# Patient Record
Sex: FEMALE | Race: BLACK OR AFRICAN AMERICAN | NOT HISPANIC OR LATINO | Employment: FULL TIME | URBAN - METROPOLITAN AREA
[De-identification: names, ages, dates, MRNs, and addresses within clinical notes are randomized per-mention and may not be internally consistent; named-entity substitution may affect disease eponyms.]

---

## 2017-01-03 ENCOUNTER — PATIENT MESSAGE (OUTPATIENT)
Dept: OBSTETRICS AND GYNECOLOGY | Facility: CLINIC | Age: 27
End: 2017-01-03

## 2017-01-03 ENCOUNTER — HOSPITAL ENCOUNTER (EMERGENCY)
Facility: OTHER | Age: 27
Discharge: HOME | End: 2017-01-03
Attending: OBSTETRICS & GYNECOLOGY
Payer: MEDICAID

## 2017-01-03 ENCOUNTER — TELEPHONE (OUTPATIENT)
Dept: OBSTETRICS AND GYNECOLOGY | Facility: CLINIC | Age: 27
End: 2017-01-03

## 2017-01-03 VITALS
DIASTOLIC BLOOD PRESSURE: 66 MMHG | TEMPERATURE: 97 F | RESPIRATION RATE: 16 BRPM | SYSTOLIC BLOOD PRESSURE: 126 MMHG | HEART RATE: 118 BPM | OXYGEN SATURATION: 99 %

## 2017-01-03 DIAGNOSIS — O36.8120 DECREASED FETAL MOVEMENT AFFECTING MANAGEMENT OF PREGNANCY IN SECOND TRIMESTER: Primary | ICD-10-CM

## 2017-01-03 PROCEDURE — 99282 EMERGENCY DEPT VISIT SF MDM: CPT | Mod: ,,, | Performed by: OBSTETRICS & GYNECOLOGY

## 2017-01-03 PROCEDURE — 99282 EMERGENCY DEPT VISIT SF MDM: CPT

## 2017-01-03 NOTE — TELEPHONE ENCOUNTER
Dr. Kidd pt calling, has been experiencing decreased fetal movement since yesterday morning. Pt states that she is diabetic and is unsure if that is the cause of the decrease. Pt has appt tomorrow and wants to know if she should come in today or wait until tomorrow to discuss. Please call pt at [phone].

## 2017-01-03 NOTE — ED AVS SNAPSHOT
[facility]  [address]  [facility] [address]               Anusha Andrew   1/3/2017 10:39 AM   ED    Description:  Female : 1990   Department:  [facility] [place], operated by [facility]           Your Care was Coordinated By:     Provider Role From To    Malou Palacios MD Attending Provider 17 1039 --      Reason for Visit     Decreased Fetal Movement           Diagnoses this Visit        Comments    Decreased fetal movement affecting management of pregnancy in second trimester    -  Primary       ED Disposition     ED Disposition Condition Comment    Discharge             To Do List           [facility]     [facility] -  Assistance  Registered nurses in the [facility] provide clinical advisement, health education, appointment booking, and other advisory services.  Call for this free service at [phone].             Medications           Message regarding Medications     Verify the changes and/or additions to your medication regime listed below are the same as discussed with your clinician today.  If any of these changes or additions are incorrect, please notify your healthcare provider.             Verify that the below list of medications is an accurate representation of the medications you are currently taking.  If none reported, the list may be blank. If incorrect, please contact your healthcare provider. Carry this list with you in case of emergency.           Current Medications     blood sugar diagnostic Strp 1 each by Misc.(Non-Drug; Combo Route) route 6 (six) times daily.    buPROPion (WELLBUTRIN XL) 150 MG TB24 tablet Take 1 tablet (150 mg total) by mouth every morning.    insulin aspart (NOVOLOG) 100 unit/mL InPn pen Inject 38 units w/ breakfast, 26 units w/ lunch, 32 units w/ dinner.    insulin detemir (LEVEMIR FLEXTOUCH) 100 unit/mL (3 mL) SubQ InPn pen Inject 50 Units into the skin 2 (two) times daily.     "lancets Misc 1 Device by Misc.(Non-Drug; Combo Route) route 4 (four) times daily before meals and nightly.    pen needle, diabetic 31 gauge x 1/4" Ndle 1 Device by MISCELLANEOUS route 4 (four) times daily with meals and nightly.    PNV#67-iron ps-FA cmb#1-dha (VITAFOL ULTRA) 29 mg iron- 1 mg-200 mg Cap Take 1 tablet by mouth once daily.           Clinical Reference Information           Your Vitals Were     BP Pulse Temp Resp Last Period SpO2    126/66 118 97 °F (36.1 °C) (Temporal) 16 07/19/2016 99%      Allergies as of 1/3/2017        Reactions    No Known Allergies       Immunizations Administered on Date of Encounter - 1/3/2017     None      ED Micro, Lab, POCT     None      ED Imaging Orders     None        Discharge Instructions         Recognizing Labor    The beginning of labor is the beginning of birth. Youll start to feel strong contractions. Thats when the muscles of your uterus tighten up to help push your baby out during birth.  Yes, labor has probably started   Signs of labor include:  · Your contractions are getting stronger and more painful instead of weaker. Youll probably feel them throughout your whole uterus.  · Your contractions are regular. This means that you feel them about every 5 to 10 minutes. And they are getting closer together.  · You have pink-colored or blood-streaked fluid from your vagina.  · Your water breaks. It may be a gush or a slow trickle of clear fluid from your vagina.  No, its probably not real labor   Signs of false labor include:  · Your contractions arent regular or strong.  · You feel the contractions only in your lower uterus.  · Your contractions go away when you walk or change position.  · Your contractions go away after drinking fluids.  When to call your healthcare provider  Call your healthcare provider or clinic right away if you notice any of these signs:  · Fluid from your vagina, with or without contractions.  · Bleeding heavy enough that you need a " sanitary pad.  · You dont feel your baby moving as much as before.     NOTE: Contractions are timed by both of these measures:  · The length of each contraction from its start to its finish.  · How far apart the contractions are -- the time between the start of one contraction and the start of the next contraction.   © 8316-7675 [employer]. [address] [place] [address], [place] [address]. All rights reserved. This information is not intended as a substitute for professional medical care. Always follow your healthcare professional's instructions.        Monitoring Your Blood Sugar During Pregnancy     Your healthcare provider will make sure you know how to check your blood sugar correctly.     The only way to be sure your blood sugar stays within a normal range is to check it regularly. You will most likely be asked to check your blood sugar at home 1 or more times a day. Your healthcare provider will teach you how. He or she may also ask you to check your urine at home.   Checking your blood sugar at home  Your healthcare provider will discuss the best way and times for you to check your blood sugar, and show you what to do. Your blood sugar is usually highest about an hour after you eat. You can check it using a blood glucose meter.  · Be sure to read the instructions that come with your meter. Follow them carefully.  · Record your blood sugar level every time you check it. Bring your records and your meter to all your appointments with your healthcare provider.  Your blood sugar   Your blood sugar should be as follows:  Less than           __ when you get up.  Less than           after breakfast.  Less than          after lunch.  Less than             after dinner.  Check your blood sugar when you get up and 1 to 2 hour(s) after breakfast, lunch, and dinner, as your healthcare provider instructs.  When to call your healthcare provider  Your blood sugar is above             for more  than                        .   If you check your urine at home  If you dont eat enough, your body will burn fat to get energy. This leaves ketones in your urine. Your healthcare provider may have you check your urine for ketones each morning. Youll use special strips that change color if there are ketones. If you have ketones, this may mean that youre not getting enough calories. Your healthcare provider may make changes in your meal plan.  When to call your healthcare provider  You have ketones in your urine for more than 2 day(s) in a row.   © 2430-8160 [employer]. [address] [place] [address]. All rights reserved. This information is not intended as a substitute for professional medical care. Always follow your healthcare professional's instructions.              Your Scheduled Appointments     Jan 04, 2017  9:45 AM CST   Routine Prenatal Visit with Purnima Kidd MD   [facility]    [address]  [address]  [facility] [address] [phone]            Jan 04, 2017 11:45 AM CST   OB Special with ADDITIONS, SPECIAL LWSC   [facility] ([facility])    [address]  [place] [address] [phone]            Jan 24, 2017  9:00 AM CST   Ultrasound with Artur Call MD   [facility] ([facility])    [address]  [facility] [address]   [phone]               [facility] complies with applicable Federal civil rights laws and does not discriminate on the basis of race, color, national origin, age, disability, or sex.        Language Assistance Services     ATTENTION: Language assistance services are available, free of charge. Please call [phone].      ATENCIÓN: Si habla español, tiene a moreno disposición servicios gratuitos de asistencia lingüística. Llame al [phone].     CHÚ Ý: N?u b?n nói Ti?ng Vi?t, có các d?ch v?  h? tr? jamie ren? mi?n phí dành cho b?n. G?i s? [phone].

## 2017-01-03 NOTE — ED PROVIDER NOTES
Encounter Date: 1/3/2017  25 yo  at 24.0 weeks presents c/o decreased fetal movement, now resolved. Patient reports no movement perceived since yesterday although she is now aware of routine movement. She denies contractions, vaginal bleeding or loss of fluid. Patient is a type 1 diabetic with recently elevated fasting (120) and postprandial (170) but she is working with Dr. Bradshaw and regulating her diet.     History     Chief Complaint   Patient presents with    Decreased Fetal Movement     Review of patient's allergies indicates:   Allergen Reactions    No known allergies      HPI Comments: 25 yo  at 24.0 weeks presents c/o decreased fetal movement, now resolved. Patient reports no movement perceived since yesterday although she is now aware of routine movement. She denies contractions, vaginal bleeding or loss of fluid. Patient is a type 1 diabetic with recently elevated fasting (120) and postprandial (170) but she is working with Dr. Bradshaw and regulating her diet.    The history is provided by the patient.     Past Medical History   Diagnosis Date    Diabetes     Hyperlipidemia     Hypertension     Type I (juvenile type) diabetes mellitus without mention of complication, uncontrolled 2011     Past Medical History Pertinent Negatives   Diagnosis Date Noted    Abnormal Pap smear of cervix 2016    Amblyopia 2015    Arthritis 2015    Cataract 2015    Diabetic retinopathy 2015    Glaucoma 2015    Macular degeneration 2015    Retinal detachment 2015    Sickle cell anemia 2015    Sickle cell trait 2015    Strabismus 2015    Uveitis 2015     Past Surgical History   Procedure Laterality Date    Tonsillectomy, adenoidectomy      [place] tooth extraction      Abcess drainage       boil went over GA    Tonsillectomy, adenoidectomy       section  2012     Family History   Problem Relation Age of Onset     Diabetes Brother     No Known Problems Mother     No Known Problems Father     No Known Problems Sister     No Known Problems Maternal Aunt     No Known Problems Maternal Uncle     No Known Problems Paternal Aunt     No Known Problems Paternal Uncle     No Known Problems Maternal Grandmother     No Known Problems Maternal Grandfather     No Known Problems Paternal Grandmother     No Known Problems Paternal Grandfather     Amblyopia Neg Hx     Blindness Neg Hx     Cataracts Neg Hx     Glaucoma Neg Hx     Hypertension Neg Hx     Macular degeneration Neg Hx     Retinal detachment Neg Hx     Strabismus Neg Hx     Stroke Neg Hx     Thyroid disease Neg Hx     Breast cancer Neg Hx     Colon cancer Neg Hx     Ovarian cancer Neg Hx      Social History   Substance Use Topics    Smoking status: Never Smoker    Smokeless tobacco: None    Alcohol use No     Review of Systems   Constitutional: Negative for appetite change and fever.   HENT: Negative for sore throat.    Respiratory: Negative for shortness of breath.    Cardiovascular: Negative for chest pain.   Gastrointestinal: Positive for abdominal distention (Gravid). Negative for abdominal pain and nausea.   Endocrine: Negative for polydipsia, polyphagia and polyuria.   Genitourinary: Negative for difficulty urinating, dysuria and pelvic pain.   Musculoskeletal: Negative for back pain.   Skin: Negative for rash.   Neurological: Negative for dizziness and weakness.   Hematological: Does not bruise/bleed easily.       Physical Exam   Initial Vitals   BP Pulse Resp Temp SpO2   01/03/17 1052 01/03/17 1052 01/03/17 1052 01/03/17 1052 01/03/17 1052   126/66 118 16 97 °F (36.1 °C) 99 %     Physical Exam    Constitutional: She appears well-developed and well-nourished. No distress.   HENT:   Head: Normocephalic and atraumatic.   Cardiovascular: Normal rate and regular rhythm.   Pulmonary/Chest: Breath sounds normal. No respiratory distress.   Abdominal:  Soft. She exhibits distension (Gravid). There is no tenderness. There is no rebound and no guarding.   Genitourinary: No vaginal discharge found.   Neurological: She is alert and oriented to person, place, and time.   Psychiatric: She has a normal mood and affect. Her behavior is normal. Judgment and thought content normal.     OB LABOR EXAM:   Pre-Term Labor: No.   Membranes ruptured: No.             Amniotic Fluid Color: no fluid.     Comments: FHT: reassuring, 140 bpm, mod BTB variability age appropriate  TOCO: none       ED Course   Procedures  Labs Reviewed - No data to display          Medical Decision Making:   History:   Old Medical Records: I decided to obtain old medical records.  Old Records Summarized: records from clinic visits.       <> Summary of Records: Diabetic management appropriate with US for growth, fetal echo  Initial Assessment:   Decreased fetal movement  ED Management:  Fetal heart tracing reassuring  Movement counts discussed, follow up in office in AM.                   ED Course     Clinical Impression:   The encounter diagnosis was Decreased fetal movement affecting management of pregnancy in second trimester.          Malou Palacios MD  01/03/17 3682

## 2017-01-03 NOTE — DISCHARGE INSTRUCTIONS
Recognizing Labor    The beginning of labor is the beginning of birth. Youll start to feel strong contractions. Thats when the muscles of your uterus tighten up to help push your baby out during birth.  Yes, labor has probably started   Signs of labor include:  · Your contractions are getting stronger and more painful instead of weaker. Youll probably feel them throughout your whole uterus.  · Your contractions are regular. This means that you feel them about every 5 to 10 minutes. And they are getting closer together.  · You have pink-colored or blood-streaked fluid from your vagina.  · Your water breaks. It may be a gush or a slow trickle of clear fluid from your vagina.  No, its probably not real labor   Signs of false labor include:  · Your contractions arent regular or strong.  · You feel the contractions only in your lower uterus.  · Your contractions go away when you walk or change position.  · Your contractions go away after drinking fluids.  When to call your healthcare provider  Call your healthcare provider or clinic right away if you notice any of these signs:  · Fluid from your vagina, with or without contractions.  · Bleeding heavy enough that you need a sanitary pad.  · You dont feel your baby moving as much as before.     NOTE: Contractions are timed by both of these measures:  · The length of each contraction from its start to its finish.  · How far apart the contractions are -- the time between the start of one contraction and the start of the next contraction.   © 4108-9181 [employer]. [address] [place] [address], [place] [address]. All rights reserved. This information is not intended as a substitute for professional medical care. Always follow your healthcare professional's instructions.        Monitoring Your Blood Sugar During Pregnancy     Your healthcare provider will make sure you know how to check your blood sugar correctly.     The only way to be sure your blood sugar  stays within a normal range is to check it regularly. You will most likely be asked to check your blood sugar at home 1 or more times a day. Your healthcare provider will teach you how. He or she may also ask you to check your urine at home.   Checking your blood sugar at home  Your healthcare provider will discuss the best way and times for you to check your blood sugar, and show you what to do. Your blood sugar is usually highest about an hour after you eat. You can check it using a blood glucose meter.  · Be sure to read the instructions that come with your meter. Follow them carefully.  · Record your blood sugar level every time you check it. Bring your records and your meter to all your appointments with your healthcare provider.  Your blood sugar   Your blood sugar should be as follows:  Less than           __ when you get up.  Less than           after breakfast.  Less than          after lunch.  Less than             after dinner.  Check your blood sugar when you get up and 1 to 2 hour(s) after breakfast, lunch, and dinner, as your healthcare provider instructs.  When to call your healthcare provider  Your blood sugar is above             for more than                        .   If you check your urine at home  If you dont eat enough, your body will burn fat to get energy. This leaves ketones in your urine. Your healthcare provider may have you check your urine for ketones each morning. Youll use special strips that change color if there are ketones. If you have ketones, this may mean that youre not getting enough calories. Your healthcare provider may make changes in your meal plan.  When to call your healthcare provider  You have ketones in your urine for more than 2 day(s) in a row.   © 6236-5974 [employer]. [address] [place] [address], [place] [address]. All rights reserved. This information is not intended as a substitute for professional medical care. Always follow your healthcare  professional's instructions.

## 2017-01-04 ENCOUNTER — ROUTINE PRENATAL (OUTPATIENT)
Dept: OBSTETRICS AND GYNECOLOGY | Facility: CLINIC | Age: 27
End: 2017-01-04
Attending: OBSTETRICS & GYNECOLOGY
Payer: MEDICAID

## 2017-01-04 VITALS
BODY MASS INDEX: 36.51 KG/M2 | DIASTOLIC BLOOD PRESSURE: 70 MMHG | WEIGHT: 209.44 LBS | SYSTOLIC BLOOD PRESSURE: 112 MMHG

## 2017-01-04 DIAGNOSIS — Z34.82 ENCOUNTER FOR SUPERVISION OF OTHER NORMAL PREGNANCY IN SECOND TRIMESTER: Primary | ICD-10-CM

## 2017-01-04 PROCEDURE — 99999 PR PBB SHADOW E&M-EST. PATIENT-LVL I: CPT | Mod: PBBFAC,,, | Performed by: OBSTETRICS & GYNECOLOGY

## 2017-01-04 PROCEDURE — 99212 OFFICE O/P EST SF 10 MIN: CPT | Mod: TH,S$PBB,, | Performed by: OBSTETRICS & GYNECOLOGY

## 2017-01-04 NOTE — PROGRESS NOTES
Went to OB ED for decreased FM yesterday. Had NST. Says she does not feel it much at work but better after eating and when she is home. Accuchecks not well controlled. Being followed by MFM. Discussed need for NST's for DM. Will start around 28-30 weeks unless MFM recommends otherwise.

## 2017-01-13 ENCOUNTER — PATIENT MESSAGE (OUTPATIENT)
Dept: OBSTETRICS AND GYNECOLOGY | Facility: CLINIC | Age: 27
End: 2017-01-13

## 2017-01-13 ENCOUNTER — PATIENT MESSAGE (OUTPATIENT)
Dept: MATERNAL FETAL MEDICINE | Facility: CLINIC | Age: 27
End: 2017-01-13

## 2017-01-17 ENCOUNTER — TELEPHONE (OUTPATIENT)
Dept: MATERNAL FETAL MEDICINE | Facility: CLINIC | Age: 27
End: 2017-01-17

## 2017-01-17 NOTE — TELEPHONE ENCOUNTER
After receipt of patient's blood sugar log, Dr. Call reviewed and wants patient to increased Levemir dose to 115 units.    Nurse phoned patient with the change. Patient notified and aware that new medication regimen includes    Novolog:  - 42 units at breakfast  - 26 units at lunch  - 38 units at dinner    Levemir:  - Increase to 115 units at nighttime    Patient verbalized understanding of information received.

## 2017-01-18 ENCOUNTER — HOSPITAL ENCOUNTER (EMERGENCY)
Facility: HOSPITAL | Age: 27
Discharge: HOME | End: 2017-01-18
Attending: EMERGENCY MEDICINE
Payer: MEDICAID

## 2017-01-18 VITALS
HEART RATE: 100 BPM | TEMPERATURE: 99 F | HEIGHT: 63 IN | OXYGEN SATURATION: 99 % | WEIGHT: 207 LBS | SYSTOLIC BLOOD PRESSURE: 127 MMHG | DIASTOLIC BLOOD PRESSURE: 71 MMHG | RESPIRATION RATE: 18 BRPM | BODY MASS INDEX: 36.68 KG/M2

## 2017-01-18 DIAGNOSIS — G43.909 MIGRAINE WITHOUT STATUS MIGRAINOSUS, NOT INTRACTABLE, UNSPECIFIED MIGRAINE TYPE: Primary | ICD-10-CM

## 2017-01-18 LAB — POCT GLUCOSE: 163 MG/DL (ref 70–110)

## 2017-01-18 PROCEDURE — 99283 EMERGENCY DEPT VISIT LOW MDM: CPT | Mod: 25

## 2017-01-18 PROCEDURE — 25000003 PHARM REV CODE 250: Performed by: NURSE PRACTITIONER

## 2017-01-18 PROCEDURE — 96374 THER/PROPH/DIAG INJ IV PUSH: CPT

## 2017-01-18 PROCEDURE — 96361 HYDRATE IV INFUSION ADD-ON: CPT

## 2017-01-18 PROCEDURE — 63600175 PHARM REV CODE 636 W HCPCS: Performed by: NURSE PRACTITIONER

## 2017-01-18 PROCEDURE — 82962 GLUCOSE BLOOD TEST: CPT

## 2017-01-18 RX ORDER — METOCLOPRAMIDE 10 MG/1
10 TABLET ORAL EVERY 6 HOURS PRN
Qty: 30 TABLET | Refills: 0 | Status: SHIPPED | OUTPATIENT
Start: 2017-01-18 | End: 2017-03-16

## 2017-01-18 RX ORDER — ACETAMINOPHEN 325 MG/1
650 TABLET ORAL
Status: COMPLETED | OUTPATIENT
Start: 2017-01-18 | End: 2017-01-18

## 2017-01-18 RX ORDER — METOCLOPRAMIDE HYDROCHLORIDE 5 MG/ML
10 INJECTION INTRAMUSCULAR; INTRAVENOUS
Status: COMPLETED | OUTPATIENT
Start: 2017-01-18 | End: 2017-01-18

## 2017-01-18 RX ADMIN — METOCLOPRAMIDE 10 MG: 5 INJECTION, SOLUTION INTRAMUSCULAR; INTRAVENOUS at 04:01

## 2017-01-18 RX ADMIN — SODIUM CHLORIDE 1000 ML: 0.9 INJECTION, SOLUTION INTRAVENOUS at 04:01

## 2017-01-18 RX ADMIN — ACETAMINOPHEN 650 MG: 325 TABLET ORAL at 04:01

## 2017-01-18 NOTE — ED AVS SNAPSHOT
[facility]  [address] [place] [address]               Anusha Andrew   2017  3:38 PM   ED    Description:  Female : 1990   Department:  [facility]           Your Care was Coordinated By:     Provider Role From To    Tyree Carmen MD Attending Provider 17 [phone] --    Poppy Yañez NP Nurse Practitioner 17 [phone] --      Reason for Visit     Headache           Diagnoses this Visit        Comments    Migraine without status migrainosus, not intractable, unspecified migraine type    -  Primary       ED Disposition     None           To Do List           Follow-up Information     Follow up with Rhina Archer MD.    Specialty:  Family Medicine    Why:  As needed    Contact information:    [address] [place] [address] [phone]          Follow up with [facility].    Specialty:  Emergency Medicine    Why:  If symptoms worsen or any other concerns    Contact information:    [address] [place] [address] [phone]       These Medications        Disp Refills Start End    metoclopramide HCl (REGLAN) 10 MG tablet 30 tablet 0 2017     Take 1 tablet (10 mg total) by mouth every 6 (six) hours as needed. - Oral    Pharmacy: [facility] [address] [place] [address] #: [phone]         [facility]     [facility] -  Assistance  Registered nurses in the [facility] provide clinical advisement, health education, appointment booking, and other advisory services.  Call for this free service at [phone].             Medications           Message regarding Medications     Verify the changes and/or additions to your medication regime listed below are the same as discussed with your clinician today.  If any of these changes or additions are incorrect, please notify your healthcare provider.        START  "taking these NEW medications        Refills    metoclopramide HCl (REGLAN) 10 MG tablet 0    Sig: Take 1 tablet (10 mg total) by mouth every 6 (six) hours as needed.    Class: Print    Route: Oral      These medications were administered today        Dose Freq    sodium chloride 0.9% bolus 1,000 mL 1,000 mL ED 1 Time    Sig: Inject 1,000 mLs into the vein ED 1 Time.    Class: Normal    Route: Intravenous    metoclopramide HCl injection 10 mg 10 mg ED 1 Time    Sig: Inject 2 mLs (10 mg total) into the vein ED 1 Time.    Class: Normal    Route: Intravenous    acetaminophen tablet 650 mg 650 mg ED 1 Time    Sig: Take 2 tablets (650 mg total) by mouth ED 1 Time.    Class: Normal    Route: Oral           Verify that the below list of medications is an accurate representation of the medications you are currently taking.  If none reported, the list may be blank. If incorrect, please contact your healthcare provider. Carry this list with you in case of emergency.           Current Medications     blood sugar diagnostic Strp 1 each by Misc.(Non-Drug; Combo Route) route 6 (six) times daily.    buPROPion (WELLBUTRIN XL) 150 MG TB24 tablet Take 1 tablet (150 mg total) by mouth every morning.    insulin aspart (NOVOLOG) 100 unit/mL InPn pen Inject 38 units w/ breakfast, 26 units w/ lunch, 32 units w/ dinner.    insulin detemir (LEVEMIR FLEXTOUCH) 100 unit/mL (3 mL) SubQ InPn pen Inject 50 Units into the skin 2 (two) times daily.    lancets Misc 1 Device by Misc.(Non-Drug; Combo Route) route 4 (four) times daily before meals and nightly.    metoclopramide HCl (REGLAN) 10 MG tablet Take 1 tablet (10 mg total) by mouth every 6 (six) hours as needed.    pen needle, diabetic 31 gauge x 1/4" Ndle 1 Device by MISCELLANEOUS route 4 (four) times daily with meals and nightly.    PNV#67-iron ps-FA cmb#1-dha (VITAFOL ULTRA) 29 mg iron- 1 mg-200 mg Cap Take 1 tablet by mouth once daily.           Clinical Reference Information           Your " "Vitals Were     BP Pulse Temp Resp Height Weight    139/68 (BP Location: Left arm, Patient Position: Sitting) 114 99.1 °F (37.3 °C) (Oral) 20 5' 3" (1.6 m) 93.9 kg (207 lb)    Last Period SpO2 BMI          07/19/2016 98% 36.67 kg/m2        Allergies as of 1/18/2017        Reactions    No Known Allergies       Immunizations Administered on Date of Encounter - 1/18/2017     None      ED Micro, Lab, POCT     Start Ordered       Status Ordering Provider    01/18/17 1612 01/18/17 1612  POCT glucose  Once      Final result     01/18/17 1552 01/18/17 1553  POCT glucose  Once      Acknowledged       ED Imaging Orders     None        Discharge Instructions         Migraine Headache: Stages and Treatment  A migraine headache tends to progress in stages. Learning these stages can help you better understand what is happening. Then you can learn ways to reduce pain and relieve other symptoms. Methods for relieving your symptoms include self-care and medicines.  Migraine stages  Migraines tend to progress through 4 stages. Many people don't have all stages, and stages may differ with each headache:  · Prodrome. A few hours to a day or so before the headache, you may feel tired, (yawning many times), uneasy, or bello. You may also feel bloated or crave certain foods.  · Aura. Up to an hour before the headache starts, some migraine sufferers experience aura--flashing lights, blind spots, other vision problems, confusion, difficulty speaking, or other neurologic symptoms.  · Headache. Moderate to severe pain affects one side of the head and then can spread to both sides, often along with nausea. You may be highly sensitive to light, sound, and odors. Vomiting or diarrhea may also happen. This stage lasts 4 to 72 hours.  · Postdrome. After your headache ends, you may feel tired, achy, and "washed out." This may last for a day or so.    Self-care during a migraine  Here is what you can do:  · Use a cold compress. Wrap a thin cloth " around a cold pack, a cold can of soda, or a bag of frozen vegetables. Apply this to your temple or other pain site.  · Drink fluids. If nausea makes it hard to drink, try sucking on ice.  · Rest. If possible, lie down. Try not to bend over, as this may increase your pain. Sometimes laying in a dark quiet room can help the migraine from being aggravated.    · Try caffeine. Some people find that drinking fluids with caffeine, such as coffee or tea, helps to lessen migraine pain.  Using medicines  Work with your healthcare provider to find the right medicines for you. Medicines for migraine may relieve pain (analgesics), relieve nausea, or attack the migraine's root causes (migraine-specific medicines).  Rebound headache  Taking analgesics each day, or even several times a week, may lead to more frequent and severe headaches. These are called rebound headaches. If you think you're having rebound headaches, tell your healthcare provider. He or she can help you safely decrease your medicine. Rebound caffeine withdrawal headaches can also happen.    © 1833-5089 [employer]. [address] [place] [address]. All rights reserved. This information is not intended as a substitute for professional medical care. Always follow your healthcare professional's instructions.          Your Scheduled Appointments     Jan 24, 2017  9:00 AM CST   Ultrasound with Artur Call MD   [facility] - Maternal [facility] ([facility])    [address] [facility] [address]   [phone]            Feb 01, 2017  2:15 PM CST   Routine Prenatal Visit with Purnima Kidd MD   [facility] -[facility] ([facility])    [address]  [address]  [facility] [address]   [phone]            Feb 21, 2017  9:00 AM CST   Established Patient Visit with Raad Cuenca OD   [facility] ([place])    [address] [facility]  [place] [address]   [phone]            Feb 21, 2017  9:15 AM  CST   Contact Lens F/U with Raad Cuenca OD   [facility] ([place])    [address]  [place] [address] [phone]               [facility] complies with applicable Federal civil rights laws and does not discriminate on the basis of race, color, national origin, age, disability, or sex.        Language Assistance Services     ATTENTION: Language assistance services are available, free of charge. Please call [phone].      ATENCIÓN: Si habla español, tiene a moreno disposición servicios gratuitos de asistencia lingüística. Llame al [phone].     CHÚ Ý: N?u b?n nói Ti?ng Vi?t, có các d?ch v? h? tr? ngôn ng? mi?n phí dành cho b?n. G?i s? [phone].

## 2017-01-18 NOTE — ED PROVIDER NOTES
"Encounter Date: 1/18/2017    SCRIBE #1 NOTE: I, Alesia Metcalf , am scribing for, and in the presence of,  Poppy Yañez NP. I have scribed the following portions of the note - Other sections scribed: HPI and ROS .       History     Chief Complaint   Patient presents with    Headache     " I have been having constant headaces near my right eye for the past week that are sensitive to light/noise" No relief w/ Tylenol. Pt 26weeks pregnant.     Review of patient's allergies indicates:   Allergen Reactions    No known allergies      HPI Comments: CC: Headache     HPI: This 26 weeks gravid 25 y/o female with DM I and HTN presents to the ED for evaluation of an acute onset right-sided constant throbbing 9/10 headache that began 1 week ago. Associated symptoms include light and sound sensitivity, nausea and decreased appetite. Pt denies emesis, fever or other associated symptoms. Pt attempted treatment with tylenol, no relief. Pt's last dose was yesterday evening. Pt denies hx of migraines. Pt denies headaches during her previous pregnancies.  Pt is due to give birth on 4/25/17. Pt's blood glucose levels have been "all over the place". Pt does not have a ride home.     The history is provided by the patient. No  was used.     Past Medical History   Diagnosis Date    Diabetes     Hyperlipidemia     Hypertension     Type I (juvenile type) diabetes mellitus without mention of complication, uncontrolled 11/23/2011     Past Medical History Pertinent Negatives   Diagnosis Date Noted    Abnormal Pap smear of cervix 9/14/2016    Amblyopia 7/17/2015    Arthritis 7/17/2015    Cataract 7/17/2015    Diabetic retinopathy 7/17/2015    Glaucoma 7/17/2015    Macular degeneration 7/17/2015    Retinal detachment 7/17/2015    Sickle cell anemia 7/17/2015    Sickle cell trait 7/17/2015    Strabismus 7/17/2015    Uveitis 7/17/2015     Past Surgical History   Procedure Laterality Date    Tonsillectomy, " adenoidectomy      [place] tooth extraction      Abcess drainage       boil went over GA    Tonsillectomy, adenoidectomy       section  2012     Family History   Problem Relation Age of Onset    Diabetes Brother     No Known Problems Mother     No Known Problems Father     No Known Problems Sister     No Known Problems Maternal Aunt     No Known Problems Maternal Uncle     No Known Problems Paternal Aunt     No Known Problems Paternal Uncle     No Known Problems Maternal Grandmother     No Known Problems Maternal Grandfather     No Known Problems Paternal Grandmother     No Known Problems Paternal Grandfather     Amblyopia Neg Hx     Blindness Neg Hx     Cataracts Neg Hx     Glaucoma Neg Hx     Hypertension Neg Hx     Macular degeneration Neg Hx     Retinal detachment Neg Hx     Strabismus Neg Hx     Stroke Neg Hx     Thyroid disease Neg Hx     Breast cancer Neg Hx     Colon cancer Neg Hx     Ovarian cancer Neg Hx      Social History   Substance Use Topics    Smoking status: Never Smoker    Smokeless tobacco: None    Alcohol use No     Review of Systems   Constitutional: Positive for appetite change (decreased). Negative for fever.   HENT: Negative for ear pain and sore throat.    Respiratory: Negative for cough and shortness of breath.    Cardiovascular: Negative for chest pain.   Gastrointestinal: Positive for nausea. Negative for abdominal pain and vomiting.   Genitourinary: Negative for flank pain and hematuria.   Musculoskeletal: Negative for back pain and neck stiffness.   Skin: Negative for rash.   Neurological: Positive for headaches (near right eye w/ light and sound sensitivity ). Negative for dizziness.   Psychiatric/Behavioral: Negative for confusion.       Physical Exam   Initial Vitals   BP Pulse Resp Temp SpO2   17 1508 17 1508 17 1508 17 1508 17 1508   139/68 114 20 99.1 °F (37.3 °C) 98 %     Physical Exam    Nursing note and  vitals reviewed.  Constitutional: She appears well-developed and well-nourished.   HENT:   Head: Normocephalic.   Nose: Nose normal.   Mouth/Throat: Oropharynx is clear and moist.   Eyes: Conjunctivae are normal.   Neck: Normal range of motion. Neck supple.   Musculoskeletal: Normal range of motion.   5/5 strength throughout   Neurological: She is alert and oriented to person, place, and time. She has normal strength and normal reflexes. She displays normal reflexes. No cranial nerve deficit or sensory deficit.   Skin: Skin is warm and dry.   Psychiatric: She has a normal mood and affect.         ED Course   Procedures  Labs Reviewed   POCT GLUCOSE - Abnormal; Notable for the following:        Result Value    POCT Glucose 163 (*)     All other components within normal limits   POCT GLUCOSE MONITORING CONTINUOUS             Medical Decision Making:   Initial Assessment:   26yr old female, who is 28wks pregnant, presents with intermittent right sided throbbing ha x 1 wk.  Associated s/s include nausea, photophobia and phonophobia.   Differential Diagnosis:   Migraine ha  HTN - pregnancy induced  Tension ha  ED Management:  Pts exam was unremarkable; pt has no focal neurological deficits     NS bolus and IV reglan, po tylenol given.      PT reports mild improvement after meds (fluid not infused yet) (6/10)     After 1L NS done, ha  1/10 and pt ready to go home.       Based on exam today - I have low suspicion for medical, surgical or other life threatening illness and I believe pt is safe for discharge and outpatient f/u.  BP WNL, diaz has classic migrainous s/s    Pt verbalizes understanding of d/c instructions to include following up with ob / pcp for migraine management and will return for worsening condition.    Case discussed with attending who agrees with assessment and plan.               Scribe Attestation:   Scribe #1: I performed the above scribed service and the documentation accurately describes the services  I performed. I attest to the accuracy of the note.    Attending Attestation:     Physician Attestation Statement for NP/PA:   I discussed this assessment and plan of this patient with the NP/PA, but I did not personally examine the patient. The face to face encounter was performed by the NP/PA.    Other NP/PA Attestation Additions:      Medical Decision Making: Agree with assessment and management of headache in pregnancy.       Physician Attestation for Scribe:  Physician Attestation Statement for Scribe #1: I, Poppy Yañez NP, reviewed documentation, as scribed by Alesia Metcalf  in my presence, and it is both accurate and complete.                 ED Course     Clinical Impression:   The encounter diagnosis was Migraine without status migrainosus, not intractable, unspecified migraine type.    Disposition:   Disposition: Discharged  Condition: Stable       Poppy Yañez NP  01/18/17 1843       Tyree Carmen MD  01/19/17 0816

## 2017-01-19 NOTE — DISCHARGE INSTRUCTIONS
"  Migraine Headache: Stages and Treatment  A migraine headache tends to progress in stages. Learning these stages can help you better understand what is happening. Then you can learn ways to reduce pain and relieve other symptoms. Methods for relieving your symptoms include self-care and medicines.  Migraine stages  Migraines tend to progress through 4 stages. Many people don't have all stages, and stages may differ with each headache:  · Prodrome. A few hours to a day or so before the headache, you may feel tired, (yawning many times), uneasy, or bello. You may also feel bloated or crave certain foods.  · Aura. Up to an hour before the headache starts, some migraine sufferers experience aura--flashing lights, blind spots, other vision problems, confusion, difficulty speaking, or other neurologic symptoms.  · Headache. Moderate to severe pain affects one side of the head and then can spread to both sides, often along with nausea. You may be highly sensitive to light, sound, and odors. Vomiting or diarrhea may also happen. This stage lasts 4 to 72 hours.  · Postdrome. After your headache ends, you may feel tired, achy, and "washed out." This may last for a day or so.    Self-care during a migraine  Here is what you can do:  · Use a cold compress. Wrap a thin cloth around a cold pack, a cold can of soda, or a bag of frozen vegetables. Apply this to your temple or other pain site.  · Drink fluids. If nausea makes it hard to drink, try sucking on ice.  · Rest. If possible, lie down. Try not to bend over, as this may increase your pain. Sometimes laying in a dark quiet room can help the migraine from being aggravated.    · Try caffeine. Some people find that drinking fluids with caffeine, such as coffee or tea, helps to lessen migraine pain.  Using medicines  Work with your healthcare provider to find the right medicines for you. Medicines for migraine may relieve pain (analgesics), relieve nausea, or attack the " migraine's root causes (migraine-specific medicines).  Rebound headache  Taking analgesics each day, or even several times a week, may lead to more frequent and severe headaches. These are called rebound headaches. If you think you're having rebound headaches, tell your healthcare provider. He or she can help you safely decrease your medicine. Rebound caffeine withdrawal headaches can also happen.    © 7890-3816 [employer]. [address] [place] [address], [place] [address]. All rights reserved. This information is not intended as a substitute for professional medical care. Always follow your healthcare professional's instructions.

## 2017-01-24 ENCOUNTER — OFFICE VISIT (OUTPATIENT)
Dept: MATERNAL FETAL MEDICINE | Facility: CLINIC | Age: 27
End: 2017-01-24
Attending: OBSTETRICS & GYNECOLOGY
Payer: MEDICAID

## 2017-01-24 VITALS
BODY MASS INDEX: 37.69 KG/M2 | SYSTOLIC BLOOD PRESSURE: 114 MMHG | DIASTOLIC BLOOD PRESSURE: 72 MMHG | WEIGHT: 212.75 LBS

## 2017-01-24 DIAGNOSIS — O24.912 DIABETES MELLITUS AFFECTING PREGNANCY IN SECOND TRIMESTER: ICD-10-CM

## 2017-01-24 PROCEDURE — 76816 OB US FOLLOW-UP PER FETUS: CPT | Mod: 26,S$PBB,, | Performed by: OBSTETRICS & GYNECOLOGY

## 2017-01-24 PROCEDURE — 99212 OFFICE O/P EST SF 10 MIN: CPT | Mod: PBBFAC | Performed by: OBSTETRICS & GYNECOLOGY

## 2017-01-24 PROCEDURE — 99999 PR PBB SHADOW E&M-EST. PATIENT-LVL II: CPT | Mod: PBBFAC,,, | Performed by: OBSTETRICS & GYNECOLOGY

## 2017-01-24 PROCEDURE — 99499 UNLISTED E&M SERVICE: CPT | Mod: S$PBB,,, | Performed by: OBSTETRICS & GYNECOLOGY

## 2017-01-24 PROCEDURE — 76816 OB US FOLLOW-UP PER FETUS: CPT | Mod: PBBFAC | Performed by: OBSTETRICS & GYNECOLOGY

## 2017-01-24 NOTE — PROGRESS NOTES
Patient did not bring her blood sugar log to her visit for Dr. Call to review. Dr. Call notified and aware. Patient states she will fax or drop them off on Thursday when she works.

## 2017-01-24 NOTE — LETTER
January 24, 2017      Purnima Kidd MD  [address]  [address]  [place] [address]           [facility]  [address] [facility] [address]  Phone: [phone]          Patient: Anusha Andrew   MR Number: [id number]   YOB: 1990   Date of Visit: 1/24/2017       Dear Dr. Purnima Kidd:    Thank you for referring Anusha Andrew to me for evaluation. Attached you will find relevant portions of my assessment and plan of care.    If you have questions, please do not hesitate to call me. I look forward to following Anusha Andrew along with you.    Sincerely,    Artur Call MD    Enclosure  CC:  No Recipients    If you would like to receive this communication electronically, please contact [email or web address] or [phone] to request more information on [facility] Link access.    For providers and/or their staff who would like to refer a patient to [facility], please contact us through our one-stop-shop provider referral line, [facility] [place], at [phone].    If you feel you have received this communication in error or would no longer like to receive these types of communications, please e-mail [email or web address]

## 2017-02-01 ENCOUNTER — ROUTINE PRENATAL (OUTPATIENT)
Dept: OBSTETRICS AND GYNECOLOGY | Facility: CLINIC | Age: 27
End: 2017-02-01
Attending: OBSTETRICS & GYNECOLOGY
Payer: MEDICAID

## 2017-02-01 ENCOUNTER — TELEPHONE (OUTPATIENT)
Dept: MATERNAL FETAL MEDICINE | Facility: CLINIC | Age: 27
End: 2017-02-01

## 2017-02-01 VITALS
BODY MASS INDEX: 38.08 KG/M2 | WEIGHT: 214.94 LBS | DIASTOLIC BLOOD PRESSURE: 70 MMHG | SYSTOLIC BLOOD PRESSURE: 126 MMHG

## 2017-02-01 DIAGNOSIS — Z34.80 SUPERVISION OF OTHER NORMAL PREGNANCY: Primary | ICD-10-CM

## 2017-02-01 PROCEDURE — 99212 OFFICE O/P EST SF 10 MIN: CPT | Mod: PBBFAC | Performed by: OBSTETRICS & GYNECOLOGY

## 2017-02-01 PROCEDURE — 99999 PR PBB SHADOW E&M-EST. PATIENT-LVL II: CPT | Mod: PBBFAC,,, | Performed by: OBSTETRICS & GYNECOLOGY

## 2017-02-01 PROCEDURE — 99213 OFFICE O/P EST LOW 20 MIN: CPT | Mod: TH,S$PBB,, | Performed by: OBSTETRICS & GYNECOLOGY

## 2017-02-01 NOTE — MR AVS SNAPSHOT
[facility]  [address]  [address]  [facility] [address]  Phone: [phone]  Fax: [phone]                  Anusha Andrew   2017 2:15 PM   Routine Prenatal    Description:  Female : 1990   Provider:  Purnima Kidd MD   Department[facility]  [facility]           Reason for Visit     Routine Prenatal Visit           Diagnoses this Visit        Comments    Supervision of other normal pregnancy    -  Primary            To Do List           Future Appointments        Provider Department Dept Phone    2017 11:00 AM [facility]ima [facility]idd [facility] [phone]    2017 9:00 AM Raad Cuenca OD [facility] [phone]    2017 9:15 AM Raad Cuenca OD [facility] [phone]    2017 3:20 PM [facility], [address] [place] [facility] [phone]    3/2/2017 11:00 AM Carmelina Barger NP [facility] [phone]      Goals (5 Years of Data)     None      Follow-Up and Disposition     Return in about 2 weeks (around 2/15/2017) for OB visit.    Follow-up and Disposition History      [facility]     [facility] Nurse Care Line - 24/ Assistance  Registered nurses in the [facility] provide clinical advisement, health education, appointment booking, and other advisory services.  Call for this free service at [phone].             Medications           Message regarding Medications     Verify the changes and/or additions to your medication regime listed below are the same as discussed with your clinician today.  If any of these changes or additions are incorrect, please notify your healthcare provider.             Verify that the below list of medications is an accurate representation of the medications you are currently taking.  If none reported, the list may be blank. If incorrect, please contact your healthcare provider. Carry this list with you in case of emergency.          "  Current Medications     blood sugar diagnostic Strp 1 each by Misc.(Non-Drug; Combo Route) route 6 (six) times daily.    buPROPion (WELLBUTRIN XL) 150 MG TB24 tablet Take 1 tablet (150 mg total) by mouth every morning.    insulin aspart (NOVOLOG) 100 unit/mL InPn pen Inject 38 units w/ breakfast, 26 units w/ lunch, 32 units w/ dinner.    insulin detemir (LEVEMIR FLEXTOUCH) 100 unit/mL (3 mL) SubQ InPn pen Inject 50 Units into the skin 2 (two) times daily.    lancets Misc 1 Device by Misc.(Non-Drug; Combo Route) route 4 (four) times daily before meals and nightly.    metoclopramide HCl (REGLAN) 10 MG tablet Take 1 tablet (10 mg total) by mouth every 6 (six) hours as needed.    pen needle, diabetic 31 gauge x 1/4" Ndle 1 Device by MISCELLANEOUS route 4 (four) times daily with meals and nightly.    PNV#67-iron ps-FA cmb#1-dha (VITAFOL ULTRA) 29 mg iron- 1 mg-200 mg Cap Take 1 tablet by mouth once daily.           Clinical Reference Information           Prenatal Vitals     Enc. Date GA Prenatal Vitals Prenatal Pulse Pain Level Urine Albumin/Glucose Edema Presentation Dilation/Effacement/Station    2/1/17 28w1d 126/70 / 97.5 kg (214 lb 15.2 oz) 31 cm / 155 / Present  0 Negative / 2+ None / None / None      1/24/17 27w0d 114/72 / 96.5 kg (212 lb 11.9 oz)           1/4/17 24w1d 112/70 / 95 kg (209 lb 7 oz)  / 150 / Absent   Negative / 2+       1/3/17 24w0d Admission Dept: [facility]    12/7/16 20w1d 126/70 / 94.2 kg (207 lb 9 oz)  /  / Present  0 Negative / 2+ None / None / None      11/29/16 19w0d 122/70 / 92.3 kg (203 lb 7.8 oz)           11/18/16 17w3d 118/70 / 92.4 kg (203 lb 11.3 oz)  / 154-159 / Present  0        11/10/16 16w2d 110/62 / 92.1 kg (203 lb 2.5 oz)  / 150 / Present  0 Negative / 3+ None / None / None      11/10/16 16w2d 122/70 / 92.3 kg (203 lb 7.8 oz)  / 158-162 / Present  0        10/26/16 14w1d 111/79 / 91.4 kg (201 lb 8 oz)  / 164          10/13/16 12w2d 102/68 / 91 kg (200 lb 9.9 oz)  /  / Present " " 0 Negative / Negative None / None / None      10/13/16 12w2d 112/68 / 90.8 kg (200 lb 2.8 oz)           9/19/16 8w6d 118/80 / 89.2 kg (196 lb 10.4 oz) 9 cm / 180 / Absent   Negative / Negative None / None / None / No      9/14/16 8w1d Admission Dx: Poorly controlled type 1 diabetes mellitus Dept: [facility]    9/14/16 8w1d 110/60 / 89.7 kg (197 lb 10.3 oz)              Height: 5' 3" (1.6 m)       Vital Signs - Last Recorded  Most recent update: 2/1/2017  2:49 PM by Mi Kemp MA    BP Wt LMP BMI       126/70 97.5 kg (214 lb 15.2 oz) 07/19/2016 38.08 kg/m2       Allergies as of 2/1/2017     No Known Allergies      Immunizations Administered on Date of Encounter - 2/1/2017     None      "

## 2017-02-10 ENCOUNTER — TELEPHONE (OUTPATIENT)
Dept: MATERNAL FETAL MEDICINE | Facility: CLINIC | Age: 27
End: 2017-02-10

## 2017-02-10 NOTE — TELEPHONE ENCOUNTER
Message left to call [facility] back at her earliest convenience at [phone] regarding her blood sugar log review per Dr. Call.

## 2017-02-13 ENCOUNTER — TELEPHONE (OUTPATIENT)
Dept: MATERNAL FETAL MEDICINE | Facility: CLINIC | Age: 27
End: 2017-02-13

## 2017-02-13 ENCOUNTER — TELEPHONE (OUTPATIENT)
Dept: OBSTETRICS AND GYNECOLOGY | Facility: CLINIC | Age: 27
End: 2017-02-13

## 2017-02-13 DIAGNOSIS — O24.113 PRE-EXISTING TYPE 2 DIABETES MELLITUS DURING PREGNANCY IN THIRD TRIMESTER: Primary | ICD-10-CM

## 2017-02-13 NOTE — TELEPHONE ENCOUNTER
Tried calling pt and mailbox is full. Can't leave message. I Will attempt to call emergency contact at [phone]. I called emergency contact and left message on v/m for her to have Anusha return my call. Pt has appt on Thursday 2-16-17 with Dr Kidd @ [facility] office. Will put a note in Pt's OB sticky note for Dr Kidd or Carlos to see and tell pt.

## 2017-02-13 NOTE — TELEPHONE ENCOUNTER
Patient calling back since dropping blood sugars off since 02/01/2017.  Patient to do Hemoglobin A1C today and see  tomorrow.  Patient verbalized her understanding.

## 2017-02-13 NOTE — TELEPHONE ENCOUNTER
Pt returned my call and she was notified that she needs to contact Dr Call's office about her blood sugars and needs Hemoglobin A1C blood test. Advised her to call Dr Call's office today to schedule appt ASAP. Pt said ok and understood.

## 2017-02-13 NOTE — TELEPHONE ENCOUNTER
----- Message from Arlene Zepeda RN sent at 2/13/2017  8:37 AM CST -----  We have multiple attempts to contact patient to discuss blood sugars she dropped off on 02/01/2017 with no return calls.  Patient needs a appointment with Dr.Fortunato COREA along with a Hemoglobin A1C.  Patient will see  on the 16 th.  will be in the office this week on Tuesday or Wednesday.    Thanks    Arlene Zepeda RN [facility]

## 2017-02-14 ENCOUNTER — ROUTINE PRENATAL (OUTPATIENT)
Dept: MATERNAL FETAL MEDICINE | Facility: CLINIC | Age: 27
End: 2017-02-14
Payer: MEDICAID

## 2017-02-14 ENCOUNTER — HOSPITAL ENCOUNTER (OUTPATIENT)
Facility: OTHER | Age: 27
Discharge: HOME | End: 2017-02-17
Attending: OBSTETRICS & GYNECOLOGY | Admitting: OBSTETRICS & GYNECOLOGY
Payer: MEDICAID

## 2017-02-14 DIAGNOSIS — O24.912 DIABETES MELLITUS AFFECTING PREGNANCY IN SECOND TRIMESTER: ICD-10-CM

## 2017-02-14 DIAGNOSIS — O24.919 DIABETES IN PREGNANCY: ICD-10-CM

## 2017-02-14 LAB
FIBRONECTIN FETAL SPEC QL: NEGATIVE
POCT GLUCOSE: 109 MG/DL (ref 70–110)
POCT GLUCOSE: 130 MG/DL (ref 70–110)

## 2017-02-14 PROCEDURE — 25000003 PHARM REV CODE 250

## 2017-02-14 PROCEDURE — 59025 FETAL NON-STRESS TEST: CPT | Mod: 26,,, | Performed by: OBSTETRICS & GYNECOLOGY

## 2017-02-14 PROCEDURE — 82731 ASSAY OF FETAL FIBRONECTIN: CPT

## 2017-02-14 PROCEDURE — 99223 1ST HOSP IP/OBS HIGH 75: CPT | Mod: 25,,, | Performed by: OBSTETRICS & GYNECOLOGY

## 2017-02-14 PROCEDURE — 63600175 PHARM REV CODE 636 W HCPCS: Performed by: OBSTETRICS & GYNECOLOGY

## 2017-02-14 PROCEDURE — 11000001 HC ACUTE MED/SURG PRIVATE ROOM

## 2017-02-14 PROCEDURE — G0379 DIRECT REFER HOSPITAL OBSERV: HCPCS

## 2017-02-14 PROCEDURE — 63600175 PHARM REV CODE 636 W HCPCS: Performed by: STUDENT IN AN ORGANIZED HEALTH CARE EDUCATION/TRAINING PROGRAM

## 2017-02-14 PROCEDURE — G0378 HOSPITAL OBSERVATION PER HR: HCPCS

## 2017-02-14 PROCEDURE — 76819 FETAL BIOPHYS PROFIL W/O NST: CPT | Mod: 26,S$PBB,, | Performed by: OBSTETRICS & GYNECOLOGY

## 2017-02-14 PROCEDURE — 99214 OFFICE O/P EST MOD 30 MIN: CPT | Mod: S$PBB,TH,25, | Performed by: OBSTETRICS & GYNECOLOGY

## 2017-02-14 PROCEDURE — 25000003 PHARM REV CODE 250: Performed by: STUDENT IN AN ORGANIZED HEALTH CARE EDUCATION/TRAINING PROGRAM

## 2017-02-14 RX ORDER — INSULIN ASPART 100 [IU]/ML
38 INJECTION, SOLUTION INTRAVENOUS; SUBCUTANEOUS
Status: DISCONTINUED | OUTPATIENT
Start: 2017-02-14 | End: 2017-02-17 | Stop reason: HOSPADM

## 2017-02-14 RX ORDER — SODIUM CHLORIDE 9 MG/ML
INJECTION, SOLUTION INTRAVENOUS CONTINUOUS
Status: DISCONTINUED | OUTPATIENT
Start: 2017-02-14 | End: 2017-02-14

## 2017-02-14 RX ORDER — SIMETHICONE 80 MG
1 TABLET,CHEWABLE ORAL EVERY 6 HOURS PRN
Status: DISCONTINUED | OUTPATIENT
Start: 2017-02-14 | End: 2017-02-17 | Stop reason: HOSPADM

## 2017-02-14 RX ORDER — DIPHENHYDRAMINE HCL 25 MG
25 CAPSULE ORAL EVERY 4 HOURS PRN
Status: DISCONTINUED | OUTPATIENT
Start: 2017-02-14 | End: 2017-02-17 | Stop reason: HOSPADM

## 2017-02-14 RX ORDER — INSULIN ASPART 100 [IU]/ML
42 INJECTION, SOLUTION INTRAVENOUS; SUBCUTANEOUS
Status: DISCONTINUED | OUTPATIENT
Start: 2017-02-15 | End: 2017-02-15

## 2017-02-14 RX ORDER — INSULIN ASPART 100 [IU]/ML
26 INJECTION, SOLUTION INTRAVENOUS; SUBCUTANEOUS
Status: DISCONTINUED | OUTPATIENT
Start: 2017-02-14 | End: 2017-02-14

## 2017-02-14 RX ORDER — AMOXICILLIN 250 MG
1 CAPSULE ORAL NIGHTLY PRN
Status: DISCONTINUED | OUTPATIENT
Start: 2017-02-14 | End: 2017-02-17 | Stop reason: HOSPADM

## 2017-02-14 RX ORDER — ONDANSETRON 4 MG/1
8 TABLET, ORALLY DISINTEGRATING ORAL EVERY 8 HOURS PRN
Status: DISCONTINUED | OUTPATIENT
Start: 2017-02-14 | End: 2017-02-17 | Stop reason: HOSPADM

## 2017-02-14 RX ORDER — DIPHENHYDRAMINE HYDROCHLORIDE 50 MG/ML
25 INJECTION INTRAMUSCULAR; INTRAVENOUS EVERY 4 HOURS PRN
Status: DISCONTINUED | OUTPATIENT
Start: 2017-02-14 | End: 2017-02-14

## 2017-02-14 RX ORDER — PRENATAL WITH FERROUS FUM AND FOLIC ACID 3080; 920; 120; 400; 22; 1.84; 3; 20; 10; 1; 12; 200; 27; 25; 2 [IU]/1; [IU]/1; MG/1; [IU]/1; MG/1; MG/1; MG/1; MG/1; MG/1; MG/1; UG/1; MG/1; MG/1; MG/1; MG/1
1 TABLET ORAL DAILY
Status: DISCONTINUED | OUTPATIENT
Start: 2017-02-14 | End: 2017-02-17 | Stop reason: HOSPADM

## 2017-02-14 RX ORDER — INSULIN ASPART 100 [IU]/ML
26 INJECTION, SOLUTION INTRAVENOUS; SUBCUTANEOUS
Status: DISCONTINUED | OUTPATIENT
Start: 2017-02-14 | End: 2017-02-16

## 2017-02-14 RX ORDER — SODIUM CHLORIDE 9 MG/ML
INJECTION, SOLUTION INTRAVENOUS ONCE
Status: COMPLETED | OUTPATIENT
Start: 2017-02-14 | End: 2017-02-14

## 2017-02-14 RX ADMIN — INSULIN ASPART 38 UNITS: 100 INJECTION, SOLUTION INTRAVENOUS; SUBCUTANEOUS at 07:02

## 2017-02-14 RX ADMIN — Medication 1 EACH: at 02:02

## 2017-02-14 RX ADMIN — INSULIN ASPART 26 UNITS: 100 INJECTION, SOLUTION INTRAVENOUS; SUBCUTANEOUS at 02:02

## 2017-02-14 RX ADMIN — INSULIN DETEMIR 50 UNITS: 100 INJECTION, SOLUTION SUBCUTANEOUS at 10:02

## 2017-02-14 RX ADMIN — SODIUM CHLORIDE 1000 ML: 0.9 INJECTION, SOLUTION INTRAVENOUS at 07:02

## 2017-02-14 RX ADMIN — SODIUM CHLORIDE: 0.9 INJECTION, SOLUTION INTRAVENOUS at 11:02

## 2017-02-14 NOTE — PROGRESS NOTES
Follow up consultation regarding diabetes in pregnancy provided today.  Risks of uncontrolled diabetes in pregnancy reviewed once again.  Blood glucose measurements assessed. She has mildly elevated BS in all periods.  Hgb A1c is 8.0 which is not concordant with BS log provided.  This raises concerns for increased fetal morbidity.   I did not adjust her medications today.  I will admit to pattern sugars and provide insulin regulation as needed.    Results of today's ultrasound discussed with patient.  I spent 30 minutes with patient today over half of which was in consultation separate of her ultrasound examination.     Referring physician to receive copy of today's consultation via electronic medical record.

## 2017-02-14 NOTE — H&P
HISTORY AND PHYSICAL  ANTEPARTUM          Subjective:       Anusha Andrew is a 26 y.o.  female with IUP at 30w0d measured by LMP with significant hx of uncontrolled DM1, HLD, h/o GHTN in a prior pregnancy, h/o c/s and obesity who is being admitted for blood sugar patterning.  Hgb A1c  Is 8.0.  Patient denies contractions, denies vaginal bleeding, denies LOF.   Fetal Movement: normal.     PMHx:   Past Medical History   Diagnosis Date    Diabetes     Hyperlipidemia     Hypertension     Type I (juvenile type) diabetes mellitus without mention of complication, uncontrolled 2011       PSHx:   Past Surgical History   Procedure Laterality Date    Tonsillectomy, adenoidectomy      [place] tooth extraction      Abcess drainage       boil went over [place]    Tonsillectomy, adenoidectomy       section  2012       All:   Review of patient's allergies indicates:   Allergen Reactions    No known allergies        Meds:   Prescriptions Prior to Admission   Medication Sig Dispense Refill Last Dose    blood sugar diagnostic Strp 1 each by Misc.(Non-Drug; Combo Route) route 6 (six) times daily. 200 each 11 Taking    buPROPion (WELLBUTRIN XL) 150 MG TB24 tablet Take 1 tablet (150 mg total) by mouth every morning. 30 tablet 2 Taking    insulin aspart (NOVOLOG) 100 unit/mL InPn pen Inject 38 units w/ breakfast, 26 units w/ lunch, 32 units w/ dinner. (Patient taking differently: Inject 42 units w/ breakfast, 26 units w/ lunch, 38 units w/ dinner.) 24.3 mL 3 Taking    insulin detemir (LEVEMIR FLEXTOUCH) 100 unit/mL (3 mL) SubQ InPn pen Inject 50 Units into the skin 2 (two) times daily. (Patient taking differently: Inject 100 Units into the skin every evening. ) 54 mL 3 Taking    lancets Misc 1 Device by Misc.(Non-Drug; Combo Route) route 4 (four) times daily before meals and nightly. 150 each 12 Taking    metoclopramide HCl (REGLAN) 10 MG tablet Take 1 tablet (10 mg total) by mouth every 6 (six)  "hours as needed. 30 tablet 0 Taking    pen needle, diabetic 31 gauge x 1/4" Ndle 1 Device by MISCELLANEOUS route 4 (four) times daily with meals and nightly. 150 each 12 Taking    PNV#67-iron ps-FA cmb#1-dha (VITAFOL ULTRA) 29 mg iron- 1 mg-200 mg Cap Take 1 tablet by mouth once daily. 30 capsule 6 Taking       SH:   Social History     Social History    Marital status: Single     Spouse name: N/A    Number of children: N/A    Years of education: N/A     Occupational History    Not on file.     Social History Main Topics    Smoking status: Never Smoker    Smokeless tobacco: Not on file    Alcohol use No    Drug use: No    Sexual activity: Yes     Partners: Male     Birth control/ protection: OCP     Other Topics Concern    Not on file     Social History Narrative       FH:   Family History   Problem Relation Age of Onset    Diabetes Brother     No Known Problems Mother     No Known Problems Father     No Known Problems Sister     No Known Problems Maternal Aunt     No Known Problems Maternal Uncle     No Known Problems Paternal Aunt     No Known Problems Paternal Uncle     No Known Problems Maternal Grandmother     No Known Problems Maternal Grandfather     No Known Problems Paternal Grandmother     No Known Problems Paternal Grandfather     Amblyopia Neg Hx     Blindness Neg Hx     Cataracts Neg Hx     Glaucoma Neg Hx     Hypertension Neg Hx     Macular degeneration Neg Hx     Retinal detachment Neg Hx     Strabismus Neg Hx     Stroke Neg Hx     Thyroid disease Neg Hx     Breast cancer Neg Hx     Colon cancer Neg Hx     Ovarian cancer Neg Hx        OBHx:   Obstetric History       T1      TAB0   SAB0   E0   M0   L1       # Outcome Date GA Lbr Hawk/2nd Weight Sex Delivery Anes PTL Lv   2 Current            1 Term 12 37w5d  3.286 kg (7 lb 3.9 oz) F CS-LTranv EPI  Y      Name: Maryjane      Obstetric Comments   Gestational HTN, failed induction, not past 6 cm "       Objective:         Visit Vitals    /65    Pulse 106    Temp 97 °F (36.1 °C) (Temporal)    Resp 16    LMP 07/19/2016    SpO2 97%       Vitals:    02/14/17 1431 02/14/17 1432   BP: 117/65    Pulse: 108 106   Resp: 16    Temp: 97 °F (36.1 °C)    TempSrc: Temporal    SpO2:  97%       General:   alert, appears stated age and cooperative   Lungs:   clear to auscultation bilaterally   Heart:   regular rate and rhythm, S1, S2 normal, no murmur, click, rub or gallop   Abdomen:  soft, non-tender; bowel sounds normal; no masses,  no organomegaly   Extremities negative edema, negative erythema   FHT: 150, mod btbv, +accels/-decels, reactive, reassuring                 TOCO: none   Presentations: cephalic by U/s   Cervix: deferred     Lab Review  Blood Type AB POS  GBBS: unk  Rubella: Immune  RPR: NR  HIV: negative  HepB: negative      Assessment:       30w0d weeks gestation presents for management of uncontrolled DM1.    Active Hospital Problems    Diagnosis  POA    Diabetes in pregnancy [O24.919]  Yes      Resolved Hospital Problems    Diagnosis Date Resolved POA   No resolved problems to display.            Plan:      Risks, benefits, alternatives and possible complications have been discussed in detail with the patient.   - Consents signed and to chart  - Ad[facility] to  [facility]  -Will start home regimen of insulin: novalog 42/26/38; levemir 50 BID  -1600 pedro diabetic diet  -2AM, fasting, 2h PP blood sugars  -NST BID      Marck Walker M.D.  PGY-2 OBGYN  [phone]

## 2017-02-14 NOTE — IP AVS SNAPSHOT
[facility] Location ([facility])  [address] [place], [place] [address]  Phone: [phone]           Patient Discharge Instructions     Our goal is to set you up for success. This packet includes information on your condition, medications, and your home care. It will help you to care for yourself so you don't get sicker and need to go back to the hospital.     Please ask your nurse if you have any questions.        There are many details to remember when preparing to leave the hospital. Here is what you will need to do:    1. Take your medicine. If you are prescribed medications, review your Medication List in the following pages. You may have new medications to  at the pharmacy and others that you'll need to stop taking. Review the instructions for how and when to take your medications. Talk with your doctor or nurses if you are unsure of what to do.     2. Go to your follow-up appointments. Specific follow-up information is listed in the following pages. Your may be contacted by a transition nurse or clinical provider about future appointments. Be sure we have all of the phone numbers to reach you, if needed. Please contact your provider's office if you are unable to make an appointment.     3. Watch for warning signs. Your doctor or nurse will give you detailed warning signs to watch for and when to call for assistance. These instructions may also include educational information about your condition. If you experience any of warning signs to your health, call your doctor.               [facility]  Unless otherwise directed by your provider, please contact [facility], our nurse care line that is available for 24/7 assistance.     [phone] (toll-free)    Registered nurses in the [facility] provide clinical advisement, health education, appointment booking, and other advisory services.                    ** Verify the list of medication(s) below is accurate and up to  date. Carry this with you in case of emergency. If your medications have changed, please notify your healthcare provider.             Medication List      CHANGE how you take these medications        Additional Info                      insulin aspart 100 unit/mL Inpn pen   Commonly known as:  NovoLOG   Quantity:  58.5 mL   Refills:  3   Dose:  65 Units   What changed:    - how much to take  - how to take this  - when to take this  - additional instructions    Last time this was given:  60 Units on 2/17/2017  1:26 PM   Instructions:  Inject 65 Units into the skin once daily. 65 units with breakfast.  60 units with lunch.  38 units with dinner.     Begin Date    AM    Noon    PM    Bedtime         CONTINUE taking these medications        Additional Info                      blood sugar diagnostic Strp   Quantity:  200 each   Refills:  11   Dose:  1 each    Instructions:  1 each by Misc.(Non-Drug; Combo Route) route 6 (six) times daily.     Begin Date    AM    Noon    PM    Bedtime       buPROPion 150 MG TB24 tablet   Commonly known as:  WELLBUTRIN XL   Quantity:  30 tablet   Refills:  2   Dose:  150 mg    Instructions:  Take 1 tablet (150 mg total) by mouth every morning.     Begin Date    AM    Noon    PM    Bedtime       insulin detemir 100 unit/mL (3 mL) Inpn pen   Commonly known as:  LEVEMIR FLEXTOUCH   Quantity:  90 mL   Refills:  3   Dose:  100 Units    Last time this was given:  100 Units on 2/16/2017  9:57 PM   Instructions:  Inject 100 Units into the skin every evening.     Begin Date    AM    Noon    PM    Bedtime       lancets Misc   Quantity:  150 each   Refills:  12   Dose:  1 Device    Instructions:  1 Device by Misc.(Non-Drug; Combo Route) route 4 (four) times daily before meals and nightly.     Begin Date    AM    Noon    PM    Bedtime       metoclopramide HCl 10 MG tablet   Commonly known as:  REGLAN   Quantity:  30 tablet   Refills:  0   Dose:  10 mg    Instructions:  Take 1 tablet (10 mg total) by  "mouth every 6 (six) hours as needed.     Begin Date    AM    Noon    PM    Bedtime       pen needle, diabetic 31 gauge x 1/4" Ndle   Quantity:  150 each   Refills:  12   Dose:  1 Device    Instructions:  1 Device by MISCELLANEOUS route 4 (four) times daily with meals and nightly.     Begin Date    AM    Noon    PM    Bedtime       PNV#67-iron ps-FA cmb#1-dha 29 mg iron- 1 mg-200 mg Cap   Commonly known as:  VITAFOL ULTRA   Quantity:  30 capsule   Refills:  6   Dose:  1 tablet    Instructions:  Take 1 tablet by mouth once daily.     Begin Date    AM    Noon    PM    Bedtime            Where to Get Your Medications      These medications were sent to [facility] [address] [place] [address] ([place], [place] - [address]  [address] ([place] [address]     Phone:  [phone]     insulin aspart 100 unit/mL Inpn pen    insulin detemir 100 unit/mL (3 mL) Inpn pen                  Please bring to all follow up appointments:    1. A copy of your discharge instructions.  2. All medicines you are currently taking in their original bottles.  3. Identification and insurance card.    Please arrive 15 minutes ahead of scheduled appointment time.    Please call 24 hours in advance if you must reschedule your appointment and/or time.        Your Scheduled Appointments     Feb 21, 2017  9:00 AM CST   Established Patient Visit with Raad Cuenca OD   [facility]    [address]  [place] [address] [phone]            Feb 21, 2017  9:15 AM CST   Contact Lens F/U with Raad Cuenca OD   [facility] ([facility])    [address]  [place] [address] [phone]            Feb 22, 2017  3:20 PM CST   Ultrasound with ULTRASOUND, [facility] 4TH FLR ON CALL   [facility] ()    [address]  [place] [address]   [phone]            Mar 02, 2017 11:00 AM CST   Routine Prenatal Visit with Carmelina Barger NP   [facility] ([facility]    [address]  [address]  [facility] [address] [phone]            Mar 02, 2017 11:30 AM CST   OB Special with ADDITIONS, [facility] ([facility])    [address]  [address]  [facility] [address] [phone]              Follow-up Information     Follow up with Artur Call MD. Schedule an appointment as soon as possible for a visit in 1 week.    Specialty:  Maternal and Fetal Medicine    Why:  Blood sugar follow up    Contact information:    [address]  [facility] [id number] [phone]          Discharge Instructions     Future Orders    Activity as tolerated     Call MD for:  difficulty breathing or increased cough     Call MD for:  persistent dizziness, light-headedness, or visual disturbances     Call MD for:  persistent nausea and vomiting or diarrhea     Call MD for:  redness, tenderness, or signs of infection (pain, swelling, redness, odor or green/yellow discharge around incision site)     Call MD for:  severe persistent headache     Call MD for:  severe uncontrolled pain     Call MD for:  temperature >100.4     Diet general     Questions:    Total calories:      Fat restriction, if any:      Protein restriction, if any:      Na restriction, if any:      Fluid restriction:      Additional restrictions:          Discharge Instructions       Call/come to L&D OB ED, [phone],  for decreased or no fetal movement, vaginal bleeding, leaking fluid contractions more than 4 in one hour or painful contractions, or unusual vaginal discharge.  Continue to keep your Blood Sugar Log and be sure to log when you do calibrations on your home meter. You can  control solutions from [facility], [phone] in 4-7 days. Follow up appointment in 1 week with .    Discharge References/Attachments     BLOOD SUGAR LOG, USING A (ENGLISH)    PREMATURE LABOR (ENGLISH)     "    Primary Diagnosis     Your primary diagnosis was:  Diabetes Mellitus During Pregnancy      Admission Information     Date & Time Provider Department CSN    2/14/2017  1:25 PM Artur Call MD [facility] [id number]      Care Providers     Provider Role Specialty Primary office phone    Artur Call MD Attending Provider Maternal and Fetal Medicine [phone]      Your Vitals Were     BP Pulse Temp Resp Height Weight    116/63 106 97.3 °F (36.3 °C) 18 5' 3" (1.6 m) 93.9 kg (207 lb)    Last Period SpO2 BMI          07/19/2016 99% 36.67 kg/m2        Recent Lab Values        10/13/2015 12/30/2015 3/30/2016 8/14/2016 9/14/2016 11/14/2016 12/7/2016 2/13/2017      8:00 AM  8:00 AM  9:43 AM  8:01 AM  9:30 PM 11:26 AM 10:57 AM  3:39 PM    A1C 12.3 (H) 11.7 (H) 11.4 (H) 11.3 (H) 9.9 (H) 7.6 (H) 7.3 (H) 8.0 (H)    Comment for A1C at  8:01 AM on 8/14/2016:  According to ADA guidelines, hemoglobin A1C <7.0% represents  optimal control in non-pregnant diabetic patients.  Different  metrics may apply to specific populations.   Standards of Medical Care in Diabetes - 2016.  For the purpose of screening for the presence of diabetes:  <5.7%     Consistent with the absence of diabetes  5.7-6.4%  Consistent with increasing risk for diabetes   (prediabetes)  >or=6.5%  Consistent with diabetes  Currently no consensus exists for use of hemoglobin A1C  for diagnosis of diabetes for children.      Comment for A1C at  9:30 PM on 9/14/2016:  According to ADA guidelines, hemoglobin A1C <7.0% represents  optimal control in non-pregnant diabetic patients.  Different  metrics may apply to specific populations.   Standards of Medical Care in Diabetes - 2016.  For the purpose of screening for the presence of diabetes:  <5.7%     Consistent with the absence of diabetes  5.7-6.4%  Consistent with increasing risk for diabetes   (prediabetes)  >or=6.5%  Consistent with diabetes  Currently no consensus exists for " use of hemoglobin A1C  for diagnosis of diabetes for children.      Comment for A1C at 11:26 AM on 11/14/2016:  According to ADA guidelines, hemoglobin A1C <7.0% represents  optimal control in non-pregnant diabetic patients.  Different  metrics may apply to specific populations.   Standards of Medical Care in Diabetes - 2016.  For the purpose of screening for the presence of diabetes:  <5.7%     Consistent with the absence of diabetes  5.7-6.4%  Consistent with increasing risk for diabetes   (prediabetes)  >or=6.5%  Consistent with diabetes  Currently no consensus exists for use of hemoglobin A1C  for diagnosis of diabetes for children.      Comment for A1C at 10:57 AM on 12/7/2016:  According to ADA guidelines, hemoglobin A1C <7.0% represents  optimal control in non-pregnant diabetic patients.  Different  metrics may apply to specific populations.   Standards of Medical Care in Diabetes - 2016.  For the purpose of screening for the presence of diabetes:  <5.7%     Consistent with the absence of diabetes  5.7-6.4%  Consistent with increasing risk for diabetes   (prediabetes)  >or=6.5%  Consistent with diabetes  Currently no consensus exists for use of hemoglobin A1C  for diagnosis of diabetes for children.      Comment for A1C at  3:39 PM on 2/13/2017:  According to ADA guidelines, hemoglobin A1C <7.0% represents  optimal control in non-pregnant diabetic patients.  Different  metrics may apply to specific populations.   Standards of Medical Care in Diabetes - 2016.  For the purpose of screening for the presence of diabetes:  <5.7%     Consistent with the absence of diabetes  5.7-6.4%  Consistent with increasing risk for diabetes   (prediabetes)  >or=6.5%  Consistent with diabetes  Currently no consensus exists for use of hemoglobin A1C  for diagnosis of diabetes for children.        Allergies as of 2/17/2017        Reactions    No Known Allergies       Advance Directives     An advance directive is a document which,  in the event you are no longer able to make decisions for yourself, tells your healthcare team what kind of treatment you do or do not want to receive, or who you would like to make those decisions for you.  If you do not currently have an advance directive, [facility] encourages you to create one.  For more information call:  [phone]), [phone] ([phone]),  or log on to [email or web address].        Language Assistance Services     ATTENTION: Language assistance services are available, free of charge. Please call [phone].      ATENCIÓN: Si habla español, tiene a moreno disposición servicios gratuitos de asistencia lingüística. Llame al [phone].     CHÚ Ý: N?u b?n nói Ti?ng Vi?t, có các d?ch v? h? tr? ngôn ng? mi?n phí dành cho b?n. G?i s? [phone].        Diabetes Discharge Instructions                                    [facility] complies with applicable Federal civil rights laws and does not discriminate on the basis of race, color, national origin, age, disability, or sex.

## 2017-02-15 LAB
POCT GLUCOSE: 141 MG/DL (ref 70–110)
POCT GLUCOSE: 143 MG/DL (ref 70–110)
POCT GLUCOSE: 153 MG/DL (ref 70–110)
POCT GLUCOSE: 185 MG/DL (ref 70–110)
POCT GLUCOSE: 202 MG/DL (ref 70–110)

## 2017-02-15 PROCEDURE — 63600175 PHARM REV CODE 636 W HCPCS: Performed by: STUDENT IN AN ORGANIZED HEALTH CARE EDUCATION/TRAINING PROGRAM

## 2017-02-15 PROCEDURE — 11000001 HC ACUTE MED/SURG PRIVATE ROOM

## 2017-02-15 PROCEDURE — 99225 PR SUBSEQUENT OBSERVATION CARE,LEVEL II: CPT | Mod: ,,, | Performed by: OBSTETRICS & GYNECOLOGY

## 2017-02-15 PROCEDURE — G0378 HOSPITAL OBSERVATION PER HR: HCPCS

## 2017-02-15 PROCEDURE — 25000003 PHARM REV CODE 250: Performed by: STUDENT IN AN ORGANIZED HEALTH CARE EDUCATION/TRAINING PROGRAM

## 2017-02-15 RX ORDER — INSULIN ASPART 100 [IU]/ML
65 INJECTION, SOLUTION INTRAVENOUS; SUBCUTANEOUS
Status: DISCONTINUED | OUTPATIENT
Start: 2017-02-16 | End: 2017-02-17 | Stop reason: HOSPADM

## 2017-02-15 RX ADMIN — INSULIN ASPART 42 UNITS: 100 INJECTION, SOLUTION INTRAVENOUS; SUBCUTANEOUS at 10:02

## 2017-02-15 RX ADMIN — INSULIN DETEMIR 50 UNITS: 100 INJECTION, SOLUTION SUBCUTANEOUS at 09:02

## 2017-02-15 RX ADMIN — INSULIN ASPART 38 UNITS: 100 INJECTION, SOLUTION INTRAVENOUS; SUBCUTANEOUS at 07:02

## 2017-02-15 RX ADMIN — INSULIN ASPART 26 UNITS: 100 INJECTION, SOLUTION INTRAVENOUS; SUBCUTANEOUS at 02:02

## 2017-02-15 RX ADMIN — Medication 1 EACH: at 09:02

## 2017-02-15 NOTE — PROGRESS NOTES
Dr Walker notified of hr 109-121, pt resting quietly, MD ordered to remove pulse ox/hr monitoring at this time. Pt has home glucometer with her. Pt will use own monitor in addition to hospital glucometer to verify home cbgs.

## 2017-02-15 NOTE — PROGRESS NOTES
Dr Lee notified of , new orders noted for levemir. Pt stated she did not have snacks today, only meals, reported decreased appetite.

## 2017-02-15 NOTE — PROGRESS NOTES
" at 30w0d type I diabetic admitted for blood glucose patterning due to recent increase in A1c.     To bedside for assessment after RN notified that pt complains of regular contractions, 6/10 pain, q5 min.  Patient reports this has been going on throughout the day. No h/o  labor or  delivery. Prior delivery at term via  section due to worsening BPs (preeclampsia?), though patient did dilate to 6cm (in ). Denies LOF, vaginal bleeding. Denies chest pain, abnormal SOB (notes baseline mild SOB associated with pregnancy). Notes decreased PO intake today.    Visit Vitals    /65    Pulse 106    Temp 97 °F (36.1 °C) (Temporal)    Resp 16    Ht 5' 3" (1.6 m)    Wt 93.9 kg (207 lb)    LMP 2016    SpO2 97%    Breastfeeding No    BMI 36.67 kg/m2     Tachycardic, regular rhythm.  Abd soft, non-tender.    Sterile speculum performed to collect FFN. It was a difficult exam due to patient discomfort, though was able to collect specimen. (Denies intercourse or anything in the vagina over the past 24 hours.)  Sterile vaginal exam also performed. Also very difficult to perform due to patient discomfort, though cervix does not feel dilated.      Recent Labs  Lab 17  1727   POCTGLUCOSE 130*         Plan:  - send FFN and f/u results  - tachycardia noted; start peripheral IV and bolus 1L normal saline  - f/u I/Os    Branden Huggins MD  PGY 4 - Ob/Gyn    "

## 2017-02-15 NOTE — PROGRESS NOTES
PROGRESS NOTE  ANTEPARTUM    Admit Date: 2/14/2017   LOS: 1 day     Reason for Admission:  Diabetes in pregnancy    SUBJECTIVE:     Anusha Andrew is a 26 y.o. female at 30w1d who is here for management of uncontrolled DMI.  This pregnancy is also complicated by depression, elevated blood pressures, h/o C/S and h/o of PreE in prior pregnancy.     Overnight patient reported regular contractions.  They have subsided this morning.  She is tolerating a PO diet.  No complaints of weakness nor dizziness.  Patient denies VB.  Normal FM.    Scheduled Meds:   insulin aspart  26 Units Subcutaneous with lunch    insulin aspart  38 Units Subcutaneous with dinner    insulin aspart  42 Units Subcutaneous with breakfast    insulin detemir  50 Units Subcutaneous BID    prenatal vitamin  1 tablet Oral Daily    sodium chloride 0.9%  1,000 mL Intravenous Once     Continuous Infusions:   PRN Meds:diphenhydrAMINE, flu vac [id number] 36mos up(PF), ondansetron, promethazine (PHENERGAN) IVPB, senna-docusate 8.6-50 mg, simethicone    Review of patient's allergies indicates:   Allergen Reactions    No known allergies        OBJECTIVE:     Vital Signs (Most Recent)  Temp: 97.7 °F (36.5 °C) (02/15/17 0606)  Pulse: 110 (02/15/17 0606)  Resp: 18 (02/15/17 0606)  BP: (!) 106/54 (02/15/17 0605)  SpO2: 100 % (02/15/17 0606)    Temperature Range Min/Max (Last 24H):  Temp:  [96.8 °F (36 °C)-97.7 °F (36.5 °C)]     Vital Signs Range (Last 24H):  Temp:  [96.8 °F (36 °C)-97.7 °F (36.5 °C)]   Pulse:  [104-126]   Resp:  [16-18]   BP: (106-134)/(54-74)   SpO2:  [91 %-100 %]     I & O (Last 24H):    Intake/Output Summary (Last 24 hours) at 02/15/17 0638  Last data filed at 02/15/17 0600   Gross per 24 hour   Intake             2600 ml   Output              500 ml   Net             2100 ml       Physical Exam:  General: well developed, well nourished, no distress  Lungs:  clear to auscultation bilaterally and normal respiratory effort  Heart:  tachycardic, regular rhythm  Abdomen: soft, non-tender non-distented; bowel sounds normal; no masses,  no organomegaly  Extremities: no cyanosis or edema, or clubbing    FHT: 150, mod btbv, +accels/-decels, reactive, reassuring                 TOCO: irregular     Laboratory:  Recent Results (from the past 12 hour(s))   Fetal fibronectin 30w0d    Collection Time: 17  7:00 PM   Result Value Ref Range    Fetal Fibronectin Negative    POCT glucose    Collection Time: 17 10:07 PM   Result Value Ref Range    POCT Glucose 109 70 - 110 mg/dL   POCT glucose    Collection Time: 02/15/17  2:43 AM   Result Value Ref Range    POCT Glucose 141 (H) 70 - 110 mg/dL   POCT glucose    Collection Time: 02/15/17  6:11 AM   Result Value Ref Range    POCT Glucose 143 (H) 70 - 110 mg/dL         ASSESSMENT/PLAN:     Active Hospital Problems    Diagnosis  POA    *Diabetes in pregnancy [O24.919]  Yes      Resolved Hospital Problems    Diagnosis Date Resolved POA   No resolved problems to display.       Assessment:   26 y.o.female  at 30w1d HD#2 for management of uncontrolled DMI.        Plan:  DMI  -Elevated 2AM and fasting BS: 141, 143.  Will consider increasing nightly levemir.  Currently 50u BID  -Continue novalog   -NST BID  -1600 pedro diabetic diet  -Daily PNV    30w1d gestation  -FFN negative overnight.  Cervix closed.  Will continue to monitor    Marck Walker M.D.  PGY-2 OBGYN  [phone]

## 2017-02-15 NOTE — PROGRESS NOTES
Dr Vale notified of  2 hours post breakfast, no new orders received regarding insulin. Pt placed on pulse ox for continuous hr reading per request of Dr Walker. Pt reported having fast resting heart rate, will monitor hr and pulse ox.

## 2017-02-16 LAB
POCT GLUCOSE: 121 MG/DL (ref 70–110)
POCT GLUCOSE: 151 MG/DL (ref 70–110)
POCT GLUCOSE: 155 MG/DL (ref 70–110)
POCT GLUCOSE: 80 MG/DL (ref 70–110)
POCT GLUCOSE: 88 MG/DL (ref 70–110)

## 2017-02-16 PROCEDURE — G0378 HOSPITAL OBSERVATION PER HR: HCPCS

## 2017-02-16 PROCEDURE — 25000003 PHARM REV CODE 250: Performed by: STUDENT IN AN ORGANIZED HEALTH CARE EDUCATION/TRAINING PROGRAM

## 2017-02-16 PROCEDURE — 99231 SBSQ HOSP IP/OBS SF/LOW 25: CPT | Mod: 25,,, | Performed by: OBSTETRICS & GYNECOLOGY

## 2017-02-16 PROCEDURE — 63600175 PHARM REV CODE 636 W HCPCS: Performed by: STUDENT IN AN ORGANIZED HEALTH CARE EDUCATION/TRAINING PROGRAM

## 2017-02-16 PROCEDURE — 11000001 HC ACUTE MED/SURG PRIVATE ROOM

## 2017-02-16 PROCEDURE — 59025 FETAL NON-STRESS TEST: CPT | Mod: 26,,, | Performed by: OBSTETRICS & GYNECOLOGY

## 2017-02-16 PROCEDURE — 59025 FETAL NON-STRESS TEST: CPT

## 2017-02-16 RX ORDER — INSULIN ASPART 100 [IU]/ML
60 INJECTION, SOLUTION INTRAVENOUS; SUBCUTANEOUS ONCE
Status: COMPLETED | OUTPATIENT
Start: 2017-02-16 | End: 2017-02-16

## 2017-02-16 RX ORDER — INSULIN ASPART 100 [IU]/ML
60 INJECTION, SOLUTION INTRAVENOUS; SUBCUTANEOUS
Status: DISCONTINUED | OUTPATIENT
Start: 2017-02-17 | End: 2017-02-17 | Stop reason: HOSPADM

## 2017-02-16 RX ADMIN — INSULIN ASPART 38 UNITS: 100 INJECTION, SOLUTION INTRAVENOUS; SUBCUTANEOUS at 07:02

## 2017-02-16 RX ADMIN — INSULIN ASPART 60 UNITS: 100 INJECTION, SOLUTION INTRAVENOUS; SUBCUTANEOUS at 02:02

## 2017-02-16 RX ADMIN — INSULIN DETEMIR 100 UNITS: 100 INJECTION, SOLUTION SUBCUTANEOUS at 09:02

## 2017-02-16 RX ADMIN — Medication 1 EACH: at 08:02

## 2017-02-16 RX ADMIN — INSULIN ASPART 65 UNITS: 100 INJECTION, SOLUTION INTRAVENOUS; SUBCUTANEOUS at 08:02

## 2017-02-16 NOTE — NURSING
No significant events overnight. Remains free from fall, injury, and skin breakdown. Reports positive fetal movement. Denies contractions, vaginal bleeding, and leakage of fluid. Ambulates to restroom independently; output adequate. VSS on RA throughout the night. Positions self independently. Denies pain. TEDs/SCDs in place, removed periodically per patient. Plan of care reviewed with patient and all questions answered. Bed low and locked. Call light within reach. Purposeful rounding performed. Resting comfortably in bed, no other complaints at this time.

## 2017-02-16 NOTE — PROGRESS NOTES
PROGRESS NOTE  ANTEPARTUM    Admit Date: 2/14/2017   LOS: 2 days     Reason for Admission:  Diabetes in pregnancy    SUBJECTIVE:     Anusha Andrew is a 26 y.o. female at 30w2d who is here for management of uncontrolled DMI.  This pregnancy is also complicated by depression, elevated blood pressures, h/o C/S and h/o of PreE in prior pregnancy.     No acute events overnight.   She is tolerating a PO diet.  No complaints of weakness nor dizziness.  Patient denies VB.  Normal FM.  No ctx.    Scheduled Meds:   insulin aspart  26 Units Subcutaneous with lunch    insulin aspart  38 Units Subcutaneous with dinner    insulin aspart  65 Units Subcutaneous with breakfast    insulin detemir  75 Units Subcutaneous QHS    prenatal vitamin  1 tablet Oral Daily     Continuous Infusions:   PRN Meds:diphenhydrAMINE, flu vac [id number] 36mos up(PF), ondansetron, promethazine (PHENERGAN) IVPB, senna-docusate 8.6-50 mg, simethicone    Review of patient's allergies indicates:   Allergen Reactions    No known allergies        OBJECTIVE:     Vital Signs (Most Recent)  Temp: 99.1 °F (37.3 °C) (02/16/17 0417)  Pulse: 105 (02/16/17 0417)  Resp: 17 (02/16/17 0417)  BP: (!) 110/57 (02/16/17 0417)  SpO2: 99 % (02/16/17 0417)    Temperature Range Min/Max (Last 24H):  Temp:  [96.8 °F (36 °C)-99.1 °F (37.3 °C)]     Vital Signs Range (Last 24H):  Temp:  [96.8 °F (36 °C)-99.1 °F (37.3 °C)]   Pulse:  [105-121]   Resp:  []   BP: (110-134)/(55-69)   SpO2:  [97 %-100 %]     I & O (Last 24H):    Intake/Output Summary (Last 24 hours) at 02/16/17 0616  Last data filed at 02/15/17 2130   Gross per 24 hour   Intake              720 ml   Output              200 ml   Net              520 ml       Physical Exam:  General: well developed, well nourished, no distress  Lungs:  clear to auscultation bilaterally and normal respiratory effort  Heart: tachycardic, regular rhythm  Abdomen: soft, non-tender non-distented; bowel sounds normal; no masses,  no  organomegaly  Extremities: no cyanosis or edema, or clubbing    FHT: 155, mod btbv, +accels/-decels, reactive, reassuring                 TOCO: irregular     Laboratory:  Recent Results (from the past 12 hour(s))   POCT glucose    Collection Time: 02/15/17  9:43 PM   Result Value Ref Range    POCT Glucose 153 (H) 70 - 110 mg/dL   POCT glucose    Collection Time: 17  2:07 AM   Result Value Ref Range    POCT Glucose 155 (H) 70 - 110 mg/dL         ASSESSMENT/PLAN:     Active Hospital Problems    Diagnosis  POA    *Diabetes in pregnancy [O24.919]  Yes      Resolved Hospital Problems    Diagnosis Date Resolved POA   No resolved problems to display.       Assessment:   26 y.o.female  at 30w2d HD#3 for management of uncontrolled DMI.        Plan:  DMI  -Elevated blood sugars throughout the day yesterday.    -AM novalog increased to 65 yesterday.  Will discuss increasing post lunch and dinner doses  -Currently on novalog 65/26/38  -PM levemir increased to 75u yesterday.  Will discuss increasing.    fasting    postB    postL   postD    2am   :                                            109       141  2/15:    143        185        202       153      155       151    -NST BID  -1600 pedro diabetic diet      30w1d gestation  -Daily PNV  -Wellbutrin 150mg XL    Marck Walker M.D.  PGY-2 OBGYN  [phone]

## 2017-02-16 NOTE — MEDICAL/APP STUDENT
L&D Management Discharge Summary      Primary Clinician: Artur Call MD    Admission date: 2017  Discharge date: 2017    Disposition: To home, self care    Admit Dx:      Patient Active Problem List   Diagnosis    Uncontrolled type 1 diabetes mellitus    Diabetes type 1, uncontrolled    Leukocytosis    Obesity (BMI 30.0-34.9)    Research study patient- Lexicon T1D Study (204)    Hyperlipidemia    Poorly controlled type 1 diabetes mellitus    Pre-existing type 1 diabetes mellitus during pregnancy in second trimester    Hypertension in pregnancy, antepartum    Pre-existing type 2 diabetes mellitus during pregnancy in second trimester    Diabetes in pregnancy     Discharge Dx:    Patient Active Problem List   Diagnosis    Uncontrolled type 1 diabetes mellitus    Diabetes type 1, uncontrolled    Leukocytosis    Obesity (BMI 30.0-34.9)    Research study patient- Lexicon T1D Study (204)    Hyperlipidemia    Poorly controlled type 1 diabetes mellitus    Pre-existing type 1 diabetes mellitus during pregnancy in second trimester    Hypertension in pregnancy, antepartum    Pre-existing type 2 diabetes mellitus during pregnancy in second trimester    Diabetes in pregnancy       Hospital Course:  Anusha Andrew is a 26 y.o. now ,  who was admitted on 2017 at 30w1d for management of uncontrolled DMI. Pregnancy complicated by depression, elevated BPs, h/o C/S and h/o PreE in prior pregnancy. Pt monitored for blood glucose levels, BP and FHT.    On discharge day, patient's pain is controlled with oral pain medications. Pt is tolerating ambulation without SOB or CP, and PO diet without N/V. Denies any HA, vision changes, F/C, LE swelling.     Pt in stable condition and ready for discharge. She has been instructed to continue ***as indicated as well as pain medications as needed and to follow up in the OB clinic in ***6 weeks with her obstetrics provider.    Scheduled Meds:    "insulin aspart  38 Units Subcutaneous with dinner    [START ON 2/17/2017] insulin aspart  60 Units Subcutaneous with lunch    insulin aspart  65 Units Subcutaneous with breakfast    insulin detemir  100 Units Subcutaneous QHS    prenatal vitamin  1 tablet Oral Daily     Continuous Infusions:   PRN Meds:diphenhydrAMINE, flu vac [id number] 36mos up(PF), ondansetron, promethazine (PHENERGAN) IVPB, senna-docusate 8.6-50 mg, simethicone      Pertinent studies:  CBC  Lab Results   Component Value Date    WBC 9.35 12/07/2016    HGB 11.5 (L) 12/07/2016    HCT 35.5 (L) 12/07/2016    MCV 82 12/07/2016     12/07/2016     ***      Patient Instructions:   Current Discharge Medication List      CONTINUE these medications which have NOT CHANGED    Details   blood sugar diagnostic Strp 1 each by Misc.(Non-Drug; Combo Route) route 6 (six) times daily.  Qty: 200 each, Refills: 11    Associated Diagnoses: Type I diabetes mellitus, uncontrolled      buPROPion (WELLBUTRIN XL) 150 MG TB24 tablet Take 1 tablet (150 mg total) by mouth every morning.  Qty: 30 tablet, Refills: 2      insulin aspart (NOVOLOG) 100 unit/mL InPn pen Inject 38 units w/ breakfast, 26 units w/ lunch, 32 units w/ dinner.  Qty: 24.3 mL, Refills: 3      insulin detemir (LEVEMIR FLEXTOUCH) 100 unit/mL (3 mL) SubQ InPn pen Inject 50 Units into the skin 2 (two) times daily.  Qty: 54 mL, Refills: 3      lancets Misc 1 Device by Misc.(Non-Drug; Combo Route) route 4 (four) times daily before meals and nightly.  Qty: 150 each, Refills: 12    Associated Diagnoses: Type I (juvenile type) diabetes mellitus without mention of complication, uncontrolled      metoclopramide HCl (REGLAN) 10 MG tablet Take 1 tablet (10 mg total) by mouth every 6 (six) hours as needed.  Qty: 30 tablet, Refills: 0      pen needle, diabetic 31 gauge x 1/4" Ndle 1 Device by MISCELLANEOUS route 4 (four) times daily with meals and nightly.  Qty: 150 each, Refills: 12    Associated Diagnoses: " Type I (juvenile type) diabetes mellitus without mention of complication, uncontrolled      PNV#67-iron ps-FA cmb#1-dha (VITAFOL ULTRA) 29 mg iron- 1 mg-200 mg Cap Take 1 tablet by mouth once daily.  Qty: 30 capsule, Refills: 6             No discharge procedures on file.    ***

## 2017-02-16 NOTE — PROGRESS NOTES
Dr Llamas notified of pt CBG reading (121mg/dL w/ hospital machine, 140mg/dL with home machine). No new orders received at this time.

## 2017-02-17 VITALS
SYSTOLIC BLOOD PRESSURE: 134 MMHG | OXYGEN SATURATION: 97 % | HEIGHT: 63 IN | BODY MASS INDEX: 36.68 KG/M2 | HEART RATE: 114 BPM | RESPIRATION RATE: 18 BRPM | WEIGHT: 207 LBS | TEMPERATURE: 97 F | DIASTOLIC BLOOD PRESSURE: 80 MMHG

## 2017-02-17 LAB
POCT GLUCOSE: 108 MG/DL (ref 70–110)
POCT GLUCOSE: 72 MG/DL (ref 70–110)
POCT GLUCOSE: 91 MG/DL (ref 70–110)

## 2017-02-17 PROCEDURE — 25000003 PHARM REV CODE 250: Performed by: STUDENT IN AN ORGANIZED HEALTH CARE EDUCATION/TRAINING PROGRAM

## 2017-02-17 PROCEDURE — G0378 HOSPITAL OBSERVATION PER HR: HCPCS

## 2017-02-17 PROCEDURE — 59025 FETAL NON-STRESS TEST: CPT | Mod: 26,,, | Performed by: OBSTETRICS & GYNECOLOGY

## 2017-02-17 PROCEDURE — 99233 SBSQ HOSP IP/OBS HIGH 50: CPT | Mod: 25,,, | Performed by: OBSTETRICS & GYNECOLOGY

## 2017-02-17 PROCEDURE — 63600175 PHARM REV CODE 636 W HCPCS: Performed by: STUDENT IN AN ORGANIZED HEALTH CARE EDUCATION/TRAINING PROGRAM

## 2017-02-17 RX ORDER — INSULIN ASPART 100 [IU]/ML
65 INJECTION, SOLUTION INTRAVENOUS; SUBCUTANEOUS DAILY
Qty: 58.5 ML | Refills: 3 | Status: SHIPPED | OUTPATIENT
Start: 2017-02-17 | End: 2017-03-03

## 2017-02-17 RX ADMIN — INSULIN ASPART 65 UNITS: 100 INJECTION, SOLUTION INTRAVENOUS; SUBCUTANEOUS at 08:02

## 2017-02-17 RX ADMIN — INSULIN ASPART 60 UNITS: 100 INJECTION, SOLUTION INTRAVENOUS; SUBCUTANEOUS at 01:02

## 2017-02-17 RX ADMIN — Medication 1 EACH: at 08:02

## 2017-02-17 NOTE — PROGRESS NOTES
PROGRESS NOTE  ANTEPARTUM    Admit Date: 2/14/2017   LOS: 3 days     Reason for Admission:  Diabetes in pregnancy    SUBJECTIVE:     Anusha Andrew is a 26 y.o. female at 30w3 who is here for management of uncontrolled DMI.  This pregnancy is also complicated by depression, elevated blood pressures, h/o C/S and h/o of PreE in prior pregnancy.     No acute events overnight.   She is tolerating a PO diet.  No complaints of weakness nor dizziness.  Patient denies VB.  Normal FM.  No ctx.    Scheduled Meds:   insulin aspart  38 Units Subcutaneous with dinner    insulin aspart  60 Units Subcutaneous with lunch    insulin aspart  65 Units Subcutaneous with breakfast    insulin detemir  100 Units Subcutaneous QHS    prenatal vitamin  1 tablet Oral Daily     Continuous Infusions:   PRN Meds:diphenhydrAMINE, flu vac [id number] 36mos up(PF), ondansetron, promethazine (PHENERGAN) IVPB, senna-docusate 8.6-50 mg, simethicone    Review of patient's allergies indicates:   Allergen Reactions    No known allergies        OBJECTIVE:     Vital Signs (Most Recent)  Temp: 97.3 °F (36.3 °C) (02/17/17 0400)  Pulse: 104 (02/17/17 0401)  Resp: 18 (02/17/17 0400)  BP: 117/70 (02/17/17 0400)  SpO2: 98 % (02/16/17 1647)    Temperature Range Min/Max (Last 24H):  Temp:  [97 °F (36.1 °C)-98.1 °F (36.7 °C)]     Vital Signs Range (Last 24H):  Temp:  [97 °F (36.1 °C)-98.1 °F (36.7 °C)]   Pulse:  []   Resp:  [16-18]   BP: (109-134)/(55-70)   SpO2:  [97 %-98 %]     I & O (Last 24H):    Intake/Output Summary (Last 24 hours) at 02/17/17 0612  Last data filed at 02/16/17 0930   Gross per 24 hour   Intake              480 ml   Output              350 ml   Net              130 ml       Physical Exam:  General: well developed, well nourished, no distress  Lungs:  clear to auscultation bilaterally and normal respiratory effort  Heart: tachycardic, regular rhythm  Abdomen: soft, non-tender non-distented; bowel sounds normal; no masses,  no  organomegaly  Extremities: no cyanosis or edema, or clubbing    FHT: 150, mod btbv, +accels/-decels, reactive, reassuring                 TOCO: none     Laboratory:  Recent Results (from the past 12 hour(s))   POCT glucose    Collection Time: 17  9:07 PM   Result Value Ref Range    POCT Glucose 88 70 - 110 mg/dL   POCT glucose    Collection Time: 17  2:04 AM   Result Value Ref Range    POCT Glucose 91 70 - 110 mg/dL   POCT glucose    Collection Time: 17  6:06 AM   Result Value Ref Range    POCT Glucose 108 70 - 110 mg/dL         ASSESSMENT/PLAN:     Active Hospital Problems    Diagnosis  POA    *Diabetes in pregnancy [O24.919]  Yes      Resolved Hospital Problems    Diagnosis Date Resolved POA   No resolved problems to display.       Assessment:   26 y.o.female  at 30w3d HD#4 for management of uncontrolled DMI.        Plan:  DMI  -Blood sugars more well controlled yesterday   -Lunch novalog increased to 60 yesterday.   -Currently on novalog 65/60/38  -PM levemir increased to 100u yesterday.                 fasting    postB    postL   postD    2am   :                                            109       141  2/15:    143        185        202       153      155       151        121        80          88         91         108    -NST BID  -1600 pedro diabetic diet      30w1d gestation  -Daily PNV  -Wellbutrin 150mg XL    Disposition  -Possible d/c today    Marck Walker M.D.  PGY-2 OBGYN  [phone]

## 2017-02-17 NOTE — PROGRESS NOTES
Contacted resident to report accucheck of 71 on hospital meter and 94 on new, hospital provided, home-meter.

## 2017-02-17 NOTE — DISCHARGE SUMMARY
Antepartum Discharge Summary        Primary OB Clinician: Dr. Kidd    Admission date: 2017  Discharge date: 2017    Disposition: To home, self care    Admit Dx:      Patient Active Problem List   Diagnosis    Uncontrolled type 1 diabetes mellitus    Diabetes type 1, uncontrolled    Leukocytosis    Obesity (BMI 30.0-34.9)    Research study patient- Lexicon T1D Study (204)    Hyperlipidemia    Poorly controlled type 1 diabetes mellitus    Pre-existing type 1 diabetes mellitus during pregnancy in second trimester    Hypertension in pregnancy, antepartum    Pre-existing type 2 diabetes mellitus during pregnancy in second trimester    Diabetes in pregnancy     Discharge Dx:    Patient Active Problem List   Diagnosis    Uncontrolled type 1 diabetes mellitus    Diabetes type 1, uncontrolled    Leukocytosis    Obesity (BMI 30.0-34.9)    Research study patient- Lexicon T1D Study (204)    Hyperlipidemia    Poorly controlled type 1 diabetes mellitus    Pre-existing type 1 diabetes mellitus during pregnancy in second trimester    Hypertension in pregnancy, antepartum    Pre-existing type 2 diabetes mellitus during pregnancy in second trimester    Diabetes in pregnancy         Hospital Course:  Anusha Andrew is a 26 y.o.  who was admitted on 2017 at 30w0d  for blood sugar patterning. For the first 24 hours, she was monitored on her home insulin regimen of levemir 50u BID and novalog 42/26/38.  Her blood sugar was checked at the schedule of 2am, fasting and 2hr postprandial.  Her blood sugars during the first 24 hours were noted to be elevated so her regimen was modified to levemir 75u qhs and novalog 65/26/38.  On night #2, she had increasing contractions.  An FFN at that time was negative and her contractions spontaneously subsided.  Her blood sugars continued to be elevated so her regimen was changed to levemir 100u qhs and novalog 65/60/38.  This regimen gave her good control  "over her blood sugars and on hospital day #4, she was discharged home on this regimen.  She was given a new glucometer and instructed to log her blood sugars daily as instructed and follow up in [facility] clinic in one week.    Pertinent studies:  Recent Labs      02/14/17   1727  02/14/17   2207  02/15/17   0243  02/15/17   0611  02/15/17   1239  02/15/17   1718  02/15/17   2143  02/16/17   0207  02/16/17   0631  02/16/17   1137  02/16/17   1709  02/16/17   2107  02/17/17   0204  02/17/17   0606  02/17/17   1143   POCTGLUCOSE  130*  109  141*  143*  185*  202*  153*  155*  151*  121*  80  88  91  108  72       Patient Instructions:   Current Discharge Medication List      CONTINUE these medications which have CHANGED    Details   insulin aspart (NOVOLOG) 100 unit/mL InPn pen Inject 65 Units into the skin once daily. 65 units with breakfast.  60 units with lunch.  38 units with dinner.  Qty: 58.5 mL, Refills: 3      insulin detemir (LEVEMIR FLEXTOUCH) 100 unit/mL (3 mL) SubQ InPn pen Inject 100 Units into the skin every evening.  Qty: 90 mL, Refills: 3         CONTINUE these medications which have NOT CHANGED    Details   blood sugar diagnostic Strp 1 each by Misc.(Non-Drug; Combo Route) route 6 (six) times daily.  Qty: 200 each, Refills: 11    Associated Diagnoses: Type I diabetes mellitus, uncontrolled      buPROPion (WELLBUTRIN XL) 150 MG TB24 tablet Take 1 tablet (150 mg total) by mouth every morning.  Qty: 30 tablet, Refills: 2      lancets Misc 1 Device by Misc.(Non-Drug; Combo Route) route 4 (four) times daily before meals and nightly.  Qty: 150 each, Refills: 12    Associated Diagnoses: Type I (juvenile type) diabetes mellitus without mention of complication, uncontrolled      metoclopramide HCl (REGLAN) 10 MG tablet Take 1 tablet (10 mg total) by mouth every 6 (six) hours as needed.  Qty: 30 tablet, Refills: 0      pen needle, diabetic 31 gauge x 1/4" Ndle 1 Device by MISCELLANEOUS route 4 (four) times daily with " meals and nightly.  Qty: 150 each, Refills: 12    Associated Diagnoses: Type I (juvenile type) diabetes mellitus without mention of complication, uncontrolled      PNV#67-iron ps-FA cmb#1-dha (VITAFOL ULTRA) 29 mg iron- 1 mg-200 mg Cap Take 1 tablet by mouth once daily.  Qty: 30 capsule, Refills: 6               Discharge Procedure Orders  Diet general     Activity as tolerated     Call MD for:  temperature >100.4     Call MD for:  persistent nausea and vomiting or diarrhea     Call MD for:  severe uncontrolled pain     Call MD for:  redness, tenderness, or signs of infection (pain, swelling, redness, odor or green/yellow discharge around incision site)     Call MD for:  difficulty breathing or increased cough     Call MD for:  severe persistent headache     Call MD for:  persistent dizziness, light-headedness, or visual disturbances         Marck Walker M.D.  PGY-2 OBGYN  [phone]

## 2017-02-21 ENCOUNTER — PATIENT MESSAGE (OUTPATIENT)
Dept: OBSTETRICS AND GYNECOLOGY | Facility: CLINIC | Age: 27
End: 2017-02-21

## 2017-02-21 RX ORDER — BUPROPION HYDROCHLORIDE 150 MG/1
150 TABLET ORAL EVERY MORNING
Qty: 30 TABLET | Refills: 2 | Status: SHIPPED | OUTPATIENT
Start: 2017-02-21 | End: 2017-05-11

## 2017-02-22 ENCOUNTER — OFFICE VISIT (OUTPATIENT)
Dept: MATERNAL FETAL MEDICINE | Facility: CLINIC | Age: 27
End: 2017-02-22
Attending: OBSTETRICS & GYNECOLOGY
Payer: MEDICAID

## 2017-02-22 VITALS
DIASTOLIC BLOOD PRESSURE: 72 MMHG | WEIGHT: 217.63 LBS | BODY MASS INDEX: 38.55 KG/M2 | SYSTOLIC BLOOD PRESSURE: 118 MMHG

## 2017-02-22 DIAGNOSIS — O24.912 DIABETES MELLITUS AFFECTING PREGNANCY IN SECOND TRIMESTER: ICD-10-CM

## 2017-02-22 PROCEDURE — 99999 PR PBB SHADOW E&M-EST. PATIENT-LVL II: CPT | Mod: PBBFAC,,,

## 2017-02-22 PROCEDURE — 76819 FETAL BIOPHYS PROFIL W/O NST: CPT | Mod: PBBFAC | Performed by: OBSTETRICS & GYNECOLOGY

## 2017-02-22 PROCEDURE — 76816 OB US FOLLOW-UP PER FETUS: CPT | Mod: PBBFAC | Performed by: OBSTETRICS & GYNECOLOGY

## 2017-02-22 PROCEDURE — 76816 OB US FOLLOW-UP PER FETUS: CPT | Mod: 26,S$PBB,, | Performed by: OBSTETRICS & GYNECOLOGY

## 2017-02-22 PROCEDURE — 99214 OFFICE O/P EST MOD 30 MIN: CPT | Mod: TH,S$PBB,, | Performed by: OBSTETRICS & GYNECOLOGY

## 2017-02-22 PROCEDURE — 76819 FETAL BIOPHYS PROFIL W/O NST: CPT | Mod: 26,S$PBB,, | Performed by: OBSTETRICS & GYNECOLOGY

## 2017-02-22 PROCEDURE — 99212 OFFICE O/P EST SF 10 MIN: CPT | Mod: PBBFAC

## 2017-02-22 NOTE — PROGRESS NOTES
Follow up consultation regarding diabetes in pregnancy provided today.  Risks of uncontrolled diabetes in pregnancy reviewed once again.  Blood glucose measurements assessed. She is under good control.  She has been experiencing lows at 3 AM but is not eating a snack at bedtime.  She will move her Levemir to later in the night and take a bedtime snack.  She also has run out of her Novolog.  Insurance is trying to tell me they will not fill it because she is on too much.  I apologized greatly for her insulin needs being so high. :) Not much I can do about that; we are getting it precerted.   I did not adjust her medications today.  [facility] will supply Novulin Regular insulin in the interim.      Results of today's ultrasound discussed with patient.  I spent 45 minutes with patient today and attempting to solve her insulin problem over half of which was in consultation separate of her ultrasound examination.     Referring physician to receive copy of today's consultation via electronic medical record.

## 2017-02-22 NOTE — LETTER
February 23, 2017      Artur Call MD  [address]  [facility] [address]           [facility]  [address]  [facility] [address]  Phone: [phone]          Patient: Anusha Andrew   MR Number: [id number]   YOB: 1990   Date of Visit: 2/22/2017       Dear Dr. Artur Call:    Thank you for referring Anusha Andrew to me for evaluation. Attached you will find relevant portions of my assessment and plan of care.    If you have questions, please do not hesitate to call me. I look forward to following Anusha Andrew along with you.    Sincerely,    Artur Call MD    Enclosure  CC:  No Recipients    If you would like to receive this communication electronically, please contact [email or web address] or [phone] to request more information on [email or web address]) Link access.    For providers and/or their staff who would like to refer a patient to [facility], please contact us through our one-stop-shop provider referral line, [facility], at [phone].    If you feel you have received this communication in error or would no longer like to receive these types of communications, please e-mail [email or web address]

## 2017-02-23 ENCOUNTER — PATIENT MESSAGE (OUTPATIENT)
Dept: OBSTETRICS AND GYNECOLOGY | Facility: CLINIC | Age: 27
End: 2017-02-23

## 2017-02-27 ENCOUNTER — HOSPITAL ENCOUNTER (OUTPATIENT)
Dept: PERINATAL CARE | Facility: OTHER | Age: 27
Discharge: HOME | End: 2017-02-27
Attending: OBSTETRICS & GYNECOLOGY
Payer: MEDICAID

## 2017-02-27 DIAGNOSIS — O24.912 DIABETES MELLITUS AFFECTING PREGNANCY IN SECOND TRIMESTER: ICD-10-CM

## 2017-02-27 PROCEDURE — 59025 FETAL NON-STRESS TEST: CPT | Mod: 26,,, | Performed by: OBSTETRICS & GYNECOLOGY

## 2017-02-27 PROCEDURE — 59025 FETAL NON-STRESS TEST: CPT

## 2017-03-02 ENCOUNTER — HOSPITAL ENCOUNTER (OUTPATIENT)
Dept: PERINATAL CARE | Facility: OTHER | Age: 27
Discharge: HOME | End: 2017-03-02
Attending: OBSTETRICS & GYNECOLOGY
Payer: MEDICAID

## 2017-03-02 DIAGNOSIS — O24.912 DIABETES MELLITUS AFFECTING PREGNANCY IN SECOND TRIMESTER: ICD-10-CM

## 2017-03-02 PROBLEM — O47.9 IRREGULAR UTERINE CONTRACTIONS: Status: ACTIVE | Noted: 2017-03-02

## 2017-03-02 PROBLEM — O47.03 THREATENED PREMATURE LABOR IN THIRD TRIMESTER: Status: ACTIVE | Noted: 2017-03-02

## 2017-03-02 PROCEDURE — 59025 FETAL NON-STRESS TEST: CPT | Mod: 26,,, | Performed by: OBSTETRICS & GYNECOLOGY

## 2017-03-03 DIAGNOSIS — O24.013 TYPE 1 DIABETES MELLITUS AFFECTING PREGNANCY IN THIRD TRIMESTER, ANTEPARTUM: Primary | ICD-10-CM

## 2017-03-03 RX ORDER — INSULIN ASPART 100 [IU]/ML
INJECTION, SOLUTION INTRAVENOUS; SUBCUTANEOUS
Qty: 100 ML | Refills: 10 | Status: CANCELLED | OUTPATIENT
Start: 2017-03-03 | End: 2018-03-03

## 2017-03-03 RX ORDER — INSULIN ASPART 100 [IU]/ML
INJECTION, SOLUTION INTRAVENOUS; SUBCUTANEOUS
Qty: 100 ML | Refills: 10 | Status: ON HOLD | OUTPATIENT
Start: 2017-03-03 | End: 2017-03-21

## 2017-03-03 NOTE — TELEPHONE ENCOUNTER
Phone call from [facility] regarding patient's prior authorization for her Novolog Flex Pen.    Nurse phoned [facility] at [phone].    Per [facility], patient changed to this insurance 01/2017 and pens are not covered on this new insurance. The Novolog vials are her preferred formulary.    Dr. Call notified and aware.

## 2017-03-07 ENCOUNTER — HOSPITAL ENCOUNTER (OUTPATIENT)
Dept: PERINATAL CARE | Facility: OTHER | Age: 27
Discharge: HOME | End: 2017-03-07
Attending: OBSTETRICS & GYNECOLOGY
Payer: MEDICAID

## 2017-03-07 DIAGNOSIS — O24.912 DIABETES MELLITUS AFFECTING PREGNANCY IN SECOND TRIMESTER: ICD-10-CM

## 2017-03-07 PROCEDURE — 59025 FETAL NON-STRESS TEST: CPT

## 2017-03-07 PROCEDURE — 59025 FETAL NON-STRESS TEST: CPT | Mod: 26,,, | Performed by: OBSTETRICS & GYNECOLOGY

## 2017-03-08 ENCOUNTER — TELEPHONE (OUTPATIENT)
Dept: MATERNAL FETAL MEDICINE | Facility: CLINIC | Age: 27
End: 2017-03-08

## 2017-03-08 NOTE — TELEPHONE ENCOUNTER
Left patient a message to call me back at [phone] about scheduling a F/U appointment and about insulin approval. Patient to call me back at [phone].

## 2017-03-09 ENCOUNTER — TELEPHONE (OUTPATIENT)
Dept: MATERNAL FETAL MEDICINE | Facility: CLINIC | Age: 27
End: 2017-03-09

## 2017-03-09 ENCOUNTER — HOSPITAL ENCOUNTER (OUTPATIENT)
Dept: PERINATAL CARE | Facility: OTHER | Age: 27
Discharge: HOME | End: 2017-03-09
Attending: OBSTETRICS & GYNECOLOGY
Payer: MEDICAID

## 2017-03-09 DIAGNOSIS — O24.912 DIABETES MELLITUS AFFECTING PREGNANCY IN SECOND TRIMESTER: ICD-10-CM

## 2017-03-09 PROCEDURE — 59025 FETAL NON-STRESS TEST: CPT | Mod: 26,,, | Performed by: OBSTETRICS & GYNECOLOGY

## 2017-03-09 PROCEDURE — 59025 FETAL NON-STRESS TEST: CPT

## 2017-03-09 NOTE — TELEPHONE ENCOUNTER
"Pt dropped off her BS log for Dr. Call ([facility]) to review. Pt also left a note stating that she is "having a problem eating corn starch," and that she is "craving it" and is now on her "3rd container." Called pt back immediately - on first call, phone was answered but person stated that it was the "wrong number" on 3 subsequent calls, no answer and message left for the patient to call [facility] clinic.      "

## 2017-03-13 ENCOUNTER — PATIENT MESSAGE (OUTPATIENT)
Dept: OBSTETRICS AND GYNECOLOGY | Facility: CLINIC | Age: 27
End: 2017-03-13

## 2017-03-13 NOTE — TELEPHONE ENCOUNTER
Pt states she has been noticing a lot of swelling in hands and feet for the past 3 days.  Feels like she is walking on pins and needles.  She has an NST tomorrow in PNT.  Scheduled appt with Carmelina tomorrow at 11 after NST.

## 2017-03-14 ENCOUNTER — HOSPITAL ENCOUNTER (OUTPATIENT)
Dept: PERINATAL CARE | Facility: OTHER | Age: 27
Discharge: HOME | End: 2017-03-14
Attending: OBSTETRICS & GYNECOLOGY
Payer: MEDICAID

## 2017-03-14 ENCOUNTER — ROUTINE PRENATAL (OUTPATIENT)
Dept: OBSTETRICS AND GYNECOLOGY | Facility: CLINIC | Age: 27
End: 2017-03-14
Payer: MEDICAID

## 2017-03-14 ENCOUNTER — TELEPHONE (OUTPATIENT)
Dept: MATERNAL FETAL MEDICINE | Facility: CLINIC | Age: 27
End: 2017-03-14

## 2017-03-14 VITALS
DIASTOLIC BLOOD PRESSURE: 70 MMHG | BODY MASS INDEX: 40.22 KG/M2 | SYSTOLIC BLOOD PRESSURE: 126 MMHG | WEIGHT: 227.06 LBS

## 2017-03-14 DIAGNOSIS — O09.93 HIGH-RISK PREGNANCY IN THIRD TRIMESTER: Primary | ICD-10-CM

## 2017-03-14 DIAGNOSIS — Z3A.34 34 WEEKS GESTATION OF PREGNANCY: ICD-10-CM

## 2017-03-14 DIAGNOSIS — O24.913 DIABETES MELLITUS DURING PREGNANCY IN THIRD TRIMESTER, UNSPECIFIED DIABETES MELLITUS TYPE: ICD-10-CM

## 2017-03-14 DIAGNOSIS — O24.912 DIABETES MELLITUS AFFECTING PREGNANCY IN SECOND TRIMESTER: ICD-10-CM

## 2017-03-14 DIAGNOSIS — E10.65 POORLY CONTROLLED TYPE 1 DIABETES MELLITUS: ICD-10-CM

## 2017-03-14 PROCEDURE — 99213 OFFICE O/P EST LOW 20 MIN: CPT | Mod: PBBFAC | Performed by: NURSE PRACTITIONER

## 2017-03-14 PROCEDURE — 59025 FETAL NON-STRESS TEST: CPT | Mod: 26,,, | Performed by: OBSTETRICS & GYNECOLOGY

## 2017-03-14 PROCEDURE — 99213 OFFICE O/P EST LOW 20 MIN: CPT | Mod: TH,S$PBB,, | Performed by: NURSE PRACTITIONER

## 2017-03-14 PROCEDURE — 99999 PR PBB SHADOW E&M-EST. PATIENT-LVL III: CPT | Mod: PBBFAC,,, | Performed by: NURSE PRACTITIONER

## 2017-03-14 RX ORDER — LANCETS 30 GAUGE
EACH MISCELLANEOUS
COMMUNITY
Start: 2017-02-17 | End: 2021-02-24

## 2017-03-14 NOTE — PROGRESS NOTES
Presents today for c/o mild swelling/tightness in hands/feet for past few days.   Denies h/a, dizziness, blurred vision, or right upper epigastric pain.  States she does not have BS logs with her, nor her glucose meter, but states her BS's range from 130-140's.  Also reports she is having a problem craving & eating corn starch.    Normally takes insulin regimen of Novolog 56/60/38 and Levimir 100 units QHS, but has been taking Novolin R for past 2 weeks, as Novolog not approved by insurance.  Last HgbA1c done 17:  (8.0) and was previously 7.3  Had scheduled appt earlier this morning for NST in Prenatal Testing, but did not go.  States she will go some time today.  Last NST done 17:  Reassuring  Reports good FM, denies ctx's or pain, denies vaginal bleeding.    Gravid abdomen soft to palpation - fundus measures 45 cm today.  Assessment:  Uncontrolled Type 1 DM & frequent non-compliance with prenatal care.  Plan:  1.  Will collect HgbA1c today - patient states she will go straight from her appt today.  2.  Advised pt she needs to call PNT and reschedule NST for some time today.  3.  Advised pt to bring BOTH her glucose log as well as her glucose meter to her next visit with Dr. Kidd .  4.  Discussed importance of keeping scheduled OB visit with  on 3/16, as well as having U/S done that day as well.  5.  Counseled patient at length discussing importance of compliance with appt's and NST's, as well as taking care of her BS.  Patient aware of risks associated with uncontrolled DM in pregnancy, including stillbirth, LGA,  morbidity, and risk for developing preeclampsia.  Patient verbalizes understanding.  6. Labor/bleeding/decreased FM precautions given.

## 2017-03-14 NOTE — MR AVS SNAPSHOT
[facility]  [address]  [address]  [place] [address]  Phone: [phone]  Fax: [phone]                  Anusha Andrew   3/14/2017 11:00 AM   Routine Prenatal    Description:  Female : 1990   Provider:  Carmelina Barger NP   Department:  [facility]           Reason for Visit     Routine Prenatal Visit           Diagnoses this Visit        Comments    Diabetes mellitus during pregnancy in third trimester, unspecified diabetes mellitus type    -  Primary            To Do List           Future Appointments        Provider Department Dept Phone    3/14/2017 12:00 PM LAB, SAME DAY [facility] [phone]    3/16/2017 9:15 AM Purnima Kidd MD [facility] [phone]    3/16/2017 10:30 AM [facility], WOMEN'S ULTRASOUND [facility] [phone]    3/17/2017 9:30 AM TESTING, PRENATAL [facility] [phone]    3/21/2017 9:30 AM TESTING, PRENATAL [facility] [phone]      Goals (5 Years of Data)     None      [facility] On Call     [facility] On Call Nurse Care Line -  Assistance  Registered nurses in the [facility] Center provide clinical advisement, health education, appointment booking, and other advisory services.  Call for this free service at [phone].             Medications           Message regarding Medications     Verify the changes and/or additions to your medication regime listed below are the same as discussed with your clinician today.  If any of these changes or additions are incorrect, please notify your healthcare provider.             Verify that the below list of medications is an accurate representation of the medications you are currently taking.  If none reported, the list may be blank. If incorrect, please contact your healthcare provider. Carry this list with you in case of emergency.           Current Medications     blood sugar diagnostic Strp 1  "each by Misc.(Non-Drug; Combo Route) route 6 (six) times daily.    buPROPion (WELLBUTRIN XL) 150 MG TB24 tablet Take 1 tablet (150 mg total) by mouth every morning.    insulin aspart (NOVOLOG) 100 unit/mL injection 65 units with Breakfast; 60 units with Lunch; 38 units with Dinner    insulin detemir (LEVEMIR FLEXTOUCH) 100 unit/mL (3 mL) SubQ InPn pen Inject 100 Units into the skin every evening.    lancets Misc 1 Device by Misc.(Non-Drug; Combo Route) route 4 (four) times daily before meals and nightly.    metoclopramide HCl (REGLAN) 10 MG tablet Take 1 tablet (10 mg total) by mouth every 6 (six) hours as needed.    ONETOUCH DELICA LANCETS 33 gauge Misc     ONETOUCH ULTRA2 kit     pen needle, diabetic 31 gauge x 1/4" Ndle 1 Device by MISCELLANEOUS route 4 (four) times daily with meals and nightly.    PNV#67-iron ps-FA cmb#1-dha (VITAFOL ULTRA) 29 mg iron- 1 mg-200 mg Cap Take 1 tablet by mouth once daily.           Clinical Reference Information           Prenatal Vitals     Enc. Date GA Prenatal Vitals Prenatal Pulse Pain Level Urine Albumin/Glucose Edema Presentation Dilation/Effacement/Station    3/14/17 34w0d 126/70  /  / Present  0 Trace / 2+ +2 / +2 /  / Yes      3/2/17 32w2d Admission Dept: [facility] OB ER    2/22/17 31w1d 118/72 / 98.7 kg (217 lb 9.5 oz)           2/14/17 30w0d Admission Dx: Diabetes in pregnancy Dept: [facility]    2/1/17 28w1d 126/70 / 97.5 kg (214 lb 15.2 oz) 31 cm / 155 / Present  0 Negative / 2+ None / None / None      1/24/17 27w0d 114/72 / 96.5 kg (212 lb 11.9 oz)           1/4/17 24w1d 112/70 / 95 kg (209 lb 7 oz)  / 150 / Absent   Negative / 2+       1/3/17 24w0d Admission Dept: [facility] OB ER    12/7/16 20w1d 126/70 / 94.2 kg (207 lb 9 oz)  /  / Present  0 Negative / 2+ None / None / None      11/29/16 19w0d 122/70 / 92.3 kg (203 lb 7.8 oz)           11/18/16 17w3d 118/70 / 92.4 kg (203 lb 11.3 oz)  / 154-159 / Present  0        11/10/16 16w2d 110/62 / 92.1 kg (203 lb 2.5 oz)  / 150 / " "Present  0 Negative / 3+ None / None / None      11/10/16 16w2d 122/70 / 92.3 kg (203 lb 7.8 oz)  / 158-162 / Present  0        10/26/16 14w1d 111/79 / 91.4 kg (201 lb 8 oz)  / 164          10/13/16 12w2d 102/68 / 91 kg (200 lb 9.9 oz)  /  / Present  0 Negative / Negative None / None / None      10/13/16 12w2d 112/68 / 90.8 kg (200 lb 2.8 oz)           9/19/16 8w6d 118/80 / 89.2 kg (196 lb 10.4 oz) 9 cm / 180 / Absent   Negative / Negative None / None / None / No      9/14/16 8w1d Admission Dx: Poorly controlled type 1 diabetes mellitus Dept: [facility]    9/14/16 8w1d 110/60 / 89.7 kg (197 lb 10.3 oz)              Height: 5' 3" (1.6 m)       Your Vitals Were     BP Last Period                126/70 07/19/2016          Allergies as of 3/14/2017     No Known Allergies      Immunizations Administered on Date of Encounter - 3/14/2017     None      Orders Placed During Today's Visit     Future Labs/Procedures Expected by Expires    Hemoglobin A1c  3/14/2017 5/13/2018    US OB/GYN Procedure ([facility]) - Extended List - Future  As directed 3/14/2018      Language Assistance Services     ATTENTION: Language assistance services are available, free of charge. Please call [phone].      ATENCIÓN: Si habla español, tiene a moreno disposición servicios gratuitos de asistencia lingüística. Llame al [phone].     CHÚ Ý: N?u b?n nói Ti?ng Vi?t, có các d?ch v? h? tr? ngôn ng? mi?n phí dành cho b?n. G?i s? [phone].         [facility] complies with applicable Federal civil rights laws and does not discriminate on the basis of race, color, national origin, age, disability, or sex.        "

## 2017-03-14 NOTE — TELEPHONE ENCOUNTER
Pt presents to [facility] clinic for BS log review. Appoitnment was not scheduled for today since pt had not called back since dropping off BS log on 3/9/17 (see prior phone note).     Pt reports that she is taking Novolin R 65/60/38 and Levemir 100 units at bedtime. Discussed and reviewed by Dr. Hernandez and no changes at this time but pt to return to [facility] in 2 wks for BS review. New logs given to patient with instructions on how to record her BS readings properly, pt also instructed to have labs drawn today on 2nd floor.    Pt verbalized understanding of information.

## 2017-03-16 ENCOUNTER — ANESTHESIA (OUTPATIENT)
Dept: OBSTETRICS AND GYNECOLOGY | Facility: OTHER | Age: 27
End: 2017-03-16
Payer: MEDICAID

## 2017-03-16 ENCOUNTER — HOSPITAL ENCOUNTER (OUTPATIENT)
Dept: PERINATAL CARE | Facility: OTHER | Age: 27
Discharge: HOME | End: 2017-03-16
Attending: OBSTETRICS & GYNECOLOGY
Payer: MEDICAID

## 2017-03-16 ENCOUNTER — DOCUMENTATION ONLY (OUTPATIENT)
Dept: MATERNAL FETAL MEDICINE | Facility: CLINIC | Age: 27
End: 2017-03-16

## 2017-03-16 ENCOUNTER — SURGERY (OUTPATIENT)
Age: 27
End: 2017-03-16

## 2017-03-16 ENCOUNTER — PROCEDURE VISIT (OUTPATIENT)
Dept: OBSTETRICS AND GYNECOLOGY | Facility: CLINIC | Age: 27
End: 2017-03-16
Payer: MEDICAID

## 2017-03-16 ENCOUNTER — HOSPITAL ENCOUNTER (INPATIENT)
Facility: OTHER | Age: 27
LOS: 5 days | Discharge: HOME | End: 2017-03-21
Attending: OBSTETRICS & GYNECOLOGY | Admitting: OBSTETRICS & GYNECOLOGY
Payer: MEDICAID

## 2017-03-16 ENCOUNTER — ROUTINE PRENATAL (OUTPATIENT)
Dept: OBSTETRICS AND GYNECOLOGY | Facility: CLINIC | Age: 27
End: 2017-03-16
Attending: OBSTETRICS & GYNECOLOGY
Payer: MEDICAID

## 2017-03-16 ENCOUNTER — ANESTHESIA EVENT (OUTPATIENT)
Dept: OBSTETRICS AND GYNECOLOGY | Facility: OTHER | Age: 27
End: 2017-03-16
Payer: MEDICAID

## 2017-03-16 VITALS — SYSTOLIC BLOOD PRESSURE: 130 MMHG | DIASTOLIC BLOOD PRESSURE: 84 MMHG

## 2017-03-16 DIAGNOSIS — O24.013 TYPE 1 DIABETES MELLITUS AFFECTING PREGNANCY IN THIRD TRIMESTER, ANTEPARTUM: ICD-10-CM

## 2017-03-16 DIAGNOSIS — O10.019 PRE-EXISTING ESSENTIAL HYPERTENSION DURING PREGNANCY, ANTEPARTUM: ICD-10-CM

## 2017-03-16 DIAGNOSIS — O36.8390 ANTEPARTUM NON-REASSURING FETAL HEART RATE OR RHYTHM AFFECTING CARE OF MOTHER: ICD-10-CM

## 2017-03-16 DIAGNOSIS — O24.414 INSULIN CONTROLLED GESTATIONAL DIABETES MELLITUS (GDM) IN THIRD TRIMESTER: ICD-10-CM

## 2017-03-16 DIAGNOSIS — E10.65 POORLY CONTROLLED TYPE 1 DIABETES MELLITUS: ICD-10-CM

## 2017-03-16 DIAGNOSIS — O24.913 DIABETES MELLITUS DURING PREGNANCY IN THIRD TRIMESTER, UNSPECIFIED DIABETES MELLITUS TYPE: ICD-10-CM

## 2017-03-16 DIAGNOSIS — O24.414 INSULIN CONTROLLED GESTATIONAL DIABETES MELLITUS (GDM) IN THIRD TRIMESTER: Primary | ICD-10-CM

## 2017-03-16 DIAGNOSIS — Z36.85 ANTENATAL SCREENING FOR STREPTOCOCCUS B: ICD-10-CM

## 2017-03-16 PROBLEM — O24.912 DIABETES MELLITUS AFFECTING PREGNANCY IN SECOND TRIMESTER: Status: RESOLVED | Noted: 2017-02-14 | Resolved: 2017-03-16

## 2017-03-16 PROBLEM — O47.03 THREATENED PREMATURE LABOR IN THIRD TRIMESTER: Status: RESOLVED | Noted: 2017-03-02 | Resolved: 2017-03-16

## 2017-03-16 PROBLEM — O47.9 IRREGULAR UTERINE CONTRACTIONS: Status: RESOLVED | Noted: 2017-03-02 | Resolved: 2017-03-16

## 2017-03-16 LAB
ABO GROUP BLD: NORMAL
ALLENS TEST: ABNORMAL
ANISOCYTOSIS BLD QL SMEAR: SLIGHT
BASOPHILS # BLD AUTO: ABNORMAL K/UL
BASOPHILS NFR BLD: 0 %
BLD GP AB SCN CELLS X3 SERPL QL: NORMAL
DIFFERENTIAL METHOD: ABNORMAL
EOSINOPHIL # BLD AUTO: ABNORMAL K/UL
EOSINOPHIL NFR BLD: 0 %
ERYTHROCYTE [DISTWIDTH] IN BLOOD BY AUTOMATED COUNT: 13.6 %
GIANT PLATELETS BLD QL SMEAR: PRESENT
HCO3 UR-SCNC: 18 MMOL/L (ref 24–28)
HCT VFR BLD AUTO: 34.2 %
HGB BLD-MCNC: 10.9 G/DL
HIV1+2 IGG SERPL QL IA.RAPID: NEGATIVE
LYMPHOCYTES # BLD AUTO: ABNORMAL K/UL
LYMPHOCYTES NFR BLD: 16 %
MCH RBC QN AUTO: 24.4 PG
MCHC RBC AUTO-ENTMCNC: 31.9 %
MCV RBC AUTO: 77 FL
MONOCYTES # BLD AUTO: ABNORMAL K/UL
MONOCYTES NFR BLD: 6 %
NEUTROPHILS # BLD AUTO: ABNORMAL K/UL
NEUTROPHILS NFR BLD: 77 %
NEUTS BAND NFR BLD MANUAL: 1 %
OVALOCYTES BLD QL SMEAR: ABNORMAL
PCO2 BLDA: 79.6 MMHG (ref 35–45)
PH SMN: 6.96 [PH] (ref 7.35–7.45)
PLATELET # BLD AUTO: 301 K/UL
PLATELET BLD QL SMEAR: ABNORMAL
PMV BLD AUTO: 10.4 FL
PO2 BLDA: 11 MMHG (ref 80–100)
POC BE: -14 MMOL/L
POC SATURATED O2: 5 % (ref 95–100)
POCT GLUCOSE: 218 MG/DL (ref 70–110)
POCT GLUCOSE: 228 MG/DL (ref 70–110)
POCT GLUCOSE: 246 MG/DL (ref 70–110)
POCT GLUCOSE: 259 MG/DL (ref 70–110)
POCT GLUCOSE: 266 MG/DL (ref 70–110)
POCT GLUCOSE: 306 MG/DL (ref 70–110)
POIKILOCYTOSIS BLD QL SMEAR: SLIGHT
POLYCHROMASIA BLD QL SMEAR: ABNORMAL
RBC # BLD AUTO: 4.46 M/UL
RH BLD: NORMAL
SAMPLE: ABNORMAL
SITE: ABNORMAL
WBC # BLD AUTO: 12.51 K/UL

## 2017-03-16 PROCEDURE — 86701 HIV-1ANTIBODY: CPT

## 2017-03-16 PROCEDURE — 25000003 PHARM REV CODE 250: Performed by: ANESTHESIOLOGY

## 2017-03-16 PROCEDURE — 85007 BL SMEAR W/DIFF WBC COUNT: CPT

## 2017-03-16 PROCEDURE — 25000003 PHARM REV CODE 250: Performed by: OBSTETRICS & GYNECOLOGY

## 2017-03-16 PROCEDURE — 27200688 HC TRAY, SPINAL-HYPER/ ISOBARIC: Performed by: STUDENT IN AN ORGANIZED HEALTH CARE EDUCATION/TRAINING PROGRAM

## 2017-03-16 PROCEDURE — 88307 TISSUE EXAM BY PATHOLOGIST: CPT | Performed by: PATHOLOGY

## 2017-03-16 PROCEDURE — 86900 BLOOD TYPING SEROLOGIC ABO: CPT

## 2017-03-16 PROCEDURE — 59514 CESAREAN DELIVERY ONLY: CPT | Mod: AT,,, | Performed by: OBSTETRICS & GYNECOLOGY

## 2017-03-16 PROCEDURE — 11000001 HC ACUTE MED/SURG PRIVATE ROOM

## 2017-03-16 PROCEDURE — 86592 SYPHILIS TEST NON-TREP QUAL: CPT

## 2017-03-16 PROCEDURE — 86850 RBC ANTIBODY SCREEN: CPT

## 2017-03-16 PROCEDURE — 59025 FETAL NON-STRESS TEST: CPT | Mod: 26,,, | Performed by: OBSTETRICS & GYNECOLOGY

## 2017-03-16 PROCEDURE — 10907ZC DRAINAGE OF AMNIOTIC FLUID, THERAPEUTIC FROM PRODUCTS OF CONCEPTION, VIA NATURAL OR ARTIFICIAL OPENING: ICD-10-PCS | Performed by: OBSTETRICS & GYNECOLOGY

## 2017-03-16 PROCEDURE — 51702 INSERT TEMP BLADDER CATH: CPT

## 2017-03-16 PROCEDURE — 76816 OB US FOLLOW-UP PER FETUS: CPT | Mod: 26,S$PBB,, | Performed by: PEDIATRICS

## 2017-03-16 PROCEDURE — 63600175 PHARM REV CODE 636 W HCPCS: Performed by: STUDENT IN AN ORGANIZED HEALTH CARE EDUCATION/TRAINING PROGRAM

## 2017-03-16 PROCEDURE — 63600175 PHARM REV CODE 636 W HCPCS: Performed by: ANESTHESIOLOGY

## 2017-03-16 PROCEDURE — 36000685 HC CESAREAN SECTION LEVEL I

## 2017-03-16 PROCEDURE — 25000003 PHARM REV CODE 250: Performed by: STUDENT IN AN ORGANIZED HEALTH CARE EDUCATION/TRAINING PROGRAM

## 2017-03-16 PROCEDURE — 86703 HIV-1/HIV-2 1 RESULT ANTBDY: CPT

## 2017-03-16 PROCEDURE — 99499 UNLISTED E&M SERVICE: CPT | Mod: TH,S$PBB,, | Performed by: OBSTETRICS & GYNECOLOGY

## 2017-03-16 PROCEDURE — 63600175 PHARM REV CODE 636 W HCPCS: Performed by: OBSTETRICS & GYNECOLOGY

## 2017-03-16 PROCEDURE — 85027 COMPLETE CBC AUTOMATED: CPT

## 2017-03-16 PROCEDURE — 76819 FETAL BIOPHYS PROFIL W/O NST: CPT | Mod: 26,S$PBB,, | Performed by: PEDIATRICS

## 2017-03-16 PROCEDURE — 37000009 HC ANESTHESIA EA ADD 15 MINS: Performed by: OBSTETRICS & GYNECOLOGY

## 2017-03-16 PROCEDURE — 36415 COLL VENOUS BLD VENIPUNCTURE: CPT

## 2017-03-16 PROCEDURE — 82803 BLOOD GASES ANY COMBINATION: CPT

## 2017-03-16 PROCEDURE — 37000008 HC ANESTHESIA 1ST 15 MINUTES: Performed by: OBSTETRICS & GYNECOLOGY

## 2017-03-16 PROCEDURE — 59025 FETAL NON-STRESS TEST: CPT

## 2017-03-16 PROCEDURE — 59514 CESAREAN DELIVERY ONLY: CPT | Mod: 82,AT,, | Performed by: OBSTETRICS & GYNECOLOGY

## 2017-03-16 PROCEDURE — 88307 TISSUE EXAM BY PATHOLOGIST: CPT | Mod: 26,,, | Performed by: PATHOLOGY

## 2017-03-16 PROCEDURE — 99999 PR PBB SHADOW E&M-EST. PATIENT-LVL III: CPT | Mod: PBBFAC,,, | Performed by: OBSTETRICS & GYNECOLOGY

## 2017-03-16 PROCEDURE — 59514 CESAREAN DELIVERY ONLY: CPT | Mod: ,,, | Performed by: ANESTHESIOLOGY

## 2017-03-16 RX ORDER — DIPHENHYDRAMINE HCL 25 MG
25 CAPSULE ORAL EVERY 4 HOURS PRN
Status: DISCONTINUED | OUTPATIENT
Start: 2017-03-16 | End: 2017-03-21 | Stop reason: HOSPADM

## 2017-03-16 RX ORDER — OXYCODONE HYDROCHLORIDE 5 MG/1
10 TABLET ORAL EVERY 4 HOURS PRN
Status: DISCONTINUED | OUTPATIENT
Start: 2017-03-16 | End: 2017-03-19

## 2017-03-16 RX ORDER — KETOROLAC TROMETHAMINE 30 MG/ML
INJECTION, SOLUTION INTRAMUSCULAR; INTRAVENOUS
Status: DISCONTINUED | OUTPATIENT
Start: 2017-03-16 | End: 2017-03-16

## 2017-03-16 RX ORDER — FENTANYL CITRATE 50 UG/ML
INJECTION, SOLUTION INTRAMUSCULAR; INTRAVENOUS
Status: DISCONTINUED | OUTPATIENT
Start: 2017-03-16 | End: 2017-03-16

## 2017-03-16 RX ORDER — ADHESIVE BANDAGE
30 BANDAGE TOPICAL 2 TIMES DAILY PRN
Status: DISCONTINUED | OUTPATIENT
Start: 2017-03-17 | End: 2017-03-21 | Stop reason: HOSPADM

## 2017-03-16 RX ORDER — ONDANSETRON 8 MG/1
8 TABLET, ORALLY DISINTEGRATING ORAL EVERY 8 HOURS PRN
Status: DISCONTINUED | OUTPATIENT
Start: 2017-03-16 | End: 2017-03-21 | Stop reason: HOSPADM

## 2017-03-16 RX ORDER — ACETAMINOPHEN 10 MG/ML
INJECTION, SOLUTION INTRAVENOUS
Status: DISCONTINUED | OUTPATIENT
Start: 2017-03-16 | End: 2017-03-16

## 2017-03-16 RX ORDER — ACETAMINOPHEN 325 MG/1
650 TABLET ORAL EVERY 6 HOURS
Status: DISCONTINUED | OUTPATIENT
Start: 2017-03-16 | End: 2017-03-16 | Stop reason: SDUPTHER

## 2017-03-16 RX ORDER — OXYCODONE AND ACETAMINOPHEN 5; 325 MG/1; MG/1
1 TABLET ORAL EVERY 4 HOURS PRN
Status: DISCONTINUED | OUTPATIENT
Start: 2017-03-16 | End: 2017-03-21 | Stop reason: HOSPADM

## 2017-03-16 RX ORDER — OXYTOCIN/RINGER'S LACTATE 20/1000 ML
41.65 PLASTIC BAG, INJECTION (ML) INTRAVENOUS CONTINUOUS
Status: DISPENSED | OUTPATIENT
Start: 2017-03-16 | End: 2017-03-16

## 2017-03-16 RX ORDER — ONDANSETRON 2 MG/ML
4 INJECTION INTRAMUSCULAR; INTRAVENOUS EVERY 12 HOURS PRN
Status: DISCONTINUED | OUTPATIENT
Start: 2017-03-16 | End: 2017-03-16 | Stop reason: SDUPTHER

## 2017-03-16 RX ORDER — GLUCAGON 1 MG
1 KIT INJECTION
Status: DISCONTINUED | OUTPATIENT
Start: 2017-03-16 | End: 2017-03-21 | Stop reason: HOSPADM

## 2017-03-16 RX ORDER — OXYCODONE HYDROCHLORIDE 5 MG/1
5 TABLET ORAL EVERY 4 HOURS PRN
Status: DISCONTINUED | OUTPATIENT
Start: 2017-03-16 | End: 2017-03-21 | Stop reason: HOSPADM

## 2017-03-16 RX ORDER — SODIUM CHLORIDE, SODIUM LACTATE, POTASSIUM CHLORIDE, CALCIUM CHLORIDE 600; 310; 30; 20 MG/100ML; MG/100ML; MG/100ML; MG/100ML
INJECTION, SOLUTION INTRAVENOUS CONTINUOUS
Status: ACTIVE | OUTPATIENT
Start: 2017-03-16 | End: 2017-03-17

## 2017-03-16 RX ORDER — ONDANSETRON 2 MG/ML
4 INJECTION INTRAMUSCULAR; INTRAVENOUS EVERY 8 HOURS PRN
Status: DISCONTINUED | OUTPATIENT
Start: 2017-03-16 | End: 2017-03-16

## 2017-03-16 RX ORDER — SIMETHICONE 80 MG
1 TABLET,CHEWABLE ORAL EVERY 6 HOURS PRN
Status: DISCONTINUED | OUTPATIENT
Start: 2017-03-16 | End: 2017-03-21 | Stop reason: HOSPADM

## 2017-03-16 RX ORDER — SODIUM CITRATE AND CITRIC ACID MONOHYDRATE 334; 500 MG/5ML; MG/5ML
SOLUTION ORAL
Status: DISPENSED
Start: 2017-03-16 | End: 2017-03-17

## 2017-03-16 RX ORDER — OXYCODONE HYDROCHLORIDE 5 MG/1
5 TABLET ORAL EVERY 4 HOURS PRN
Status: DISCONTINUED | OUTPATIENT
Start: 2017-03-16 | End: 2017-03-16

## 2017-03-16 RX ORDER — KETOROLAC TROMETHAMINE 30 MG/ML
30 INJECTION, SOLUTION INTRAMUSCULAR; INTRAVENOUS EVERY 6 HOURS
Status: DISCONTINUED | OUTPATIENT
Start: 2017-03-16 | End: 2017-03-16 | Stop reason: SDUPTHER

## 2017-03-16 RX ORDER — FAMOTIDINE 10 MG/ML
20 INJECTION INTRAVENOUS ONCE
Status: COMPLETED | OUTPATIENT
Start: 2017-03-16 | End: 2017-03-16

## 2017-03-16 RX ORDER — LIDOCAINE HCL/EPINEPHRINE/PF 2%-1:200K
VIAL (ML) INJECTION
Status: DISCONTINUED | OUTPATIENT
Start: 2017-03-16 | End: 2017-03-16

## 2017-03-16 RX ORDER — INSULIN ASPART 100 [IU]/ML
1-10 INJECTION, SOLUTION INTRAVENOUS; SUBCUTANEOUS
Status: DISCONTINUED | OUTPATIENT
Start: 2017-03-17 | End: 2017-03-16

## 2017-03-16 RX ORDER — METOCLOPRAMIDE HYDROCHLORIDE 5 MG/ML
10 INJECTION INTRAMUSCULAR; INTRAVENOUS ONCE
Status: COMPLETED | OUTPATIENT
Start: 2017-03-16 | End: 2017-03-16

## 2017-03-16 RX ORDER — MISOPROSTOL 200 UG/1
TABLET ORAL
Status: DISCONTINUED
Start: 2017-03-16 | End: 2017-03-16 | Stop reason: WASHOUT

## 2017-03-16 RX ORDER — INSULIN ASPART 100 [IU]/ML
1-10 INJECTION, SOLUTION INTRAVENOUS; SUBCUTANEOUS
Status: DISCONTINUED | OUTPATIENT
Start: 2017-03-16 | End: 2017-03-21 | Stop reason: HOSPADM

## 2017-03-16 RX ORDER — BUPROPION HYDROCHLORIDE 150 MG/1
150 TABLET ORAL DAILY
Status: DISCONTINUED | OUTPATIENT
Start: 2017-03-17 | End: 2017-03-21 | Stop reason: HOSPADM

## 2017-03-16 RX ORDER — DOCUSATE SODIUM 100 MG/1
200 CAPSULE, LIQUID FILLED ORAL 2 TIMES DAILY
Status: DISCONTINUED | OUTPATIENT
Start: 2017-03-16 | End: 2017-03-21 | Stop reason: HOSPADM

## 2017-03-16 RX ORDER — KETOROLAC TROMETHAMINE 30 MG/ML
30 INJECTION, SOLUTION INTRAMUSCULAR; INTRAVENOUS EVERY 6 HOURS
Status: DISCONTINUED | OUTPATIENT
Start: 2017-03-16 | End: 2017-03-16

## 2017-03-16 RX ORDER — OXYTOCIN 10 [USP'U]/ML
INJECTION, SOLUTION INTRAMUSCULAR; INTRAVENOUS
Status: DISCONTINUED | OUTPATIENT
Start: 2017-03-16 | End: 2017-03-16

## 2017-03-16 RX ORDER — OXYCODONE HYDROCHLORIDE 5 MG/1
10 TABLET ORAL EVERY 4 HOURS PRN
Status: DISCONTINUED | OUTPATIENT
Start: 2017-03-16 | End: 2017-03-16

## 2017-03-16 RX ORDER — OXYCODONE AND ACETAMINOPHEN 10; 325 MG/1; MG/1
1 TABLET ORAL EVERY 4 HOURS PRN
Status: DISCONTINUED | OUTPATIENT
Start: 2017-03-16 | End: 2017-03-21 | Stop reason: HOSPADM

## 2017-03-16 RX ORDER — METOCLOPRAMIDE HYDROCHLORIDE 5 MG/ML
INJECTION INTRAMUSCULAR; INTRAVENOUS
Status: DISPENSED
Start: 2017-03-16 | End: 2017-03-17

## 2017-03-16 RX ORDER — ACETAMINOPHEN 325 MG/1
650 TABLET ORAL EVERY 6 HOURS
Status: DISPENSED | OUTPATIENT
Start: 2017-03-16 | End: 2017-03-17

## 2017-03-16 RX ORDER — IBUPROFEN 200 MG
24 TABLET ORAL
Status: DISCONTINUED | OUTPATIENT
Start: 2017-03-16 | End: 2017-03-21 | Stop reason: HOSPADM

## 2017-03-16 RX ORDER — AMOXICILLIN 250 MG
1 CAPSULE ORAL NIGHTLY PRN
Status: DISCONTINUED | OUTPATIENT
Start: 2017-03-16 | End: 2017-03-21 | Stop reason: HOSPADM

## 2017-03-16 RX ORDER — CHLOROPROCAINE HYDROCHLORIDE 20 MG/ML
INJECTION, SOLUTION EPIDURAL; INFILTRATION; INTRACAUDAL; PERINEURAL
Status: DISCONTINUED | OUTPATIENT
Start: 2017-03-16 | End: 2017-03-16

## 2017-03-16 RX ORDER — BISACODYL 10 MG
10 SUPPOSITORY, RECTAL RECTAL ONCE AS NEEDED
Status: ACTIVE | OUTPATIENT
Start: 2017-03-16 | End: 2017-03-16

## 2017-03-16 RX ORDER — CARBOPROST TROMETHAMINE 250 UG/ML
INJECTION, SOLUTION INTRAMUSCULAR
Status: DISCONTINUED
Start: 2017-03-16 | End: 2017-03-16 | Stop reason: WASHOUT

## 2017-03-16 RX ORDER — KETOROLAC TROMETHAMINE 30 MG/ML
15 INJECTION, SOLUTION INTRAMUSCULAR; INTRAVENOUS EVERY 6 HOURS
Status: DISCONTINUED | OUTPATIENT
Start: 2017-03-16 | End: 2017-03-17

## 2017-03-16 RX ORDER — SODIUM CITRATE AND CITRIC ACID MONOHYDRATE 334; 500 MG/5ML; MG/5ML
30 SOLUTION ORAL ONCE
Status: COMPLETED | OUTPATIENT
Start: 2017-03-16 | End: 2017-03-16

## 2017-03-16 RX ORDER — IBUPROFEN 200 MG
16 TABLET ORAL
Status: DISCONTINUED | OUTPATIENT
Start: 2017-03-16 | End: 2017-03-21 | Stop reason: HOSPADM

## 2017-03-16 RX ORDER — MISOPROSTOL 200 UG/1
800 TABLET ORAL
Status: DISCONTINUED | OUTPATIENT
Start: 2017-03-16 | End: 2017-03-21 | Stop reason: HOSPADM

## 2017-03-16 RX ORDER — SODIUM CHLORIDE, SODIUM LACTATE, POTASSIUM CHLORIDE, CALCIUM CHLORIDE 600; 310; 30; 20 MG/100ML; MG/100ML; MG/100ML; MG/100ML
INJECTION, SOLUTION INTRAVENOUS CONTINUOUS
Status: DISCONTINUED | OUTPATIENT
Start: 2017-03-16 | End: 2017-03-16

## 2017-03-16 RX ORDER — FAMOTIDINE 10 MG/ML
INJECTION INTRAVENOUS
Status: DISPENSED
Start: 2017-03-16 | End: 2017-03-17

## 2017-03-16 RX ORDER — PHENYLEPHRINE HYDROCHLORIDE 10 MG/ML
INJECTION INTRAVENOUS
Status: DISCONTINUED | OUTPATIENT
Start: 2017-03-16 | End: 2017-03-16

## 2017-03-16 RX ORDER — MORPHINE SULFATE 0.5 MG/ML
INJECTION, SOLUTION EPIDURAL; INTRATHECAL; INTRAVENOUS
Status: DISCONTINUED | OUTPATIENT
Start: 2017-03-16 | End: 2017-03-16

## 2017-03-16 RX ADMIN — OXYTOCIN 3 UNITS: 10 INJECTION, SOLUTION INTRAMUSCULAR; INTRAVENOUS at 01:03

## 2017-03-16 RX ADMIN — FENTANYL CITRATE 100 MCG: 50 INJECTION, SOLUTION INTRAMUSCULAR; INTRAVENOUS at 01:03

## 2017-03-16 RX ADMIN — OXYTOCIN 5 UNITS: 10 INJECTION, SOLUTION INTRAMUSCULAR; INTRAVENOUS at 01:03

## 2017-03-16 RX ADMIN — INSULIN ASPART 8 UNITS: 100 INJECTION, SOLUTION INTRAVENOUS; SUBCUTANEOUS at 06:03

## 2017-03-16 RX ADMIN — SODIUM CITRATE AND CITRIC ACID MONOHYDRATE 30 ML: 500; 334 SOLUTION ORAL at 12:03

## 2017-03-16 RX ADMIN — Medication 41.65 MILLI-UNITS/MIN: at 02:03

## 2017-03-16 RX ADMIN — CHLOROPROCAINE HYDROCHLORIDE 5 ML: 20 INJECTION, SOLUTION EPIDURAL; INFILTRATION; INTRACAUDAL; PERINEURAL at 01:03

## 2017-03-16 RX ADMIN — FAMOTIDINE 20 MG: 10 INJECTION, SOLUTION INTRAVENOUS at 12:03

## 2017-03-16 RX ADMIN — PHENYLEPHRINE HYDROCHLORIDE 200 MCG: 10 INJECTION INTRAVENOUS at 01:03

## 2017-03-16 RX ADMIN — SODIUM CHLORIDE, SODIUM LACTATE, POTASSIUM CHLORIDE, AND CALCIUM CHLORIDE: 600; 310; 30; 20 INJECTION, SOLUTION INTRAVENOUS at 12:03

## 2017-03-16 RX ADMIN — PHENYLEPHRINE HYDROCHLORIDE 100 MCG: 10 INJECTION INTRAVENOUS at 02:03

## 2017-03-16 RX ADMIN — SODIUM CHLORIDE, SODIUM LACTATE, POTASSIUM CHLORIDE, AND CALCIUM CHLORIDE: .6; .31; .03; .02 INJECTION, SOLUTION INTRAVENOUS at 04:03

## 2017-03-16 RX ADMIN — DIPHENHYDRAMINE HYDROCHLORIDE 25 MG: 25 CAPSULE ORAL at 10:03

## 2017-03-16 RX ADMIN — DOCUSATE SODIUM 200 MG: 100 CAPSULE, LIQUID FILLED ORAL at 08:03

## 2017-03-16 RX ADMIN — ACETAMINOPHEN 650 MG: 325 TABLET ORAL at 08:03

## 2017-03-16 RX ADMIN — LIDOCAINE HYDROCHLORIDE,EPINEPHRINE BITARTRATE 5 ML: 20; .005 INJECTION, SOLUTION EPIDURAL; INFILTRATION; INTRACAUDAL; PERINEURAL at 01:03

## 2017-03-16 RX ADMIN — PHENYLEPHRINE HYDROCHLORIDE 100 MCG: 10 INJECTION INTRAVENOUS at 01:03

## 2017-03-16 RX ADMIN — DIPHENHYDRAMINE HYDROCHLORIDE 25 MG: 25 CAPSULE ORAL at 05:03

## 2017-03-16 RX ADMIN — OXYCODONE HYDROCHLORIDE 10 MG: 5 TABLET ORAL at 08:03

## 2017-03-16 RX ADMIN — ACETAMINOPHEN 1000 MG: 10 INJECTION, SOLUTION INTRAVENOUS at 02:03

## 2017-03-16 RX ADMIN — Medication 1.5 MG: at 01:03

## 2017-03-16 RX ADMIN — METOCLOPRAMIDE 10 MG: 5 INJECTION, SOLUTION INTRAMUSCULAR; INTRAVENOUS at 12:03

## 2017-03-16 RX ADMIN — DEXTROSE 2 G: 50 INJECTION, SOLUTION INTRAVENOUS at 12:03

## 2017-03-16 RX ADMIN — ONDANSETRON 8 MG: 8 TABLET, ORALLY DISINTEGRATING ORAL at 10:03

## 2017-03-16 RX ADMIN — KETOROLAC TROMETHAMINE 30 MG: 30 INJECTION, SOLUTION INTRAMUSCULAR; INTRAVENOUS at 02:03

## 2017-03-16 RX ADMIN — OXYTOCIN 3 UNITS: 10 INJECTION, SOLUTION INTRAMUSCULAR; INTRAVENOUS at 02:03

## 2017-03-16 RX ADMIN — INSULIN ASPART 3 UNITS: 100 INJECTION, SOLUTION INTRAVENOUS; SUBCUTANEOUS at 10:03

## 2017-03-16 RX ADMIN — INSULIN DETEMIR 12 UNITS: 100 INJECTION, SOLUTION SUBCUTANEOUS at 10:03

## 2017-03-16 RX ADMIN — OXYCODONE HYDROCHLORIDE 5 MG: 5 TABLET ORAL at 04:03

## 2017-03-16 RX ADMIN — CHLOROPROCAINE HYDROCHLORIDE 10 ML: 20 INJECTION, SOLUTION EPIDURAL; INFILTRATION; INTRACAUDAL; PERINEURAL at 01:03

## 2017-03-16 NOTE — PROGRESS NOTES
Poorly controlled Type II DM with LGA and polyhydramnios  Noted to have a BPP of 2/10 (only points for AFV) today  Rec:  I have discussed the case with Dr. Kidd and pt will be delivered today

## 2017-03-16 NOTE — NURSING
Dr Almeida notified of accucheck 259  .  Pt has not eaten  Will have pt order meal and MB RN will administer insulin per SS

## 2017-03-16 NOTE — ANESTHESIA PREPROCEDURE EVALUATION
2017  Anusha Andrew is a 26 y.o. female  female with IUP at 34w2d weeks gestation who presents to L&D for poor prenatal testing. BPP 2/10. Poorly controlled insulin dependent type 1 DM. Pertinent medical history for this pregnancy include Type 1 DM, previous  delivery, fetal macrosomia, polyhydramnios. Patient reports contractions, denies vaginal bleeding, denies LOF.    Previous pregnancy complicated by T1DM and gHTN. Had c/s with spinal done, no complications to report from procedure.    NPO: last ate/drank yesterday (3/15) at 22:00. Took Levemir last night, has not taken any Aspart today.     OB History    Para Term  AB SAB TAB Ectopic Multiple Living   2 1 1       1      # Outcome Date GA Lbr Hawk/2nd Weight Sex Delivery Anes PTL Lv   2 Current            1 Term 12 37w5d  3.286 kg (7 lb 3.9 oz) F CS-LTranv EPI  Y      Obstetric Comments   Gestational HTN, failed induction, not past 6 cm       Wt Readings from Last 1 Encounters:   17 1218 103 kg (227 lb)       BP Readings from Last 3 Encounters:   17 130/84   17 126/70   17 118/70       Patient Active Problem List   Diagnosis    Uncontrolled type 1 diabetes mellitus    Diabetes type 1, uncontrolled    Leukocytosis    Obesity (BMI 30.0-34.9)    Hyperlipidemia    Poorly controlled type 1 diabetes mellitus    Pre-existing type 1 diabetes mellitus during pregnancy in second trimester    Hypertension in pregnancy, antepartum    Pre-existing type 2 diabetes mellitus during pregnancy in second trimester    Diabetes mellitus affecting pregnancy in second trimester    Threatened premature labor in third trimester    Irregular uterine contractions    Antepartum non-reassuring fetal heart rate or rhythm affecting care of mother       Past Surgical History:   Procedure Laterality Date     ABCESS DRAINAGE      boil went over GA     SECTION  2012    TONSILLECTOMY, ADENOIDECTOMY      TONSILLECTOMY, ADENOIDECTOMY      WISDOM TOOTH EXTRACTION         Social History     Social History    Marital status: Single     Spouse name: N/A    Number of children: N/A    Years of education: N/A     Occupational History    Not on file.     Social History Main Topics    Smoking status: Never Smoker    Smokeless tobacco: Not on file    Alcohol use No    Drug use: No    Sexual activity: Yes     Partners: Male     Birth control/ protection: OCP     Other Topics Concern    Not on file     Social History Narrative         Chemistry        Component Value Date/Time     (L) 2016 1241    K 3.8 2016 1241     2016 1241    CO2 20 (L) 2016 1241    BUN 12 2016 1241    CREATININE 0.9 2016 1241     (H) 2016 1241        Component Value Date/Time    CALCIUM 9.3 2016 1241    ALKPHOS 76 2016 1241    AST 5 (L) 2016 1241    ALT 9 (L) 2016 1241    BILITOT 0.5 2016 1241            Lab Results   Component Value Date    WBC 9.35 2016    HGB 11.5 (L) 2016    HCT 35.5 (L) 2016    MCV 82 2016     2016       No results for input(s): INR, PROTIME, APTT in the last 72 hours.    Invalid input(s): PT    [facility] Anesthesia Evaluation    I have reviewed the Patient Summary Reports.    I have reviewed the Nursing Notes.   I have reviewed the Medications.     Review of Systems  Anesthesia Hx:  History of prior surgery of interest to airway management or planning: Denies Family Hx of Anesthesia complications.   Denies Personal Hx of Anesthesia complications.   Social:  Non-Smoker, No Alcohol Use    Cardiovascular:  Cardiovascular Normal     Pulmonary:  Pulmonary Normal    Renal/:  Renal/ Normal     Hepatic/GI:  Hepatic/GI Normal    Musculoskeletal:  Musculoskeletal Normal    Neurological:  Neurology  Normal    Endocrine:   Diabetes, type 1    Psych:   anxiety          Physical Exam  General:  Well nourished    Airway/Jaw/Neck:  Airway Findings: Mouth Opening: Normal Tongue: Normal  Mallampati: II  Jaw/Neck Findings:  Neck ROM: Normal ROM      Dental:  Dental Findings: In tact   Chest/Lungs:  Chest/Lungs Findings: Normal Respiratory Rate     Heart/Vascular:  Heart Findings: Rate: Normal  Rhythm: Regular Rhythm        Mental Status:  Mental Status Findings:  Cooperative, Alert and Oriented         Anesthesia Plan  Type of Anesthesia, risks & benefits discussed:  Anesthesia Type:  CSE, epidural, general, spinal  Patient's Preference:   Intra-op Monitoring Plan:   Intra-op Monitoring Plan Comments:   Post Op Pain Control Plan:   Post Op Pain Control Plan Comments:   Induction:    Beta Blocker:  Patient is not currently on a Beta-Blocker (No further documentation required).       Informed Consent: Patient understands risks and agrees with Anesthesia plan.  Questions answered. Anesthesia consent signed with patient.  ASA Score: 3     Day of Surgery Review of History & Physical:    H&P update referred to the provider.         Ready For Surgery From Anesthesia Perspective.

## 2017-03-16 NOTE — NURSING
Upon transfer of pt to [facility] Pt hasn't initiated use of breast pump.  Encouraged early start.  Pt verbalized  understanding

## 2017-03-16 NOTE — PROGRESS NOTES
BPP . rec go to  testing for NST. I contacted [facility] office. Pt c/o contractions- painful

## 2017-03-16 NOTE — DISCHARGE INSTRUCTIONS
Preparation and Hygiene:    1. Shower daily.  2. Wear a clean bra each day and wash daily in warm soapy water.  3. Change wet or moist breast pads frequently.  Moist pads can promote growth of germs.  4. Actively wash your hands, paying close attention to the area around and under your fingernails, thoroughly with soap and water for 15 seconds before pumping or handling your milk.  Re-wash your hands if you touch anything (scratching your nose, answering the phone, etc) while pumping or handling your milk.   5. Before pumping your breasts, assemble the pump collection kit and have ready the sterile container and labels.  Place these items on a clean surface next to the breastpump.  6. Each time after you have finished pumping, take apart all of the parts of the breastpump collection kit and place them in a separate cleaning container (do not place them in the sink).  Be sure to remove the yellow valve from the breastshield and separate the white membrane from the yellow valve.  Rinse all of these parts with cool water.  Then use a new sponge and/or bottle brush and dishwashing detergent to clean the parts.  Rinse off the soapy water with cool water and air dry on a clean towel covered with a clean cloth.  All parts may also be washed after each use in the top rack of a .  7. Once each day, sterilize all of the parts of the breastpump collection kit.  This can be done by boiling the kit parts for 10 minutes or by using a Quick Clean Micro-Steam Bag made by [employer]  8. If condensation appears in the tubing, continue to run the pump with the tubing attached for 1-2 minutes or until the tubing is dry.   9. Notify your babys nurse or doctor if you become ill or need to take any medication, even over-the-counter medicines.        Collection and Storage of Expressed Breastmilk:         1. Pump your breasts at least 8-10 times every 24 hours.  Double pump (both breasts at  the same time) for at least 15-20  minutes using the most suction that is comfortable.    2. Write the date and time of pumping and the name of any medications you are takingon the babys pre-printed hospital identification label.   3. Place your babys pre-printed hospital identification label on each container of breastmilk.  Additional pre-printed labels can be obtained from your babys nurse.  If your expressed breastmilk is not correctly labeled, the nurse cannot feed the milk to the baby.       4. Place a brightly colored sticker on the top of each container of milk pumped during the first 30 days.  This identifies the milk as special and having higher levels of nutrients and anti-infective properties that are so important for your baby.  Additional stickers can be obtained from the lactation consultants or your babys nurse.  5.   Do not touch the inside of the storage containers or lids.  6.      Pour the amount of expressed milk needed for 1 of your babys feedings into each   storage container. Use a new container(s) for each pumping.  Additional storage   containers can be obtained from your babys nurse.        7.       Tightly screw the lid onto the container and place immediately into the       refrigerator fordaily transportation to the hospital.   Do not freeze your milk      unless asked to do so by your babys nurse.  However, if you are not able to      visit your baby each day, place the expressed breastmilk in the freezer.  8.   Expressed breastmilk should be refrigerated or frozen within 1 hour of      pumping.  9.      Do not store expressed breastmilk on the door of your refrigerator or freezer where the temperature is warmer.         Transportation of Expressed Breastmilk:    1. Refrigerated breastmilk or frozen milk should be packed tightly together in a cooler with frozen, blue gel-packs to keep the milk frozen.  DO NOT USE ICE CUBES (WET ICE) TO TRANSPORT FROZEN MILK.   A clean towel can be used to fill any extra space  between containers of frozen milk.  2.    Bring your expressed milk from home each time you visit the baby.        NICU warmline:  [phone]  Lactation warmline:  [phone]

## 2017-03-16 NOTE — NURSING
Mother would like to pump for her baby. Mother encouraged to provide  breastmilk by pumping.Benefits of breastmilk discussed. Breastpump to bedside Mother educated on pump use, cleaning pump parts, labeling containers and storage of breastmilk. Mother to call her nurse with further questions.

## 2017-03-16 NOTE — PROCEDURES
Procedures       Indication  ========    Evaluation of fetal growth. Evaluation of fetal well-being.    History  ======    Medical History  Allergies: No allergies identified  Medication  Medication: wellbutrin xl  Details: qday  Medication: novolog 65u/60u/38u  Details: tid w meals  Medication: levemir 100u  Details: q hs    Method  ======    Transabdominal ultrasound examination. View: Suboptimal view: limited by late gestational age.    Pregnancy  =========    Ray pregnancy. Number of fetuses: 1.    Dating  ======    LMP on: 7/19/2016  GA by LMP 34 w + 2 d  RYAN by LMP: 4/25/2017  Ultrasound examination on: 3/16/2017  GA by U/S based upon: AC, BPD, Femur, HC  GA by U/S 37 w + 2 d  RYAN by U/S: 4/4/2017  Assigned: The Best Overall Assessment is based on the LMP.  Assigned GA 34 w + 2 d  Assigned RYAN: 4/25/2017    General Evaluation  ==============    Cardiac activity: present.  bpm.  Fetal movements: not visualized.  Presentation: cephalic.  Umbilical cord: 3 vessel cord.  Amniotic fluid: polyhydramnios.    Biophysical Profile  ==============    0: Fetal breathing movements  0: Gross body movements  0: Fetal tone  2: Amniotic fluid volume  2/8: Biophysical profile score  Interpretation: abnormal  Dr. Kidd notified    Fetal Biometry  ============    Fetal Biometry  BPD 88.1 mm 83% 35w 4d Hadlock  .0 mm 94% 38w 0d Hadlock  .0 mm >99% 40w 2d Hadlock  Femur 68.1 mm 60% 35w 0d Hadlock  EFW 3,466 g >99% 39w 1d Hadlock  Calculated by: Hadlock (BPD-HC-AC-FL)  EFW (lb) 7 lb  EFW (oz) 10 oz  HC / AC 0.91  FL / BPD 0.77  FL / AC 0.19  MVP 9.9 cm  MADELYN 25.5 cm   bpm    Impression  =========    Patient with hypertensive disorder of pregnancy, Type I DM, BMI of 40.2 kg/m2.    Limited anatomy was negative. No anomalies seen.  Fetal biometry is consistent and concordant with a macrosomic growth profile.    Polyhydramnios.    BPP is 2 of 8. Dr. Kidd was  notified.    Recommendation  ==============    Fetal Macrosomia.  Abnormal BPP.    Patient has been sent to OB ED for monitoring. Confirmed.

## 2017-03-16 NOTE — ANESTHESIA PROCEDURE NOTES
Epidural    Patient location during procedure: OR   Reason for block: primary anesthetic   Diagnosis: IUP   Start time: 3/16/2017 12:53 PM  Timeout: 3/16/2017 12:50 PM  End time: 3/16/2017 1:16 PM  Surgery related to: Vaginal Delivery  Staffing  Anesthesiologist: ARIK WATT  Resident/CRNA: JARAD MORELOS  Other anesthesia staff: ERIN SOL  Performed by: anesthesiologist   Preanesthetic Checklist  Completed: patient identified, site marked, surgical consent, pre-op evaluation, timeout performed, IV checked, risks and benefits discussed, monitors and equipment checked, anesthesia consent given, hand hygiene performed and patient being monitored  Preparation  Patient position: sitting  Prep: ChloraPrep  Patient monitoring: Pulse Ox  Epidural  Skin Anesthetic: lidocaine 1%  Skin Wheal: 3 mL  Administration type: continuous  Approach: midline  Interspace: L3-4  Injection technique: DENIS saline  Needle and Epidural Catheter  Needle type: Tuohy   Needle gauge: 17  Needle length: 3.5 inches  Needle insertion depth: 8 cm  Catheter type: springwSun [employer]  Catheter size: 19 G  Test dose: 3 mL of lidocaine 1.5% with Epi 1-to-200,000  Additional Documentation: incremental injection, negative aspiration for heme and CSF, no paresthesia on injection, no signs/symptoms of IV or SA injection, no significant pain on injection and no significant complaints from patient  Needle localization: anatomical landmarks  Medications:  Bolus administered: 20 mL of 3.0% chloroprocaine  Opioid administered: 100 mcg of   fentanyl  Volume per aspiration: 5 mL  Time between aspirations: 5 minutes  Assessment   Dermatomal levels determined by pinch or prick  Ease of block: difficult  Additional Notes  CSE attempted multiple times. Epidural space reached to right of midline on several attempts with good DENIS but unable to obtain CSF from spinal needle. Pt not tolerating procedure well so decision made to abort CSE and place epidural catheter.

## 2017-03-16 NOTE — TRANSFER OF CARE
"Anesthesia Transfer of Care Note    Patient: Anusha Andrew    Procedure(s) Performed: Procedure(s) (LRB):  DELIVERY- SECTION (N/A)    Patient location: Labor and Delivery    Anesthesia Type: epidural    Transport from OR: Transported from OR on room air with adequate spontaneous ventilation    Post pain: adequate analgesia    Post assessment: no apparent anesthetic complications    Level of consciousness: awake    Nausea/Vomiting: no nausea/vomiting          Last vitals:   Visit Vitals    BP (!) 173/75    Pulse (!) 120    Temp 36.7 °C (98 °F)    Ht 5' 3" (1.6 m)    Wt 103 kg (227 lb)    LMP 2016    SpO2 99%    Breastfeeding Unknown    BMI 40.21 kg/m2     "

## 2017-03-16 NOTE — MR AVS SNAPSHOT
[facility]  [address]  [address]  [facility] [address]  Phone: [phone]  Fax: [phone]                  Anusha Andrew   3/16/2017 9:15 AM   Routine Prenatal    Description:  Female : 1990   Provider:  Purnima Kidd MD   Department:  [facility]           Reason for Visit     Routine Prenatal Visit           Diagnoses this Visit        Comments    Insulin controlled gestational diabetes mellitus (GDM) in third trimester    -  Primary            To Do List           Future Appointments        Provider Department Dept Phone    3/17/2017  9:30 AM TESTING, PRENATAL [facility] [phone]    3/21/2017 9:30 AM TESTING, PRENATAL [facility] [phone]    3/24/2017 9:30 AM TESTING, PRENATAL [facility] [phone]    3/28/2017 9:30 AM TESTING, [facility] [phone]    3/28/2017 11:00 AM ULTRASOUND, [facility] 4TH FLR ON CALL [facility] [phone]      Goals (5 Years of Data)     None      [facility]     [facility] Care Line -  Assistance  Registered nurses in the [facility] provide clinical advisement, health education, appointment booking, and other advisory services.  Call for this free service at [phone].             Medications           Message regarding Medications     Verify the changes and/or additions to your medication regime listed below are the same as discussed with your clinician today.  If any of these changes or additions are incorrect, please notify your healthcare provider.        STOP taking these medications     metoclopramide HCl (REGLAN) 10 MG tablet Take 1 tablet (10 mg total) by mouth every 6 (six) hours as needed.           Verify that the below list of medications is an accurate representation of the medications you are currently taking.  If none reported, the list may be blank. If incorrect,  "please contact your healthcare provider. Carry this list with you in case of emergency.           Current Medications     blood sugar diagnostic Strp 1 each by Misc.(Non-Drug; Combo Route) route 6 (six) times daily.    buPROPion (WELLBUTRIN XL) 150 MG TB24 tablet Take 1 tablet (150 mg total) by mouth every morning.    insulin aspart (NOVOLOG) 100 unit/mL injection 65 units with Breakfast; 60 units with Lunch; 38 units with Dinner    insulin detemir (LEVEMIR FLEXTOUCH) 100 unit/mL (3 mL) SubQ InPn pen Inject 100 Units into the skin every evening.    lancets Misc 1 Device by Misc.(Non-Drug; Combo Route) route 4 (four) times daily before meals and nightly.    ONETOUCH DELICA LANCETS 33 gauge Misc     ONETOUCH ULTRA2 kit     pen needle, diabetic 31 gauge x 1/4" Ndle 1 Device by MISCELLANEOUS route 4 (four) times daily with meals and nightly.    PNV#67-iron ps-FA cmb#1-dha (VITAFOL ULTRA) 29 mg iron- 1 mg-200 mg Cap Take 1 tablet by mouth once daily.           Clinical Reference Information           Prenatal Vitals     Enc. Date GA Prenatal Vitals Prenatal Pulse Pain Level Urine Albumin/Glucose Edema Presentation Dilation/Effacement/Station    3/16/17 34w2d 130/84  /  / Decreased (A)  8  +2 / +2 / None / Yes      3/14/17 34w0d 126/70 / 103 kg (227 lb 1.2 oz) 45 cm / 154 / Present  0 Trace / 2+ +2 / +2 /  / Yes      3/2/17 32w2d Admission Dept: [facility] OB ER    2/22/17 31w1d 118/72 / 98.7 kg (217 lb 9.5 oz)           2/14/17 30w0d Admission Dx: Diabetes in pregnancy Dept: [facility] MOMBABY    2/1/17 28w1d 126/70 / 97.5 kg (214 lb 15.2 oz) 31 cm / 155 / Present  0 Negative / 2+ None / None / None      1/24/17 27w0d 114/72 / 96.5 kg (212 lb 11.9 oz)           1/4/17 24w1d 112/70 / 95 kg (209 lb 7 oz)  / 150 / Absent   Negative / 2+       1/3/17 24w0d Admission Dept: [facility] OB ER    12/7/16 20w1d 126/70 / 94.2 kg (207 lb 9 oz)  /  / Present  0 Negative / 2+ None / None / None      11/29/16 19w0d 122/70 / 92.3 kg (203 lb 7.8 oz)       " "    11/18/16 17w3d 118/70 / 92.4 kg (203 lb 11.3 oz)  / 154-159 / Present  0        11/10/16 16w2d 110/62 / 92.1 kg (203 lb 2.5 oz)  / 150 / Present  0 Negative / 3+ None / None / None      11/10/16 16w2d 122/70 / 92.3 kg (203 lb 7.8 oz)  / 158-162 / Present  0        10/26/16 14w1d 111/79 / 91.4 kg (201 lb 8 oz)  / 164          10/13/16 12w2d 102/68 / 91 kg (200 lb 9.9 oz)  /  / Present  0 Negative / Negative None / None / None      10/13/16 12w2d 112/68 / 90.8 kg (200 lb 2.8 oz)           9/19/16 8w6d 118/80 / 89.2 kg (196 lb 10.4 oz) 9 cm / 180 / Absent   Negative / Negative None / None / None / No      9/14/16 8w1d Admission Dx: Poorly controlled type 1 diabetes mellitus Dept: [facility]    9/14/16 8w1d 110/60 / 89.7 kg (197 lb 10.3 oz)              Height: 5' 3" (1.6 m)       Your Vitals Were     BP Last Period                130/84 07/19/2016          Allergies as of 3/16/2017     No Known Allergies      Immunizations Administered on Date of Encounter - 3/16/2017     None      Orders Placed During Today's Visit     Future Labs/Procedures Expected by Expires    Prenatal Testing- [facility]  As directed 3/16/2018      Language Assistance Services     ATTENTION: Language assistance services are available, free of charge. Please call [phone].      ATENCIÓN: Si habla español, tiene a moreno disposición servicios gratuitos de asistencia lingüística. Llame al [phone].     KENNETH Ý: N?u b?n nói Ti?ng Vi?t, có các d?ch v? h? tr? ngôn ng? mi?n phí dành cho b?n. G?i s? [phone].         [facility] complies with applicable Federal civil rights laws and does not discriminate on the basis of race, color, national origin, age, disability, or sex.        "

## 2017-03-16 NOTE — PLAN OF CARE
Problem: Patient Care Overview  Goal: Plan of Care Review  Outcome: Ongoing (interventions implemented as appropriate)  Lactation note:  LC to room to start mother pumping for her NICU baby. Gave mother NICU Blue folder for lactation. Showed mother how to work pump, how to keep track of pumpings, how to label nicu breastmilk, how to clean pump parts and bring milk to NICU even if it is only a drop of milk. NICU uses mother's milk for mouth care so even small amounts are ok to bring to NICU. Mother aware to pump 8 or more times a day. Showed mother how to use Symphony pump on premie setting. Encouraged hand expression and showed handout on how to perform. Patient declined demonstration. Call RN with any further questions. Mother may want to start thinking about what she will pump with when she gets home. May need to call insurance/WIC for pump options.

## 2017-03-16 NOTE — MR AVS SNAPSHOT
[facility]  [address]  [address]  [facility] [address]  Phone: [phone]  Fax: [phone]                  Anusha Andrew   3/16/2017 10:30 AM   Procedure visit    Description:  Female : 1990   Provider:  [facility], WOMEN'S ULTRASOUND   [facility]           Diagnoses this Visit        Comments    Diabetes mellitus during pregnancy in third trimester, unspecified diabetes mellitus type                To Do List           Future Appointments        Provider Department Dept Phone    3/17/2017  9:30 AM TESTING, PRENATAL [facility] [phone]    3/21/2017 9:30 AM TESTING, PRENATAL [facility] [phone]    3/24/2017 9:30 AM TESTING, PRENATAL [facility] [phone]    3/28/2017 9:30 AM TESTING, PRENATAL [facility] [phone]    3/28/2017 11:00 AM ULTRASOUND, [facility] 4TH FLR ON CALL [facility]aternal Fetal [facility] [phone]      Goals (5 Years of Data)     None      [facility]     [facility] Nurse Care Line -  Assistance  Registered nurses in the [facility] provide clinical advisement, health education, appointment booking, and other advisory services.  Call for this free service at [phone].             Medications           Message regarding Medications     Verify the changes and/or additions to your medication regime listed below are the same as discussed with your clinician today.  If any of these changes or additions are incorrect, please notify your healthcare provider.             Verify that the below list of medications is an accurate representation of the medications you are currently taking.  If none reported, the list may be blank. If incorrect, please contact your healthcare provider. Carry this list with you in case of emergency.           Current Medications     blood sugar diagnostic Strp 1 each by Misc.(Non-Drug; Combo Route)  "route 6 (six) times daily.    buPROPion (WELLBUTRIN XL) 150 MG TB24 tablet Take 1 tablet (150 mg total) by mouth every morning.    insulin aspart (NOVOLOG) 100 unit/mL injection 65 units with Breakfast; 60 units with Lunch; 38 units with Dinner    insulin detemir (LEVEMIR FLEXTOUCH) 100 unit/mL (3 mL) SubQ InPn pen Inject 100 Units into the skin every evening.    lancets Misc 1 Device by Misc.(Non-Drug; Combo Route) route 4 (four) times daily before meals and nightly.    ONETOUCH DELICA LANCETS 33 gauge Misc     ONETOUCH ULTRA2 kit     pen needle, diabetic 31 gauge x 1/4" Ndle 1 Device by MISCELLANEOUS route 4 (four) times daily with meals and nightly.    PNV#67-iron ps-FA cmb#1-dha (VITAFOL ULTRA) 29 mg iron- 1 mg-200 mg Cap Take 1 tablet by mouth once daily.           Clinical Reference Information           Prenatal Vitals     Enc. Date GA Prenatal Vitals Prenatal Pulse Pain Level Urine Albumin/Glucose Edema Presentation Dilation/Effacement/Station    3/16/17 34w2d 130/84  /  / Decreased (A)  8  +2 / +2 / None / Yes      3/14/17 34w0d 126/70 / 103 kg (227 lb 1.2 oz) 45 cm / 154 / Present  0 Trace / 2+ +2 / +2 /  / Yes      3/2/17 32w2d Admission Dept: [facility] OB ER    2/22/17 31w1d 118/72 / 98.7 kg (217 lb 9.5 oz)           2/14/17 30w0d Admission Dx: Diabetes in pregnancy Dept: [facility]    2/1/17 28w1d 126/70 / 97.5 kg (214 lb 15.2 oz) 31 cm / 155 / Present  0 Negative / 2+ None / None / None      1/24/17 27w0d 114/72 / 96.5 kg (212 lb 11.9 oz)           1/4/17 24w1d 112/70 / 95 kg (209 lb 7 oz)  / 150 / Absent   Negative / 2+       1/3/17 24w0d Admission Dept: [facility] OB ER    12/7/16 20w1d 126/70 / 94.2 kg (207 lb 9 oz)  /  / Present  0 Negative / 2+ None / None / None      11/29/16 19w0d 122/70 / 92.3 kg (203 lb 7.8 oz)           11/18/16 17w3d 118/70 / 92.4 kg (203 lb 11.3 oz)  / 154-159 / Present  0        11/10/16 16w2d 110/62 / 92.1 kg (203 lb 2.5 oz)  / 150 / Present  0 Negative / 3+ None / None / None   " "   11/10/16 16w2d 122/70 / 92.3 kg (203 lb 7.8 oz)  / 158-162 / Present  0        10/26/16 14w1d 111/79 / 91.4 kg (201 lb 8 oz)  / 164          10/13/16 12w2d 102/68 / 91 kg (200 lb 9.9 oz)  /  / Present  0 Negative / Negative None / None / None      10/13/16 12w2d 112/68 / 90.8 kg (200 lb 2.8 oz)           9/19/16 8w6d 118/80 / 89.2 kg (196 lb 10.4 oz) 9 cm / 180 / Absent   Negative / Negative None / None / None / No      9/14/16 8w1d Admission Dx: Poorly controlled type 1 diabetes mellitus Dept: [facility]    9/14/16 8w1d 110/60 / 89.7 kg (197 lb 10.3 oz)              Height: 5' 3" (1.6 m)       Your Vitals Were     Last Period                   07/19/2016           Allergies as of 3/16/2017     No Known Allergies      Immunizations Administered on Date of Encounter - 3/16/2017     None      Orders Placed During Today's Visit      Normal Orders This Visit    US OB/GYN Procedure ([facility]) - Extended List - Future       Language Assistance Services     ATTENTION: Language assistance services are available, free of charge. Please call [phone].      ATENCIÓN: Si habla español, tiene a moreno disposición servicios gratuitos de asistencia lingüística. Llame al [phone].     CHÚ Ý: N?u b?n nói Ti?ng Vi?t, có các d?ch v? h? tr? ngôn ng? mi?n phí dành cho b?n. G?i s? [phone].         [facility] complies with applicable Federal civil rights laws and does not discriminate on the basis of race, color, national origin, age, disability, or sex.        "

## 2017-03-16 NOTE — IP AVS SNAPSHOT
[facility] ([facility])  [address] [place], [place] [address]  Phone: [phone]           Patient Discharge Instructions     Our goal is to set you up for success. This packet includes information on your condition, medications, and your home care. It will help you to care for yourself so you don't get sicker and need to go back to the hospital.     Please ask your nurse if you have any questions.        There are many details to remember when preparing to leave the hospital. Here is what you will need to do:    1. Take your medicine. If you are prescribed medications, review your Medication List in the following pages. You may have new medications to  at the pharmacy and others that you'll need to stop taking. Review the instructions for how and when to take your medications. Talk with your doctor or nurses if you are unsure of what to do.     2. Go to your follow-up appointments. Specific follow-up information is listed in the following pages. Your may be contacted by a transition nurse or clinical provider about future appointments. Be sure we have all of the phone numbers to reach you, if needed. Please contact your provider's office if you are unable to make an appointment.     3. Watch for warning signs. Your doctor or nurse will give you detailed warning signs to watch for and when to call for assistance. These instructions may also include educational information about your condition. If you experience any of warning signs to your health, call your doctor.               [facility]  Unless otherwise directed by your provider, please contact [facility], our nurse care line that is available for 24/7 assistance.     [phone] (toll-free)    Registered nurses in the [facility] provide clinical advisement, health education, appointment booking, and other advisory services.                    ** Verify the list of medication(s) below is accurate and up to  date. Carry this with you in case of emergency. If your medications have changed, please notify your healthcare provider.             Medication List      START taking these medications        Additional Info                      escitalopram oxalate 10 MG tablet   Commonly known as:  LEXAPRO   Quantity:  30 tablet   Refills:  3   Dose:  10 mg    Last time this was given:  10 mg on 3/21/2017  9:23 AM   Instructions:  Take 1 tablet (10 mg total) by mouth once daily.     Begin Date    AM    Noon    PM    Bedtime       ibuprofen 600 MG tablet   Commonly known as:  ADVIL,MOTRIN   Quantity:  45 tablet   Refills:  1   Dose:  600 mg    Last time this was given:  600 mg on 3/21/2017  5:52 AM   Instructions:  Take 1 tablet (600 mg total) by mouth 4 (four) times daily.     Begin Date    AM    Noon    PM    Bedtime       insulin glargine 100 unit/mL injection   Commonly known as:  LANTUS   Quantity:  10 mL   Refills:  3   Dose:  20 Units    Instructions:  Inject 20 Units into the skin every evening.     Begin Date    AM    Noon    PM    Bedtime       oxycodone-acetaminophen 5-325 mg per tablet   Commonly known as:  PERCOCET   Quantity:  45 tablet   Refills:  0   Dose:  1 tablet    Instructions:  Take 1 tablet by mouth every 4 (four) hours as needed.     Begin Date    AM    Noon    PM    Bedtime         CHANGE how you take these medications        Additional Info                      insulin aspart 100 unit/mL injection   Commonly known as:  NOVOLOG   Quantity:  100 mL   Refills:  10   What changed:  additional instructions    Instructions:  15 units ac meals     Begin Date    AM    Noon    PM    Bedtime         CONTINUE taking these medications        Additional Info                      blood sugar diagnostic Strp   Quantity:  200 each   Refills:  11   Dose:  1 each    Instructions:  1 each by Misc.(Non-Drug; Combo Route) route 6 (six) times daily.     Begin Date    AM    Noon    PM    Bedtime       buPROPion 150 MG TB24  "tablet   Commonly known as:  WELLBUTRIN XL   Quantity:  30 tablet   Refills:  2   Dose:  150 mg    Last time this was given:  150 mg on 3/21/2017  9:23 AM   Instructions:  Take 1 tablet (150 mg total) by mouth every morning.     Begin Date    AM    Noon    PM    Bedtime       * lancets Misc   Quantity:  150 each   Refills:  12   Dose:  1 Device    Instructions:  1 Device by Misc.(Non-Drug; Combo Route) route 4 (four) times daily before meals and nightly.     Begin Date    AM    Noon    PM    Bedtime       * ONETOUCH DELICA LANCETS 33 gauge Misc   Refills:  0   Generic drug:  lancets      Begin Date    AM    Noon    PM    Bedtime       ONETOUCH ULTRA2 kit   Refills:  0   Generic drug:  blood-glucose meter      Begin Date    AM    Noon    PM    Bedtime       pen needle, diabetic 31 gauge x 1/4" Ndle   Quantity:  150 each   Refills:  12   Dose:  1 Device    Instructions:  1 Device by MISCELLANEOUS route 4 (four) times daily with meals and nightly.     Begin Date    AM    Noon    PM    Bedtime       PNV#67-iron ps-FA cmb#1-dha 29 mg iron- 1 mg-200 mg Cap   Commonly known as:  VITAFOL ULTRA   Quantity:  30 capsule   Refills:  6   Dose:  1 tablet    Instructions:  Take 1 tablet by mouth once daily.     Begin Date    AM    Noon    PM    Bedtime       * Notice:  This list has 2 medication(s) that are the same as other medications prescribed for you. Read the directions carefully, and ask your doctor or other care provider to review them with you.      STOP taking these medications     insulin detemir 100 unit/mL (3 mL) Inpn pen   Commonly known as:  LEVEMIR FLEXTOUCH            Where to Get Your Medications      These medications were sent to [facility] [address] [place] [address] ([place], [place] [facility] [address] [facility]  [address] [facility] [place] ([place] [address]     Phone:  [phone]     escitalopram oxalate 10 MG tablet    ibuprofen 600 MG tablet    insulin aspart 100 unit/mL injection    insulin glargine 100 unit/mL injection "    oxycodone-acetaminophen 5-325 mg per tablet                  Please bring to all follow up appointments:    1. A copy of your discharge instructions.  2. All medicines you are currently taking in their original bottles.  3. Identification and insurance card.    Please arrive 15 minutes ahead of scheduled appointment time.    Please call 24 hours in advance if you must reschedule your appointment and/or time.        Follow-up Information     Follow up with Purnima Kidd MD In 2 weeks.    Specialties:  Obstetrics, Gynecology, Obstetrics and Gynecology    Why:  For wound re-check and follow up blood sugars    Contact information:    [address]  [address]  [place] [id number] [phone]          Follow up with [facility] Today.    Why:  at location where she has appt today at 1pm for diabetes follow up    Contact information:    [address]  [facility] [address]  [phone]          Discharge Instructions     Future Orders    Call MD for:  persistent nausea and vomiting or diarrhea     Call MD for:  redness, tenderness, or signs of infection (pain, swelling, redness, odor or green/yellow discharge around incision site)     Call MD for:  severe uncontrolled pain     Call MD for:  temperature >100.4     Call MD for:     Comments:    Vaginal bleeding greater than 1 pad an hour for more than 2 hours    Diet general     Questions:    Total calories:      Fat restriction, if any:      Protein restriction, if any:      Na restriction, if any:      Fluid restriction:      Additional restrictions:      Other restrictions (specify):     Comments:    Pelvic rest x 6 weeks, no lifting greater than 10 pounds, showers only - no baths or swimming        Discharge Instructions       Preparation and Hygiene:    1. Shower daily.  2. Wear a clean bra each day and wash daily in warm soapy water.  3. Change wet or moist breast pads frequently.  Moist pads can promote growth of  germs.  4. Actively wash your hands, paying close attention to the area around and under your fingernails, thoroughly with soap and water for 15 seconds before pumping or handling your milk.  Re-wash your hands if you touch anything (scratching your nose, answering the phone, etc) while pumping or handling your milk.   5. Before pumping your breasts, assemble the pump collection kit and have ready the sterile container and labels.  Place these items on a clean surface next to the breastpump.  6. Each time after you have finished pumping, take apart all of the parts of the breastpump collection kit and place them in a separate cleaning container (do not place them in the sink).  Be sure to remove the yellow valve from the breastshield and separate the white membrane from the yellow valve.  Rinse all of these parts with cool water.  Then use a new sponge and/or bottle brush and dishwashing detergent to clean the parts.  Rinse off the soapy water with cool water and air dry on a clean towel covered with a clean cloth.  All parts may also be washed after each use in the top rack of a .  7. Once each day, sterilize all of the parts of the breastpump collection kit.  This can be done by boiling the kit parts for 10 minutes or by using a Quick Clean Micro-Steam Bag made by [employer]  8. If condensation appears in the tubing, continue to run the pump with the tubing attached for 1-2 minutes or until the tubing is dry.   9. Notify your babys nurse or doctor if you become ill or need to take any medication, even over-the-counter medicines.        Collection and Storage of Expressed Breastmilk:         1. Pump your breasts at least 8-10 times every 24 hours.  Double pump (both breasts at  the same time) for at least 15-20 minutes using the most suction that is comfortable.    2. Write the date and time of pumping and the name of any medications you are takingon the babys pre-printed hospital identification  label.   3. Place your babys pre-printed hospital identification label on each container of breastmilk.  Additional pre-printed labels can be obtained from your babys nurse.  If your expressed breastmilk is not correctly labeled, the nurse cannot feed the milk to the baby.       4. Place a brightly colored sticker on the top of each container of milk pumped during the first 30 days.  This identifies the milk as special and having higher levels of nutrients and anti-infective properties that are so important for your baby.  Additional stickers can be obtained from the lactation consultants or your babys nurse.  5.   Do not touch the inside of the storage containers or lids.  6.      Pour the amount of expressed milk needed for 1 of your babys feedings into each   storage container. Use a new container(s) for each pumping.  Additional storage   containers can be obtained from your babys nurse.        7.       Tightly screw the lid onto the container and place immediately into the       refrigerator fordaily transportation to the hospital.   Do not freeze your milk      unless asked to do so by your babys nurse.  However, if you are not able to      visit your baby each day, place the expressed breastmilk in the freezer.  8.   Expressed breastmilk should be refrigerated or frozen within 1 hour of      pumping.  9.      Do not store expressed breastmilk on the door of your refrigerator or freezer where the temperature is warmer.         Transportation of Expressed Breastmilk:    1. Refrigerated breastmilk or frozen milk should be packed tightly together in a cooler with frozen, blue gel-packs to keep the milk frozen.  DO NOT USE ICE CUBES (WET ICE) TO TRANSPORT FROZEN MILK.   A clean towel can be used to fill any extra space between containers of frozen milk.  2.    Bring your expressed milk from home each time you visit the baby.        NICU warmline:  [phone]  Lactation warmline:   "[phone]                Primary Diagnosis     Your primary diagnosis was:  Status Post       Admission Information     Date & Time Provider Department CSN    3/16/2017 11:52 AM Purnima Kidd MD [facility] [id number]      Care Providers     Provider Role Specialty Primary office phone    Purnima Kidd MD Attending Provider Obstetrics [phone]    Purnima Kidd MD Surgeon  Obstetrics [phone]    Braeden Hernandez, APRN, FNP Consulting Physician  Endocrinology [phone]    Cammie Schwartz MD Consulting Physician  Obstetrics and Gynecology [phone]      Your Vitals Were     BP Pulse Temp Resp Height Weight    141/98 99 98.7 °F (37.1 °C) (Oral) 18 5' 3" (1.6 m) 103 kg (227 lb)    Last Period SpO2 BMI          2016 99% 40.21 kg/m2        Recent Lab Values        2015 3/30/2016 2016 2016 2016 2016 2017 3/14/2017      8:00 AM  9:43 AM  8:01 AM  9:30 PM 11:26 AM 10:57 AM  3:39 PM  2:10 PM    A1C 11.7 (H) 11.4 (H) 11.3 (H) 9.9 (H) 7.6 (H) 7.3 (H) 8.0 (H) 7.9 (H)    Comment for A1C at  8:01 AM on 2016:  According to ADA guidelines, hemoglobin A1C <7.0% represents  optimal control in non-pregnant diabetic patients.  Different  metrics may apply to specific populations.   Standards of Medical Care in Diabetes - 2016.  For the purpose of screening for the presence of diabetes:  <5.7%     Consistent with the absence of diabetes  5.7-6.4%  Consistent with increasing risk for diabetes   (prediabetes)  >or=6.5%  Consistent with diabetes  Currently no consensus exists for use of hemoglobin A1C  for diagnosis of diabetes for children.      Comment for A1C at  9:30 PM on 2016:  According to ADA guidelines, hemoglobin A1C <7.0% represents  optimal control in non-pregnant diabetic patients.  Different  metrics may apply to specific populations.   Standards of Medical Care in Diabetes - 2016.  For the purpose of screening for the presence of " diabetes:  <5.7%     Consistent with the absence of diabetes  5.7-6.4%  Consistent with increasing risk for diabetes   (prediabetes)  >or=6.5%  Consistent with diabetes  Currently no consensus exists for use of hemoglobin A1C  for diagnosis of diabetes for children.      Comment for A1C at 11:26 AM on 11/14/2016:  According to ADA guidelines, hemoglobin A1C <7.0% represents  optimal control in non-pregnant diabetic patients.  Different  metrics may apply to specific populations.   Standards of Medical Care in Diabetes - 2016.  For the purpose of screening for the presence of diabetes:  <5.7%     Consistent with the absence of diabetes  5.7-6.4%  Consistent with increasing risk for diabetes   (prediabetes)  >or=6.5%  Consistent with diabetes  Currently no consensus exists for use of hemoglobin A1C  for diagnosis of diabetes for children.      Comment for A1C at 10:57 AM on 12/7/2016:  According to ADA guidelines, hemoglobin A1C <7.0% represents  optimal control in non-pregnant diabetic patients.  Different  metrics may apply to specific populations.   Standards of Medical Care in Diabetes - 2016.  For the purpose of screening for the presence of diabetes:  <5.7%     Consistent with the absence of diabetes  5.7-6.4%  Consistent with increasing risk for diabetes   (prediabetes)  >or=6.5%  Consistent with diabetes  Currently no consensus exists for use of hemoglobin A1C  for diagnosis of diabetes for children.      Comment for A1C at  3:39 PM on 2/13/2017:  According to ADA guidelines, hemoglobin A1C <7.0% represents  optimal control in non-pregnant diabetic patients.  Different  metrics may apply to specific populations.   Standards of Medical Care in Diabetes - 2016.  For the purpose of screening for the presence of diabetes:  <5.7%     Consistent with the absence of diabetes  5.7-6.4%  Consistent with increasing risk for diabetes   (prediabetes)  >or=6.5%  Consistent with diabetes  Currently no consensus exists for  use of hemoglobin A1C  for diagnosis of diabetes for children.      Comment for A1C at  2:10 PM on 3/14/2017:  According to ADA guidelines, hemoglobin A1C <7.0% represents  optimal control in non-pregnant diabetic patients.  Different  metrics may apply to specific populations.   Standards of Medical Care in Diabetes - 2016.  For the purpose of screening for the presence of diabetes:  <5.7%     Consistent with the absence of diabetes  5.7-6.4%  Consistent with increasing risk for diabetes   (prediabetes)  >or=6.5%  Consistent with diabetes  Currently no consensus exists for use of hemoglobin A1C  for diagnosis of diabetes for children.        Pending Labs     Order Current Status    Specimen to Pathology - Surgery In process      Allergies as of 3/21/2017        Reactions    No Known Allergies       Advance Directives     An advance directive is a document which, in the event you are no longer able to make decisions for yourself, tells your healthcare team what kind of treatment you do or do not want to receive, or who you would like to make those decisions for you.  If you do not currently have an advance directive, [facility] encourages you to create one.  For more information call:  [phone]), [phone] ([phone]),  or log on to [email or web address].        Language Assistance Services     ATTENTION: Language assistance services are available, free of charge. Please call [phone].      ATENCIÓN: Si habla español, tiene a moreno disposición servicios gratuitos de asistencia lingüística. Llame al [phone].     [facility] Ý: N?u b?n nói Ti?ng Vi?t, có các d?ch v? h? tr? ngôn ng? mi?n phí dành cho b?n. G?i s? [phone].        Diabetes Discharge Instructions                                    [facility] complies with applicable Federal civil rights laws and does not discriminate on the basis of race, color, national origin, age, disability, or sex.

## 2017-03-16 NOTE — ANESTHESIA RELEASE NOTE
"Anesthesia Release from PACU Note    Patient: Anusha Andrew    Procedure(s) Performed: Procedure(s) (LRB):  DELIVERY- SECTION (N/A)    Anesthesia type: epidural    Post pain: Adequate analgesia    Post assessment: no apparent anesthetic complications    Last Vitals:   Visit Vitals    /63    Pulse 86    Temp 36.4 °C (97.6 °F) (Temporal)    Resp 16    Ht 5' 3" (1.6 m)    Wt 103 kg (227 lb)    LMP 2016    SpO2 99%    Breastfeeding Unknown    BMI 40.21 kg/m2       Post vital signs: stable    Level of consciousness: awake    Nausea/Vomiting: no nausea/no vomiting    Complications: none    Airway Patency: patent    Respiratory: unassisted    Cardiovascular: stable and blood pressure at baseline    Hydration: euvolemic  "

## 2017-03-16 NOTE — H&P
"   HISTORY AND PHYSICAL                                                OBSTETRICS          Subjective:       Anusha Andrew is a 26 y.o.  female with IUP at 34w2d weeks gestation who presents to L&D for poor prenatal testing. BPP 2/10. Poorly controlled insulin dependent type 1 DM. Pertinent medical history for this pregnancy include Type 1 DM, previous  delivery, fetal macrosomia, polyhydramnios.  Patient reports contractions, denies vaginal bleeding, denies LOF.   Fetal Movement: normal.     PMHx:   Past Medical History:   Diagnosis Date    Diabetes     Hyperlipidemia     Hypertension     Type I (juvenile type) diabetes mellitus without mention of complication, uncontrolled 2011       PSHx:   Past Surgical History:   Procedure Laterality Date    ABCESS DRAINAGE      boil went over GA     SECTION  2012    TONSILLECTOMY, ADENOIDECTOMY      TONSILLECTOMY, ADENOIDECTOMY      WISDOM TOOTH EXTRACTION         All:   Review of patient's allergies indicates:   Allergen Reactions    No known allergies        Meds:   Prescriptions Prior to Admission   Medication Sig Dispense Refill Last Dose    blood sugar diagnostic Strp 1 each by Misc.(Non-Drug; Combo Route) route 6 (six) times daily. 200 each 11 Taking    buPROPion (WELLBUTRIN XL) 150 MG TB24 tablet Take 1 tablet (150 mg total) by mouth every morning. 30 tablet 2 Taking    insulin aspart (NOVOLOG) 100 unit/mL injection 65 units with Breakfast; 60 units with Lunch; 38 units with Dinner 100 mL 10 Taking    insulin detemir (LEVEMIR FLEXTOUCH) 100 unit/mL (3 mL) SubQ InPn pen Inject 100 Units into the skin every evening. 90 mL 3 Taking    lancets Misc 1 Device by Misc.(Non-Drug; Combo Route) route 4 (four) times daily before meals and nightly. 150 each 12 Taking    ONETOUCH DELICA LANCETS 33 gauge Misc    Taking    ONETOUCH ULTRA2 kit    Taking    pen needle, diabetic 31 gauge x 1/4" Ndle 1 Device by MISCELLANEOUS route 4 " (four) times daily with meals and nightly. 150 each 12 Taking    PNV#67-iron ps-FA cmb#1-dha (VITAFOL ULTRA) 29 mg iron- 1 mg-200 mg Cap Take 1 tablet by mouth once daily. 30 capsule 6 Taking       SH:   Social History     Social History    Marital status: Single     Spouse name: N/A    Number of children: N/A    Years of education: N/A     Occupational History    Not on file.     Social History Main Topics    Smoking status: Never Smoker    Smokeless tobacco: Not on file    Alcohol use No    Drug use: No    Sexual activity: Yes     Partners: Male     Birth control/ protection: OCP     Other Topics Concern    Not on file     Social History Narrative       FH:   Family History   Problem Relation Age of Onset    Diabetes Brother     No Known Problems Mother     No Known Problems Father     No Known Problems Sister     No Known Problems Maternal Aunt     No Known Problems Maternal Uncle     No Known Problems Paternal Aunt     No Known Problems Paternal Uncle     No Known Problems Maternal Grandmother     No Known Problems Maternal Grandfather     No Known Problems Paternal Grandmother     No Known Problems Paternal Grandfather     Amblyopia Neg Hx     Blindness Neg Hx     Cataracts Neg Hx     Glaucoma Neg Hx     Hypertension Neg Hx     Macular degeneration Neg Hx     Retinal detachment Neg Hx     Strabismus Neg Hx     Stroke Neg Hx     Thyroid disease Neg Hx     Breast cancer Neg Hx     Colon cancer Neg Hx     Ovarian cancer Neg Hx        OBHx:   Obstetric History       T1      TAB0   SAB0   E0   M0   L1       # Outcome Date GA Lbr Hawk/2nd Weight Sex Delivery Anes PTL Lv   2 Current            1 Term 12 37w5d  3.286 kg (7 lb 3.9 oz) F CS-LTranv EPI  Y      Name: Maryjane      Obstetric Comments   Gestational HTN, failed induction, not past 6 cm       Objective:       LMP 2016    There were no vitals filed for this visit.    General:   alert and cooperative    Lungs:   clear to auscultation bilaterally   Heart:   regular rate and rhythm, S1, S2 normal, no murmur, click, rub or gallop   Abdomen:  soft, non-tender; bowel sounds normal; no masses,  no organomegaly   Extremities negative edema, negative erythema   FHT: 130's Cat 1 (reassuring)                 TOCO: Irregular contractions       Cervix:     Dilation: Closed    Effacement: 50%    Station:  -3    Consistency: soft    Position: anterior        Assessment:       34w2d weeks gestation.  Type 1 DM- poorly controlled  BPP- 2/10  Macrosomia  Polyhydramnios  Previous  delivery  Contractions Q2-3 minutes, painful    Active Hospital Problems    Diagnosis  POA    Antepartum non-reassuring fetal heart rate or rhythm affecting care of mother [O36.8990]  Yes      Resolved Hospital Problems    Diagnosis Date Resolved POA   No resolved problems to display.          Plan:      Risks, benefits, alternatives and possible complications have been discussed in detail with the patient.   - Consents signed and to chart  - Admit to Labor and Delivery unit  - To OR for repeat  delivery

## 2017-03-16 NOTE — L&D DELIVERY NOTE
[facility]     Section     Operative Note      SUMMARY      Date of Procedure: 3/16/2017       Procedure: Procedure(s) (LRB):  DELIVERY- SECTION (N/A)      Surgeon(s) and Role:     * Purnima Kidd MD - Primary     * Marycruz Villegas MD - Assisting          Pre-Operative Diagnosis: Poorly controlled type 1 diabetes mellitus [E10.65]  Pre-existing essential hypertension during pregnancy, antepartum [O10.019]  Poor  testing- BPP- 2/10, previous  delivery, IUP at 34 weeks and 2 days      Post-Operative Diagnosis: Post-Op Diagnosis Codes:     * Poorly controlled type 1 diabetes mellitus [E10.65]     * Pre-existing essential hypertension during pregnancy, antepartum [O10.019]  same    Anesthesia: Spinal/Epidural    Operative Report for  Delivery:    Date of Procedure: 3/16/2017     Procedure: Procedure(s) (LRB):  DELIVERY- SECTION (N/A)       Surgeon(s) and Role:     * Purnima Kidd MD - Primary     * Marycruz Villegas MD - Assisting        A qualified resident was not available.    Pre-Operative Diagnosis: Poorly controlled type 1 diabetes mellitus [E10.65]  Pre-existing essential hypertension during pregnancy, antepartum [O10.019]    Post-Operative Diagnosis: Post-Op Diagnosis Codes:     * Poorly controlled type 1 diabetes mellitus [E10.65]     * Pre-existing essential hypertension during pregnancy, antepartum [O10.019]    Anesthesia: Spinal/Epidural    Complications: none    Findings: Viable infant, normal uterus, tubes and ovaries    Procedure in detail:    The patient was taken to the operating room where epidural/spinal anesthesia was found to be adequate. She was then prepped and draped in the normal sterile fashion. Allis clamp was used to verify adequate anesthesia. A us catheter was in place.     After Time Out was performed, A scalpel was used to make a Pfannensteil incision. The knife was used to carry down to the underlying  layer of fascia. The fascia was then incised in the midline. The curved frnacisco scissors were then used to extend the fascia incision laterally. The fascia was then elevated using the kocher clamps and extended both inferiorly and superiorly using the curved francisco scissors. The rectus muscles were then  in the midline and the peritoneum was then entered bluntly and extended but bluntly and sharply with the curved francisco scissors. The bladder blade was then inserted and the vesicouterine peritoneum was then entered sharply with the Metzenbaum scissors and extended laterally and the bladder flap was created digitally. The bladder blade was then reinserted.     The inside scalpel was then used to make a LOW TRANSVERSE incision in the uterus. This was extended bluntly. The amniotic fluid was noted to be clear. The infants head was delivered atraumatically and the infant's body was delivered atraumatically. The nose and mouth were suctioned with the bulb suction. The cord was clamped and cut and the baby was handed off to awaiting pediatric attendant. The placenta was then removed manually and the uterus was then exteriorized and cleared of all clots and debris. The bladder blade was then reinserted.     The uterine incision was then closed using a #1 Chromic in a running locked suture. A second imbricating suture of #1 chromic was used to close a second layer with excellent hemostasis. The abdomen was then irrigated and cleared of all clots and debris. The uterine incision was then reexamined and noted to have excellent hemostasis. The uterus was then returned to the abdomen. The bladder blade was replaced and then uterine incision was then reexamined and noted to have excellent hemostasis.     The peritoneum and rectus muscles were then re approximated using 2-0 Vicryl in a running fashion. The rectus muscles were then irrigated and and bleeding areas were cauterized using the Bovie with excellent hemostasis. The  fascia was then closed using #1 Vicryl in a running fashion. The fat was then cauterized for any small bleeding areas. 2-0 plain gut was then used to re approximate the fat. 4-0 Monocryl was then used to close the skin in a subcuticular fashion. The patient tolerated the procedure well. Sponge lap and needle counts were correct x 2.       Delivery Information for  Aba Andrew    Birth information:  YOB: 2015   Time of birth: 1:42 PM   Sex: male   Head Delivery Date/Time: 3/16/2017  1:42 PM   Delivery type: , Low Transverse   Gestational Age: 34w2d    Delivery Providers    Delivering clinician:  NESTOR MARCUS   Other personnel:   Provider Role   BRANDON SUTHERLAND Assisting Surgeon   RODDY MATIAS Registered Nurse   DEANDRA STOREY Registered Nurse   ALEJANDRO WALLS Surgical Tech                       Assessment    Living status:  Yes   Apgars    1 Minute:   5 Minute:   10 Minute 15 Minute 20 Minute   Skin Color: 0  1       Heart Rate: 2  2       Reflex Irritability: 2  2       Muscle Tone: 0  1       Respiratory Effort: 1  2       Total: 5  8                  Apgars Assigned By:  NNP          Assisted Delivery Details:    Forceps attempted?:  No   Vacuum extractor attempted?:  No             Shoulder Dystocia    Shoulder dystocia present?:  No                                             Presentation and Position    Presentation: Vertex   Position:                    Interventions/Resuscitation    Method:  NICU Attended        Cord    Vessels:  3 vessels   Complications:  None   Delayed Cord Clamping?:  No   Cord Clamped Date/Time:  3/16/2017  1:43 PM   Cord Blood Disposition:  Sent with Baby   Gases Sent?:  Yes   Stem Cell Collection (by MD):  No        Placenta    Date and time:  3/16/2017  1:44 PM   Removal:  Manual removal   Appearance:  Intact   Placenta disposition:  Pathology            Labor Events:       labor: Yes     Labor Onset Date/Time:          Dilation Complete Date/Time:         Start Pushing Date/Time:       Rupture Date/Time: 17  1342         Rupture type: artificial rupture of membranes         Fluid Amount: large      Fluid Color: clear      Fluid Odor:        Membrane Status (PeriCalm):        Rupture Date/Time (PeriCalm):        Fluid Amount (PeriCalm):        Fluid Color (PeriCalm):         steroids: None     Antibiotics given for GBS: No     Induction: none     Indications for induction:        Augmentation:       Indications for augmentation:       Labor complications: None     Additional complications:          Cervical ripening:                     Delivery:      Episiotomy: None     Indication for Episiotomy:       Perineal Lacerations: None Repaired:      Periurethral Laceration: none Repaired:     Labial Laceration: none Repaired:     Sulcus Laceration: none Repaired:     Vaginal Laceration: No Repaired:     Cervical Laceration: No Repaired:     Repair suture:       Repair # of packets:       Blood loss (ml):       Vaginal Sweep Performed: No     Surgicount Correct: Yes       Other providers:       Anesthesia    Method:  Spinal, Epidural              Details (if applicable):  Trial of Labor No    Categorization: Repeat    Priority: Routine   Indications for : Repeat Section;Other (Add Comments)   Incision Type: Low Transverse     Additional  information:  Forceps:    Vacuum:    Breech:    Observed anomalies    Other (Comments):

## 2017-03-16 NOTE — PROGRESS NOTES
1449 Dr. Jensen notified of pots decreased bp.     1454 MD at bedside. 500ml bolus given per MD order.

## 2017-03-16 NOTE — OPERATIVE NOTE ADDENDUM
Certification of Assistant at Surgery       Surgery Date: 3/16/2017     Participating Surgeons:  Surgeon(s) and Role:     * Purnima Kidd MD - Primary     * Marycruz Villegas MD - Assisting    Procedures:  Procedure(s) (LRB):  DELIVERY- SECTION (N/A)    Assistant Surgeon's Certification of Necessity:  I understand that section 1842 (b) (6) (d) of the Social Security Act generally prohibits Medicare Part B reasonable charge payment for the services of assistants at surgery in [facility] hospitals when qualified residents are available to furnish such services. I certify that the services for which payment is claimed were medically necessary, and that no qualified resident was available to perform the services. I further understand that these services are subject to post-payment review by the Medicare carrier.      Marycruz Villegas MD    2017  3:49 PM

## 2017-03-17 ENCOUNTER — TELEPHONE (OUTPATIENT)
Dept: ENDOCRINOLOGY | Facility: CLINIC | Age: 27
End: 2017-03-17

## 2017-03-17 ENCOUNTER — HOSPITAL ENCOUNTER (OUTPATIENT)
Dept: PERINATAL CARE | Facility: OTHER | Age: 27
Discharge: HOME | End: 2017-03-17
Attending: OBSTETRICS & GYNECOLOGY

## 2017-03-17 ENCOUNTER — TELEPHONE (OUTPATIENT)
Dept: ENDOCRINOLOGY | Facility: HOSPITAL | Age: 27
End: 2017-03-17

## 2017-03-17 LAB
BASOPHILS # BLD AUTO: 0.02 K/UL
BASOPHILS NFR BLD: 0.2 %
DIFFERENTIAL METHOD: ABNORMAL
EOSINOPHIL # BLD AUTO: 0 K/UL
EOSINOPHIL NFR BLD: 0.3 %
ERYTHROCYTE [DISTWIDTH] IN BLOOD BY AUTOMATED COUNT: 13.7 %
HCT VFR BLD AUTO: 28.3 %
HGB BLD-MCNC: 8.9 G/DL
HIV 1+2 AB+HIV1 P24 AG SERPL QL IA: NEGATIVE
LYMPHOCYTES # BLD AUTO: 2.4 K/UL
LYMPHOCYTES NFR BLD: 18.4 %
MCH RBC QN AUTO: 23.9 PG
MCHC RBC AUTO-ENTMCNC: 31.4 %
MCV RBC AUTO: 76 FL
MONOCYTES # BLD AUTO: 0.8 K/UL
MONOCYTES NFR BLD: 6.3 %
NEUTROPHILS # BLD AUTO: 9.8 K/UL
NEUTROPHILS NFR BLD: 74.5 %
PLATELET # BLD AUTO: 258 K/UL
PMV BLD AUTO: 9.3 FL
POCT GLUCOSE: 274 MG/DL (ref 70–110)
POCT GLUCOSE: 291 MG/DL (ref 70–110)
POCT GLUCOSE: 330 MG/DL (ref 70–110)
POCT GLUCOSE: 340 MG/DL (ref 70–110)
POCT GLUCOSE: 346 MG/DL (ref 70–110)
RBC # BLD AUTO: 3.72 M/UL
RPR SER QL: NORMAL
WBC # BLD AUTO: 13.18 K/UL

## 2017-03-17 PROCEDURE — 11000001 HC ACUTE MED/SURG PRIVATE ROOM

## 2017-03-17 PROCEDURE — 85025 COMPLETE CBC W/AUTO DIFF WBC: CPT

## 2017-03-17 PROCEDURE — 25000003 PHARM REV CODE 250: Performed by: STUDENT IN AN ORGANIZED HEALTH CARE EDUCATION/TRAINING PROGRAM

## 2017-03-17 PROCEDURE — 25000003 PHARM REV CODE 250: Performed by: OBSTETRICS & GYNECOLOGY

## 2017-03-17 PROCEDURE — 36415 COLL VENOUS BLD VENIPUNCTURE: CPT

## 2017-03-17 PROCEDURE — 63600175 PHARM REV CODE 636 W HCPCS: Performed by: OBSTETRICS & GYNECOLOGY

## 2017-03-17 PROCEDURE — 63600175 PHARM REV CODE 636 W HCPCS: Performed by: STUDENT IN AN ORGANIZED HEALTH CARE EDUCATION/TRAINING PROGRAM

## 2017-03-17 RX ORDER — INSULIN ASPART 100 [IU]/ML
12 INJECTION, SOLUTION INTRAVENOUS; SUBCUTANEOUS
Status: DISCONTINUED | OUTPATIENT
Start: 2017-03-17 | End: 2017-03-19

## 2017-03-17 RX ORDER — INSULIN ASPART 100 [IU]/ML
12 INJECTION, SOLUTION INTRAVENOUS; SUBCUTANEOUS
Status: COMPLETED | OUTPATIENT
Start: 2017-03-17 | End: 2017-03-17

## 2017-03-17 RX ORDER — IBUPROFEN 600 MG/1
600 TABLET ORAL 4 TIMES DAILY
Status: DISCONTINUED | OUTPATIENT
Start: 2017-03-17 | End: 2017-03-21 | Stop reason: HOSPADM

## 2017-03-17 RX ORDER — KETOROLAC TROMETHAMINE 30 MG/ML
30 INJECTION, SOLUTION INTRAMUSCULAR; INTRAVENOUS EVERY 6 HOURS
Status: DISCONTINUED | OUTPATIENT
Start: 2017-03-17 | End: 2017-03-17

## 2017-03-17 RX ORDER — ESCITALOPRAM OXALATE 10 MG/1
10 TABLET ORAL DAILY
Status: DISCONTINUED | OUTPATIENT
Start: 2017-03-17 | End: 2017-03-21 | Stop reason: HOSPADM

## 2017-03-17 RX ORDER — IBUPROFEN 400 MG/1
800 TABLET ORAL EVERY 8 HOURS
Status: DISCONTINUED | OUTPATIENT
Start: 2017-03-18 | End: 2017-03-17

## 2017-03-17 RX ADMIN — OXYCODONE HYDROCHLORIDE AND ACETAMINOPHEN 1 TABLET: 10; 325 TABLET ORAL at 08:03

## 2017-03-17 RX ADMIN — BUPROPION HYDROCHLORIDE 150 MG: 150 TABLET, FILM COATED, EXTENDED RELEASE ORAL at 09:03

## 2017-03-17 RX ADMIN — ESCITALOPRAM 10 MG: 10 TABLET, FILM COATED ORAL at 11:03

## 2017-03-17 RX ADMIN — DIPHENHYDRAMINE HYDROCHLORIDE 25 MG: 25 CAPSULE ORAL at 03:03

## 2017-03-17 RX ADMIN — OXYCODONE HYDROCHLORIDE 10 MG: 5 TABLET ORAL at 03:03

## 2017-03-17 RX ADMIN — DOCUSATE SODIUM 200 MG: 100 CAPSULE, LIQUID FILLED ORAL at 09:03

## 2017-03-17 RX ADMIN — OXYCODONE HYDROCHLORIDE AND ACETAMINOPHEN 1 TABLET: 10; 325 TABLET ORAL at 03:03

## 2017-03-17 RX ADMIN — INSULIN ASPART 6 UNITS: 100 INJECTION, SOLUTION INTRAVENOUS; SUBCUTANEOUS at 08:03

## 2017-03-17 RX ADMIN — DOCUSATE SODIUM 200 MG: 100 CAPSULE, LIQUID FILLED ORAL at 08:03

## 2017-03-17 RX ADMIN — SIMETHICONE CHEW TAB 80 MG 80 MG: 80 TABLET ORAL at 03:03

## 2017-03-17 RX ADMIN — INSULIN ASPART 12 UNITS: 100 INJECTION, SOLUTION INTRAVENOUS; SUBCUTANEOUS at 03:03

## 2017-03-17 RX ADMIN — ACETAMINOPHEN 650 MG: 325 TABLET ORAL at 03:03

## 2017-03-17 RX ADMIN — INSULIN DETEMIR 30 UNITS: 100 INJECTION, SOLUTION SUBCUTANEOUS at 08:03

## 2017-03-17 RX ADMIN — INSULIN ASPART 3 UNITS: 100 INJECTION, SOLUTION INTRAVENOUS; SUBCUTANEOUS at 10:03

## 2017-03-17 RX ADMIN — INSULIN ASPART 12 UNITS: 100 INJECTION, SOLUTION INTRAVENOUS; SUBCUTANEOUS at 07:03

## 2017-03-17 RX ADMIN — KETOROLAC TROMETHAMINE 30 MG: 30 INJECTION, SOLUTION INTRAMUSCULAR at 07:03

## 2017-03-17 RX ADMIN — ACETAMINOPHEN 650 MG: 325 TABLET ORAL at 09:03

## 2017-03-17 RX ADMIN — IBUPROFEN 600 MG: 600 TABLET, FILM COATED ORAL at 06:03

## 2017-03-17 NOTE — TELEPHONE ENCOUNTER
Pt delivered her baby boy, named Rai (34 weeks) on yesterday 3/16. Pt is at [facility], pt was on high doses of insulin during pregnancy, regimen is cautious today, levemir 12 units qhs, novolog 8 units ac w/ low dose correction scale. See note, pt's prandial 30s-68 units w/ meals, basal 50 units bid. Recs discussed over the phone, but pt was encouraged to follow MD order at [facility]. F/u with our office will have to be covered per commercial insurance. Pt expressed that she is working on this now. Recs: looked at wt based for comparison, levemir 30 units qhs, novolog 12 units ac w/ mod dose correction scale starting at 150. Pt verbalized understanding. Baby is in NICU currently, stabilizing, had hypoglycemia, 6-7lbs.

## 2017-03-17 NOTE — PROGRESS NOTES
Per Dr. Terrazas notified of pt's POCT glucose 340. Orders to give 12 units of Aspart now. Dr. Terrazas will readjust orders for next meal.

## 2017-03-17 NOTE — PROGRESS NOTES
Spoke to Dr Alicea with endo and she suggested below:  Novalog 12U with meals and with a rescue sliding scale and Levamir 30 U qhs

## 2017-03-17 NOTE — PROGRESS NOTES
Dr. Tripp notified that pt's IV access no longer patient. IV attempted to be restarted per L&D charge RN. Anesthesia will come to restart IV. Also, notified that pt not tolerating food at moment and blood glucose 266 tonight. Will continue to monitor pt.

## 2017-03-17 NOTE — LACTATION NOTE
This note was copied from a baby's chart.    Lactation Note: Met Mother at bedside; Introduced self.  Encouraged pumping 8 or more times in 24 hours and skin to skin care when baby able. Encouragement and support offered to mom.   Nimco Gongora, SUZETTEN, RN, CLC, IBCLC

## 2017-03-17 NOTE — TELEPHONE ENCOUNTER
----- Message from Lizett Reid sent at 3/17/2017 11:33 AM CDT -----  Contact: Cammie   [phone]  Mary   -   Called requesting for the Dr to come see the pt in the hospital  thanks

## 2017-03-17 NOTE — PROGRESS NOTES
I was notified by RN that patient lost IV access at ~ 8 pm. They have been trying to get access, and are waiting for Anesthesia to evaluate.     Patient has Zofran ODT & Percalone ordered.  I changed Toradol to IM x 2 more doses.  Patient got one in OR.

## 2017-03-17 NOTE — ANESTHESIA POSTPROCEDURE EVALUATION
"Anesthesia Post Evaluation    Patient: Anusha Andrew    Procedure(s) Performed: Procedure(s) (LRB):  DELIVERY- SECTION (N/A)    Final Anesthesia Type: epidural  Patient location during evaluation: labor & delivery  Patient participation: Yes- Able to Participate  Level of consciousness: awake and alert  Post-procedure vital signs: reviewed and stable  Pain management: adequate  Airway patency: patent  PONV status at discharge: No PONV  Anesthetic complications: no      Cardiovascular status: blood pressure returned to baseline  Hydration status: euvolemic  Follow-up not needed.        Visit Vitals    /71    Pulse 98    Temp 36.2 °C (97.1 °F) (Oral)    Resp 18    Ht 5' 3" (1.6 m)    Wt 103 kg (227 lb)    LMP 2016    SpO2 97%    Breastfeeding Yes    BMI 40.21 kg/m2       Pain/Sylvain Score: Pain Assessment Performed: Yes (3/16/2017  4:00 PM)  Presence of Pain: complains of pain/discomfort (3/16/2017  4:00 PM)  Pain Rating Prior to Med Admin: 3 (3/17/2017  9:43 AM)  Pain Rating Post Med Admin: 0 (3/17/2017  4:17 AM)      "

## 2017-03-17 NOTE — PROGRESS NOTES
Delivery Progress Note  Obstetrics        SUBJECTIVE:   Postpartum Day1:  Delivery repeat low transverse  delivery secondary to nonreassuring fetal status.  Patient with1 diabetes which has been uncontrolled throughout the pregnancy due to poor compliance.    She states she feels well. She denies  concerns. Her pain is well controlled with current medications. The baby is in the NICU needing glucose control but overall doing well The patient is ambulating well.  The patient is tolerating a diabetic diet. Flatus has been passed. Urinary output is adequate.  Patient also with a history of Wellbutrin.  She is started on her Wellbutrin however we also discussed adding Lexapro today secondary to high risk for postpartum depression.  Patient agreeable and she would like to go ahead and start the Lexapro now.    Regarding her diabetes she is currently on a sliding scale.  Patient states that she sees Dr. Hernandez with endocrinology at [facility].  This 8 AM glucose 274 prior to breakfast.  Patient given 6 units of insulin  Last 9   Last 6   Last 4 PM  259  Last 3   Last 2     OBJECTIVE:     Vital Signs (Most Recent):  Temp: 97.1 °F (36.2 °C) (17)  Pulse: 98 (17)  Resp: 18 (17)  BP: 115/71 (17)  SpO2: 97 % (17)    Vital Signs Range (Last 24H):  Temp:  [97.1 °F (36.2 °C)-98.2 °F (36.8 °C)]   Pulse:  []   Resp:  [16-18]   BP: ()/(49-84)   SpO2:  [97 %-100 %]     I & O (Last 24H):  Intake/Output Summary (Last 24 hours) at 17 0950  Last data filed at 17 0645   Gross per 24 hour   Intake          2541.25 ml   Output             1930 ml   Net           611.25 ml       Physical Exam:  General:    no distress   Abdomen:  soft, non-tender; bowel sounds normal   Fundus:  firm   Incision:  bandage in place without drainage    Lochia:   moderate rubra   DVT Evaluation:  Negative Shruti's sign bilaterally.          Hemoglobin/Hematocrit    Recent Labs  Lab 17  0535   HGB 8.9*   HCT 28.3*     ABO/Rh  Lab Results   Component Value Date    GROUPTRH AB POS 2016     Rubella  No results for input(s): RUBELLAIGGSC in the last 168 hours.    ASSESSMENT/PLAN:     Status post  section postop day 1. Doing well postoperatively.  Diabetes mellitus -type I uncontrolled.  Sliding scale as ordered.  We'll consult endocrine for improved management of insulin and diabetes.  Patient has a history of being noncompliant and I feel that it is best for care to be reestablished with endocrine prior to discharge.  Depression-patient currently on Wellbutrin 150 mg.  After discussion with patient we both feel like she would benefit from Lexapro 10 mg as she is a high risk for postpartum depression/depression exacerbation   Baby in the NICU secondary to glucose fluctuations and prematurity  Anemia-predelivery hematocrit 34 and postoperative 28.  Patient doing well postoperatively asymptomatic.  We will give iron at discharge once daily    Continue current care   Start Lexapro 10 mg  Consult Dr. Hernandez/endocrinology today

## 2017-03-18 LAB
BACTERIA SPEC AEROBE CULT: NORMAL
POCT GLUCOSE: 203 MG/DL (ref 70–110)
POCT GLUCOSE: 225 MG/DL (ref 70–110)
POCT GLUCOSE: 271 MG/DL (ref 70–110)
POCT GLUCOSE: 279 MG/DL (ref 70–110)

## 2017-03-18 PROCEDURE — 25000003 PHARM REV CODE 250: Performed by: OBSTETRICS & GYNECOLOGY

## 2017-03-18 PROCEDURE — 11000001 HC ACUTE MED/SURG PRIVATE ROOM

## 2017-03-18 PROCEDURE — 63600175 PHARM REV CODE 636 W HCPCS: Performed by: OBSTETRICS & GYNECOLOGY

## 2017-03-18 RX ADMIN — INSULIN ASPART 4 UNITS: 100 INJECTION, SOLUTION INTRAVENOUS; SUBCUTANEOUS at 02:03

## 2017-03-18 RX ADMIN — DOCUSATE SODIUM 200 MG: 100 CAPSULE, LIQUID FILLED ORAL at 08:03

## 2017-03-18 RX ADMIN — IBUPROFEN 600 MG: 600 TABLET, FILM COATED ORAL at 05:03

## 2017-03-18 RX ADMIN — OXYCODONE HYDROCHLORIDE AND ACETAMINOPHEN 1 TABLET: 10; 325 TABLET ORAL at 12:03

## 2017-03-18 RX ADMIN — OXYCODONE HYDROCHLORIDE AND ACETAMINOPHEN 1 TABLET: 10; 325 TABLET ORAL at 09:03

## 2017-03-18 RX ADMIN — DOCUSATE SODIUM 200 MG: 100 CAPSULE, LIQUID FILLED ORAL at 09:03

## 2017-03-18 RX ADMIN — BUPROPION HYDROCHLORIDE 150 MG: 150 TABLET, FILM COATED, EXTENDED RELEASE ORAL at 08:03

## 2017-03-18 RX ADMIN — IBUPROFEN 600 MG: 600 TABLET, FILM COATED ORAL at 12:03

## 2017-03-18 RX ADMIN — DIPHENHYDRAMINE HYDROCHLORIDE 25 MG: 25 CAPSULE ORAL at 09:03

## 2017-03-18 RX ADMIN — INSULIN ASPART 12 UNITS: 100 INJECTION, SOLUTION INTRAVENOUS; SUBCUTANEOUS at 07:03

## 2017-03-18 RX ADMIN — OXYCODONE HYDROCHLORIDE AND ACETAMINOPHEN 1 TABLET: 10; 325 TABLET ORAL at 08:03

## 2017-03-18 RX ADMIN — INSULIN DETEMIR 30 UNITS: 100 INJECTION, SOLUTION SUBCUTANEOUS at 09:03

## 2017-03-18 RX ADMIN — INSULIN ASPART 12 UNITS: 100 INJECTION, SOLUTION INTRAVENOUS; SUBCUTANEOUS at 02:03

## 2017-03-18 RX ADMIN — INSULIN ASPART 12 UNITS: 100 INJECTION, SOLUTION INTRAVENOUS; SUBCUTANEOUS at 08:03

## 2017-03-18 RX ADMIN — OXYCODONE HYDROCHLORIDE AND ACETAMINOPHEN 1 TABLET: 10; 325 TABLET ORAL at 05:03

## 2017-03-18 RX ADMIN — INSULIN ASPART 6 UNITS: 100 INJECTION, SOLUTION INTRAVENOUS; SUBCUTANEOUS at 07:03

## 2017-03-18 RX ADMIN — ESCITALOPRAM 10 MG: 10 TABLET, FILM COATED ORAL at 08:03

## 2017-03-18 RX ADMIN — IBUPROFEN 600 MG: 600 TABLET, FILM COATED ORAL at 11:03

## 2017-03-18 RX ADMIN — SIMETHICONE CHEW TAB 80 MG 80 MG: 80 TABLET ORAL at 11:03

## 2017-03-18 RX ADMIN — INSULIN ASPART 4 UNITS: 100 INJECTION, SOLUTION INTRAVENOUS; SUBCUTANEOUS at 08:03

## 2017-03-18 NOTE — PROGRESS NOTES
PostPartum Progress Note        Subjective:      Post-Operative Day #2 after  delivery at 34w2d secondary to non-reassuring fetal status in the setting of previous .    Patient is without complaints. Lochia decreasing. pumping as baby is in NICU Pain is well controlled. Patient is ambulating. Tolerating 2000 kcal ADA diet. Denies dizziness or diaphoresis.     Objective:      Temp:  [97.7 °F (36.5 °C)-98.7 °F (37.1 °C)] 98.6 °F (37 °C)  Pulse:  [] 104  Resp:  [18] 18  SpO2:  [98 %-100 %] 98 %  BP: (114-136)/(64-93) 136/67  Body mass index is 40.21 kg/(m^2).    General: no acute distress  Abdomen: soft, non-tender, non-distended; Fundus firm and below the umbilicus  aquacel dressing in place, clean and dry  Extremities: non-tender, symmetric, trace edema    Group & Rh   Date Value Ref Range Status   2016 AB POS  Final   2012 AB POS  Final     Recent Results (from the past 336 hour(s))   CBC auto differential    Collection Time: 17  5:35 AM   Result Value Ref Range    WBC 13.18 (H) 3.90 - 12.70 K/uL    Hemoglobin 8.9 (L) 12.0 - 16.0 g/dL    Hematocrit 28.3 (L) 37.0 - 48.5 %    Platelets 258 150 - 350 K/uL   CBC auto differential    Collection Time: 17 12:23 PM   Result Value Ref Range    WBC 12.51 3.90 - 12.70 K/uL    Hemoglobin 10.9 (L) 12.0 - 16.0 g/dL    Hematocrit 34.2 (L) 37.0 - 48.5 %    Platelets 301 150 - 350 K/uL     1st dose of Levemir, PM 3/17.     Component      Latest Ref Rng & Units 3/18/2017 3/17/2017 3/17/2017 3/17/2017           10:39 PM  8:46 PM  7:26 PM   POCT Glucose      70 - 110 mg/dL 225 (H) 291 (H) 346 (H) 330 (H)     Component      Latest Ref Rng & Units 3/17/2017 3/17/2017 3/16/2017 3/16/2017           2:57 PM  8:19 AM  9:26 PM  6:41 PM   POCT Glucose      70 - 110 mg/dL 340 (H) 274 (H) 266 (H) 306 (H)     Component      Latest Ref Rng & Units 3/16/2017 3/16/2017 3/16/2017 3/16/2017           4:32 PM  3:01 PM  1:43 PM 12:16 PM   POCT Glucose       70 - 110 mg/dL 259 (H) 246 (H) 228 (H) 218 (H)          Assessment:     26 y.o.  S/P  Delivery Post-Operative Day #2    2. Uncontrolled Type 1 DM  3. cHTN     Plan:     1. Continue routine postpartum care  2. Endocrine consulted yesterday for assistance with glycemic control (Dr. Alicea, [phone])      Current regimen: Levamir 30 units QHS; Novalog 12 units AC meals with an insulin sliding scale  3. Anticipate d/c home on POD4

## 2017-03-18 NOTE — PLAN OF CARE
Problem: Breastfeeding (Adult,Obstetrics,Pediatric)  Goal: Signs and Symptoms of Listed Potential Problems Will be Absent, Minimized or Managed (Breastfeeding)  Signs and symptoms of listed potential problems will be absent, minimized or managed by discharge/transition of care (reference Breastfeeding (Adult,Obstetrics,Pediatric) CPG).   Outcome: Ongoing (interventions implemented as appropriate)    03/18/17 1300   Breastfeeding   Problems Assessed (Breastfeeding) all   Problems Present (Breastfeeding) other (see comments)   Lactation plan of care discussed. Encouraged patient to pump 8 or more times in 24 hours, kit cleaning and sterilization discussed. Acknowledged understanding.

## 2017-03-18 NOTE — PROGRESS NOTES
2239: bg 291. MD states to give insulin aspart sliding scale, 3 units (bedtime). MD states to recheck bg before breakfast. Will continue to monitor.

## 2017-03-18 NOTE — PROGRESS NOTES
2046: bg 346. MD states to give scheduled detemir 30 Units. Hold sliding scale and recheck bg at 2230 and call with results. Will continue to monitor.

## 2017-03-19 PROBLEM — O99.013 ANEMIA OF PREGNANCY IN THIRD TRIMESTER: Status: ACTIVE | Noted: 2017-03-19

## 2017-03-19 PROBLEM — D62 POSTOPERATIVE ANEMIA DUE TO ACUTE BLOOD LOSS: Status: ACTIVE | Noted: 2017-03-19

## 2017-03-19 LAB
POCT GLUCOSE: 250 MG/DL (ref 70–110)
POCT GLUCOSE: 285 MG/DL (ref 70–110)
POCT GLUCOSE: 292 MG/DL (ref 70–110)
POCT GLUCOSE: 390 MG/DL (ref 70–110)

## 2017-03-19 PROCEDURE — 25000003 PHARM REV CODE 250: Performed by: OBSTETRICS & GYNECOLOGY

## 2017-03-19 PROCEDURE — 11000001 HC ACUTE MED/SURG PRIVATE ROOM

## 2017-03-19 RX ORDER — INSULIN ASPART 100 [IU]/ML
14 INJECTION, SOLUTION INTRAVENOUS; SUBCUTANEOUS
Status: DISCONTINUED | OUTPATIENT
Start: 2017-03-19 | End: 2017-03-20

## 2017-03-19 RX ADMIN — INSULIN ASPART 14 UNITS: 100 INJECTION, SOLUTION INTRAVENOUS; SUBCUTANEOUS at 01:03

## 2017-03-19 RX ADMIN — INSULIN ASPART 10 UNITS: 100 INJECTION, SOLUTION INTRAVENOUS; SUBCUTANEOUS at 08:03

## 2017-03-19 RX ADMIN — INSULIN ASPART 6 UNITS: 100 INJECTION, SOLUTION INTRAVENOUS; SUBCUTANEOUS at 01:03

## 2017-03-19 RX ADMIN — INSULIN ASPART 3 UNITS: 100 INJECTION, SOLUTION INTRAVENOUS; SUBCUTANEOUS at 09:03

## 2017-03-19 RX ADMIN — DOCUSATE SODIUM 200 MG: 100 CAPSULE, LIQUID FILLED ORAL at 09:03

## 2017-03-19 RX ADMIN — BUPROPION HYDROCHLORIDE 150 MG: 150 TABLET, FILM COATED, EXTENDED RELEASE ORAL at 09:03

## 2017-03-19 RX ADMIN — INSULIN ASPART 14 UNITS: 100 INJECTION, SOLUTION INTRAVENOUS; SUBCUTANEOUS at 07:03

## 2017-03-19 RX ADMIN — INSULIN ASPART 4 UNITS: 100 INJECTION, SOLUTION INTRAVENOUS; SUBCUTANEOUS at 07:03

## 2017-03-19 RX ADMIN — IBUPROFEN 600 MG: 600 TABLET, FILM COATED ORAL at 06:03

## 2017-03-19 RX ADMIN — OXYCODONE HYDROCHLORIDE AND ACETAMINOPHEN 1 TABLET: 10; 325 TABLET ORAL at 06:03

## 2017-03-19 RX ADMIN — INSULIN ASPART 12 UNITS: 100 INJECTION, SOLUTION INTRAVENOUS; SUBCUTANEOUS at 08:03

## 2017-03-19 RX ADMIN — OXYCODONE HYDROCHLORIDE AND ACETAMINOPHEN 1 TABLET: 10; 325 TABLET ORAL at 09:03

## 2017-03-19 RX ADMIN — ESCITALOPRAM 10 MG: 10 TABLET, FILM COATED ORAL at 09:03

## 2017-03-19 RX ADMIN — INSULIN DETEMIR 30 UNITS: 100 INJECTION, SOLUTION SUBCUTANEOUS at 09:03

## 2017-03-19 RX ADMIN — OXYCODONE HYDROCHLORIDE AND ACETAMINOPHEN 1 TABLET: 10; 325 TABLET ORAL at 04:03

## 2017-03-19 RX ADMIN — OXYCODONE HYDROCHLORIDE AND ACETAMINOPHEN 1 TABLET: 10; 325 TABLET ORAL at 12:03

## 2017-03-19 RX ADMIN — IBUPROFEN 600 MG: 600 TABLET, FILM COATED ORAL at 11:03

## 2017-03-19 NOTE — PROGRESS NOTES
"Dr. Babcock notified of pt's blood sugar, accucheck done per pt's request pt stated "she felt a little extra sleep" and was concerned that her blood sugar was high, pt just ordered dinner insulin to be given when meal arrives pt awake and alert no distress noted  "

## 2017-03-19 NOTE — PROGRESS NOTES
Dr. Babcock notified of pt's blood sugar ordered to give scheduled insulin and sliding scale dose no new orders at this time will continue to monitor.

## 2017-03-19 NOTE — PROGRESS NOTES
"POSTPARTUM PROGRESS NOTE     Anusha Andrew is a 26 y.o. female POD #3 status post Repeat  section at 34w2d in a pregnancy complicated by poorly controlled IDDM, chronic HTN & nonreassuring fetal status. Patient is feeling well this morning - she just returned from walking to the NICU to see the baby. She denies nausea, vomiting, fever or chills. She denies headache, visual changes, shortness of breath.   Patient reports moderate abdominal pain that is well relieved by oral pain medications. Lochia is mild to moderate  and decreasing. Patient is voiding without difficulty and ambulating with no difficulty. She has passed flatus, and has not had BM.  Patient does plan to breast feed - she is pumping ~ 8/24 hours. She is getting some condensation in the pump of colostrum; but has not gotten "milk" yet.      Objective:       Temp:  [97.9 °F (36.6 °C)-98.7 °F (37.1 °C)] 98.7 °F (37.1 °C)  Pulse:  [] 101  Resp:  [18] 18  SpO2:  [97 %-100 %] 97 %  BP: (117-145)/(70-87) 133/86    General:   alert, appears stated age and cooperative   Lungs:   clear to auscultation bilaterally   Heart:   tachycardic; regular rhythm   Abdomen:  + bowel sounds; obese; soft; appropriately tender   Uterus:  firm located at the umblicus.    breasts Soft, nontender; nonengorged   Incision: Bandage in place - Aquacel clean, dry and intact   Extremities: 2+ pitting edema; nontender     Lab Review  Recent Results (from the past 4 hour(s))   POCT glucose    Collection Time: 17  8:06 AM   Result Value Ref Range    POCT Glucose 390 (H) 70 - 110 mg/dL     Pt and I reviewed her 2000 calorie diabetic diet.  We discussed difficulty in controlling IDDM in immediate post partum period; and even more so in patients who did not have good control before. I stressed that she will stay longer to try to get control, especially since she had an episode of DKA early this pregnancy.  She voiced understanding.  She does not feel that the Levemir " "(long acting) insulin was injected properly last night - she felt as if the pen "fired" prior to her injection and only a small amount got under her skin.      I/O  No intake or output data in the 24 hours ending 17 1140     Assessment:     Patient Active Problem List   Diagnosis    Obesity (BMI 30.0-34.9)    Poorly controlled type 1 diabetes mellitus    Pre-existing type 1 diabetes mellitus during pregnancy in third trimester    Hypertension in pregnancy, antepartum    Antepartum non-reassuring fetal heart rate or rhythm affecting care of mother    Pre-existing essential hypertension during pregnancy, antepartum     delivery delivered        Plan:   1. Postpartum care:  - Patient doing well. Continue routine management and advances.  - Continue PO pain meds. Pain well controlled.  - Heme: H/h 8.9/28.3 showing moderate anemia due to acute operative intrapartum blood loss, on top of preexisting anemia of pregnancy. The patient is asymptomatic of the anemia - no intervention is indicated    - Encourage ambulation & normal activity to try to get accurate insulin needs  - - Lactation consulted - continue pumping  for baby in NICU    2. IDDM - poorly controlled - per Endocrinology  Recs, patient was started on Levemir 30 units SC qhs & Novalog 12 TID , plus a sliding scale.  There is some question if she received the Levemir properly last night.  I will adjust her Novalog today & Nursing & Patient aware for Levemir dosage tonight.  IF fasting still not improved tomorrow, will call Endocrinology for further assistance.         Dispo: As patient meets milestones, will plan to discharge when Diabetes management improved.     Natalia Babcock  "

## 2017-03-19 NOTE — PROGRESS NOTES
Dr. Babcock notified of pt's blood sugar ordered by MD to give sliding scale dose with new order for 14 units with meals

## 2017-03-19 NOTE — LACTATION NOTE
Lactation note- Patient states she is pumping. States she sterilized kit. Questions answered in regards to diabetes and lactation. Acknowledged understanding

## 2017-03-19 NOTE — PLAN OF CARE
"Problem: Breastfeeding (Adult,Obstetrics,Pediatric)  Goal: Signs and Symptoms of Listed Potential Problems Will be Absent, Minimized or Managed (Breastfeeding)  Signs and symptoms of listed potential problems will be absent, minimized or managed by discharge/transition of care (reference Breastfeeding (Adult,Obstetrics,Pediatric) CPG).   Outcome: Ongoing (interventions implemented as appropriate)    03/19/17 0920   Breastfeeding   Problems Assessed (Breastfeeding) all   Problems Present (Breastfeeding) other (see comments)   Plan of care for pumping for NICU baby discussed. * times in 24 hours, transportation and labeling of EBM. Pt reports pumping "about 8 times, acknowledged understanding of instructions.      "

## 2017-03-20 LAB
POCT GLUCOSE: 185 MG/DL (ref 70–110)
POCT GLUCOSE: 228 MG/DL (ref 70–110)
POCT GLUCOSE: 54 MG/DL (ref 70–110)
POCT GLUCOSE: 80 MG/DL (ref 70–110)
POCT GLUCOSE: 82 MG/DL (ref 70–110)

## 2017-03-20 PROCEDURE — 11000001 HC ACUTE MED/SURG PRIVATE ROOM

## 2017-03-20 PROCEDURE — 25000003 PHARM REV CODE 250: Performed by: OBSTETRICS & GYNECOLOGY

## 2017-03-20 PROCEDURE — 63600175 PHARM REV CODE 636 W HCPCS: Performed by: OBSTETRICS & GYNECOLOGY

## 2017-03-20 RX ORDER — INSULIN ASPART 100 [IU]/ML
25 INJECTION, SOLUTION INTRAVENOUS; SUBCUTANEOUS
Status: DISCONTINUED | OUTPATIENT
Start: 2017-03-20 | End: 2017-03-20

## 2017-03-20 RX ORDER — INSULIN ASPART 100 [IU]/ML
4 INJECTION, SOLUTION INTRAVENOUS; SUBCUTANEOUS
Status: DISCONTINUED | OUTPATIENT
Start: 2017-03-20 | End: 2017-03-20

## 2017-03-20 RX ORDER — INSULIN ASPART 100 [IU]/ML
15 INJECTION, SOLUTION INTRAVENOUS; SUBCUTANEOUS
Status: DISCONTINUED | OUTPATIENT
Start: 2017-03-21 | End: 2017-03-20

## 2017-03-20 RX ORDER — INSULIN ASPART 100 [IU]/ML
15 INJECTION, SOLUTION INTRAVENOUS; SUBCUTANEOUS
Status: DISCONTINUED | OUTPATIENT
Start: 2017-03-20 | End: 2017-03-21 | Stop reason: HOSPADM

## 2017-03-20 RX ADMIN — ESCITALOPRAM 10 MG: 10 TABLET, FILM COATED ORAL at 09:03

## 2017-03-20 RX ADMIN — IBUPROFEN 600 MG: 600 TABLET, FILM COATED ORAL at 05:03

## 2017-03-20 RX ADMIN — INSULIN ASPART 14 UNITS: 100 INJECTION, SOLUTION INTRAVENOUS; SUBCUTANEOUS at 07:03

## 2017-03-20 RX ADMIN — INSULIN ASPART 4 UNITS: 100 INJECTION, SOLUTION INTRAVENOUS; SUBCUTANEOUS at 09:03

## 2017-03-20 RX ADMIN — DOCUSATE SODIUM 200 MG: 100 CAPSULE, LIQUID FILLED ORAL at 09:03

## 2017-03-20 RX ADMIN — INSULIN ASPART 2 UNITS: 100 INJECTION, SOLUTION INTRAVENOUS; SUBCUTANEOUS at 08:03

## 2017-03-20 RX ADMIN — BUPROPION HYDROCHLORIDE 150 MG: 150 TABLET, FILM COATED, EXTENDED RELEASE ORAL at 09:03

## 2017-03-20 RX ADMIN — DOCUSATE SODIUM 200 MG: 100 CAPSULE, LIQUID FILLED ORAL at 10:03

## 2017-03-20 RX ADMIN — OXYCODONE HYDROCHLORIDE AND ACETAMINOPHEN 1 TABLET: 10; 325 TABLET ORAL at 10:03

## 2017-03-20 RX ADMIN — OXYCODONE HYDROCHLORIDE AND ACETAMINOPHEN 1 TABLET: 10; 325 TABLET ORAL at 11:03

## 2017-03-20 RX ADMIN — INSULIN ASPART 25 UNITS: 100 INJECTION, SOLUTION INTRAVENOUS; SUBCUTANEOUS at 12:03

## 2017-03-20 RX ADMIN — IBUPROFEN 600 MG: 600 TABLET, FILM COATED ORAL at 11:03

## 2017-03-20 RX ADMIN — INSULIN ASPART 15 UNITS: 100 INJECTION, SOLUTION INTRAVENOUS; SUBCUTANEOUS at 06:03

## 2017-03-20 RX ADMIN — OXYCODONE HYDROCHLORIDE AND ACETAMINOPHEN 1 TABLET: 10; 325 TABLET ORAL at 05:03

## 2017-03-20 RX ADMIN — IBUPROFEN 600 MG: 600 TABLET, FILM COATED ORAL at 12:03

## 2017-03-20 RX ADMIN — MAGNESIUM HYDROXIDE 2400 MG: 400 SUSPENSION ORAL at 12:03

## 2017-03-20 RX ADMIN — INSULIN DETEMIR 20 UNITS: 100 INJECTION, SOLUTION SUBCUTANEOUS at 09:03

## 2017-03-20 RX ADMIN — DIPHENHYDRAMINE HYDROCHLORIDE 25 MG: 25 CAPSULE ORAL at 10:03

## 2017-03-20 RX ADMIN — OXYCODONE HYDROCHLORIDE AND ACETAMINOPHEN 1 TABLET: 10; 325 TABLET ORAL at 01:03

## 2017-03-20 NOTE — PROGRESS NOTES
Dr. Palacios notified of pt's blood sugar ordered to bring pt grahamn crackers and milk, encourage pt to order dinner between 1559-1288 in order for blood glucose to be rechecked before meal, pt verbalized an understanding, grahamn crackers and milk brought to pt. Pt ambulating in room denies feeling clammy, tired, lightheaded, shaky or having n/v will continue to monitor.

## 2017-03-20 NOTE — PROGRESS NOTES
PostPartum Progress Note        Subjective:      Post-Operative Day #4 after  delivery secondary to repeat.     Patient is without complaints. Lochia decreasing. Breast feeding. Pain is well controlled. Patient is ambulating. Tolerating 2000 ADA diet.Baby remains in SCN. Overall mother and baby are doing well.     Objective:      Temp:  [97.9 °F (36.6 °C)-98.7 °F (37.1 °C)] 98 °F (36.7 °C)  Pulse:  [] 97  Resp:  [18-20] 20  SpO2:  [98 %-99 %] 98 %  BP: (125-134)/(68-86) 134/79  No intake or output data in the 24 hours ending 17 0855  Body mass index is 40.21 kg/(m^2).    General: no acute distress  Abdomen: soft, non-tender, non-distended; Fundus firm and below the umbilicus  Incision- intact, healing well, no sign of infection Silver impregnated dressing intact  Extremities: non-tender, symmetric, trace edema    Group & Rh   Date Value Ref Range Status   2016 AB POS  Final   2012 AB POS  Final     Recent Results (from the past 336 hour(s))   CBC auto differential    Collection Time: 17  5:35 AM   Result Value Ref Range    WBC 13.18 (H) 3.90 - 12.70 K/uL    Hemoglobin 8.9 (L) 12.0 - 16.0 g/dL    Hematocrit 28.3 (L) 37.0 - 48.5 %    Platelets 258 150 - 350 K/uL   CBC auto differential    Collection Time: 17 12:23 PM   Result Value Ref Range    WBC 12.51 3.90 - 12.70 K/uL    Hemoglobin 10.9 (L) 12.0 - 16.0 g/dL    Hematocrit 34.2 (L) 37.0 - 48.5 %    Platelets 301 150 - 350 K/uL          Assessment:     26 y.o.  S/P  Delivery Post-Operative Day #4  - Doing Well however Type 1 diabetes remains difficult to control and follow up is uncertain due to insurance issues.     Plan:     1. Continue routine postpartum care  2. Dietary consult.  3. Will increase Novalog to 20 units (gave additional 4 untis now) and will assess pre-lunch for next dose, most likely increase Levamir prior to bed as well.   4. Plan for D/C this PM if sugars controlled and follow up is in  place.

## 2017-03-20 NOTE — PROGRESS NOTES
** Spoke with MD Malou Palacios this morning who desired diabetic diet education for pt prior to discharge.  Pt will be following up outpatient , location unknown to RD at this time.    Pt is post op day #4 . Pre-existing diabetes (pt states for 8 years). She has had prior diabetes education at [facility].  Pt was able to give examples of carbohydrate exchanges: 1 slice bread =15 g CHO etc.  Reviewed food sources of carbohydrate. Discussed meal planning and timing with pt. Encouraged pt to eat whole grains, pair CHO with protein, gave examples. Recently, she states, she has been eating a lot of fast food, enjoys hamburgers.  Gave easy meal ideas. Discouraged pt from drinking sweet drinks like fruit juice, soda, sweet tea. Encouraged adequate water consumption for blood sugar management as well as lactation.  Provided handout with RD name and number.  Will f/u prn.

## 2017-03-20 NOTE — PROGRESS NOTES
Notified Dr. Babcock of pts  at bedtime. Ordered to give sliding scale of Aspart at this time, following sliding scale for bedtime dosage per order. Pt may receive snack if within calorie restrictions for the day. Recheck BG before breakfast per orders unless change in pt status. Pt safety maintained, will continue to monitor.

## 2017-03-21 VITALS
OXYGEN SATURATION: 99 % | HEART RATE: 96 BPM | TEMPERATURE: 99 F | DIASTOLIC BLOOD PRESSURE: 77 MMHG | BODY MASS INDEX: 40.22 KG/M2 | WEIGHT: 227 LBS | SYSTOLIC BLOOD PRESSURE: 123 MMHG | HEIGHT: 63 IN | RESPIRATION RATE: 18 BRPM

## 2017-03-21 PROBLEM — O36.8390 ANTEPARTUM NON-REASSURING FETAL HEART RATE OR RHYTHM AFFECTING CARE OF MOTHER: Status: RESOLVED | Noted: 2017-03-16 | Resolved: 2017-03-21

## 2017-03-21 PROBLEM — O99.013 ANEMIA OF PREGNANCY IN THIRD TRIMESTER: Status: RESOLVED | Noted: 2017-03-19 | Resolved: 2017-03-21

## 2017-03-21 PROBLEM — O10.019 PRE-EXISTING ESSENTIAL HYPERTENSION DURING PREGNANCY, ANTEPARTUM: Status: RESOLVED | Noted: 2017-03-16 | Resolved: 2017-03-21

## 2017-03-21 LAB
POCT GLUCOSE: 144 MG/DL (ref 70–110)
POCT GLUCOSE: 176 MG/DL (ref 70–110)
POCT GLUCOSE: 187 MG/DL (ref 70–110)

## 2017-03-21 PROCEDURE — 25000003 PHARM REV CODE 250: Performed by: OBSTETRICS & GYNECOLOGY

## 2017-03-21 RX ORDER — INSULIN GLARGINE 100 [IU]/ML
20 INJECTION, SOLUTION SUBCUTANEOUS NIGHTLY
Qty: 10 ML | Refills: 3 | Status: SHIPPED | OUTPATIENT
Start: 2017-03-21 | End: 2017-12-13 | Stop reason: SDUPTHER

## 2017-03-21 RX ORDER — IBUPROFEN 600 MG/1
600 TABLET ORAL 4 TIMES DAILY
Qty: 45 TABLET | Refills: 1 | Status: SHIPPED | OUTPATIENT
Start: 2017-03-21 | End: 2017-05-11

## 2017-03-21 RX ORDER — ESCITALOPRAM OXALATE 10 MG/1
10 TABLET ORAL DAILY
Qty: 30 TABLET | Refills: 3 | Status: SHIPPED | OUTPATIENT
Start: 2017-03-21 | End: 2017-11-10

## 2017-03-21 RX ORDER — INSULIN ASPART 100 [IU]/ML
INJECTION, SOLUTION INTRAVENOUS; SUBCUTANEOUS
Qty: 100 ML | Refills: 10 | Status: ON HOLD | OUTPATIENT
Start: 2017-03-21 | End: 2018-01-08 | Stop reason: HOSPADM

## 2017-03-21 RX ORDER — OXYCODONE AND ACETAMINOPHEN 5; 325 MG/1; MG/1
1 TABLET ORAL EVERY 4 HOURS PRN
Qty: 45 TABLET | Refills: 0 | Status: SHIPPED | OUTPATIENT
Start: 2017-03-21 | End: 2017-04-13 | Stop reason: HOSPADM

## 2017-03-21 RX ADMIN — IBUPROFEN 600 MG: 600 TABLET, FILM COATED ORAL at 05:03

## 2017-03-21 RX ADMIN — ESCITALOPRAM 10 MG: 10 TABLET, FILM COATED ORAL at 09:03

## 2017-03-21 RX ADMIN — DOCUSATE SODIUM 200 MG: 100 CAPSULE, LIQUID FILLED ORAL at 09:03

## 2017-03-21 RX ADMIN — BUPROPION HYDROCHLORIDE 150 MG: 150 TABLET, FILM COATED, EXTENDED RELEASE ORAL at 09:03

## 2017-03-21 RX ADMIN — INSULIN ASPART 15 UNITS: 100 INJECTION, SOLUTION INTRAVENOUS; SUBCUTANEOUS at 09:03

## 2017-03-21 RX ADMIN — OXYCODONE HYDROCHLORIDE AND ACETAMINOPHEN 1 TABLET: 10; 325 TABLET ORAL at 02:03

## 2017-03-21 RX ADMIN — OXYCODONE HYDROCHLORIDE AND ACETAMINOPHEN 1 TABLET: 10; 325 TABLET ORAL at 07:03

## 2017-03-21 NOTE — PROGRESS NOTES
Called from pt's pharmacy due to levamir not covered by insurance.  Lantus is.  Inpatient pharmacy called to see what the change in dosing would be, and pharmacist states that there is a 1:1 conversion.  Rx changed and sent to pharmacy.     Sammi Stephens, DO

## 2017-03-21 NOTE — PLAN OF CARE
Problem: Patient Care Overview  Goal: Plan of Care Review  Outcome: Outcome(s) achieved Date Met:  03/21/17   Vs stable. Pt verbalized understanding of pain management. Discharged off unit with family at side in no apparent distress.

## 2017-03-21 NOTE — PROGRESS NOTES
PostPartum Progress Note        Subjective:      Post-Operative Day #5 after  delivery secondary to repeat CD at 34 wga due to non-reassuring fetal status.    Patient is without complaints. Lochia decreasing. Both breast and bottle feeding. Baby is in NICU.  Pain iswell controlled. Patient is ambulating. Tolerating 1800 ADA diet.Overall mother and baby are doing well.     Objective:      Temp:  [97.3 °F (36.3 °C)-98.7 °F (37.1 °C)] 98.7 °F (37.1 °C)  Pulse:  [] 99  Resp:  [18] 18  SpO2:  [97 %-100 %] 99 %  BP: (122-141)/(74-98) 141/98  No intake or output data in the 24 hours ending 17 0922  Body mass index is 40.21 kg/(m^2).    General: no acute distress  Abdomen: soft, non-tender, non-distended; Fundus firm and below the umbilicus  aquacel bandage in place, clean and intact  Extremities: non-tender, symmetric, no edema    Group & Rh   Date Value Ref Range Status   2016 AB POS  Final   2012 AB POS  Final     Recent Results (from the past 336 hour(s))   CBC auto differential    Collection Time: 17  5:35 AM   Result Value Ref Range    WBC 13.18 (H) 3.90 - 12.70 K/uL    Hemoglobin 8.9 (L) 12.0 - 16.0 g/dL    Hematocrit 28.3 (L) 37.0 - 48.5 %    Platelets 258 150 - 350 K/uL   CBC auto differential    Collection Time: 17 12:23 PM   Result Value Ref Range    WBC 12.51 3.90 - 12.70 K/uL    Hemoglobin 10.9 (L) 12.0 - 16.0 g/dL    Hematocrit 34.2 (L) 37.0 - 48.5 %    Platelets 301 150 - 350 K/uL     Current insulin regimen:  Levamir 20qhs  Novalog 15ac meals    Accuchecks   3/20: F185  PPL54 (insulin adjustment) ACD82 PPD80 [id number]  3/21 F144     Assessment:     26 y.o.  S/P  Delivery Post-Operative Day #5   Type 1DM - under better control on current insulin regimen.  She has appointment with [facility] today at 1pm for DM  Depression on wellbutrin 150mg qd and lexapro 10mg qd. She only needs rx for lexapro  Baby in NICU     Plan:     1. Continue  routine postpartum care  2. Plan for D/C today with appropriate follow up and prescriptions    Sammi Stephens DO

## 2017-03-21 NOTE — LACTATION NOTE
Pt has increased her pumping. Encouraged pumping 8 times daily for 20 minutes using rental pump. Pt has wic appointment for this week. Reviewed education for pumping for nicu baby. Pt has lactation contact number.

## 2017-03-21 NOTE — DISCHARGE SUMMARY
Delivery Discharge Summary  Obstetrics      Primary OB Clinician: Purnima Kidd MD    Discharge Provider: Sammi Stephens MD    Admission date: 3/16/2017  Discharge date: 2017    Admit Dx: Previous LTCD    Non-reassuring fetal status at 34 wga    Uncontrolled type 1 DM    Depression  Discharge Dx:    Patient Active Problem List   Diagnosis    Obesity (BMI 30.0-34.9)    Poorly controlled type 1 diabetes mellitus     delivery delivered    Postoperative anemia due to acute blood loss       Procedure: , due to previous CD    Hospital Course:  Anusha Andrwe is a 26 y.o. now  who was admitted on 3/16/2017 for delivery. Patient delivered a viable . Please see delivery note for further details. Pt was in stable condition post delivery and was transferred to the NICU due to premature status. Her postpartum course was complicated by uncontrolled DM.  Insulin regimen was adjusted with help of MFM, and was moderately well controlled on discharge.  Pt has follow up appointment with [facility] at 1pm today. On the date of discharge, patient's pain is controlled with oral pain medications. She is tolerating ambulation without SOB or CP, and PO diet without N/V. Reported lochia is within the normal range. Pt in stable condition and ready for discharge.     Pertinent studies:  Postpartum CBC  Lab Results   Component Value Date    WBC 13.18 (H) 2017    HGB 8.9 (L) 2017    HCT 28.3 (L) 2017    MCV 76 (L) 2017     2017       Delivery:    Episiotomy: None   Lacerations: None   Repair suture:     Repair # of packets:     Blood loss (ml): 0     Birth information:  YOB: 2015   Time of birth: 1:42 PM   Sex: male   Delivery type: , Low Transverse   Gestational Age: 34w2d    Delivery Clinician:      Other providers:       Additional  information:  Forceps:    Vacuum:    Breech:    Observed anomalies      Living?:            APGARS  One minute Five minutes Ten minutes   Skin color:         Heart rate:         Grimace:         Muscle tone:         Breathing:         Totals: 5  8        Placenta: Delivered:       appearance      Disposition: To home, self care    Follow Up: 2 weeks    Patient Instructions:   1. Call the office for any bleeding >2 pads/hour for >2 hours, temperature >100.4, pain that is uncontrolled with medications, or for any other concerns.  2. Pelvic rest and no tub baths x 6 weeks.  3. No driving while on narcotics.    Current Discharge Medication List      START taking these medications    Details   escitalopram oxalate (LEXAPRO) 10 MG tablet Take 1 tablet (10 mg total) by mouth once daily.  Qty: 30 tablet, Refills: 3      ibuprofen (ADVIL,MOTRIN) 600 MG tablet Take 1 tablet (600 mg total) by mouth 4 (four) times daily.  Qty: 45 tablet, Refills: 1      oxycodone-acetaminophen (PERCOCET) 5-325 mg per tablet Take 1 tablet by mouth every 4 (four) hours as needed.  Qty: 45 tablet, Refills: 0         CONTINUE these medications which have CHANGED    Details   insulin aspart (NOVOLOG) 100 unit/mL injection 15 units ac meals  Qty: 100 mL, Refills: 10    Associated Diagnoses: Type 1 diabetes mellitus affecting pregnancy in third trimester, antepartum      insulin detemir (LEVEMIR FLEXTOUCH) 100 unit/mL (3 mL) SubQ InPn pen 20 units at bedtime  Qty: 90 mL, Refills: 3         CONTINUE these medications which have NOT CHANGED    Details   blood sugar diagnostic Strp 1 each by Misc.(Non-Drug; Combo Route) route 6 (six) times daily.  Qty: 200 each, Refills: 11    Associated Diagnoses: Type I diabetes mellitus, uncontrolled      buPROPion (WELLBUTRIN XL) 150 MG TB24 tablet Take 1 tablet (150 mg total) by mouth every morning.  Qty: 30 tablet, Refills: 2      !! lancets Misc 1 Device by Misc.(Non-Drug; Combo Route) route 4 (four) times daily before meals and nightly.  Qty: 150 each, Refills: 12    Associated Diagnoses: Type I  "(juvenile type) diabetes mellitus without mention of complication, uncontrolled      !! ONETOUCH DELICA LANCETS 33 gauge Misc       ONETOUCH ULTRA2 kit       pen needle, diabetic 31 gauge x 1/4" Ndle 1 Device by MISCELLANEOUS route 4 (four) times daily with meals and nightly.  Qty: 150 each, Refills: 12    Associated Diagnoses: Type I (juvenile type) diabetes mellitus without mention of complication, uncontrolled      PNV#67-iron ps-FA cmb#1-dha (VITAFOL ULTRA) 29 mg iron- 1 mg-200 mg Cap Take 1 tablet by mouth once daily.  Qty: 30 capsule, Refills: 6       !! - Potential duplicate medications found. Please discuss with provider.          Sammi Stephens,     "

## 2017-03-29 ENCOUNTER — POSTPARTUM VISIT (OUTPATIENT)
Dept: OBSTETRICS AND GYNECOLOGY | Facility: CLINIC | Age: 27
End: 2017-03-29
Attending: OBSTETRICS & GYNECOLOGY
Payer: MEDICAID

## 2017-03-29 VITALS
WEIGHT: 200.81 LBS | SYSTOLIC BLOOD PRESSURE: 140 MMHG | HEIGHT: 63 IN | BODY MASS INDEX: 35.58 KG/M2 | DIASTOLIC BLOOD PRESSURE: 80 MMHG

## 2017-03-29 DIAGNOSIS — E10.65 POORLY CONTROLLED TYPE 1 DIABETES MELLITUS: ICD-10-CM

## 2017-03-29 DIAGNOSIS — Z48.89 POSTOPERATIVE VISIT: Primary | ICD-10-CM

## 2017-03-29 DIAGNOSIS — R45.89 POSTPARTUM EMOTIONAL LABILITY: ICD-10-CM

## 2017-03-29 PROCEDURE — 99999 PR PBB SHADOW E&M-EST. PATIENT-LVL III: CPT | Mod: PBBFAC,,, | Performed by: OBSTETRICS & GYNECOLOGY

## 2017-03-29 PROCEDURE — 99213 OFFICE O/P EST LOW 20 MIN: CPT | Mod: PBBFAC | Performed by: OBSTETRICS & GYNECOLOGY

## 2017-03-29 NOTE — MR AVS SNAPSHOT
[facility]  [address], [address]  [facility] [address]  Phone: [phone]  Fax: [phone]                  Anusha Andrew   3/29/2017 1:00 PM   Postpartum Visit    Description:  Female : 1990   Provider:  Purnima Kidd MD   Department[facility]  [facility] [place]           Reason for Visit     Postpartum Care                To Do List           Future Appointments        Provider Department Dept Phone    2017 1:45 PM Purnima Kidd MD [facility] [phone]      Goals (5 Years of Data)     None      [facility]     [facility] On Call Nurse Care Line -  Assistance  Registered nurses in the [facility] On Call Center provide clinical advisement, health education, appointment booking, and other advisory services.  Call for this free service at [phone].             Medications           Message regarding Medications     Verify the changes and/or additions to your medication regime listed below are the same as discussed with your clinician today.  If any of these changes or additions are incorrect, please notify your healthcare provider.             Verify that the below list of medications is an accurate representation of the medications you are currently taking.  If none reported, the list may be blank. If incorrect, please contact your healthcare provider. Carry this list with you in case of emergency.           Current Medications     buPROPion (WELLBUTRIN XL) 150 MG TB24 tablet Take 1 tablet (150 mg total) by mouth every morning.    escitalopram oxalate (LEXAPRO) 10 MG tablet Take 1 tablet (10 mg total) by mouth once daily.    ibuprofen (ADVIL,MOTRIN) 600 MG tablet Take 1 tablet (600 mg total) by mouth 4 (four) times daily.    insulin aspart (NOVOLOG) 100 unit/mL injection 15 units ac meals    insulin glargine (LANTUS) 100 unit/mL injection Inject 20 Units into the skin every evening.    lancets Misc 1 Device by Misc.(Non-Drug; Combo Route) route 4  "(four) times daily before meals and nightly.    ONETOUCH DELICA LANCETS 33 gauge Misc     ONETOUCH ULTRA2 kit     oxycodone-acetaminophen (PERCOCET) 5-325 mg per tablet Take 1 tablet by mouth every 4 (four) hours as needed.    pen needle, diabetic 31 gauge x 1/4" Ndle 1 Device by MISCELLANEOUS route 4 (four) times daily with meals and nightly.    PNV#67-iron ps-FA cmb#1-dha (VITAFOL ULTRA) 29 mg iron- 1 mg-200 mg Cap Take 1 tablet by mouth once daily.    blood sugar diagnostic Strp 1 each by Misc.(Non-Drug; Combo Route) route 6 (six) times daily.           Clinical Reference Information           Your Vitals Were     BP Height Weight BMI       140/80 5' 3" (1.6 m) 91.1 kg (200 lb 13.4 oz) 35.58 kg/m2       Allergies as of 3/29/2017     No Known Allergies      Immunizations Administered on Date of Encounter - 3/29/2017     None      Language Assistance Services     ATTENTION: Language assistance services are available, free of charge. Please call [phone].      ATENCIÓN: Si ivan mcclure, tiene a moreno disposición servicios gratuitos de asistencia lingüística. Llame al [phone].     KENNETH Ý: N?u b?n nói Ti?ng Vi?t, có các d?ch v? h? tr? ngôn ng? mi?n phí dành cho b?n. G?i s? [phone].         [facility] complies with applicable Federal civil rights laws and does not discriminate on the basis of race, color, national origin, age, disability, or sex.        "

## 2017-03-31 NOTE — PROGRESS NOTES
Subjective:       Patient ID: Anusha Andrew is a 26 y.o. female.    Chief Complaint:  Postpartum Care (2 weeks )      History of Present Illness  26-y ear-old status post repeat low transverse  section 2 weeks ago.  Patient overall feeling well.  Was discharged with Lexapro as well as her Wellbutrin.  The patient has not been taking her Lexapro at home.  She denies suicidal or homicidal ideation but she has been feeling down and anxious lately.  We discussed taking her Lexapro as prescribed.  Patient willing to restart Lexapro and she will come back to see us in 2 weeks for follow-up.  Patient also with insulin-dependent diabetes.  Patient with a history of noncompliance.  She states she currently has been taking her insulin and has been having some lows but this is been achieved been adjusted through her endocrinologist as well as [facility]      GYN & OB History  No LMP recorded.   Date of Last Pap: 10/28/2015    OB History    Para Term  AB SAB TAB Ectopic Multiple Living   2 2 1 1      2      # Outcome Date GA Lbr Hawk/2nd Weight Sex Delivery Anes PTL Lv   2  / 34w2d  3.16 kg (6 lb 15.5 oz) M CS-LTranv Spinal,EPI Y Y   1 Term 12 37w5d  3.286 kg (7 lb 3.9 oz) F CS-LTranv EPI  Y      Obstetric Comments   Gestational HTN, failed induction, not past 6 cm       Review of Systems  Review of Systems   Constitutional: Negative for fatigue and unexpected weight change.   Respiratory: Negative for shortness of breath.    Cardiovascular: Negative for chest pain.   Gastrointestinal: Negative for abdominal pain, constipation, diarrhea, nausea and vomiting.   Genitourinary: Negative for dysuria.   Musculoskeletal: Negative for back pain.   Skin: Negative for rash.   Neurological: Negative for headaches.   Hematological: Does not bruise/bleed easily.   Psychiatric/Behavioral: Positive for dysphoric mood. Negative for agitation, behavioral problems, self-injury and suicidal  ideas. The patient is not nervous/anxious.         Objective:   Physical Exam:   Constitutional: She appears well-developed and well-nourished.             Abdominal: Soft.   Incision clear dry and intact  No signs of infection.                        Assessment/ Plan:          Anusha was seen today for postpartum care.    Diagnoses and all orders for this visit:    Postoperative visit   Incision healing well.  Postpartum emotional lability   Mild postpartum blues.  Patient not taking her Lexapro.  Will restart Lexapro 10 mg daily and follow-up in 2 weeks.  Poorly controlled type 1 diabetes mellitus   Patient seems to be more in control of her diabetes now that she is not pregnant.  She has been in touch with her endocrinologist as well as [facility] and she has made adjustments to her insulin.  She did not bring her glucose log in today for my review.  I instructed her to bring it in at her next visit in 2 weeks.    No Follow-up on file.      Health Maintenance       Date Due Completion Date    HPV Vaccines (1 of 3 - Female 3 Dose Series) 12/3/2003 ---    TETANUS VACCINE 12/3/2010 ---    Pneumococcal PPSV23 (Medium Risk) (1) 12/3/2010 ---    Urine Microalbumin 7/17/2016 7/17/2015    Eye Exam 2/17/2017 2/17/2016    Hemoglobin A1c 9/14/2017 3/14/2017    Foot Exam 9/27/2017 9/27/2016    Lipid Panel 12/7/2017 12/7/2016    Pap Smear 10/14/2018 10/14/2015

## 2017-04-12 ENCOUNTER — POSTPARTUM VISIT (OUTPATIENT)
Dept: OBSTETRICS AND GYNECOLOGY | Facility: CLINIC | Age: 27
End: 2017-04-12
Attending: OBSTETRICS & GYNECOLOGY
Payer: MEDICAID

## 2017-04-12 VITALS
HEIGHT: 63 IN | SYSTOLIC BLOOD PRESSURE: 122 MMHG | BODY MASS INDEX: 33.77 KG/M2 | DIASTOLIC BLOOD PRESSURE: 84 MMHG | WEIGHT: 190.56 LBS

## 2017-04-12 PROCEDURE — 99999 PR PBB SHADOW E&M-EST. PATIENT-LVL III: CPT | Mod: PBBFAC,,, | Performed by: OBSTETRICS & GYNECOLOGY

## 2017-04-12 PROCEDURE — 87076 CULTURE ANAEROBE IDENT EACH: CPT

## 2017-04-12 PROCEDURE — 99213 OFFICE O/P EST LOW 20 MIN: CPT | Mod: PBBFAC | Performed by: OBSTETRICS & GYNECOLOGY

## 2017-04-12 PROCEDURE — 87070 CULTURE OTHR SPECIMN AEROBIC: CPT

## 2017-04-12 PROCEDURE — 99024 POSTOP FOLLOW-UP VISIT: CPT | Mod: ,,, | Performed by: OBSTETRICS & GYNECOLOGY

## 2017-04-12 PROCEDURE — 87075 CULTR BACTERIA EXCEPT BLOOD: CPT

## 2017-04-12 RX ORDER — BUPROPION HYDROCHLORIDE 300 MG/1
300 TABLET ORAL DAILY
Qty: 30 TABLET | Refills: 11 | Status: SHIPPED | OUTPATIENT
Start: 2017-04-12 | End: 2018-01-07

## 2017-04-12 RX ORDER — FLUCONAZOLE 100 MG/1
100 TABLET ORAL DAILY
Qty: 3 TABLET | Refills: 1 | Status: SHIPPED | OUTPATIENT
Start: 2017-04-12 | End: 2017-04-15

## 2017-04-12 RX ORDER — SULFAMETHOXAZOLE AND TRIMETHOPRIM 800; 160 MG/1; MG/1
1 TABLET ORAL 2 TIMES DAILY
Qty: 14 TABLET | Refills: 0 | Status: SHIPPED | OUTPATIENT
Start: 2017-04-12 | End: 2017-04-19

## 2017-04-12 NOTE — PROGRESS NOTES
Subjective:   Patient reports no nausea or vomiting.    Activity level: Normal.    Pain control: Well controlled.    Reports still with some postpartum depression, no anxiety. Taking both Wellbutrin 150 and Lexapro 10 mg. overall doing well.     Objective:  Vitals:    17 1420   BP: 122/84     General appearance: Comfortable and well-appearing.    Abdomen: Abdomen is soft, non distended   Tenderness: There is no abdominal tenderness.    Wound:  Clean.  There is no dehiscence.  There is no drainage.      Assessment:    wound infection  s/p  delivery- 4 week post op  Condition: In stable condition.     Plan:  Aerobic and anaerobic cultures done  1.  wound infection  sulfamethoxazole-trimethoprim 800-160mg (BACTRIM DS) 800-160 mg Tab    fluconazole (DIFLUCAN) 100 MG tablet    buPROPion (WELLBUTRIN XL) 300 MG 24 hr tablet       Encourage ambulation.  Continue wound care.  Pelvic rest for 6 weeks postpartum.      F/U in 1 week for reeval of incision  Antibacterial soap daily in shower.   Call if symptoms worsen

## 2017-04-12 NOTE — MR AVS SNAPSHOT
[facility]  [address], [address]  [facility] [address]  Phone: [phone]  Fax: [phone]                  Anusha Andrew   2017 1:45 PM   Postpartum Visit    Description:  Female : 1990   Provider:  Purnima Kidd MD   Department:  [facility]           Reason for Visit     Wound Check           Diagnoses this Visit        Comments     wound infection    -  Primary            To Do List           Future Appointments        Provider Department Dept Phone    2017 1:15 PM Purnima Kidd MD [facility] [phone]      Goals (5 Years of Data)     None      Follow-Up and Disposition     Return in about 1 week (around 2017) for Follow up visit.       These Medications        Disp Refills Start End    sulfamethoxazole-trimethoprim 800-160mg (BACTRIM DS) 800-160 mg Tab 14 tablet 0 2017    Take 1 tablet by mouth 2 (two) times daily. - Oral    Pharmacy: [facility] [place], [place] - [address] Ph #: [phone]       fluconazole (DIFLUCAN) 100 MG tablet 3 tablet 1 2017 4/15/2017    Take 1 tablet (100 mg total) by mouth once daily. - Oral    Pharmacy: [facility] [place], [place] - [address] Ph #: [phone]       buPROPion (WELLBUTRIN XL) 300 MG 24 hr tablet 30 tablet 11 2017    Take 1 tablet (300 mg total) by mouth once daily. - Oral    Pharmacy: [facility] [place], [place] - [address] Ph #: [phone]         [facility]     [facility] Nurse Care Line - 24/ Assistance  Unless otherwise directed by your provider, please contact [facility], our nurse care line that is available for / assistance.     Registered nurses in the [facility] provide: appointment scheduling, clinical advisement, health education, and other advisory services.  Call:  [phone] (toll free)               Medications           Message regarding Medications     Verify the changes and/or additions to your medication regime listed below are the same as discussed with your clinician today.  If any of these changes or additions are incorrect, please notify your healthcare provider.        START taking these NEW medications        Refills    sulfamethoxazole-trimethoprim 800-160mg (BACTRIM DS) 800-160 mg Tab 0    Sig: Take 1 tablet by mouth 2 (two) times daily.    Class: Normal    Route: Oral    fluconazole (DIFLUCAN) 100 MG tablet 1    Sig: Take 1 tablet (100 mg total) by mouth once daily.    Class: Normal    Route: Oral    buPROPion (WELLBUTRIN XL) 300 MG 24 hr tablet 11    Sig: Take 1 tablet (300 mg total) by mouth once daily.    Class: Normal    Route: Oral           Verify that the below list of medications is an accurate representation of the medications you are currently taking.  If none reported, the list may be blank. If incorrect, please contact your healthcare provider. Carry this list with you in case of emergency.           Current Medications     blood sugar diagnostic Strp 1 each by Misc.(Non-Drug; Combo Route) route 6 (six) times daily.    buPROPion (WELLBUTRIN XL) 150 MG TB24 tablet Take 1 tablet (150 mg total) by mouth every morning.    escitalopram oxalate (LEXAPRO) 10 MG tablet Take 1 tablet (10 mg total) by mouth once daily.    ibuprofen (ADVIL,MOTRIN) 600 MG tablet Take 1 tablet (600 mg total) by mouth 4 (four) times daily.    insulin aspart (NOVOLOG) 100 unit/mL injection 15 units ac meals    insulin glargine (LANTUS) 100 unit/mL injection Inject 20 Units into the skin every evening.    lancets Misc 1 Device by Misc.(Non-Drug; Combo Route) route 4 (four) times daily before meals and nightly.    ONETOUCH DELICA LANCETS 33 gauge [facility]     ONETOUCH ULTRA2 kit     oxycodone-acetaminophen (PERCOCET) 5-325 mg per tablet Take 1 tablet by mouth every 4 (four) hours as  "needed.    pen needle, diabetic 31 gauge x 1/4" Ndle 1 Device by MISCELLANEOUS route 4 (four) times daily with meals and nightly.    PNV#67-iron ps-FA cmb#1-dha (VITAFOL ULTRA) 29 mg iron- 1 mg-200 mg Cap Take 1 tablet by mouth once daily.    buPROPion (WELLBUTRIN XL) 300 MG 24 hr tablet Take 1 tablet (300 mg total) by mouth once daily.    fluconazole (DIFLUCAN) 100 MG tablet Take 1 tablet (100 mg total) by mouth once daily.    sulfamethoxazole-trimethoprim 800-160mg (BACTRIM DS) 800-160 mg Tab Take 1 tablet by mouth 2 (two) times daily.           Clinical Reference Information           Your Vitals Were     BP Height Weight BMI       122/84 5' 3" (1.6 m) 86.4 kg (190 lb 9.4 oz) 33.76 kg/m2       Allergies as of 4/12/2017     No Known Allergies      Immunizations Administered on Date of Encounter - 4/12/2017     None      Language Assistance Services     ATTENTION: Language assistance services are available, free of charge. Please call [phone].      ATENCIÓN: Si habla kami, tiene a moreno disposición servicios gratuitos de asistencia lingüística. Llame al [phone].     KENNETH Ý: N?u b?n nói Ti?ng Vi?t, có các d?ch v? h? tr? ngôn ng? mi?n phí dành cho b?n. G?i s? [phone].         [facility] complies with applicable Federal civil rights laws and does not discriminate on the basis of race, color, national origin, age, disability, or sex.        "

## 2017-04-13 ENCOUNTER — HOSPITAL ENCOUNTER (EMERGENCY)
Facility: HOSPITAL | Age: 27
Discharge: HOME | End: 2017-04-13
Attending: EMERGENCY MEDICINE
Payer: MEDICAID

## 2017-04-13 VITALS
TEMPERATURE: 98 F | RESPIRATION RATE: 20 BRPM | OXYGEN SATURATION: 98 % | WEIGHT: 194 LBS | SYSTOLIC BLOOD PRESSURE: 130 MMHG | BODY MASS INDEX: 34.38 KG/M2 | HEIGHT: 63 IN | DIASTOLIC BLOOD PRESSURE: 80 MMHG | HEART RATE: 70 BPM

## 2017-04-13 DIAGNOSIS — R10.13 EPIGASTRIC ABDOMINAL PAIN: Primary | ICD-10-CM

## 2017-04-13 DIAGNOSIS — R10.11 RUQ ABDOMINAL PAIN: ICD-10-CM

## 2017-04-13 DIAGNOSIS — R79.89 ELEVATED LFTS: ICD-10-CM

## 2017-04-13 LAB
ALBUMIN SERPL BCP-MCNC: 3.6 G/DL
ALP SERPL-CCNC: 250 U/L
ALT SERPL W/O P-5'-P-CCNC: 202 U/L
ANION GAP SERPL CALC-SCNC: 11 MMOL/L
AST SERPL-CCNC: 599 U/L
B-HCG UR QL: NEGATIVE
B-OH-BUTYR BLD STRIP-SCNC: 0.7 MMOL/L
BACTERIA #/AREA URNS HPF: ABNORMAL /HPF
BASOPHILS # BLD AUTO: 0.01 K/UL
BASOPHILS NFR BLD: 0.1 %
BILIRUB SERPL-MCNC: 1.3 MG/DL
BILIRUB UR QL STRIP: NEGATIVE
BUN SERPL-MCNC: 10 MG/DL
CALCIUM SERPL-MCNC: 9 MG/DL
CHLORIDE SERPL-SCNC: 102 MMOL/L
CLARITY UR: CLEAR
CO2 SERPL-SCNC: 22 MMOL/L
COLOR UR: YELLOW
CREAT SERPL-MCNC: 0.9 MG/DL
CTP QC/QA: YES
DIFFERENTIAL METHOD: ABNORMAL
EOSINOPHIL # BLD AUTO: 0 K/UL
EOSINOPHIL NFR BLD: 0.4 %
ERYTHROCYTE [DISTWIDTH] IN BLOOD BY AUTOMATED COUNT: 16.4 %
EST. GFR  (AFRICAN AMERICAN): >60 ML/MIN/1.73 M^2
EST. GFR  (NON AFRICAN AMERICAN): >60 ML/MIN/1.73 M^2
GLUCOSE SERPL-MCNC: 290 MG/DL
GLUCOSE UR QL STRIP: ABNORMAL
HCT VFR BLD AUTO: 37 %
HGB BLD-MCNC: 11.8 G/DL
HGB UR QL STRIP: NEGATIVE
KETONES UR QL STRIP: ABNORMAL
LEUKOCYTE ESTERASE UR QL STRIP: NEGATIVE
LIPASE SERPL-CCNC: 17 U/L
LYMPHOCYTES # BLD AUTO: 0.9 K/UL
LYMPHOCYTES NFR BLD: 9.2 %
MCH RBC QN AUTO: 23.3 PG
MCHC RBC AUTO-ENTMCNC: 31.9 %
MCV RBC AUTO: 73 FL
MICROSCOPIC COMMENT: ABNORMAL
MONOCYTES # BLD AUTO: 0.6 K/UL
MONOCYTES NFR BLD: 6.1 %
NEUTROPHILS # BLD AUTO: 7.9 K/UL
NEUTROPHILS NFR BLD: 84.1 %
NITRITE UR QL STRIP: NEGATIVE
PH UR STRIP: 6 [PH] (ref 5–8)
PLATELET # BLD AUTO: 414 K/UL
PMV BLD AUTO: 9.8 FL
POCT GLUCOSE: 285 MG/DL (ref 70–110)
POCT GLUCOSE: 293 MG/DL (ref 70–110)
POTASSIUM SERPL-SCNC: 4.3 MMOL/L
PROT SERPL-MCNC: 7.9 G/DL
PROT UR QL STRIP: NEGATIVE
RBC # BLD AUTO: 5.06 M/UL
SODIUM SERPL-SCNC: 135 MMOL/L
SP GR UR STRIP: 1.02 (ref 1–1.03)
SQUAMOUS #/AREA URNS HPF: ABNORMAL /HPF
URN SPEC COLLECT METH UR: ABNORMAL
UROBILINOGEN UR STRIP-ACNC: ABNORMAL EU/DL
WBC # BLD AUTO: 9.35 K/UL
WBC #/AREA URNS HPF: 1 /HPF (ref 0–5)
YEAST URNS QL MICRO: ABNORMAL

## 2017-04-13 PROCEDURE — 80053 COMPREHEN METABOLIC PANEL: CPT

## 2017-04-13 PROCEDURE — 96374 THER/PROPH/DIAG INJ IV PUSH: CPT

## 2017-04-13 PROCEDURE — 99284 EMERGENCY DEPT VISIT MOD MDM: CPT | Mod: 25

## 2017-04-13 PROCEDURE — 83690 ASSAY OF LIPASE: CPT

## 2017-04-13 PROCEDURE — 63600175 PHARM REV CODE 636 W HCPCS: Performed by: PHYSICIAN ASSISTANT

## 2017-04-13 PROCEDURE — 85025 COMPLETE CBC W/AUTO DIFF WBC: CPT

## 2017-04-13 PROCEDURE — 81025 URINE PREGNANCY TEST: CPT | Performed by: PHYSICIAN ASSISTANT

## 2017-04-13 PROCEDURE — 96361 HYDRATE IV INFUSION ADD-ON: CPT

## 2017-04-13 PROCEDURE — 63600175 PHARM REV CODE 636 W HCPCS: Performed by: EMERGENCY MEDICINE

## 2017-04-13 PROCEDURE — 87086 URINE CULTURE/COLONY COUNT: CPT

## 2017-04-13 PROCEDURE — 96375 TX/PRO/DX INJ NEW DRUG ADDON: CPT

## 2017-04-13 PROCEDURE — 82010 KETONE BODYS QUAN: CPT

## 2017-04-13 PROCEDURE — 81000 URINALYSIS NONAUTO W/SCOPE: CPT

## 2017-04-13 PROCEDURE — 25000003 PHARM REV CODE 250: Performed by: PHYSICIAN ASSISTANT

## 2017-04-13 RX ORDER — SODIUM CHLORIDE 9 MG/ML
1000 INJECTION, SOLUTION INTRAVENOUS
Status: COMPLETED | OUTPATIENT
Start: 2017-04-13 | End: 2017-04-13

## 2017-04-13 RX ORDER — ONDANSETRON 4 MG/1
4 TABLET, FILM COATED ORAL EVERY 6 HOURS PRN
Qty: 12 TABLET | Refills: 0 | Status: SHIPPED | OUTPATIENT
Start: 2017-04-13 | End: 2017-04-18

## 2017-04-13 RX ORDER — MORPHINE SULFATE 10 MG/ML
4 INJECTION INTRAMUSCULAR; INTRAVENOUS; SUBCUTANEOUS
Status: COMPLETED | OUTPATIENT
Start: 2017-04-13 | End: 2017-04-13

## 2017-04-13 RX ORDER — TRAMADOL HYDROCHLORIDE 50 MG/1
50 TABLET ORAL EVERY 6 HOURS PRN
Qty: 12 TABLET | Refills: 0 | Status: SHIPPED | OUTPATIENT
Start: 2017-04-13 | End: 2017-04-18

## 2017-04-13 RX ORDER — ONDANSETRON 2 MG/ML
4 INJECTION INTRAMUSCULAR; INTRAVENOUS
Status: COMPLETED | OUTPATIENT
Start: 2017-04-13 | End: 2017-04-13

## 2017-04-13 RX ADMIN — SODIUM CHLORIDE 1000 ML: 0.9 INJECTION, SOLUTION INTRAVENOUS at 11:04

## 2017-04-13 RX ADMIN — SODIUM CHLORIDE 1000 ML: 0.9 INJECTION, SOLUTION INTRAVENOUS at 09:04

## 2017-04-13 RX ADMIN — ONDANSETRON 4 MG: 2 INJECTION INTRAMUSCULAR; INTRAVENOUS at 09:04

## 2017-04-13 RX ADMIN — MORPHINE SULFATE 4 MG: 10 INJECTION INTRAVENOUS at 09:04

## 2017-04-13 NOTE — Clinical Note
STOP taking percocet and any other medications that contain Acetaminophen.    You can take Tramadol as needed for pain and Zofran as needed for nausea.     Please make appointment with Dr. Wilson, Gastroenterologist, ASAP. Also make appointment with you r primary care doctor for follow up. Return to the ER if symptoms worsen or as needed.

## 2017-04-13 NOTE — ED AVS SNAPSHOT
[facility]  [address] [place] [place] [address]               Anusha Andrew   2017  8:58 AM   ED    Description:  Female : 1990   Department:  [facility]           Your Care was Coordinated By:     Provider Role From To    Ava Ho MD Attending Provider 17 0933 --    Thelma Elena PA-C Physician Assistant 17 0859 --      Reason for Visit     Abdominal Pain           Diagnoses this Visit        Comments    Epigastric abdominal pain    -  Primary     RUQ abdominal pain         Elevated LFTs           ED Disposition     ED Disposition Condition Comment    Discharge  STOP taking percocet and any other medications that contain Acetaminophen.    You can take Tramadol as needed for pain and Zofran as needed for nausea.     Please make appointment with Dr. Wilson, Gastroenterologist, ASAP. Also make appointment with you r primary care doctor for follow up. Return to the ER if symptoms worsen or as needed.              To Do List           Follow-up Information     Follow up with Tyree Wilson MD. Schedule an appointment as soon as possible for a visit in 2 days.    Specialty:  Gastroenterology    Why:  for follow up    Contact information:    [address] [place] [address]  [address]  [facility]  [facility] [address]  [phone]          Go to [facility].    Specialty:  Emergency Medicine    Why:  As needed, If symptoms worsen    Contact information:    [address] [place] [address] [phone]        Follow up with Rhina Archer MD. Schedule an appointment as soon as possible for a visit in 2 days.    Specialty:  Family Medicine    Contact information:    [address] [facility] [address]  [phone]         These Medications        Disp Refills Start End    tramadol (ULTRAM) 50 mg tablet 12 tablet 0 2017    Take 1 tablet  (50 mg total) by mouth every 6 (six) hours as needed for Pain. - Oral    Pharmacy: [facility] [place] - [address] Ph #: [phone]       ondansetron (ZOFRAN) 4 MG tablet 12 tablet 0 4/13/2017 4/18/2017    Take 1 tablet (4 mg total) by mouth every 6 (six) hours as needed. - Oral    Pharmacy: [facility] - [place] - [address] Ph #: [phone]         [facility]     [facility] On Call Nurse Care Line - 24/7 Assistance  Unless otherwise directed by your provider, please contact [facility], our nurse care line that is available for 24/7 assistance.     Registered nurses in the [facility] provide: appointment scheduling, clinical advisement, health education, and other advisory services.  Call: [phone] (toll free)               Medications           Message regarding Medications     Verify the changes and/or additions to your medication regime listed below are the same as discussed with your clinician today.  If any of these changes or additions are incorrect, please notify your healthcare provider.        START taking these NEW medications        Refills    tramadol (ULTRAM) 50 mg tablet 0    Sig: Take 1 tablet (50 mg total) by mouth every 6 (six) hours as needed for Pain.    Class: Print    Route: Oral    ondansetron (ZOFRAN) 4 MG tablet 0    Sig: Take 1 tablet (4 mg total) by mouth every 6 (six) hours as needed.    Class: Print    Route: Oral      These medications were administered today        Dose Freq    0.9%  NaCl infusion 1,000 mL ED 1 Time    Sig: Inject 1,000 mLs into the vein ED 1 Time.    Class: Normal    Route: Intravenous    Cosign for Ordering: Required by Adele Flood MD    ondansetron injection 4 mg 4 mg ED 1 Time    Sig: Inject 4 mg into the vein ED 1 Time.    Class: Normal    Route: Intravenous    Cosign for Ordering: Accepted by Ava Ho MD on 4/13/2017  9:38 AM    morphine injection 4 mg  "4 mg ED 1 Time    Sig: Inject 0.4 mLs (4 mg total) into the vein ED 1 Time.    Class: Normal    Route: Intravenous    0.9%  NaCl infusion 1,000 mL ED 1 Time    Sig: Inject 1,000 mLs into the vein ED 1 Time.    Class: Normal    Route: Intravenous    Cosign for Ordering: Required by Ava Ho MD      STOP taking these medications     oxycodone-acetaminophen (PERCOCET) 5-325 mg per tablet Take 1 tablet by mouth every 4 (four) hours as needed.           Verify that the below list of medications is an accurate representation of the medications you are currently taking.  If none reported, the list may be blank. If incorrect, please contact your healthcare provider. Carry this list with you in case of emergency.           Current Medications     blood sugar diagnostic Strp 1 each by Misc.(Non-Drug; Combo Route) route 6 (six) times daily.    buPROPion (WELLBUTRIN XL) 150 MG TB24 tablet Take 1 tablet (150 mg total) by mouth every morning.    buPROPion (WELLBUTRIN XL) 300 MG 24 hr tablet Take 1 tablet (300 mg total) by mouth once daily.    escitalopram oxalate (LEXAPRO) 10 MG tablet Take 1 tablet (10 mg total) by mouth once daily.    fluconazole (DIFLUCAN) 100 MG tablet Take 1 tablet (100 mg total) by mouth once daily.    ibuprofen (ADVIL,MOTRIN) 600 MG tablet Take 1 tablet (600 mg total) by mouth 4 (four) times daily.    insulin aspart (NOVOLOG) 100 unit/mL injection 15 units ac meals    insulin glargine (LANTUS) 100 unit/mL injection Inject 20 Units into the skin every evening.    lancets Misc 1 Device by Misc.(Non-Drug; Combo Route) route 4 (four) times daily before meals and nightly.    ondansetron (ZOFRAN) 4 MG tablet Take 1 tablet (4 mg total) by mouth every 6 (six) hours as needed.    ONETOUCH DELICA LANCETS 33 gauge [facility]     ONETOUCH ULTRA2 kit     pen needle, diabetic 31 gauge x 1/4" Ndle 1 Device by MISCELLANEOUS route 4 (four) times daily with meals and nightly.    PNV#67-iron ps-FA cmb#1-dha (VITAFOL " "ULTRA) 29 mg iron- 1 mg-200 mg Cap Take 1 tablet by mouth once daily.    sulfamethoxazole-trimethoprim 800-160mg (BACTRIM DS) 800-160 mg Tab Take 1 tablet by mouth 2 (two) times daily.    tramadol (ULTRAM) 50 mg tablet Take 1 tablet (50 mg total) by mouth every 6 (six) hours as needed for Pain.           Clinical Reference Information           Your Vitals Were     BP Pulse Temp Resp Height Weight    129/83 (BP Location: Right arm, Patient Position: Sitting, BP Method: Automatic) 72 98 °F (36.7 °C) (Oral) 18 5' 3" (1.6 m) 88 kg (194 lb)    SpO2 BMI             98% 34.37 kg/m2         Allergies as of 4/13/2017        Reactions    No Known Allergies       Immunizations Administered on Date of Encounter - 4/13/2017     None      ED Micro, Lab, POCT     Start Ordered       Status Ordering Provider    04/13/17 1128 04/13/17 1128  POCT glucose  Once      Final result     04/13/17 0940 04/13/17 0939  Beta - Hydroxybutyrate, Serum  STAT      Final result     04/13/17 0938 04/13/17 0937  POCT urine pregnancy  Once      Final result     04/13/17 0927 04/13/17 0927  POCT glucose  Once      Final result     04/13/17 0922 04/13/17 0922  POCT glucose  Once      Acknowledged     04/13/17 0922 04/13/17 0922  CBC auto differential  STAT      Final result     04/13/17 0922 04/13/17 0922  Comprehensive metabolic panel  STAT      Final result     04/13/17 0922 04/13/17 0922  Lipase  STAT      Final result     04/13/17 0922 04/13/17 0922  Urinalysis  STAT      Final result     04/13/17 0922 04/13/17 0922  Urine culture **CANNOT BE ORDERED STAT**  Once      In process     04/13/17 0922 04/13/17 0922  Urinalysis Microscopic  Once      Final result       ED Imaging Orders     Start Ordered       Status Ordering Provider    04/13/17 1238 04/13/17 1238  US Abdomen Limited  1 time imaging      Final result     04/13/17 0933 04/13/17 0932  CT Abdomen Pelvis  Without Contrast  1 time imaging      Final result         Discharge Instructions     "     Epigastric Pain (Uncertain Cause)    Epigastric pain can be a sign of disease in the upper abdomen. Common causes include:  · Acid reflux (stomach acid flowing up into the esophagus)  · Gastritis (irritation of the stomach lining)  · Peptic Ulcer Disease  · Inflammation of the pancreas  · Gallstone  · Infection in the gallbladder  Pain may be dull or burning. It may spread upward to the chest or to the back. There may be other symptoms such as belching, bloating, cramps or hunger pains. There may be weight loss or poor appetite, nausea or vomiting.  Since the diagnosis of your pain is not certain yet, further tests may sometimes be needed. Sometimes the doctor will treat you for the most likely condition to see if there is improvement before doing further tests.  Home care  Medicines  · Antacids help neutralize the normal acids in your stomach. Examples are Maalox, Mylanta, Rolaids, and Tums. If you dont like the liquid, you can also try a chewable one. You may find one works better than another for you. Overuse can cause diarrhea or constipation.  · Acid blockers (H2 blockers) decrease acid production. Examples are cimetidine (Tagamet), famotidine (Pepcid) and ranitidine (Zantac).  · Acid inhibitors (PPIs) decrease acid production in a different way than the blockers. You may find they work better, but can take a little longer to take effect.  Examples are omeprazole (Prilosec), lansoprazole (Prevacid), pantoprazole (Protonix), rabeprazole (Aciphex), and esomeprazole (Nexium).  · Take an antacid 30-60 minutes after eating and at bedtime, but not at the same time as an acid blocker.  · Try not to take NSAIDs. Aspirin may also cause problems, but if taking it for your heart or other medical reasons, talk to your doctor before stopping it; you do not want to cause a worse problem, like a heart attack or stroke.  Diet  · If certain foods seem to cause your spasm, try to avoid them.   · Eat slowly and chew food  well before swallowing. Symptoms of gastritis can be worsened by certain foods. Limit or avoid fatty, fried, and spicy foods, as well as coffee, chocolate, mint, and foods with high acid content such as tomatoes and citrus fruit and juices (orange, grapefruit, lemon).  · Avoid alcohol, caffeine, and tobacco, which can delay healing and worsen your problem.  · Try eating smaller meals with snacks in between  Follow-up care  Follow up with your healthcare provider or as advised.  When to seek medical advice  Call your healthcare provider right away if any of the following occur:  · Stomach pain worsens or moves to the right lower part of the abdomen  · Chest pain appears, or if it worsens or spreads to the chest, back, neck, shoulder, or arm  · Frequent vomiting (cant keep down liquids)  · Blood in the stool or vomit (red or black color)  · Feeling weak or dizzy, fainting, or having trouble breathing  · Fever of 100.4ºF (38ºC) or higher, or as directed by your healthcare provider  · Abdominal swelling  Date Last Reviewed: 9/25/2015  © 8613-2916 "[employer] [address] [place] [address]. All rights reserved. This information is not intended as a substitute for professional medical care. Always follow your healthcare professional's instructions.          Your Scheduled Appointments     Apr 20, 2017  1:15 PM CDT   Post Partum with Purnima Kidd MD   [facility] ([facility])    [address] [place] [id number]   [phone]               [facility] complies with applicable Federal civil rights laws and does not discriminate on the basis of race, color, national origin, age, disability, or sex.        Language Assistance Services     ATTENTION: Language assistance services are available, free of charge. Please call [phone].      ATENCIÓN: Si habla español, tiene a moreno disposición servicios gratuitos de  asistencia lingüística. [facility] [phone].     KENNETH Ý: N?u b?n nói Ti?ng Vi?t, có các d?ch v? h? tr? ngôn ng? mi?n phí marh cho b?n. G?i s? [phone].

## 2017-04-13 NOTE — DISCHARGE INSTRUCTIONS
Epigastric Pain (Uncertain Cause)    Epigastric pain can be a sign of disease in the upper abdomen. Common causes include:  · Acid reflux (stomach acid flowing up into the esophagus)  · Gastritis (irritation of the stomach lining)  · Peptic Ulcer Disease  · Inflammation of the pancreas  · Gallstone  · Infection in the gallbladder  Pain may be dull or burning. It may spread upward to the chest or to the back. There may be other symptoms such as belching, bloating, cramps or hunger pains. There may be weight loss or poor appetite, nausea or vomiting.  Since the diagnosis of your pain is not certain yet, further tests may sometimes be needed. Sometimes the doctor will treat you for the most likely condition to see if there is improvement before doing further tests.  Home care  Medicines  · Antacids help neutralize the normal acids in your stomach. Examples are Maalox, Mylanta, Rolaids, and Tums. If you dont like the liquid, you can also try a chewable one. You may find one works better than another for you. Overuse can cause diarrhea or constipation.  · Acid blockers (H2 blockers) decrease acid production. Examples are cimetidine (Tagamet), famotidine (Pepcid) and ranitidine (Zantac).  · Acid inhibitors (PPIs) decrease acid production in a different way than the blockers. You may find they work better, but can take a little longer to take effect.  Examples are omeprazole (Prilosec), lansoprazole (Prevacid), pantoprazole (Protonix), rabeprazole (Aciphex), and esomeprazole (Nexium).  · Take an antacid 30-60 minutes after eating and at bedtime, but not at the same time as an acid blocker.  · Try not to take NSAIDs. Aspirin may also cause problems, but if taking it for your heart or other medical reasons, talk to your doctor before stopping it; you do not want to cause a worse problem, like a heart attack or stroke.  Diet  · If certain foods seem to cause your spasm, try to avoid them.   · Eat slowly and chew food well  before swallowing. Symptoms of gastritis can be worsened by certain foods. Limit or avoid fatty, fried, and spicy foods, as well as coffee, chocolate, mint, and foods with high acid content such as tomatoes and citrus fruit and juices (orange, grapefruit, lemon).  · Avoid alcohol, caffeine, and tobacco, which can delay healing and worsen your problem.  · Try eating smaller meals with snacks in between  Follow-up care  Follow up with your healthcare provider or as advised.  When to seek medical advice  Call your healthcare provider right away if any of the following occur:  · Stomach pain worsens or moves to the right lower part of the abdomen  · Chest pain appears, or if it worsens or spreads to the chest, back, neck, shoulder, or arm  · Frequent vomiting (cant keep down liquids)  · Blood in the stool or vomit (red or black color)  · Feeling weak or dizzy, fainting, or having trouble breathing  · Fever of 100.4ºF (38ºC) or higher, or as directed by your healthcare provider  · Abdominal swelling  Date Last Reviewed: 9/25/2015  © 0979-4370 [employer]. [address] [place] [address]. All rights reserved. This information is not intended as a substitute for professional medical care. Always follow your healthcare professional's instructions.

## 2017-04-13 NOTE — ED TRIAGE NOTES
Upper abdominal and back pain since 4 am had a baby a month ago and saw MD yesterday says c section looks infected started on antibiotics

## 2017-04-14 LAB — BACTERIA SPEC AEROBE CULT: NORMAL

## 2017-04-15 LAB — BACTERIA UR CULT: NORMAL

## 2017-04-17 LAB
BACTERIA SPEC ANAEROBE CULT: NORMAL
BACTERIA SPEC ANAEROBE CULT: NORMAL

## 2017-04-20 ENCOUNTER — POSTPARTUM VISIT (OUTPATIENT)
Dept: OBSTETRICS AND GYNECOLOGY | Facility: CLINIC | Age: 27
End: 2017-04-20
Attending: OBSTETRICS & GYNECOLOGY
Payer: MEDICAID

## 2017-04-20 VITALS
WEIGHT: 192.69 LBS | BODY MASS INDEX: 34.14 KG/M2 | DIASTOLIC BLOOD PRESSURE: 86 MMHG | SYSTOLIC BLOOD PRESSURE: 144 MMHG | HEIGHT: 63 IN

## 2017-04-20 PROCEDURE — 99999 PR PBB SHADOW E&M-EST. PATIENT-LVL II: CPT | Mod: PBBFAC,,, | Performed by: OBSTETRICS & GYNECOLOGY

## 2017-04-20 PROCEDURE — 99024 POSTOP FOLLOW-UP VISIT: CPT | Mod: ,,, | Performed by: OBSTETRICS & GYNECOLOGY

## 2017-04-20 PROCEDURE — 99212 OFFICE O/P EST SF 10 MIN: CPT | Mod: PBBFAC | Performed by: OBSTETRICS & GYNECOLOGY

## 2017-04-20 RX ORDER — MUPIROCIN 20 MG/G
OINTMENT TOPICAL 2 TIMES DAILY
Qty: 30 G | Refills: 1 | Status: SHIPPED | OUTPATIENT
Start: 2017-04-20 | End: 2017-04-27

## 2017-04-20 NOTE — PROGRESS NOTES
Subjective:   Patient reports no nausea or vomiting.    Activity level: Normal.    Pain control: Well controlled.    Reports depression is better than before. Taking 300 of Wellbutrin and Lexapro, overall doing well.   Says incision is better, less odor and less drainage. Still on antibiotics.      Objective:  Vitals:    17 1325   BP: (!) 144/86     General appearance: Comfortable and well-appearing.    Abdomen: Abdomen is soft, non distended   Tenderness: There is no abdominal tenderness.    Wound:  Clean.  There is no dehiscence.  There is still mild drainage but better than last week      Assessment:    wound infection- follow up. Dx last week  s/p  delivery- 2 week post op  Condition: In stable condition.     Plan:  Finish oral antibiotics  Add bactroban  F/u in 3 weeks  Encourage ambulation.  Continue wound care.  Pelvic rest for 6 weeks postpartum.

## 2017-04-20 NOTE — MR AVS SNAPSHOT
[facility]  [address], [address]  [facility] [address]  Phone: [phone]  Fax: [phone]                  Anusha Andrew   2017 1:15 PM   Postpartum Visit    Description:  Female : 1990   Provider:  Purnima Kidd MD   Department:  [facility]           Reason for Visit     Postpartum Care                To Do List           Future Appointments        Provider Department Dept Phone    2017 10:45 AM [facility] [phone]      Goals (5 Years of Data)     None      [facility]     [facility] On Call Nurse Care Line -  Assistance  Unless otherwise directed by your provider, please contact [facility], our nurse care line that is available for  assistance.     Registered nurses in the [facility] On Call Center provide: appointment scheduling, clinical advisement, health education, and other advisory services.  Call: [phone] (toll free)               Medications           Message regarding Medications     Verify the changes and/or additions to your medication regime listed below are the same as discussed with your clinician today.  If any of these changes or additions are incorrect, please notify your healthcare provider.             Verify that the below list of medications is an accurate representation of the medications you are currently taking.  If none reported, the list may be blank. If incorrect, please contact your healthcare provider. Carry this list with you in case of emergency.           Current Medications     blood sugar diagnostic Strp 1 each by Misc.(Non-Drug; Combo Route) route 6 (six) times daily.    buPROPion (WELLBUTRIN XL) 150 MG TB24 tablet Take 1 tablet (150 mg total) by mouth every morning.    buPROPion (WELLBUTRIN XL) 300 MG 24 hr tablet Take 1 tablet (300 mg total) by mouth once daily.    escitalopram oxalate (LEXAPRO) 10 MG tablet Take 1 tablet (10 mg total) by mouth once daily.     "ibuprofen (ADVIL,MOTRIN) 600 MG tablet Take 1 tablet (600 mg total) by mouth 4 (four) times daily.    insulin aspart (NOVOLOG) 100 unit/mL injection 15 units ac meals    insulin glargine (LANTUS) 100 unit/mL injection Inject 20 Units into the skin every evening.    lancets Misc 1 Device by Misc.(Non-Drug; Combo Route) route 4 (four) times daily before meals and nightly.    ONETOUCH DELICA LANCETS 33 gauge Misc     ONETOUCH ULTRA2 kit     pen needle, diabetic 31 gauge x 1/4" Ndle 1 Device by MISCELLANEOUS route 4 (four) times daily with meals and nightly.    PNV#67-iron ps-FA cmb#1-dha (VITAFOL ULTRA) 29 mg iron- 1 mg-200 mg Cap Take 1 tablet by mouth once daily.           Clinical Reference Information           Your Vitals Were     BP Height Weight BMI       144/86 5' 3" (1.6 m) 87.4 kg (192 lb 10.9 oz) 34.13 kg/m2       Allergies as of 4/20/2017     No Known Allergies      Immunizations Administered on Date of Encounter - 4/20/2017     None      Language Assistance Services     ATTENTION: Language assistance services are available, free of charge. Please call [phone].      ATENCIÓN: Si habla kami, tiene a moreno disposición servicios gratuitos de asistencia lingüística. Llame al [phone].     CHÚ Ý: N?u b?n nói Ti?ng Vi?t, có các d?ch v? h? tr? ngôn ng? mi?n phí dành cho b?n. G?i s? [phone].         [facility] complies with applicable Federal civil rights laws and does not discriminate on the basis of race, color, national origin, age, disability, or sex.        "

## 2017-05-02 ENCOUNTER — OFFICE VISIT (OUTPATIENT)
Dept: OPTOMETRY | Facility: CLINIC | Age: 27
End: 2017-05-02
Payer: MEDICAID

## 2017-05-02 DIAGNOSIS — E10.9 TYPE 1 DIABETES MELLITUS WITHOUT RETINOPATHY: Primary | ICD-10-CM

## 2017-05-02 DIAGNOSIS — H52.203 MYOPIC ASTIGMATISM OF BOTH EYES: ICD-10-CM

## 2017-05-02 DIAGNOSIS — Z46.0 FITTING AND ADJUSTMENT OF SPECTACLES AND CONTACT LENSES: Primary | ICD-10-CM

## 2017-05-02 DIAGNOSIS — H52.13 MYOPIC ASTIGMATISM OF BOTH EYES: ICD-10-CM

## 2017-05-02 PROCEDURE — 92014 COMPRE OPH EXAM EST PT 1/>: CPT | Mod: S$PBB,,, | Performed by: OPTOMETRIST

## 2017-05-02 PROCEDURE — 92310 CONTACT LENS FITTING OU: CPT | Mod: ,,, | Performed by: OPTOMETRIST

## 2017-05-02 PROCEDURE — 99211 OFF/OP EST MAY X REQ PHY/QHP: CPT | Mod: PBBFAC,27,PO | Performed by: OPTOMETRIST

## 2017-05-02 PROCEDURE — 99999 PR PBB SHADOW E&M-EST. PATIENT-LVL I: CPT | Mod: PBBFAC,,, | Performed by: OPTOMETRIST

## 2017-05-02 PROCEDURE — 92015 DETERMINE REFRACTIVE STATE: CPT | Mod: ,,, | Performed by: OPTOMETRIST

## 2017-05-02 NOTE — PROGRESS NOTES
HPI     DSL- 2/17/16     Pt states no VA change since last appt. Pt denies any eye allergies,   floaters, and flashes. Pt is happy with current C/L. Pt wears for about 8   hours daily. BSL was 185 this morning.    Eyemeds  NO gtts    Hemoglobin A1C       Date                     Value               Ref Range             Status                03/14/2017               7.9 (H)             4.5 - 6.2 %           Final                02/13/2017               8.0 (H)             4.5 - 6.2 %           Final                 12/07/2016               7.3 (H)             4.5 - 6.2 %           Final            ----------         Last edited by Raad Cuenca, OD on 5/2/2017  3:29 PM.         Assessment /Plan     For exam results, see Encounter Report.    Type 1 diabetes mellitus without retinopathy  -No retinopathy noted today.  Continued control with primary care physician and annual comprehensive eye exam.    Myopic astigmatism of both eyes  Eyeglass Final Rx     Eyeglass Final Rx      Sphere Cylinder Axis   Right -2.75 +1.00 010   Left -2.25 +0.50 015       Type:  SVL    Expiration Date:  5/3/2018              Contact Lens Final Rx     Final Contact Lens Rx      Brand Base Curve Diameter Sphere Cylinder   Right My Day 8.6 14.0 -2.00 Sphere   Left My Day 8.6 14.0 -2.00 Sphere       Expiration Date:  5/3/2018    Replacement:  Daily    Wearing Schedule:  Daily wear                  RTC 1 yr

## 2017-05-11 ENCOUNTER — POSTPARTUM VISIT (OUTPATIENT)
Dept: OBSTETRICS AND GYNECOLOGY | Facility: CLINIC | Age: 27
End: 2017-05-11
Attending: OBSTETRICS & GYNECOLOGY
Payer: MEDICAID

## 2017-05-11 VITALS
BODY MASS INDEX: 34.04 KG/M2 | SYSTOLIC BLOOD PRESSURE: 116 MMHG | HEIGHT: 63 IN | DIASTOLIC BLOOD PRESSURE: 78 MMHG | WEIGHT: 192.13 LBS

## 2017-05-11 DIAGNOSIS — Z01.419 ENCOUNTER FOR GYNECOLOGICAL EXAMINATION (GENERAL) (ROUTINE) WITHOUT ABNORMAL FINDINGS: Primary | ICD-10-CM

## 2017-05-11 DIAGNOSIS — Z12.4 ROUTINE CERVICAL SMEAR: ICD-10-CM

## 2017-05-11 PROCEDURE — 99999 PR PBB SHADOW E&M-EST. PATIENT-LVL III: CPT | Mod: PBBFAC,,, | Performed by: OBSTETRICS & GYNECOLOGY

## 2017-05-11 PROCEDURE — 99395 PREV VISIT EST AGE 18-39: CPT | Mod: S$PBB,,, | Performed by: OBSTETRICS & GYNECOLOGY

## 2017-05-11 PROCEDURE — 88175 CYTOPATH C/V AUTO FLUID REDO: CPT

## 2017-05-11 PROCEDURE — 99213 OFFICE O/P EST LOW 20 MIN: CPT | Mod: PBBFAC | Performed by: OBSTETRICS & GYNECOLOGY

## 2017-05-11 RX ORDER — NORGESTIMATE AND ETHINYL ESTRADIOL 0.25-0.035
KIT ORAL
COMMUNITY
Start: 2017-03-20 | End: 2017-05-11 | Stop reason: SDUPTHER

## 2017-05-11 RX ORDER — NORGESTIMATE AND ETHINYL ESTRADIOL 0.25-0.035
1 KIT ORAL DAILY
Qty: 84 TABLET | Refills: 3 | Status: SHIPPED | OUTPATIENT
Start: 2017-05-11 | End: 2017-09-06

## 2017-05-11 NOTE — PROGRESS NOTES
Subjective:       Patient ID: Anusha Andrew is a 26 y.o. female.    Chief Complaint:  Postpartum Care (8 weeks )      Patient Active Problem List   Diagnosis    Obesity (BMI 30.0-34.9)    Poorly controlled type 1 diabetes mellitus     delivery delivered    Postoperative anemia due to acute blood loss       History of Present Illness  26 y.o. yo  here for annual exam. No gyn complaints. Doing well. Taking Wellbutrin and Lexapro. Says overall she is doing much better. Diabetes is ok. Wants to stay on OCP for birth control      Past Medical History:   Diagnosis Date    Diabetes     Hyperlipidemia     Hypertension     Type I (juvenile type) diabetes mellitus without mention of complication, uncontrolled 2011       Past Surgical History:   Procedure Laterality Date    ABCESS DRAINAGE      boil went over GA     SECTION  2012    TONSILLECTOMY, ADENOIDECTOMY      TONSILLECTOMY, ADENOIDECTOMY      WISDOM TOOTH EXTRACTION         OB History    Para Term  AB SAB TAB Ectopic Multiple Living   2 2 1 1      2      # Outcome Date GA Lbr Hawk/2nd Weight Sex Delivery Anes PTL Lv   2  17 34w2d  3.16 kg (6 lb 15.5 oz) M CS-LTranv Spinal,EPI Y Y   1 Term 12 37w5d  3.286 kg (7 lb 3.9 oz) F CS-LTranv EPI  Y      Obstetric Comments   Gestational HTN, failed induction, not past 6 cm       No LMP recorded.   Date of Last Pap: 10/28/2015    Review of Systems  Review of Systems   Constitutional: Negative for fatigue and unexpected weight change.   Respiratory: Negative for shortness of breath.    Cardiovascular: Negative for chest pain.   Gastrointestinal: Negative for abdominal pain, constipation, diarrhea, nausea and vomiting.   Genitourinary: Negative for dysuria.   Musculoskeletal: Negative for back pain.   Skin: Negative for rash.   Neurological: Negative for headaches.   Hematological: Does not bruise/bleed easily.   Psychiatric/Behavioral: Negative for  behavioral problems.        Objective:   Physical Exam:   Constitutional: She appears well-developed and well-nourished.               Genitourinary: Vagina normal and uterus normal. Cervix is normal. Right adnexum displays no mass and no tenderness. Left adnexum displays no mass and no tenderness. Cervix exhibits no motion tenderness.                      Assessment/ Plan:     1. Encounter for gynecological examination (general) (routine) without abnormal findings  SPRINTEC, 28, 0.25-35 mg-mcg per tablet   2. Routine cervical smear  Liquid-based pap smear, screening       Follow-up with me in 1 year

## 2017-05-11 NOTE — LETTER
May 11, 2017    Anusha Andrew  [address]  [place] [address]         [facility]  [address], [address]  [facility] [address]  Phone: [phone]  Fax: [phone] May 11, 2017     Patient: Anusha Andrew   YOB: 1990   Date of Visit: 5/11/2017       To Whom It May Concern:    It is my medical opinion that Anusha Andrew may return to work on 05/15/2017 with no restrictions.    If you have any questions or concerns, please don't hesitate to call.    Sincerely,        Purnima Kidd MD

## 2017-05-11 NOTE — MR AVS SNAPSHOT
[facility]  [address], [address]  [facility] [address]  Phone: [phone]  Fax: [phone]                  Anusha Andrew   2017 10:45 AM   Postpartum Visit    Description:  Female : 1990   Provider:  Purnima Kidd MD   Department:  [facility]           Reason for Visit     Postpartum Care           Diagnoses this Visit        Comments    Routine cervical smear    -  Primary            To Do List           Goals (5 Years of Data)     None      [facility]     [facility] On Call Nurse Care Line -  Assistance  Unless otherwise directed by your provider, please contact [facility], our nurse care line that is available for  assistance.     Registered nurses in the [facility] On Call Center provide: appointment scheduling, clinical advisement, health education, and other advisory services.  Call: [phone] (toll free)               Medications           Message regarding Medications     Verify the changes and/or additions to your medication regime listed below are the same as discussed with your clinician today.  If any of these changes or additions are incorrect, please notify your healthcare provider.        STOP taking these medications     ibuprofen (ADVIL,MOTRIN) 600 MG tablet Take 1 tablet (600 mg total) by mouth 4 (four) times daily.           Verify that the below list of medications is an accurate representation of the medications you are currently taking.  If none reported, the list may be blank. If incorrect, please contact your healthcare provider. Carry this list with you in case of emergency.           Current Medications     blood sugar diagnostic Strp 1 each by Misc.(Non-Drug; Combo Route) route 6 (six) times daily.    buPROPion (WELLBUTRIN XL) 300 MG 24 hr tablet Take 1 tablet (300 mg total) by mouth once daily.    escitalopram oxalate (LEXAPRO) 10 MG tablet Take 1 tablet (10 mg total) by mouth once daily.    insulin aspart  "(NOVOLOG) 100 unit/mL injection 15 units ac meals    insulin glargine (LANTUS) 100 unit/mL injection Inject 20 Units into the skin every evening.    lancets Misc 1 Device by Misc.(Non-Drug; Combo Route) route 4 (four) times daily before meals and nightly.    ONETOUCH DELICA LANCETS 33 gauge Misc     ONETOUCH ULTRA2 kit     pen needle, diabetic 31 gauge x 1/4" Ndle 1 Device by MISCELLANEOUS route 4 (four) times daily with meals and nightly.    PNV#67-iron ps-FA cmb#1-dha (VITAFOL ULTRA) 29 mg iron- 1 mg-200 mg Cap Take 1 tablet by mouth once daily.    SPRINTEC, 28, 0.25-35 mg-mcg per tablet            Clinical Reference Information           Your Vitals Were     BP Height Weight BMI       116/78 5' 3" (1.6 m) 87.1 kg (192 lb 2.1 oz) 34.03 kg/m2       Allergies as of 5/11/2017     No Known Allergies      Immunizations Administered on Date of Encounter - 5/11/2017     None      Orders Placed During Today's Visit      Normal Orders This Visit    Liquid-based pap smear, screening       Language Assistance Services     ATTENTION: Language assistance services are available, free of charge. Please call [phone].      ATENCIÓN: Si ivan mcclure, tiene a moreno disposición servicios gratuitos de asistencia lingüística. Llame al [phone].     [facility] Ý: N?u b?n nói Ti?ng Vi?t, có các d?ch v? h? tr? ngôn ng? mi?n phí dành cho b?n. G?i s? [phone].         [facility] complies with applicable Federal civil rights laws and does not discriminate on the basis of race, color, national origin, age, disability, or sex.        "

## 2017-05-22 ENCOUNTER — TELEPHONE (OUTPATIENT)
Dept: ENDOCRINOLOGY | Facility: CLINIC | Age: 27
End: 2017-05-22

## 2017-05-22 NOTE — TELEPHONE ENCOUNTER
Pt  Novolog vial on 05/13/2017 and humolog is still pending cause pt just  his Rx. Pt Levemir  And Lantus is not cover but Basalgar and toujeo is cover. Pt is not on vial.

## 2017-08-20 ENCOUNTER — HOSPITAL ENCOUNTER (EMERGENCY)
Facility: HOSPITAL | Age: 27
Discharge: HOME | End: 2017-08-20
Attending: EMERGENCY MEDICINE
Payer: MEDICAID

## 2017-08-20 VITALS
BODY MASS INDEX: 33.49 KG/M2 | HEIGHT: 63 IN | WEIGHT: 189 LBS | TEMPERATURE: 99 F | DIASTOLIC BLOOD PRESSURE: 68 MMHG | OXYGEN SATURATION: 99 % | HEART RATE: 89 BPM | SYSTOLIC BLOOD PRESSURE: 122 MMHG | RESPIRATION RATE: 18 BRPM

## 2017-08-20 DIAGNOSIS — R51.9 HEADACHE, UNSPECIFIED HEADACHE TYPE: ICD-10-CM

## 2017-08-20 DIAGNOSIS — M54.9 UPPER BACK PAIN ON LEFT SIDE: ICD-10-CM

## 2017-08-20 DIAGNOSIS — R10.13 EPIGASTRIC ABDOMINAL PAIN: Primary | ICD-10-CM

## 2017-08-20 LAB
ALBUMIN SERPL BCP-MCNC: 3.2 G/DL
ALP SERPL-CCNC: 85 U/L
ALT SERPL W/O P-5'-P-CCNC: 18 U/L
ANION GAP SERPL CALC-SCNC: 11 MMOL/L
AST SERPL-CCNC: 16 U/L
B-HCG UR QL: NEGATIVE
BACTERIA #/AREA URNS HPF: NORMAL /HPF
BASOPHILS # BLD AUTO: 0.01 K/UL
BASOPHILS NFR BLD: 0.1 %
BILIRUB SERPL-MCNC: 0.2 MG/DL
BILIRUB UR QL STRIP: NEGATIVE
BUN SERPL-MCNC: 16 MG/DL
CALCIUM SERPL-MCNC: 9.6 MG/DL
CHLORIDE SERPL-SCNC: 106 MMOL/L
CLARITY UR: CLEAR
CO2 SERPL-SCNC: 21 MMOL/L
COLOR UR: YELLOW
CREAT SERPL-MCNC: 0.9 MG/DL
CTP QC/QA: YES
DIFFERENTIAL METHOD: ABNORMAL
EOSINOPHIL # BLD AUTO: 0.1 K/UL
EOSINOPHIL NFR BLD: 0.7 %
ERYTHROCYTE [DISTWIDTH] IN BLOOD BY AUTOMATED COUNT: 13.4 %
EST. GFR  (AFRICAN AMERICAN): >60 ML/MIN/1.73 M^2
EST. GFR  (NON AFRICAN AMERICAN): >60 ML/MIN/1.73 M^2
GLUCOSE SERPL-MCNC: 77 MG/DL
GLUCOSE UR QL STRIP: ABNORMAL
HCT VFR BLD AUTO: 39 %
HGB BLD-MCNC: 12.9 G/DL
HGB UR QL STRIP: NEGATIVE
KETONES UR QL STRIP: NEGATIVE
LEUKOCYTE ESTERASE UR QL STRIP: NEGATIVE
LIPASE SERPL-CCNC: 48 U/L
LYMPHOCYTES # BLD AUTO: 2.9 K/UL
LYMPHOCYTES NFR BLD: 37.9 %
MCH RBC QN AUTO: 26.4 PG
MCHC RBC AUTO-ENTMCNC: 33.1 G/DL
MCV RBC AUTO: 80 FL
MICROSCOPIC COMMENT: NORMAL
MONOCYTES # BLD AUTO: 0.5 K/UL
MONOCYTES NFR BLD: 6.5 %
NEUTROPHILS # BLD AUTO: 4.2 K/UL
NEUTROPHILS NFR BLD: 54.8 %
NITRITE UR QL STRIP: NEGATIVE
PH UR STRIP: 6 [PH] (ref 5–8)
PLATELET # BLD AUTO: 323 K/UL
PMV BLD AUTO: 9.9 FL
POCT GLUCOSE: 100 MG/DL (ref 70–110)
POTASSIUM SERPL-SCNC: 4.2 MMOL/L
PROT SERPL-MCNC: 8 G/DL
PROT UR QL STRIP: NEGATIVE
RBC # BLD AUTO: 4.89 M/UL
RBC #/AREA URNS HPF: 1 /HPF (ref 0–4)
SODIUM SERPL-SCNC: 138 MMOL/L
SP GR UR STRIP: 1.03 (ref 1–1.03)
SQUAMOUS #/AREA URNS HPF: 1 /HPF
URN SPEC COLLECT METH UR: ABNORMAL
UROBILINOGEN UR STRIP-ACNC: NEGATIVE EU/DL
WBC # BLD AUTO: 7.67 K/UL
YEAST URNS QL MICRO: NORMAL

## 2017-08-20 PROCEDURE — 96361 HYDRATE IV INFUSION ADD-ON: CPT

## 2017-08-20 PROCEDURE — 85025 COMPLETE CBC W/AUTO DIFF WBC: CPT

## 2017-08-20 PROCEDURE — 63600175 PHARM REV CODE 636 W HCPCS: Performed by: PHYSICIAN ASSISTANT

## 2017-08-20 PROCEDURE — 83690 ASSAY OF LIPASE: CPT

## 2017-08-20 PROCEDURE — 81025 URINE PREGNANCY TEST: CPT | Performed by: EMERGENCY MEDICINE

## 2017-08-20 PROCEDURE — 80053 COMPREHEN METABOLIC PANEL: CPT

## 2017-08-20 PROCEDURE — 25000003 PHARM REV CODE 250: Performed by: PHYSICIAN ASSISTANT

## 2017-08-20 PROCEDURE — 96375 TX/PRO/DX INJ NEW DRUG ADDON: CPT

## 2017-08-20 PROCEDURE — 99284 EMERGENCY DEPT VISIT MOD MDM: CPT | Mod: 25

## 2017-08-20 PROCEDURE — S0028 INJECTION, FAMOTIDINE, 20 MG: HCPCS | Performed by: PHYSICIAN ASSISTANT

## 2017-08-20 PROCEDURE — 96374 THER/PROPH/DIAG INJ IV PUSH: CPT

## 2017-08-20 PROCEDURE — 81000 URINALYSIS NONAUTO W/SCOPE: CPT

## 2017-08-20 PROCEDURE — 82962 GLUCOSE BLOOD TEST: CPT

## 2017-08-20 RX ORDER — BUTALBITAL, ACETAMINOPHEN AND CAFFEINE 50; 325; 40 MG/1; MG/1; MG/1
1 TABLET ORAL EVERY 4 HOURS PRN
Qty: 15 TABLET | Refills: 0 | Status: SHIPPED | OUTPATIENT
Start: 2017-08-20 | End: 2017-08-25

## 2017-08-20 RX ORDER — SODIUM CHLORIDE 9 MG/ML
1000 INJECTION, SOLUTION INTRAVENOUS
Status: COMPLETED | OUTPATIENT
Start: 2017-08-20 | End: 2017-08-20

## 2017-08-20 RX ORDER — ONDANSETRON 2 MG/ML
4 INJECTION INTRAMUSCULAR; INTRAVENOUS
Status: DISCONTINUED | OUTPATIENT
Start: 2017-08-20 | End: 2017-08-20

## 2017-08-20 RX ORDER — IBUPROFEN 600 MG/1
600 TABLET ORAL EVERY 6 HOURS PRN
Qty: 20 TABLET | Refills: 0 | Status: SHIPPED | OUTPATIENT
Start: 2017-08-20 | End: 2017-08-25

## 2017-08-20 RX ORDER — KETOROLAC TROMETHAMINE 30 MG/ML
15 INJECTION, SOLUTION INTRAMUSCULAR; INTRAVENOUS
Status: COMPLETED | OUTPATIENT
Start: 2017-08-20 | End: 2017-08-20

## 2017-08-20 RX ORDER — DIPHENHYDRAMINE HYDROCHLORIDE 50 MG/ML
12.5 INJECTION INTRAMUSCULAR; INTRAVENOUS
Status: COMPLETED | OUTPATIENT
Start: 2017-08-20 | End: 2017-08-20

## 2017-08-20 RX ORDER — PROCHLORPERAZINE EDISYLATE 5 MG/ML
10 INJECTION INTRAMUSCULAR; INTRAVENOUS
Status: COMPLETED | OUTPATIENT
Start: 2017-08-20 | End: 2017-08-20

## 2017-08-20 RX ORDER — FAMOTIDINE 20 MG/1
20 TABLET, FILM COATED ORAL 2 TIMES DAILY
Qty: 20 TABLET | Refills: 0 | Status: SHIPPED | OUTPATIENT
Start: 2017-08-20 | End: 2017-11-10

## 2017-08-20 RX ORDER — METHOCARBAMOL 500 MG/1
1000 TABLET, FILM COATED ORAL 3 TIMES DAILY
Qty: 30 TABLET | Refills: 0 | Status: SHIPPED | OUTPATIENT
Start: 2017-08-20 | End: 2017-08-25

## 2017-08-20 RX ORDER — FAMOTIDINE 10 MG/ML
20 INJECTION INTRAVENOUS
Status: COMPLETED | OUTPATIENT
Start: 2017-08-20 | End: 2017-08-20

## 2017-08-20 RX ADMIN — LIDOCAINE HYDROCHLORIDE: 20 SOLUTION ORAL; TOPICAL at 06:08

## 2017-08-20 RX ADMIN — FAMOTIDINE 20 MG: 10 INJECTION, SOLUTION INTRAVENOUS at 06:08

## 2017-08-20 RX ADMIN — KETOROLAC TROMETHAMINE 15 MG: 30 INJECTION, SOLUTION INTRAMUSCULAR at 06:08

## 2017-08-20 RX ADMIN — SODIUM CHLORIDE 1000 ML: 0.9 INJECTION, SOLUTION INTRAVENOUS at 06:08

## 2017-08-20 RX ADMIN — DIPHENHYDRAMINE HYDROCHLORIDE 12.5 MG: 50 INJECTION, SOLUTION INTRAMUSCULAR; INTRAVENOUS at 06:08

## 2017-08-20 RX ADMIN — PROCHLORPERAZINE EDISYLATE 10 MG: 5 INJECTION INTRAMUSCULAR; INTRAVENOUS at 06:08
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
## 2017-08-21 NOTE — DISCHARGE INSTRUCTIONS
Take Pepcid as prescribed for abdominal pain, Fioricet as prescribed for headache and Robaxin and Ibuprofen as prescribed for back pain. Do not drive when taking Fioricet and Robaxin because they can make you sleepy.    Follow up with primary care.    Return to ER if you develop worsening symptoms or as needed.

## 2017-08-29 ENCOUNTER — PATIENT MESSAGE (OUTPATIENT)
Dept: ENDOCRINOLOGY | Facility: CLINIC | Age: 27
End: 2017-08-29

## 2017-08-29 ENCOUNTER — TELEPHONE (OUTPATIENT)
Dept: ENDOCRINOLOGY | Facility: CLINIC | Age: 27
End: 2017-08-29

## 2017-08-29 DIAGNOSIS — E10.65 POORLY CONTROLLED TYPE 1 DIABETES MELLITUS: Primary | ICD-10-CM

## 2017-08-30 ENCOUNTER — TELEPHONE (OUTPATIENT)
Dept: ENDOCRINOLOGY | Facility: CLINIC | Age: 27
End: 2017-08-30

## 2017-08-30 NOTE — TELEPHONE ENCOUNTER
Message left for patient to contact clinic to schedule appointment in 6-8 weeks per Braeden Hernandez NP

## 2017-08-30 NOTE — TELEPHONE ENCOUNTER
----- Message from Emelina Florence sent at 8/30/2017 12:09 PM CDT -----  Contact: pt [phone]  Pt states she received a call earlier to schedule an appointment within 6-8 weeks. I attempted to schedule, but no appointments were available. Pt can be reached at [phone].    Thanks

## 2017-09-01 ENCOUNTER — PATIENT MESSAGE (OUTPATIENT)
Dept: OBSTETRICS AND GYNECOLOGY | Facility: CLINIC | Age: 27
End: 2017-09-01

## 2017-09-04 ENCOUNTER — HOSPITAL ENCOUNTER (EMERGENCY)
Facility: OTHER | Age: 27
Discharge: HOME | End: 2017-09-04
Attending: INTERNAL MEDICINE
Payer: MEDICAID

## 2017-09-04 VITALS
HEART RATE: 94 BPM | DIASTOLIC BLOOD PRESSURE: 77 MMHG | WEIGHT: 188 LBS | BODY MASS INDEX: 33.31 KG/M2 | HEIGHT: 63 IN | OXYGEN SATURATION: 99 % | TEMPERATURE: 99 F | SYSTOLIC BLOOD PRESSURE: 137 MMHG | RESPIRATION RATE: 18 BRPM

## 2017-09-04 DIAGNOSIS — N30.01 ACUTE CYSTITIS WITH HEMATURIA: Primary | ICD-10-CM

## 2017-09-04 LAB
B-HCG UR QL: NEGATIVE
BILIRUBIN, POC UA: NEGATIVE
BLOOD, POC UA: ABNORMAL
CLARITY, POC UA: CLEAR
COLOR, POC UA: YELLOW
CTP QC/QA: YES
GLUCOSE, POC UA: ABNORMAL
KETONES, POC UA: NEGATIVE
LEUKOCYTE EST, POC UA: ABNORMAL
NITRITE, POC UA: NEGATIVE
PH UR STRIP: 6 [PH]
PROTEIN, POC UA: NEGATIVE
SPECIFIC GRAVITY, POC UA: 1.02
UROBILINOGEN, POC UA: 0.2 E.U./DL

## 2017-09-04 PROCEDURE — 99283 EMERGENCY DEPT VISIT LOW MDM: CPT

## 2017-09-04 PROCEDURE — 81003 URINALYSIS AUTO W/O SCOPE: CPT

## 2017-09-04 PROCEDURE — 81025 URINE PREGNANCY TEST: CPT | Performed by: EMERGENCY MEDICINE

## 2017-09-04 RX ORDER — PHENAZOPYRIDINE HYDROCHLORIDE 200 MG/1
200 TABLET, FILM COATED ORAL 3 TIMES DAILY
Qty: 6 TABLET | Refills: 0 | Status: SHIPPED | OUTPATIENT
Start: 2017-09-04 | End: 2017-09-06

## 2017-09-04 RX ORDER — CEPHALEXIN 500 MG/1
500 CAPSULE ORAL EVERY 12 HOURS
Qty: 20 CAPSULE | Refills: 0 | Status: SHIPPED | OUTPATIENT
Start: 2017-09-04 | End: 2017-09-14 | Stop reason: ALTCHOICE
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
## 2017-09-06 RX ORDER — LEVONORGESTREL / ETHINYL ESTRADIOL AND ETHINYL ESTRADIOL 150-30(84)
1 KIT ORAL DAILY
Qty: 84 EACH | Refills: 3 | Status: SHIPPED | OUTPATIENT
Start: 2017-09-06 | End: 2017-09-08

## 2017-09-08 ENCOUNTER — TELEPHONE (OUTPATIENT)
Dept: OBSTETRICS AND GYNECOLOGY | Facility: CLINIC | Age: 27
End: 2017-09-08

## 2017-09-08 RX ORDER — LEVONORGESTREL / ETHINYL ESTRADIOL AND ETHINYL ESTRADIOL 150-30(84)
1 KIT ORAL DAILY
Qty: 90 EACH | Refills: 3 | Status: SHIPPED | OUTPATIENT
Start: 2017-09-08 | End: 2017-09-18 | Stop reason: SDUPTHER

## 2017-09-08 NOTE — TELEPHONE ENCOUNTER
[facility] faxed a paper stating that [employer] isn't covered to make change.[facility] # [phone]

## 2017-09-13 ENCOUNTER — PATIENT MESSAGE (OUTPATIENT)
Dept: OBSTETRICS AND GYNECOLOGY | Facility: CLINIC | Age: 27
End: 2017-09-13

## 2017-09-14 ENCOUNTER — OFFICE VISIT (OUTPATIENT)
Dept: OBSTETRICS AND GYNECOLOGY | Facility: CLINIC | Age: 27
End: 2017-09-14
Payer: MEDICAID

## 2017-09-14 VITALS
BODY MASS INDEX: 34.24 KG/M2 | DIASTOLIC BLOOD PRESSURE: 84 MMHG | HEIGHT: 63 IN | SYSTOLIC BLOOD PRESSURE: 128 MMHG | WEIGHT: 193.25 LBS

## 2017-09-14 DIAGNOSIS — E10.9 TYPE 1 DIABETES MELLITUS WITHOUT COMPLICATION: ICD-10-CM

## 2017-09-14 DIAGNOSIS — B37.31 VAGINAL YEAST INFECTION: Primary | ICD-10-CM

## 2017-09-14 LAB
GARDNERELLA VAGINALIS: NEGATIVE
OTHER MICROSC. OBSERVATIONS: ABNORMAL
TRICHOMONAS, POC: NEGATIVE
YEAST WET PREP: POSITIVE

## 2017-09-14 PROCEDURE — 3008F BODY MASS INDEX DOCD: CPT | Mod: ,,, | Performed by: NURSE PRACTITIONER

## 2017-09-14 PROCEDURE — 3045F PR MOST RECENT HEMOGLOBIN A1C LEVEL 7.0-9.0%: CPT | Mod: ,,, | Performed by: NURSE PRACTITIONER

## 2017-09-14 PROCEDURE — 99214 OFFICE O/P EST MOD 30 MIN: CPT | Mod: S$PBB,,, | Performed by: NURSE PRACTITIONER

## 2017-09-14 PROCEDURE — 99213 OFFICE O/P EST LOW 20 MIN: CPT | Mod: PBBFAC | Performed by: NURSE PRACTITIONER

## 2017-09-14 PROCEDURE — 99999 PR PBB SHADOW E&M-EST. PATIENT-LVL III: CPT | Mod: PBBFAC,,, | Performed by: NURSE PRACTITIONER

## 2017-09-14 RX ORDER — FLUCONAZOLE 150 MG/1
TABLET ORAL
Qty: 2 TABLET | Refills: 0 | Status: SHIPPED | OUTPATIENT
Start: 2017-09-14 | End: 2017-09-27 | Stop reason: SDUPTHER

## 2017-09-14 NOTE — PROGRESS NOTES
"Chief complaint: Vaginal Irritation/Itching    HPI: Vaginal Irritation/itching for the last 4 days. Did have antibiotics for a UTI and those symptoms resolved. Has a discharge that is white/cream, thick and clumpy, no odor.  She reports no changes in sexual partners, detergents, douching. She has not used anything OTC and denies pelvic pain, fever, chills, n/v/lesions or cuts.  Also with DM and reports levels have been elevated.    ROS   Systemic: Not feeling tired (fatigue).  No fever chills   Gastrointestinal: No nausea, vomiting, no abdominal pain.  No diarrhea.  Genitourinary: No dysuria. No Pelvic Pain  Skin: No rash.    /84   Ht 5' 3" (1.6 m)   Wt 87.6 kg (193 lb 3.7 oz)   LMP 08/27/2017 (Exact Date)   Breastfeeding? No   BMI 34.23 kg/m²     Physical Exam   Vital Signs:° Normal.  General Appearance:° Well developed. ° Well nourished.  Lymph Nodes: no Inguinal LAD  Urinary System:° Normal. Urethra: ° Meatus showed no abnormalities. ° No mass on the urethra.  Genitalia: External: ° Vulva was erythematous with dry excoriation normal. ° Genitalia exhibited no lesion.                    Vagina: ° Mucosa was erythematous. ° A vaginal discharge was observed moderate amt of thick white  Pelvic: Cervix: ° No cervical discharge. ° Showed no lesion. ° Not tender, no CMT         Uterus: ° Position was normal. ° Not tender.  Neurological:° No disorientation was observed.  Psychiatric:Affect: ° Normal.  Skin:° No skin lesions.   Perineum:° Normal. ° Did not have a mass.° No perineal lesions/rashes/erythema     Assessment/Plan:  1. Yeast infection-wet mount positive for yeast.  Diflucan 150mg po today and repeat in 2 days, external Monistat cream as needed for symptom relief. Prevention measures discussed/educated. Most likely related to recent antibiotics and also abnormal glucose levels. Discussed monitoring sugar and keep levels stable    RTC prn and as scheduled for annual exam.    "

## 2017-09-14 NOTE — PATIENT INSTRUCTIONS
Vaginal Infection: Yeast (Candidiasis)  Yeast infection occurs when yeast in the vagina increase and attacks the vaginal tissues. Yeast is a type of fungus. These infections are often caused by a type of yeast called Candida albicans. Factors that may make infection more likely include recent antibiotic use, douching, or increased sex. Yeast infections are more common in women who have diabetes, or are obese or pregnant, or have a weak immune system.    Symptoms of yeast infection  · Clumpy or thin, white discharge, which may look like cottage cheese  · No odor or minimal odor  · Severe vaginal itching or burning  · Burning with urination  · Swelling, redness of vulva  · Pain during sex    Treating yeast infection  Yeast infection is treated with a vaginal antifungal cream. In some cases, antifungal pills are prescribed instead. During treatment:

## 2017-09-18 RX ORDER — LEVONORGESTREL AND ETHINYL ESTRADIOL 0.15-0.03
1 KIT ORAL DAILY
Qty: 28 TABLET | Refills: 11 | Status: SHIPPED | OUTPATIENT
Start: 2017-09-18 | End: 2018-01-07

## 2017-09-19 ENCOUNTER — TELEPHONE (OUTPATIENT)
Dept: OBSTETRICS AND GYNECOLOGY | Facility: CLINIC | Age: 27
End: 2017-09-19

## 2017-09-20 ENCOUNTER — TELEPHONE (OUTPATIENT)
Dept: OPHTHALMOLOGY | Facility: CLINIC | Age: 27
End: 2017-09-20

## 2017-09-20 ENCOUNTER — PATIENT MESSAGE (OUTPATIENT)
Dept: OPTOMETRY | Facility: CLINIC | Age: 27
End: 2017-09-20

## 2017-09-20 NOTE — TELEPHONE ENCOUNTER
Pt sts she ordered cls; no records of lens; pt asked for trail lens unable to give pt a trail  lens due to none ordered

## 2017-09-26 ENCOUNTER — TELEPHONE (OUTPATIENT)
Dept: FAMILY MEDICINE | Facility: CLINIC | Age: 27
End: 2017-09-26

## 2017-09-26 ENCOUNTER — PATIENT MESSAGE (OUTPATIENT)
Dept: OBSTETRICS AND GYNECOLOGY | Facility: CLINIC | Age: 27
End: 2017-09-26

## 2017-09-26 DIAGNOSIS — B37.31 YEAST VAGINITIS: Primary | ICD-10-CM

## 2017-09-26 DIAGNOSIS — B37.31 VAGINAL YEAST INFECTION: ICD-10-CM

## 2017-09-27 ENCOUNTER — PATIENT MESSAGE (OUTPATIENT)
Dept: FAMILY MEDICINE | Facility: CLINIC | Age: 27
End: 2017-09-27

## 2017-09-27 ENCOUNTER — PATIENT MESSAGE (OUTPATIENT)
Dept: OBSTETRICS AND GYNECOLOGY | Facility: CLINIC | Age: 27
End: 2017-09-27

## 2017-09-27 DIAGNOSIS — B37.31 VAGINAL YEAST INFECTION: ICD-10-CM

## 2017-09-27 RX ORDER — TERCONAZOLE 8 MG/G
1 CREAM VAGINAL NIGHTLY
Qty: 20 G | Refills: 1 | Status: SHIPPED | OUTPATIENT
Start: 2017-09-27 | End: 2017-12-08

## 2017-09-27 RX ORDER — FLUCONAZOLE 150 MG/1
TABLET ORAL
Qty: 2 TABLET | Refills: 0 | Status: CANCELLED | OUTPATIENT
Start: 2017-09-27

## 2017-09-27 RX ORDER — FLUCONAZOLE 150 MG/1
TABLET ORAL
Qty: 2 TABLET | Refills: 0 | Status: SHIPPED | OUTPATIENT
Start: 2017-09-27 | End: 2017-11-10

## 2017-09-27 RX ORDER — FLUCONAZOLE 100 MG/1
100 TABLET ORAL DAILY
Qty: 10 TABLET | Refills: 3 | Status: SHIPPED | OUTPATIENT
Start: 2017-09-27 | End: 2017-10-07

## 2017-09-27 NOTE — TELEPHONE ENCOUNTER
This messsage has been taken care of by her ob-gyn.  She needs an appointment. She has not been seen by me since 12/2016.

## 2017-09-27 NOTE — TELEPHONE ENCOUNTER
If sugars are that high yeast is defintely going to be hard to get rid of. Why don't you have her take a Diflucan 100 mg once a week until sugars are better. FOr example. One pill every Sunday. I sent in the Rx and for some cream with some refills. Good glucose control is the key! Please relay this info to the patient. Rx sent

## 2017-10-20 ENCOUNTER — PATIENT MESSAGE (OUTPATIENT)
Dept: OBSTETRICS AND GYNECOLOGY | Facility: CLINIC | Age: 27
End: 2017-10-20

## 2017-11-02 ENCOUNTER — LAB VISIT (OUTPATIENT)
Dept: LAB | Facility: OTHER | Age: 27
End: 2017-11-02
Attending: NURSE PRACTITIONER
Payer: MEDICAID

## 2017-11-02 DIAGNOSIS — E10.65 POORLY CONTROLLED TYPE 1 DIABETES MELLITUS: ICD-10-CM

## 2017-11-02 LAB
ANION GAP SERPL CALC-SCNC: 16 MMOL/L
BUN SERPL-MCNC: 17 MG/DL
CALCIUM SERPL-MCNC: 9.4 MG/DL
CHLORIDE SERPL-SCNC: 99 MMOL/L
CO2 SERPL-SCNC: 18 MMOL/L
CREAT SERPL-MCNC: 1 MG/DL
EST. GFR  (AFRICAN AMERICAN): >60 ML/MIN/1.73 M^2
EST. GFR  (NON AFRICAN AMERICAN): >60 ML/MIN/1.73 M^2
ESTIMATED AVG GLUCOSE: 280 MG/DL
GLUCOSE SERPL-MCNC: 430 MG/DL
HBA1C MFR BLD HPLC: 11.4 %
POTASSIUM SERPL-SCNC: 4.6 MMOL/L
SODIUM SERPL-SCNC: 133 MMOL/L
TSH SERPL DL<=0.005 MIU/L-ACNC: 2.18 UIU/ML

## 2017-11-02 PROCEDURE — 36415 COLL VENOUS BLD VENIPUNCTURE: CPT

## 2017-11-02 PROCEDURE — 80048 BASIC METABOLIC PNL TOTAL CA: CPT

## 2017-11-02 PROCEDURE — 83036 HEMOGLOBIN GLYCOSYLATED A1C: CPT

## 2017-11-02 PROCEDURE — 84443 ASSAY THYROID STIM HORMONE: CPT

## 2017-11-10 ENCOUNTER — HOSPITAL ENCOUNTER (EMERGENCY)
Facility: OTHER | Age: 27
Discharge: HOME | End: 2017-11-10
Attending: EMERGENCY MEDICINE
Payer: MEDICAID

## 2017-11-10 VITALS
OXYGEN SATURATION: 100 % | HEART RATE: 104 BPM | TEMPERATURE: 98 F | DIASTOLIC BLOOD PRESSURE: 70 MMHG | HEIGHT: 63 IN | WEIGHT: 170 LBS | SYSTOLIC BLOOD PRESSURE: 111 MMHG | RESPIRATION RATE: 16 BRPM | BODY MASS INDEX: 30.12 KG/M2

## 2017-11-10 DIAGNOSIS — R06.02 SOB (SHORTNESS OF BREATH): ICD-10-CM

## 2017-11-10 LAB
ANION GAP SERPL CALC-SCNC: 12 MMOL/L
B-HCG UR QL: NEGATIVE
B-OH-BUTYR BLD STRIP-SCNC: 0.2 MMOL/L
BACTERIA #/AREA URNS HPF: NORMAL /HPF
BASOPHILS # BLD AUTO: 0.01 K/UL
BASOPHILS NFR BLD: 0.1 %
BILIRUB UR QL STRIP: NEGATIVE
BUN SERPL-MCNC: 17 MG/DL
CALCIUM SERPL-MCNC: 9.7 MG/DL
CHLORIDE SERPL-SCNC: 102 MMOL/L
CLARITY UR: CLEAR
CO2 SERPL-SCNC: 21 MMOL/L
COLOR UR: YELLOW
CREAT SERPL-MCNC: 1 MG/DL
CTP QC/QA: YES
D DIMER PPP IA.FEU-MCNC: 0.61 MG/L FEU
DIFFERENTIAL METHOD: ABNORMAL
EOSINOPHIL # BLD AUTO: 0 K/UL
EOSINOPHIL NFR BLD: 0.4 %
ERYTHROCYTE [DISTWIDTH] IN BLOOD BY AUTOMATED COUNT: 13.2 %
EST. GFR  (AFRICAN AMERICAN): >60 ML/MIN/1.73 M^2
EST. GFR  (NON AFRICAN AMERICAN): >60 ML/MIN/1.73 M^2
GLUCOSE SERPL-MCNC: 331 MG/DL
GLUCOSE UR QL STRIP: ABNORMAL
HCT VFR BLD AUTO: 37.9 %
HGB BLD-MCNC: 12.4 G/DL
HGB UR QL STRIP: ABNORMAL
KETONES UR QL STRIP: ABNORMAL
LEUKOCYTE ESTERASE UR QL STRIP: NEGATIVE
LYMPHOCYTES # BLD AUTO: 2.8 K/UL
LYMPHOCYTES NFR BLD: 30.1 %
MCH RBC QN AUTO: 26.7 PG
MCHC RBC AUTO-ENTMCNC: 32.7 G/DL
MCV RBC AUTO: 82 FL
MICROSCOPIC COMMENT: NORMAL
MONOCYTES # BLD AUTO: 0.4 K/UL
MONOCYTES NFR BLD: 4.3 %
NEUTROPHILS # BLD AUTO: 6 K/UL
NEUTROPHILS NFR BLD: 65 %
NITRITE UR QL STRIP: NEGATIVE
PH UR STRIP: 6 [PH] (ref 5–8)
PLATELET # BLD AUTO: 353 K/UL
PMV BLD AUTO: 9.9 FL
POCT GLUCOSE: 283 MG/DL (ref 70–110)
POTASSIUM SERPL-SCNC: 4.4 MMOL/L
PROT UR QL STRIP: NEGATIVE
RBC # BLD AUTO: 4.65 M/UL
RBC #/AREA URNS HPF: 3 /HPF (ref 0–4)
SODIUM SERPL-SCNC: 135 MMOL/L
SP GR UR STRIP: 1.02 (ref 1–1.03)
TSH SERPL DL<=0.005 MIU/L-ACNC: 1.46 UIU/ML
URN SPEC COLLECT METH UR: ABNORMAL
UROBILINOGEN UR STRIP-ACNC: NEGATIVE EU/DL
WBC # BLD AUTO: 9.25 K/UL
WBC #/AREA URNS HPF: 1 /HPF (ref 0–5)
YEAST URNS QL MICRO: NORMAL

## 2017-11-10 PROCEDURE — 93010 ELECTROCARDIOGRAM REPORT: CPT | Mod: ,,, | Performed by: INTERNAL MEDICINE

## 2017-11-10 PROCEDURE — 25500020 PHARM REV CODE 255: Performed by: EMERGENCY MEDICINE

## 2017-11-10 PROCEDURE — 85025 COMPLETE CBC W/AUTO DIFF WBC: CPT

## 2017-11-10 PROCEDURE — 96360 HYDRATION IV INFUSION INIT: CPT

## 2017-11-10 PROCEDURE — 84443 ASSAY THYROID STIM HORMONE: CPT

## 2017-11-10 PROCEDURE — 82010 KETONE BODYS QUAN: CPT

## 2017-11-10 PROCEDURE — 81000 URINALYSIS NONAUTO W/SCOPE: CPT

## 2017-11-10 PROCEDURE — 82962 GLUCOSE BLOOD TEST: CPT

## 2017-11-10 PROCEDURE — 85379 FIBRIN DEGRADATION QUANT: CPT

## 2017-11-10 PROCEDURE — 99284 EMERGENCY DEPT VISIT MOD MDM: CPT | Mod: 25

## 2017-11-10 PROCEDURE — 81025 URINE PREGNANCY TEST: CPT | Performed by: EMERGENCY MEDICINE

## 2017-11-10 PROCEDURE — 25000003 PHARM REV CODE 250: Performed by: EMERGENCY MEDICINE

## 2017-11-10 PROCEDURE — 96361 HYDRATE IV INFUSION ADD-ON: CPT

## 2017-11-10 PROCEDURE — 80048 BASIC METABOLIC PNL TOTAL CA: CPT

## 2017-11-10 PROCEDURE — 93005 ELECTROCARDIOGRAM TRACING: CPT

## 2017-11-10 RX ADMIN — IOHEXOL 75 ML: 350 INJECTION, SOLUTION INTRAVENOUS at 04:11

## 2017-11-10 RX ADMIN — SODIUM CHLORIDE 1000 ML: 0.9 INJECTION, SOLUTION INTRAVENOUS at 03:11

## 2017-11-10 NOTE — ED TRIAGE NOTES
Pt states has been feeling SOB and having tingling on the left side of her face.  Pt denies chest pain, N/V.  Pt admits to having some stress issues.

## 2017-11-10 NOTE — ED NOTES
Rounding on the pt has been completed. Pt resting on recliner. Pt denies pain. Positional, comfort, and bathroom needs addressed. Respirations even and unlabored, no distress noted. Call bell within reach, will continue to monitor.

## 2017-11-10 NOTE — ED PROVIDER NOTES
"Encounter Date: 11/10/2017    SCRIBE #1 NOTE: I, Awais Velasco, am scribing for, and in the presence of, Dr. Garrett.       History     Chief Complaint   Patient presents with    Shortness of Breath     + new onset of increased SOB while at work around 11 AM today. Pt denies chest pains, N/V/D, dizziness, blurred vision or weakness.      2:19 PM    Patient is a 26 y.o. 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 She states she is currently short of breath. She also reports feeling "tingling, not numbness" on the left side of her face. She denies chest pain, palpitations, nausea, vomiting, or cough. She has experienced shortness of breath in the past, which she states was due to dehydration and notes an associated increased heart rate at that time. She notes a history of, as well as recent stress and anxiety, but denies these triggering SOB in the past. She had a CBG of 283 taken in triage. She reports compliance with Novolog and Lantus, and denies any medication changes.      The history is provided by the patient.     Review of patient's allergies indicates:   Allergen Reactions    No known allergies      Past Medical History:   Diagnosis Date    Depression     Hyperlipidemia     Hypertension     Type I (juvenile type) diabetes mellitus without mention of complication, uncontrolled 2011     Past Surgical History:   Procedure Laterality Date    ABCESS DRAINAGE      boil went over [place]     SECTION  2012    TONSILLECTOMY, ADENOIDECTOMY      WISDOM TOOTH EXTRACTION       Family History   Problem Relation Age of Onset    Diabetes Brother     No Known Problems Mother     No Known Problems Father     Hydrocephalus Daughter     No Known Problems Maternal Aunt     No Known Problems Maternal Uncle     No Known Problems Paternal Aunt     No Known Problems Paternal " Uncle     No Known Problems Maternal Grandmother     No Known Problems Maternal Grandfather     No Known Problems Paternal Grandmother     No Known Problems Paternal Grandfather     Amblyopia Neg Hx     Blindness Neg Hx     Cataracts Neg Hx     Glaucoma Neg Hx     Hypertension Neg Hx     Macular degeneration Neg Hx     Retinal detachment Neg Hx     Strabismus Neg Hx     Stroke Neg Hx     Thyroid disease Neg Hx     Breast cancer Neg Hx     Colon cancer Neg Hx     Ovarian cancer Neg Hx      Social History   Substance Use Topics    Smoking status: Never Smoker    Smokeless tobacco: Never Used    Alcohol use Yes      Comment: occasionally     Review of Systems   Constitutional: Negative for fever.   HENT: Negative for congestion.    Eyes: Negative for visual disturbance.   Respiratory: Positive for shortness of breath. Negative for cough.    Cardiovascular: Negative for chest pain and palpitations.   Gastrointestinal: Negative for abdominal pain, diarrhea, nausea and vomiting.   Genitourinary: Negative for dysuria.   Musculoskeletal: Negative for back pain.   Skin: Negative for rash.   Neurological: Negative for dizziness, syncope, facial asymmetry, speech difficulty, weakness, light-headedness, numbness and headaches.        Positive for left-sided facial tingling.   Hematological: Does not bruise/bleed easily.   Psychiatric/Behavioral: Negative for confusion.       Physical Exam     Initial Vitals [11/10/17 1412]   BP Pulse Resp Temp SpO2   134/67 110 16 98 °F (36.7 °C) 98 %      MAP       89.33         Physical Exam    Nursing note and vitals reviewed.  Constitutional: She appears well-developed and well-nourished. She is not diaphoretic. No distress.   HENT:   Head: Normocephalic and atraumatic.   Eyes: Conjunctivae and EOM are normal.   Neck: Normal range of motion. Neck supple.   Cardiovascular: Normal rate, regular rhythm, normal heart sounds and intact distal pulses. Exam reveals no gallop  and no friction rub.    No murmur heard.  Pulmonary/Chest: Breath sounds normal. No respiratory distress. She has no wheezes. She has no rhonchi. She has no rales.   Musculoskeletal: Normal range of motion.   No lower extremity edema.   Neurological: She is alert and oriented to person, place, and time. She has normal strength.   Skin: Skin is warm and dry.   Psychiatric: She has a normal mood and affect. Her behavior is normal. Judgment and thought content normal.         ED Course   Procedures  Labs Reviewed   BASIC METABOLIC PANEL - Abnormal; Notable for the following:        Result Value    Sodium 135 (*)     CO2 21 (*)     Glucose 331 (*)     All other components within normal limits   CBC W/ AUTO DIFFERENTIAL - Abnormal; Notable for the following:     MCH 26.7 (*)     Platelets 353 (*)     All other components within normal limits   URINALYSIS - Abnormal; Notable for the following:     Glucose, UA 3+ (*)     Ketones, UA Trace (*)     Occult Blood UA Trace (*)     All other components within normal limits   D DIMER, QUANTITATIVE - Abnormal; Notable for the following:     D-Dimer 0.61 (*)     All other components within normal limits   POCT GLUCOSE - Abnormal; Notable for the following:     POCT Glucose 283 (*)     All other components within normal limits   TSH   BETA - HYDROXYBUTYRATE, SERUM   URINALYSIS MICROSCOPIC   POCT URINE PREGNANCY   POCT GLUCOSE MONITORING CONTINUOUS     Imaging Results          CTA Chest Non-Coronary (PE Study) (Final result)  Result time 11/10/17 16:50:22    Final result by Nishi Castro MD (11/10/17 16:50:22)                 Impression:       No pulmonary arterial thromboembolism to the segmental level.      Electronically signed by: NISHI CASTRO  Date:     11/10/17  Time:    16:50              Narrative:    HISTORY:  elevated d-dimer.  Shortness of breath.    TECHNIQUE: Axial CT images acquired from the thoracic inlet through the diaphragm during intravenous administration of 75 mL  Omnipaque 350 intravenous contrast.  Images were obtained during pulmonary arterial phase per pulmonary embolism protocol.  Maximum intensity projection reconstructions provided for review.    COMPARISON: None.     FINDINGS:    Pulmonary Vasculature: Satisfactory opacification. No filling defect to the segmental level.      Systemic Vasculature: No aneurysm.      Chest Wall:  Normal.     Thyroid: Normal lower gland.    Axillae: No adenopathy.    Mediastinum/Radhika: Unremarkable.    Heart: Normal size.     Airways: Patent.      Pleura: No thickening or fluid.    Lungs: No nodules or consolidation.    Upper Abdomen: Normal.    Bones: No acute fracture or bony destructive process.                             X-Ray Chest PA And Lateral (Final result)  Result time 11/10/17 15:14:34    Final result by Mely Pereira MD (11/10/17 15:14:34)                 Impression:     No finding to correlate with history.      Electronically signed by: Mely Pereira MD  Date:     11/10/17  Time:    15:14              Narrative:    2 views.    The cardiac size is not enlarged.  pulmonary vascularity is normal.  There is no pleural effusion.  The osseous structures are intact.  Lungs demonstrate no consolidation.                              EKG Readings: (Independently Interpreted)   Sinus tachycardia at a rate of 122. No STEMI.       X-Rays:   Independently Interpreted Readings:   Chest X-Ray: No effusions or opacities.     Medical Decision Making:   Independently Interpreted Test(s):   I have ordered and independently interpreted X-rays - see prior notes.  Clinical Tests:   Lab Tests: Reviewed and Ordered  Radiological Study: Ordered and Reviewed  Medical Tests: Ordered and Reviewed  ED Management:  Well-appearing patient resents complaint sudden onset shortness of breath.  She appears very couple, reports he feels as if she's having difficulty getting air in.  She is tachycardic so I cannot apply PERC criteria.  D-dimer slightly  elevated, CTA demonstrates no signs of PE.  Blood work without otherwise acute findings.  Patient feels much better without further intervention.  Stable for discharge follow-up with primary care.    I did have an extensive talk regarding signs to return for and need for follow up. Patient expressed understanding and will monitor symptoms closely and follow-up as needed.     BRITT Garrett M.D.  11/10/2017  6:06 PM              Scribe Attestation:   Scribe #1: I performed the above scribed service and the documentation accurately describes the services I performed. I attest to the accuracy of the note.    I, Dr. Ramon Garrett, personally performed the services described in this documentation. All medical record entries made by the scribe were at my direction and in my presence.  I have reviewed the chart and agree that the record reflects my personal performance and is accurate and complete. Ramon Garrett MD.  6:04 PM 11/10/2017          ED Course      Clinical Impression:     1. SOB (shortness of breath)                                 Ramon Garrett MD  11/10/17 6817

## 2017-11-10 NOTE — ED NOTES
Pt AAOx4 and appropriate at this time. Respirations even and unlabored. No acute distress noted. Pt denies pain. PT  Awaiting further orders. Pt updated on POC.  Call bell within reach and pt oriented to use of call bell. Pt on continuous pulse ox, and continuous BP cuff. Will continue to monitor.

## 2017-11-10 NOTE — ED NOTES
Patient Identifiers for Anusha Andrew checked and correct  LOC: The patient is awake, alert and aware of environment with an appropriate affect, the patient is oriented x 3 and speaking appropriate.  APPEARANCE: Patient resting comfortably and in no acute distress. The patient is clean and well groomed. The patient's clothing is properly fastened.  SKIN: The skin is warm and dry. The patient has normal skin turgor and moist mucus membranes. No rashes or lesions upon observation. Skin Intact , no breakdown noted.  Musculoskeletal :  Normal range of motion noted. Moves all extremities well, No swelling or tenderness noted  RESPIRATORY: Airway is open and patent, respirations are spontaneous, patient has a normal effort and rate. Breath sounds are clear & equal, bilaterally.  CARDIAC: Patient has a normal rate and rhythm, no peripheral edema noted, capillary refill < 3 seconds.     PULSES: 2+ radial  pulses, symmetrical in all extremities.  NEUROLOGIC: PERRL, . Motor strength 5/5 all extremities.  The pt's facial expression is symmetrical, patient moving all extremities, normal sensation in all extremities when touched with a finger.The patient is awake, alert and cooperative with a calm affect, patient is aware of environment.      Will continue to monitor

## 2017-11-21 ENCOUNTER — PATIENT MESSAGE (OUTPATIENT)
Dept: OPTOMETRY | Facility: CLINIC | Age: 27
End: 2017-11-21

## 2017-12-05 ENCOUNTER — HOSPITAL ENCOUNTER (INPATIENT)
Facility: OTHER | Age: 27
LOS: 2 days | Discharge: HOME | DRG: 639 | End: 2017-12-07
Attending: EMERGENCY MEDICINE | Admitting: EMERGENCY MEDICINE
Payer: MEDICAID

## 2017-12-05 DIAGNOSIS — E10.65 POORLY CONTROLLED TYPE 1 DIABETES MELLITUS: ICD-10-CM

## 2017-12-05 DIAGNOSIS — E08.10 DIABETIC KETOACIDOSIS WITHOUT COMA ASSOCIATED WITH DIABETES MELLITUS DUE TO UNDERLYING CONDITION: Primary | ICD-10-CM

## 2017-12-05 DIAGNOSIS — E10.10 DIABETIC KETOACIDOSIS WITHOUT COMA ASSOCIATED WITH TYPE 1 DIABETES MELLITUS: ICD-10-CM

## 2017-12-05 LAB
ALBUMIN SERPL BCP-MCNC: 3.3 G/DL
ALP SERPL-CCNC: 138 U/L
ALT SERPL W/O P-5'-P-CCNC: 19 U/L
ANION GAP SERPL CALC-SCNC: 26 MMOL/L
AST SERPL-CCNC: 18 U/L
B-HCG UR QL: NEGATIVE
BACTERIA #/AREA URNS HPF: NORMAL /HPF
BASOPHILS # BLD AUTO: 0.01 K/UL
BASOPHILS NFR BLD: 0.1 %
BILIRUB SERPL-MCNC: 0.2 MG/DL
BILIRUB UR QL STRIP: NEGATIVE
BUN SERPL-MCNC: 27 MG/DL
CALCIUM SERPL-MCNC: 9.6 MG/DL
CHLORIDE SERPL-SCNC: 95 MMOL/L
CLARITY UR: CLEAR
CO2 SERPL-SCNC: 7 MMOL/L
COLOR UR: YELLOW
CREAT SERPL-MCNC: 1.4 MG/DL
CTP QC/QA: YES
DIFFERENTIAL METHOD: ABNORMAL
EOSINOPHIL # BLD AUTO: 0 K/UL
EOSINOPHIL NFR BLD: 0 %
ERYTHROCYTE [DISTWIDTH] IN BLOOD BY AUTOMATED COUNT: 13.6 %
EST. GFR  (AFRICAN AMERICAN): >60 ML/MIN/1.73 M^2
EST. GFR  (NON AFRICAN AMERICAN): 52 ML/MIN/1.73 M^2
GLUCOSE SERPL-MCNC: 500 MG/DL (ref 70–110)
GLUCOSE SERPL-MCNC: 792 MG/DL
GLUCOSE UR QL STRIP: ABNORMAL
HCT VFR BLD AUTO: 40.8 %
HGB BLD-MCNC: 12.7 G/DL
HGB UR QL STRIP: ABNORMAL
KETONES UR QL STRIP: ABNORMAL
LEUKOCYTE ESTERASE UR QL STRIP: NEGATIVE
LIPASE SERPL-CCNC: 21 U/L
LYMPHOCYTES # BLD AUTO: 2.1 K/UL
LYMPHOCYTES NFR BLD: 21.5 %
MCH RBC QN AUTO: 26.3 PG
MCHC RBC AUTO-ENTMCNC: 31.1 G/DL
MCV RBC AUTO: 85 FL
MICROSCOPIC COMMENT: NORMAL
MONOCYTES # BLD AUTO: 0.3 K/UL
MONOCYTES NFR BLD: 3 %
NEUTROPHILS # BLD AUTO: 7.3 K/UL
NEUTROPHILS NFR BLD: 75.3 %
NITRITE UR QL STRIP: NEGATIVE
PH UR STRIP: 6 [PH] (ref 5–8)
PLATELET # BLD AUTO: 389 K/UL
PMV BLD AUTO: 10.8 FL
POTASSIUM SERPL-SCNC: 5.9 MMOL/L
PROT SERPL-MCNC: 8.1 G/DL
PROT UR QL STRIP: NEGATIVE
RBC # BLD AUTO: 4.82 M/UL
RBC #/AREA URNS HPF: 1 /HPF (ref 0–4)
SODIUM SERPL-SCNC: 128 MMOL/L
SP GR UR STRIP: 1.01 (ref 1–1.03)
SQUAMOUS #/AREA URNS HPF: 3 /HPF
URN SPEC COLLECT METH UR: ABNORMAL
UROBILINOGEN UR STRIP-ACNC: NEGATIVE EU/DL
WBC # BLD AUTO: 9.74 K/UL
WBC #/AREA URNS HPF: 1 /HPF (ref 0–5)
YEAST URNS QL MICRO: NORMAL

## 2017-12-05 PROCEDURE — 85025 COMPLETE CBC W/AUTO DIFF WBC: CPT

## 2017-12-05 PROCEDURE — 96361 HYDRATE IV INFUSION ADD-ON: CPT

## 2017-12-05 PROCEDURE — 99291 CRITICAL CARE FIRST HOUR: CPT | Mod: 25

## 2017-12-05 PROCEDURE — 96365 THER/PROPH/DIAG IV INF INIT: CPT

## 2017-12-05 PROCEDURE — S0028 INJECTION, FAMOTIDINE, 20 MG: HCPCS | Performed by: EMERGENCY MEDICINE

## 2017-12-05 PROCEDURE — 83690 ASSAY OF LIPASE: CPT

## 2017-12-05 PROCEDURE — 63600175 PHARM REV CODE 636 W HCPCS: Performed by: EMERGENCY MEDICINE

## 2017-12-05 PROCEDURE — 80053 COMPREHEN METABOLIC PANEL: CPT

## 2017-12-05 PROCEDURE — 81025 URINE PREGNANCY TEST: CPT | Performed by: EMERGENCY MEDICINE

## 2017-12-05 PROCEDURE — 25000003 PHARM REV CODE 250: Performed by: EMERGENCY MEDICINE

## 2017-12-05 PROCEDURE — 82962 GLUCOSE BLOOD TEST: CPT

## 2017-12-05 PROCEDURE — 81000 URINALYSIS NONAUTO W/SCOPE: CPT

## 2017-12-05 PROCEDURE — 96366 THER/PROPH/DIAG IV INF ADDON: CPT

## 2017-12-05 PROCEDURE — 96375 TX/PRO/DX INJ NEW DRUG ADDON: CPT

## 2017-12-05 PROCEDURE — 96376 TX/PRO/DX INJ SAME DRUG ADON: CPT

## 2017-12-05 PROCEDURE — 20000000 HC ICU ROOM

## 2017-12-05 RX ORDER — SODIUM CHLORIDE 9 MG/ML
1000 INJECTION, SOLUTION INTRAVENOUS
Status: COMPLETED | OUTPATIENT
Start: 2017-12-05 | End: 2017-12-05

## 2017-12-05 RX ORDER — ONDANSETRON 2 MG/ML
4 INJECTION INTRAMUSCULAR; INTRAVENOUS
Status: COMPLETED | OUTPATIENT
Start: 2017-12-05 | End: 2017-12-05

## 2017-12-05 RX ORDER — FAMOTIDINE 10 MG/ML
20 INJECTION INTRAVENOUS
Status: COMPLETED | OUTPATIENT
Start: 2017-12-05 | End: 2017-12-05

## 2017-12-05 RX ADMIN — INSULIN HUMAN 7 UNITS: 100 INJECTION, SOLUTION PARENTERAL at 09:12

## 2017-12-05 RX ADMIN — SODIUM CHLORIDE 7 UNITS/HR: 9 INJECTION, SOLUTION INTRAVENOUS at 10:12

## 2017-12-05 RX ADMIN — SODIUM CHLORIDE 1000 ML: 0.9 INJECTION, SOLUTION INTRAVENOUS at 07:12

## 2017-12-05 RX ADMIN — ONDANSETRON 4 MG: 2 INJECTION, SOLUTION INTRAMUSCULAR; INTRAVENOUS at 08:12

## 2017-12-05 RX ADMIN — LIDOCAINE HYDROCHLORIDE: 20 SOLUTION ORAL; TOPICAL at 07:12

## 2017-12-05 RX ADMIN — SODIUM CHLORIDE 1000 ML: 0.9 INJECTION, SOLUTION INTRAVENOUS at 08:12

## 2017-12-05 RX ADMIN — FAMOTIDINE 20 MG: 10 INJECTION, SOLUTION INTRAVENOUS at 07:12

## 2017-12-06 LAB
ALBUMIN SERPL BCP-MCNC: 2.9 G/DL
ALP SERPL-CCNC: 118 U/L
ALT SERPL W/O P-5'-P-CCNC: 16 U/L
ANION GAP SERPL CALC-SCNC: 15 MMOL/L
ANION GAP SERPL CALC-SCNC: 23 MMOL/L
ANION GAP SERPL CALC-SCNC: 9 MMOL/L
AST SERPL-CCNC: 14 U/L
B-OH-BUTYR BLD STRIP-SCNC: 1.7 MMOL/L
BASOPHILS # BLD AUTO: 0.02 K/UL
BASOPHILS NFR BLD: 0.2 %
BILIRUB SERPL-MCNC: 0.3 MG/DL
BUN SERPL-MCNC: 11 MG/DL
BUN SERPL-MCNC: 27 MG/DL
BUN SERPL-MCNC: 28 MG/DL
CALCIUM SERPL-MCNC: 8.4 MG/DL
CALCIUM SERPL-MCNC: 8.9 MG/DL
CALCIUM SERPL-MCNC: 9.1 MG/DL
CHLORIDE SERPL-SCNC: 103 MMOL/L
CHLORIDE SERPL-SCNC: 108 MMOL/L
CHLORIDE SERPL-SCNC: 108 MMOL/L
CO2 SERPL-SCNC: 15 MMOL/L
CO2 SERPL-SCNC: 18 MMOL/L
CO2 SERPL-SCNC: 8 MMOL/L
CREAT SERPL-MCNC: 0.9 MG/DL
CREAT SERPL-MCNC: 1.2 MG/DL
CREAT SERPL-MCNC: 1.4 MG/DL
DIFFERENTIAL METHOD: ABNORMAL
EOSINOPHIL # BLD AUTO: 0 K/UL
EOSINOPHIL NFR BLD: 0 %
ERYTHROCYTE [DISTWIDTH] IN BLOOD BY AUTOMATED COUNT: 13.6 %
EST. GFR  (AFRICAN AMERICAN): >60 ML/MIN/1.73 M^2
EST. GFR  (NON AFRICAN AMERICAN): 52 ML/MIN/1.73 M^2
EST. GFR  (NON AFRICAN AMERICAN): >60 ML/MIN/1.73 M^2
EST. GFR  (NON AFRICAN AMERICAN): >60 ML/MIN/1.73 M^2
ESTIMATED AVG GLUCOSE: 289 MG/DL
GLUCOSE SERPL-MCNC: 126 MG/DL
GLUCOSE SERPL-MCNC: 205 MG/DL
GLUCOSE SERPL-MCNC: 414 MG/DL
HBA1C MFR BLD HPLC: 11.7 %
HCT VFR BLD AUTO: 36.9 %
HGB BLD-MCNC: 12 G/DL
LACTATE SERPL-SCNC: 3.6 MMOL/L
LYMPHOCYTES # BLD AUTO: 3.7 K/UL
LYMPHOCYTES NFR BLD: 31.6 %
MAGNESIUM SERPL-MCNC: 2.5 MG/DL
MCH RBC QN AUTO: 26.8 PG
MCHC RBC AUTO-ENTMCNC: 32.5 G/DL
MCV RBC AUTO: 82 FL
MONOCYTES # BLD AUTO: 0.7 K/UL
MONOCYTES NFR BLD: 5.5 %
NEUTROPHILS # BLD AUTO: 7.4 K/UL
NEUTROPHILS NFR BLD: 62.4 %
PHOSPHATE SERPL-MCNC: 3.9 MG/DL
PLATELET # BLD AUTO: 359 K/UL
PMV BLD AUTO: 9.3 FL
POCT GLUCOSE: 132 MG/DL (ref 70–110)
POCT GLUCOSE: 136 MG/DL (ref 70–110)
POCT GLUCOSE: 158 MG/DL (ref 70–110)
POCT GLUCOSE: 167 MG/DL (ref 70–110)
POCT GLUCOSE: 172 MG/DL (ref 70–110)
POCT GLUCOSE: 178 MG/DL (ref 70–110)
POCT GLUCOSE: 180 MG/DL (ref 70–110)
POCT GLUCOSE: 181 MG/DL (ref 70–110)
POCT GLUCOSE: 197 MG/DL (ref 70–110)
POCT GLUCOSE: 197 MG/DL (ref 70–110)
POCT GLUCOSE: 198 MG/DL (ref 70–110)
POCT GLUCOSE: 202 MG/DL (ref 70–110)
POCT GLUCOSE: 203 MG/DL (ref 70–110)
POCT GLUCOSE: 213 MG/DL (ref 70–110)
POCT GLUCOSE: 216 MG/DL (ref 70–110)
POCT GLUCOSE: 249 MG/DL (ref 70–110)
POCT GLUCOSE: 305 MG/DL (ref 70–110)
POCT GLUCOSE: 387 MG/DL (ref 70–110)
POCT GLUCOSE: 405 MG/DL (ref 70–110)
POCT GLUCOSE: >500 MG/DL (ref 70–110)
POTASSIUM SERPL-SCNC: 4.2 MMOL/L
POTASSIUM SERPL-SCNC: 4.4 MMOL/L
POTASSIUM SERPL-SCNC: 5 MMOL/L
PROT SERPL-MCNC: 7.3 G/DL
RBC # BLD AUTO: 4.48 M/UL
SODIUM SERPL-SCNC: 134 MMOL/L
SODIUM SERPL-SCNC: 135 MMOL/L
SODIUM SERPL-SCNC: 138 MMOL/L
WBC # BLD AUTO: 11.77 K/UL

## 2017-12-06 PROCEDURE — 84100 ASSAY OF PHOSPHORUS: CPT

## 2017-12-06 PROCEDURE — 83735 ASSAY OF MAGNESIUM: CPT

## 2017-12-06 PROCEDURE — 80048 BASIC METABOLIC PNL TOTAL CA: CPT | Mod: 91

## 2017-12-06 PROCEDURE — 99223 1ST HOSP IP/OBS HIGH 75: CPT | Mod: ,,, | Performed by: HOSPITALIST

## 2017-12-06 PROCEDURE — S5010 5% DEXTROSE AND 0.45% SALINE: HCPCS | Performed by: NURSE PRACTITIONER

## 2017-12-06 PROCEDURE — 83605 ASSAY OF LACTIC ACID: CPT

## 2017-12-06 PROCEDURE — 80053 COMPREHEN METABOLIC PANEL: CPT

## 2017-12-06 PROCEDURE — 25000003 PHARM REV CODE 250: Performed by: NURSE PRACTITIONER

## 2017-12-06 PROCEDURE — 83036 HEMOGLOBIN GLYCOSYLATED A1C: CPT

## 2017-12-06 PROCEDURE — 63600175 PHARM REV CODE 636 W HCPCS: Performed by: NURSE PRACTITIONER

## 2017-12-06 PROCEDURE — 63600175 PHARM REV CODE 636 W HCPCS: Performed by: HOSPITALIST

## 2017-12-06 PROCEDURE — 80048 BASIC METABOLIC PNL TOTAL CA: CPT

## 2017-12-06 PROCEDURE — 85025 COMPLETE CBC W/AUTO DIFF WBC: CPT

## 2017-12-06 PROCEDURE — 20000000 HC ICU ROOM

## 2017-12-06 PROCEDURE — 36415 COLL VENOUS BLD VENIPUNCTURE: CPT

## 2017-12-06 PROCEDURE — 82010 KETONE BODYS QUAN: CPT

## 2017-12-06 RX ORDER — DEXTROSE MONOHYDRATE 100 MG/ML
1000 INJECTION, SOLUTION INTRAVENOUS
Status: DISCONTINUED | OUTPATIENT
Start: 2017-12-06 | End: 2017-12-06

## 2017-12-06 RX ORDER — INSULIN ASPART 100 [IU]/ML
1-10 INJECTION, SOLUTION INTRAVENOUS; SUBCUTANEOUS
Status: DISCONTINUED | OUTPATIENT
Start: 2017-12-06 | End: 2017-12-07 | Stop reason: HOSPADM

## 2017-12-06 RX ORDER — ACETAMINOPHEN 325 MG/1
650 TABLET ORAL EVERY 4 HOURS PRN
Status: DISCONTINUED | OUTPATIENT
Start: 2017-12-06 | End: 2017-12-07 | Stop reason: HOSPADM

## 2017-12-06 RX ORDER — GLUCAGON 1 MG
1 KIT INJECTION
Status: DISCONTINUED | OUTPATIENT
Start: 2017-12-06 | End: 2017-12-06

## 2017-12-06 RX ORDER — ONDANSETRON 2 MG/ML
4 INJECTION INTRAMUSCULAR; INTRAVENOUS EVERY 8 HOURS PRN
Status: DISCONTINUED | OUTPATIENT
Start: 2017-12-06 | End: 2017-12-07 | Stop reason: HOSPADM

## 2017-12-06 RX ORDER — ENOXAPARIN SODIUM 100 MG/ML
40 INJECTION SUBCUTANEOUS EVERY 24 HOURS
Status: DISCONTINUED | OUTPATIENT
Start: 2017-12-06 | End: 2017-12-07 | Stop reason: HOSPADM

## 2017-12-06 RX ORDER — SODIUM CHLORIDE 9 MG/ML
INJECTION, SOLUTION INTRAVENOUS CONTINUOUS
Status: DISCONTINUED | OUTPATIENT
Start: 2017-12-06 | End: 2017-12-06

## 2017-12-06 RX ORDER — IBUPROFEN 200 MG
24 TABLET ORAL
Status: DISCONTINUED | OUTPATIENT
Start: 2017-12-06 | End: 2017-12-07 | Stop reason: HOSPADM

## 2017-12-06 RX ORDER — IBUPROFEN 200 MG
16 TABLET ORAL
Status: DISCONTINUED | OUTPATIENT
Start: 2017-12-06 | End: 2017-12-06 | Stop reason: SDUPTHER

## 2017-12-06 RX ORDER — IBUPROFEN 200 MG
24 TABLET ORAL
Status: DISCONTINUED | OUTPATIENT
Start: 2017-12-06 | End: 2017-12-06 | Stop reason: SDUPTHER

## 2017-12-06 RX ORDER — GLUCAGON 1 MG
1 KIT INJECTION
Status: DISCONTINUED | OUTPATIENT
Start: 2017-12-06 | End: 2017-12-07 | Stop reason: HOSPADM

## 2017-12-06 RX ORDER — GLUCAGON 1 MG
1 KIT INJECTION
Status: DISCONTINUED | OUTPATIENT
Start: 2017-12-06 | End: 2017-12-06 | Stop reason: SDUPTHER

## 2017-12-06 RX ORDER — IBUPROFEN 200 MG
16 TABLET ORAL
Status: DISCONTINUED | OUTPATIENT
Start: 2017-12-06 | End: 2017-12-06

## 2017-12-06 RX ORDER — BUPROPION HYDROCHLORIDE 150 MG/1
300 TABLET ORAL DAILY
Status: DISCONTINUED | OUTPATIENT
Start: 2017-12-06 | End: 2017-12-07 | Stop reason: HOSPADM

## 2017-12-06 RX ORDER — DEXTROSE MONOHYDRATE AND SODIUM CHLORIDE 5; .45 G/100ML; G/100ML
INJECTION, SOLUTION INTRAVENOUS CONTINUOUS
Status: DISCONTINUED | OUTPATIENT
Start: 2017-12-06 | End: 2017-12-06

## 2017-12-06 RX ORDER — IBUPROFEN 200 MG
24 TABLET ORAL
Status: DISCONTINUED | OUTPATIENT
Start: 2017-12-06 | End: 2017-12-06

## 2017-12-06 RX ORDER — INSULIN ASPART 100 [IU]/ML
15 INJECTION, SOLUTION INTRAVENOUS; SUBCUTANEOUS
Status: DISCONTINUED | OUTPATIENT
Start: 2017-12-07 | End: 2017-12-07 | Stop reason: HOSPADM

## 2017-12-06 RX ORDER — IBUPROFEN 200 MG
16 TABLET ORAL
Status: DISCONTINUED | OUTPATIENT
Start: 2017-12-06 | End: 2017-12-07 | Stop reason: HOSPADM

## 2017-12-06 RX ORDER — SODIUM CHLORIDE 0.9 % (FLUSH) 0.9 %
5 SYRINGE (ML) INJECTION
Status: DISCONTINUED | OUTPATIENT
Start: 2017-12-06 | End: 2017-12-07 | Stop reason: HOSPADM

## 2017-12-06 RX ADMIN — BUPROPION HYDROCHLORIDE 300 MG: 150 TABLET, FILM COATED, EXTENDED RELEASE ORAL at 08:12

## 2017-12-06 RX ADMIN — ENOXAPARIN SODIUM 40 MG: 100 INJECTION SUBCUTANEOUS at 04:12

## 2017-12-06 RX ADMIN — DEXTROSE MONOHYDRATE AND SODIUM CHLORIDE: 5; .45 INJECTION, SOLUTION INTRAVENOUS at 01:12

## 2017-12-06 RX ADMIN — SODIUM CHLORIDE: 0.9 INJECTION, SOLUTION INTRAVENOUS at 01:12

## 2017-12-06 RX ADMIN — DEXTROSE MONOHYDRATE AND SODIUM CHLORIDE: 5; .45 INJECTION, SOLUTION INTRAVENOUS at 03:12

## 2017-12-06 RX ADMIN — INSULIN DETEMIR 20 UNITS: 100 INJECTION, SOLUTION SUBCUTANEOUS at 06:12

## 2017-12-06 RX ADMIN — INSULIN ASPART 3 UNITS: 100 INJECTION, SOLUTION INTRAVENOUS; SUBCUTANEOUS at 09:12

## 2017-12-06 RX ADMIN — DEXTROSE MONOHYDRATE AND SODIUM CHLORIDE: 5; .45 INJECTION, SOLUTION INTRAVENOUS at 08:12

## 2017-12-06 NOTE — ED NOTES
Pt rounding complete. Pt denies anyneeds at the moment. Pt does not appear to be in acute distress. Respirations are even and unlabored.  Bed is locked and in lowest position with side rails up x2. Call light is within reach. Will continue to monitor.

## 2017-12-06 NOTE — ASSESSMENT & PLAN NOTE
- Glucose 792, anion gap 26, Na 128, K 5.9 on admission, all improved overnight  - Discontinue insulin drip and fluids later this afternoon  - Likely home tomorrow  - Will need close follow up.

## 2017-12-06 NOTE — ED NOTES
Two patient identifiers have been checked and are correct.      Appearance: Pt awake, alert & oriented to person, place & time. Pt in no acute distress at present time. Pt is clean and well groomed with clothes appropriately fastened.   Skin: Skin warm, dry & intact. Color consistent with ethnicity. Mucous membranes moist. No breakdown or brusing noted.   Musculoskeletal: Patient moving all extremities well, no obvious swelling or deformities noted. +weakness  Respiratory: Respirations spontaneous, even, and non-labored. Visible chest rise noted. Airway is open and patent. No accessory muscle use noted. +SOB  Neurologic: Sensation is intact. Speech is clear and appropriate. Eyes open spontaneously, behavior appropriate to situation, follows commands, facial expression symmetrical, bilateral hand grasp equal and even, purposeful motor response noted. +dizziness, fatigue  Cardiac: All peripheral pulses present. No Bilateral lower extremity edema. Cap refill is <3 seconds.  Abdomen: Abdomen soft, +sharp, epigastric pain and tenderness  : Pt reports no dysuria or hematuria.

## 2017-12-06 NOTE — PROGRESS NOTES
Informed Janes Brown of lab results anion gap 15, Co2 15, and beta hydroxy 1.7.  No new orders given.

## 2017-12-06 NOTE — PLAN OF CARE
ATTN: TEAM DC PLANNING     OSTOMY TEACHING NOTED IN EPIC PER WOUND CARE RN TEAM      NEEDS - NEW OSTOMY CARE     SSW TO F/U WITH ASSESSMENT      SALOMÓN GRAYSON ALSO ON CASE # [id number]     Chelsey Jolly RN  Case management 12/5/20172:36 PM  # [phone] (FAX) [phone]     12/06/17 1335   Discharge Assessment   Assessment Type Discharge Planning Assessment

## 2017-12-06 NOTE — H&P
"[facility] Hospital Medicine  History & Physical    Patient Name: Anusha Andrew  MRN: [id number]  Admission Date: 12/5/2017  Attending Physician: Maureen Fitzgerald MD   Primary Care Provider: Rhina Archer MD         Patient information was obtained from patient, past medical records and ER records.     Subjective:     Principal Problem:Diabetic ketoacidosis without coma associated with diabetes mellitus due to underlying condition    Chief Complaint:   Chief Complaint   Patient presents with    Abdominal Pain     Pt CO mid epigastric pain described as sharp, symptoms began aprox 3 hours ago. Denies vomiting and diarrhea. PT states I think it might be because of my sugar        HPI: This is a 27 y.o. female, with history of IDDM, HLD, and HTN, who presents with complaint of abdominal pain which started approximately four hours ago. Abdominal pain is described as constant, sharp, and located in the epigastric region with no radiation. The patient reports associated chest discomfort ("tightness"), headache, dizziness, weakness, shortness of breath, and polyuria, but denies fever, chills, nausea, vomiting, palpitations, leg swelling, constipation, diarrhea, hematuria, dysuria, LOC, or visual disturbance. Patient states that these symptoms all started around the same time after she reports "grabbing the wrong insulin" and taking only a half dose. She says that she took 8mg without food approximately six hours ago when she typically takes 15mg. The patient states that symptoms are inconsistent with previous episodes in that this pain is more intense and not typically accompanied by chest discomfort. Patient reports eating a salad with grilled chicken and some snacks today. She notes that she normally takes 35mg lantus in the evening (9pm). The patient believes she has a history of gastritis but denies history of peptic ulcers or cholelithiasis. Patient has no additional complaints.     Past Medical " "History:   Diagnosis Date    Depression     Hyperlipidemia     Hypertension     Type I (juvenile type) diabetes mellitus without mention of complication, uncontrolled 2011       Past Surgical History:   Procedure Laterality Date    ABCESS DRAINAGE      boil went over [place]     SECTION  2012    TONSILLECTOMY, ADENOIDECTOMY      WISDOM TOOTH EXTRACTION         Review of patient's allergies indicates:   Allergen Reactions    No known allergies        No current facility-administered medications on file prior to encounter.      Current Outpatient Prescriptions on File Prior to Encounter   Medication Sig    blood sugar diagnostic Strp 1 each by Misc.(Non-Drug; Combo Route) route 6 (six) times daily.    buPROPion (WELLBUTRIN XL) 300 MG 24 hr tablet Take 1 tablet (300 mg total) by mouth once daily.    insulin aspart (NOVOLOG) 100 unit/mL injection 15 units ac meals    insulin glargine (LANTUS) 100 unit/mL injection Inject 20 Units into the skin every evening.    lancets Misc 1 Device by Misc.(Non-Drug; Combo Route) route 4 (four) times daily before meals and nightly.    levonorgestrel-ethinyl estradiol (NORDETTE) 0.15-0.03 mg per tablet Take 1 tablet by mouth once daily. Fill generic    ONETOUCH DELICA LANCETS 33 gauge Misc     ONETOUCH ULTRA2 kit     pen needle, diabetic 31 gauge x 1/4" Ndle 1 Device by MISCELLANEOUS route 4 (four) times daily with meals and nightly.    terconazole (TERAZOL 3) 0.8 % vaginal cream Place 1 applicator vaginally nightly.     Family History     Problem Relation (Age of Onset)    Diabetes Brother    Hydrocephalus Daughter    No Known Problems Mother, Father, Maternal Aunt, Maternal Uncle, Paternal Aunt, Paternal Uncle, Maternal Grandmother, Maternal Grandfather, Paternal Grandmother, Paternal Grandfather        Social History Main Topics    Smoking status: Never Smoker    Smokeless tobacco: Never Used    Alcohol use Yes      Comment: occasionally    Drug " use: No    Sexual activity: Yes     Partners: Male     Birth control/ protection: OCP     Review of Systems   Constitutional: Positive for activity change, appetite change and fatigue. Negative for fever.   HENT: Negative for congestion, ear pain, rhinorrhea and sinus pressure.    Eyes: Negative for pain and discharge.   Respiratory: Negative for cough, chest tightness, shortness of breath and wheezing.    Cardiovascular: Negative for chest pain and leg swelling.   Gastrointestinal: Positive for nausea and vomiting. Negative for abdominal distention, abdominal pain and diarrhea.   Endocrine: Positive for polyuria. Negative for cold intolerance and heat intolerance.   Genitourinary: Negative for difficulty urinating, flank pain, frequency, hematuria and urgency.   Musculoskeletal: Positive for arthralgias and myalgias. Negative for joint swelling.   Allergic/Immunologic: Negative for environmental allergies and food allergies.   Neurological: Negative for dizziness, weakness, light-headedness and headaches.   Hematological: Does not bruise/bleed easily.   Psychiatric/Behavioral: Negative for agitation, behavioral problems and decreased concentration.     Objective:     Vital Signs (Most Recent):  Temp: 98.3 °F (36.8 °C) (12/06/17 0015)  Pulse: (!) 112 (12/06/17 0015)  Resp: (!) 25 (12/06/17 0015)  BP: (!) 104/53 (12/06/17 0015)  SpO2: 97 % (12/06/17 0015) Vital Signs (24h Range):  Temp:  [98.3 °F (36.8 °C)-99.1 °F (37.3 °C)] 98.3 °F (36.8 °C)  Pulse:  [102-112] 112  Resp:  [16-25] 25  SpO2:  [97 %-100 %] 97 %  BP: ()/(51-59) 104/53     Weight: 85.1 kg (187 lb 9.8 oz)  Body mass index is 33.23 kg/m².    Physical Exam   Constitutional: She is oriented to person, place, and time. She appears well-developed and well-nourished. She appears lethargic. She appears ill.   HENT:   Head: Normocephalic.   Eyes: Conjunctivae are normal. Right eye exhibits no discharge. Left eye exhibits no discharge.   Neck: Normal range  of motion. Neck supple.   Cardiovascular: Normal rate, regular rhythm, normal heart sounds and intact distal pulses.    Pulmonary/Chest: Effort normal and breath sounds normal. No respiratory distress.   Abdominal: Soft. Bowel sounds are normal. She exhibits distension. There is generalized tenderness.   Musculoskeletal: Normal range of motion.   Neurological: She is oriented to person, place, and time. She appears lethargic. GCS eye subscore is 3. GCS verbal subscore is 5. GCS motor subscore is 6.   Skin: Skin is warm and dry. Capillary refill takes less than 2 seconds. There is pallor.   Psychiatric: Her speech is normal. Thought content normal. She is slowed. She exhibits a depressed mood.           Significant Labs:   CBC:   Recent Labs  Lab 12/05/17  1912   WBC 9.74   HGB 12.7   HCT 40.8   *     CMP:   Recent Labs  Lab 12/05/17  2108   *   K 5.9*   CL 95   CO2 7*   *   BUN 27*   CREATININE 1.4   CALCIUM 9.6   PROT 8.1   ALBUMIN 3.3*   BILITOT 0.2   ALKPHOS 138*   AST 18   ALT 19   ANIONGAP 26*   EGFRNONAA 52*       Significant Imaging: I have reviewed all pertinent imaging results/findings within the past 24 hours.    Assessment/Plan:     * Diabetic ketoacidosis without coma associated with diabetes mellitus due to underlying condition    Glucose 792, anion gap 26, Na 128, K 5.9    Patient given 2L NS in ER  NS at 200 ml/hr  Insulin drip titrated  Beta hydroxybutyrate pending  BMP at midnight and will adjust regiment if necessary                Poorly controlled type 1 diabetes mellitus    A1c in November was 11.4.      Lantus 20 units nightly  Aspart 15 units with meals  Claims does not follow diet too well                VTE Risk Mitigation         Ordered     Place sequential compression device  Until discontinued      12/06/17 0012     Medium Risk of VTE  Once      12/06/17 0012     enoxaparin injection 40 mg  Daily     Route:  Subcutaneous        12/06/17 0012             Janes DONIS  AARON Brown  Department of Hospital Medicine   [facility]

## 2017-12-06 NOTE — ASSESSMENT & PLAN NOTE
- A1c in November was 11.4 and now is 11.7.    - She is on Lantus 20 units nightly and aspart 15 units with meals  - Not following diet strictly  - Needs endocrinologist but declined referral to [facility]

## 2017-12-06 NOTE — HPI
Ms. 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  She is a type I diabetic, and presented to the ED with 4 hours of abdominal pain described as constant, sharp and located in the epigastric area, with associated chest discomfort, dizziness, polyuria and shortness of breath.  She reports she had taken the wrong amount of insulin.  She has had 2 previous episodes of DKA.  She has been [facility] by [facility] but they no longer take her insurance.    Workup in ED was notable for DKA, with anion gap 26 and glucose 792.  Sodium was 128 and potassium 5.9.  She received 2 liters IVF in the ED and was admitted to the ICU on an insulin drip and fluids.

## 2017-12-06 NOTE — ED NOTES
Pt roomed to ed bed 7. Pt dressed in hospital gown, placed on cont bp and pulse ox monitoring. Pt does not appear to be in acute distress. Respirations are even and unlabored. Pt tolerated IV access well. Bed is locked and in lowest position with side rails up x2. Call light is within reach. Will continue to monitor.

## 2017-12-06 NOTE — ASSESSMENT & PLAN NOTE
A1c in November was 11.4.      Lantus 20 units nightly  Aspart 15 units with meals  Claims does not follow diet too well

## 2017-12-06 NOTE — ED NOTES
Pt rounding complete. Pt reports relief from nausea. Pt resting in bed with eyes closed. Pt reports that pain is not better or worse than before. Pt denies any needs at the moment.  Respirations are even and unlabored.  Bed is locked and in lowest position with side rails up x1  Call light is within reach. Will continue to monitor.

## 2017-12-06 NOTE — SUBJECTIVE & OBJECTIVE
"Past Medical History:   Diagnosis Date    Depression     Hyperlipidemia     Hypertension     Type I (juvenile type) diabetes mellitus without mention of complication, uncontrolled 2011       Past Surgical History:   Procedure Laterality Date    ABCESS DRAINAGE      boil went over [place]     SECTION  2012    TONSILLECTOMY, ADENOIDECTOMY      WISDOM TOOTH EXTRACTION         Review of patient's allergies indicates:   Allergen Reactions    No known allergies        No current facility-administered medications on file prior to encounter.      Current Outpatient Prescriptions on File Prior to Encounter   Medication Sig    blood sugar diagnostic Strp 1 each by Misc.(Non-Drug; Combo Route) route 6 (six) times daily.    buPROPion (WELLBUTRIN XL) 300 MG 24 hr tablet Take 1 tablet (300 mg total) by mouth once daily.    insulin aspart (NOVOLOG) 100 unit/mL injection 15 units ac meals    insulin glargine (LANTUS) 100 unit/mL injection Inject 20 Units into the skin every evening.    lancets Misc 1 Device by Misc.(Non-Drug; Combo Route) route 4 (four) times daily before meals and nightly.    levonorgestrel-ethinyl estradiol (NORDETTE) 0.15-0.03 mg per tablet Take 1 tablet by mouth once daily. Fill generic    ONETOUCH DELICA LANCETS 33 gauge Misc     ONETOUCH ULTRA2 kit     pen needle, diabetic 31 gauge x 1/4" Ndle 1 Device by MISCELLANEOUS route 4 (four) times daily with meals and nightly.    terconazole (TERAZOL 3) 0.8 % vaginal cream Place 1 applicator vaginally nightly.     Family History     Problem Relation (Age of Onset)    Diabetes Brother    Hydrocephalus Daughter    No Known Problems Mother, Father, Maternal Aunt, Maternal Uncle, Paternal Aunt, Paternal Uncle, Maternal Grandmother, Maternal Grandfather, Paternal Grandmother, Paternal Grandfather        Social History Main Topics    Smoking status: Never Smoker    Smokeless tobacco: Never Used    Alcohol use Yes      Comment: " occasionally    Drug use: No    Sexual activity: Yes     Partners: Male     Birth control/ protection: OCP     Review of Systems   Constitutional: Positive for activity change, appetite change and fatigue. Negative for fever.   HENT: Negative for congestion, ear pain, rhinorrhea and sinus pressure.    Eyes: Negative for pain and discharge.   Respiratory: Negative for cough, chest tightness, shortness of breath and wheezing.    Cardiovascular: Negative for chest pain and leg swelling.   Gastrointestinal: Positive for nausea and vomiting. Negative for abdominal distention, abdominal pain and diarrhea.   Endocrine: Positive for polyuria. Negative for cold intolerance and heat intolerance.   Genitourinary: Negative for difficulty urinating, flank pain, frequency, hematuria and urgency.   Musculoskeletal: Positive for arthralgias and myalgias. Negative for joint swelling.   Allergic/Immunologic: Negative for environmental allergies and food allergies.   Neurological: Negative for dizziness, weakness, light-headedness and headaches.   Hematological: Does not bruise/bleed easily.   Psychiatric/Behavioral: Negative for agitation, behavioral problems and decreased concentration.     Objective:     Vital Signs (Most Recent):  Temp: 98.3 °F (36.8 °C) (12/06/17 0015)  Pulse: (!) 112 (12/06/17 0015)  Resp: (!) 25 (12/06/17 0015)  BP: (!) 104/53 (12/06/17 0015)  SpO2: 97 % (12/06/17 0015) Vital Signs (24h Range):  Temp:  [98.3 °F (36.8 °C)-99.1 °F (37.3 °C)] 98.3 °F (36.8 °C)  Pulse:  [102-112] 112  Resp:  [16-25] 25  SpO2:  [97 %-100 %] 97 %  BP: ()/(51-59) 104/53     Weight: 85.1 kg (187 lb 9.8 oz)  Body mass index is 33.23 kg/m².    Physical Exam   Constitutional: She is oriented to person, place, and time. She appears well-developed and well-nourished. She appears lethargic. She appears ill.   HENT:   Head: Normocephalic.   Eyes: Conjunctivae are normal. Right eye exhibits no discharge. Left eye exhibits no discharge.    Neck: Normal range of motion. Neck supple.   Cardiovascular: Normal rate, regular rhythm, normal heart sounds and intact distal pulses.    Pulmonary/Chest: Effort normal and breath sounds normal. No respiratory distress.   Abdominal: Soft. Bowel sounds are normal. She exhibits distension. There is generalized tenderness.   Musculoskeletal: Normal range of motion.   Neurological: She is oriented to person, place, and time. She appears lethargic. GCS eye subscore is 3. GCS verbal subscore is 5. GCS motor subscore is 6.   Skin: Skin is warm and dry. Capillary refill takes less than 2 seconds. There is pallor.   Psychiatric: Her speech is normal. Thought content normal. She is slowed. She exhibits a depressed mood.           Significant Labs:   CBC:   Recent Labs  Lab 12/05/17  1912   WBC 9.74   HGB 12.7   HCT 40.8   *     CMP:   Recent Labs  Lab 12/05/17  2108   *   K 5.9*   CL 95   CO2 7*   *   BUN 27*   CREATININE 1.4   CALCIUM 9.6   PROT 8.1   ALBUMIN 3.3*   BILITOT 0.2   ALKPHOS 138*   AST 18   ALT 19   ANIONGAP 26*   EGFRNONAA 52*       Significant Imaging: I have reviewed all pertinent imaging results/findings within the past 24 hours.

## 2017-12-06 NOTE — ED NOTES
Pt rounding complete. Pt report no relief from pain.  Pt appears lethargic. Respirations ar even and unlabored. Pt requesting ice chips. Pt denies any other needs at the moment. Bed is locked and in lowest position with side rails up x1. Call light is within reach. Will continue to monitor.

## 2017-12-06 NOTE — ASSESSMENT & PLAN NOTE
Glucose 792, anion gap 26, Na 128, K 5.9    Patient given 2L NS in ER  NS at 200 ml/hr  Insulin drip titrated  Beta hydroxybutyrate pending  BMP at midnight and will adjust regiment if necessary

## 2017-12-06 NOTE — ED NOTES
Pt updated on POC. Pt verbalized understanding. Pt denies any needs at the moment.  Pt does not appear to be in acute distress. Respirations are even and unlabored. Bed is locked and in lowest position with side rails up x1. Call light is within reach.

## 2017-12-06 NOTE — ED NOTES
Pt uncooperative while attempting to establish 2nd IV access. Pt screaming, and pulling arm during insertion attempt. Unable to establish IV access. Charge nurse Levon TIMMONS notified.

## 2017-12-06 NOTE — SUBJECTIVE & OBJECTIVE
Interval History:  Stomach pain improved.  Her bicarbonate level improved to 15 overnight.  New labs are pending.  She feels like she could eat.    Review of Systems   Constitutional: Negative for chills and fever.   Respiratory: Negative for cough and shortness of breath.    Cardiovascular: Negative for chest pain and palpitations.     Objective:     Vital Signs (Most Recent):  Temp: 98.2 °F (36.8 °C) (12/06/17 1105)  Pulse: 92 (12/06/17 1400)  Resp: 20 (12/06/17 1400)  BP: (!) 115/58 (12/06/17 1500)  SpO2: 99 % (12/06/17 1400) Vital Signs (24h Range):  Temp:  [98.1 °F (36.7 °C)-99.1 °F (37.3 °C)] 98.2 °F (36.8 °C)  Pulse:  [] 92  Resp:  [16-39] 20  SpO2:  [97 %-100 %] 99 %  BP: ()/(50-66) 115/58     Weight: 85.1 kg (187 lb 9.8 oz)  Body mass index is 33.23 kg/m².    Intake/Output Summary (Last 24 hours) at 12/06/17 1600  Last data filed at 12/06/17 0516   Gross per 24 hour   Intake           831.83 ml   Output              450 ml   Net           381.83 ml      Physical Exam   Constitutional: She is oriented to person, place, and time. She appears well-developed and well-nourished.   HENT:   Head: Normocephalic.   Eyes: Conjunctivae are normal. Pupils are equal, round, and reactive to light.   Neck: Neck supple. No thyromegaly present.   Cardiovascular: Normal rate, regular rhythm, normal heart sounds and intact distal pulses.  Exam reveals no gallop and no friction rub.    No murmur heard.  Pulmonary/Chest: Effort normal and breath sounds normal.   Abdominal: Soft. Bowel sounds are normal. She exhibits no distension. There is no tenderness.   Musculoskeletal: Normal range of motion. She exhibits no edema.   Lymphadenopathy:     She has no cervical adenopathy.   Neurological: She is alert and oriented to person, place, and time.   Strength equal and symmetric   Skin: Skin is warm and dry. No rash noted.   Psychiatric: She has a normal mood and affect. Her behavior is normal. Thought content normal.        Significant Labs:   BMP:   Recent Labs  Lab 12/06/17  0326   *      K 4.4      CO2 15*   BUN 27*   CREATININE 1.2   CALCIUM 9.1   MG 2.5     CBC:   Recent Labs  Lab 12/05/17  1912 12/06/17  0325   WBC 9.74 11.77   HGB 12.7 12.0   HCT 40.8 36.9*   * 359*       Significant Imaging: I have reviewed all pertinent imaging results/findings within the past 24 hours.

## 2017-12-06 NOTE — ED PROVIDER NOTES
"Encounter Date: 12/5/2017    SCRIBE #1 NOTE: I, Merari Chiu, am scribing for, and in the presence of, Dr. Fierro.       History     Chief Complaint   Patient presents with    Abdominal Pain     Pt CO mid epigastric pain described as sharp, symptoms began aprox 3 hours ago. Denies vomiting and diarrhea. PT states I think it might be because of my sugar     Time seen by provider: 7:09 PM    This is a 27 y.o. female, with history of IDDM, HLD, and HTN, who presents with complaint of abdominal pain which started approximately four hours ago. Abdominal pain is described as constant, sharp, and located in the epigastric region with no radiation. The patient reports associated chest discomfort ("tightness"), headache, dizziness, weakness, shortness of breath, and polyuria, but denies fever, chills, nausea, vomiting, palpitations, leg swelling, constipation, diarrhea, hematuria, dysuria, LOC, or visual disturbance. Patient states that these symptoms all started around the same time after she reports "grabbing the wrong insulin" and taking only a half dose. She says that she took 8mg without food approximately six hours ago when she typically takes 15mg. The patient states that symptoms are inconsistent with previous episodes in that this pain is more intense and not typically accompanied by chest discomfort. Patient reports eating a salad with grilled chicken and some snacks today. She notes that she normally takes 35mg lantus in the evening (9pm). The patient believes she has a history of gastritis but denies history of peptic ulcers or cholelithiasis. Patient has no additional complaints.       The history is provided by the patient.     Review of patient's allergies indicates:   Allergen Reactions    No known allergies      Past Medical History:   Diagnosis Date    Depression     Hyperlipidemia     Hypertension     Type I (juvenile type) diabetes mellitus without mention of complication, uncontrolled 11/23/2011 "     Past Surgical History:   Procedure Laterality Date    ABCESS DRAINAGE      boil went over GA     SECTION  2012    TONSILLECTOMY, ADENOIDECTOMY      WISDOM TOOTH EXTRACTION       Family History   Problem Relation Age of Onset    Diabetes Brother     No Known Problems Mother     No Known Problems Father     Hydrocephalus Daughter     No Known Problems Maternal Aunt     No Known Problems Maternal Uncle     No Known Problems Paternal Aunt     No Known Problems Paternal Uncle     No Known Problems Maternal Grandmother     No Known Problems Maternal Grandfather     No Known Problems Paternal Grandmother     No Known Problems Paternal Grandfather     Amblyopia Neg Hx     Blindness Neg Hx     Cataracts Neg Hx     Glaucoma Neg Hx     Hypertension Neg Hx     Macular degeneration Neg Hx     Retinal detachment Neg Hx     Strabismus Neg Hx     Stroke Neg Hx     Thyroid disease Neg Hx     Breast cancer Neg Hx     Colon cancer Neg Hx     Ovarian cancer Neg Hx      Social History   Substance Use Topics    Smoking status: Never Smoker    Smokeless tobacco: Never Used    Alcohol use Yes      Comment: occasionally     Review of Systems   Constitutional: Negative for chills and fever.   HENT: Negative for sore throat.    Eyes: Negative for visual disturbance.   Respiratory: Positive for chest tightness and shortness of breath.    Cardiovascular: Negative for palpitations and leg swelling.   Gastrointestinal: Positive for abdominal pain. Negative for constipation, diarrhea, nausea and vomiting.   Endocrine: Positive for polyuria.   Genitourinary: Negative for dysuria and hematuria.   Musculoskeletal: Negative for back pain.   Skin: Negative for rash.   Neurological: Positive for dizziness, weakness and headaches. Negative for syncope.   Hematological: Does not bruise/bleed easily.       Physical Exam     Initial Vitals [17 1836]   BP Pulse Resp Temp SpO2   (!) 117/59 107 16 99.1 °F  (37.3 °C) 99 %      MAP       78.33         Physical Exam    Constitutional: She appears well-developed and well-nourished. She is not diaphoretic. No distress.   HENT:   Head: Normocephalic and atraumatic.   Mouth/Throat: Oropharynx is clear and moist. No oropharyngeal exudate.   Moist mucous membranes.   Eyes: EOM are normal. Pupils are equal, round, and reactive to light. Right eye exhibits no discharge. Left eye exhibits no discharge.   Neck: Normal range of motion. Neck supple.   Cardiovascular: Regular rhythm, normal heart sounds and intact distal pulses. Exam reveals no gallop and no friction rub.    No murmur heard.  Tachycardic.   Pulmonary/Chest: Breath sounds normal. No respiratory distress. She has no wheezes. She has no rhonchi. She has no rales.   Abdominal: Soft. Bowel sounds are normal. She exhibits no distension. There is tenderness. There is no rebound and no guarding.   Mild epigastric tenderness to deep palpation.   Musculoskeletal: Normal range of motion. She exhibits no edema or tenderness.   Neurological: She is alert and oriented to person, place, and time.   Skin: Skin is warm and dry. Capillary refill takes less than 2 seconds. No rash and no abscess noted. No erythema. No pallor.   Psychiatric: She has a normal mood and affect. Her behavior is normal. Judgment and thought content normal.         ED Course   Critical Care  Date/Time: 12/5/2017 10:49 PM  Performed by: LEATHA CHAVEZ.  Authorized by: LEATHA CHAVEZ   Direct patient critical care time: 15 minutes  Additional history critical care time: 5 minutes  Ordering / reviewing critical care time: 10 minutes  Documentation critical care time: 5 minutes  Consulting other physicians critical care time: 5 minutes  Total critical care time (exclusive of procedural time) : 40 minutes  Critical care was necessary to treat or prevent imminent or life-threatening deterioration of the following conditions: metabolic crisis.  Critical care was time  spent personally by me on the following activities: discussions with consultants, evaluation of patient's response to treatment, ordering and review of laboratory studies, review of old charts, re-evaluation of patient's condition, ordering and performing treatments and interventions and examination of patient.        Labs Reviewed   CBC W/ AUTO DIFFERENTIAL - Abnormal; Notable for the following:        Result Value    MCH 26.3 (*)     MCHC 31.1 (*)     Platelets 389 (*)     Gran% 75.3 (*)     Mono% 3.0 (*)     All other components within normal limits   COMPREHENSIVE METABOLIC PANEL - Abnormal; Notable for the following:     Sodium 128 (*)     Potassium 5.9 (*)     CO2 7 (*)     Glucose 792 (*)     BUN, Bld 27 (*)     Albumin 3.3 (*)     Alkaline Phosphatase 138 (*)     Anion Gap 26 (*)     eGFR if non  52 (*)     All other components within normal limits    Narrative:        CO2 & GLU critical result(s) called and verbal readback obtained   from Lindsey Gagnon, 12/05/2017 21:42  Recoll. [id number] by [facility] [place] at 12/05/2017 19:49, reason: hemolyzed,   spoke to Lindsey Castro   URINALYSIS - Abnormal; Notable for the following:     Glucose, UA 3+ (*)     Ketones, UA 3+ (*)     Occult Blood UA Trace (*)     All other components within normal limits   POCT GLUCOSE MONITORING CONTINUOUS - Abnormal; Notable for the following:     POC Glucose 500 (*)     All other components within normal limits   LIPASE    Narrative:     Recoll. [id number] by [facility] [place] at 12/05/2017 19:49, reason: hemolyzed,   spoke to Lindsey [facility]   URINALYSIS MICROSCOPIC   POCT URINE PREGNANCY             Medical Decision Making:   Initial Assessment:   27-year-old well-appearing female with insulin-dependent diabetes presents with complaint of nausea, vomiting, epigastric pain and dizziness all which started this afternoon.  Patient did admit to not having her regular insulin just this afternoon and having only part of her dose this  morning.  On exam she had mild epigastric tenderness to palpation but was otherwise well-appearing.  Vital signs were significant for mild tachycardia.  Clinical Tests:   Lab Tests: Ordered and Reviewed  ED Management:  Labs significant for a an anion gap of 26 and a bicarbonate 7.  His blood glucose on BMP was greater than 700.  Patient received 2 L of IV fluids in the emergency department and was started on an insulin drip with an insulin bolus.  She also received Pepcid, GI cocktail, and Zofran with symptomatic improvement.    9:57PM- Discussed case with Donald Brown (LUTHER). Patient will be admitted to Dr. Fitzgerald.               Scribe Attestation:   Scribe #1: I performed the above scribed service and the documentation accurately describes the services I performed. I attest to the accuracy of the note.    Attending Attestation:           Physician Attestation for Scribe:  Physician Attestation Statement for Scribe #1: I, Dr. Fierro, reviewed documentation, as scribed by Merari Chiu in my presence, and it is both accurate and complete.                 ED Course      Clinical Impression:     1. Diabetic ketoacidosis without coma associated with diabetes mellitus due to underlying condition                               Cleo Fierro MD  12/05/17 [id number]

## 2017-12-06 NOTE — ED NOTES
Pt to ED c/o sharp, epigastric abdominal pain with SOB, dizziness, weakness, and fatigue. Pt reports takes 15 units novalog AC, only took 8 units at lunch, then pain started.  in triage. Pt reports similar symptoms when glucose high. Denies NVD, vision changes, Cp, dysuria. Pt AAOx4 and appropriate at this time. Respirations even and unlabored. No acute distress noted.

## 2017-12-06 NOTE — PROGRESS NOTES
[facility] Hospital Medicine  Progress Note    Patient Name: Anusha Andrew  MRN: [id number]  Patient Class: IP- Inpatient   Admission Date: 12/5/2017  Length of Stay: 1 days  Attending Physician: Maureen Fitzgerald MD  Primary Care Provider: Rhina Archer MD        Subjective:     Principal Problem:Diabetic ketoacidosis associated with type 1 diabetes mellitus    HPI:  Ms. 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  She is a type I diabetic, and presented to the ED with 4 hours of abdominal pain described as constant, sharp and located in the epigastric area, with associated chest discomfort, dizziness, polyuria and shortness of breath.  She reports she had taken the wrong amount of insulin.  She has had 2 previous episodes of DKA.  She has been followed by [facility] [place]ology but they no longer take her insurance.    Workup in ED was notable for DKA, with anion gap 26 and glucose 792.  Sodium was 128 and potassium 5.9.  She received 2 liters IVF in the ED and was admitted to the ICU on an insulin drip and fluids.    Hospital Course:  Patient was admitted on fluids and an insulin drip.  Her anion gap closed overnight but her bicarb level was still low so she was continued on the insulin drip throughout the day and transitioned to her usual home regimen in the evening.    Interval History:  Stomach pain improved.  Her bicarbonate level improved to 15 overnight.  New labs are pending.  She feels like she could eat.    Review of Systems   Constitutional: Negative for chills and fever.   Respiratory: Negative for cough and shortness of breath.    Cardiovascular: Negative for chest pain and palpitations.     Objective:     Vital Signs (Most Recent):  Temp: 98.2 °F (36.8 °C) (12/06/17 1105)  Pulse: 92 (12/06/17 1400)  Resp: 20 (12/06/17 1400)  BP: (!) 115/58 (12/06/17 1500)  SpO2: 99 % (12/06/17 1400) Vital Signs (24h Range):  Temp:  [98.1 °F (36.7 °C)-99.1 °F (37.3  °C)] 98.2 °F (36.8 °C)  Pulse:  [] 92  Resp:  [16-39] 20  SpO2:  [97 %-100 %] 99 %  BP: ()/(50-66) 115/58     Weight: 85.1 kg (187 lb 9.8 oz)  Body mass index is 33.23 kg/m².    Intake/Output Summary (Last 24 hours) at 12/06/17 1600  Last data filed at 12/06/17 0516   Gross per 24 hour   Intake           831.83 ml   Output              450 ml   Net           381.83 ml      Physical Exam   Constitutional: She is oriented to person, place, and time. She appears well-developed and well-nourished.   HENT:   Head: Normocephalic.   Eyes: Conjunctivae are normal. Pupils are equal, round, and reactive to light.   Neck: Neck supple. No thyromegaly present.   Cardiovascular: Normal rate, regular rhythm, normal heart sounds and intact distal pulses.  Exam reveals no gallop and no friction rub.    No murmur heard.  Pulmonary/Chest: Effort normal and breath sounds normal.   Abdominal: Soft. Bowel sounds are normal. She exhibits no distension. There is no tenderness.   Musculoskeletal: Normal range of motion. She exhibits no edema.   Lymphadenopathy:     She has no cervical adenopathy.   Neurological: She is alert and oriented to person, place, and time.   Strength equal and symmetric   Skin: Skin is warm and dry. No rash noted.   Psychiatric: She has a normal mood and affect. Her behavior is normal. Thought content normal.       Significant Labs:   BMP:   Recent Labs  Lab 12/06/17  0326   *      K 4.4      CO2 15*   BUN 27*   CREATININE 1.2   CALCIUM 9.1   MG 2.5     CBC:   Recent Labs  Lab 12/05/17  1912 12/06/17  0325   WBC 9.74 11.77   HGB 12.7 12.0   HCT 40.8 36.9*   * 359*       Significant Imaging: I have reviewed all pertinent imaging results/findings within the past 24 hours.    Assessment/Plan:      * Diabetic ketoacidosis associated with type 1 diabetes mellitus    - Glucose 792, anion gap 26, Na 128, K 5.9 on admission, all improved overnight  - Discontinue insulin drip and fluids  later this afternoon  - Likely home tomorrow  - Will need close follow up.            Poorly controlled type 1 diabetes mellitus    - A1c in November was 11.4 and now is 11.7.    - She is on Lantus 20 units nightly and aspart 15 units with meals  - Not following diet strictly  - Needs endocrinologist but declined referral to [facility] [place]                VTE Risk Mitigation         Ordered     Place sequential compression device  Until discontinued      12/06/17 0012     Medium Risk of VTE  Once      12/06/17 0012     enoxaparin injection 40 mg  Daily     Route:  Subcutaneous        12/06/17 0012              Maureen Medina MD  Department of Hospital Medicine   [facility]

## 2017-12-06 NOTE — HOSPITAL COURSE
Patient was admitted on fluids and an insulin drip.  Her anion gap closed overnight but her bicarb level was still low so she was continued on the insulin drip throughout the day and transitioned to her usual home regimen in the evening.  She may resume her usual regimen on discharge and follow up with her PCP within the next 2 weeks.  We recommend follow up with an endocrinologist as well.

## 2017-12-06 NOTE — PLAN OF CARE
Problem: Patient Care Overview  Goal: Plan of Care Review  Outcome: Ongoing (interventions implemented as appropriate)  Elevated CBGs with glucose of 387 on admit to ICU. On insulin gtt with current rate of 1.5 units per hour. No c/o nausea, mild abdominal tenderness to palpation. Up to BSC, 450cc UO.

## 2017-12-06 NOTE — ED NOTES
Pt rounding complete. Pt denies any  needs at the moment. Pt does not appear to be in acute distress. Respirations are even and unlabored.  Bed is locked and in lowest position with side rails up x2. Call light is within reach. Will continue to monitor.

## 2017-12-07 VITALS
RESPIRATION RATE: 41 BRPM | HEART RATE: 112 BPM | DIASTOLIC BLOOD PRESSURE: 57 MMHG | OXYGEN SATURATION: 98 % | SYSTOLIC BLOOD PRESSURE: 121 MMHG | WEIGHT: 187.63 LBS | HEIGHT: 63 IN | BODY MASS INDEX: 33.25 KG/M2 | TEMPERATURE: 99 F

## 2017-12-07 LAB
ANION GAP SERPL CALC-SCNC: 7 MMOL/L
BASOPHILS # BLD AUTO: 0.02 K/UL
BASOPHILS NFR BLD: 0.3 %
BUN SERPL-MCNC: 9 MG/DL
CALCIUM SERPL-MCNC: 8.4 MG/DL
CHLORIDE SERPL-SCNC: 106 MMOL/L
CO2 SERPL-SCNC: 21 MMOL/L
CREAT SERPL-MCNC: 0.8 MG/DL
DIFFERENTIAL METHOD: ABNORMAL
EOSINOPHIL # BLD AUTO: 0.1 K/UL
EOSINOPHIL NFR BLD: 1.1 %
ERYTHROCYTE [DISTWIDTH] IN BLOOD BY AUTOMATED COUNT: 14 %
EST. GFR  (AFRICAN AMERICAN): >60 ML/MIN/1.73 M^2
EST. GFR  (NON AFRICAN AMERICAN): >60 ML/MIN/1.73 M^2
GLUCOSE SERPL-MCNC: 247 MG/DL
HCT VFR BLD AUTO: 36.4 %
HGB BLD-MCNC: 11.7 G/DL
LYMPHOCYTES # BLD AUTO: 3.4 K/UL
LYMPHOCYTES NFR BLD: 47.2 %
MAGNESIUM SERPL-MCNC: 1.8 MG/DL
MCH RBC QN AUTO: 26.4 PG
MCHC RBC AUTO-ENTMCNC: 32.1 G/DL
MCV RBC AUTO: 82 FL
MONOCYTES # BLD AUTO: 0.4 K/UL
MONOCYTES NFR BLD: 5.1 %
NEUTROPHILS # BLD AUTO: 3.3 K/UL
NEUTROPHILS NFR BLD: 46.2 %
PHOSPHATE SERPL-MCNC: 2.7 MG/DL
PLATELET # BLD AUTO: 323 K/UL
PMV BLD AUTO: 9.1 FL
POCT GLUCOSE: 290 MG/DL (ref 70–110)
POTASSIUM SERPL-SCNC: 4.2 MMOL/L
RBC # BLD AUTO: 4.43 M/UL
SODIUM SERPL-SCNC: 134 MMOL/L
WBC # BLD AUTO: 7.24 K/UL

## 2017-12-07 PROCEDURE — 80048 BASIC METABOLIC PNL TOTAL CA: CPT

## 2017-12-07 PROCEDURE — 85025 COMPLETE CBC W/AUTO DIFF WBC: CPT

## 2017-12-07 PROCEDURE — 36415 COLL VENOUS BLD VENIPUNCTURE: CPT

## 2017-12-07 PROCEDURE — 25000003 PHARM REV CODE 250: Performed by: NURSE PRACTITIONER

## 2017-12-07 PROCEDURE — 83735 ASSAY OF MAGNESIUM: CPT

## 2017-12-07 PROCEDURE — 84100 ASSAY OF PHOSPHORUS: CPT

## 2017-12-07 PROCEDURE — 99238 HOSP IP/OBS DSCHRG MGMT 30/<: CPT | Mod: ,,, | Performed by: HOSPITALIST

## 2017-12-07 PROCEDURE — 63600175 PHARM REV CODE 636 W HCPCS: Performed by: HOSPITALIST

## 2017-12-07 RX ADMIN — INSULIN ASPART 15 UNITS: 100 INJECTION, SOLUTION INTRAVENOUS; SUBCUTANEOUS at 08:12

## 2017-12-07 RX ADMIN — INSULIN ASPART 6 UNITS: 100 INJECTION, SOLUTION INTRAVENOUS; SUBCUTANEOUS at 08:12

## 2017-12-07 RX ADMIN — BUPROPION HYDROCHLORIDE 300 MG: 150 TABLET, FILM COATED, EXTENDED RELEASE ORAL at 08:12

## 2017-12-07 NOTE — PLAN OF CARE
Problem: Patient Care Overview  Goal: Plan of Care Review  Outcome: Ongoing (interventions implemented as appropriate)  VSS. Afebrile. Patient denies pain. Insulin stopped at 1824. D5 1/2 NS stopped at 1824. Pt. Denies nausea/vomiting. 1800 calorie diabetic diet ordered. Awaiting tray. Given 20 Units of Levemir.

## 2017-12-07 NOTE — DISCHARGE SUMMARY
[facility] Hospital Medicine  Discharge Summary      Patient Name: Anusha Andrew  MRN: [id number]  Admission Date: 12/5/2017  Hospital Length of Stay: 2 days  Discharge Date and Time:  12/07/2017 9:27 AM  Attending Physician: Maureen Fitzgerald MD   Discharging Provider: Maureen Fitzgerald MD  Primary Care Provider: Rhina Archer MD      HPI:   Ms. 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  She is a type I diabetic, and presented to the ED with 4 hours of abdominal pain described as constant, sharp and located in the epigastric area, with associated chest discomfort, dizziness, polyuria and shortness of breath.  She reports she had taken the wrong amount of insulin.  She has had 2 previous episodes of DKA.  She has been followed by [facility] endocrinology but they no longer take her insurance.    Workup in ED was notable for DKA, with anion gap 26 and glucose 792.  Sodium was 128 and potassium 5.9.  She received 2 liters IVF in the ED and was admitted to the ICU on an insulin drip and fluids.    Hospital Course:   Patient was admitted on fluids and an insulin drip.  Her anion gap closed overnight but her bicarb level was still low so she was continued on the insulin drip throughout the day and transitioned to her usual home regimen in the evening.  She may resume her usual regimen on discharge and follow up with her PCP within the next 2 weeks.  We recommend follow up with an endocrinologist as well.         Final Active Diagnoses:    Diagnosis Date Noted POA    PRINCIPAL PROBLEM:  Diabetic ketoacidosis associated with type 1 diabetes mellitus [E10.10] 12/05/2017 Yes    Poorly controlled type 1 diabetes mellitus [E10.65] 09/14/2016 Yes      Problems Resolved During this Admission:    Diagnosis Date Noted Date Resolved POA       Discharged Condition: stable    Disposition: Home or Self Care    Follow Up:  Follow-up Information     Rhina Archer MD.    Specialty:   "Family Medicine  Why:  Follow up in 1-2 weeks  Contact information:  [place] [address] [phone]                 Patient Instructions:     Diet general   Order Specific Question Answer Comments   Additional restrictions: Diabetic 1800      Activity as tolerated       Medications:  Reconciled Home Medications:   Current Discharge Medication List      CONTINUE these medications which have NOT CHANGED    Details   blood sugar diagnostic Strp 1 each by Misc.(Non-Drug; Combo Route) route 6 (six) times daily.  Qty: 200 each, Refills: 11    Associated Diagnoses: Type I diabetes mellitus, uncontrolled      buPROPion (WELLBUTRIN XL) 300 MG 24 hr tablet Take 1 tablet (300 mg total) by mouth once daily.  Qty: 30 tablet, Refills: 11    Associated Diagnoses:  wound infection      insulin aspart (NOVOLOG) 100 unit/mL injection 15 units ac meals  Qty: 100 mL, Refills: 10    Associated Diagnoses: Type 1 diabetes mellitus affecting pregnancy in third trimester, antepartum      insulin glargine (LANTUS) 100 unit/mL injection Inject 20 Units into the skin every evening.  Qty: 10 mL, Refills: 3      !! lancets Misc 1 Device by Misc.(Non-Drug; Combo Route) route 4 (four) times daily before meals and nightly.  Qty: 150 each, Refills: 12    Associated Diagnoses: Type I (juvenile type) diabetes mellitus without mention of complication, uncontrolled      levonorgestrel-ethinyl estradiol (NORDETTE) 0.15-0.03 mg per tablet Take 1 tablet by mouth once daily. Fill generic  Qty: 28 tablet, Refills: 11      !! ONETOUCH DELICA LANCETS 33 gauge [employer] [facility]       ONETOUCH ULTRA2 kit       pen needle, diabetic 31 gauge x 1/4" Ndle 1 Device by MISCELLANEOUS route 4 (four) times daily with meals and nightly.  Qty: 150 each, Refills: 12    Associated Diagnoses: Type I (juvenile type) diabetes mellitus without mention of complication, uncontrolled      terconazole (TERAZOL 3) 0.8 % vaginal cream Place 1 applicator vaginally " nightly.  Qty: 20 g, Refills: 1    Associated Diagnoses: Yeast vaginitis       !! - Potential duplicate medications found. Please discuss with provider.          Time spent on the discharge of patient: <30 minutes  Patient was seen and examined on the date of discharge and determined to be suitable for discharge.         Maureen Medina MD  Department of Hospital Medicine  [facility]

## 2017-12-07 NOTE — PLAN OF CARE
Problem: Patient Care Overview  Goal: Plan of Care Review  Outcome: Outcome(s) achieved Date Met: 12/07/17  Discharge orders received IVs removed, AVS ;printed and reviewed with patient, patient states understanding

## 2017-12-08 ENCOUNTER — PATIENT OUTREACH (OUTPATIENT)
Dept: ADMINISTRATIVE | Facility: CLINIC | Age: 27
End: 2017-12-08

## 2017-12-08 ENCOUNTER — TELEPHONE (OUTPATIENT)
Dept: FAMILY MEDICINE | Facility: CLINIC | Age: 27
End: 2017-12-08

## 2017-12-08 DIAGNOSIS — E11.9 TYPE 2 DIABETES MELLITUS WITHOUT COMPLICATION: ICD-10-CM

## 2017-12-08 NOTE — PATIENT INSTRUCTIONS
Diabetic Ketoacidosis  Diabetic ketoacidosis (DKA) is a serious problem that can happen in people with diabetes. DKA should be treated as a medical emergency. This is because it can lead to coma or death. If you have the symptoms of DKA, get medical help right away.  DKA happens more often in people with type 1 diabetes. But it can happen in people with type 2 diabetes. It can also happen in women with diabetes during pregnancy. This is often known as gestational diabetes.  DKA happens when insulin levels are too low. Without enough insulin, sugar (glucose) cant get to the cells of your body. The glucose stays in the blood. This causes high blood glucose (hyperglycemia). Without glucose, your body breaks down stored fat for energy. When this happens, acids called ketones are released into the blood. This is known as ketosis. High levels of ketones (ketoacidosis) can be harmful to you. Hyperglycemia and ketoacidosis can also cause serious problems in the blood and your body, such as:  · Low levels of potassium (hypokalemia)  · Swelling inside the brain (cerebral edema)  · Fluid in the lungs (pulmonary edema)  · Damage to kidneys or other organs  What causes diabetic ketoacidosis?  In people with diabetes, DKA is most often caused by too little insulin in the body. It is also caused by:  · Poor management of diabetes  · Infections like a urinary tract infection or pneumonia  · Serious health problems, such as a heart attack  · Reactions to certain prescribed medicines and illicit drugs  Symptoms of diabetic ketoacidosis  DKA most often happens slowly over time. But it can worsen in a few hours if you are vomiting. The first symptoms are:  · Thirst and dry mouth  · Urinating a lot  · Belly pain  · Nausea or vomiting  · Breath that smells fruity (from the ketones)  Over time, these symptoms may happen:  · Dry or flushed skin  · Nausea and vomiting  · Loss of appetite  · Weight loss  · Belly pain  · Trouble  breathing  · Trouble thinking or confusion  · Feeling very tired or weak. This can lead to coma.  How is diabetic ketoacidosis diagnosed?  Your healthcare provider will ask about your medical history. He or she will give you a physical exam. You may also have these tests:  · Blood tests to check your glucose levels  · Blood tests to check your electrolytes, such as potassium and sodium  · Urine test to check for ketones  These tests are done to check for DKA, and monitor it over time.  How is diabetic ketoacidosis treated?  DKA needs treatment right away in the hospital. Treatment includes:  · Insulin. This is the main type of treatment. Insulin allows the cells to use the glucose in the blood. This lowers the levels of both blood glucose and ketones.  · Fluids and electrolytes. These are given through a vein (IV). Fluids are replaced and abnormal electrolyte levels are corrected.  · Other medicines. These may be given to treat an illness that caused DKA. For example, antibiotics may be given to treat a urinary tract infection that caused DKA.  Preventing diabetic ketoacidosis  To help prevent DKA, make sure you:  · Take all of your medicines for diabetes exactly as prescribed. This includes insulin.  · Check your blood glucose levels exactly as instructed.  · Be especially careful when you are sick with an illness or an infection. Take extra care to follow diabetes care instructions. Check your blood glucose more often.  · Do not exercise when your blood sugar is high and you have ketones in your urine.   · Check your urine ketone levels if told to do so. This is done with a urine test strip. Ask your healthcare provider how often to check your urine.     When to call your healthcare provider  Call your healthcare provider right away if you:  · Have symptoms of DKA  · Have very high blood glucose levels  · Are getting sick with another illness   Date Last Reviewed: 7/1/2016  © 3529-5957 [employer]  [address] [place] [address]. All rights reserved. This information is not intended as a substitute for professional medical care. Always follow your healthcare professional's instructions.

## 2017-12-13 ENCOUNTER — OFFICE VISIT (OUTPATIENT)
Dept: FAMILY MEDICINE | Facility: CLINIC | Age: 27
End: 2017-12-13
Payer: MEDICAID

## 2017-12-13 VITALS
HEART RATE: 120 BPM | OXYGEN SATURATION: 98 % | BODY MASS INDEX: 33.87 KG/M2 | HEIGHT: 63 IN | SYSTOLIC BLOOD PRESSURE: 118 MMHG | WEIGHT: 191.13 LBS | DIASTOLIC BLOOD PRESSURE: 68 MMHG | TEMPERATURE: 99 F

## 2017-12-13 DIAGNOSIS — E10.10 DIABETIC KETOACIDOSIS WITHOUT COMA ASSOCIATED WITH TYPE 1 DIABETES MELLITUS: ICD-10-CM

## 2017-12-13 PROCEDURE — 99214 OFFICE O/P EST MOD 30 MIN: CPT | Mod: S$PBB,,, | Performed by: NURSE PRACTITIONER

## 2017-12-13 PROCEDURE — 99999 PR PBB SHADOW E&M-EST. PATIENT-LVL IV: CPT | Mod: PBBFAC,,, | Performed by: NURSE PRACTITIONER

## 2017-12-13 PROCEDURE — 99214 OFFICE O/P EST MOD 30 MIN: CPT | Mod: PBBFAC,PO | Performed by: NURSE PRACTITIONER

## 2017-12-13 RX ORDER — INSULIN GLARGINE 100 [IU]/ML
30 INJECTION, SOLUTION SUBCUTANEOUS NIGHTLY
Qty: 10 ML | Refills: 3 | Status: SHIPPED | OUTPATIENT
Start: 2017-12-13 | End: 2018-01-05 | Stop reason: CLARIF

## 2017-12-13 NOTE — PROGRESS NOTES
This dictation has been generated using Dragon Dictation some phonetic errors may occur.     Anusha was seen today for hospital follow up.    Diagnoses and all orders for this visit:    Uncontrolled type 1 diabetes mellitus without complication  -     Ambulatory referral to Endocrinology  -     Ambulatory referral to Nutrition Services    Diabetic ketoacidosis without coma associated with type 1 diabetes mellitus  -     Ambulatory referral to Endocrinology  -     Ambulatory referral to Nutrition Services    Other orders  -     insulin glargine (LANTUS) 100 unit/mL injection; Inject 30 Units into the skin every evening.      Recent admit to the hospital.   Previously followed with endocrinology.  Patient is overdue for follow-up.  Also needs to see dietitian.  Patient indicates she's taking Lantus 30 units at night update the record.    Return if symptoms worsen or fail to improve.      ________________________________________________________________  ________________________________________________________________        Chief Complaint   Patient presents with    Hospital Follow Up     History of present illness  This 27 y.o. presents today for complaint of  follow-up from the hospital.  This patient has diabetes type 1.  Historically uncontrolled.  Recent admit for hypoglycemic episode.  I reviewed that record.  She was diagnosed with DKA.  She has had 2 other admits for similar DX. she has seen a dietitian in the past but it has been a while.  She is interested in follow-up.  Previously followed with endocrinology but no recent follow-up.  No further abdominal pain.  No dizziness chest pain or shortness of breath.  ROS:   CONST: weight stable.  EYES: no vision change.  ENT: No sore throat, headache, or earache.  CV: no chest pain w/ exertion.  RESP: No shortness of breath.  GI: no nausea, vomiting, diarrhea. No dysphagia. Appetite good.   : no urinary issues.  MUSCULOSKELETAL: no new myalgias or arthralgias.   SKIN: no new changes.  NEURO: no focal deficits. Denies headache.  PSYCH: no new issues.  ENDOCRINE: no polyuria.  No polydipsia  HEME: no lymph nodes.  ALLERGY: no general pruritis.    Past medical and social history reviewed.  Patient is new to me.  Follows with [facility].  Past Medical History:   Diagnosis Date    Depression     Hyperlipidemia     Hypertension     Type I (juvenile type) diabetes mellitus without mention of complication, uncontrolled 2011       Past Surgical History:   Procedure Laterality Date    ABCESS DRAINAGE      boil went over GA     SECTION  2012    TONSILLECTOMY, ADENOIDECTOMY      WISDOM TOOTH EXTRACTION         Family History   Problem Relation Age of Onset    Diabetes Brother     No Known Problems Mother     No Known Problems Father     Hydrocephalus Daughter     No Known Problems Maternal Aunt     No Known Problems Maternal Uncle     No Known Problems Paternal Aunt     No Known Problems Paternal Uncle     No Known Problems Maternal Grandmother     No Known Problems Maternal Grandfather     No Known Problems Paternal Grandmother     No Known Problems Paternal Grandfather     Amblyopia Neg Hx     Blindness Neg Hx     Cataracts Neg Hx     Glaucoma Neg Hx     Hypertension Neg Hx     Macular degeneration Neg Hx     Retinal detachment Neg Hx     Strabismus Neg Hx     Stroke Neg Hx     Thyroid disease Neg Hx     Breast cancer Neg Hx     Colon cancer Neg Hx     Ovarian cancer Neg Hx        Social History     Social History    Marital status: Single     Spouse name: N/A    Number of children: N/A    Years of education: N/A     Social History Main Topics    Smoking status: Never Smoker    Smokeless tobacco: Never Used    Alcohol use Yes      Comment: occasionally    Drug use: No    Sexual activity: Yes     Partners: Male     Birth control/ protection: OCP     Other Topics Concern    None     Social History Narrative    None  "      Current Outpatient Prescriptions   Medication Sig Dispense Refill    blood sugar diagnostic Strp 1 each by Misc.(Non-Drug; Combo Route) route 6 (six) times daily. 200 each 11    buPROPion (WELLBUTRIN XL) 300 MG 24 hr tablet Take 1 tablet (300 mg total) by mouth once daily. 30 tablet 11    insulin aspart (NOVOLOG) 100 unit/mL injection 15 units ac meals 100 mL 10    insulin glargine (LANTUS) 100 unit/mL injection Inject 30 Units into the skin every evening. 10 mL 3    lancets Misc 1 Device by Misc.(Non-Drug; Combo Route) route 4 (four) times daily before meals and nightly. 150 each 12    levonorgestrel-ethinyl estradiol (NORDETTE) 0.15-0.03 mg per tablet Take 1 tablet by mouth once daily. Fill generic 28 tablet 11    ONETOUCH DELICA LANCETS 33 gauge Misc       ONETOUCH ULTRA2 kit       pen needle, diabetic 31 gauge x 1/4" Ndle 1 Device by MISCELLANEOUS route 4 (four) times daily with meals and nightly. 150 each 12     No current facility-administered medications for this visit.        Review of patient's allergies indicates:   Allergen Reactions    No known allergies        Physical examination  Vitals Reviewed  Gen. Well-dressed well-nourished no apparent distress  Skin warm dry and intact.  No rashes noted.  Chest.  Respirations are even unlabored.  Lungs are clear to auscultation.  Cardiac regular rate and rhythm.  No chest wall adenopathy noted.  Neuro. Awake alert oriented x4.  Normal judgment and cognition noted.  Extremities no clubbing cyanosis or edema noted.     Call or return to clinic prn if these symptoms worsen or fail to improve as anticipated.    "

## 2017-12-15 ENCOUNTER — TELEPHONE (OUTPATIENT)
Dept: FAMILY MEDICINE | Facility: CLINIC | Age: 27
End: 2017-12-15

## 2017-12-15 RX ORDER — INSULIN GLARGINE 300 [IU]/ML
30 INJECTION, SOLUTION SUBCUTANEOUS NIGHTLY
Qty: 9 ML | Refills: 3 | Status: SHIPPED | OUTPATIENT
Start: 2017-12-15 | End: 2018-01-05 | Stop reason: SDUPTHER

## 2017-12-15 NOTE — TELEPHONE ENCOUNTER
Call pt. Medicaid request change in insulin from Lantus to Toujeo. We are going to change meds and start at same dose.

## 2017-12-22 ENCOUNTER — TELEPHONE (OUTPATIENT)
Dept: FAMILY MEDICINE | Facility: CLINIC | Age: 27
End: 2017-12-22

## 2017-12-22 NOTE — LETTER
December 22, 2017    Anusha Andrew  [address]  [place] [address]             [facility]  [address]  [place] [address]  Phone: [phone]  Fax: [phone] Dear Ms. Andrew:    I have been unable to reach you by phone for your appointment to Endocrinology. Please call me at the clinic [phone] to book your appointment.        If you have any questions or concerns, please don't hesitate to call.    Sincerely,        Naheed Madison MA

## 2018-01-05 ENCOUNTER — TELEPHONE (OUTPATIENT)
Dept: FAMILY MEDICINE | Facility: CLINIC | Age: 28
End: 2018-01-05

## 2018-01-05 RX ORDER — INSULIN GLARGINE 300 [IU]/ML
30 INJECTION, SOLUTION SUBCUTANEOUS NIGHTLY
Qty: 9 ML | Refills: 3 | Status: ON HOLD | OUTPATIENT
Start: 2018-01-05 | End: 2018-01-08 | Stop reason: HOSPADM

## 2018-01-07 ENCOUNTER — HOSPITAL ENCOUNTER (INPATIENT)
Facility: OTHER | Age: 28
LOS: 1 days | Discharge: HOME | DRG: 639 | End: 2018-01-08
Attending: EMERGENCY MEDICINE | Admitting: HOSPITALIST
Payer: MEDICAID

## 2018-01-07 DIAGNOSIS — E10.10 DIABETIC KETOACIDOSIS WITHOUT COMA ASSOCIATED WITH TYPE 1 DIABETES MELLITUS: Primary | ICD-10-CM

## 2018-01-07 PROBLEM — D62 POSTOPERATIVE ANEMIA DUE TO ACUTE BLOOD LOSS: Status: RESOLVED | Noted: 2017-03-19 | Resolved: 2018-01-07

## 2018-01-07 PROBLEM — N30.01 ACUTE CYSTITIS WITH HEMATURIA: Status: RESOLVED | Noted: 2017-09-04 | Resolved: 2018-01-07

## 2018-01-07 LAB
ALBUMIN SERPL BCP-MCNC: 3.6 G/DL
ALLENS TEST: ABNORMAL
ALP SERPL-CCNC: 136 U/L
ALT SERPL W/O P-5'-P-CCNC: 21 U/L
ANION GAP SERPL CALC-SCNC: 25 MMOL/L
ANION GAP SERPL CALC-SCNC: 9 MMOL/L
AST SERPL-CCNC: 17 U/L
B-HCG UR QL: NEGATIVE
B-OH-BUTYR BLD STRIP-SCNC: 4.7 MMOL/L
BACTERIA #/AREA URNS HPF: ABNORMAL /HPF
BASOPHILS # BLD AUTO: 0.01 K/UL
BASOPHILS NFR BLD: 0.1 %
BILIRUB SERPL-MCNC: 0.4 MG/DL
BILIRUB UR QL STRIP: NEGATIVE
BUN SERPL-MCNC: 17 MG/DL
BUN SERPL-MCNC: 7 MG/DL
CALCIUM SERPL-MCNC: 8.2 MG/DL
CALCIUM SERPL-MCNC: 9.5 MG/DL
CHLORIDE SERPL-SCNC: 108 MMOL/L
CHLORIDE SERPL-SCNC: 97 MMOL/L
CLARITY UR: CLEAR
CO2 SERPL-SCNC: 18 MMOL/L
CO2 SERPL-SCNC: 8 MMOL/L
COLOR UR: YELLOW
CREAT SERPL-MCNC: 0.7 MG/DL
CREAT SERPL-MCNC: 1.1 MG/DL
CTP QC/QA: YES
DIFFERENTIAL METHOD: ABNORMAL
EOSINOPHIL # BLD AUTO: 0.1 K/UL
EOSINOPHIL NFR BLD: 0.6 %
ERYTHROCYTE [DISTWIDTH] IN BLOOD BY AUTOMATED COUNT: 13.7 %
EST. GFR  (AFRICAN AMERICAN): >60 ML/MIN/1.73 M^2
EST. GFR  (AFRICAN AMERICAN): >60 ML/MIN/1.73 M^2
EST. GFR  (NON AFRICAN AMERICAN): >60 ML/MIN/1.73 M^2
EST. GFR  (NON AFRICAN AMERICAN): >60 ML/MIN/1.73 M^2
GLUCOSE SERPL-MCNC: 198 MG/DL
GLUCOSE SERPL-MCNC: 496 MG/DL
GLUCOSE UR QL STRIP: ABNORMAL
HCO3 UR-SCNC: 15.6 MMOL/L (ref 24–28)
HCT VFR BLD AUTO: 40.7 %
HGB BLD-MCNC: 13.2 G/DL
HGB UR QL STRIP: ABNORMAL
KETONES UR QL STRIP: ABNORMAL
LEUKOCYTE ESTERASE UR QL STRIP: NEGATIVE
LIPASE SERPL-CCNC: 28 U/L
LYMPHOCYTES # BLD AUTO: 1.5 K/UL
LYMPHOCYTES NFR BLD: 14.6 %
MCH RBC QN AUTO: 27 PG
MCHC RBC AUTO-ENTMCNC: 32.4 G/DL
MCV RBC AUTO: 83 FL
MICROSCOPIC COMMENT: ABNORMAL
MONOCYTES # BLD AUTO: 0.4 K/UL
MONOCYTES NFR BLD: 3.6 %
NEUTROPHILS # BLD AUTO: 8.1 K/UL
NEUTROPHILS NFR BLD: 80.7 %
NITRITE UR QL STRIP: NEGATIVE
PCO2 BLDA: 36.7 MMHG (ref 35–45)
PH SMN: 7.24 [PH] (ref 7.35–7.45)
PH UR STRIP: 6 [PH] (ref 5–8)
PLATELET # BLD AUTO: 357 K/UL
PMV BLD AUTO: 9.7 FL
PO2 BLDA: 43 MMHG (ref 40–60)
POC BE: -12 MMOL/L
POC SATURATED O2: 71 % (ref 95–100)
POCT GLUCOSE: 164 MG/DL (ref 70–110)
POCT GLUCOSE: 188 MG/DL (ref 70–110)
POCT GLUCOSE: 194 MG/DL (ref 70–110)
POCT GLUCOSE: 198 MG/DL (ref 70–110)
POCT GLUCOSE: 198 MG/DL (ref 70–110)
POCT GLUCOSE: 201 MG/DL (ref 70–110)
POCT GLUCOSE: 205 MG/DL (ref 70–110)
POCT GLUCOSE: 241 MG/DL (ref 70–110)
POCT GLUCOSE: 315 MG/DL (ref 70–110)
POCT GLUCOSE: 474 MG/DL (ref 70–110)
POTASSIUM SERPL-SCNC: 4.1 MMOL/L
POTASSIUM SERPL-SCNC: 4.7 MMOL/L
PROT SERPL-MCNC: 8.7 G/DL
PROT UR QL STRIP: NEGATIVE
RBC # BLD AUTO: 4.88 M/UL
RBC #/AREA URNS HPF: 13 /HPF (ref 0–4)
SAMPLE: ABNORMAL
SITE: ABNORMAL
SODIUM SERPL-SCNC: 130 MMOL/L
SODIUM SERPL-SCNC: 135 MMOL/L
SP GR UR STRIP: 1.01 (ref 1–1.03)
SQUAMOUS #/AREA URNS HPF: 3 /HPF
URN SPEC COLLECT METH UR: ABNORMAL
UROBILINOGEN UR STRIP-ACNC: NEGATIVE EU/DL
WBC # BLD AUTO: 10 K/UL
WBC #/AREA URNS HPF: 9 /HPF (ref 0–5)
WBC CLUMPS URNS QL MICRO: ABNORMAL
YEAST URNS QL MICRO: ABNORMAL

## 2018-01-07 PROCEDURE — 82803 BLOOD GASES ANY COMBINATION: CPT

## 2018-01-07 PROCEDURE — 99284 EMERGENCY DEPT VISIT MOD MDM: CPT | Mod: 25

## 2018-01-07 PROCEDURE — 85025 COMPLETE CBC W/AUTO DIFF WBC: CPT

## 2018-01-07 PROCEDURE — 99900035 HC TECH TIME PER 15 MIN (STAT)

## 2018-01-07 PROCEDURE — 96375 TX/PRO/DX INJ NEW DRUG ADDON: CPT

## 2018-01-07 PROCEDURE — 63600175 PHARM REV CODE 636 W HCPCS: Performed by: PHYSICIAN ASSISTANT

## 2018-01-07 PROCEDURE — 25000003 PHARM REV CODE 250: Performed by: PHYSICIAN ASSISTANT

## 2018-01-07 PROCEDURE — 96374 THER/PROPH/DIAG INJ IV PUSH: CPT

## 2018-01-07 PROCEDURE — 36415 COLL VENOUS BLD VENIPUNCTURE: CPT

## 2018-01-07 PROCEDURE — 82962 GLUCOSE BLOOD TEST: CPT

## 2018-01-07 PROCEDURE — 80048 BASIC METABOLIC PNL TOTAL CA: CPT

## 2018-01-07 PROCEDURE — 81025 URINE PREGNANCY TEST: CPT | Performed by: EMERGENCY MEDICINE

## 2018-01-07 PROCEDURE — 63600175 PHARM REV CODE 636 W HCPCS: Performed by: HOSPITALIST

## 2018-01-07 PROCEDURE — 20000000 HC ICU ROOM

## 2018-01-07 PROCEDURE — 25000003 PHARM REV CODE 250: Performed by: HOSPITALIST

## 2018-01-07 PROCEDURE — 99223 1ST HOSP IP/OBS HIGH 75: CPT | Mod: ,,, | Performed by: HOSPITALIST

## 2018-01-07 PROCEDURE — 96361 HYDRATE IV INFUSION ADD-ON: CPT

## 2018-01-07 PROCEDURE — 83690 ASSAY OF LIPASE: CPT

## 2018-01-07 PROCEDURE — 81000 URINALYSIS NONAUTO W/SCOPE: CPT

## 2018-01-07 PROCEDURE — 82010 KETONE BODYS QUAN: CPT

## 2018-01-07 PROCEDURE — 80053 COMPREHEN METABOLIC PANEL: CPT

## 2018-01-07 RX ORDER — DEXTROSE MONOHYDRATE 100 MG/ML
1000 INJECTION, SOLUTION INTRAVENOUS
Status: DISCONTINUED | OUTPATIENT
Start: 2018-01-07 | End: 2018-01-07

## 2018-01-07 RX ORDER — DEXTROSE MONOHYDRATE AND SODIUM CHLORIDE 5; .9 G/100ML; G/100ML
INJECTION, SOLUTION INTRAVENOUS CONTINUOUS
Status: ACTIVE | OUTPATIENT
Start: 2018-01-07 | End: 2018-01-07

## 2018-01-07 RX ORDER — SODIUM CHLORIDE 9 MG/ML
INJECTION, SOLUTION INTRAVENOUS CONTINUOUS
Status: DISCONTINUED | OUTPATIENT
Start: 2018-01-07 | End: 2018-01-08 | Stop reason: HOSPADM

## 2018-01-07 RX ORDER — SODIUM CHLORIDE 9 MG/ML
INJECTION, SOLUTION INTRAVENOUS CONTINUOUS
Status: DISCONTINUED | OUTPATIENT
Start: 2018-01-07 | End: 2018-01-07

## 2018-01-07 RX ORDER — IBUPROFEN 200 MG
24 TABLET ORAL
Status: DISCONTINUED | OUTPATIENT
Start: 2018-01-07 | End: 2018-01-08 | Stop reason: HOSPADM

## 2018-01-07 RX ORDER — HEPARIN SODIUM 5000 [USP'U]/ML
5000 INJECTION, SOLUTION INTRAVENOUS; SUBCUTANEOUS EVERY 8 HOURS
Status: DISCONTINUED | OUTPATIENT
Start: 2018-01-07 | End: 2018-01-08 | Stop reason: HOSPADM

## 2018-01-07 RX ORDER — GLUCAGON 1 MG
1 KIT INJECTION
Status: DISCONTINUED | OUTPATIENT
Start: 2018-01-07 | End: 2018-01-08 | Stop reason: HOSPADM

## 2018-01-07 RX ORDER — INSULIN ASPART 100 [IU]/ML
0-5 INJECTION, SOLUTION INTRAVENOUS; SUBCUTANEOUS
Status: DISCONTINUED | OUTPATIENT
Start: 2018-01-07 | End: 2018-01-08 | Stop reason: HOSPADM

## 2018-01-07 RX ORDER — IBUPROFEN 200 MG
16 TABLET ORAL
Status: DISCONTINUED | OUTPATIENT
Start: 2018-01-07 | End: 2018-01-08 | Stop reason: HOSPADM

## 2018-01-07 RX ORDER — INSULIN ASPART 100 [IU]/ML
10 INJECTION, SOLUTION INTRAVENOUS; SUBCUTANEOUS
Status: DISCONTINUED | OUTPATIENT
Start: 2018-01-08 | End: 2018-01-08 | Stop reason: HOSPADM

## 2018-01-07 RX ORDER — DEXTROSE MONOHYDRATE AND SODIUM CHLORIDE 5; .9 G/100ML; G/100ML
INJECTION, SOLUTION INTRAVENOUS CONTINUOUS
Status: DISCONTINUED | OUTPATIENT
Start: 2018-01-07 | End: 2018-01-07

## 2018-01-07 RX ORDER — ONDANSETRON 2 MG/ML
4 INJECTION INTRAMUSCULAR; INTRAVENOUS
Status: COMPLETED | OUTPATIENT
Start: 2018-01-07 | End: 2018-01-07

## 2018-01-07 RX ADMIN — INSULIN HUMAN 6 UNITS: 100 INJECTION, SOLUTION PARENTERAL at 11:01

## 2018-01-07 RX ADMIN — HEPARIN SODIUM 5000 UNITS: 5000 INJECTION, SOLUTION INTRAVENOUS; SUBCUTANEOUS at 04:01

## 2018-01-07 RX ADMIN — DEXTROSE AND SODIUM CHLORIDE: 5; .9 INJECTION, SOLUTION INTRAVENOUS at 01:01

## 2018-01-07 RX ADMIN — SODIUM CHLORIDE 1000 ML: 0.9 INJECTION, SOLUTION INTRAVENOUS at 10:01

## 2018-01-07 RX ADMIN — SODIUM CHLORIDE: 0.9 INJECTION, SOLUTION INTRAVENOUS at 08:01

## 2018-01-07 RX ADMIN — INSULIN ASPART 1 UNITS: 100 INJECTION, SOLUTION INTRAVENOUS; SUBCUTANEOUS at 10:01

## 2018-01-07 RX ADMIN — INSULIN DETEMIR 30 UNITS: 100 INJECTION, SOLUTION SUBCUTANEOUS at 08:01

## 2018-01-07 RX ADMIN — SODIUM CHLORIDE 3 UNITS/HR: 9 INJECTION, SOLUTION INTRAVENOUS at 12:01

## 2018-01-07 RX ADMIN — SODIUM CHLORIDE 1000 ML: 0.9 INJECTION, SOLUTION INTRAVENOUS at 09:01

## 2018-01-07 RX ADMIN — HEPARIN SODIUM 5000 UNITS: 5000 INJECTION, SOLUTION INTRAVENOUS; SUBCUTANEOUS at 09:01

## 2018-01-07 RX ADMIN — ONDANSETRON 4 MG: 2 INJECTION INTRAMUSCULAR; INTRAVENOUS at 09:01

## 2018-01-07 RX ADMIN — SODIUM CHLORIDE: 0.9 INJECTION, SOLUTION INTRAVENOUS at 12:01

## 2018-01-07 NOTE — ED TRIAGE NOTES
Pt c/o epigastric pain, nausea & elevated blood glucose level X 1 week. Pt also reports decreased appetite. Pt states that she has been adjusting her insulin dose to compensate for the decreased intake. Pt reports that her glucose levels have been in the 200-300s this past week. Pt denies V/D.

## 2018-01-07 NOTE — H&P
[facility] Hospital Medicine  History & Physical    Patient Name: Anusha Andrew  MRN: [id number]  Admission Date: 2018  Attending Physician: Joseph Dunaway MD   Primary Care Provider: Rhina Archer MD         Patient information was obtained from patient, past medical records and ER records.     Subjective:     Principal Problem:Diabetic ketoacidosis without coma associated with type 1 diabetes mellitus    Chief Complaint:   Chief Complaint   Patient presents with    Abdominal Pain     Pt c/o epigastric pain with nausea X 1 week. Pt reports elevated CBG in the 200-300s.        HPI: Patient is a 27 year-old woman with a history of diabetes mellitus type I who presented to the emergency department with excessive thirst and increased urination over the last few days and also abdominal pain which began this morning.  She reports some nausea but no emesis.   She report prior  section but otherwise no abdominal surgeries.  She reports that she usually adheres to her diabetic diet but not always.  She denies any recent illness.  She denies any fevers, chills, cough, shortness of breath, chest pain, or diarrhea.    Patient reports that she has been compliant with her insulin regimen but notes that she recently switched from from Lantus® (insulin glargine) to Toujeo® (insulin glargine).  She takes 30 units of long-acting insulin the morning and she takes 15 units of NovoLog® (insulin aspart injection) before each meal.    She denies any tobacco or substance abuse.  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    Past Medical History:   Diagnosis Date    Depression     Hyperlipidemia     Hypertension     Type I (juvenile type) diabetes mellitus without mention of complication, uncontrolled 2011       Past Surgical History:   Procedure Laterality Date    ABCESS DRAINAGE      boil went over GA     SECTION  2012    TONSILLECTOMY, ADENOIDECTOMY    "   WISDOM TOOTH EXTRACTION         Review of patient's allergies indicates:   Allergen Reactions    No known allergies        No current facility-administered medications on file prior to encounter.      Current Outpatient Prescriptions on File Prior to Encounter   Medication Sig    blood sugar diagnostic Strp 1 each by Misc.(Non-Drug; Combo Route) route 6 (six) times daily.    insulin aspart (NOVOLOG) 100 unit/mL injection 15 units ac meals    insulin glargine, TOUJEO, (TOUJEO) 300 unit/mL (1.5 mL) InPn pen Inject 30 Units into the skin every evening.    lancets Misc 1 Device by Misc.(Non-Drug; Combo Route) route 4 (four) times daily before meals and nightly.    ONETOUCH DELICA LANCETS 33 gauge Misc     ONETOUCH ULTRA2 kit     pen needle, diabetic 31 gauge x 1/4" Ndle 1 Device by MISCELLANEOUS route 4 (four) times daily with meals and nightly.    [DISCONTINUED] buPROPion (WELLBUTRIN XL) 300 MG 24 hr tablet Take 1 tablet (300 mg total) by mouth once daily.    [DISCONTINUED] levonorgestrel-ethinyl estradiol (NORDETTE) 0.15-0.03 mg per tablet Take 1 tablet by mouth once daily. Fill generic     Family History     Problem Relation (Age of Onset)    Diabetes Brother    Hydrocephalus Daughter    No Known Problems Mother, Father, Maternal Aunt, Maternal Uncle, Paternal Aunt, Paternal Uncle, Maternal Grandmother, Maternal Grandfather, Paternal Grandmother, Paternal Grandfather        Social History Main Topics    Smoking status: Never Smoker    Smokeless tobacco: Never Used    Alcohol use Yes      Comment: occasionally    Drug use: No    Sexual activity: Yes     Partners: Male     Birth control/ protection: OCP     Review of Systems   Constitutional: Negative for chills and fever.   HENT: Negative for congestion, hearing loss, sinus pressure and trouble swallowing.    Eyes: Negative for photophobia, pain, redness and visual disturbance.   Respiratory: Negative for cough, chest tightness, shortness of breath " and wheezing.    Cardiovascular: Negative for chest pain and palpitations.   Gastrointestinal: Positive for abdominal pain and nausea. Negative for abdominal distention, blood in stool, constipation, diarrhea and vomiting.   Endocrine: Positive for polydipsia, polyphagia and polyuria. Negative for cold intolerance and heat intolerance.   Genitourinary: Negative for dysuria and frequency.   Musculoskeletal: Negative for arthralgias, back pain, gait problem, joint swelling, myalgias and neck pain.   Allergic/Immunologic: Negative for environmental allergies, food allergies and immunocompromised state.   Neurological: Negative for dizziness, seizures, syncope, weakness, light-headedness and headaches.   Hematological: Negative for adenopathy.   Psychiatric/Behavioral: Negative for agitation, behavioral problems and confusion.     Objective:     Vital Signs (Most Recent):  Temp: 98.5 °F (36.9 °C) (01/07/18 0835)  Pulse: 100 (01/07/18 0835)  Resp: 18 (01/07/18 0835)  BP: 114/63 (01/07/18 1040)  SpO2: 100 % (01/07/18 0835) Vital Signs (24h Range):  Temp:  [98.5 °F (36.9 °C)] 98.5 °F (36.9 °C)  Pulse:  [100] 100  Resp:  [18] 18  SpO2:  [100 %] 100 %  BP: (114-121)/(63-77) 114/63     Weight: 85.3 kg (188 lb)  Body mass index is 33.3 kg/m².    Physical Exam   Constitutional: She is oriented to person, place, and time. She appears well-developed and well-nourished. No distress.   HENT:   Head: Normocephalic and atraumatic.   Nose: Nose normal.   Mouth/Throat: No oropharyngeal exudate.   Dry mucous membranes    Eyes: Conjunctivae are normal. Right eye exhibits no discharge. Left eye exhibits no discharge. No scleral icterus.   Neck: Normal range of motion. Neck supple. No JVD present. No tracheal deviation present. No thyromegaly present.   Cardiovascular: Normal heart sounds and intact distal pulses.  Exam reveals no gallop and no friction rub.    No murmur heard.  Tachycardia   Pulmonary/Chest: Effort normal and breath  sounds normal. No stridor. No respiratory distress. She has no wheezes. She has no rales. She exhibits no tenderness.   Abdominal: Soft. Bowel sounds are normal. She exhibits no distension and no mass. There is no tenderness. There is no rebound and no guarding.   Musculoskeletal: Normal range of motion. She exhibits no edema or tenderness.   Lymphadenopathy:     She has no cervical adenopathy.   Neurological: She is alert and oriented to person, place, and time. She has normal reflexes. She displays normal reflexes. No cranial nerve deficit. She exhibits normal muscle tone. Coordination normal.   Skin: Skin is warm and dry. No rash noted. She is not diaphoretic. No erythema. No pallor.   Psychiatric: She has a normal mood and affect. Her behavior is normal. Judgment and thought content normal.           Significant Labs: All pertinent labs within the past 24 hours have been reviewed.    Significant Imaging: I have reviewed all pertinent imaging results/findings within the past 24 hours.    Assessment/Plan:     * Diabetic ketoacidosis without coma associated with type 1 diabetes mellitus    Will admit the patient to the intensive care unit for aggressive volume resuscitation with intravenous fluids and continuous insulin infusion and will continue until her increased anion gap resolves.  No infectious triggers appreciated.  Abdominal exam unremarkable and abdominal pain likely secondary to ketoacidosis.  Will follow clinically.  I suspect her ketoacidosis stems from poor compliance with her insulin regimen, diet, and/or inadequate insulin regimen.        Poorly controlled type 1 diabetes mellitus    Based on prior hemoglobin A1c, patient has a long-standing history of poor control.  Patient counseled on the importance of achieving better glycemic control with adherence to a diabetic diet and her insulin regimen.        Obesity (BMI 30.0-34.9)    Patient counseled on the importance of weight loss with diet and also  moderate exercise.            VTE Risk Mitigation         Ordered     heparin (porcine) injection 5,000 Units  Every 8 hours     Route:  Subcutaneous        01/07/18 1100     Medium Risk of VTE  Once      01/07/18 1236     Place WILMAN hose  Until discontinued      01/07/18 1236             Joseph Dunaway MD  Department of Hospital Medicine   [facility]

## 2018-01-07 NOTE — ED PROVIDER NOTES
Encounter Date: 2018       History     Chief Complaint   Patient presents with    Abdominal Pain     Pt c/o epigastric pain with nausea X 1 week. Pt reports elevated CBG in the 200-300s.     Patient is a 27-year-old female with history of diabetes type 1 who presents to the emergency department with nausea and vomiting.  She states that the last 24 hours she's not been feeling well.  She states she has a burning sensation in the upper region of her abdomen.  She reports extreme nausea with numerous episodes of vomiting.  She denies diarrhea.  She denies fevers or chills.  She states her blood sugars have been out of control.  She states she's been compliant with her medications.      The history is provided by the patient.     Review of patient's allergies indicates:   Allergen Reactions    No known allergies      Past Medical History:   Diagnosis Date    Depression     Hyperlipidemia     Hypertension     Type I (juvenile type) diabetes mellitus without mention of complication, uncontrolled 2011     Past Surgical History:   Procedure Laterality Date    ABCESS DRAINAGE      boil went over [place]     SECTION  2012    TONSILLECTOMY, ADENOIDECTOMY      WISDOM TOOTH EXTRACTION       Family History   Problem Relation Age of Onset    Diabetes Brother     No Known Problems Mother     No Known Problems Father     Hydrocephalus Daughter     No Known Problems Maternal Aunt     No Known Problems Maternal Uncle     No Known Problems Paternal Aunt     No Known Problems Paternal Uncle     No Known Problems Maternal Grandmother     No Known Problems Maternal Grandfather     No Known Problems Paternal Grandmother     No Known Problems Paternal Grandfather     Amblyopia Neg Hx     Blindness Neg Hx     Cataracts Neg Hx     Glaucoma Neg Hx     Hypertension Neg Hx     Macular degeneration Neg Hx     Retinal detachment Neg Hx     Strabismus Neg Hx     Stroke Neg Hx     Thyroid disease  Neg Hx     Breast cancer Neg Hx     Colon cancer Neg Hx     Ovarian cancer Neg Hx      Social History   Substance Use Topics    Smoking status: Never Smoker    Smokeless tobacco: Never Used    Alcohol use Yes      Comment: occasionally     Review of Systems   Constitutional: Positive for activity change, appetite change and fatigue. Negative for chills.   HENT: Negative for congestion, ear discharge, ear pain, postnasal drip, rhinorrhea, sore throat and trouble swallowing.    Respiratory: Negative for cough and shortness of breath.    Cardiovascular: Negative for chest pain.   Gastrointestinal: Positive for abdominal pain, nausea and vomiting. Negative for blood in stool, constipation and diarrhea.   Genitourinary: Positive for frequency. Negative for dysuria and pelvic pain.   Musculoskeletal: Positive for myalgias. Negative for back pain, neck pain and neck stiffness.   Skin: Negative for rash and wound.   Neurological: Negative for dizziness, speech difficulty, light-headedness, numbness and headaches.       Physical Exam     Initial Vitals [01/07/18 0835]   BP Pulse Resp Temp SpO2   121/77 100 18 98.5 °F (36.9 °C) 100 %      MAP       91.67         Physical Exam    Nursing note and vitals reviewed.  Constitutional: She appears well-developed and well-nourished. She is not diaphoretic.  Non-toxic appearance. No distress.   HENT:   Head: Normocephalic.   Right Ear: Hearing and external ear normal.   Left Ear: Hearing and external ear normal.   Nose: Nose normal.   Mouth/Throat: Uvula is midline and oropharynx is clear and moist. Mucous membranes are not pale and dry. No trismus in the jaw. No uvula swelling. No oropharyngeal exudate.   Eyes: Conjunctivae are normal. Pupils are equal, round, and reactive to light.   Neck: Normal range of motion.   Cardiovascular: Normal rate, regular rhythm and normal heart sounds.   Pulmonary/Chest: Breath sounds normal.   Abdominal: Soft. Bowel sounds are normal. There is  no tenderness. There is no CVA tenderness.   Lymphadenopathy:     She has no cervical adenopathy.   Neurological: She is alert and oriented to person, place, and time.   Skin: Skin is warm and dry. Capillary refill takes less than 2 seconds.   Psychiatric: She has a normal mood and affect.         ED Course   Procedures  Labs Reviewed   URINALYSIS - Abnormal; Notable for the following:        Result Value    Glucose, UA 3+ (*)     Ketones, UA 3+ (*)     Occult Blood UA 1+ (*)     All other components within normal limits   URINALYSIS MICROSCOPIC - Abnormal; Notable for the following:     RBC, UA 13 (*)     WBC, UA 9 (*)     WBC Clumps, UA Occasional (*)     All other components within normal limits   CBC W/ AUTO DIFFERENTIAL - Abnormal; Notable for the following:     Platelets 357 (*)     Gran # 8.1 (*)     Gran% 80.7 (*)     Lymph% 14.6 (*)     Mono% 3.6 (*)     All other components within normal limits   COMPREHENSIVE METABOLIC PANEL - Abnormal; Notable for the following:     Sodium 130 (*)     CO2 8 (*)     Glucose 496 (*)     Total Protein 8.7 (*)     Alkaline Phosphatase 136 (*)     Anion Gap 25 (*)     All other components within normal limits    Narrative:      glucose and co2 critical result(s) called and verbal readback   obtained from  lula boudreaux,rn er @1013, 01/07/2018 10:13   BETA - HYDROXYBUTYRATE, SERUM - Abnormal; Notable for the following:     Beta-Hydroxybutyrate 4.7 (*)     All other components within normal limits   POCT GLUCOSE - Abnormal; Notable for the following:     POCT Glucose 474 (*)     All other components within normal limits   ISTAT PROCEDURE - Abnormal; Notable for the following:     POC PH 7.237 (*)     POC HCO3 15.6 (*)     POC SATURATED O2 71 (*)     All other components within normal limits   LIPASE   POCT URINE PREGNANCY   POCT GLUCOSE MONITORING CONTINUOUS             Medical Decision Making:   Initial Assessment:   Urgent evaluation of 27-year-old female with history of  diabetes who presents to the emergency department with nausea and vomiting.  Patient is afebrile, nontoxic appearing, and hemodynamically stable.  Benign abdominal exam.  Dry mucous membranes.  Patient's accucheck is 474.  Will obtain labs and give fluids.  I am concerned for DKA.  ED Management:  10:22 AM  Ketonuria.  No kidney insufficiency.  Beta Hydroxybuterate is 4.7.  PH is 7.23.  Anion gap is 25.  Potassium is normal.  Will give fluids and IVP insulin.  Pt will be admitted for DKA.    10:33 AM  Discussed case with Dr. Guerra.  Pt will be admitted to ICU for insulin drip.  Other:   I have discussed this case with another health care provider.       <> Summary of the Discussion: Dr. Baig                   ED Course      Clinical Impression:   The encounter diagnosis was Diabetic ketoacidosis without coma associated with type 1 diabetes mellitus.                           Maryjane Brian PA-C  01/07/18 1034

## 2018-01-07 NOTE — PLAN OF CARE
Problem: Patient Care Overview  Goal: Plan of Care Review  Outcome: Ongoing (interventions implemented as appropriate)  Patient received on room air with adequate saturation. No changes to current respiratory orders.

## 2018-01-07 NOTE — ED NOTES
Patient Identifiers for Anusha Andrew checked and correct  LOC: The patient is awake, alert and aware of environment with an appropriate affect, the patient is oriented x 3 and speaking appropriate.  APPEARANCE: Patient resting comfortably and in no acute distress. The patient is clean and well groomed. The patient's clothing is properly fastened.  SKIN: The skin is warm and dry. The patient has normal skin turgor andsticky mucus membranes. No rashes or lesions upon observation. Skin Intact , no breakdown noted.  Musculoskeletal :  Normal range of motion noted. Moves all extremities well.  RESPIRATORY: Airway is open and patent, respirations are spontaneous, patient has a normal effort and rate. Breath sounds are clear & equal, bilaterally.  ABDOMEN: Soft and mildly tender to palpation in the epigstrium, no distention observed. Bowels Sounds are WNL all quads.  PULSES: 2+ radial & pedal pulses, symmetrical in all extremities.  Will continue to monitor

## 2018-01-07 NOTE — ASSESSMENT & PLAN NOTE
Will admit the patient to the intensive care unit for aggressive volume resuscitation with intravenous fluids and continuous insulin infusion and will continue until her increased anion gap resolves.  No infectious triggers appreciated.  Abdominal exam unremarkable and abdominal pain likely secondary to ketoacidosis.  Will follow clinically.  I suspect her ketoacidosis stems from poor compliance with her insulin regimen, diet, and/or inadequate insulin regimen.

## 2018-01-07 NOTE — HPI
Patient is a 27 year-old woman with a history of diabetes mellitus type I who presented to the emergency department with excessive thirst and increased urination over the last few days and also abdominal pain which began this morning.  She reports some nausea but no emesis.   She report prior  section but otherwise no abdominal surgeries.  She reports that she usually adheres to her diabetic diet but not always.  She denies any recent illness.  She denies any fevers, chills, cough, shortness of breath, chest pain, or diarrhea.    Patient reports that she has been compliant with her insulin regimen but notes that she recently switched from from Lantus® (insulin glargine) to Toujeo® (insulin glargine).  She takes 30 units of long-acting insulin the morning and she takes 15 units of NovoLog® (insulin aspart injection) before each meal.    She denies any tobacco or substance abuse.  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

## 2018-01-07 NOTE — SUBJECTIVE & OBJECTIVE
"Past Medical History:   Diagnosis Date    Depression     Hyperlipidemia     Hypertension     Type I (juvenile type) diabetes mellitus without mention of complication, uncontrolled 2011       Past Surgical History:   Procedure Laterality Date    ABCESS DRAINAGE      boil went over [place]     SECTION  2012    TONSILLECTOMY, ADENOIDECTOMY      WISDOM TOOTH EXTRACTION         Review of patient's allergies indicates:   Allergen Reactions    No known allergies        No current facility-administered medications on file prior to encounter.      Current Outpatient Prescriptions on File Prior to Encounter   Medication Sig    blood sugar diagnostic Strp 1 each by Misc.(Non-Drug; Combo Route) route 6 (six) times daily.    insulin aspart (NOVOLOG) 100 unit/mL injection 15 units ac meals    insulin glargine, TOUJEO, (TOUJEO) 300 unit/mL (1.5 mL) InPn pen Inject 30 Units into the skin every evening.    lancets Misc 1 Device by Misc.(Non-Drug; Combo Route) route 4 (four) times daily before meals and nightly.    ONETOUCH DELICA LANCETS 33 gauge Misc     ONETOUCH ULTRA2 kit     pen needle, diabetic 31 gauge x 1/4" Ndle 1 Device by MISCELLANEOUS route 4 (four) times daily with meals and nightly.    [DISCONTINUED] buPROPion (WELLBUTRIN XL) 300 MG 24 hr tablet Take 1 tablet (300 mg total) by mouth once daily.    [DISCONTINUED] levonorgestrel-ethinyl estradiol (NORDETTE) 0.15-0.03 mg per tablet Take 1 tablet by mouth once daily. Fill generic     Family History     Problem Relation (Age of Onset)    Diabetes Brother    Hydrocephalus Daughter    No Known Problems Mother, Father, Maternal Aunt, Maternal Uncle, Paternal Aunt, Paternal Uncle, Maternal Grandmother, Maternal Grandfather, Paternal Grandmother, Paternal Grandfather        Social History Main Topics    Smoking status: Never Smoker    Smokeless tobacco: Never Used    Alcohol use Yes      Comment: occasionally    Drug use: No    Sexual " activity: Yes     Partners: Male     Birth control/ protection: OCP     Review of Systems   Constitutional: Negative for chills and fever.   HENT: Negative for congestion, hearing loss, sinus pressure and trouble swallowing.    Eyes: Negative for photophobia, pain, redness and visual disturbance.   Respiratory: Negative for cough, chest tightness, shortness of breath and wheezing.    Cardiovascular: Negative for chest pain and palpitations.   Gastrointestinal: Positive for abdominal pain and nausea. Negative for abdominal distention, blood in stool, constipation, diarrhea and vomiting.   Endocrine: Positive for polydipsia, polyphagia and polyuria. Negative for cold intolerance and heat intolerance.   Genitourinary: Negative for dysuria and frequency.   Musculoskeletal: Negative for arthralgias, back pain, gait problem, joint swelling, myalgias and neck pain.   Allergic/Immunologic: Negative for environmental allergies, food allergies and immunocompromised state.   Neurological: Negative for dizziness, seizures, syncope, weakness, light-headedness and headaches.   Hematological: Negative for adenopathy.   Psychiatric/Behavioral: Negative for agitation, behavioral problems and confusion.     Objective:     Vital Signs (Most Recent):  Temp: 98.5 °F (36.9 °C) (01/07/18 0835)  Pulse: 100 (01/07/18 0835)  Resp: 18 (01/07/18 0835)  BP: 114/63 (01/07/18 1040)  SpO2: 100 % (01/07/18 0835) Vital Signs (24h Range):  Temp:  [98.5 °F (36.9 °C)] 98.5 °F (36.9 °C)  Pulse:  [100] 100  Resp:  [18] 18  SpO2:  [100 %] 100 %  BP: (114-121)/(63-77) 114/63     Weight: 85.3 kg (188 lb)  Body mass index is 33.3 kg/m².    Physical Exam   Constitutional: She is oriented to person, place, and time. She appears well-developed and well-nourished. No distress.   HENT:   Head: Normocephalic and atraumatic.   Nose: Nose normal.   Mouth/Throat: No oropharyngeal exudate.   Dry mucous membranes    Eyes: Conjunctivae are normal. Right eye exhibits no  discharge. Left eye exhibits no discharge. No scleral icterus.   Neck: Normal range of motion. Neck supple. No JVD present. No tracheal deviation present. No thyromegaly present.   Cardiovascular: Normal heart sounds and intact distal pulses.  Exam reveals no gallop and no friction rub.    No murmur heard.  Tachycardia   Pulmonary/Chest: Effort normal and breath sounds normal. No stridor. No respiratory distress. She has no wheezes. She has no rales. She exhibits no tenderness.   Abdominal: Soft. Bowel sounds are normal. She exhibits no distension and no mass. There is no tenderness. There is no rebound and no guarding.   Musculoskeletal: Normal range of motion. She exhibits no edema or tenderness.   Lymphadenopathy:     She has no cervical adenopathy.   Neurological: She is alert and oriented to person, place, and time. She has normal reflexes. She displays normal reflexes. No cranial nerve deficit. She exhibits normal muscle tone. Coordination normal.   Skin: Skin is warm and dry. No rash noted. She is not diaphoretic. No erythema. No pallor.   Psychiatric: She has a normal mood and affect. Her behavior is normal. Judgment and thought content normal.           Significant Labs: All pertinent labs within the past 24 hours have been reviewed.    Significant Imaging: I have reviewed all pertinent imaging results/findings within the past 24 hours.

## 2018-01-07 NOTE — ASSESSMENT & PLAN NOTE
Based on prior hemoglobin A1c, patient has a long-standing history of poor control.  Patient counseled on the importance of achieving better glycemic control with adherence to a diabetic diet and her insulin regimen.

## 2018-01-07 NOTE — HOSPITAL COURSE
Patient admitted to the intensive care unit to treat diabetic ketoacidosis with isotonic intravenous fluids and continuous insulin infusion.  Patient had resolution of her ketoacidosis.  She was transitioned to subcutaneous insulin regimen.  Patient had resolution of her presenting abdominal pain and tolerated oral intake of food and water without issue.  Patient discharged home and given 1 month supply of both her short-acting and long-acting insulin.  I have asked the patient to check capillary blood glucose measurements frequently (before meals and at bedtime) and to review capillary blood glucose measurement log during upcoming clinic appointment later this week.  I have also restated her prior antidepressant to treat depression.

## 2018-01-08 ENCOUNTER — TELEPHONE (OUTPATIENT)
Dept: FAMILY MEDICINE | Facility: CLINIC | Age: 28
End: 2018-01-08

## 2018-01-08 VITALS
WEIGHT: 194 LBS | TEMPERATURE: 99 F | OXYGEN SATURATION: 99 % | SYSTOLIC BLOOD PRESSURE: 151 MMHG | HEART RATE: 106 BPM | DIASTOLIC BLOOD PRESSURE: 89 MMHG | HEIGHT: 63 IN | RESPIRATION RATE: 22 BRPM | BODY MASS INDEX: 34.38 KG/M2

## 2018-01-08 LAB
ANION GAP SERPL CALC-SCNC: 10 MMOL/L
BASOPHILS # BLD AUTO: 0.01 K/UL
BASOPHILS NFR BLD: 0.2 %
BUN SERPL-MCNC: 7 MG/DL
CALCIUM SERPL-MCNC: 8.3 MG/DL
CHLORIDE SERPL-SCNC: 109 MMOL/L
CO2 SERPL-SCNC: 17 MMOL/L
CREAT SERPL-MCNC: 0.8 MG/DL
DIFFERENTIAL METHOD: ABNORMAL
EOSINOPHIL # BLD AUTO: 0.1 K/UL
EOSINOPHIL NFR BLD: 0.8 %
ERYTHROCYTE [DISTWIDTH] IN BLOOD BY AUTOMATED COUNT: 13.9 %
EST. GFR  (AFRICAN AMERICAN): >60 ML/MIN/1.73 M^2
EST. GFR  (NON AFRICAN AMERICAN): >60 ML/MIN/1.73 M^2
GLUCOSE SERPL-MCNC: 270 MG/DL
HCT VFR BLD AUTO: 35.8 %
HGB BLD-MCNC: 11.4 G/DL
LYMPHOCYTES # BLD AUTO: 4.2 K/UL
LYMPHOCYTES NFR BLD: 63.7 %
MAGNESIUM SERPL-MCNC: 1.8 MG/DL
MCH RBC QN AUTO: 26.6 PG
MCHC RBC AUTO-ENTMCNC: 31.8 G/DL
MCV RBC AUTO: 83 FL
MONOCYTES # BLD AUTO: 0.4 K/UL
MONOCYTES NFR BLD: 6.2 %
NEUTROPHILS # BLD AUTO: 1.9 K/UL
NEUTROPHILS NFR BLD: 29.1 %
PHOSPHATE SERPL-MCNC: 3 MG/DL
PLATELET # BLD AUTO: 342 K/UL
PMV BLD AUTO: 9.6 FL
POCT GLUCOSE: 163 MG/DL (ref 70–110)
POCT GLUCOSE: 181 MG/DL (ref 70–110)
POCT GLUCOSE: 192 MG/DL (ref 70–110)
POCT GLUCOSE: 218 MG/DL (ref 70–110)
POCT GLUCOSE: 259 MG/DL (ref 70–110)
POTASSIUM SERPL-SCNC: 4.3 MMOL/L
RBC # BLD AUTO: 4.29 M/UL
SODIUM SERPL-SCNC: 136 MMOL/L
WBC # BLD AUTO: 6.62 K/UL

## 2018-01-08 PROCEDURE — 85025 COMPLETE CBC W/AUTO DIFF WBC: CPT

## 2018-01-08 PROCEDURE — 83735 ASSAY OF MAGNESIUM: CPT

## 2018-01-08 PROCEDURE — 36415 COLL VENOUS BLD VENIPUNCTURE: CPT

## 2018-01-08 PROCEDURE — 99239 HOSP IP/OBS DSCHRG MGMT >30: CPT | Mod: ,,, | Performed by: HOSPITALIST

## 2018-01-08 PROCEDURE — 63600175 PHARM REV CODE 636 W HCPCS: Performed by: HOSPITALIST

## 2018-01-08 PROCEDURE — 80048 BASIC METABOLIC PNL TOTAL CA: CPT

## 2018-01-08 PROCEDURE — 84100 ASSAY OF PHOSPHORUS: CPT

## 2018-01-08 PROCEDURE — 25000003 PHARM REV CODE 250: Performed by: HOSPITALIST

## 2018-01-08 RX ORDER — BUPROPION HYDROCHLORIDE 150 MG/1
150 TABLET ORAL DAILY
Qty: 30 TABLET | Refills: 0 | Status: SHIPPED | OUTPATIENT
Start: 2018-01-09 | End: 2018-02-16 | Stop reason: SDUPTHER

## 2018-01-08 RX ORDER — INSULIN ASPART 100 [IU]/ML
10 INJECTION, SOLUTION INTRAVENOUS; SUBCUTANEOUS 3 TIMES DAILY
Qty: 9 ML | Refills: 0 | Status: SHIPPED | OUTPATIENT
Start: 2018-01-08 | End: 2018-04-11 | Stop reason: SDUPTHER

## 2018-01-08 RX ORDER — BUPROPION HYDROCHLORIDE 150 MG/1
150 TABLET ORAL DAILY
Status: DISCONTINUED | OUTPATIENT
Start: 2018-01-08 | End: 2018-01-08 | Stop reason: HOSPADM

## 2018-01-08 RX ADMIN — INSULIN DETEMIR 10 UNITS: 100 INJECTION, SOLUTION SUBCUTANEOUS at 08:01

## 2018-01-08 RX ADMIN — HEPARIN SODIUM 5000 UNITS: 5000 INJECTION, SOLUTION INTRAVENOUS; SUBCUTANEOUS at 05:01

## 2018-01-08 RX ADMIN — SODIUM CHLORIDE: 0.9 INJECTION, SOLUTION INTRAVENOUS at 03:01

## 2018-01-08 RX ADMIN — BUPROPION HYDROCHLORIDE 150 MG: 150 TABLET, FILM COATED, EXTENDED RELEASE ORAL at 08:01

## 2018-01-08 RX ADMIN — HEPARIN SODIUM 5000 UNITS: 5000 INJECTION, SOLUTION INTRAVENOUS; SUBCUTANEOUS at 02:01

## 2018-01-08 RX ADMIN — INSULIN ASPART 10 UNITS: 100 INJECTION, SOLUTION INTRAVENOUS; SUBCUTANEOUS at 11:01

## 2018-01-08 RX ADMIN — SODIUM CHLORIDE: 0.9 INJECTION, SOLUTION INTRAVENOUS at 10:01

## 2018-01-08 RX ADMIN — INSULIN ASPART 10 UNITS: 100 INJECTION, SOLUTION INTRAVENOUS; SUBCUTANEOUS at 04:01

## 2018-01-08 RX ADMIN — INSULIN ASPART 10 UNITS: 100 INJECTION, SOLUTION INTRAVENOUS; SUBCUTANEOUS at 08:01

## 2018-01-08 NOTE — PLAN OF CARE
"Problem: Patient Care Overview  Goal: Plan of Care Review  Outcome: Ongoing (interventions implemented as appropriate)  Patient stable overnight,  Insulin gtt stopped at 2100, first dose of SQ insulin given. NS at 150cc/hr.  Ate  Bedtime snack, denies any nausea or vomiting.  Gap this a.m. is10.  Reports some "pins and needles" to both feet.  Ambulates to bathroom and voids without difficulty.  Free from falls and injury.  VSS.      "

## 2018-01-08 NOTE — PLAN OF CARE
DC PLANNING:    CM met with patient at bedside to discuss plan of care. Patient offered [facility] care program; she declined. Patient prefers to follow up with PCP Dr. Archer. Patient independent of ADL's. No needs expressed, CM to follow and remain supportive.      01/08/18 1220   Discharge Assessment   Assessment Type Discharge Planning Assessment   Confirmed/corrected address and phone number on facesheet? Yes   Assessment information obtained from? Patient   Prior to hospitilization cognitive status: Alert/Oriented   Prior to hospitalization functional status: Independent   Current cognitive status: Alert/Oriented   Current Functional Status: Independent   Lives With alone   Able to Return to Prior Arrangements yes   Is patient able to care for self after discharge? Yes   Who are your caregiver(s) and their phone number(s)? Stephanie Andrew, mother, [phone]   Patient currently being followed by outpatient case management? No   Patient currently receives any other outside agency services? No   Equipment Currently Used at Home none   Do you have any problems affording any of your prescribed medications? No   Is the patient taking medications as prescribed? yes   Does the patient have transportation home? Yes   Transportation Available car;family or friend will provide   Discharge Plan A Home   Patient/Family In Agreement With Plan yes

## 2018-01-08 NOTE — PLAN OF CARE
Problem: Patient Care Overview  Goal: Plan of Care Review  Patient free from falls and injury, free from skin breakdown. Denies pain since admission to ICU. BG has decreased. Insulin drip infusing with D5NS@150 cc/hr.

## 2018-01-09 NOTE — DISCHARGE SUMMARY
[facility] Hospital Medicine  Discharge Summary      Patient Name: Anusha Andrew  MRN: [id number]  Admission Date: 2018  Hospital Length of Stay: 1 days  Discharge Date and Time:  2018  Attending Physician: Joseph Dunaway MD   Discharging Provider: Joseph Dunaway MD  Primary Care Provider: Rhina Archer MD      HPI:   Patient is a 27 year-old woman with a history of diabetes mellitus type I who presented to the emergency department with excessive thirst and increased urination over the last few days and also abdominal pain which began this morning.  She reports some nausea but no emesis.   She report prior  section but otherwise no abdominal surgeries.  She reports that she usually adheres to her diabetic diet but not always.  She denies any recent illness.  She denies any fevers, chills, cough, shortness of breath, chest pain, or diarrhea.    Patient reports that she has been compliant with her insulin regimen but notes that she recently switched from from Lantus® (insulin glargine) to Toujeo® (insulin glargine).  She takes 30 units of long-acting insulin the morning and she takes 15 units of NovoLog® (insulin aspart injection) before each meal.    She denies any tobacco or substance abuse.  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    Hospital Course:   Patient admitted to the intensive care unit to treat diabetic ketoacidosis with isotonic intravenous fluids and continuous insulin infusion.  Patient had resolution of her ketoacidosis.  She was transitioned to subcutaneous insulin regimen.  Patient had resolution of her presenting abdominal pain and tolerated oral intake of food and water without issue.  Patient discharged home and given 1 month supply of both her short-acting and long-acting insulin.  I have asked the patient to check capillary blood glucose measurements frequently (before meals and at bedtime) and to review capillary blood  glucose measurement log during upcoming clinic appointment later this week.  I have also restated her prior antidepressant to treat depression.     Final Active Diagnoses:    Diagnosis Date Noted POA    PRINCIPAL PROBLEM:  Diabetic ketoacidosis without coma associated with type 1 diabetes mellitus [E10.10] 01/07/2018 Yes    Poorly controlled type 1 diabetes mellitus [E10.65] 09/14/2016 Yes    Obesity (BMI 30.0-34.9) [E66.9] 07/01/2015 Yes      Problems Resolved During this Admission:    Diagnosis Date Noted Date Resolved POA       Discharged Condition: Stable    Disposition: Home or Self Care    Follow Up:  Follow-up Information     Go to Rhina Archer MD.    Specialty:  Family Medicine  Why:  Appointment with Dr. Archer on Thursday, January 18, 2018 at 10:15am  Contact information:  [address] [facility] [address]  [phone]             [facility]to Alfaro.    Why:  Please bring ID, Insurance Card and Hospital discharge papers. arrive for 3:30 PM  Contact information:  [address] [facility] [address]  [phone]                 Patient Instructions:     Diet diabetic     Activity as tolerated     Notify your health care provider if you experience any of the following:  temperature >100.4     Notify your health care provider if you experience any of the following:  persistent nausea and vomiting or diarrhea     Notify your health care provider if you experience any of the following:  severe uncontrolled pain     Notify your health care provider if you experience any of the following:  difficulty breathing or increased cough     Notify your health care provider if you experience any of the following:  severe persistent headache     Notify your health care provider if you experience any of the following:  worsening rash     Notify your health care provider if you experience any of the following:  persistent dizziness, light-headedness, or visual disturbances     Notify your health care  "provider if you experience any of the following:  increased confusion or weakness        Medications:  Reconciled Home Medications:   Discharge Medication List as of 1/8/2018  4:48 PM      START taking these medications    Details   buPROPion (WELLBUTRIN XL) 150 MG TB24 tablet Take 1 tablet (150 mg total) by mouth once daily., Starting Tue 1/9/2018, Until Wed 1/9/2019, Normal      insulin aspart (NOVOLOG) 100 unit/mL InPn pen Inject 10 Units into the skin 3 (three) times daily., Starting Mon 1/8/2018, Until Tue 1/8/2019, Normal      insulin detemir (LEVEMIR FLEXTOUCH) 100 unit/mL (3 mL) SubQ InPn pen Inject 40 Units into the skin every evening., Starting Mon 1/8/2018, Until Tue 1/8/2019, Normal         CONTINUE these medications which have NOT CHANGED    Details   blood sugar diagnostic Strp 1 each by Misc.(Non-Drug; Combo Route) route 6 (six) times daily., Starting 1/20/2016, Until Discontinued, Normal      ONETOUCH DELICA LANCETS 33 gauge Misc Starting 2/17/2017, Until Discontinued, Historical Med      ONETOUCH ULTRA2 kit Historical Med      pen needle, diabetic 31 gauge x 1/4" Ndle 1 Device by MISCELLANEOUS route 4 (four) times daily with meals and nightly., Starting 12/8/2016, Until Discontinued, Normal         STOP taking these medications       insulin aspart (NOVOLOG) 100 unit/mL injection Comments:   Reason for Stopping:         insulin glargine, TOUJEO, (TOUJEO) 300 unit/mL (1.5 mL) InPn pen Comments:   Reason for Stopping:               Indwelling Lines/Drains at time of discharge:   Lines/Drains/Airways          No matching active lines, drains, or airways          Time spent on the discharge of patient: 35 minutes  Patient was seen and examined on the date of discharge and determined to be suitable for discharge.         Joseph Dunaway MD  Department of Hospital Medicine  [facility]  "

## 2018-01-09 NOTE — NURSING
Pt. Walked down to her car with the nurse. Meds were delivered to the bedside. VSS, afebrile prior to discharge. All lines were dc'd. Pt education and med lists were explained and taken with her.

## 2018-01-10 ENCOUNTER — PATIENT OUTREACH (OUTPATIENT)
Dept: ADMINISTRATIVE | Facility: CLINIC | Age: 28
End: 2018-01-10

## 2018-01-10 DIAGNOSIS — E10.10 DIABETIC KETOACIDOSIS WITHOUT COMA ASSOCIATED WITH TYPE 1 DIABETES MELLITUS: Primary | ICD-10-CM

## 2018-01-10 NOTE — PROGRESS NOTES
Please forward this important TCC information to your provider in order to maximize the post discharge care delivery of this patient.    C3 nurse spoke with Anusha Andrew  for a TCC post hospital discharge follow up call. The patient has a scheduled HOSFU appointment with Alberto Alfaro on 1\12\18 @ 1530. The hospitalist wanted her to be seen before the 1\18 scheduled appt with her PCP.  Respectfully,  Ирина Edwards RN  [facility]    [email or web address]       Please do not reply to this message, as this inbox is not routinely monitored.

## 2018-01-10 NOTE — PATIENT INSTRUCTIONS
Diabetic Ketoacidosis  Diabetic ketoacidosis (DKA) is a serious problem that can happen in people with diabetes. DKA should be treated as a medical emergency. This is because it can lead to coma or death. If you have the symptoms of DKA, get medical help right away.  DKA happens more often in people with type 1 diabetes. But it can happen in people with type 2 diabetes. It can also happen in women with diabetes during pregnancy. This is often known as gestational diabetes.  DKA happens when insulin levels are too low. Without enough insulin, sugar (glucose) cant get to the cells of your body. The glucose stays in the blood. This causes high blood glucose (hyperglycemia). Without glucose, your body breaks down stored fat for energy. When this happens, acids called ketones are released into the blood. This is known as ketosis. High levels of ketones (ketoacidosis) can be harmful to you. Hyperglycemia and ketoacidosis can also cause serious problems in the blood and your body, such as:  · Low levels of potassium (hypokalemia)  · Swelling inside the brain (cerebral edema)  · Fluid in the lungs (pulmonary edema)  · Damage to kidneys or other organs  What causes diabetic ketoacidosis?  In people with diabetes, DKA is most often caused by too little insulin in the body. It is also caused by:  · Poor management of diabetes  · Infections like a urinary tract infection or pneumonia  · Serious health problems, such as a heart attack  · Reactions to certain prescribed medicines and illicit drugs  Symptoms of diabetic ketoacidosis  DKA most often happens slowly over time. But it can worsen in a few hours if you are vomiting. The first symptoms are:  · Thirst and dry mouth  · Urinating a lot  · Belly pain  · Nausea or vomiting  · Breath that smells fruity (from the ketones)  Over time, these symptoms may happen:  · Dry or flushed skin  · Nausea and vomiting  · Loss of appetite  · Weight loss  · Belly pain  · Trouble  breathing  · Trouble thinking or confusion  · Feeling very tired or weak. This can lead to coma.  How is diabetic ketoacidosis diagnosed?  Your healthcare provider will ask about your medical history. He or she will give you a physical exam. You may also have these tests:  · Blood tests to check your glucose levels  · Blood tests to check your electrolytes, such as potassium and sodium  · Urine test to check for ketones  These tests are done to check for DKA, and monitor it over time.  How is diabetic ketoacidosis treated?  DKA needs treatment right away in the hospital. Treatment includes:  · Insulin. This is the main type of treatment. Insulin allows the cells to use the glucose in the blood. This lowers the levels of both blood glucose and ketones.  · Fluids and electrolytes. These are given through a vein (IV). Fluids are replaced and abnormal electrolyte levels are corrected.  · Other medicines. These may be given to treat an illness that caused DKA. For example, antibiotics may be given to treat a urinary tract infection that caused DKA.  Preventing diabetic ketoacidosis  To help prevent DKA, make sure you:  · Take all of your medicines for diabetes exactly as prescribed. This includes insulin.  · Check your blood glucose levels exactly as instructed.  · Be especially careful when you are sick with an illness or an infection. Take extra care to follow diabetes care instructions. Check your blood glucose more often.  · Do not exercise when your blood sugar is high and you have ketones in your urine.   · Check your urine ketone levels if told to do so. This is done with a urine test strip. Ask your healthcare provider how often to check your urine.     When to call your healthcare provider  Call your healthcare provider right away if you:  · Have symptoms of DKA  · Have very high blood glucose levels  · Are getting sick with another illness   Date Last Reviewed: 7/1/2016  © 1802-0305 [employer]  [address] [place] [address]. All rights reserved. This information is not intended as a substitute for professional medical care. Always follow your healthcare professional's instructions.        Diet: Diabetes  Food is an important tool that you can use to control diabetes and stay healthy. Eating well-balanced meals in the correct amounts will help you control your blood glucose levels and prevent low blood sugar reactions. It will also help you reduce the health risks of diabetes. There is no one specific diet that is right for everyone with diabetes. But there are general guidelines to follow. A registered dietitian (RD) will create a tailored diet approach thats just right for you. He or she will also help you plan healthy meals and snacks. If you have any questions, call your dietitian for advice.     Guidelines for success  Talk with your healthcare provider before starting a diabetes diet or weight loss program. If you haven't talked with a dietitian yet, ask your provider for a referral. The following guidelines can help you succeed:  · Select foods from the 6 food groups below. Your dietitian will help you find food choices within each group. He or she will also show you serving sizes and how many servings you can have at each meal.  ¨ Grains, beans, and starchy vegetables  ¨ Vegetables  ¨ Fruit  ¨ Milk or yogurt  ¨ Meat, poultry, fish, or tofu  ¨ Healthy fats  · Check your blood sugar levels as directed by your provider. Take any medicine as prescribed by your provider.  · Learn to read food labels and pick the right portion sizes.  · Eat only the amount of food in your meal plan. Eat about the same amount of food at regular times each day. Dont skip meals. Eat meals 4 to 5 hours apart, with snacks in between.  · Limit alcohol. It raises blood sugar levels. Drink water or calorie-free diet drinks that use safe sweeteners.  · Eat less fat to help lower your risk of heart disease. Use nonfat  or low-fat dairy products and lean meats. Avoid fried foods. Use cooking oils that are unsaturated, such as olive, canola, or peanut oil.  · Talk with your dietitian about safe sugar substitutes.  · Avoid added salt. It can contribute to high blood pressure, which can cause heart disease. People with diabetes already have a risk of high blood pressure and heart disease.  · Stay at a healthy weight. If you need to lose weight, cut down on your portion sizes. But dont skip meals. Exercise is an important part of any weight management program. Talk with your provider about an exercise program thats right for you.  · For more information about the best diet plan for you, talk with a registered dietitian (RD). To find an RD in your area, contact:  ¨ Academy of Nutrition and Dietetics www.eatright.org  ¨ The American Diabetes Association 005-659-2744 www.diabetes.org  Date Last Reviewed: 8/1/2016  © 5132-9463 [employer]. [address] [place] [address], [place] [address]. All rights reserved. This information is not intended as a substitute for professional medical care. Always follow your healthcare professional's instructions.

## 2018-01-11 ENCOUNTER — OUTPATIENT CASE MANAGEMENT (OUTPATIENT)
Dept: ADMINISTRATIVE | Facility: OTHER | Age: 28
End: 2018-01-11

## 2018-01-11 NOTE — PROGRESS NOTES
Please note the following patients information has been forwarded to [facility] for Case Management or .    Please contact Outpatient Complex Care Mgmt at ext [phone] with questions.    Thank you,    Ania Hartley, SSC

## 2018-01-12 ENCOUNTER — OFFICE VISIT (OUTPATIENT)
Dept: OBSTETRICS AND GYNECOLOGY | Facility: CLINIC | Age: 28
End: 2018-01-12
Attending: OBSTETRICS & GYNECOLOGY
Payer: MEDICAID

## 2018-01-12 VITALS
DIASTOLIC BLOOD PRESSURE: 80 MMHG | BODY MASS INDEX: 35.08 KG/M2 | HEIGHT: 63 IN | SYSTOLIC BLOOD PRESSURE: 132 MMHG | WEIGHT: 198 LBS

## 2018-01-12 DIAGNOSIS — Z30.41 ENCOUNTER FOR SURVEILLANCE OF CONTRACEPTIVE PILLS: Primary | ICD-10-CM

## 2018-01-12 PROCEDURE — 99213 OFFICE O/P EST LOW 20 MIN: CPT | Mod: S$PBB,,, | Performed by: STUDENT IN AN ORGANIZED HEALTH CARE EDUCATION/TRAINING PROGRAM

## 2018-01-12 PROCEDURE — 99213 OFFICE O/P EST LOW 20 MIN: CPT | Mod: PBBFAC | Performed by: STUDENT IN AN ORGANIZED HEALTH CARE EDUCATION/TRAINING PROGRAM

## 2018-01-12 PROCEDURE — 99999 PR PBB SHADOW E&M-EST. PATIENT-LVL III: CPT | Mod: PBBFAC,,, | Performed by: STUDENT IN AN ORGANIZED HEALTH CARE EDUCATION/TRAINING PROGRAM

## 2018-01-12 RX ORDER — LEVONORGESTREL / ETHINYL ESTRADIOL AND ETHINYL ESTRADIOL 150-30(84)
1 KIT ORAL DAILY
Qty: 84 EACH | Refills: 3 | Status: SHIPPED | OUTPATIENT
Start: 2018-01-12 | End: 2018-05-10 | Stop reason: SDUPTHER

## 2018-01-13 NOTE — PROGRESS NOTES
Chief Complaint: Contraception follow up     HPI:      Anusha Andrew is a 27 y.o.  who presents today for follow up of contraception.  She reports worsening headache around the time of her cycles.  She also would like to try for fewer cycles.  Patient reports a history of depression and was previously on Wellbutrin.  She then stopped taking the medication and has since restarted it.  She reports mood improved.  LMP: Patient's last menstrual period was 2017 (exact date).      OB History      Para Term  AB Living    2 2 1 1   2    SAB TAB Ectopic Multiple Live Births            2        Obstetric Comments    Gestational HTN, failed induction, not past 6 cm  Menarche ~12        Past Medical History:   Diagnosis Date    Depression     Hyperlipidemia     Hypertension     Type I (juvenile type) diabetes mellitus without mention of complication, uncontrolled 2011     Past Surgical History:   Procedure Laterality Date    ABCESS DRAINAGE      boil went over GA     SECTION  2012    TONSILLECTOMY, ADENOIDECTOMY      WISDOM TOOTH EXTRACTION       Social History     Social History    Marital status: Single     Spouse name: N/A    Number of children: N/A    Years of education: N/A     Occupational History    Not on file.     Social History Main Topics    Smoking status: Never Smoker    Smokeless tobacco: Never Used    Alcohol use Yes      Comment: occasionally    Drug use: No    Sexual activity: Yes     Partners: Male     Birth control/ protection: OCP     Other Topics Concern    Not on file     Social History Narrative    No narrative on file     Family History   Problem Relation Age of Onset    Diabetes Brother     No Known Problems Mother     No Known Problems Father     Hydrocephalus Daughter     No Known Problems Maternal Aunt     No Known Problems Maternal Uncle     No Known Problems Paternal Aunt     No Known Problems Paternal Uncle     No Known  "Problems Maternal Grandmother     No Known Problems Maternal Grandfather     No Known Problems Paternal Grandmother     No Known Problems Paternal Grandfather     Amblyopia Neg Hx     Blindness Neg Hx     Cataracts Neg Hx     Glaucoma Neg Hx     Hypertension Neg Hx     Macular degeneration Neg Hx     Retinal detachment Neg Hx     Strabismus Neg Hx     Stroke Neg Hx     Thyroid disease Neg Hx     Breast cancer Neg Hx     Colon cancer Neg Hx     Ovarian cancer Neg Hx        ROS:     GENERAL: Denies unintentional weight gain or weight loss. Feeling well overall.   SKIN: Denies rash or lesions.   HEENT: Denies headaches, or vision changes.   CARDIOVASCULAR: Denies palpitations or chest pain.   RESPIRATORY: Denies shortness of breath or dyspnea on exertion.  BREASTS: Denies pain, lumps, or nipple discharge.   ABDOMEN: Denies abdominal pain, constipation, diarrhea, nausea, vomiting, change in appetite.  URINARY: Denies frequency, dysuria, hematuria.  NEUROLOGIC: Denies syncope or weakness.   PSYCHIATRIC: Denies depression, anxiety or mood swings.    Physical Exam:      PHYSICAL EXAM:  /80   Ht 5' 3" (1.6 m)   Wt 89.8 kg (197 lb 15.6 oz)   LMP 2017 (Exact Date)   Breastfeeding? No   BMI 35.07 kg/m²   Body mass index is 35.07 kg/m².     APPEARANCE: Well nourished, well developed, in no acute distress.  PSYCH: Appropriate mood and affect.  SKIN: No acne or hirsutism.    Labs:  UPT negative    Assessment/Plan:     27 y.o. F here for contraception management, doing well    Counselin.  Contraception  Reviewed options.  Patient desires few er cycle.s  R/B/A discussed.  Reviewed compliance, minor risks of heache, breast tenderness, irregular bleeding, bloating.  Also discussed risk of major side effects including blood clot, DVT, stroke, Mi.  All questions answered.  Patient voiced understanding and desires to proceed.  All questions answered.  2.  Continue follow up with PCP.  Encouraged " compliance with diabetic medication.  3.  RTC for annual this year.      Use of the [employer] Patient Portal discussed and encouraged during today's visit.

## 2018-01-15 ENCOUNTER — TELEPHONE (OUTPATIENT)
Dept: OBSTETRICS AND GYNECOLOGY | Facility: CLINIC | Age: 28
End: 2018-01-15

## 2018-01-22 ENCOUNTER — TELEPHONE (OUTPATIENT)
Dept: OBSTETRICS AND GYNECOLOGY | Facility: CLINIC | Age: 28
End: 2018-01-22

## 2018-01-29 ENCOUNTER — TELEPHONE (OUTPATIENT)
Dept: OBSTETRICS AND GYNECOLOGY | Facility: CLINIC | Age: 28
End: 2018-01-29

## 2018-01-29 NOTE — TELEPHONE ENCOUNTER
Received prior auth for pt. Called pt to verify if she had picked up b/c that was sent to her by Dr Holt, pt stated the the b/c wasn't covered by her insurance and it was to expensive. Pt stated that she is going to continue to take the b/c that is covered by her insurance.

## 2018-02-16 ENCOUNTER — PATIENT MESSAGE (OUTPATIENT)
Dept: OBSTETRICS AND GYNECOLOGY | Facility: CLINIC | Age: 28
End: 2018-02-16

## 2018-02-16 RX ORDER — BUPROPION HYDROCHLORIDE 150 MG/1
150 TABLET ORAL DAILY
Qty: 30 TABLET | Refills: 11 | Status: SHIPPED | OUTPATIENT
Start: 2018-02-16 | End: 2018-05-10 | Stop reason: SDUPTHER

## 2018-02-16 NOTE — TELEPHONE ENCOUNTER
Patient requesting refill of Wellbutrin. Seen on 1/12/18.    Allergies and Pharm UTD  Wellbutrin pended

## 2018-03-26 ENCOUNTER — PATIENT MESSAGE (OUTPATIENT)
Dept: OBSTETRICS AND GYNECOLOGY | Facility: CLINIC | Age: 28
End: 2018-03-26

## 2018-03-26 NOTE — TELEPHONE ENCOUNTER
No availability tomorrow; Bernabe is off and No NP and patient only wants [employer]. Advised her to go to women's urgent care in [facility] if she had to be seen tomorrow. Pt verbalized understanding.

## 2018-04-10 PROBLEM — F32.A DEPRESSION: Status: ACTIVE | Noted: 2018-04-10

## 2018-04-10 PROBLEM — Z71.89 COUNSELING AND COORDINATION OF CARE: Status: ACTIVE | Noted: 2018-04-10

## 2018-04-10 NOTE — PROGRESS NOTES
CC:  Ms. Anusha Andrew arrives today for management of Type 1 . There were no encounter diagnoses.       HPI: Ms. Anusha Andrew was diagnosed with Type 1  At age 17 years old. Pt has been hospitalized for DKA  X 3 years ago.    Pt was last seen by me in November 2016.  She has a 4 y/o  and 13 mos old (boy)-Rai.  a1c in dec 2017- elevated.  Needs lab work.    Lab Results   Component Value Date    HGBA1C 11.7 (H) 12/06/2017     Works morning shifts. Exercise 2 times a week w/ .  BG 6 times a day.  fbg 180s  Lunch 200s (largest meal at lunch)  Dinner 200s  Bedtime 150s       Hypoglycemia: No  Hypoglycemic Symptoms: dizziness, hunger and sweating  Hypoglycemia Treatment: orange juice, peppermints, glucose tabs   Frequency of Hypoglycemia: none in the past few weeks     Missing doses of diabetes medications: Yes-sometimes mostly at night/evening     Prandial Insulin Injections Taken with Meals: Yes  Carbohydrate Counting: Yes    Exercise: yes 2 x a week     Dietary Habits:   Unhealthy/ portion heavy  Drinks: sugar free packets/water, diet cold drinks  2-3 meals a day, seldom snacks     Polyuria: Yes  Polydipsia: No  Polyphagia: No    CURRENT DIABETIC MEDS: toujeo 40 units at night, novolog 10 units w/ meals no scale.   Uses Vials or Pens: pens   Type of Glucose Meter: relion     Last Eye Exam: 2017, wears glasses, due in May 2018   Last Podiatry Exam: none     REVIEW OF SYSTEMS  Personally reviewed past medical, social, and family history.     General: + weight loss #25 since march 2017, weakness,  fatigue, or fever.   Eyes: denies eye pain, redness, excessive tearing, glaucoma, or cataracts.   Cardiac: no palpitations, chest pain, or edema.   Respiratory: no cough, denies dyspnea, or wheezing.   GI: no abdominal pain, heartburn, loss of appetite, no ausea   Skin: no rashes, itching, dryness, or color changes.   Neuro: + mood changes/depression (seen by therapist in 2015, prn) , headaches, loss of  "sensation, or tingling.  +polydipsia    Vital Signs  Personally reviewed the labs noted below.     /82 (BP Location: Left arm, Patient Position: Sitting)   Pulse 76   Ht 5' 3" (1.6 m)   Wt 91.6 kg (202 lb)   BMI 35.78 kg/m²     Hemoglobin A1C   Date Value Ref Range Status   12/06/2017 11.7 (H) 4.0 - 5.6 % Final     Comment:     According to ADA guidelines, hemoglobin A1c <7.0% represents  optimal control in non-pregnant diabetic patients. Different  metrics may apply to specific patient populations.   Standards of Medical Care in Diabetes-2016.  For the purpose of screening for the presence of diabetes:  <5.7%     Consistent with the absence of diabetes  5.7-6.4%  Consistent with increasing risk for diabetes   (prediabetes)  >or=6.5%  Consistent with diabetes  Currently, no consensus exists for use of hemoglobin A1c  for diagnosis of diabetes for children.  This Hemoglobin A1c assay has significant interference with fetal   hemoglobin   (HbF). The results are invalid for patients with abnormal amounts of   HbF,   including those with known Hereditary Persistence   of Fetal Hemoglobin. Heterozygous hemoglobin variants (HbAS, HbAC,   HbAD, HbAE, HbA2) do not significantly interfere with this assay;   however, presence of multiple variants in a sample may impact the %   interference.     11/02/2017 11.4 (H) 4.0 - 5.6 % Final     Comment:     According to ADA guidelines, hemoglobin A1c <7.0% represents  optimal control in non-pregnant diabetic patients. Different  metrics may apply to specific patient populations.   Standards of Medical Care in Diabetes-2016.  For the purpose of screening for the presence of diabetes:  <5.7%     Consistent with the absence of diabetes  5.7-6.4%  Consistent with increasing risk for diabetes   (prediabetes)  >or=6.5%  Consistent with diabetes  Currently, no consensus exists for use of hemoglobin A1c  for diagnosis of diabetes for children.  This Hemoglobin A1c assay has " significant interference with fetal   hemoglobin   (HbF). The results are invalid for patients with abnormal amounts of   HbF,   including those with known Hereditary Persistence   of Fetal Hemoglobin. Heterozygous hemoglobin variants (HbAS, HbAC,   HbAD, HbAE, HbA2) do not significantly interfere with this assay;   however, presence of multiple variants in a sample may impact the %   interference.     03/14/2017 7.9 (H) 4.5 - 6.2 % Final     Comment:     According to ADA guidelines, hemoglobin A1C <7.0% represents  optimal control in non-pregnant diabetic patients.  Different  metrics may apply to specific populations.   Standards of Medical Care in Diabetes - 2016.  For the purpose of screening for the presence of diabetes:  <5.7%     Consistent with the absence of diabetes  5.7-6.4%  Consistent with increasing risk for diabetes   (prediabetes)  >or=6.5%  Consistent with diabetes  Currently no consensus exists for use of hemoglobin A1C  for diagnosis of diabetes for children.         Chemistry        Component Value Date/Time     01/08/2018 0323    K 4.3 01/08/2018 0323     01/08/2018 0323    CO2 17 (L) 01/08/2018 0323    BUN 7 01/08/2018 0323    CREATININE 0.8 01/08/2018 0323     (H) 01/08/2018 0323        Component Value Date/Time    CALCIUM 8.3 (L) 01/08/2018 0323    ALKPHOS 136 (H) 01/07/2018 0939    AST 17 01/07/2018 0939    ALT 21 01/07/2018 0939    BILITOT 0.4 01/07/2018 0939          Lab Results   Component Value Date    CHOL 256 (H) 12/07/2016    CHOL 199 07/01/2015     Lab Results   Component Value Date    HDL 88 (H) 12/07/2016    HDL 80 (H) 07/01/2015     Lab Results   Component Value Date    LDLCALC 143.8 12/07/2016    LDLCALC 97.6 07/01/2015     Lab Results   Component Value Date    TRIG 121 12/07/2016    TRIG 107 07/01/2015     Lab Results   Component Value Date    CHOLHDL 34.4 12/07/2016    CHOLHDL 40.2 07/01/2015       Lab Results   Component Value Date    TSH 1.459 11/10/2017        CrCl cannot be calculated (Patient's most recent lab result is older than the maximum 7 days allowed.).    Lab Results   Component Value Date    MICALBCREAT 15.8 07/17/2015       Vit D, 25-Hydroxy   Date Value Ref Range Status   01/07/2013 9 (L) 30 - 100 ng/mL Final     Comment:     Vitamin D, 25-Hydroxy:  Vitamin D deficiency <10 ng/mL  Vitamin D insufficiency 10-30 ng/mL  Vitamin D sufficiency >30 ng/mL  Vitamin D potential toxicity >100 ng/mL       PHYSICAL EXAMINATION  Constitutional: Appears well, no distress  Neck: Supple, trachea midline.   Respiratory: no wheezes, even and unlabored.  Cardiovascular: RRR, S1 and S2 with no murmurs or extra sounds; no carotid bruits.  Lymph: DP pulses  2+ bilaterally; trace edema.   Skin: warm and dry; no rash, petechiae, ecchymosis, or acanthosis nigracans observed.  Neuro: reflexes 2+ and symmetric; patient alert and cooperative.  Visual Inspection (Evidence of Foot Deformity, Ulceration, Nails Intact, Skin Intact):   Pedal Pulses: Present     Visual Inspection:  Normal -  Bilateral    Pedal Pulses:   Right: Present  Left: Present    Posterior tibialis:   Right:Present  Left: Present      Assessment/Plan  1. Poorly controlled type 1 diabetes mellitus  Hemoglobin A1c    Basic metabolic panel    Ambulatory Referral to Diabetes Education  -pump eval  Increase toujeo to 45 units at night  Change 13 units w/ meals plus scale 150/+2, etc  Pump settings:  1.6 u/hr  3.5 iob  1:5 icr  20 isf  120 target  Cgm 80<>250 alarms  a1c goal less than 7%  a1c uncontrolled  Discussed optimal bg management, complications   2. Obesity (BMI 30.0-34.9)  Body mass index is 35.78 kg/m². may increase insulin resistance.   Exercise increase to 3 times a week  Currently 2 x a week    3. Counseling and coordination of care  See above    4. Depression, unspecified depression type  On wellbutrin      FOLLOW UP  Follow-up in about 3 months (around 7/11/2018).     Orders Placed This Encounter    Procedures    Hemoglobin A1c     Standing Status:   Future     Standing Expiration Date:   6/9/2019    Basic metabolic panel     Standing Status:   Future     Standing Expiration Date:   6/9/2019    Ambulatory Referral to Diabetes Education     Referral Priority:   Routine     Referral Type:   Consultation     Referral Reason:   Specialty Services Required     Requested Specialty:   Endocrinology     Number of Visits Requested:   1     Expiration Date:   4/10/2019

## 2018-04-11 ENCOUNTER — OFFICE VISIT (OUTPATIENT)
Dept: ENDOCRINOLOGY | Facility: CLINIC | Age: 28
End: 2018-04-11
Payer: MEDICAID

## 2018-04-11 VITALS
BODY MASS INDEX: 35.79 KG/M2 | HEART RATE: 76 BPM | HEIGHT: 63 IN | SYSTOLIC BLOOD PRESSURE: 118 MMHG | DIASTOLIC BLOOD PRESSURE: 82 MMHG | WEIGHT: 202 LBS

## 2018-04-11 DIAGNOSIS — Z71.89 COUNSELING AND COORDINATION OF CARE: ICD-10-CM

## 2018-04-11 DIAGNOSIS — F32.A DEPRESSION, UNSPECIFIED DEPRESSION TYPE: ICD-10-CM

## 2018-04-11 DIAGNOSIS — E10.65 POORLY CONTROLLED TYPE 1 DIABETES MELLITUS: Primary | ICD-10-CM

## 2018-04-11 DIAGNOSIS — E66.9 OBESITY (BMI 30.0-34.9): ICD-10-CM

## 2018-04-11 PROCEDURE — 99214 OFFICE O/P EST MOD 30 MIN: CPT | Mod: S$PBB,,, | Performed by: NURSE PRACTITIONER

## 2018-04-11 PROCEDURE — 99214 OFFICE O/P EST MOD 30 MIN: CPT | Mod: PBBFAC | Performed by: NURSE PRACTITIONER

## 2018-04-11 PROCEDURE — 99999 PR PBB SHADOW E&M-EST. PATIENT-LVL IV: CPT | Mod: PBBFAC,,, | Performed by: NURSE PRACTITIONER

## 2018-04-11 RX ORDER — INSULIN ASPART 100 [IU]/ML
INJECTION, SOLUTION INTRAVENOUS; SUBCUTANEOUS
Qty: 1 BOX | Refills: 6 | Status: SHIPPED | OUTPATIENT
Start: 2018-04-11 | End: 2018-05-28 | Stop reason: CLARIF

## 2018-04-11 NOTE — PATIENT INSTRUCTIONS
Increase levemir to 45 units at night  Change novolog to 13 units w/ meals plus scale 150-200+2, 201-250+4, 251-300+6, 301-350+8        Snacks can be an important part of a balanced, healthy meal plan. They allow you to eat more frequently, feeling full and satisfied throughout the day. Also, they allow you to spread carbohydrates evenly, which may stabilize blood sugars.  Plus, snacks are enjoyable!     The amount of carbohydrate needed at snacks varies. Generally, about 15-30 grams of carbohydrate per snack is recommended.  Below you will find some tasty treats.       0-5 gm carb   Crystal Light   Vitamin Water Zero   Herbal tea, unsweetened   2 tsp peanut butter on celery   1./2 cup sugar-free jell-o   1 sugar-free popsicle   ¼ cup blueberries   8oz Blue Geneva unsweetened almond milk   5 baby carrots & celery sticks, cucumbers, bell peppers dipped in ¼ cup salsa, 2Tbsp light ranch dressing or 2Tbsp plain Greek yogurt   10 Goldfish crackers   ½ oz low-fat cheese or string cheese   1 closed handful of nuts, unsalted   1 Tbsp of sunflower seeds, unsalted   1 cup Smart Pop popcorn   1 whole grain brown rice cake        15 gm carb   1 small piece of fruit or ½ banana or 1/2 cup lite canned fruit   3 eliane cracker squares   3 cups Smart Pop popcorn, top spray butter, Fitzpatrick lite salt or cinnamon and Truvia   5 Vanilla Wafers   ½ cup low fat, no added sugar ice cream or frozen yogurt (Blue bell, Blue Bunny, Weight Watchers, Skinny Cow)   ½ turkey, ham, or chicken sandwich   ½ c fruit with ½ c Cottage cheese   4-6 unsalted wheat crackers with 1 oz low fat cheese or 1 tbsp peanut butter    30-45 goldfish crackers (depending on flavor)    7-8 Judaism mini brown rice cakes (caramel, apple cinnamon, chocolate)    12 Judaism mini brown rice cakes (cheddar, bbq, ranch)    1/3 cup hummus dip with raw veg   1/2 whole wheat yolanda, 1Tbsp hummus   Mini Pizza (1/2 whole wheat English muffin, low-fat   cheese, tomato sauce)   100 calorie snack pack (Oreo, Chips Ahoy, Ritz Mix, Baked Cheetos)   4-6 oz. light or Greek Style yogurt (Chobani, Yoplait, Okios, [place]oneyfield)   ½ cup sugar-free pudding     6 in. wheat tortilla or yolanda oven toasted chips (topped with spray butter flavoring, cinnamon, Truvia OR spray butter, garlic powder, chili powder)    18 BBQ Popchips (available at [employer], [employer], [employer])                   Diabetes Support Group Meetings         Date: Topic:   February 8 Health Promotion/Cooking Demo   March 8 Taking Care of Your Kidneys   April 12 Taking Care of Your Feet   May 10 Ease Your Mind with Diabetes   Nhi 14 Summer Treats/Cooking Demo   July 12 Super Market Sweep   August 9 Taking Care of Your Eyes   Sept 13 Technology/ADA updates   October 11 Recipes & Treats/Cooking Demo   November 8 Heart Health/Pump it up!   December 13 Year-End Close Out        Meetings are held in the [facility] (A) of the [facility] located at [address] [place] [address]. Please call [phone] for additional information.    Free service, offered every 2nd Thursday of every month! Family members and/or friends are welcome as well!  Support group is for patients with type 1 or type 2 diabetes.    From 3:30p to 4:30p

## 2018-04-12 ENCOUNTER — TELEPHONE (OUTPATIENT)
Dept: OBSTETRICS AND GYNECOLOGY | Facility: CLINIC | Age: 28
End: 2018-04-12

## 2018-04-12 ENCOUNTER — LAB VISIT (OUTPATIENT)
Dept: LAB | Facility: OTHER | Age: 28
End: 2018-04-12
Payer: MEDICAID

## 2018-04-12 DIAGNOSIS — E10.65 POORLY CONTROLLED TYPE 1 DIABETES MELLITUS: ICD-10-CM

## 2018-04-12 LAB
ESTIMATED AVG GLUCOSE: 235 MG/DL
HBA1C MFR BLD HPLC: 9.8 %

## 2018-04-12 PROCEDURE — 36415 COLL VENOUS BLD VENIPUNCTURE: CPT

## 2018-04-12 PROCEDURE — 83036 HEMOGLOBIN GLYCOSYLATED A1C: CPT

## 2018-04-19 RX ORDER — INSULIN LISPRO 100 [IU]/ML
INJECTION, SOLUTION INTRAVENOUS; SUBCUTANEOUS
Qty: 1 BOX | Refills: 6 | Status: SHIPPED | OUTPATIENT
Start: 2018-04-19 | End: 2018-05-28 | Stop reason: SDUPTHER

## 2018-05-06 ENCOUNTER — HOSPITAL ENCOUNTER (EMERGENCY)
Facility: HOSPITAL | Age: 28
Discharge: HOME | End: 2018-05-06
Attending: EMERGENCY MEDICINE
Payer: MEDICAID

## 2018-05-06 VITALS
OXYGEN SATURATION: 99 % | TEMPERATURE: 99 F | HEART RATE: 94 BPM | RESPIRATION RATE: 19 BRPM | BODY MASS INDEX: 31.89 KG/M2 | SYSTOLIC BLOOD PRESSURE: 129 MMHG | WEIGHT: 180 LBS | DIASTOLIC BLOOD PRESSURE: 76 MMHG | HEIGHT: 63 IN

## 2018-05-06 DIAGNOSIS — J02.9 VIRAL PHARYNGITIS: Primary | ICD-10-CM

## 2018-05-06 LAB
B-HCG UR QL: NEGATIVE
CTP QC/QA: YES
DEPRECATED S PYO AG THROAT QL EIA: NEGATIVE

## 2018-05-06 PROCEDURE — 82962 GLUCOSE BLOOD TEST: CPT

## 2018-05-06 PROCEDURE — 87880 STREP A ASSAY W/OPTIC: CPT

## 2018-05-06 PROCEDURE — 99283 EMERGENCY DEPT VISIT LOW MDM: CPT | Mod: 25

## 2018-05-06 PROCEDURE — 81025 URINE PREGNANCY TEST: CPT | Performed by: NURSE PRACTITIONER

## 2018-05-06 PROCEDURE — 87081 CULTURE SCREEN ONLY: CPT

## 2018-05-07 LAB — POCT GLUCOSE: 267 MG/DL (ref 70–110)

## 2018-05-07 NOTE — ED PROVIDER NOTES
Encounter Date: 2018  SORT : This is a 26 y/o female that presents to the ER with sore throat and headache for 3 days. She has had previous tonsillectomy/adenoidectomy.  She denies fevers, congestion, cough, abdominal pain, nausea/vomiting.  She is also a Type I diabetic; accucheck at triage - 267 mg/dL.  Reports compliance with Toujeo and Novolog.  Awaiting further evaluation.     History     Chief Complaint   Patient presents with    Sore Throat     pt reports frontal headache and sore throat ongoing for 3 days; pt denies any other symptoms at this time    Headache     Chief complaint:  Sore throat and headache    History of present illness:  Patient is a 27-year-old female who presents with 3 days of frontal headache that is constant.  She also reports sore throat with the character of throbbing and stabbing also for the last 3 days.  She reports having taken a Goody Powder which has not alleviated the symptoms.  Swelling makes the pain worse.  Current severity pain is 8/10.  She denies fever, chills, congestion, runny nose, cough, chest pain, diarrhea, nausea, vomiting, eyes that are watery or itchy, rash. She endorses headache, sore throat, ear pain.      The history is provided by the patient. No  was used.     Review of patient's allergies indicates:   Allergen Reactions    No known allergies      Past Medical History:   Diagnosis Date    Depression     Hyperlipidemia     Hypertension     Type I (juvenile type) diabetes mellitus without mention of complication, uncontrolled 2011     Past Surgical History:   Procedure Laterality Date    ABCESS DRAINAGE      boil went over GA     SECTION  2012    TONSILLECTOMY, ADENOIDECTOMY      WISDOM TOOTH EXTRACTION       Family History   Problem Relation Age of Onset    Diabetes Brother     No Known Problems Mother     No Known Problems Father     Hydrocephalus Daughter     No Known Problems Maternal Aunt     No  Known Problems Maternal Uncle     No Known Problems Paternal Aunt     No Known Problems Paternal Uncle     No Known Problems Maternal Grandmother     No Known Problems Maternal Grandfather     No Known Problems Paternal Grandmother     No Known Problems Paternal Grandfather     Amblyopia Neg Hx     Blindness Neg Hx     Cataracts Neg Hx     Glaucoma Neg Hx     Hypertension Neg Hx     Macular degeneration Neg Hx     Retinal detachment Neg Hx     Strabismus Neg Hx     Stroke Neg Hx     Thyroid disease Neg Hx     Breast cancer Neg Hx     Colon cancer Neg Hx     Ovarian cancer Neg Hx      Social History   Substance Use Topics    Smoking status: Never Smoker    Smokeless tobacco: Never Used    Alcohol use Yes      Comment: occasionally     Review of Systems   Constitutional: Negative for chills, fatigue and fever.   HENT: Positive for ear pain and sore throat. Negative for congestion, ear discharge, postnasal drip, rhinorrhea, sinus pressure, sneezing and voice change.    Eyes: Negative for discharge and itching.   Respiratory: Negative for cough, shortness of breath and wheezing.    Cardiovascular: Negative for chest pain, palpitations and leg swelling.   Gastrointestinal: Negative for abdominal pain, constipation, diarrhea, nausea and vomiting.   Endocrine: Negative for polydipsia, polyphagia and polyuria.   Genitourinary: Negative for dysuria, frequency, hematuria, urgency, vaginal bleeding, vaginal discharge and vaginal pain.   Musculoskeletal: Negative for arthralgias and myalgias.   Skin: Negative for rash and wound.   Neurological: Positive for headaches. Negative for dizziness, seizures, syncope, weakness and numbness.   Hematological: Negative for adenopathy. Does not bruise/bleed easily.   Psychiatric/Behavioral: Negative for self-injury and suicidal ideas. The patient is not nervous/anxious.        Physical Exam     Initial Vitals [05/06/18 1909]   BP Pulse Resp Temp SpO2   126/74 98 16 99  °F (37.2 °C) 100 %      MAP       91.33         Physical Exam    Nursing note and vitals reviewed.  Constitutional: She appears well-developed and well-nourished.   HENT:   Head: Normocephalic and atraumatic.   Right Ear: External ear normal.   Left Ear: External ear normal.   Nose: Nose normal. No epistaxis.   Mouth/Throat: Oropharynx is clear and moist.   Eyes: Conjunctivae and EOM are normal. Pupils are equal, round, and reactive to light. Right eye exhibits no discharge. Left eye exhibits no discharge.   Neck: Normal range of motion.   Cardiovascular: Regular rhythm, S1 normal, S2 normal and normal heart sounds. Exam reveals no gallop.    No murmur heard.  Pulmonary/Chest: Effort normal and breath sounds normal. No respiratory distress. She has no decreased breath sounds. She has no wheezes. She has no rhonchi. She has no rales.   Abdominal: She exhibits no distension.   Musculoskeletal: Normal range of motion.   Neurological: She is alert and oriented to person, place, and time. She has normal strength. No cranial nerve deficit or sensory deficit. She exhibits normal muscle tone. She displays a negative Romberg sign. Coordination and gait normal. GCS eye subscore is 4. GCS verbal subscore is 5. GCS motor subscore is 6.   Equal  strength bilaterally, equal bicep flexion and tricep extension strength, leg extension and flexion strength appropriate and equal, foot plantar- and dorsi-flexion equal and appropriate   Skin: Skin is dry. Capillary refill takes less than 2 seconds.         ED Course   Procedures  Labs Reviewed   POCT URINE PREGNANCY   POCT GLUCOSE MONITORING CONTINUOUS                   APC / Resident Notes:   This is an evaluation of a 27 y.o. female that presents to the Emergency Department for sore throat, headache. The patient is a non-toxic, afebrile, and well appearing female. On physical exam, there is a midline uvula; she has no drooling, hoarseness, trismus, facial swelling, meningeal  signs; no findings to suggest peritonsillar or retropharyngeal abscess, epiglottitis, or airway compromise. There is no cervical lymphadenopathy. TM's WNL. Breath sounds clear and equal to ascultation bilaterally.      Vital Signs Are Reassuring.   Rapid Strep Test: negative    Given the above findings, my overall impression is viral phayrntitis. In addition, I do not think the patient has strep pharyngitis based on [facility] Criteria and a Negative Rapid Strep Test. However a reflex strep culture is pending. I do not suspect OM, OE, peritonsillar abscess, retropharyngeal abscess, epiglotitis, meningitis, or airway compromise.    Home Care Recommendations: OTC medications for symptomatic relief, sore throat self care DC instructions. The diagnosis, treatment plan, instructions for follow-up and reevaluation with Primary Care as well as ED return precautions have been discussed with the patient and understanding of the information was verbalized. All questions or concerns from the patient have been addressed.     This case was discussed with Dr. Gamboa who is in agreement with my assessment and plan.                ED Course as of May 07 0007   Sun May 06, 2018   1948 Preg Test, Ur: Negative [VC]   2029 Rapid Strep A Screen: Negative [VC]      ED Course User Index  [VC] Jacob Barroso DNP     Clinical Impression:   The encounter diagnosis was Viral pharyngitis.    Disposition:   Disposition: Discharged  Condition: Stable                        Jacob Barroso DNP  05/07/18 0009       Jacob Barroso DNP  05/07/18 0012

## 2018-05-07 NOTE — DISCHARGE INSTRUCTIONS
Cepacol lozenges, warm fluids to drink, and warm salt water gargles for sore throat. Return to the Emergency department for any worsening or failure to improve, otherwise follow up with your primary care provider.

## 2018-05-08 LAB — BACTERIA THROAT CULT: NORMAL

## 2018-05-10 ENCOUNTER — OFFICE VISIT (OUTPATIENT)
Dept: OBSTETRICS AND GYNECOLOGY | Facility: CLINIC | Age: 28
End: 2018-05-10
Attending: OBSTETRICS & GYNECOLOGY
Payer: MEDICAID

## 2018-05-10 VITALS
BODY MASS INDEX: 35.16 KG/M2 | HEIGHT: 63 IN | SYSTOLIC BLOOD PRESSURE: 128 MMHG | DIASTOLIC BLOOD PRESSURE: 78 MMHG | WEIGHT: 198.44 LBS

## 2018-05-10 DIAGNOSIS — Z01.419 ENCOUNTER FOR GYNECOLOGICAL EXAMINATION (GENERAL) (ROUTINE) WITHOUT ABNORMAL FINDINGS: ICD-10-CM

## 2018-05-10 DIAGNOSIS — N76.0 ACUTE VAGINITIS: ICD-10-CM

## 2018-05-10 DIAGNOSIS — Z12.4 ENCOUNTER FOR PAPANICOLAOU SMEAR FOR CERVICAL CANCER SCREENING: Primary | ICD-10-CM

## 2018-05-10 PROCEDURE — 99213 OFFICE O/P EST LOW 20 MIN: CPT | Mod: PBBFAC | Performed by: OBSTETRICS & GYNECOLOGY

## 2018-05-10 PROCEDURE — 87510 GARDNER VAG DNA DIR PROBE: CPT

## 2018-05-10 PROCEDURE — 99395 PREV VISIT EST AGE 18-39: CPT | Mod: S$PBB,,, | Performed by: OBSTETRICS & GYNECOLOGY

## 2018-05-10 PROCEDURE — 87480 CANDIDA DNA DIR PROBE: CPT

## 2018-05-10 PROCEDURE — 99999 PR PBB SHADOW E&M-EST. PATIENT-LVL III: CPT | Mod: PBBFAC,,, | Performed by: OBSTETRICS & GYNECOLOGY

## 2018-05-10 PROCEDURE — 87491 CHLMYD TRACH DNA AMP PROBE: CPT

## 2018-05-10 PROCEDURE — 88175 CYTOPATH C/V AUTO FLUID REDO: CPT

## 2018-05-10 RX ORDER — BUPROPION HYDROCHLORIDE 150 MG/1
150 TABLET ORAL DAILY
Qty: 30 TABLET | Refills: 11 | Status: SHIPPED | OUTPATIENT
Start: 2018-05-10 | End: 2018-07-11

## 2018-05-10 RX ORDER — LEVONORGESTREL / ETHINYL ESTRADIOL AND ETHINYL ESTRADIOL 150-30(84)
1 KIT ORAL DAILY
Qty: 84 EACH | Refills: 3 | Status: SHIPPED | OUTPATIENT
Start: 2018-05-10 | End: 2018-07-18

## 2018-05-10 NOTE — PROGRESS NOTES
Subjective:       Patient ID: Anusha Andrew is a 27 y.o. female.    Chief Complaint:  Well Woman ( pt past pap 2017 normal )      Patient Active Problem List   Diagnosis    Obesity (BMI 30.0-34.9)    Poorly controlled type 1 diabetes mellitus     delivery delivered    Diabetic ketoacidosis without coma associated with type 1 diabetes mellitus    Counseling and coordination of care    Depression       History of Present Illness  27 y.o. yo  here for annual exam. No gyn complaints. Doing well. Happy with OCP. Does do better with depression on Wellbutrin, but stops it when she starts feeling good. Wants to restart. All questions answered. Thinks she may have yeast infection      Past Medical History:   Diagnosis Date    Depression     Hyperlipidemia     Hypertension     Type I (juvenile type) diabetes mellitus without mention of complication, uncontrolled 2011       Past Surgical History:   Procedure Laterality Date    ABCESS DRAINAGE      boil went over GA     SECTION  2012    TONSILLECTOMY, ADENOIDECTOMY      WISDOM TOOTH EXTRACTION         OB History    Para Term  AB Living   2 2 1 1   2   SAB TAB Ectopic Multiple Live Births           2      # Outcome Date GA Lbr Hawk/2nd Weight Sex Delivery Anes PTL Lv   2  17 34w2d  3.16 kg (6 lb 15.5 oz) M CS-LTranv Spinal, EPI Y KRYSTIAN      Complications: Diabetes   1 Term 12 37w5d  3.286 kg (7 lb 3.9 oz) F CS-LTranv EPI  KRYSTIAN      Complications: Preeclampsia      Obstetric Comments   Gestational HTN, failed induction, not past 6 cm   Menarche ~12       Patient's last menstrual period was 2018 (approximate).   Date of Last Pap: 2017    Review of Systems  Review of Systems   Constitutional: Negative for fatigue and unexpected weight change.   Respiratory: Negative for shortness of breath.    Cardiovascular: Negative for chest pain.   Gastrointestinal: Negative for abdominal pain,  constipation, diarrhea, nausea and vomiting.   Genitourinary: Negative for dysuria.   Musculoskeletal: Negative for back pain.   Skin: Negative for rash.   Neurological: Negative for headaches.   Hematological: Does not bruise/bleed easily.   Psychiatric/Behavioral: Negative for behavioral problems.        Objective:   Physical Exam:   Constitutional: She is oriented to person, place, and time. Vital signs are normal. She appears well-developed and well-nourished. No distress.        Pulmonary/Chest: She exhibits no mass. Right breast exhibits no mass, no nipple discharge, no skin change, no tenderness, no bleeding and no swelling. Left breast exhibits no mass, no nipple discharge, no skin change, no tenderness, no bleeding and no swelling. Breasts are symmetrical.        Abdominal: Soft. Normal appearance and bowel sounds are normal. She exhibits no distension and no mass. There is no tenderness. There is no rebound.     Genitourinary: Vagina normal and uterus normal. There is no rash, tenderness, lesion or injury on the right labia. There is no rash, tenderness, lesion or injury on the left labia. Uterus is not deviated, not enlarged, not fixed, not tender, not hosting fibroids and not experiencing uterine prolapse. Cervix is normal. Right adnexum displays no mass, no tenderness and no fullness. Left adnexum displays no mass, no tenderness and no fullness. No erythema, tenderness, rectocele, cystocele or unspecified prolapse of vaginal walls in the vagina. No vaginal discharge found. Cervix exhibits no motion tenderness, no discharge and no friability.           Musculoskeletal: Normal range of motion and moves all extremeties.      Lymphadenopathy:     She has no axillary adenopathy.        Right: No supraclavicular adenopathy present.        Left: No supraclavicular adenopathy present.    Neurological: She is alert and oriented to person, place, and time.    Skin: Skin is warm and dry.    Psychiatric: She has a  normal mood and affect. Her behavior is normal. Judgment normal.        Assessment/ Plan:     1. Encounter for Papanicolaou smear for cervical cancer screening  Liquid-based pap smear, screening   2. Acute vaginitis  Vaginosis Screen by DNA Probe    C. trachomatis/N. gonorrhoeae by AMP DNA Cervicovaginal   3. Encounter for gynecological examination (general) (routine) without abnormal findings  buPROPion (WELLBUTRIN XL) 150 MG TB24 tablet    L norgest/e.estradiol-e.estrad 0.15 mg-30 mcg (84)/10 mcg (7) 3MPk       Follow-up with me in 1 year

## 2018-05-11 LAB
CANDIDA RRNA VAG QL PROBE: POSITIVE
G VAGINALIS RRNA GENITAL QL PROBE: NEGATIVE
T VAGINALIS RRNA GENITAL QL PROBE: NEGATIVE

## 2018-05-13 LAB
C TRACH DNA SPEC QL NAA+PROBE: NOT DETECTED
N GONORRHOEA DNA SPEC QL NAA+PROBE: NOT DETECTED

## 2018-05-14 ENCOUNTER — PATIENT MESSAGE (OUTPATIENT)
Dept: OBSTETRICS AND GYNECOLOGY | Facility: CLINIC | Age: 28
End: 2018-05-14

## 2018-05-14 RX ORDER — FLUCONAZOLE 150 MG/1
150 TABLET ORAL DAILY
Qty: 3 TABLET | Refills: 0 | Status: SHIPPED | OUTPATIENT
Start: 2018-05-14 | End: 2018-05-17

## 2018-05-14 NOTE — TELEPHONE ENCOUNTER
Pt seen on 5/10/18 saw on portal that she is positive for yeast.     Allergies and Pharm UTD  Diflucan pended

## 2018-05-27 ENCOUNTER — PATIENT MESSAGE (OUTPATIENT)
Dept: ENDOCRINOLOGY | Facility: CLINIC | Age: 28
End: 2018-05-27

## 2018-05-27 DIAGNOSIS — E10.65 TYPE 1 DIABETES MELLITUS WITH HYPERGLYCEMIA: Primary | ICD-10-CM

## 2018-05-28 RX ORDER — INSULIN LISPRO 100 [IU]/ML
INJECTION, SOLUTION INTRAVENOUS; SUBCUTANEOUS
Qty: 60 ML | Refills: 6 | Status: SHIPPED | OUTPATIENT
Start: 2018-05-28 | End: 2018-06-29 | Stop reason: SDUPTHER

## 2018-06-24 ENCOUNTER — HOSPITAL ENCOUNTER (EMERGENCY)
Facility: HOSPITAL | Age: 28
Discharge: HOME | End: 2018-06-24
Attending: EMERGENCY MEDICINE
Payer: MEDICAID

## 2018-06-24 VITALS
HEIGHT: 63 IN | DIASTOLIC BLOOD PRESSURE: 99 MMHG | OXYGEN SATURATION: 99 % | WEIGHT: 196 LBS | RESPIRATION RATE: 20 BRPM | TEMPERATURE: 99 F | SYSTOLIC BLOOD PRESSURE: 146 MMHG | HEART RATE: 99 BPM | BODY MASS INDEX: 34.73 KG/M2

## 2018-06-24 DIAGNOSIS — R06.00 DYSPNEA: ICD-10-CM

## 2018-06-24 DIAGNOSIS — R11.0 NAUSEA: Primary | ICD-10-CM

## 2018-06-24 LAB
ALBUMIN SERPL BCP-MCNC: 3.3 G/DL
ALP SERPL-CCNC: 70 U/L
ALT SERPL W/O P-5'-P-CCNC: 15 U/L
ANION GAP SERPL CALC-SCNC: 9 MMOL/L
AST SERPL-CCNC: 15 U/L
B-HCG UR QL: NEGATIVE
BACTERIA #/AREA URNS AUTO: NORMAL /HPF
BASOPHILS # BLD AUTO: 0.03 K/UL
BASOPHILS NFR BLD: 0.4 %
BILIRUB SERPL-MCNC: 0.4 MG/DL
BILIRUB UR QL STRIP: NEGATIVE
BUN SERPL-MCNC: 11 MG/DL
CALCIUM SERPL-MCNC: 9.2 MG/DL
CHLORIDE SERPL-SCNC: 106 MMOL/L
CLARITY UR REFRACT.AUTO: CLEAR
CO2 SERPL-SCNC: 21 MMOL/L
COLOR UR AUTO: ABNORMAL
CREAT SERPL-MCNC: 0.9 MG/DL
CTP QC/QA: YES
D DIMER PPP IA.FEU-MCNC: 0.95 MG/L FEU
DIFFERENTIAL METHOD: ABNORMAL
EOSINOPHIL # BLD AUTO: 0.1 K/UL
EOSINOPHIL NFR BLD: 0.8 %
ERYTHROCYTE [DISTWIDTH] IN BLOOD BY AUTOMATED COUNT: 13.2 %
EST. GFR  (AFRICAN AMERICAN): >60 ML/MIN/1.73 M^2
EST. GFR  (NON AFRICAN AMERICAN): >60 ML/MIN/1.73 M^2
GLUCOSE SERPL-MCNC: 368 MG/DL
GLUCOSE UR QL STRIP: ABNORMAL
HCT VFR BLD AUTO: 40.6 %
HGB BLD-MCNC: 13.1 G/DL
HGB UR QL STRIP: NEGATIVE
IMM GRANULOCYTES # BLD AUTO: 0.02 K/UL
IMM GRANULOCYTES NFR BLD AUTO: 0.2 %
KETONES UR QL STRIP: ABNORMAL
LEUKOCYTE ESTERASE UR QL STRIP: ABNORMAL
LIPASE SERPL-CCNC: 35 U/L
LYMPHOCYTES # BLD AUTO: 3.1 K/UL
LYMPHOCYTES NFR BLD: 36.8 %
MCH RBC QN AUTO: 26.8 PG
MCHC RBC AUTO-ENTMCNC: 32.3 G/DL
MCV RBC AUTO: 83 FL
MICROSCOPIC COMMENT: NORMAL
MONOCYTES # BLD AUTO: 0.5 K/UL
MONOCYTES NFR BLD: 5.4 %
NEUTROPHILS # BLD AUTO: 4.7 K/UL
NEUTROPHILS NFR BLD: 56.4 %
NITRITE UR QL STRIP: NEGATIVE
NRBC BLD-RTO: 0 /100 WBC
PH UR STRIP: 6 [PH] (ref 5–8)
PLATELET # BLD AUTO: 369 K/UL
PMV BLD AUTO: 10 FL
POCT GLUCOSE: 338 MG/DL (ref 70–110)
POTASSIUM SERPL-SCNC: 4.4 MMOL/L
PROT SERPL-MCNC: 7.6 G/DL
PROT UR QL STRIP: NEGATIVE
RBC # BLD AUTO: 4.88 M/UL
RBC #/AREA URNS AUTO: 1 /HPF (ref 0–4)
SODIUM SERPL-SCNC: 136 MMOL/L
SP GR UR STRIP: >=1.03 (ref 1–1.03)
SQUAMOUS #/AREA URNS AUTO: 2 /HPF
URN SPEC COLLECT METH UR: ABNORMAL
UROBILINOGEN UR STRIP-ACNC: NEGATIVE EU/DL
WBC # BLD AUTO: 8.38 K/UL
WBC #/AREA URNS AUTO: 4 /HPF (ref 0–5)
YEAST UR QL AUTO: NORMAL

## 2018-06-24 PROCEDURE — 99285 EMERGENCY DEPT VISIT HI MDM: CPT | Mod: ,,, | Performed by: EMERGENCY MEDICINE

## 2018-06-24 PROCEDURE — 83690 ASSAY OF LIPASE: CPT

## 2018-06-24 PROCEDURE — 80053 COMPREHEN METABOLIC PANEL: CPT

## 2018-06-24 PROCEDURE — 25000003 PHARM REV CODE 250: Performed by: EMERGENCY MEDICINE

## 2018-06-24 PROCEDURE — 81001 URINALYSIS AUTO W/SCOPE: CPT

## 2018-06-24 PROCEDURE — 93010 ELECTROCARDIOGRAM REPORT: CPT | Mod: ,,, | Performed by: INTERNAL MEDICINE

## 2018-06-24 PROCEDURE — 85379 FIBRIN DEGRADATION QUANT: CPT

## 2018-06-24 PROCEDURE — 93005 ELECTROCARDIOGRAM TRACING: CPT

## 2018-06-24 PROCEDURE — 81025 URINE PREGNANCY TEST: CPT | Performed by: EMERGENCY MEDICINE

## 2018-06-24 PROCEDURE — 25500020 PHARM REV CODE 255: Performed by: EMERGENCY MEDICINE

## 2018-06-24 PROCEDURE — 85025 COMPLETE CBC W/AUTO DIFF WBC: CPT

## 2018-06-24 PROCEDURE — 82962 GLUCOSE BLOOD TEST: CPT

## 2018-06-24 PROCEDURE — 99284 EMERGENCY DEPT VISIT MOD MDM: CPT | Mod: 25

## 2018-06-24 PROCEDURE — 96360 HYDRATION IV INFUSION INIT: CPT | Mod: 59

## 2018-06-24 RX ADMIN — IOHEXOL 75 ML: 350 INJECTION, SOLUTION INTRAVENOUS at 10:06

## 2018-06-24 RX ADMIN — SODIUM CHLORIDE 1000 ML: 0.9 INJECTION, SOLUTION INTRAVENOUS at 08:06

## 2018-06-24 NOTE — ED NOTES
LOC: Pt awake, alert, aware of environment, appropriate affect, oriented x3, speaking appropriately  APPEARANCE: Pt resting comfortably, no acute distress, clean, well groomed. Pt's clothing is properly fastened.  SKIN: The skin is warm and dry, intact, patient has normal skin turgor and moist mucus membranes  RESPIRATORY: Airway is open and patent, respirations spontaneous, even and unlabored, normal effort and rate  CARDIAC:  no peripheral edema noted, capillary refill < 3 seconds. Bilateral radial pulses +2  NEUROLOGIC: PERRL, facial expression is symmetrical, pt moving all extremities spontaneously, normal sensation in all extremities when touched with finger. Follows all commands appropriately.  MUSCULOSKELETAL: No obvious deformities.
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
## 2018-06-26 ENCOUNTER — TELEPHONE (OUTPATIENT)
Dept: OPTOMETRY | Facility: CLINIC | Age: 28
End: 2018-06-26

## 2018-06-29 ENCOUNTER — PATIENT MESSAGE (OUTPATIENT)
Dept: ENDOCRINOLOGY | Facility: CLINIC | Age: 28
End: 2018-06-29

## 2018-06-29 DIAGNOSIS — E10.65 TYPE 1 DIABETES MELLITUS WITH HYPERGLYCEMIA: Primary | ICD-10-CM

## 2018-06-29 RX ORDER — INSULIN LISPRO 100 [IU]/ML
INJECTION, SOLUTION INTRAVENOUS; SUBCUTANEOUS
Qty: 60 ML | Refills: 6 | Status: SHIPPED | OUTPATIENT
Start: 2018-06-29 | End: 2018-07-26 | Stop reason: SDUPTHER

## 2018-06-29 NOTE — TELEPHONE ENCOUNTER
----- Message from Susan Morel sent at 6/29/2018  9:58 AM CDT -----  Contact: Self  .Rx Refill/Request     Is this a Refill or New Rx:  Reillf  Rx Name and Strength:  insulin lispro (HUMALOG KWIKPEN INSULIN) 100 unit/mL InPn pen  Preferred Pharmacy with phone number: [facility]   Communication Preference: [phone]  Additional Information:

## 2018-07-02 ENCOUNTER — PATIENT MESSAGE (OUTPATIENT)
Dept: OBSTETRICS AND GYNECOLOGY | Facility: CLINIC | Age: 28
End: 2018-07-02

## 2018-07-11 RX ORDER — BUPROPION HYDROCHLORIDE 300 MG/1
300 TABLET ORAL DAILY
Qty: 30 TABLET | Refills: 11 | Status: SHIPPED | OUTPATIENT
Start: 2018-07-11 | End: 2018-09-11

## 2018-07-11 NOTE — TELEPHONE ENCOUNTER
I spoke with pt. Doing ok but wants to increase or add Prozac. She read up on it and saw that it sometimes helps. I rec increase to 300 Xl and if still not better in a few weeks then will add Prozac. Pt agrees. No SI, No HI

## 2018-07-17 ENCOUNTER — PATIENT MESSAGE (OUTPATIENT)
Dept: OBSTETRICS AND GYNECOLOGY | Facility: CLINIC | Age: 28
End: 2018-07-17

## 2018-07-17 ENCOUNTER — PATIENT MESSAGE (OUTPATIENT)
Dept: FAMILY MEDICINE | Facility: CLINIC | Age: 28
End: 2018-07-17

## 2018-07-17 ENCOUNTER — PATIENT MESSAGE (OUTPATIENT)
Dept: ENDOCRINOLOGY | Facility: CLINIC | Age: 28
End: 2018-07-17

## 2018-07-17 NOTE — TELEPHONE ENCOUNTER
Please contact patient. She can be scheduled 8/3 if needed. I do not have an earlier spot.     She will need to have a repeat chest CT in the future for evaluation of lung nodule.  It is very small at this time. I recommend repeating this in 1 year.

## 2018-07-18 ENCOUNTER — OFFICE VISIT (OUTPATIENT)
Dept: OBSTETRICS AND GYNECOLOGY | Facility: CLINIC | Age: 28
End: 2018-07-18
Attending: OBSTETRICS & GYNECOLOGY
Payer: MEDICAID

## 2018-07-18 VITALS
DIASTOLIC BLOOD PRESSURE: 78 MMHG | HEIGHT: 63 IN | SYSTOLIC BLOOD PRESSURE: 122 MMHG | WEIGHT: 194.56 LBS | BODY MASS INDEX: 34.47 KG/M2

## 2018-07-18 DIAGNOSIS — Z30.42 ENCOUNTER FOR DEPO-PROVERA CONTRACEPTION: Primary | ICD-10-CM

## 2018-07-18 PROCEDURE — 99999 PR PBB SHADOW E&M-EST. PATIENT-LVL III: CPT | Mod: PBBFAC,,, | Performed by: OBSTETRICS & GYNECOLOGY

## 2018-07-18 PROCEDURE — 99212 OFFICE O/P EST SF 10 MIN: CPT | Mod: S$PBB,,, | Performed by: OBSTETRICS & GYNECOLOGY

## 2018-07-18 PROCEDURE — 99213 OFFICE O/P EST LOW 20 MIN: CPT | Mod: PBBFAC | Performed by: OBSTETRICS & GYNECOLOGY

## 2018-07-18 RX ORDER — MEDROXYPROGESTERONE ACETATE 150 MG/ML
150 INJECTION, SUSPENSION INTRAMUSCULAR
Qty: 1 ML | Refills: 3 | Status: SHIPPED | OUTPATIENT
Start: 2018-07-18 | End: 2019-05-15 | Stop reason: SDUPTHER

## 2018-07-18 NOTE — PROGRESS NOTES
Subjective:       Patient ID: Anusha Andrew is a 27 y.o. female.    Chief Complaint:  Contraception (last pap 5/10/18 normal, pt will like to change form of contraception)      Patient Active Problem List   Diagnosis    Obesity (BMI 30.0-34.9)    Poorly controlled type 1 diabetes mellitus     delivery delivered    Diabetic ketoacidosis without coma associated with type 1 diabetes mellitus    Counseling and coordination of care    Depression       History of Present Illness  Here to discuss birth control options. Wants to go back on Depo. Did it in the past. Knows she may gain weight. Overall doing better since we increased the Wellbutrin to 300 XL. Says she feels good and wants to continue at this dose. All questions answered. Will get first injection today at [facility] pharmacy. On OCP now. Declines UPT.   Past Medical History:   Diagnosis Date    Depression     Hyperlipidemia     Hypertension     Type I (juvenile type) diabetes mellitus without mention of complication, uncontrolled 2011       Past Surgical History:   Procedure Laterality Date    ABCESS DRAINAGE      boil went over GA     SECTION  2012    TONSILLECTOMY, ADENOIDECTOMY      WISDOM TOOTH EXTRACTION         OB History    Para Term  AB Living   2 2 1 1   2   SAB TAB Ectopic Multiple Live Births           2      # Outcome Date GA Lbr Hawk/2nd Weight Sex Delivery Anes PTL Lv   2  17 34w2d  3.16 kg (6 lb 15.5 oz) M CS-LTranv Spinal, EPI Y KRYSTIAN      Complications: Diabetes   1 Term 12 37w5d  3.286 kg (7 lb 3.9 oz) F CS-LTranv EPI  KRYSTIAN      Complications: Preeclampsia      Obstetric Comments   Gestational HTN, failed induction, not past 6 cm   Menarche ~12       Patient's last menstrual period was 2018 (exact date).   Date of Last Pap: 5/15/2018    Review of Systems  Review of Systems   Constitutional: Negative for fatigue and unexpected weight change.   Respiratory: Negative for  shortness of breath.    Cardiovascular: Negative for chest pain.   Gastrointestinal: Negative for abdominal pain, constipation, diarrhea, nausea and vomiting.   Genitourinary: Negative for dysuria.   Musculoskeletal: Negative for back pain.   Skin: Negative for rash.   Neurological: Negative for headaches.   Hematological: Does not bruise/bleed easily.   Psychiatric/Behavioral: Negative for behavioral problems.        Objective:   Physical Exam:   Constitutional: She appears well-developed and well-nourished.                                  Assessment/ Plan:     1. Encounter for Depo-Provera contraception  medroxyPROGESTERone (DEPO-PROVERA) 150 mg/mL Syrg       Follow-up with me prn

## 2018-07-18 NOTE — PROGRESS NOTES
Anushamyra Andrew received IM Depo Provera to the right upper outer side of the deltoid on 7/18/2018.    The patient was instructed to get the next injection on .    Lot Number: [id number]  Expiration Date: 12/2019  BY LIBERTY [facility]OUGHBY (No urine test needed per Russ (dr morales))

## 2018-07-20 ENCOUNTER — PATIENT MESSAGE (OUTPATIENT)
Dept: OBSTETRICS AND GYNECOLOGY | Facility: CLINIC | Age: 28
End: 2018-07-20

## 2018-07-26 ENCOUNTER — PATIENT MESSAGE (OUTPATIENT)
Dept: ENDOCRINOLOGY | Facility: CLINIC | Age: 28
End: 2018-07-26

## 2018-07-26 DIAGNOSIS — E10.65 TYPE 1 DIABETES MELLITUS WITH HYPERGLYCEMIA: ICD-10-CM

## 2018-07-26 RX ORDER — INSULIN LISPRO 100 [IU]/ML
INJECTION, SOLUTION INTRAVENOUS; SUBCUTANEOUS
Qty: 60 ML | Refills: 3 | Status: ON HOLD | OUTPATIENT
Start: 2018-07-26 | End: 2018-10-19 | Stop reason: HOSPADM

## 2018-08-06 ENCOUNTER — PATIENT MESSAGE (OUTPATIENT)
Dept: OBSTETRICS AND GYNECOLOGY | Facility: CLINIC | Age: 28
End: 2018-08-06

## 2018-08-20 ENCOUNTER — TELEPHONE (OUTPATIENT)
Dept: ENDOCRINOLOGY | Facility: CLINIC | Age: 28
End: 2018-08-20

## 2018-08-20 NOTE — TELEPHONE ENCOUNTER
----- Message from Cammie Khanna sent at 8/17/2018  5:26 PM CDT -----  Contact: PT Portal Request  Appointment Request From: Anusha Andrew    With Provider: NAA Razo FNP [[facility]]    Preferred Date Range: 8/18/2018 - 10/31/2018    Preferred Times: Any time    Reason for visit: Existing Patient    Comments:  three month check up fpr diabetes missed last appointment

## 2018-08-22 ENCOUNTER — HOSPITAL ENCOUNTER (EMERGENCY)
Facility: HOSPITAL | Age: 28
Discharge: HOME | End: 2018-08-22
Attending: EMERGENCY MEDICINE
Payer: MEDICAID

## 2018-08-22 VITALS
OXYGEN SATURATION: 97 % | HEIGHT: 63 IN | WEIGHT: 198 LBS | RESPIRATION RATE: 16 BRPM | TEMPERATURE: 98 F | SYSTOLIC BLOOD PRESSURE: 120 MMHG | HEART RATE: 82 BPM | DIASTOLIC BLOOD PRESSURE: 75 MMHG | BODY MASS INDEX: 35.08 KG/M2

## 2018-08-22 DIAGNOSIS — M77.9 TENDONITIS: Primary | ICD-10-CM

## 2018-08-22 DIAGNOSIS — M25.539 WRIST PAIN: ICD-10-CM

## 2018-08-22 LAB
B-HCG UR QL: POSITIVE
CTP QC/QA: YES

## 2018-08-22 PROCEDURE — 25000003 PHARM REV CODE 250: Performed by: PHYSICIAN ASSISTANT

## 2018-08-22 PROCEDURE — 99284 EMERGENCY DEPT VISIT MOD MDM: CPT | Mod: 25

## 2018-08-22 PROCEDURE — 29125 APPL SHORT ARM SPLINT STATIC: CPT | Mod: RT

## 2018-08-22 PROCEDURE — 81025 URINE PREGNANCY TEST: CPT | Performed by: PHYSICIAN ASSISTANT

## 2018-08-22 RX ORDER — ACETAMINOPHEN 500 MG
500 TABLET ORAL EVERY 4 HOURS PRN
Qty: 20 TABLET | Refills: 0 | Status: SHIPPED | OUTPATIENT
Start: 2018-08-22 | End: 2018-08-27

## 2018-08-22 RX ORDER — ACETAMINOPHEN 500 MG
500 TABLET ORAL
Status: COMPLETED | OUTPATIENT
Start: 2018-08-22 | End: 2018-08-22

## 2018-08-22 RX ADMIN — ACETAMINOPHEN 500 MG: 500 TABLET, FILM COATED ORAL at 09:08

## 2018-08-22 NOTE — ED PROVIDER NOTES
"Encounter Date: 2018    SCRIBE #1 NOTE: I, Juju Abbott, am scribing for, and in the presence of,  Thelma Spaulding PA-C. I have scribed the following portions of the note - Other sections scribed: HPI, ROS.       History     Chief Complaint   Patient presents with    Wrist Pain     pt here for right wrist pain x2 weeks. denies any trauma, no previous treatment.      CC: Wrist Pain    HPI:   Pt is a 26 y/o female who presents for evaluation of x2 wk hx of R wrist pain with associated cramping of R hand. The patient reports a "sharp/stabbing" pain that is severe (6/10) and worse with movement and overuse. 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 The patient attempted tx with Tylenol and ibuprofen. The patient denies hx of IV drug abuse. The patient denies any associated swelling, erythema, fever, chills, or N/V/D, history of IVDU. No other symptoms reported. The patient has positive UPT at this facility.  She denies abdominal pain, pelvic pain or vaginal bleeding.  Patient states that she has appointment a scheduled to terminate pregnancy and is going to follow up with her OBGYN following that. Attempted tx with Tylenol and Ibuprofen           The history is provided by the patient. No  was used.     Review of patient's allergies indicates:   Allergen Reactions    No known allergies      Past Medical History:   Diagnosis Date    Depression     Hyperlipidemia     Hypertension     Type I (juvenile type) diabetes mellitus without mention of complication, uncontrolled 2011     Past Surgical History:   Procedure Laterality Date    ABCESS DRAINAGE      boil went over [place]     SECTION  2012    TONSILLECTOMY, ADENOIDECTOMY      WISDOM TOOTH EXTRACTION       Family History   Problem Relation Age of Onset    Diabetes Brother     No Known Problems Mother     No Known Problems Father     Hydrocephalus Daughter     No Known Problems " Maternal Aunt     No Known Problems Maternal Uncle     No Known Problems Paternal Aunt     No Known Problems Paternal Uncle     No Known Problems Maternal Grandmother     No Known Problems Maternal Grandfather     No Known Problems Paternal Grandmother     No Known Problems Paternal Grandfather     Amblyopia Neg Hx     Blindness Neg Hx     Cataracts Neg Hx     Glaucoma Neg Hx     Hypertension Neg Hx     Macular degeneration Neg Hx     Retinal detachment Neg Hx     Strabismus Neg Hx     Stroke Neg Hx     Thyroid disease Neg Hx     Breast cancer Neg Hx     Colon cancer Neg Hx     Ovarian cancer Neg Hx      Social History     Tobacco Use    Smoking status: Never Smoker    Smokeless tobacco: Never Used   Substance Use Topics    Alcohol use: Yes     Comment: occasionally    Drug use: No     Review of Systems   Constitutional: Negative for chills and fever.   HENT: Negative for congestion, ear pain, rhinorrhea and sore throat.    Eyes: Negative for redness.   Respiratory: Negative for cough and shortness of breath.    Cardiovascular: Negative for chest pain.   Gastrointestinal: Negative for abdominal pain, diarrhea, nausea and vomiting.   Genitourinary: Negative for decreased urine volume, difficulty urinating, dysuria, frequency, hematuria and urgency.   Musculoskeletal: Positive for arthralgias (R wrist pain with associated cramping to R hand). Negative for back pain, joint swelling and neck pain.   Skin: Negative for rash.   Neurological: Negative for weakness, numbness and headaches.       Physical Exam     Initial Vitals [08/22/18 0759]   BP Pulse Resp Temp SpO2   134/79 95 18 99.2 °F (37.3 °C) 100 %      MAP       --         Physical Exam    Nursing note and vitals reviewed.  Constitutional: She appears well-developed and well-nourished. No distress.   HENT:   Head: Normocephalic.   Right Ear: External ear normal.   Left Ear: External ear normal.   Eyes: Conjunctivae are normal.    Cardiovascular: Normal rate and regular rhythm. Exam reveals no gallop and no friction rub.    No murmur heard.  Pulses:       Radial pulses are 2+ on the right side, and 2+ on the left side.   Pulmonary/Chest: Breath sounds normal. She has no wheezes. She has no rhonchi. She has no rales. She exhibits no tenderness.   Musculoskeletal: Normal range of motion.   Diffuse TTP over R wrist with no swelling or erythema. No TTP of hand. Pain worse with extension and flexion of R wrist. +finkelsteins test    Neurological: She is alert. She has normal strength. No sensory deficit.   Pt reports numbness to fingertips of all digits of R hand with Phalen's test.    Skin: Skin is warm and dry. No erythema.   Psychiatric: She has a normal mood and affect.         ED Course   Procedures  Labs Reviewed   POCT URINE PREGNANCY - Abnormal; Notable for the following components:       Result Value    POC Preg Test, Ur Positive (*)     All other components within normal limits          Imaging Results          X-Ray Wrist Complete Right (Final result)  Result time 08/22/18 08:48:23    Final result by Steven Gonzalez Jr., MD (08/22/18 08:48:23)                 Impression:      No significant abnormality and no detrimental change.      Electronically signed by: Steven Gonzalez MD  Date:    08/22/2018  Time:    08:48             Narrative:    EXAMINATION:  XR WRIST COMPLETE 3 VIEWS RIGHT    CLINICAL HISTORY:  Pain in unspecified wrist    TECHNIQUE:  PA, lateral, and oblique views of the right wrist were performed.    COMPARISON:  September 2015.    FINDINGS:  Bones are fairly well mineralized.  Alignment is satisfactory.  No fracture.                                 Medical Decision Making:   Initial Assessment:   27-year-old female who presents for evaluation of 2 week history of atraumatic R wrist pain. Patient is afebrile, well-appearing in no distress.  Exam findings detailed above.  UPT is positive. Patient denies abdominal pain,  pelvic pain or vaginal bleeding.  X-rays negative for fracture, dislocation or findings to explain patient's pain. Positive Finkelstein's test but causes pain in entire wrist.  Patient reports numbness to all the fingertips her right hand with Phalen's test. Not localized to median nerve distribuion. May be tendonitis.  Will avoid NSAIDs and this pregnant patient.  Tylenol given in the ED.  Splint applied.  Howevere discharge follow up with Orthopedics for further evaluation management.  ER return precautions discussed worsening symptoms or as needed. Doubt septic joint, emergent etiology of patient's symptoms at this time.  Discussed this patient with Dr. Chávez who agrees with asssessment an d plan            Scribe Attestation:   Scribe #1: I performed the above scribed service and the documentation accurately describes the services I performed. I attest to the accuracy of the note.    Attending Attestation:     Physician Attestation Statement for NP/PA:   I discussed this assessment and plan of this patient with the NP/PA, but I did not personally examine the patient. The face to face encounter was performed by the NP/PA.        Physician Attestation for Scribe:  Physician Attestation Statement for Scribe #1: I, Thelma Spaulding PA-C, reviewed documentation, as scribed by Juju Abbott in my presence, and it is both accurate and complete.                    Clinical Impression:   The primary encounter diagnosis was Tendonitis. A diagnosis of Wrist pain was also pertinent to this visit.                             Thelma Elena PA-C  08/22/18 1523

## 2018-08-22 NOTE — ED TRIAGE NOTES
Patient reports right wrist pain x 2 weeks with worsening pain x 3 days.  Unsure of trauma to right wrist.  ROM present, no swelling noted at this time.

## 2018-08-22 NOTE — DISCHARGE INSTRUCTIONS
Take Tylenol as prescribed. Use wrist splint and apply ice as needed. Read attached instructions.     Follow up with Orthopedics in 2 days. Return to ER for worsening symptoms or as needed.

## 2018-09-10 ENCOUNTER — TELEPHONE (OUTPATIENT)
Dept: OBSTETRICS AND GYNECOLOGY | Facility: CLINIC | Age: 28
End: 2018-09-10

## 2018-09-10 NOTE — TELEPHONE ENCOUNTER
----- Message from Portia Mckee MA sent at 9/10/2018  2:34 PM CDT -----  [facility]  Can you see what is going on with this patient please... We just saw her for a depo on 7/18/18 but she is saying she recently had a miscarriage.     Thank you,  Portia  ----- Message -----  From: Silke Matos  Sent: 9/10/2018   2:31 PM  To: [facility] Staff    Message from [facility], System Message sent at 9/10/2018  8:50 AM CDT -----    Appointment Request From: Anusha Andrew    With Provider: P[facility]ima [facility]idd [facility]    Preferred Date Range: 9/11/2018 - 9/21/2018    Preferred Times: Any time    Reason for visit: Existing Patient    Comments:  good morning wanted to schedule an appointment pertaining  to a miscarriage make two weeks on Wednesday and wanted to see about my next shot

## 2018-09-10 NOTE — TELEPHONE ENCOUNTER
Pt was on OCP and switched to depo provera (received at pharmacy on the day of her appt with Dr. Kidd 18).  She declined UPT because she was sure she wasn't pregnant but turns out she she was pregnant at the time.  She took a UPT  because she had pregnancy symptoms.  She had an elective  on .  She is following up [facility] Wednesday which is where she had the .  She is due for next injection 10/3-10/17, scheduled depo at that time.

## 2018-09-11 ENCOUNTER — OFFICE VISIT (OUTPATIENT)
Dept: OPTOMETRY | Facility: CLINIC | Age: 28
End: 2018-09-11
Payer: MEDICAID

## 2018-09-11 DIAGNOSIS — H52.203 MYOPIC ASTIGMATISM OF BOTH EYES: ICD-10-CM

## 2018-09-11 DIAGNOSIS — H52.13 MYOPIC ASTIGMATISM OF BOTH EYES: ICD-10-CM

## 2018-09-11 DIAGNOSIS — E10.9 TYPE 1 DIABETES MELLITUS WITHOUT RETINOPATHY: Primary | ICD-10-CM

## 2018-09-11 DIAGNOSIS — Z46.0 FITTING AND ADJUSTMENT OF SPECTACLES AND CONTACT LENSES: Primary | ICD-10-CM

## 2018-09-11 PROCEDURE — 92014 COMPRE OPH EXAM EST PT 1/>: CPT | Mod: S$PBB,,, | Performed by: OPTOMETRIST

## 2018-09-11 PROCEDURE — 92310 CONTACT LENS FITTING OU: CPT | Mod: ,,, | Performed by: OPTOMETRIST

## 2018-09-11 PROCEDURE — 99212 OFFICE O/P EST SF 10 MIN: CPT | Mod: PBBFAC,PO | Performed by: OPTOMETRIST

## 2018-09-11 PROCEDURE — 92015 DETERMINE REFRACTIVE STATE: CPT | Mod: ,,, | Performed by: OPTOMETRIST

## 2018-09-11 PROCEDURE — 99999 PR PBB SHADOW E&M-EST. PATIENT-LVL II: CPT | Mod: PBBFAC,,, | Performed by: OPTOMETRIST

## 2018-09-11 RX ORDER — INSULIN GLARGINE 300 U/ML
INJECTION, SOLUTION SUBCUTANEOUS
Status: ON HOLD | COMMUNITY
Start: 2018-08-10 | End: 2018-10-19 | Stop reason: HOSPADM

## 2018-09-11 NOTE — PROGRESS NOTES
HPI     Last Eye Exam: 5/2/2017 w/ Dr. Raad Cuenca.    Pt here for diabetic eye exam + CLFU    Hemoglobin A1C       Date                     Value               Ref Range             Status                04/12/2018               9.8 (H)             4.0 - 5.6 %           Final                   12/06/2017               11.7 (H)            4.0 - 5.6 %           Final                  11/02/2017               11.4 (H)            4.0 - 5.6 %           Final              SYMPTOMS:  (--) Eye pain/discomfort  (--) Blurry Vision  (--) Double Vision  (--) Itchy Eyes  (--) Watery Eyes  (--) Dry Eyes  (--) Floaters/Black Spots  (--) Headaches  (--) Photophobia  ---------------------------------------------------------------------------    EYE MEDS:  none  ---------------------------------------------------------------------------    LENSES:  Glasses: SVLs  Contacts:   --Pt denies sleeping/napping in CLs.  --Pt wears CLs part-time.  --Pt disposes of CLs daily.      Last edited by Abbea Rodriguez on 9/11/2018  1:52 PM. (History)            Assessment /Plan     For exam results, see Encounter Report.    Type 1 diabetes mellitus without retinopathy  -No retinopathy noted today.  Continued control with primary care physician and annual comprehensive eye exam.    Myopic astigmatism of both eyes  Eyeglass Final Rx     Eyeglass Final Rx       Sphere Cylinder [place] [facility]    Right -3.00 +1.00 010 20/25    Left -2.75 +0.50 015 20/30+    Type:  SVL    Expiration Date:  9/12/2019              Contact Lens Final Rx     Final Contact Lens Rx       Brand Base Curve Diameter Sphere Cylinder    Right My Day 8.6 14.0 -2.25 Sphere    Left My Day 8.6 14.0 -2.25 Sphere    Expiration Date:  9/12/2019    Replacement:  Daily    Wearing Schedule:  Daily wear                  RTC 1 yr

## 2018-10-03 NOTE — PROGRESS NOTES
Anusha T Fabianoks received IM Depo Provera to the right upper outer side of the deltoid on 10/3/2018.    The patient was instructed to get the next injection on .    Lot Number: [id number]  Expiration Date: 06/2020  BY LIBERTY WILLOUGHBY

## 2018-10-12 ENCOUNTER — PATIENT MESSAGE (OUTPATIENT)
Dept: ENDOCRINOLOGY | Facility: CLINIC | Age: 28
End: 2018-10-12

## 2018-10-12 ENCOUNTER — PATIENT MESSAGE (OUTPATIENT)
Dept: OBSTETRICS AND GYNECOLOGY | Facility: CLINIC | Age: 28
End: 2018-10-12

## 2018-10-12 ENCOUNTER — TELEPHONE (OUTPATIENT)
Dept: ENDOCRINOLOGY | Facility: CLINIC | Age: 28
End: 2018-10-12

## 2018-10-12 RX ORDER — INSULIN ASPART 100 [IU]/ML
INJECTION, SOLUTION INTRAVENOUS; SUBCUTANEOUS
Qty: 60 ML | Refills: 3 | Status: SHIPPED | OUTPATIENT
Start: 2018-10-12 | End: 2018-10-17 | Stop reason: SDUPTHER

## 2018-10-17 ENCOUNTER — PATIENT MESSAGE (OUTPATIENT)
Dept: ENDOCRINOLOGY | Facility: CLINIC | Age: 28
End: 2018-10-17

## 2018-10-17 ENCOUNTER — TELEPHONE (OUTPATIENT)
Dept: OBSTETRICS AND GYNECOLOGY | Facility: CLINIC | Age: 28
End: 2018-10-17

## 2018-10-17 ENCOUNTER — PATIENT MESSAGE (OUTPATIENT)
Dept: OBSTETRICS AND GYNECOLOGY | Facility: CLINIC | Age: 28
End: 2018-10-17

## 2018-10-17 DIAGNOSIS — E10.65 TYPE 1 DIABETES MELLITUS WITH HYPERGLYCEMIA: Primary | ICD-10-CM

## 2018-10-17 RX ORDER — INSULIN ASPART 100 [IU]/ML
INJECTION, SOLUTION INTRAVENOUS; SUBCUTANEOUS
Qty: 60 ML | Refills: 3 | Status: ON HOLD | OUTPATIENT
Start: 2018-10-17 | End: 2018-10-19 | Stop reason: HOSPADM

## 2018-10-18 ENCOUNTER — HOSPITAL ENCOUNTER (INPATIENT)
Facility: OTHER | Age: 28
LOS: 4 days | Discharge: HOME | DRG: 638 | End: 2018-10-22
Attending: EMERGENCY MEDICINE | Admitting: EMERGENCY MEDICINE
Payer: MEDICAID

## 2018-10-18 DIAGNOSIS — E10.10 DIABETIC KETOACIDOSIS WITHOUT COMA ASSOCIATED WITH TYPE 1 DIABETES MELLITUS: Primary | ICD-10-CM

## 2018-10-18 DIAGNOSIS — N17.9 ACUTE KIDNEY INJURY: ICD-10-CM

## 2018-10-18 DIAGNOSIS — E87.5 HYPERKALEMIA: ICD-10-CM

## 2018-10-18 DIAGNOSIS — E86.0 DEHYDRATION: ICD-10-CM

## 2018-10-18 DIAGNOSIS — R73.9 HYPERGLYCEMIA: ICD-10-CM

## 2018-10-18 PROBLEM — Z71.89 COUNSELING AND COORDINATION OF CARE: Status: RESOLVED | Noted: 2018-04-10 | Resolved: 2018-10-18

## 2018-10-18 PROBLEM — E87.29 INCREASED ANION GAP METABOLIC ACIDOSIS: Status: ACTIVE | Noted: 2018-10-18

## 2018-10-18 PROBLEM — D72.829 LEUKOCYTOSIS: Status: ACTIVE | Noted: 2018-10-18

## 2018-10-18 PROBLEM — E87.0 HYPERNATREMIA: Status: ACTIVE | Noted: 2018-10-18

## 2018-10-18 PROBLEM — F32.A DEPRESSION: Chronic | Status: ACTIVE | Noted: 2018-04-10

## 2018-10-18 LAB
ALBUMIN SERPL BCP-MCNC: 4.1 G/DL
ALLENS TEST: ABNORMAL
ALLENS TEST: ABNORMAL
ALP SERPL-CCNC: 118 U/L
ALT SERPL W/O P-5'-P-CCNC: 19 U/L
ANION GAP SERPL CALC-SCNC: 14 MMOL/L
ANION GAP SERPL CALC-SCNC: 18 MMOL/L
ANION GAP SERPL CALC-SCNC: 20 MMOL/L
ANION GAP SERPL CALC-SCNC: 22 MMOL/L
AST SERPL-CCNC: 20 U/L
B-HCG UR QL: NEGATIVE
B-OH-BUTYR BLD STRIP-SCNC: 5.1 MMOL/L
BACTERIA #/AREA URNS HPF: NORMAL /HPF
BASOPHILS # BLD AUTO: ABNORMAL K/UL
BASOPHILS NFR BLD: 0 %
BILIRUB SERPL-MCNC: 0.2 MG/DL
BILIRUB UR QL STRIP: NEGATIVE
BUN SERPL-MCNC: 16 MG/DL
BUN SERPL-MCNC: 21 MG/DL
BUN SERPL-MCNC: 28 MG/DL
BUN SERPL-MCNC: 28 MG/DL
CALCIUM SERPL-MCNC: 10 MG/DL
CALCIUM SERPL-MCNC: 8.9 MG/DL
CALCIUM SERPL-MCNC: 9.2 MG/DL
CALCIUM SERPL-MCNC: 9.3 MG/DL
CHLORIDE SERPL-SCNC: 104 MMOL/L
CHLORIDE SERPL-SCNC: 110 MMOL/L
CHLORIDE SERPL-SCNC: 112 MMOL/L
CHLORIDE SERPL-SCNC: 115 MMOL/L
CLARITY UR: CLEAR
CO2 SERPL-SCNC: 6 MMOL/L
CO2 SERPL-SCNC: 6 MMOL/L
CO2 SERPL-SCNC: 8 MMOL/L
CO2 SERPL-SCNC: 9 MMOL/L
COLOR UR: YELLOW
CREAT SERPL-MCNC: 1.2 MG/DL
CREAT SERPL-MCNC: 1.4 MG/DL
CREAT SERPL-MCNC: 1.5 MG/DL
CREAT SERPL-MCNC: 1.9 MG/DL
CTP QC/QA: YES
DELSYS: ABNORMAL
DELSYS: ABNORMAL
DIFFERENTIAL METHOD: ABNORMAL
EOSINOPHIL # BLD AUTO: ABNORMAL K/UL
EOSINOPHIL NFR BLD: 0 %
ERYTHROCYTE [DISTWIDTH] IN BLOOD BY AUTOMATED COUNT: 13.6 %
ERYTHROCYTE [SEDIMENTATION RATE] IN BLOOD BY WESTERGREN METHOD: 18 MM/H
EST. GFR  (AFRICAN AMERICAN): 42 ML/MIN/1.73 M^2
EST. GFR  (AFRICAN AMERICAN): 55 ML/MIN/1.73 M^2
EST. GFR  (AFRICAN AMERICAN): >60 ML/MIN/1.73 M^2
EST. GFR  (AFRICAN AMERICAN): >60 ML/MIN/1.73 M^2
EST. GFR  (NON AFRICAN AMERICAN): 36 ML/MIN/1.73 M^2
EST. GFR  (NON AFRICAN AMERICAN): 48 ML/MIN/1.73 M^2
EST. GFR  (NON AFRICAN AMERICAN): 52 ML/MIN/1.73 M^2
EST. GFR  (NON AFRICAN AMERICAN): >60 ML/MIN/1.73 M^2
ESTIMATED AVG GLUCOSE: 229 MG/DL
FIO2: 21
FIO2: 21
GIANT PLATELETS BLD QL SMEAR: PRESENT
GLUCOSE SERPL-MCNC: 166 MG/DL
GLUCOSE SERPL-MCNC: 318 MG/DL
GLUCOSE SERPL-MCNC: 543 MG/DL
GLUCOSE SERPL-MCNC: 664 MG/DL
GLUCOSE UR QL STRIP: ABNORMAL
HBA1C MFR BLD HPLC: 9.6 %
HCO3 UR-SCNC: 3.6 MMOL/L (ref 24–28)
HCO3 UR-SCNC: 5.8 MMOL/L (ref 24–28)
HCT VFR BLD AUTO: 44.4 %
HGB BLD-MCNC: 13.9 G/DL
HGB UR QL STRIP: ABNORMAL
HYALINE CASTS #/AREA URNS LPF: 0 /LPF
KETONES UR QL STRIP: ABNORMAL
LEUKOCYTE ESTERASE UR QL STRIP: NEGATIVE
LIPASE SERPL-CCNC: 13 U/L
LYMPHOCYTES # BLD AUTO: ABNORMAL K/UL
LYMPHOCYTES NFR BLD: 8 %
MCH RBC QN AUTO: 26.5 PG
MCHC RBC AUTO-ENTMCNC: 31.3 G/DL
MCV RBC AUTO: 85 FL
MICROSCOPIC COMMENT: NORMAL
MODE: ABNORMAL
MODE: ABNORMAL
MONOCYTES # BLD AUTO: ABNORMAL K/UL
MONOCYTES NFR BLD: 1 %
NEUTROPHILS # BLD AUTO: ABNORMAL K/UL
NEUTROPHILS NFR BLD: 89 %
NEUTS BAND NFR BLD MANUAL: 2 %
NITRITE UR QL STRIP: NEGATIVE
PCO2 BLDA: 12.9 MMHG (ref 35–45)
PCO2 BLDA: 25.1 MMHG (ref 35–45)
PH SMN: 6.97 [PH] (ref 7.35–7.45)
PH SMN: 7.05 [PH] (ref 7.35–7.45)
PH UR STRIP: 6 [PH] (ref 5–8)
PLATELET # BLD AUTO: 498 K/UL
PLATELET BLD QL SMEAR: ABNORMAL
PMV BLD AUTO: 10.5 FL
PO2 BLDA: 140 MMHG (ref 80–100)
PO2 BLDA: 35 MMHG (ref 40–60)
POC BE: -26 MMOL/L
POC BE: -27 MMOL/L
POC SATURATED O2: 40 % (ref 95–100)
POC SATURATED O2: 98 % (ref 95–100)
POCT GLUCOSE: 149 MG/DL (ref 70–110)
POCT GLUCOSE: 152 MG/DL (ref 70–110)
POCT GLUCOSE: 186 MG/DL (ref 70–110)
POCT GLUCOSE: 226 MG/DL (ref 70–110)
POCT GLUCOSE: 271 MG/DL (ref 70–110)
POCT GLUCOSE: 305 MG/DL (ref 70–110)
POCT GLUCOSE: 367 MG/DL (ref 70–110)
POCT GLUCOSE: 387 MG/DL (ref 70–110)
POCT GLUCOSE: 456 MG/DL (ref 70–110)
POCT GLUCOSE: 490 MG/DL (ref 70–110)
POCT GLUCOSE: >500 MG/DL (ref 70–110)
POTASSIUM SERPL-SCNC: 4.5 MMOL/L
POTASSIUM SERPL-SCNC: 4.9 MMOL/L
POTASSIUM SERPL-SCNC: 6.1 MMOL/L
POTASSIUM SERPL-SCNC: 6.6 MMOL/L
PROT SERPL-MCNC: 9.3 G/DL
PROT UR QL STRIP: ABNORMAL
RBC # BLD AUTO: 5.24 M/UL
RBC #/AREA URNS HPF: 1 /HPF (ref 0–4)
SAMPLE: ABNORMAL
SAMPLE: ABNORMAL
SITE: ABNORMAL
SITE: ABNORMAL
SODIUM SERPL-SCNC: 132 MMOL/L
SODIUM SERPL-SCNC: 136 MMOL/L
SODIUM SERPL-SCNC: 138 MMOL/L
SODIUM SERPL-SCNC: 138 MMOL/L
SP GR UR STRIP: 1.02 (ref 1–1.03)
SP02: 100
URN SPEC COLLECT METH UR: ABNORMAL
UROBILINOGEN UR STRIP-ACNC: NEGATIVE EU/DL
WBC # BLD AUTO: 16.73 K/UL
WBC #/AREA URNS HPF: 5 /HPF (ref 0–5)
YEAST URNS QL MICRO: NORMAL

## 2018-10-18 PROCEDURE — 63600175 PHARM REV CODE 636 W HCPCS: Performed by: INTERNAL MEDICINE

## 2018-10-18 PROCEDURE — 81025 URINE PREGNANCY TEST: CPT | Performed by: EMERGENCY MEDICINE

## 2018-10-18 PROCEDURE — 93010 ELECTROCARDIOGRAM REPORT: CPT | Mod: ,,, | Performed by: INTERNAL MEDICINE

## 2018-10-18 PROCEDURE — 80048 BASIC METABOLIC PNL TOTAL CA: CPT | Mod: 91

## 2018-10-18 PROCEDURE — 25000003 PHARM REV CODE 250: Performed by: INTERNAL MEDICINE

## 2018-10-18 PROCEDURE — 99900035 HC TECH TIME PER 15 MIN (STAT)

## 2018-10-18 PROCEDURE — 82803 BLOOD GASES ANY COMBINATION: CPT

## 2018-10-18 PROCEDURE — 85027 COMPLETE CBC AUTOMATED: CPT

## 2018-10-18 PROCEDURE — 20000000 HC ICU ROOM

## 2018-10-18 PROCEDURE — 81000 URINALYSIS NONAUTO W/SCOPE: CPT

## 2018-10-18 PROCEDURE — 94761 N-INVAS EAR/PLS OXIMETRY MLT: CPT

## 2018-10-18 PROCEDURE — 25000003 PHARM REV CODE 250: Performed by: EMERGENCY MEDICINE

## 2018-10-18 PROCEDURE — 83036 HEMOGLOBIN GLYCOSYLATED A1C: CPT

## 2018-10-18 PROCEDURE — S0030 INJECTION, METRONIDAZOLE: HCPCS | Performed by: INTERNAL MEDICINE

## 2018-10-18 PROCEDURE — 80048 BASIC METABOLIC PNL TOTAL CA: CPT

## 2018-10-18 PROCEDURE — 83690 ASSAY OF LIPASE: CPT

## 2018-10-18 PROCEDURE — 80053 COMPREHEN METABOLIC PANEL: CPT

## 2018-10-18 PROCEDURE — S5010 5% DEXTROSE AND 0.45% SALINE: HCPCS | Performed by: INTERNAL MEDICINE

## 2018-10-18 PROCEDURE — 85007 BL SMEAR W/DIFF WBC COUNT: CPT

## 2018-10-18 PROCEDURE — 63600175 PHARM REV CODE 636 W HCPCS: Performed by: EMERGENCY MEDICINE

## 2018-10-18 PROCEDURE — 36415 COLL VENOUS BLD VENIPUNCTURE: CPT

## 2018-10-18 PROCEDURE — 99291 CRITICAL CARE FIRST HOUR: CPT | Mod: ,,, | Performed by: INTERNAL MEDICINE

## 2018-10-18 PROCEDURE — 93005 ELECTROCARDIOGRAM TRACING: CPT

## 2018-10-18 PROCEDURE — 36600 WITHDRAWAL OF ARTERIAL BLOOD: CPT

## 2018-10-18 PROCEDURE — 82010 KETONE BODYS QUAN: CPT

## 2018-10-18 RX ORDER — BUPROPION HYDROCHLORIDE 150 MG/1
150 TABLET ORAL DAILY
Status: DISCONTINUED | OUTPATIENT
Start: 2018-10-19 | End: 2018-10-21

## 2018-10-18 RX ORDER — SODIUM CHLORIDE, SODIUM LACTATE, POTASSIUM CHLORIDE, CALCIUM CHLORIDE 600; 310; 30; 20 MG/100ML; MG/100ML; MG/100ML; MG/100ML
INJECTION, SOLUTION INTRAVENOUS CONTINUOUS
Status: CANCELLED | OUTPATIENT
Start: 2018-10-18

## 2018-10-18 RX ORDER — SODIUM CHLORIDE 450 MG/100ML
INJECTION, SOLUTION INTRAVENOUS CONTINUOUS
Status: DISCONTINUED | OUTPATIENT
Start: 2018-10-18 | End: 2018-10-18

## 2018-10-18 RX ORDER — DEXTROSE MONOHYDRATE 100 MG/ML
1000 INJECTION, SOLUTION INTRAVENOUS
Status: DISCONTINUED | OUTPATIENT
Start: 2018-10-18 | End: 2018-10-19

## 2018-10-18 RX ORDER — SODIUM CHLORIDE 0.9 % (FLUSH) 0.9 %
5 SYRINGE (ML) INJECTION
Status: DISCONTINUED | OUTPATIENT
Start: 2018-10-18 | End: 2018-10-22 | Stop reason: HOSPADM

## 2018-10-18 RX ORDER — ONDANSETRON 4 MG/1
4 TABLET, ORALLY DISINTEGRATING ORAL EVERY 8 HOURS PRN
Status: DISCONTINUED | OUTPATIENT
Start: 2018-10-18 | End: 2018-10-22 | Stop reason: HOSPADM

## 2018-10-18 RX ORDER — OXYCODONE AND ACETAMINOPHEN 5; 325 MG/1; MG/1
1 TABLET ORAL EVERY 4 HOURS PRN
Status: DISCONTINUED | OUTPATIENT
Start: 2018-10-18 | End: 2018-10-22 | Stop reason: HOSPADM

## 2018-10-18 RX ORDER — DEXTROSE MONOHYDRATE AND SODIUM CHLORIDE 5; .45 G/100ML; G/100ML
INJECTION, SOLUTION INTRAVENOUS CONTINUOUS
Status: DISCONTINUED | OUTPATIENT
Start: 2018-10-18 | End: 2018-10-19

## 2018-10-18 RX ORDER — BUPROPION HYDROCHLORIDE 150 MG/1
150 TABLET ORAL DAILY
Status: ON HOLD | COMMUNITY
End: 2018-10-19

## 2018-10-18 RX ORDER — ENOXAPARIN SODIUM 100 MG/ML
40 INJECTION SUBCUTANEOUS EVERY 24 HOURS
Status: DISCONTINUED | OUTPATIENT
Start: 2018-10-18 | End: 2018-10-22 | Stop reason: HOSPADM

## 2018-10-18 RX ORDER — METRONIDAZOLE 500 MG/100ML
500 INJECTION, SOLUTION INTRAVENOUS
Status: DISCONTINUED | OUTPATIENT
Start: 2018-10-18 | End: 2018-10-20

## 2018-10-18 RX ORDER — ONDANSETRON 2 MG/ML
4 INJECTION INTRAMUSCULAR; INTRAVENOUS
Status: COMPLETED | OUTPATIENT
Start: 2018-10-18 | End: 2018-10-18

## 2018-10-18 RX ORDER — ONDANSETRON 2 MG/ML
4 INJECTION INTRAMUSCULAR; INTRAVENOUS EVERY 8 HOURS PRN
Status: DISCONTINUED | OUTPATIENT
Start: 2018-10-18 | End: 2018-10-22 | Stop reason: HOSPADM

## 2018-10-18 RX ORDER — ACETAMINOPHEN 325 MG/1
650 TABLET ORAL EVERY 6 HOURS PRN
Status: DISCONTINUED | OUTPATIENT
Start: 2018-10-18 | End: 2018-10-22 | Stop reason: HOSPADM

## 2018-10-18 RX ADMIN — ACETAMINOPHEN 650 MG: 325 TABLET, FILM COATED ORAL at 06:10

## 2018-10-18 RX ADMIN — CEFTRIAXONE 1 G: 1 INJECTION, SOLUTION INTRAVENOUS at 03:10

## 2018-10-18 RX ADMIN — ONDANSETRON 4 MG: 2 INJECTION INTRAMUSCULAR; INTRAVENOUS at 11:10

## 2018-10-18 RX ADMIN — INSULIN HUMAN 6 UNITS: 100 INJECTION, SOLUTION PARENTERAL at 11:10

## 2018-10-18 RX ADMIN — ENOXAPARIN SODIUM 40 MG: 100 INJECTION SUBCUTANEOUS at 05:10

## 2018-10-18 RX ADMIN — SODIUM CHLORIDE 1000 ML: 0.9 INJECTION, SOLUTION INTRAVENOUS at 11:10

## 2018-10-18 RX ADMIN — DEXTROSE AND SODIUM CHLORIDE: 5; .45 INJECTION, SOLUTION INTRAVENOUS at 09:10

## 2018-10-18 RX ADMIN — SODIUM CHLORIDE, SODIUM LACTATE, POTASSIUM CHLORIDE, AND CALCIUM CHLORIDE 1000 ML: .6; .31; .03; .02 INJECTION, SOLUTION INTRAVENOUS at 12:10

## 2018-10-18 RX ADMIN — SODIUM CHLORIDE, SODIUM LACTATE, POTASSIUM CHLORIDE, AND CALCIUM CHLORIDE 1000 ML: .6; .31; .03; .02 INJECTION, SOLUTION INTRAVENOUS at 01:10

## 2018-10-18 RX ADMIN — OXYCODONE AND ACETAMINOPHEN 1 TABLET: 5; 325 TABLET ORAL at 09:10

## 2018-10-18 RX ADMIN — SODIUM CHLORIDE: 0.45 INJECTION, SOLUTION INTRAVENOUS at 03:10

## 2018-10-18 RX ADMIN — SODIUM CHLORIDE 6 UNITS/HR: 9 INJECTION, SOLUTION INTRAVENOUS at 02:10

## 2018-10-18 RX ADMIN — METRONIDAZOLE 500 MG: 500 INJECTION, SOLUTION INTRAVENOUS at 10:10

## 2018-10-18 RX ADMIN — ONDANSETRON 4 MG: 4 TABLET, ORALLY DISINTEGRATING ORAL at 09:10

## 2018-10-18 RX ADMIN — METRONIDAZOLE 500 MG: 500 INJECTION, SOLUTION INTRAVENOUS at 05:10

## 2018-10-18 NOTE — ED NOTES
Pt rounding complete. Pt updated on POC and pt verbalized understanding. Pt does not appear to be in acute distress.  Bed is locked and in lowest position with side rails up x2. Call light is within reach. Will continue to monitor.

## 2018-10-18 NOTE — SUBJECTIVE & OBJECTIVE
"Past Medical History:   Diagnosis Date    Depression     Hyperlipidemia     Hypertension     Type I (juvenile type) diabetes mellitus without mention of complication, uncontrolled 2011       Past Surgical History:   Procedure Laterality Date    ABCESS DRAINAGE      boil went over [place]     SECTION  2012    DELIVERY- SECTION N/A 3/16/2017    Performed by [facility]urnima [facility]idd [facility] at [facility]    INCISION AND DRAINAGE (I & D) Left 2013    [facility]    TONSILLECTOMY, ADENOIDECTOMY      WISDOM TOOTH EXTRACTION       Review of patient's allergies indicates:   Allergen Reactions    No known allergies        Current Facility-Administered Medications on File Prior to Encounter   Medication    [COMPLETED] insulin regular injection 8 Units    [COMPLETED] sodium chloride 0.9% bolus 1,000 mL     Current Outpatient Medications on File Prior to Encounter   Medication Sig    insulin lispro (HUMALOG KWIKPEN INSULIN) 100 unit/mL InPn pen Inject 13 units w/ meals plus scale 150-200+2, 201-250+4, 251-300+6, 301-350+8.    medroxyPROGESTERone (DEPO-PROVERA) 150 mg/mL Syrg Inject 1 mL (150 mg total) into the muscle every 3 (three) months.    TOUJEO SOLOSTAR U-300 INSULIN 300 unit/mL (1.5 mL) InPn pen     blood sugar diagnostic Strp 1 each by Misc.(Non-Drug; Combo Route) route 6 (six) times daily.    insulin aspart U-100 (NOVOLOG FLEXPEN U-100 INSULIN) 100 unit/mL InPn pen Inject 13 units w/ meals plus scale 150-200+2, 201-250+4, 251-300+6, 301-350+8.    ONETOUCH DELICA LANCETS 33 gauge Misc     ONETOUCH ULTRA2 kit     pen needle, diabetic 31 gauge x 1/4" Ndle 1 Device by MISCELLANEOUS route 4 (four) times daily with meals and nightly.     Family History     Problem Relation (Age of Onset)    Diabetes Brother    Hydrocephalus Daughter    No Known Problems Mother, Father, Brother, Brother, Maternal Aunt, Maternal Uncle, Paternal Aunt, Paternal Uncle, " Maternal Grandmother, Maternal Grandfather, Paternal Grandmother, Paternal Grandfather        Tobacco Use    Smoking status: Never Smoker    Smokeless tobacco: Never Used   Substance and Sexual Activity    Alcohol use: Yes     Comment: occasionally    Drug use: No    Sexual activity: Yes     Partners: Male     Birth control/protection: OCP     Review of Systems   Constitutional: Negative for chills and fever.   HENT: Negative for sore throat and trouble swallowing.    Eyes: Negative for pain and visual disturbance.   Respiratory: Positive for shortness of breath. Negative for cough.    Cardiovascular: Negative for chest pain and palpitations.   Gastrointestinal: Positive for abdominal pain and nausea. Negative for vomiting.   Endocrine: Positive for polydipsia and polyuria.   Genitourinary: Negative for difficulty urinating and dysuria.   Musculoskeletal: Negative for arthralgias and myalgias.   Skin: Negative for rash and wound.   Neurological: Negative for weakness and numbness.     Objective:     Vital Signs (Most Recent):  Temp: 97.8 °F (36.6 °C) (10/18/18 0939)  Pulse: (!) 120 (10/18/18 1421)  Resp: (!) 23 (10/18/18 1421)  BP: 134/62 (10/18/18 1330)  SpO2: 100 % (10/18/18 1421) Vital Signs (24h Range):  Temp:  [97.8 °F (36.6 °C)] 97.8 °F (36.6 °C)  Pulse:  [110-124] 120  Resp:  [18-28] 23  SpO2:  [99 %-100 %] 100 %  BP: (121-141)/(58-74) 134/62     Weight: 89.4 kg (197 lb)  Body mass index is 34.9 kg/m².    Physical Exam   Constitutional: She is oriented to person, place, and time. She appears well-developed. No distress.   HENT:   Head: Normocephalic and atraumatic.   Mouth/Throat: Mucous membranes are dry.   Eyes: Conjunctivae and EOM are normal.   Cardiovascular: Regular rhythm, S1 normal, S2 normal and intact distal pulses. Tachycardia present.   Pulmonary/Chest: Effort normal and breath sounds normal. Tachypnea noted.   Abdominal: Soft. She exhibits no distension. There is tenderness (mild RUQ).    Musculoskeletal: Normal range of motion. She exhibits no edema.   Neurological: She is alert and oriented to person, place, and time.   Skin: Skin is warm and dry.   Nursing note and vitals reviewed.    Significant Labs:   CBC:  Recent Labs   Lab 10/18/18  1050   WBC 16.73*   HGB 13.9   HCT 44.4   *   GRAN 89.0*  CANCELED   LYMPH 8.0*  CANCELED   MONO 1.0*  CANCELED   EOS CANCELED   BASO CANCELED      CMP:  Recent Labs   Lab 10/17/18  1455 10/18/18  1115 10/18/18  1322   * 132* 136   K 4.9 6.6* 6.1*   CL 99 104 110   CO2 13* 6* 6*   BUN 22* 28* 28*   CREATININE 1.1 1.9* 1.5*   * 664* 543*   CALCIUM 9.8 10.0 9.3   ALKPHOS 95 118  --    AST 14 20  --    ALT 15 19  --    BILITOT 0.5 0.2  --    PROT 8.6* 9.3*  --    ALBUMIN 3.9 4.1  --    ANIONGAP 18* 22* 20*     ABG:  Recent Labs   Lab 10/18/18  1117 10/18/18  1343   PH 6.972* 7.055*   PCO2 25.1* 12.9*   PO2 35* 140*   BE -26 -27     Significant Imaging:   CXR 10/18/18:  No evidence of acute intrathoracic process.    U/S Abdomen Limited 10/18/18:  No acute sonographic abnormalities.

## 2018-10-18 NOTE — ASSESSMENT & PLAN NOTE
- Suspect secondary to DKA versus ?infectious process that caused her DKA. Abdominal pain present, so if any source would be intra-abdominal.  - SIRS 3/4 (tachycardia, tachypnea, leukocytosis); qSOFA 1/3 (tachypnea).  - Mild RUQ tenderness on exam; U/S unremarkable.  - Given initial presentation will cover for now with ceftriaxone 1g IV q24hr, metronidazole 500mg IV q8hr; if symptoms mignon entirely with treatment of DKA likely safe to discontinue.

## 2018-10-18 NOTE — H&P
[facility]  History & Physical    Patient Name: Anusha Andrew  MRN: [id number]  Admission Date: 10/18/2018  Attending Physician: LINDA Allen MD  Primary Care Provider: Rhina Archer MD    Patient information was obtained from patient, past medical records and ER records.     Subjective:     Principal Problem:Diabetic ketoacidosis without coma associated with type 1 diabetes mellitus    Chief Complaint:   Chief Complaint   Patient presents with    Abdominal Pain     pt to er with c/o abdominal pain and sob . pt seen in er with  vomiting and weakness.      HPI: Ms. Andrew is a 27/F with PMH depression, DMI (diagnosed age 17, on insulin glargine and insulin aspart) who presented to ED 10/18 with abdominal pain, nausea, vomiting and SOB that felt similar to prior presentations of her DKA. She reports that several days ago she began cutting down on her prescribed insulin doses as her insurance no longer covered prior prescriptions, so she attempted to stretch out dosing to make it last longer. On 10/17 she began having symptoms and presented to ED at [facility] for evaluation; she was deemed clinically stable and discharged. With persistence of her symptoms as well as worsening nausea and vomiting, she presented to [facility] ED and was found to have elevated beta-hydroxybutyrate, metabolic acidosis, and glucose in 600s consistent with DKA.    She complains of SOB, abdominal pain, nausea, and vomiting. She denies fevers, chills, CP, palpitations.    Past Medical History:   Diagnosis Date    Depression     Hyperlipidemia     Hypertension     Type I (juvenile type) diabetes mellitus without mention of complication, uncontrolled 2011       Past Surgical History:   Procedure Laterality Date    ABCESS DRAINAGE      boil went over [place]     SECTION  2012    DELIVERY- SECTION N/A 3/16/2017    Performed by [facility]urnima [facility]idd [facility] at [facility] L[facility]    INCISION AND  "DRAINAGE (I & D) Left 7/21/2013    Performed by [facility] at [facility]    TONSILLECTOMY, ADENOIDECTOMY      WISDOM TOOTH EXTRACTION       Review of patient's allergies indicates:   Allergen Reactions    No known allergies        Current Facility-Administered Medications on File Prior to Encounter   Medication    [COMPLETED] insulin regular injection 8 Units    [COMPLETED] sodium chloride 0.9% bolus 1,000 mL     Current Outpatient Medications on File Prior to Encounter   Medication Sig    insulin lispro (HUMALOG KWIKPEN INSULIN) 100 unit/mL InPn pen Inject 13 units w/ meals plus scale 150-200+2, 201-250+4, 251-300+6, 301-350+8.    medroxyPROGESTERone (DEPO-PROVERA) 150 mg/mL Syrg Inject 1 mL (150 mg total) into the muscle every 3 (three) months.    TOUJEO SOLOSTAR U-300 INSULIN 300 unit/mL (1.5 mL) InPn pen     blood sugar diagnostic Strp 1 each by Misc.(Non-Drug; Combo Route) route 6 (six) times daily.    insulin aspart U-100 (NOVOLOG FLEXPEN U-100 INSULIN) 100 unit/mL InPn pen Inject 13 units w/ meals plus scale 150-200+2, 201-250+4, 251-300+6, 301-350+8.    ONETOUCH DELICA LANCETS 33 gauge Misc     ONETOUCH ULTRA2 kit     pen needle, diabetic 31 gauge x 1/4" Ndle 1 Device by MISCELLANEOUS route 4 (four) times daily with meals and nightly.     Family History     Problem Relation (Age of Onset)    Diabetes Brother    Hydrocephalus Daughter    No Known Problems Mother, Father, Brother, Brother, Maternal Aunt, Maternal Uncle, Paternal Aunt, Paternal Uncle, Maternal Grandmother, Maternal Grandfather, Paternal Grandmother, Paternal Grandfather        Tobacco Use    Smoking status: Never Smoker    Smokeless tobacco: Never Used   Substance and Sexual Activity    Alcohol use: Yes     Comment: occasionally    Drug use: No    Sexual activity: Yes     Partners: Male     Birth control/protection: OCP     Review of Systems   Constitutional: Negative for chills and fever.   HENT: Negative for " sore throat and trouble swallowing.    Eyes: Negative for pain and visual disturbance.   Respiratory: Positive for shortness of breath. Negative for cough.    Cardiovascular: Negative for chest pain and palpitations.   Gastrointestinal: Positive for abdominal pain and nausea. Negative for vomiting.   Endocrine: Positive for polydipsia and polyuria.   Genitourinary: Negative for difficulty urinating and dysuria.   Musculoskeletal: Negative for arthralgias and myalgias.   Skin: Negative for rash and wound.   Neurological: Negative for weakness and numbness.     Objective:     Vital Signs (Most Recent):  Temp: 97.8 °F (36.6 °C) (10/18/18 0939)  Pulse: (!) 120 (10/18/18 1421)  Resp: (!) 23 (10/18/18 1421)  BP: 134/62 (10/18/18 1330)  SpO2: 100 % (10/18/18 1421) Vital Signs (24h Range):  Temp:  [97.8 °F (36.6 °C)] 97.8 °F (36.6 °C)  Pulse:  [110-124] 120  Resp:  [18-28] 23  SpO2:  [99 %-100 %] 100 %  BP: (121-141)/(58-74) 134/62     Weight: 89.4 kg (197 lb)  Body mass index is 34.9 kg/m².    Physical Exam   Constitutional: She is oriented to person, place, and time. She appears well-developed. No distress.   HENT:   Head: Normocephalic and atraumatic.   Mouth/Throat: Mucous membranes are dry.   Eyes: Conjunctivae and EOM are normal.   Cardiovascular: Regular rhythm, S1 normal, S2 normal and intact distal pulses. Tachycardia present.   Pulmonary/Chest: Effort normal and breath sounds normal. Tachypnea noted.   Abdominal: Soft. She exhibits no distension. There is tenderness (mild RUQ).   Musculoskeletal: Normal range of motion. She exhibits no edema.   Neurological: She is alert and oriented to person, place, and time.   Skin: Skin is warm and dry.   Nursing note and vitals reviewed.    Significant Labs:   CBC:  Recent Labs   Lab 10/18/18  1050   WBC 16.73*   HGB 13.9   HCT 44.4   *   GRAN 89.0*  CANCELED   LYMPH 8.0*  CANCELED   MONO 1.0*  CANCELED   EOS CANCELED   BASO CANCELED      CMP:  Recent Labs   Lab  10/17/18  1455 10/18/18  1115 10/18/18  1322   * 132* 136   K 4.9 6.6* 6.1*   CL 99 104 110   CO2 13* 6* 6*   BUN 22* 28* 28*   CREATININE 1.1 1.9* 1.5*   * 664* 543*   CALCIUM 9.8 10.0 9.3   ALKPHOS 95 118  --    AST 14 20  --    ALT 15 19  --    BILITOT 0.5 0.2  --    PROT 8.6* 9.3*  --    ALBUMIN 3.9 4.1  --    ANIONGAP 18* 22* 20*     ABG:  Recent Labs   Lab 10/18/18  1117 10/18/18  1343   PH 6.972* 7.055*   PCO2 25.1* 12.9*   PO2 35* 140*   BE -26 -27     Significant Imaging:   CXR 10/18/18:  No evidence of acute intrathoracic process.    U/S Abdomen Limited 10/18/18:  No acute sonographic abnormalities.    Assessment/Plan:     * Diabetic ketoacidosis without coma associated with type 1 diabetes mellitus    - DKA suspect secondary to medication nonadherence versus potential infection, though infection seems less likely.  - Concurrent anion gap metabolic acidosis with elevated beta hydroxybutyrate.  - Received 2L NS in ED; will continue rehydration with 0.45% saline at 250mL/hr.  - Started insulin gtt in ED; will continue and titrate as feasible with hourly POCT glucose checks.  - Trend BMP q4hr to monitor for resolution of anion gap acidosis.  - Once gap closes would resume insulin regimen with insulin detemir 45U subQ daily, insulin aspart 13U subQ TIDWM with additional low-dose sliding scale insulin aspart 0-5U subQ TIDWM PRN.     Hypernatremia    - Mild hypernatremia in the setting of dehydration with DKA; corrected sodium ~147.  - Management as under DKA.     TRISHA (acute kidney injury)    - TRISHA likely in the setting of dehydration from DKA.     Depression    - Continue bupropion 150mg PO daily.     Poorly controlled type 1 diabetes mellitus    - As under DKA.     Leukocytosis    - Suspect secondary to DKA versus ?infectious process that caused her DKA. Abdominal pain present, so if any source would be intra-abdominal.  - SIRS 3/4 (tachycardia, tachypnea, leukocytosis); qSOFA 1/3 (tachypnea).  -  Mild RUQ tenderness on exam; U/S unremarkable.  - Given initial presentation will cover for now with ceftriaxone 1g IV q24hr, metronidazole 500mg IV q8hr; if symptoms mignon entirely with treatment of DKA likely safe to discontinue.     Increased anion gap metabolic acidosis    - As under DKA.       VTE Risk Mitigation (From admission, onward)        Ordered     enoxaparin injection 40 mg  Daily      10/18/18 1423     Place sequential compression device  Until discontinued      10/18/18 1351     IP VTE HIGH RISK PATIENT  Once      10/18/18 1351        Critical care time spent on the evaluation and treatment of severe organ dysfunction, review of pertinent labs and imaging studies, discussions with consulting providers and discussions with patient/family: 45 minutes.    CARLIE Mena MD  Department of Hospital Medicine   [facility]  [phone]

## 2018-10-18 NOTE — ED NOTES
Pt presents with c/o N/V since early this morning. Pt also c/o sharp epi gastric pain. Pt reports she has not been taking her recommended units of insulin due to an insurance problem. Pt reports she cannot fill her script right now so she  has been conserving the little bit of insulin that she has. Pt reports she has had increased thirst and urinary frequency. Pt states she was  in DKA in January. Pt is AAOx4. RR are even and unlabored. Skin is warm and dry. Pt is placed on cont bp and pulse ox monitoring. Bed is locked and in lowest position with side rails up x.2 Call light is within reach. Visitor is at the bedside.

## 2018-10-18 NOTE — ED NOTES
Pt transported to ICU by RN, via stetcher and on th zoll. Pt is AAOx4. RR even and unlabored. Skin is warm and dry,.

## 2018-10-18 NOTE — ED NOTES
Dr. Allen at the bedside. Pt updated on POC and pt verbalized understanding. Pt remains on cont cardiac, bp and pulse ox monitoring. Bed is locked and in lowest position with side rails up x2.C all light is within reach. Will continue to monitor.

## 2018-10-18 NOTE — ED NOTES
Pt rounding complete.   Pt updated on POC and pt verbalized understanding. Pt denies any pain or needs at the moment. Pt does not appear to be in acute distress. Respirations are even and unlabored.  Bed is locked and in lowest position with side rails up x2. Call light is within reach. Will continue to monitor.

## 2018-10-18 NOTE — ASSESSMENT & PLAN NOTE
- Mild hypernatremia in the setting of dehydration with DKA; corrected sodium ~147.  - Management as under DKA.

## 2018-10-18 NOTE — ASSESSMENT & PLAN NOTE
- DKA suspect secondary to medication nonadherence versus potential infection, though infection seems less likely.  - Concurrent anion gap metabolic acidosis with elevated beta hydroxybutyrate.  - Received 2L NS in ED; will continue rehydration with 0.45% saline at 250mL/hr.  - Started insulin gtt in ED; will continue and titrate as feasible with hourly POCT glucose checks.  - Trend BMP q4hr to monitor for resolution of anion gap acidosis.  - Once gap closes would resume insulin regimen with insulin detemir 45U subQ daily, insulin aspart 13U subQ TIDWM with additional low-dose sliding scale insulin aspart 0-5U subQ TIDWM PRN.

## 2018-10-18 NOTE — ED PROVIDER NOTES
Encounter Date: 10/18/2018    SCRIBE #1 NOTE: IChristine am scribing for, and in the presence of, Dr. Fontenot.       History     Chief Complaint   Patient presents with    Abdominal Pain     pt to er with c/o abdominal pain and sob . pt seen in er with  vomiting and weakness.     Time seen by provider: 10:40 AM    This is a 27 y.o. female with history of DM, HTN, and HLD who presents with complaint of abdominal pain today. She was seen at [facility] yesterday and was discharged. She reports N/V, SOB, and urinary frequency. She denies fever, abdominal distention, diarrhea, constipation, or dysuria. She reports symptoms feel similar to past DKA. She is non compliant with insulin due to insurance complications. She reports her sugars have been in the 300s.      The history is provided by the patient.     Review of patient's allergies indicates:   Allergen Reactions    No known allergies      Past Medical History:   Diagnosis Date    Depression     Hyperlipidemia     Hypertension     Type I (juvenile type) diabetes mellitus without mention of complication, uncontrolled 2011     Past Surgical History:   Procedure Laterality Date    ABCESS DRAINAGE      boil went over [place]     SECTION  2012    DELIVERY- SECTION N/A 3/16/2017    Performed by Purnima Kidd MD at [facility] L&D    INCISION AND DRAINAGE (I & D) Left 2013    Performed by Joshua Goldberg, MD at [facility] OR [facility] F[facility]    TONSILLECTOMY, ADENOIDECTOMY      WISDOM TOOTH EXTRACTION       Family History   Problem Relation Age of Onset    Diabetes Brother     No Known Problems Mother     No Known Problems Father     Hydrocephalus Daughter     No Known Problems Brother     No Known Problems Brother     No Known Problems Maternal Aunt     No Known Problems Maternal Uncle     No Known Problems Paternal Aunt     No Known Problems Paternal Uncle     No Known Problems Maternal Grandmother     No Known Problems Maternal  Grandfather     No Known Problems Paternal Grandmother     No Known Problems Paternal Grandfather     Amblyopia Neg Hx     Blindness Neg Hx     Cataracts Neg Hx     Glaucoma Neg Hx     Hypertension Neg Hx     Macular degeneration Neg Hx     Retinal detachment Neg Hx     Strabismus Neg Hx     Stroke Neg Hx     Thyroid disease Neg Hx     Breast cancer Neg Hx     Colon cancer Neg Hx     Ovarian cancer Neg Hx     Cancer Neg Hx      Social History     Tobacco Use    Smoking status: Never Smoker    Smokeless tobacco: Never Used   Substance Use Topics    Alcohol use: Yes     Comment: occasionally    Drug use: No     Review of Systems   Constitutional: Negative for chills and fever.   HENT: Negative for congestion and sore throat.    Eyes: Negative for photophobia and redness.   Respiratory: Positive for shortness of breath. Negative for cough.    Cardiovascular: Negative for chest pain.   Gastrointestinal: Positive for abdominal pain, nausea and vomiting. Negative for abdominal distention, blood in stool, constipation and diarrhea.   Genitourinary: Positive for frequency. Negative for dysuria.   Musculoskeletal: Negative for back pain.   Skin: Negative for rash.   Neurological: Negative for weakness, light-headedness and headaches.   Psychiatric/Behavioral: Negative for confusion.       Physical Exam     Initial Vitals [10/18/18 0939]   BP Pulse Resp Temp SpO2   (!) 141/58 (!) 115 18 97.8 °F (36.6 °C) 99 %      MAP       --         Physical Exam    Nursing note and vitals reviewed.  Constitutional: She appears well-developed and well-nourished. She is not diaphoretic. No distress.   HENT:   Head: Normocephalic and atraumatic.   Sticky mucous membranes.   Eyes: EOM are normal. No scleral icterus.   Neck: Normal range of motion. Neck supple.   Cardiovascular: Normal rate, regular rhythm and normal heart sounds. Exam reveals no gallop and no friction rub.    No murmur heard.  Pulmonary/Chest: Breath sounds  normal. No respiratory distress. She has no wheezes. She has no rhonchi. She has no rales.   Abdominal: Soft. There is no tenderness. There is no rebound and no guarding.   Musculoskeletal: Normal range of motion. She exhibits no edema or tenderness.   Neurological: She is alert and oriented to person, place, and time.   Skin: Skin is warm and dry. No rash noted. No erythema.         ED Course   Critical Care  Date/Time: 10/18/2018 2:24 PM  Performed by: Oniel Baig MD  Authorized by: Oniel Baig MD   Direct patient critical care time: 50 minutes  Additional history critical care time: 10 minutes  Ordering / reviewing critical care time: 12 minutes  Documentation critical care time: 10 minutes  Consulting other physicians critical care time: 8 minutes  Consult with family critical care time: 15 minutes  Total critical care time (exclusive of procedural time) : 105 minutes  Critical care time was exclusive of separately billable procedures and treating other patients and teaching time.  Critical care was necessary to treat or prevent imminent or life-threatening deterioration of the following conditions: endocrine crisis.  Critical care was time spent personally by me on the following activities: blood draw for specimens, development of treatment plan with patient or surrogate, discussions with consultants, interpretation of cardiac output measurements, evaluation of patient's response to treatment, examination of patient, obtaining history from patient or surrogate, ordering and performing treatments and interventions, ordering and review of laboratory studies, ordering and review of radiographic studies, pulse oximetry, re-evaluation of patient's condition and review of old charts.        Labs Reviewed   CBC W/ AUTO DIFFERENTIAL - Abnormal; Notable for the following components:       Result Value    WBC 16.73 (*)     MCH 26.5 (*)     MCHC 31.3 (*)     Platelets 498 (*)     Gran% 89.0 (*)     Lymph%  8.0 (*)     Mono% 1.0 (*)     Platelet Estimate Increased (*)     All other components within normal limits   COMPREHENSIVE METABOLIC PANEL - Abnormal; Notable for the following components:    Sodium 132 (*)     Potassium 6.6 (*)     CO2 6 (*)     Glucose 664 (*)     BUN, Bld 28 (*)     Creatinine 1.9 (*)     Total Protein 9.3 (*)     Anion Gap 22 (*)     eGFR if  42 (*)     eGFR if non  36 (*)     All other components within normal limits    Narrative:      CO2, glucose, and potassium  critical result(s) called and verbal   readback obtained from Antonette Dejesus RN at 1206. , 10/18/2018 12:06  Recoll. [id number] by KAREN at 10/18/2018 11:15, reason: Specimen   hemolyzed. Notified Maura Pham RN at 1115. Nurse will   recollect.   BETA - HYDROXYBUTYRATE, SERUM - Abnormal; Notable for the following components:    Beta-Hydroxybutyrate 5.1 (*)     All other components within normal limits   URINALYSIS, REFLEX TO URINE CULTURE - Abnormal; Notable for the following components:    Protein, UA 1+ (*)     Glucose, UA 3+ (*)     Ketones, UA 3+ (*)     Occult Blood UA 3+ (*)     All other components within normal limits    Narrative:     Preferred Collection Type->Urine, Clean Catch   BASIC METABOLIC PANEL - Abnormal; Notable for the following components:    Potassium 6.1 (*)     CO2 6 (*)     Glucose 543 (*)     BUN, Bld 28 (*)     Creatinine 1.5 (*)     Anion Gap 20 (*)     eGFR if  55 (*)     eGFR if non  48 (*)     All other components within normal limits    Narrative:        Glucose and CO2 critical result(s) called and verbal readback   obtained from Maura Pham RN at 1407. , 10/18/2018 14:07   POCT GLUCOSE - Abnormal; Notable for the following components:    POCT Glucose >500 (*)     All other components within normal limits   ISTAT PROCEDURE - Abnormal; Notable for the following components:    POC PH 6.972 (*)     POC PCO2 25.1 (*)     POC PO2 35 (*)     POC  HCO3 5.8 (*)     POC SATURATED O2 40 (*)     All other components within normal limits   ISTAT PROCEDURE - Abnormal; Notable for the following components:    POC PH 7.055 (*)     POC PCO2 12.9 (*)     POC PO2 140 (*)     POC HCO3 3.6 (*)     All other components within normal limits   LIPASE   URINALYSIS MICROSCOPIC    Narrative:     Preferred Collection Type->Urine, Clean Catch   POCT URINE PREGNANCY   POCT GLUCOSE MONITORING CONTINUOUS   POCT GLUCOSE MONITORING CONTINUOUS     EKG Readings: (Independently Interpreted)   Sinus tachycardia at rate of 127 bpm with occasional PVCs.       Imaging Results          US Abdomen Limited (Final result)  Result time 10/18/18 12:34:34    Final result by Lee Germain MD (10/18/18 12:34:34)                 Impression:      No acute sonographic abnormalities, no findings to suggest acute cholecystitis.      Electronically signed by: Lee Germain MD  Date:    10/18/2018  Time:    12:34             Narrative:    EXAMINATION:  US ABDOMEN LIMITED    CLINICAL HISTORY:  Abdominal Pain - Gallbladder;    TECHNIQUE:  Limited ultrasound of the right upper quadrant of the abdomen was performed.    COMPARISON:  04/13/2017    FINDINGS:  The visualized portions of the pancreas are grossly unremarkable.  The gallbladder is somewhat decompressed, limiting evaluation.  No gallbladder wall thickening or pericholecystic fluid.  Sonographic Issa sign is negative.  The common duct is not dilated measuring 0.2 cm.  The visualized portions of the IVC and aorta are grossly unremarkable.  Incidentally visualized portions of the liver are unremarkable.  No ascites seen.                               X-Ray Chest AP Portable (Final result)  Result time 10/18/18 11:45:07    Final result by Hugo Ny MD (10/18/18 11:45:07)                 Impression:      Stable exam.  No evidence of active cardiopulmonary disease.      Electronically signed by: Hugo Ny  MD  Date:    10/18/2018  Time:    11:45             Narrative:    EXAMINATION:  XR CHEST AP PORTABLE    CLINICAL HISTORY:  hyperglycemia;    TECHNIQUE:  Frontal view of the chest was performed.    COMPARISON:  10/17/2018    FINDINGS:  Multiple overlying cardiac monitoring leads. The cardiomediastinal silhouette is normal in size and midline. Pulmonary vascularity appears within normal limits.    The lungs appear clear without confluent pulmonary parenchymal opacity. No pleural fluid or pneumothorax.                              X-Rays:   Independently Interpreted Readings:   Chest X-Ray: No acute findings.     Medical Decision Making:   Clinical Tests:   Lab Tests: Ordered and Reviewed  Radiological Study: Ordered and Reviewed  Medical Tests: Ordered and Reviewed  ED Management:  11:39 AM - Discussed case with Critical Care. No recommendation on giving bicarb at this time. Recommendation is aggressive fluid resuscitation and recheck patient ABG.  1:50 PM - Discussed case with Dr. Louise, and will admit patient to Dr. Allen.            Scribe Attestation:   Scribe #1: I performed the above scribed service and the documentation accurately describes the services I performed. I attest to the accuracy of the note.    Attending Attestation:           Physician Attestation for Scribe:  Physician Attestation Statement for Scribe #1: I, Dr. Fontenot, reviewed documentation, as scribed by Christine Hernandez in my presence, and it is both accurate and complete.         Attending ED Notes:   Emergent and clinical evaluation a 27-year-old female with past medical history of type 1 diabetes now presents to the emergency room with chief complaint of nausea, abdominal cramping, shortness of breath and vomiting. Patient states her blood glucose head read high this morning.  Urinary analysis reveals protein, sugar, ketones and blood.  Patient's initial VBG reveals a pH of 6.9, bicarb of 5.8.  Patient's sodium is 132, potassium 6.6, blood  glucose is 664 and a CO2 of 6.  Anion gap is 22.  I consulted and discussed patient with critical care.  Recommendation in agreement is to not lower the potassium at this time and that the potassium will be drawn up into the cells with the administration of insulin.  No bicarb at this time.  Concern for possible intracellular acidosis with administration of bicarb.  First administered fluids and then re-evaluate for increasing PH.  Patient's white blood cell count of 16,000.  H&H within normal limits. Beta hydroxybutyrate is 5.1.  Ultrasound of the right upper quadrant reveals no acute findings.  No acute findings on chest x-ray.  Patient is administered 1 L of normal saline and 2 L of lactated Ringer's.  Insulin drip is initiated after initial bolus of 6 units of regular insulin.  The patient is extensively counseled her diagnosis and treatment including all diagnostic, laboratory and physical exam findings.  The patient is admitted in stable condition.             Clinical Impression:     1. Diabetic ketoacidosis without coma associated with type 1 diabetes mellitus    2. Hyperglycemia    3. Hyperkalemia    4. Acute kidney injury    5. Dehydration                                 Oniel Baig MD  10/18/18 1900

## 2018-10-18 NOTE — HPI
Ms. Andrew is a 27/F with PMH depression, DMI (diagnosed age 17, on insulin glargine and insulin aspart) who presented to ED 10/18 with abdominal pain, nausea, vomiting and SOB that felt similar to prior presentations of her DKA. She reports that several days ago she began cutting down on her prescribed insulin doses as her insurance no longer covered prior prescriptions, so she attempted to stretch out dosing to make it last longer. On 10/17 she began having symptoms and presented to ED at [facility] for evaluation; she was deemed clinically stable and discharged. With persistence of her symptoms as well as worsening nausea and vomiting, she presented to [facility] ED and was found to have elevated beta-hydroxybutyrate, metabolic acidosis, and glucose in 600s consistent with DKA.    She complains of SOB, abdominal pain, nausea, and vomiting. She denies fevers, chills, CP, palpitations.

## 2018-10-18 NOTE — ED NOTES
Pt rounding complete. Pt denies any  needs at the moment. Pt does not appear to be in acute distress.  Bed is locked and in lowest position with side rails up x2. Call light is within reach. Will continue to monitor.

## 2018-10-18 NOTE — PROGRESS NOTES
Pt received on RA with normal saturation. VBG was done at 1117;results shown to Dr. Baig. No changes made.  ABG was done at 1343;results shown to Dr. Baig. No changes were made. Will continue to monitor.

## 2018-10-19 PROBLEM — E87.0 HYPERNATREMIA: Status: RESOLVED | Noted: 2018-10-18 | Resolved: 2018-10-19

## 2018-10-19 PROBLEM — E87.29 INCREASED ANION GAP METABOLIC ACIDOSIS: Status: RESOLVED | Noted: 2018-10-18 | Resolved: 2018-10-19

## 2018-10-19 LAB
ANION GAP SERPL CALC-SCNC: 10 MMOL/L
ANION GAP SERPL CALC-SCNC: 12 MMOL/L
ANION GAP SERPL CALC-SCNC: 6 MMOL/L
ANION GAP SERPL CALC-SCNC: 9 MMOL/L
ANION GAP SERPL CALC-SCNC: 9 MMOL/L
BASOPHILS # BLD AUTO: 0.01 K/UL
BASOPHILS NFR BLD: 0.1 %
BUN SERPL-MCNC: 10 MG/DL
BUN SERPL-MCNC: 12 MG/DL
BUN SERPL-MCNC: 13 MG/DL
BUN SERPL-MCNC: 13 MG/DL
BUN SERPL-MCNC: 15 MG/DL
CALCIUM SERPL-MCNC: 8.4 MG/DL
CALCIUM SERPL-MCNC: 8.6 MG/DL
CALCIUM SERPL-MCNC: 8.6 MG/DL
CALCIUM SERPL-MCNC: 8.9 MG/DL
CALCIUM SERPL-MCNC: 9 MG/DL
CHLORIDE SERPL-SCNC: 109 MMOL/L
CHLORIDE SERPL-SCNC: 109 MMOL/L
CHLORIDE SERPL-SCNC: 112 MMOL/L
CHLORIDE SERPL-SCNC: 113 MMOL/L
CHLORIDE SERPL-SCNC: 114 MMOL/L
CO2 SERPL-SCNC: 10 MMOL/L
CO2 SERPL-SCNC: 13 MMOL/L
CO2 SERPL-SCNC: 15 MMOL/L
CO2 SERPL-SCNC: 16 MMOL/L
CO2 SERPL-SCNC: 16 MMOL/L
CREAT SERPL-MCNC: 1 MG/DL
CREAT SERPL-MCNC: 1.1 MG/DL
CREAT SERPL-MCNC: 1.1 MG/DL
CREAT SERPL-MCNC: 1.2 MG/DL
CREAT SERPL-MCNC: 1.2 MG/DL
DIFFERENTIAL METHOD: ABNORMAL
EOSINOPHIL # BLD AUTO: 0 K/UL
EOSINOPHIL NFR BLD: 0.1 %
ERYTHROCYTE [DISTWIDTH] IN BLOOD BY AUTOMATED COUNT: 13.3 %
EST. GFR  (AFRICAN AMERICAN): >60 ML/MIN/1.73 M^2
EST. GFR  (NON AFRICAN AMERICAN): >60 ML/MIN/1.73 M^2
GLUCOSE SERPL-MCNC: 180 MG/DL
GLUCOSE SERPL-MCNC: 234 MG/DL
GLUCOSE SERPL-MCNC: 271 MG/DL
GLUCOSE SERPL-MCNC: 271 MG/DL
GLUCOSE SERPL-MCNC: 300 MG/DL
HCT VFR BLD AUTO: 35.7 %
HGB BLD-MCNC: 11.4 G/DL
LYMPHOCYTES # BLD AUTO: 2.2 K/UL
LYMPHOCYTES NFR BLD: 14.1 %
MAGNESIUM SERPL-MCNC: 2.1 MG/DL
MCH RBC QN AUTO: 26 PG
MCHC RBC AUTO-ENTMCNC: 31.9 G/DL
MCV RBC AUTO: 82 FL
MONOCYTES # BLD AUTO: 1.2 K/UL
MONOCYTES NFR BLD: 7.9 %
NEUTROPHILS # BLD AUTO: 11.8 K/UL
NEUTROPHILS NFR BLD: 77.3 %
PHOSPHATE SERPL-MCNC: 2.2 MG/DL
PLATELET # BLD AUTO: 361 K/UL
PMV BLD AUTO: 9.8 FL
POCT GLUCOSE: 162 MG/DL (ref 70–110)
POCT GLUCOSE: 185 MG/DL (ref 70–110)
POCT GLUCOSE: 194 MG/DL (ref 70–110)
POCT GLUCOSE: 220 MG/DL (ref 70–110)
POCT GLUCOSE: 226 MG/DL (ref 70–110)
POCT GLUCOSE: 230 MG/DL (ref 70–110)
POCT GLUCOSE: 232 MG/DL (ref 70–110)
POCT GLUCOSE: 243 MG/DL (ref 70–110)
POCT GLUCOSE: 251 MG/DL (ref 70–110)
POCT GLUCOSE: 258 MG/DL (ref 70–110)
POCT GLUCOSE: 263 MG/DL (ref 70–110)
POCT GLUCOSE: 266 MG/DL (ref 70–110)
POCT GLUCOSE: 269 MG/DL (ref 70–110)
POTASSIUM SERPL-SCNC: 3.6 MMOL/L
POTASSIUM SERPL-SCNC: 3.7 MMOL/L
POTASSIUM SERPL-SCNC: 4 MMOL/L
POTASSIUM SERPL-SCNC: 4.5 MMOL/L
POTASSIUM SERPL-SCNC: 4.7 MMOL/L
RBC # BLD AUTO: 4.38 M/UL
SODIUM SERPL-SCNC: 134 MMOL/L
SODIUM SERPL-SCNC: 135 MMOL/L
SODIUM SERPL-SCNC: 136 MMOL/L
WBC # BLD AUTO: 15.27 K/UL

## 2018-10-19 PROCEDURE — 94761 N-INVAS EAR/PLS OXIMETRY MLT: CPT

## 2018-10-19 PROCEDURE — 11000001 HC ACUTE MED/SURG PRIVATE ROOM

## 2018-10-19 PROCEDURE — 25000003 PHARM REV CODE 250: Performed by: INTERNAL MEDICINE

## 2018-10-19 PROCEDURE — S5010 5% DEXTROSE AND 0.45% SALINE: HCPCS | Performed by: INTERNAL MEDICINE

## 2018-10-19 PROCEDURE — 84100 ASSAY OF PHOSPHORUS: CPT

## 2018-10-19 PROCEDURE — 99900035 HC TECH TIME PER 15 MIN (STAT)

## 2018-10-19 PROCEDURE — 85025 COMPLETE CBC W/AUTO DIFF WBC: CPT

## 2018-10-19 PROCEDURE — 36415 COLL VENOUS BLD VENIPUNCTURE: CPT

## 2018-10-19 PROCEDURE — 99291 CRITICAL CARE FIRST HOUR: CPT | Mod: ,,, | Performed by: INTERNAL MEDICINE

## 2018-10-19 PROCEDURE — S0030 INJECTION, METRONIDAZOLE: HCPCS | Performed by: INTERNAL MEDICINE

## 2018-10-19 PROCEDURE — 80048 BASIC METABOLIC PNL TOTAL CA: CPT | Mod: 91

## 2018-10-19 PROCEDURE — 83735 ASSAY OF MAGNESIUM: CPT

## 2018-10-19 PROCEDURE — 63600175 PHARM REV CODE 636 W HCPCS: Performed by: INTERNAL MEDICINE

## 2018-10-19 RX ORDER — INSULIN ASPART 100 [IU]/ML
13 INJECTION, SOLUTION INTRAVENOUS; SUBCUTANEOUS
Status: DISCONTINUED | OUTPATIENT
Start: 2018-10-19 | End: 2018-10-20

## 2018-10-19 RX ORDER — INSULIN LISPRO 100 [IU]/ML
13 INJECTION, SOLUTION INTRAVENOUS; SUBCUTANEOUS
Qty: 10 ML | Refills: 0 | Status: SHIPPED | OUTPATIENT
Start: 2018-10-19 | End: 2018-10-23

## 2018-10-19 RX ORDER — IBUPROFEN 200 MG
16 TABLET ORAL
Status: DISCONTINUED | OUTPATIENT
Start: 2018-10-19 | End: 2018-10-22 | Stop reason: HOSPADM

## 2018-10-19 RX ORDER — IBUPROFEN 200 MG
24 TABLET ORAL
Status: DISCONTINUED | OUTPATIENT
Start: 2018-10-19 | End: 2018-10-22 | Stop reason: HOSPADM

## 2018-10-19 RX ORDER — GLUCAGON 1 MG
1 KIT INJECTION
Status: DISCONTINUED | OUTPATIENT
Start: 2018-10-19 | End: 2018-10-22 | Stop reason: HOSPADM

## 2018-10-19 RX ORDER — INSULIN LISPRO 100 [IU]/ML
13 INJECTION, SOLUTION INTRAVENOUS; SUBCUTANEOUS
Qty: 11.7 ML | Refills: 2 | Status: CANCELLED | OUTPATIENT
Start: 2018-10-19

## 2018-10-19 RX ORDER — INSULIN GLARGINE 100 [IU]/ML
45 INJECTION, SOLUTION SUBCUTANEOUS DAILY
Qty: 15 ML | Refills: 2 | Status: SHIPPED | OUTPATIENT
Start: 2018-10-19 | End: 2018-10-22 | Stop reason: SDUPTHER

## 2018-10-19 RX ORDER — INSULIN ASPART 100 [IU]/ML
0-5 INJECTION, SOLUTION INTRAVENOUS; SUBCUTANEOUS
Status: DISCONTINUED | OUTPATIENT
Start: 2018-10-19 | End: 2018-10-20

## 2018-10-19 RX ADMIN — METRONIDAZOLE 500 MG: 500 INJECTION, SOLUTION INTRAVENOUS at 03:10

## 2018-10-19 RX ADMIN — ENOXAPARIN SODIUM 40 MG: 100 INJECTION SUBCUTANEOUS at 05:10

## 2018-10-19 RX ADMIN — DEXTROSE AND SODIUM CHLORIDE: 5; .45 INJECTION, SOLUTION INTRAVENOUS at 07:10

## 2018-10-19 RX ADMIN — INSULIN DETEMIR 45 UNITS: 100 INJECTION, SOLUTION SUBCUTANEOUS at 11:10

## 2018-10-19 RX ADMIN — METRONIDAZOLE 500 MG: 500 INJECTION, SOLUTION INTRAVENOUS at 07:10

## 2018-10-19 RX ADMIN — INSULIN ASPART 2 UNITS: 100 INJECTION, SOLUTION INTRAVENOUS; SUBCUTANEOUS at 05:10

## 2018-10-19 RX ADMIN — CEFTRIAXONE 1 G: 1 INJECTION, SOLUTION INTRAVENOUS at 03:10

## 2018-10-19 RX ADMIN — INSULIN ASPART 13 UNITS: 100 INJECTION, SOLUTION INTRAVENOUS; SUBCUTANEOUS at 05:10

## 2018-10-19 RX ADMIN — ACETAMINOPHEN 650 MG: 325 TABLET, FILM COATED ORAL at 09:10

## 2018-10-19 RX ADMIN — INSULIN ASPART 1 UNITS: 100 INJECTION, SOLUTION INTRAVENOUS; SUBCUTANEOUS at 09:10

## 2018-10-19 RX ADMIN — SODIUM CHLORIDE 2.55 UNITS/HR: 9 INJECTION, SOLUTION INTRAVENOUS at 11:10

## 2018-10-19 RX ADMIN — OXYCODONE AND ACETAMINOPHEN 1 TABLET: 5; 325 TABLET ORAL at 08:10

## 2018-10-19 RX ADMIN — INSULIN ASPART 13 UNITS: 100 INJECTION, SOLUTION INTRAVENOUS; SUBCUTANEOUS at 11:10

## 2018-10-19 RX ADMIN — METRONIDAZOLE 500 MG: 500 INJECTION, SOLUTION INTRAVENOUS at 11:10

## 2018-10-19 RX ADMIN — OXYCODONE AND ACETAMINOPHEN 1 TABLET: 5; 325 TABLET ORAL at 11:10

## 2018-10-19 NOTE — PROGRESS NOTES
GILES called [facility]/Medicaid [phone], spoke to Idris regarding insulin covered under pt's insurance.  GILES was told that pt reeived Toujeo in August and not due for refill until 10/26/18.  GILES explained that dose had been increased.  Per auth was needed to have med filled at this time.    GILES received call from Marisel in [facility], medication had been approved and will deliver to bed.

## 2018-10-19 NOTE — CONSULTS
Pt seen by DM ed.     Pt had trouble refilling insulin, and before able to sort out insurance challenges, ran out of insulin. Pt has been in contact with endocrine NP's at [facility] for assistance.     Suggest ordering outpatient Pharmacy Assistance Referral to further assist with insulin coverage for Type I DM.     Pt denies needing any further assistance at this time. All questions answered.

## 2018-10-19 NOTE — ASSESSMENT & PLAN NOTE
- Suspect secondary to DKA versus ?infectious process that caused her DKA. Abdominal pain present, so if any source would be intra-abdominal.  - SIRS 3/4 (tachycardia, tachypnea, leukocytosis); qSOFA 1/3 (tachypnea).  - Mild RUQ tenderness initially; now resolved.  - Given initial presentation will cover for now with ceftriaxone 1g IV q24hr, metronidazole 500mg IV q8hr; still having mild leukocytosis so will continue for time being.

## 2018-10-19 NOTE — PLAN OF CARE
Problem: Patient Care Overview  Goal: Plan of Care Review  Outcome: Ongoing (interventions implemented as appropriate)  Pt received on room air with adequate saturation. No acute respiratory distress at this time.

## 2018-10-19 NOTE — PLAN OF CARE
Problem: Patient Care Overview  Goal: Plan of Care Review  Outcome: Ongoing (interventions implemented as appropriate)  Patient was awake, calm and oriented. On room air, not on respiratory distress. Cardiac monitor showed sinus tachycardia to NSR on scope. On continuous Insulin drip, titrated as per ICU protocol. Able to ambulate/transfer from bed to bedside commode with independence.  Provided rest and comfort. Patient was educated. Free from fall. Will continue to monitor.

## 2018-10-19 NOTE — PROGRESS NOTES
[facility] Hospital Medicine  Progress Note    Patient Name: Anusha Andrew  MRN: [id number]  Patient Class: IP- Inpatient   Admission Date: 10/18/2018  Length of Stay: 1 days  Attending Physician: CARLIE Allen MD  Primary Care Provider: Rhina rAcher MD    Subjective:     Principal Problem:Diabetic ketoacidosis without coma associated with type 1 diabetes mellitus    HPI:  Ms. Andrew is a 27/F with PMH depression, DMI (diagnosed age 17, on insulin glargine and insulin aspart) who presented to ED 10/18 with abdominal pain, nausea, vomiting and SOB that felt similar to prior presentations of her DKA. She reports that several days ago she began cutting down on her prescribed insulin doses as her insurance no longer covered prior prescriptions, so she attempted to stretch out dosing to make it last longer. On 10/17 she began having symptoms and presented to ED at [facility] for evaluation; she was deemed clinically stable and discharged. With persistence of her symptoms as well as worsening nausea and vomiting, she presented to [facility] ED and was found to have elevated beta-hydroxybutyrate, metabolic acidosis, and glucose in 600s consistent with DKA.    She complains of SOB, abdominal pain, nausea, and vomiting. She denies fevers, chills, CP, palpitations.    Hospital Course:  No notes on file    Interval History: No acute events overnight. Gap closed; bicarb remains slightly low. Transitioned to D5 0.45% saline overnight as glucose improved. No other concerns at this time.    Review of Systems   Constitutional: Negative for chills and fever.   Respiratory: Negative for cough and shortness of breath.    Cardiovascular: Negative for chest pain and palpitations.   Gastrointestinal: Negative for abdominal pain, nausea and vomiting.     Objective:     Vital Signs (Most Recent):  Temp: 98.4 °F (36.9 °C) (10/19/18 1100)  Pulse: 100 (10/19/18 1700)  Resp: (!) 25 (10/19/18 1700)  BP: 109/68  (10/19/18 1700)  SpO2: 99 % (10/19/18 1700) Vital Signs (24h Range):  Temp:  [97.8 °F (36.6 °C)-98.6 °F (37 °C)] 98.4 °F (36.9 °C)  Pulse:  [] 100  Resp:  [17-30] 25  SpO2:  [98 %-100 %] 99 %  BP: ()/(52-84) 109/68     Weight: 88.3 kg (194 lb 10.7 oz)  Body mass index is 34.48 kg/m².    Intake/Output Summary (Last 24 hours) at 10/19/2018 1809  Last data filed at 10/19/2018 0600  Gross per 24 hour   Intake 1146.02 ml   Output 450 ml   Net 696.02 ml      Physical Exam   Constitutional: She is oriented to person, place, and time. She appears well-developed and well-nourished. No distress.   HENT:   Head: Normocephalic and atraumatic.   Eyes: Conjunctivae and EOM are normal.   Cardiovascular: Normal rate, regular rhythm, S1 normal, S2 normal and intact distal pulses.   Pulmonary/Chest: Effort normal and breath sounds normal.   Abdominal: Soft. She exhibits no distension. There is no tenderness.   Musculoskeletal: Normal range of motion. She exhibits no edema.   Neurological: She is alert and oriented to person, place, and time.   Skin: Skin is warm and dry.   Nursing note and vitals reviewed.    Significant Labs:   CBC:  Recent Labs   Lab 10/19/18  0552   WBC 15.27*   HGB 11.4*   HCT 35.7*   *   GRAN 77.3*  11.8*   LYMPH 14.1*  2.2   MONO 7.9  1.2*   EOS 0.0   BASO 0.01      BMP:  Recent Labs   Lab 10/19/18  0552 10/19/18  0958 10/19/18  1442   * 136 134*   K 4.7 4.0 3.7   * 114* 109   CO2 13* 16* 15*   BUN 13 12 13   CREATININE 1.1 1.0 1.2   * 180* 271*   CALCIUM 8.6* 8.6* 8.9   MG 2.1  --   --    PHOS 2.2*  --   --      Significant Imaging:   No new imaging this morning.    Assessment/Plan:      * Diabetic ketoacidosis without coma associated with type 1 diabetes mellitus    - DKA suspect secondary to medication nonadherence versus potential infection, though infection seems less likely.  - Concurrent anion gap metabolic acidosis with elevated beta hydroxybutyrate.  - Continued  insulin gtt; will monitor HCO3 and when above acceptable levels now that gap has closed will transition off insulin gtt to insulin detemir 45U subQ daily, insulin aspart 13U subQ TIDWM with additional low-dose sliding scale insulin aspart 0-5U subQ TIDWM PRN.     TRISHA (acute kidney injury)    - Resolved.     Depression    - Continue bupropion 150mg PO daily.     Poorly controlled type 1 diabetes mellitus    - As under DKA.     Leukocytosis    - Suspect secondary to DKA versus ?infectious process that caused her DKA. Abdominal pain present, so if any source would be intra-abdominal.  - SIRS 3/4 (tachycardia, tachypnea, leukocytosis); qSOFA 1/3 (tachypnea).  - Mild RUQ tenderness initially; now resolved.  - Given initial presentation will cover for now with ceftriaxone 1g IV q24hr, metronidazole 500mg IV q8hr; still having mild leukocytosis so will continue for time being.       VTE Risk Mitigation (From admission, onward)        Ordered     enoxaparin injection 40 mg  Daily      10/18/18 1423     Place sequential compression device  Until discontinued      10/18/18 1351     IP VTE HIGH RISK PATIENT  Once      10/18/18 1351        Critical care time spent on the evaluation and treatment of severe organ dysfunction, review of pertinent labs and imaging studies, discussions with consulting providers and discussions with patient/family: 35 minutes.    CARLIE Mena MD  Department of Hospital Medicine   [facility]  [phone]

## 2018-10-19 NOTE — PLAN OF CARE
SW met with pt at bedside to complete discharge assessment, verified PCP and uses [facility] on [address].  Pt's mother will provide transportation home.  Pt received assistance with obtaining medication. No needs identified at this time.     10/19/18 0885   Discharge Assessment   Assessment Type Discharge Planning Assessment   Confirmed/corrected address and phone number on facesheet? Yes   Assessment information obtained from? Patient   Communicated expected length of stay with patient/caregiver no   Prior to hospitilization cognitive status: Alert/Oriented   Prior to hospitalization functional status: Independent   Current cognitive status: Alert/Oriented   Current Functional Status: Independent   Lives With alone;child(bob), dependent  (children, ages 1 &6)   Able to Return to Prior Arrangements yes   Is patient able to care for self after discharge? Yes   Patient's perception of discharge disposition home or selfcare   Readmission Within The Last 30 Days no previous admission in last 30 days   Patient currently being followed by outpatient case management? No   Patient currently receives any other outside agency services? No   Equipment Currently Used at Home glucometer   Do you have any problems affording any of your prescribed medications? No   Is the patient taking medications as prescribed? yes   Does the patient have transportation home? Yes   Transportation Available family or friend will provide   Does the patient receive services at the [facility]mad[facility] Clinic? No   Discharge Plan A Home   Patient/Family In Agreement With Plan yes

## 2018-10-19 NOTE — PHARMACY MED REC
"Admission Medication Reconciliation - Pharmacy Consult Note    The home medication history was taken by Geneva Alcantara CPhT.  Based on information gathered and subsequent review by the clinical pharmacist, the items below may need attention.     You may go to "Admission" then "Reconcile Home Medications" tabs to review and/or act upon these items.     Potentially problematic discrepancies with current MAR  o Patient IS NOT taking the following which was ordered upon admit  o Bupropion  mg PO daily  o Patient apparently stopped taking last month    Medications Prior to Admission   Medication Sig Dispense Refill Last Dose    medroxyPROGESTERone (DEPO-PROVERA) 150 mg/mL Syrg Inject 1 mL (150 mg total) into the muscle every 3 (three) months. 1 mL 3 Past Week    [DISCONTINUED] insulin lispro (HUMALOG KWIKPEN INSULIN) 100 unit/mL InPn pen Inject 13 units w/ meals plus scale 150-200+2, 201-250+4, 251-300+6, 301-350+8. 60 mL 3 Past Week    [DISCONTINUED] TOUJEO SOLOSTAR U-300 INSULIN 300 unit/mL (1.5 mL) InPn pen    10/16/2018    blood sugar diagnostic Strp 1 each by Misc.(Non-Drug; Combo Route) route 6 (six) times daily. (Patient taking differently: 1 each by Misc.(Non-Drug; Combo Route) route 4 (four) times daily. ) 200 each 11 10/17/2018    ONETOUCH DELICA LANCETS 33 gauge Misc 1 lancet 4 (four) times daily.    Past Week    ONETOUCH ULTRA2 kit    Past Week    pen needle, diabetic 31 gauge x 1/4" Ndle use with basaglar 100 each 0 Past Week    [DISCONTINUED] insulin aspart U-100 (NOVOLOG FLEXPEN U-100 INSULIN) 100 unit/mL InPn pen Inject 13 units w/ meals plus scale 150-200+2, 201-250+4, 251-300+6, 301-350+8. 60 mL 3 Past Week       Please address this information as you see fit.  Feel free to contact us if you have any questions or require assistance.    Zachary Fraga, Pharm.D., BCPS  [phone]                .  .          "

## 2018-10-19 NOTE — EICU
eICU Note :    Called by the [facility] eRN:    Problem:Complaining of headache which is not relieved with Tylenol and the blood sugars are now in the 150s.  Patient o 1/2NS at 125 an hour    Pertinent History and labs reviewed : 27-year-old female admitted with DKA  Patient Active Problem List   Diagnosis    Increased anion gap metabolic acidosis    Leukocytosis    Obesity (BMI 30.0-34.9)    Poorly controlled type 1 diabetes mellitus    Diabetic ketoacidosis without coma associated with type 1 diabetes mellitus    Depression    TRISHA (acute kidney injury)    Hypernatremia         Treatment /Intervention given: Percocet 5/325 every 6 and fluid changed to D5 half and 0.45@125cc/hr        Shannon Membreno M.D  eICU Physician  11:22 PM  HCO3 is 9 , improved from 8 previously , continue current treatment

## 2018-10-19 NOTE — ASSESSMENT & PLAN NOTE
- DKA suspect secondary to medication nonadherence versus potential infection, though infection seems less likely.  - Concurrent anion gap metabolic acidosis with elevated beta hydroxybutyrate.  - Continued insulin gtt; will monitor HCO3 and when above acceptable levels now that gap has closed will transition off insulin gtt to insulin detemir 45U subQ daily, insulin aspart 13U subQ TIDWM with additional low-dose sliding scale insulin aspart 0-5U subQ TIDWM PRN.

## 2018-10-19 NOTE — SUBJECTIVE & OBJECTIVE
Interval History: No acute events overnight. Gap closed; bicarb remains slightly low. Transitioned to D5 0.45% saline overnight as glucose improved. No other concerns at this time.    Review of Systems   Constitutional: Negative for chills and fever.   Respiratory: Negative for cough and shortness of breath.    Cardiovascular: Negative for chest pain and palpitations.   Gastrointestinal: Negative for abdominal pain, nausea and vomiting.     Objective:     Vital Signs (Most Recent):  Temp: 98.4 °F (36.9 °C) (10/19/18 1100)  Pulse: 100 (10/19/18 1700)  Resp: (!) 25 (10/19/18 1700)  BP: 109/68 (10/19/18 1700)  SpO2: 99 % (10/19/18 1700) Vital Signs (24h Range):  Temp:  [97.8 °F (36.6 °C)-98.6 °F (37 °C)] 98.4 °F (36.9 °C)  Pulse:  [] 100  Resp:  [17-30] 25  SpO2:  [98 %-100 %] 99 %  BP: ()/(52-84) 109/68     Weight: 88.3 kg (194 lb 10.7 oz)  Body mass index is 34.48 kg/m².    Intake/Output Summary (Last 24 hours) at 10/19/2018 1809  Last data filed at 10/19/2018 0600  Gross per 24 hour   Intake 1146.02 ml   Output 450 ml   Net 696.02 ml      Physical Exam   Constitutional: She is oriented to person, place, and time. She appears well-developed and well-nourished. No distress.   HENT:   Head: Normocephalic and atraumatic.   Eyes: Conjunctivae and EOM are normal.   Cardiovascular: Normal rate, regular rhythm, S1 normal, S2 normal and intact distal pulses.   Pulmonary/Chest: Effort normal and breath sounds normal.   Abdominal: Soft. She exhibits no distension. There is no tenderness.   Musculoskeletal: Normal range of motion. She exhibits no edema.   Neurological: She is alert and oriented to person, place, and time.   Skin: Skin is warm and dry.   Nursing note and vitals reviewed.    Significant Labs:   CBC:  Recent Labs   Lab 10/19/18  0552   WBC 15.27*   HGB 11.4*   HCT 35.7*   *   GRAN 77.3*  11.8*   LYMPH 14.1*  2.2   MONO 7.9  1.2*   EOS 0.0   BASO 0.01      BMP:  Recent Labs   Lab  10/19/18  0552 10/19/18  0958 10/19/18  1442   * 136 134*   K 4.7 4.0 3.7   * 114* 109   CO2 13* 16* 15*   BUN 13 12 13   CREATININE 1.1 1.0 1.2   * 180* 271*   CALCIUM 8.6* 8.6* 8.9   MG 2.1  --   --    PHOS 2.2*  --   --      Significant Imaging:   No new imaging this morning.

## 2018-10-20 PROBLEM — N17.9 AKI (ACUTE KIDNEY INJURY): Status: RESOLVED | Noted: 2018-10-18 | Resolved: 2018-10-20

## 2018-10-20 LAB
ANION GAP SERPL CALC-SCNC: 12 MMOL/L
BASOPHILS # BLD AUTO: 0.01 K/UL
BASOPHILS NFR BLD: 0.1 %
BUN SERPL-MCNC: 10 MG/DL
CALCIUM SERPL-MCNC: 8.7 MG/DL
CHLORIDE SERPL-SCNC: 109 MMOL/L
CO2 SERPL-SCNC: 13 MMOL/L
CREAT SERPL-MCNC: 0.9 MG/DL
DIFFERENTIAL METHOD: ABNORMAL
EOSINOPHIL # BLD AUTO: 0 K/UL
EOSINOPHIL NFR BLD: 0.2 %
ERYTHROCYTE [DISTWIDTH] IN BLOOD BY AUTOMATED COUNT: 13.3 %
EST. GFR  (AFRICAN AMERICAN): >60 ML/MIN/1.73 M^2
EST. GFR  (NON AFRICAN AMERICAN): >60 ML/MIN/1.73 M^2
GLUCOSE SERPL-MCNC: 288 MG/DL
HCT VFR BLD AUTO: 35 %
HGB BLD-MCNC: 11.6 G/DL
LYMPHOCYTES # BLD AUTO: 3.4 K/UL
LYMPHOCYTES NFR BLD: 41.4 %
MAGNESIUM SERPL-MCNC: 1.9 MG/DL
MCH RBC QN AUTO: 26.4 PG
MCHC RBC AUTO-ENTMCNC: 33.1 G/DL
MCV RBC AUTO: 80 FL
MONOCYTES # BLD AUTO: 0.5 K/UL
MONOCYTES NFR BLD: 6 %
NEUTROPHILS # BLD AUTO: 4.3 K/UL
NEUTROPHILS NFR BLD: 52.1 %
PHOSPHATE SERPL-MCNC: 2.1 MG/DL
PLATELET # BLD AUTO: 339 K/UL
PMV BLD AUTO: 9.6 FL
POCT GLUCOSE: 266 MG/DL (ref 70–110)
POCT GLUCOSE: 293 MG/DL (ref 70–110)
POCT GLUCOSE: 316 MG/DL (ref 70–110)
POCT GLUCOSE: 67 MG/DL (ref 70–110)
POCT GLUCOSE: 80 MG/DL (ref 70–110)
POTASSIUM SERPL-SCNC: 4.1 MMOL/L
RBC # BLD AUTO: 4.4 M/UL
SODIUM SERPL-SCNC: 134 MMOL/L
WBC # BLD AUTO: 8.22 K/UL

## 2018-10-20 PROCEDURE — 84100 ASSAY OF PHOSPHORUS: CPT

## 2018-10-20 PROCEDURE — S0030 INJECTION, METRONIDAZOLE: HCPCS | Performed by: INTERNAL MEDICINE

## 2018-10-20 PROCEDURE — 11000001 HC ACUTE MED/SURG PRIVATE ROOM

## 2018-10-20 PROCEDURE — 25000003 PHARM REV CODE 250: Performed by: INTERNAL MEDICINE

## 2018-10-20 PROCEDURE — 36415 COLL VENOUS BLD VENIPUNCTURE: CPT

## 2018-10-20 PROCEDURE — 63600175 PHARM REV CODE 636 W HCPCS: Performed by: INTERNAL MEDICINE

## 2018-10-20 PROCEDURE — 80048 BASIC METABOLIC PNL TOTAL CA: CPT

## 2018-10-20 PROCEDURE — 85025 COMPLETE CBC W/AUTO DIFF WBC: CPT

## 2018-10-20 PROCEDURE — 83735 ASSAY OF MAGNESIUM: CPT

## 2018-10-20 PROCEDURE — 99233 SBSQ HOSP IP/OBS HIGH 50: CPT | Mod: ,,, | Performed by: INTERNAL MEDICINE

## 2018-10-20 PROCEDURE — 94761 N-INVAS EAR/PLS OXIMETRY MLT: CPT

## 2018-10-20 RX ORDER — CIPROFLOXACIN 500 MG/1
500 TABLET ORAL EVERY 12 HOURS
Status: COMPLETED | OUTPATIENT
Start: 2018-10-20 | End: 2018-10-21

## 2018-10-20 RX ORDER — INSULIN ASPART 100 [IU]/ML
15 INJECTION, SOLUTION INTRAVENOUS; SUBCUTANEOUS
Status: DISCONTINUED | OUTPATIENT
Start: 2018-10-20 | End: 2018-10-22 | Stop reason: HOSPADM

## 2018-10-20 RX ORDER — INSULIN ASPART 100 [IU]/ML
1-10 INJECTION, SOLUTION INTRAVENOUS; SUBCUTANEOUS
Status: DISCONTINUED | OUTPATIENT
Start: 2018-10-20 | End: 2018-10-22 | Stop reason: HOSPADM

## 2018-10-20 RX ORDER — METRONIDAZOLE 500 MG/1
500 TABLET ORAL EVERY 8 HOURS
Status: COMPLETED | OUTPATIENT
Start: 2018-10-20 | End: 2018-10-21

## 2018-10-20 RX ADMIN — METRONIDAZOLE 500 MG: 500 INJECTION, SOLUTION INTRAVENOUS at 06:10

## 2018-10-20 RX ADMIN — INSULIN ASPART 13 UNITS: 100 INJECTION, SOLUTION INTRAVENOUS; SUBCUTANEOUS at 08:10

## 2018-10-20 RX ADMIN — INSULIN ASPART 3 UNITS: 100 INJECTION, SOLUTION INTRAVENOUS; SUBCUTANEOUS at 08:10

## 2018-10-20 RX ADMIN — METRONIDAZOLE 500 MG: 500 TABLET ORAL at 09:10

## 2018-10-20 RX ADMIN — METRONIDAZOLE 500 MG: 500 TABLET ORAL at 02:10

## 2018-10-20 RX ADMIN — ACETAMINOPHEN 650 MG: 325 TABLET, FILM COATED ORAL at 08:10

## 2018-10-20 RX ADMIN — INSULIN ASPART 15 UNITS: 100 INJECTION, SOLUTION INTRAVENOUS; SUBCUTANEOUS at 01:10

## 2018-10-20 RX ADMIN — INSULIN ASPART 6 UNITS: 100 INJECTION, SOLUTION INTRAVENOUS; SUBCUTANEOUS at 01:10

## 2018-10-20 RX ADMIN — ENOXAPARIN SODIUM 40 MG: 100 INJECTION SUBCUTANEOUS at 06:10

## 2018-10-20 RX ADMIN — CIPROFLOXACIN HYDROCHLORIDE 500 MG: 500 TABLET, FILM COATED ORAL at 02:10

## 2018-10-20 RX ADMIN — INSULIN DETEMIR 50 UNITS: 100 INJECTION, SOLUTION SUBCUTANEOUS at 08:10

## 2018-10-20 RX ADMIN — INSULIN ASPART 8 UNITS: 100 INJECTION, SOLUTION INTRAVENOUS; SUBCUTANEOUS at 08:10

## 2018-10-20 NOTE — ASSESSMENT & PLAN NOTE
- DKA suspect secondary to medication nonadherence versus potential infection, though infection seems less likely.  - DKA resolved; remains hyperglycemic.  - Continuing insulin detemir at 50U subQ daily, insulin aspart at 15U subQ TIDWM with additional moderate-dose sliding scale insulin aspart 1-10U subQ TIDWM PRN.

## 2018-10-20 NOTE — ASSESSMENT & PLAN NOTE
- Suspect secondary to DKA versus ?infectious process that caused her DKA. Abdominal pain present, so if any source would be intra-abdominal.  - SIRS 3/4 (tachycardia, tachypnea, leukocytosis); qSOFA 1/3 (tachypnea).  - Mild RUQ tenderness initially; now resolved.  - Leukocytosis resolved; will de-escalate to ciprofloxacin 500mg PO q12hr, metronidazole 500mg PO q8hr to complete a 4-day total course.

## 2018-10-20 NOTE — PLAN OF CARE
Problem: Patient Care Overview  Goal: Plan of Care Review  Outcome: Ongoing (interventions implemented as appropriate)  Complaints of headache treated moderately with PRN med. Vital signs stable, on room air. Pt remained free of falls. Bed locked in lowest position, call light and personal items within reach. Pt ambulates without assistance to commode. Will continue to monitor.

## 2018-10-20 NOTE — PROGRESS NOTES
[facility] Hospital Medicine  Progress Note    Patient Name: Anusha Andrew  MRN: [id number]  Patient Class: IP- Inpatient   Admission Date: 10/18/2018  Length of Stay: 2 days  Attending Physician: CARLIE Allen MD  Primary Care Provider: Rhina Archer MD        Subjective:     Principal Problem:Diabetic ketoacidosis without coma associated with type 1 diabetes mellitus    HPI:  Ms. Andrew is a 27/F with PMH depression, DMI (diagnosed age 17, on insulin glargine and insulin aspart) who presented to ED 10/18 with abdominal pain, nausea, vomiting and SOB that felt similar to prior presentations of her DKA. She reports that several days ago she began cutting down on her prescribed insulin doses as her insurance no longer covered prior prescriptions, so she attempted to stretch out dosing to make it last longer. On 10/17 she began having symptoms and presented to ED at [facility] for evaluation; she was deemed clinically stable and discharged. With persistence of her symptoms as well as worsening nausea and vomiting, she presented to [facility] ED and was found to have elevated beta-hydroxybutyrate, metabolic acidosis, and glucose in 600s consistent with DKA.    She complains of SOB, abdominal pain, nausea, and vomiting. She denies fevers, chills, CP, palpitations.    Hospital Course:  After admission Ms. Andrew was admitted to ICU on insulin gtt. Gap closed and bicarb improved and she was transitioned to subQ insulin. She was transferred to the floor 10/19. Insulin regimen was further adjusted.    Interval History: No acute events overnight. Feeling well this morning. No other concerns at this time.    Review of Systems   Constitutional: Negative for chills and fever.   Respiratory: Negative for cough and shortness of breath.    Cardiovascular: Negative for chest pain and palpitations.   Gastrointestinal: Negative for abdominal pain, nausea and vomiting.     Objective:     Vital Signs (Most  Recent):  Temp: 98.1 °F (36.7 °C) (10/20/18 1142)  Pulse: 88 (10/20/18 1142)  Resp: 16 (10/20/18 1142)  BP: (!) 105/53 (10/20/18 1142)  SpO2: 98 % (10/20/18 1142) Vital Signs (24h Range):  Temp:  [98.1 °F (36.7 °C)-99.1 °F (37.3 °C)] 98.1 °F (36.7 °C)  Pulse:  [] 88  Resp:  [16-30] 16  SpO2:  [98 %-100 %] 98 %  BP: (105-139)/(53-87) 105/53     Weight: 88.3 kg (194 lb 10.7 oz)  Body mass index is 34.48 kg/m².    Intake/Output Summary (Last 24 hours) at 10/20/2018 1306  Last data filed at 10/20/2018 0700  Gross per 24 hour   Intake 225 ml   Output 100 ml   Net 125 ml      Physical Exam   Constitutional: She is oriented to person, place, and time. She appears well-developed and well-nourished. No distress.   HENT:   Head: Normocephalic and atraumatic.   Eyes: Conjunctivae and EOM are normal.   Cardiovascular: Normal rate, regular rhythm, S1 normal, S2 normal and intact distal pulses.   Pulmonary/Chest: Effort normal and breath sounds normal.   Abdominal: Soft. She exhibits no distension. There is no tenderness.   Musculoskeletal: Normal range of motion. She exhibits no edema.   Neurological: She is alert and oriented to person, place, and time.   Skin: Skin is warm and dry.   Nursing note and vitals reviewed.    Significant Labs:   CBC:  Recent Labs   Lab 10/20/18  0505   WBC 8.22   HGB 11.6*   HCT 35.0*      GRAN 52.1  4.3   LYMPH 41.4  3.4   MONO 6.0  0.5   EOS 0.0   BASO 0.01      BMP:  Recent Labs   Lab 10/19/18  0552  10/19/18  1442 10/19/18  1922 10/20/18  0505   *   < > 134* 134* 134*   K 4.7   < > 3.7 3.6 4.1   *   < > 109 109 109   CO2 13*   < > 15* 16* 13*   BUN 13   < > 13 10 10   CREATININE 1.1   < > 1.2 1.2 0.9   *   < > 271* 300* 288*   CALCIUM 8.6*   < > 8.9 9.0 8.7   MG 2.1  --   --   --  1.9   PHOS 2.2*  --   --   --  2.1*    < > = values in this interval not displayed.     Significant Imaging:   No new imaging this morning.    Assessment/Plan:      * Diabetic  ketoacidosis without coma associated with type 1 diabetes mellitus    - DKA suspect secondary to medication nonadherence versus potential infection, though infection seems less likely.  - DKA resolved; remains hyperglycemic.  - Continuing insulin detemir at 50U subQ daily, insulin aspart at 15U subQ TIDWM with additional moderate-dose sliding scale insulin aspart 1-10U subQ TIDWM PRN.     Depression    - Continue bupropion 150mg PO daily.     Poorly controlled type 1 diabetes mellitus    - As under DKA.     Leukocytosis    - Suspect secondary to DKA versus ?infectious process that caused her DKA. Abdominal pain present, so if any source would be intra-abdominal.  - SIRS 3/4 (tachycardia, tachypnea, leukocytosis); qSOFA 1/3 (tachypnea).  - Mild RUQ tenderness initially; now resolved.  - Leukocytosis resolved; will de-escalate to ciprofloxacin 500mg PO q12hr, metronidazole 500mg PO q8hr to complete a 4-day total course.       VTE Risk Mitigation (From admission, onward)        Ordered     enoxaparin injection 40 mg  Daily      10/18/18 1423     Place sequential compression device  Until discontinued      10/18/18 1351     IP VTE HIGH RISK PATIENT  Once      10/18/18 1351        CARLIE Mena MD  Department of Hospital Medicine   [facility]  [phone]

## 2018-10-20 NOTE — SUBJECTIVE & OBJECTIVE
Interval History: No acute events overnight. Feeling well this morning. No other concerns at this time.    Review of Systems   Constitutional: Negative for chills and fever.   Respiratory: Negative for cough and shortness of breath.    Cardiovascular: Negative for chest pain and palpitations.   Gastrointestinal: Negative for abdominal pain, nausea and vomiting.     Objective:     Vital Signs (Most Recent):  Temp: 98.1 °F (36.7 °C) (10/20/18 1142)  Pulse: 88 (10/20/18 1142)  Resp: 16 (10/20/18 1142)  BP: (!) 105/53 (10/20/18 1142)  SpO2: 98 % (10/20/18 1142) Vital Signs (24h Range):  Temp:  [98.1 °F (36.7 °C)-99.1 °F (37.3 °C)] 98.1 °F (36.7 °C)  Pulse:  [] 88  Resp:  [16-30] 16  SpO2:  [98 %-100 %] 98 %  BP: (105-139)/(53-87) 105/53     Weight: 88.3 kg (194 lb 10.7 oz)  Body mass index is 34.48 kg/m².    Intake/Output Summary (Last 24 hours) at 10/20/2018 1306  Last data filed at 10/20/2018 0700  Gross per 24 hour   Intake 225 ml   Output 100 ml   Net 125 ml      Physical Exam   Constitutional: She is oriented to person, place, and time. She appears well-developed and well-nourished. No distress.   HENT:   Head: Normocephalic and atraumatic.   Eyes: Conjunctivae and EOM are normal.   Cardiovascular: Normal rate, regular rhythm, S1 normal, S2 normal and intact distal pulses.   Pulmonary/Chest: Effort normal and breath sounds normal.   Abdominal: Soft. She exhibits no distension. There is no tenderness.   Musculoskeletal: Normal range of motion. She exhibits no edema.   Neurological: She is alert and oriented to person, place, and time.   Skin: Skin is warm and dry.   Nursing note and vitals reviewed.    Significant Labs:   CBC:  Recent Labs   Lab 10/20/18  0505   WBC 8.22   HGB 11.6*   HCT 35.0*      GRAN 52.1  4.3   LYMPH 41.4  3.4   MONO 6.0  0.5   EOS 0.0   BASO 0.01      BMP:  Recent Labs   Lab 10/19/18  0552  10/19/18  1442 10/19/18  1922 10/20/18  0505   *   < > 134* 134* 134*   K 4.7   <  > 3.7 3.6 4.1   *   < > 109 109 109   CO2 13*   < > 15* 16* 13*   BUN 13   < > 13 10 10   CREATININE 1.1   < > 1.2 1.2 0.9   *   < > 271* 300* 288*   CALCIUM 8.6*   < > 8.9 9.0 8.7   MG 2.1  --   --   --  1.9   PHOS 2.2*  --   --   --  2.1*    < > = values in this interval not displayed.     Significant Imaging:   No new imaging this morning.

## 2018-10-20 NOTE — HOSPITAL COURSE
After admission Ms. Andrew was admitted to ICU on insulin gtt. Gap closed and bicarb improved and she was transitioned to subQ insulin. She was transferred to the floor 10/19. Insulin regimen was further adjusted for hyperglycemia; ultimately she was placed on insulin detemir 45U subQ qHS and insulin aspart 13U subQ TIDWM with additional sliding scale for home use. With correction of her hyperglycemia and replacement insulin delivered from pharmacy she was prepared for discharge.

## 2018-10-21 LAB
ANION GAP SERPL CALC-SCNC: 6 MMOL/L
BASOPHILS # BLD AUTO: 0.01 K/UL
BASOPHILS NFR BLD: 0.2 %
BUN SERPL-MCNC: 9 MG/DL
CALCIUM SERPL-MCNC: 8.5 MG/DL
CHLORIDE SERPL-SCNC: 108 MMOL/L
CO2 SERPL-SCNC: 20 MMOL/L
CREAT SERPL-MCNC: 0.8 MG/DL
DIFFERENTIAL METHOD: ABNORMAL
EOSINOPHIL # BLD AUTO: 0 K/UL
EOSINOPHIL NFR BLD: 0.2 %
ERYTHROCYTE [DISTWIDTH] IN BLOOD BY AUTOMATED COUNT: 13.1 %
EST. GFR  (AFRICAN AMERICAN): >60 ML/MIN/1.73 M^2
EST. GFR  (NON AFRICAN AMERICAN): >60 ML/MIN/1.73 M^2
GLUCOSE SERPL-MCNC: 335 MG/DL
HCT VFR BLD AUTO: 35.2 %
HGB BLD-MCNC: 11.4 G/DL
LYMPHOCYTES # BLD AUTO: 3 K/UL
LYMPHOCYTES NFR BLD: 50.3 %
MAGNESIUM SERPL-MCNC: 1.8 MG/DL
MCH RBC QN AUTO: 25.7 PG
MCHC RBC AUTO-ENTMCNC: 32.4 G/DL
MCV RBC AUTO: 79 FL
MONOCYTES # BLD AUTO: 0.3 K/UL
MONOCYTES NFR BLD: 4.9 %
NEUTROPHILS # BLD AUTO: 2.6 K/UL
NEUTROPHILS NFR BLD: 44.2 %
PHOSPHATE SERPL-MCNC: 2.8 MG/DL
PLATELET # BLD AUTO: 300 K/UL
PMV BLD AUTO: 9.7 FL
POCT GLUCOSE: 115 MG/DL (ref 70–110)
POCT GLUCOSE: 224 MG/DL (ref 70–110)
POCT GLUCOSE: 236 MG/DL (ref 70–110)
POCT GLUCOSE: 324 MG/DL (ref 70–110)
POTASSIUM SERPL-SCNC: 3.6 MMOL/L
RBC # BLD AUTO: 4.44 M/UL
SODIUM SERPL-SCNC: 134 MMOL/L
WBC # BLD AUTO: 5.93 K/UL

## 2018-10-21 PROCEDURE — 25000003 PHARM REV CODE 250: Performed by: INTERNAL MEDICINE

## 2018-10-21 PROCEDURE — 94761 N-INVAS EAR/PLS OXIMETRY MLT: CPT

## 2018-10-21 PROCEDURE — 83735 ASSAY OF MAGNESIUM: CPT

## 2018-10-21 PROCEDURE — 84100 ASSAY OF PHOSPHORUS: CPT

## 2018-10-21 PROCEDURE — 80048 BASIC METABOLIC PNL TOTAL CA: CPT

## 2018-10-21 PROCEDURE — 11000001 HC ACUTE MED/SURG PRIVATE ROOM

## 2018-10-21 PROCEDURE — 99232 SBSQ HOSP IP/OBS MODERATE 35: CPT | Mod: ,,, | Performed by: INTERNAL MEDICINE

## 2018-10-21 PROCEDURE — 36415 COLL VENOUS BLD VENIPUNCTURE: CPT

## 2018-10-21 PROCEDURE — 85025 COMPLETE CBC W/AUTO DIFF WBC: CPT

## 2018-10-21 RX ADMIN — INSULIN DETEMIR 50 UNITS: 100 INJECTION, SOLUTION SUBCUTANEOUS at 08:10

## 2018-10-21 RX ADMIN — INSULIN ASPART 4 UNITS: 100 INJECTION, SOLUTION INTRAVENOUS; SUBCUTANEOUS at 01:10

## 2018-10-21 RX ADMIN — INSULIN ASPART 15 UNITS: 100 INJECTION, SOLUTION INTRAVENOUS; SUBCUTANEOUS at 05:10

## 2018-10-21 RX ADMIN — CIPROFLOXACIN HYDROCHLORIDE 500 MG: 500 TABLET, FILM COATED ORAL at 09:10

## 2018-10-21 RX ADMIN — INSULIN ASPART 15 UNITS: 100 INJECTION, SOLUTION INTRAVENOUS; SUBCUTANEOUS at 01:10

## 2018-10-21 RX ADMIN — INSULIN ASPART 8 UNITS: 100 INJECTION, SOLUTION INTRAVENOUS; SUBCUTANEOUS at 08:10

## 2018-10-21 RX ADMIN — CIPROFLOXACIN HYDROCHLORIDE 500 MG: 500 TABLET, FILM COATED ORAL at 08:10

## 2018-10-21 RX ADMIN — ACETAMINOPHEN 650 MG: 325 TABLET, FILM COATED ORAL at 08:10

## 2018-10-21 RX ADMIN — METRONIDAZOLE 500 MG: 500 TABLET ORAL at 05:10

## 2018-10-21 RX ADMIN — METRONIDAZOLE 500 MG: 500 TABLET ORAL at 09:10

## 2018-10-21 RX ADMIN — INSULIN ASPART 15 UNITS: 100 INJECTION, SOLUTION INTRAVENOUS; SUBCUTANEOUS at 08:10

## 2018-10-21 RX ADMIN — METRONIDAZOLE 500 MG: 500 TABLET ORAL at 01:10

## 2018-10-21 RX ADMIN — ACETAMINOPHEN 650 MG: 325 TABLET, FILM COATED ORAL at 05:10

## 2018-10-21 RX ADMIN — INSULIN ASPART 2 UNITS: 100 INJECTION, SOLUTION INTRAVENOUS; SUBCUTANEOUS at 09:10

## 2018-10-21 NOTE — PROGRESS NOTES
[facility] Hospital Medicine  Progress Note    Patient Name: Anusha Andrew  MRN: [id number]  Patient Class: IP- Inpatient   Admission Date: 10/18/2018  Length of Stay: 3 days  Attending Physician: CARLIE Allen MD  Primary Care Provider: Rhina Archer MD        Subjective:     Principal Problem:Diabetic ketoacidosis without coma associated with type 1 diabetes mellitus    HPI:  Ms. Andrew is a 27/F with PMH depression, DMI (diagnosed age 17, on insulin glargine and insulin aspart) who presented to ED 10/18 with abdominal pain, nausea, vomiting and SOB that felt similar to prior presentations of her DKA. She reports that several days ago she began cutting down on her prescribed insulin doses as her insurance no longer covered prior prescriptions, so she attempted to stretch out dosing to make it last longer. On 10/17 she began having symptoms and presented to ED at [facility] for evaluation; she was deemed clinically stable and discharged. With persistence of her symptoms as well as worsening nausea and vomiting, she presented to [facility] ED and was found to have elevated beta-hydroxybutyrate, metabolic acidosis, and glucose in 600s consistent with DKA.    She complains of SOB, abdominal pain, nausea, and vomiting. She denies fevers, chills, CP, palpitations.    Hospital Course:  After admission Ms. Andrew was admitted to ICU on insulin gtt. Gap closed and bicarb improved and she was transitioned to subQ insulin. She was transferred to the floor 10/19. Insulin regimen was further adjusted for hyperglycemia.    Interval History: No acute events overnight. Feeling well this morning. No other concerns at this time.    Review of Systems   Constitutional: Negative for chills and fever.   Respiratory: Negative for cough and shortness of breath.    Cardiovascular: Negative for chest pain and palpitations.   Gastrointestinal: Negative for abdominal pain, nausea and vomiting.     Objective:      Vital Signs (Most Recent):  Temp: 97.8 °F (36.6 °C) (10/21/18 1235)  Pulse: 94 (10/21/18 1235)  Resp: 16 (10/21/18 1235)  BP: 114/62 (10/21/18 1235)  SpO2: 99 % (10/21/18 1235) Vital Signs (24h Range):  Temp:  [97.8 °F (36.6 °C)-98.8 °F (37.1 °C)] 97.8 °F (36.6 °C)  Pulse:  [90-98] 94  Resp:  [16-18] 16  SpO2:  [98 %-100 %] 99 %  BP: (114-132)/(62-80) 114/62     Weight: 88.3 kg (194 lb 10.7 oz)  Body mass index is 34.48 kg/m².    Intake/Output Summary (Last 24 hours) at 10/21/2018 1240  Last data filed at 10/21/2018 0500  Gross per 24 hour   Intake 1127 ml   Output --   Net 1127 ml      Physical Exam   Constitutional: She is oriented to person, place, and time. She appears well-developed and well-nourished. No distress.   HENT:   Head: Normocephalic and atraumatic.   Eyes: Conjunctivae and EOM are normal.   Cardiovascular: Normal rate, regular rhythm, S1 normal, S2 normal and intact distal pulses.   Pulmonary/Chest: Effort normal and breath sounds normal.   Abdominal: Soft. She exhibits no distension. There is no tenderness.   Musculoskeletal: Normal range of motion. She exhibits no edema.   Neurological: She is alert and oriented to person, place, and time.   Skin: Skin is warm and dry.   Nursing note and vitals reviewed.    Significant Labs:   CBC:  Recent Labs   Lab 10/21/18  0535   WBC 5.93   HGB 11.4*   HCT 35.2*      GRAN 44.2  2.6   LYMPH 50.3*  3.0   MONO 4.9  0.3   EOS 0.0   BASO 0.01      BMP:  Recent Labs   Lab 10/19/18  1922 10/20/18  0505 10/21/18  0534 10/21/18  0535   * 134* 134*  --    K 3.6 4.1 3.6  --     109 108  --    CO2 16* 13* 20*  --    BUN 10 10 9  --    CREATININE 1.2 0.9 0.8  --    * 288* 335*  --    CALCIUM 9.0 8.7 8.5*  --    MG  --  1.9  --  1.8   PHOS  --  2.1*  --  2.8     Significant Imaging:   No new imaging this morning.    Assessment/Plan:      * Diabetic ketoacidosis without coma associated with type 1 diabetes mellitus    - DKA suspect secondary  to medication nonadherence versus potential infection, though infection seems less likely.  - DKA resolved; remains hyperglycemic.  - Given PM drop, will shift detemir to qHS; currently will schedule insulin detemir 25U subQ qHS and if qHS glucose is elevated, administer additional 20U to achieve better control of AM glucose.  - Continuing insulin aspart at 15U subQ TIDWM with additional moderate-dose sliding scale insulin aspart 1-10U subQ TIDWM PRN.     Depression    - Patient states would prefer not to re-initiate bupropion; will hold.     Poorly controlled type 1 diabetes mellitus    - As under DKA.     Leukocytosis    - Suspect secondary to DKA versus ?infectious process that caused her DKA. Abdominal pain present, so if any source would be intra-abdominal.  - SIRS 3/4 (tachycardia, tachypnea, leukocytosis); qSOFA 1/3 (tachypnea).  - Mild RUQ tenderness initially; now resolved.  - Leukocytosis resolved; will de-escalate to ciprofloxacin 500mg PO q12hr, metronidazole 500mg PO q8hr to complete a 4-day total course.       VTE Risk Mitigation (From admission, onward)        Ordered     enoxaparin injection 40 mg  Daily      10/18/18 1423     Place sequential compression device  Until discontinued      10/18/18 1351     IP VTE HIGH RISK PATIENT  Once      10/18/18 1351        CARLIE Mena MD  Department of Hospital Medicine   [facility]  [phone]

## 2018-10-21 NOTE — ASSESSMENT & PLAN NOTE
- DKA suspect secondary to medication nonadherence versus potential infection, though infection seems less likely.  - DKA resolved; remains hyperglycemic.  - Given PM drop, will shift detemir to qHS; currently will schedule insulin detemir 25U subQ qHS and if qHS glucose is elevated, administer additional 20U to achieve better control of AM glucose.  - Continuing insulin aspart at 15U subQ TIDWM with additional moderate-dose sliding scale insulin aspart 1-10U subQ TIDWM PRN.

## 2018-10-21 NOTE — PLAN OF CARE
Problem: Patient Care Overview  Goal: Plan of Care Review  Outcome: Ongoing (interventions implemented as appropriate)  Plan of care reviewed with patient.  VSS.  Patient denies pain.  Purposeful rounding completed, call bell within reach, no needs at this time.  Will continue to monitor.

## 2018-10-21 NOTE — SUBJECTIVE & OBJECTIVE
Interval History: No acute events overnight. Feeling well this morning. No other concerns at this time.    Review of Systems   Constitutional: Negative for chills and fever.   Respiratory: Negative for cough and shortness of breath.    Cardiovascular: Negative for chest pain and palpitations.   Gastrointestinal: Negative for abdominal pain, nausea and vomiting.     Objective:     Vital Signs (Most Recent):  Temp: 97.8 °F (36.6 °C) (10/21/18 1235)  Pulse: 94 (10/21/18 1235)  Resp: 16 (10/21/18 1235)  BP: 114/62 (10/21/18 1235)  SpO2: 99 % (10/21/18 1235) Vital Signs (24h Range):  Temp:  [97.8 °F (36.6 °C)-98.8 °F (37.1 °C)] 97.8 °F (36.6 °C)  Pulse:  [90-98] 94  Resp:  [16-18] 16  SpO2:  [98 %-100 %] 99 %  BP: (114-132)/(62-80) 114/62     Weight: 88.3 kg (194 lb 10.7 oz)  Body mass index is 34.48 kg/m².    Intake/Output Summary (Last 24 hours) at 10/21/2018 1240  Last data filed at 10/21/2018 0500  Gross per 24 hour   Intake 1127 ml   Output --   Net 1127 ml      Physical Exam   Constitutional: She is oriented to person, place, and time. She appears well-developed and well-nourished. No distress.   HENT:   Head: Normocephalic and atraumatic.   Eyes: Conjunctivae and EOM are normal.   Cardiovascular: Normal rate, regular rhythm, S1 normal, S2 normal and intact distal pulses.   Pulmonary/Chest: Effort normal and breath sounds normal.   Abdominal: Soft. She exhibits no distension. There is no tenderness.   Musculoskeletal: Normal range of motion. She exhibits no edema.   Neurological: She is alert and oriented to person, place, and time.   Skin: Skin is warm and dry.   Nursing note and vitals reviewed.    Significant Labs:   CBC:  Recent Labs   Lab 10/21/18  0535   WBC 5.93   HGB 11.4*   HCT 35.2*      GRAN 44.2  2.6   LYMPH 50.3*  3.0   MONO 4.9  0.3   EOS 0.0   BASO 0.01      BMP:  Recent Labs   Lab 10/19/18  1922 10/20/18  0505 10/21/18  0534 10/21/18  0535   * 134* 134*  --    K 3.6 4.1 3.6  --    CL  109 109 108  --    CO2 16* 13* 20*  --    BUN 10 10 9  --    CREATININE 1.2 0.9 0.8  --    * 288* 335*  --    CALCIUM 9.0 8.7 8.5*  --    MG  --  1.9  --  1.8   PHOS  --  2.1*  --  2.8     Significant Imaging:   No new imaging this morning.

## 2018-10-21 NOTE — PLAN OF CARE
Problem: Patient Care Overview  Goal: Plan of Care Review  Outcome: Ongoing (interventions implemented as appropriate)  Pt remained free of falls and injuries. On room air with VSS. Managed pain with PRN medications. Held 1645 dose of insulin, BG 67, Dr. Allen notified. Bed low and locked, call light within reach, side rails up X2. Will continue to monitor.

## 2018-10-22 VITALS
DIASTOLIC BLOOD PRESSURE: 78 MMHG | RESPIRATION RATE: 18 BRPM | HEART RATE: 96 BPM | BODY MASS INDEX: 34.5 KG/M2 | SYSTOLIC BLOOD PRESSURE: 137 MMHG | OXYGEN SATURATION: 99 % | HEIGHT: 63 IN | TEMPERATURE: 99 F | WEIGHT: 194.69 LBS

## 2018-10-22 LAB
ANION GAP SERPL CALC-SCNC: 9 MMOL/L
BUN SERPL-MCNC: 9 MG/DL
CALCIUM SERPL-MCNC: 8.7 MG/DL
CHLORIDE SERPL-SCNC: 111 MMOL/L
CO2 SERPL-SCNC: 20 MMOL/L
CREAT SERPL-MCNC: 0.7 MG/DL
EST. GFR  (AFRICAN AMERICAN): >60 ML/MIN/1.73 M^2
EST. GFR  (NON AFRICAN AMERICAN): >60 ML/MIN/1.73 M^2
GLUCOSE SERPL-MCNC: 118 MG/DL
POCT GLUCOSE: 162 MG/DL (ref 70–110)
POTASSIUM SERPL-SCNC: 3.1 MMOL/L
SODIUM SERPL-SCNC: 140 MMOL/L

## 2018-10-22 PROCEDURE — 99238 HOSP IP/OBS DSCHRG MGMT 30/<: CPT | Mod: ,,, | Performed by: INTERNAL MEDICINE

## 2018-10-22 PROCEDURE — 36415 COLL VENOUS BLD VENIPUNCTURE: CPT

## 2018-10-22 PROCEDURE — 80048 BASIC METABOLIC PNL TOTAL CA: CPT

## 2018-10-22 PROCEDURE — 94761 N-INVAS EAR/PLS OXIMETRY MLT: CPT

## 2018-10-22 RX ORDER — INSULIN GLARGINE 100 [IU]/ML
45 INJECTION, SOLUTION SUBCUTANEOUS NIGHTLY
Qty: 15 ML | Refills: 2
Start: 2018-10-22 | End: 2018-11-21 | Stop reason: SDUPTHER

## 2018-10-22 RX ADMIN — INSULIN ASPART 2 UNITS: 100 INJECTION, SOLUTION INTRAVENOUS; SUBCUTANEOUS at 08:10

## 2018-10-22 RX ADMIN — INSULIN ASPART 15 UNITS: 100 INJECTION, SOLUTION INTRAVENOUS; SUBCUTANEOUS at 08:10

## 2018-10-22 NOTE — PROGRESS NOTES
SW called [phone], spoke to [facility], scheduled f/u Endo appt with LALIT Fraga, for 10/23/18 at 11am.      SW notified pt and pt was okay with appt time and date and location.

## 2018-10-22 NOTE — DISCHARGE SUMMARY
[facility] Medicine  Discharge Summary      Patient Name: Anusha Andrew  MRN: [id number]  Admission Date: 10/18/2018  Hospital Length of Stay: 4 days  Discharge Date and Time:  10/30/2018 4:04 PM  Attending Physician: No att. providers found   Discharging Provider: LINDA Allen MD  Primary Care Provider: Rhina Archer MD      HPI:   Ms. Andrew is a 27/F with PMH depression, DMI (diagnosed age 17, on insulin glargine and insulin aspart) who presented to ED 10/18 with abdominal pain, nausea, vomiting and SOB that felt similar to prior presentations of her DKA. She reports that several days ago she began cutting down on her prescribed insulin doses as her insurance no longer covered prior prescriptions, so she attempted to stretch out dosing to make it last longer. On 10/17 she began having symptoms and presented to ED at [facility] for evaluation; she was deemed clinically stable and discharged. With persistence of her symptoms as well as worsening nausea and vomiting, she presented to [facility] ED and was found to have elevated beta-hydroxybutyrate, metabolic acidosis, and glucose in 600s consistent with DKA.    She complains of SOB, abdominal pain, nausea, and vomiting. She denies fevers, chills, CP, palpitations.    * No surgery found *      Hospital Course:   After admission Ms. Andrew was admitted to ICU on insulin gtt. Gap closed and bicarb improved and she was transitioned to subQ insulin. She was transferred to the floor 10/19. Insulin regimen was further adjusted for hyperglycemia; ultimately she was placed on insulin detemir 45U subQ qHS and insulin aspart 13U subQ TIDWM with additional sliding scale for home use. With correction of her hyperglycemia and replacement insulin delivered from pharmacy she was prepared for discharge.     Consults:   Consults (From admission, onward)        Status Ordering Provider     Inpatient consult to Diabetes educator  Once     Provider:   (Not yet assigned)    Completed CARLIE RAY          Poorly controlled type 1 diabetes mellitus    - DKA suspect secondary to medication nonadherence versus potential infection, though infection seems less likely.  - DKA resolved.  - Continue insulin detemir at 45U subQ qHS, insulin aspart at 15U subQ TIDWM with additional moderate-dose sliding scale insulin aspart 1-10U subQ TIDWM PRN.       Final Active Diagnoses:    Diagnosis Date Noted POA    Depression [F32.9] 04/10/2018 Yes     Chronic    Poorly controlled type 1 diabetes mellitus [E10.65] 09/14/2016 Yes     Chronic    Obesity (BMI 30.0-34.9) [E66.9] 07/01/2015 Yes     Chronic      Problems Resolved During this Admission:    Diagnosis Date Noted Date Resolved POA    PRINCIPAL PROBLEM:  Diabetic ketoacidosis without coma associated with type 1 diabetes mellitus [E10.10] 01/07/2018 10/30/2018 Yes    TRISHA (acute kidney injury) [N17.9] 10/18/2018 10/20/2018 Yes    Leukocytosis [D72.829] 10/18/2018 10/30/2018 Yes    Increased anion gap metabolic acidosis [E87.2] 10/18/2018 10/19/2018 Yes    Hypernatremia [E87.0] 10/18/2018 10/19/2018 Yes       Discharged Condition: fair    Disposition: Home or Self Care    Follow Up:  Follow-up Information     Rhina Archer MD In 2 weeks.    Specialty:  Family Medicine  Contact information:  [address] [facility] [address]  [phone]             Schedule an appointment as soon as possible for a visit with NAA Razo, MK.    Specialty:  Endocrinology  Why:  follow-up on diabetes management  Contact information:  [address] [facility] [address] [phone]                 Patient Instructions:      Notify your health care provider if you experience any of the following:  persistent nausea and vomiting or diarrhea     Notify your health care provider if you experience any of the following:  persistent dizziness, light-headedness, or visual disturbances     Notify your health care provider if  "you experience any of the following:  increased confusion or weakness     Activity as tolerated       Significant Diagnostic Studies:     Recent Labs   Lab 10/21/18  0535 10/22/18  0438   NA  --  140   K  --  3.1*   CL  --  111*   CO2  --  20*   BUN  --  9   CREATININE  --  0.7   GLU  --  118*   CALCIUM  --  8.7   MG 1.8  --    PHOS 2.8  --      Pending Diagnostic Studies:     None         Medications:  Reconciled Home Medications:      Medication List      START taking these medications    BD INSULIN SYRINGE ULTRA-FINE 0.5 mL 31 gauge x 5/16" Syrg  Generic drug:  insulin syringe-needle U-100  use with admelog     insulin glargine 100 unit/mL (3 mL) Inpn pen  Commonly known as:  BASAGLAR KWIKPEN U-100 INSULIN  Inject 45 Units into the skin every evening.  Replaces:  TOUJEO SOLOSTAR U-300 INSULIN 300 unit/mL (1.5 mL) Inpn pen        CHANGE how you take these medications    blood sugar diagnostic Strp  1 each by Misc.(Non-Drug; Combo Route) route 6 (six) times daily.  What changed:  when to take this     * pen needle, diabetic 31 gauge x 1/4" Ndle  use with basaglar  What changed:  Another medication with the same name was added. Make sure you understand how and when to take each.     * BD ULTRA-FINE SHORT PEN NEEDLE 31 gauge x 5/16" Ndle  Generic drug:  pen needle, diabetic  use with basaglar  What changed:  You were already taking a medication with the same name, and this prescription was added. Make sure you understand how and when to take each.         * This list has 2 medication(s) that are the same as other medications prescribed for you. Read the directions carefully, and ask your doctor or other care provider to review them with you.            CONTINUE taking these medications    medroxyPROGESTERone 150 mg/mL Syrg  Commonly known as:  DEPO-PROVERA  Inject 1 mL (150 mg total) into the muscle every 3 (three) months.     ONETOUCH DELICA LANCETS 33 gauge Misc  Generic drug:  lancets  1 lancet 4 (four) times " daily.     ONETOUCH ULTRA2 kit  Generic drug:  blood-glucose meter        STOP taking these medications    HumaLOG KwikPen Insulin 100 unit/mL Inpn pen  Generic drug:  insulin lispro     insulin aspart U-100 100 unit/mL Inpn pen  Commonly known as:  NovoLOG Flexpen U-100 Insulin     TOUJEO SOLOSTAR U-300 INSULIN 300 unit/mL (1.5 mL) Inpn pen  Generic drug:  insulin glargine (TOUJEO)  Replaced by:  insulin glargine 100 unit/mL (3 mL) Inpn pen            Indwelling Lines/Drains at time of discharge:   Lines/Drains/Airways          None        Time spent on the discharge of patient: 30 minutes  Patient was seen and examined on the date of discharge and determined to be suitable for discharge.    LINDA Allen MD  Department of Hospital Medicine  [facility]

## 2018-10-22 NOTE — NURSING
Pt resting in bed. NAD, VSS on RA, AOx4. Pt not complaining of pain and is independent and ambulatory to the bathroom. BGL monitored and insulin given. PO abx tolerated. POC reviewed with pt. Bed low and locked. Personal items and call light within reach. Will continue to monitor.

## 2018-10-22 NOTE — PHYSICIAN QUERY
PT Name: Anusha Andrew  MR #: [id number]    Physician Query Form - Emergency Medicine Diagnosis Clarification     Michaela Davies RN CDS    Contact Information: [phone]    This form is a permanent document in the medical record.     Query Date: October 22, 2018    By submitting this query, we are merely seeking further clarification of documentation.  Please utilize your independent clinical judgment when addressing the question(s) below.      The Medical record contains the following:     Diagnosis Supporting Clinical Information Location in Medical Record   Hyperkalemia    Hyperkalemia   potassium 6.6,   Recommendation in agreement is to not lower the potassium at this time and that the potassium will be drawn up into the cells with the administration of insulin. No bicarb at this time   10/18 ED MD note     Do you agree with the Emergency Medicine diagnosis of ______Hyperkalemia _____________?    [ X ] Yes  [  ] Yes, but it resolved prior to my assessment of the patient  [  ] No  [  ] Other/Clarification of Findings:_________________________________  [  ] Clinically Undetermined    Please document in your progress notes daily for the duration of treatment until resolved and include in your discharge summary.

## 2018-10-22 NOTE — NURSING
Pt discharged per MD order. Midline IV dc/d and pt tolerated well. Followup appointments and diabetes education reviewed. Pt verbalized understanding. Pt is waiting in room for ride and stated she will not need a wheel chair transport. Will continue to monitor.

## 2018-10-22 NOTE — DISCHARGE INSTRUCTIONS
Diabetic Ketoacidosis  Diabetic ketoacidosis (DKA) is a serious problem that can happen in people with diabetes. DKA should be treated as a medical emergency. This is because it can lead to coma or death. If you have the symptoms of DKA, get medical help right away.  DKA happens more often in people with type 1 diabetes. But it can happen in people with type 2 diabetes. It can also happen in women with diabetes during pregnancy. This is often known as gestational diabetes.  DKA happens when insulin levels are too low. Without enough insulin, sugar (glucose) cant get to the cells of your body. The glucose stays in the blood. This causes high blood glucose (hyperglycemia). Without glucose, your body breaks down stored fat for energy. When this happens, acids called ketones are released into the blood. This is known as ketosis. High levels of ketones (ketoacidosis) can be harmful to you. Hyperglycemia and ketoacidosis can also cause serious problems in the blood and your body, such as:  · Low levels of potassium (hypokalemia)  · Swelling inside the brain (cerebral edema)  · Fluid in the lungs (pulmonary edema)  · Damage to kidneys or other organs  What causes diabetic ketoacidosis?  In people with diabetes, DKA is most often caused by too little insulin in the body. It is also caused by:  · Poor management of diabetes  · Infections like a urinary tract infection or pneumonia  · Serious health problems, such as a heart attack  · Reactions to certain prescribed medicines and illicit drugs  Symptoms of diabetic ketoacidosis  DKA most often happens slowly over time. But it can worsen in a few hours if you are vomiting. The first symptoms are:  · Thirst and dry mouth  · Urinating a lot  · Belly pain  · Nausea or vomiting  · Breath that smells fruity (from the ketones)  Over time, these symptoms may happen:  · Dry or flushed skin  · Nausea and vomiting  · Loss of appetite  · Weight loss  · Belly pain  · Trouble  breathing  · Trouble thinking or confusion  · Feeling very tired or weak. This can lead to coma.  How is diabetic ketoacidosis diagnosed?  Your healthcare provider will ask about your medical history. He or she will give you a physical exam. You may also have these tests:  · Blood tests to check your glucose levels  · Blood tests to check your electrolytes, such as potassium and sodium  · Urine test to check for ketones  These tests are done to check for DKA, and monitor it over time.  How is diabetic ketoacidosis treated?  DKA needs treatment right away in the hospital. Treatment includes:  · Insulin. This is the main type of treatment. Insulin allows the cells to use the glucose in the blood. This lowers the levels of both blood glucose and ketones.  · Fluids and electrolytes. These are given through a vein (IV). Fluids are replaced and abnormal electrolyte levels are corrected.  · Other medicines. These may be given to treat an illness that caused DKA. For example, antibiotics may be given to treat a urinary tract infection that caused DKA.  Preventing diabetic ketoacidosis  To help prevent DKA, make sure you:  · Take all of your medicines for diabetes exactly as prescribed. This includes insulin.  · Check your blood glucose levels exactly as instructed.  · Be especially careful when you are sick with an illness or an infection. Take extra care to follow diabetes care instructions. Check your blood glucose more often.  · Do not exercise when your blood sugar is high and you have ketones in your urine.   · Check your urine ketone levels if told to do so. This is done with a urine test strip. Ask your healthcare provider how often to check your urine.     When to call your healthcare provider  Call your healthcare provider right away if you:  · Have symptoms of DKA  · Have very high blood glucose levels  · Are getting sick with another illness   Date Last Reviewed: 7/1/2016  © 2120-9891 [employer]  [address] [place] [address]. All rights reserved. This information is not intended as a substitute for professional medical care. Always follow your healthcare professional's instructions.

## 2018-10-22 NOTE — PLAN OF CARE
Patient resting in NAD. VSS on RA. Elevated BG covered with SSI. Tylenol given x1 for HA. Pt ambulating independently. No needs expressed at this time. Will continue to monitor.

## 2018-10-23 ENCOUNTER — TELEPHONE (OUTPATIENT)
Dept: ENDOCRINOLOGY | Facility: CLINIC | Age: 28
End: 2018-10-23

## 2018-10-23 ENCOUNTER — OFFICE VISIT (OUTPATIENT)
Dept: ENDOCRINOLOGY | Facility: CLINIC | Age: 28
DRG: 638 | End: 2018-10-23
Payer: MEDICAID

## 2018-10-23 ENCOUNTER — PATIENT OUTREACH (OUTPATIENT)
Dept: ADMINISTRATIVE | Facility: CLINIC | Age: 28
End: 2018-10-23

## 2018-10-23 VITALS
DIASTOLIC BLOOD PRESSURE: 76 MMHG | SYSTOLIC BLOOD PRESSURE: 125 MMHG | BODY MASS INDEX: 36.32 KG/M2 | HEART RATE: 90 BPM | HEIGHT: 63 IN | WEIGHT: 205 LBS

## 2018-10-23 DIAGNOSIS — Z71.9 VISIT FOR COUNSELING: ICD-10-CM

## 2018-10-23 DIAGNOSIS — E10.65 POORLY CONTROLLED TYPE 1 DIABETES MELLITUS: Primary | Chronic | ICD-10-CM

## 2018-10-23 PROCEDURE — 99214 OFFICE O/P EST MOD 30 MIN: CPT | Mod: S$PBB,,, | Performed by: NURSE PRACTITIONER

## 2018-10-23 PROCEDURE — 99215 OFFICE O/P EST HI 40 MIN: CPT | Mod: PBBFAC,PN | Performed by: NURSE PRACTITIONER

## 2018-10-23 PROCEDURE — 99999 PR PBB SHADOW E&M-EST. PATIENT-LVL V: CPT | Mod: PBBFAC,,, | Performed by: NURSE PRACTITIONER

## 2018-10-23 RX ORDER — INSULIN LISPRO 100 [IU]/ML
13 INJECTION, SOLUTION INTRAVENOUS; SUBCUTANEOUS
COMMUNITY
End: 2018-11-07 | Stop reason: SDUPTHER

## 2018-10-23 NOTE — PATIENT INSTRUCTIONS
Start keeping a blood sugar log.   Increase Basaglar to 50 units every night.   Unable to optimize diabetes control without blood sugar logs.   See Diabetes Educator/Registered Dietician for carb counting and insulin pump evaluation. Please start with carb ratio of 1:5.   Test blood sugar before meals and after meals, 6x/day.   Look into carb counting apps like carb manager or calorie arabella.       Start Freestyle Cinthia glucose monitoring system.    Medicare/Medicaid patients: Please go on [email or web address] to obtain a discount.  You will be able to use the discount at the pharmacy and purchase the sensors and reader as a CASH payer with the discount. The pharmacy will not process the prescription through your insurance. The reader is about $65 and the sensors ~ $55/per month. [facility] may be the cheapest. You can learn how to use it by looking at the YouTube video OR we can set you up with an appointment with diabetes education when you  your supplies.

## 2018-10-23 NOTE — TELEPHONE ENCOUNTER
Called patient and informed her that Medicaid will not cover the Freestyle Cinthia reader and sensors but she can download a coupon on Singlecare.com for a discounted price. Patient verbalized understanding.

## 2018-10-23 NOTE — TELEPHONE ENCOUNTER
----- Message from Mandy Dacosta sent at 10/23/2018 12:09 PM CDT -----  Contact: [facility] [phone]  Can the clinic reply in [facility]: no      Please refill the medication(s) listed below. The patient can be reached at this phone number (_) once it is called into the pharmacy.      Medication #1freestyle brooke- needs order under medical [phone]      Medication #2      Preferred Pharmacy:

## 2018-10-23 NOTE — PROGRESS NOTES
"CC: This 27 y.o. Black or  female  is here for evaluation of  T1DM along with comorbidities indicated in the Visit Diagnosis section of this encounter.    HPI: Anusha Andrew was diagnosed with T1DM at age 17. Insulin started at the time of diagnosis.   She has tried an Omnipod insulin pump before x 2 weeks. Has never tried CGM.     Last seen by DANIELLE Hernandez NP, in 4/2018. New to me. Arrives with her 1 year son Rai.     She was admitted for DKA last week. She cut down on her prescribed insulin doses as her insurance no longer covered prior prescriptions, so she attempted to stretch out dosing to make it last longer.     She does have enough insulin supply now however.   She is interested in getting an insulin pump. She knows how to carb count "somewhat."     LAST DIABETES EDUCATION: 1/2016    HOSPITALIZED FOR DIABETES -  Yes -   For DKA - 10/18/18 x 4 days; presented with abdominal pain, n/v, SOB  A total of 4 hospital visits for DKA.     PRESCRIBED DIABETES MEDICATIONS: Basaglar 45 units every night ~ 9 pm, Admelog (vial) 13 units before meals   Misses medication doses - No    DM COMPLICATIONS: none    SIGNIFICANT DIABETES MED HISTORY: denies intolerance to prior insulin types     SELF MONITORING BLOOD GLUCOSE: Checks blood glucose at home 4-6x/day. Since hospital discharge   Fasting blood glucose: 180-190s mg/dL  2 hours after breakfast 150s  Pre-lunch readings: 200s mg/dL  2 hours after lunch mid 100s   Pre-dinner readings: 200s mg/dL    HYPOGLYCEMIC EPISODES: rare; no overnight sweats     CURRENT DIET: drinks diet tea, crystal lite. Eats 3 meals/day.   Diet recall: breakfast was egg white, turkey sausage, oatmeal packet cooked with milk. Supper was yakimein.     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   CARDIAC: No chest pain        PHYSICAL EXAM:  GENERAL: Well developed, well nourished. No acute distress.   PSYCH: AAOx3, appropriate mood and affect, conversant, well-groomed. Judgement and insight good.   NEURO: Cranial nerves grossly intact. Speech clear, no tremor.   CHEST: Respirations even and unlabored.   MS: Gait steady. No clubbing.         Hemoglobin A1C   Date Value Ref Range Status   10/18/2018 9.6 (H) 4.0 - 5.6 % Final     Comment:     ADA Screening Guidelines:  5.7-6.4%  Consistent with prediabetes  >or=6.5%  Consistent with diabetes  High levels of fetal hemoglobin interfere with the HbA1C  assay. Heterozygous hemoglobin variants (HbS, HgC, etc)do  not significantly interfere with this assay.   However, presence of multiple variants may affect accuracy.     04/12/2018 9.8 (H) 4.0 - 5.6 % Final     Comment:     According to ADA guidelines, hemoglobin A1c <7.0% represents  optimal control in non-pregnant diabetic patients. Different  metrics may apply to specific patient populations.   Standards of Medical Care in Diabetes-2016.  For the purpose of screening for the presence of diabetes:  <5.7%     Consistent with the absence of diabetes  5.7-6.4%  Consistent with increasing risk for diabetes   (prediabetes)  >or=6.5%  Consistent with diabetes  Currently, no consensus exists for use of hemoglobin A1c  for diagnosis of diabetes for children.  This Hemoglobin A1c assay has significant interference with fetal   hemoglobin   (HbF). The results are invalid for patients with abnormal amounts of   HbF,   including those with known Hereditary Persistence   of Fetal Hemoglobin. Heterozygous hemoglobin variants (HbAS, HbAC,   HbAD, HbAE, HbA2) do not significantly interfere with this assay;   however, presence of  multiple variants in a sample may impact the %   interference.     12/06/2017 11.7 (H) 4.0 - 5.6 % Final     Comment:     According to ADA guidelines, hemoglobin A1c <7.0% represents  optimal control in non-pregnant diabetic patients. Different  metrics may apply to specific patient populations.   Standards of Medical Care in Diabetes-2016.  For the purpose of screening for the presence of diabetes:  <5.7%     Consistent with the absence of diabetes  5.7-6.4%  Consistent with increasing risk for diabetes   (prediabetes)  >or=6.5%  Consistent with diabetes  Currently, no consensus exists for use of hemoglobin A1c  for diagnosis of diabetes for children.  This Hemoglobin A1c assay has significant interference with fetal   hemoglobin   (HbF). The results are invalid for patients with abnormal amounts of   HbF,   including those with known Hereditary Persistence   of Fetal Hemoglobin. Heterozygous hemoglobin variants (HbAS, HbAC,   HbAD, HbAE, HbA2) do not significantly interfere with this assay;   however, presence of multiple variants in a sample may impact the %   interference.             Chemistry        Component Value Date/Time     10/22/2018 0438    K 3.1 (L) 10/22/2018 0438     (H) 10/22/2018 0438    CO2 20 (L) 10/22/2018 0438    BUN 9 10/22/2018 0438    CREATININE 0.7 10/22/2018 0438     (H) 10/22/2018 0438        Component Value Date/Time    CALCIUM 8.7 10/22/2018 0438    ALKPHOS 118 10/18/2018 1115    AST 20 10/18/2018 1115    ALT 19 10/18/2018 1115    BILITOT 0.2 10/18/2018 1115    ESTGFRAFRICA >60 10/22/2018 0438    EGFRNONAA >60 10/22/2018 0438          Lab Results   Component Value Date    LDLCALC 143.8 12/07/2016       Lab Results   Component Value Date    MICALBCREAT Unable to calculate 04/12/2018        Ref. Range 4/12/2018 15:07   Microalbum.,U,Random Latest Units: ug/mL <2.5   Microalb Creat Ratio Latest Ref Range: 0.0 - 30.0 ug/mg Unable to calculate           STANDARDS of  CARE:        ASA:               Last eye exam:       ASSESSMENT and PLAN:    A1C GOAL:     1. Poorly controlled type 1 diabetes mellitus  Start keeping a blood sugar log.   Increase Basaglar to 50 units every night.   Unable to optimize diabetes control without blood sugar logs.   See Diabetes Educator/Registered Dietician for carb counting and insulin pump evaluation. Please start with carb ratio of 1:5.   Test blood sugar before meals and after meals, 6x/day.   Look into carb counting apps like carb manager or CapsoVision.     Difficult to fully discuss carb counting and insulin pump basics due to many interruptions from her young child. She was provided brochures for Dexcom CGM and Medtronic/Tslim pumps. She may switch from Medicaid insurance to commercial plan under [employer] her employer, which would give her more options.     rx for Freestyle Cinthia CGM sent which she can purchase now as cash payer.     Return to clinic in a month.         Ambulatory Referral to Diabetes Education     Spent 25 minutes with patient with > 50% time spent in counseling, as noted in # 1. Z71.9      Orders Placed This Encounter   Procedures    Ambulatory Referral to Diabetes Education     Referral Priority:   Routine     Referral Type:   Consultation     Referral Reason:   Specialty Services Required     Requested Specialty:   Endocrinology     Number of Visits Requested:   1     Expiration Date:   10/23/2019        Follow-up in about 4 weeks (around 11/20/2018).

## 2018-10-29 ENCOUNTER — CLINICAL SUPPORT (OUTPATIENT)
Dept: DIABETES | Facility: CLINIC | Age: 28
End: 2018-10-29
Payer: MEDICAID

## 2018-10-29 DIAGNOSIS — E10.10 DIABETIC KETOACIDOSIS WITHOUT COMA ASSOCIATED WITH TYPE 1 DIABETES MELLITUS: ICD-10-CM

## 2018-10-29 DIAGNOSIS — E10.65 POORLY CONTROLLED TYPE 1 DIABETES MELLITUS: Primary | ICD-10-CM

## 2018-10-29 PROCEDURE — G0108 DIAB MANAGE TRN  PER INDIV: HCPCS | Mod: PBBFAC,PN | Performed by: DIETITIAN, REGISTERED

## 2018-10-29 NOTE — PROGRESS NOTES
Diabetes Education  Author: Jovita Navarrete, MATTHEW  Date: 10/29/2018    Diabetes Care Management Summary  The focus of today's office visit was to discussed CHO awareness/counting, SMBG, CGM, Insulin Pumps, and medications. Provided pt with brief overview of freestyle brooke and application, medication dosage changes and meal timing with introduction of CHO counting. Diet recall notes pt is somewhat familiar w carb counting. However due to the very disruptive behavior of the patient's toddler, we were unable to complete the education session. Pt states she would reschedule the visit.   Diabetes Education Record Assessment/Progress: Initial  Current Diabetes Risk Level: Moderate     Last A1c:   Lab Results   Component Value Date    HGBA1C 9.6 (H) 10/18/2018     Last visit with Diabetes Educator: : 10/29/2018    Diabetes Type  Diabetes Type : Type I  Diabetes History  Diabetes Diagnosis: >10 years  Current Treatment: Diet, Insulin  Reviewed Problem List with Patient: Yes    Health Maintenance was reviewed today with patient. Discussed with patient importance of routine eye exams, foot exams/foot care, blood work (i.e.: A1c, microalbumin, and lipid), dental visits, yearly flu vaccine, and pneumonia vaccine as indicated by PCP. Patient verbalized understanding.     Health Maintenance Topics with due status: Not Due       Topic Last Completion Date    Foot Exam 04/11/2018    Urine Microalbumin 04/12/2018    Pap Smear 05/10/2018    Eye Exam 09/11/2018    Hemoglobin A1c 10/18/2018     Health Maintenance Due   Topic Date Due    HPV Vaccines (1 - Female 3-dose series) 12/03/2003    TETANUS VACCINE  12/03/2010    Pneumococcal PPSV23 (Medium Risk) (1) 12/03/2010    Lipid Panel  12/16/2017       Nutrition  Meal Planning: 3 meals per day, diet drinks, artificial sweeteners, snacks between meal, eats out often  What type of beverages do you drink?: diet soda/tea, water, milk  Meal Plan 24 Hour Recall - Breakfast: 8am: 2 eggs, 2 sl  buttered toast, 2 sl gonsalves, water  Meal Plan 24 Hour Recall - Lunch: 2pm: baked fish, macaroni & cheese, broccoli, diet coke  Meal Plan 24 Hour Recall - Dinner: 8pm Roast beef sandwich on taost, carrots, unsweet tea  Meal Plan 24 Hour Recall - Snack: PB crackers, rice cakes, nuts or seeds    Monitoring   Monitoring: Other(relion meter)  Blood Glucose Logs: No  Do you use a personal continuous glucose monitor?: No(interested in free style brooke)  In the last month, how often have you had a low blood sugar reaction?: never  Can you tell when your blood sugar is too high?: no    Exercise   Exercise Type: use exercise equipment, walking  Intensity: Low  Frequency: 3-5 Times per week  Duration: 1 hour    Current Diabetes Treatment   Current Treatment: Diet, Insulin    Social History  Preferred Learning Method: Face to Face, Group Education, Reading Materials  Primary Support: Self  Educational Level: Some College  Smoking Status: Never a Smoker  Alcohol Use: Rarely  Learning Challenges : None   Readiness to Learn : Acceptance  Cultural Influences: No    Diabetes Education Assessment/Progress  Diabetes Disease Process (diabetes disease process and treatment options): Individual Session, Written Materials Provided, Discussion  Nutrition (Incorporating nutritional management into one's lifestyle): Individual Session, Written Materials Provided, Instructed, Discussion, Needs Review, Comprehends Key Points  -diet recall has CHO intake at meals to 15-30 grams, meals 6 hours apart, and no bedtime snack even when BG 75-80 before bed.   -since education session was cut short due to very disruptive toddler present during entire visit, pt asked to review the remainder of the Diabetes Management Guide jose juan the carb and menus when planning meals and snacks  -Carb counting handbook also provided    Medications (states correct name, dose, onset, peak, duration, side effects & timing of meds): Individual Session, Written Materials  Provided, Instructed, Discussion, Demonstrates Understanding/Competency(verbalizes/demonstrates)  Monitoring (monitoring blood glucose/other parameters & using results): Individual Session, Instructed, Written Materials Provided, Discussion, Comprehends Key Points  Acute Complications (preventing, detecting, and treating acute complications): Individual Session, Discussion, Instructed, Written Materials Provided    Goals  Patient has selected/evaluated goals during today's session: No    Diabetes Care Plan/Intervention  Education Plan/Intervention: Individual Follow-Up DSMT    Diabetes Meal Plan  Restrictions: Restricted Carbohydrate  Carbohydrate Per Meal: 30-45g  Carbohydrate Per Snack : 15-20g    Today's Self-Management Care Plan was developed with the patient's input and is based on barriers identified during today's assessment.    The long and short-term goals in the care plan were written with the patient/caregiver's input. The patient has agreed to work toward these goals to improve her overall diabetes control.      The patient received a copy of today's self-management plan and verbalized understanding of the care plan, goals, and all of today's instructions.      The patient was encouraged to communicate with her physician and care team regarding her condition(s) and treatment.  I provided the patient with my contact information today and encouraged her to contact me via phone or patient portal as needed.     Education Units of Time   Time Spent: 30 min

## 2018-10-30 PROBLEM — D72.829 LEUKOCYTOSIS: Status: RESOLVED | Noted: 2018-10-18 | Resolved: 2018-10-30

## 2018-10-30 PROBLEM — E10.10 DIABETIC KETOACIDOSIS WITHOUT COMA ASSOCIATED WITH TYPE 1 DIABETES MELLITUS: Status: RESOLVED | Noted: 2018-01-07 | Resolved: 2018-10-30

## 2018-10-30 NOTE — ASSESSMENT & PLAN NOTE
- DKA suspect secondary to medication nonadherence versus potential infection, though infection seems less likely.  - DKA resolved.  - Continue insulin detemir at 45U subQ qHS, insulin aspart at 15U subQ TIDWM with additional moderate-dose sliding scale insulin aspart 1-10U subQ TIDWM PRN.

## 2018-11-06 LAB — POCT GLUCOSE: >500 MG/DL (ref 70–110)

## 2018-11-07 ENCOUNTER — PATIENT MESSAGE (OUTPATIENT)
Dept: ENDOCRINOLOGY | Facility: CLINIC | Age: 28
End: 2018-11-07

## 2018-11-07 RX ORDER — INSULIN LISPRO 100 [IU]/ML
13 INJECTION, SOLUTION INTRAVENOUS; SUBCUTANEOUS
Qty: 20 ML | Refills: 3 | Status: SHIPPED | OUTPATIENT
Start: 2018-11-07 | End: 2018-11-07 | Stop reason: SDUPTHER

## 2018-11-07 RX ORDER — INSULIN LISPRO 100 [IU]/ML
13 INJECTION, SOLUTION INTRAVENOUS; SUBCUTANEOUS
Qty: 20 ML | Refills: 3 | Status: SHIPPED | OUTPATIENT
Start: 2018-11-07 | End: 2019-05-03

## 2018-11-21 ENCOUNTER — PATIENT MESSAGE (OUTPATIENT)
Dept: ENDOCRINOLOGY | Facility: CLINIC | Age: 28
End: 2018-11-21

## 2018-11-21 RX ORDER — INSULIN GLARGINE 100 [IU]/ML
50 INJECTION, SOLUTION SUBCUTANEOUS NIGHTLY
Qty: 15 ML | Refills: 3 | Status: SHIPPED | OUTPATIENT
Start: 2018-11-21 | End: 2019-04-07 | Stop reason: SDUPTHER

## 2018-11-27 ENCOUNTER — PATIENT MESSAGE (OUTPATIENT)
Dept: OBSTETRICS AND GYNECOLOGY | Facility: CLINIC | Age: 28
End: 2018-11-27

## 2018-11-27 NOTE — TELEPHONE ENCOUNTER
Pt called back to schedule but there is no appt's available for this morning with Carmelina or Dr Holt.Pt will go to urgent care.

## 2018-12-03 ENCOUNTER — PATIENT MESSAGE (OUTPATIENT)
Dept: OBSTETRICS AND GYNECOLOGY | Facility: CLINIC | Age: 28
End: 2018-12-03

## 2018-12-03 DIAGNOSIS — N93.0 BLEEDING AFTER INTERCOURSE: Primary | ICD-10-CM

## 2018-12-03 NOTE — TELEPHONE ENCOUNTER
Pt having spotting every time after intercourse. Not enough bleeding to fill a pad or tampon, but it is a good amount on a washcloth when wiping after intercourse.     Pt is scheduled on 12/13 to see Dr. Kidd. Will schedule and ultrasound same day at 2pm and explain u/s prep to pt. If pt cannot come for 2pm will reschedule u/s+appt.

## 2018-12-05 ENCOUNTER — PATIENT MESSAGE (OUTPATIENT)
Dept: FAMILY MEDICINE | Facility: CLINIC | Age: 28
End: 2018-12-05

## 2018-12-05 ENCOUNTER — PATIENT MESSAGE (OUTPATIENT)
Dept: ENDOCRINOLOGY | Facility: CLINIC | Age: 28
End: 2018-12-05

## 2018-12-13 ENCOUNTER — PATIENT MESSAGE (OUTPATIENT)
Dept: OBSTETRICS AND GYNECOLOGY | Facility: CLINIC | Age: 28
End: 2018-12-13

## 2018-12-13 ENCOUNTER — OFFICE VISIT (OUTPATIENT)
Dept: OBSTETRICS AND GYNECOLOGY | Facility: CLINIC | Age: 28
End: 2018-12-13
Attending: OBSTETRICS & GYNECOLOGY
Payer: MEDICAID

## 2018-12-13 VITALS
WEIGHT: 205 LBS | HEIGHT: 63 IN | BODY MASS INDEX: 36.32 KG/M2 | SYSTOLIC BLOOD PRESSURE: 130 MMHG | DIASTOLIC BLOOD PRESSURE: 84 MMHG

## 2018-12-13 DIAGNOSIS — N92.6 IRREGULAR BLEEDING: Primary | ICD-10-CM

## 2018-12-13 PROCEDURE — 99999 PR PBB SHADOW E&M-EST. PATIENT-LVL III: CPT | Mod: PBBFAC,,, | Performed by: OBSTETRICS & GYNECOLOGY

## 2018-12-13 PROCEDURE — 99213 OFFICE O/P EST LOW 20 MIN: CPT | Mod: S$PBB,,, | Performed by: OBSTETRICS & GYNECOLOGY

## 2018-12-13 PROCEDURE — 99213 OFFICE O/P EST LOW 20 MIN: CPT | Mod: PBBFAC | Performed by: OBSTETRICS & GYNECOLOGY

## 2018-12-13 RX ORDER — FLUCONAZOLE 150 MG/1
150 TABLET ORAL DAILY
Qty: 1 TABLET | Refills: 1 | Status: SHIPPED | OUTPATIENT
Start: 2018-12-13 | End: 2019-02-02 | Stop reason: SDUPTHER

## 2018-12-13 NOTE — Clinical Note
Requested Date:januaryCase Length:30 minutesSurgeon: Purnima Kidd      Co- Surgeon: none Visit Type: OutpatientPROCEDURE:hysteroscopy D&C, truclear of polypDiagnosis:endometrial polyp, irregular bleedingAnesthesia type: General Comments/Special Equipment Needed:Rep needed: noU/S needed at pre-op: noPCP clearance: Not Needed

## 2018-12-14 NOTE — PROGRESS NOTES
Subjective:       Patient ID: Anusha Andrew is a 28 y.o. female.    Chief Complaint:  Gynecologic Exam (spotting after intercourse, lmp-bcp)      Patient Active Problem List   Diagnosis    Obesity (BMI 30.0-34.9)    Poorly controlled type 1 diabetes mellitus    Depression       History of Present Illness  28 y.o. yo  here for evaluation of bleeding with sex sometimes. U/S- shows small endometrial polyp. Discussed in detail. Can wait and repeat scan or do hysteroscopy. Patient wants hysteroscopy. Also after pt left I realized I did not do Gc/Chl culture. rec she come back and leave urine sample to test for Gc/Chl due to postcoital bleeding. Pt agrees. Will come Wednesday to [facility] to leave sample. Has an ultrasound with another provider where they also say something in the uterus as well.     Last HbA1c per pt was 9%, rec better glucose control especially for surgery    Past Medical History:   Diagnosis Date    Depression     Hyperlipidemia     Hypertension     Type I (juvenile type) diabetes mellitus without mention of complication, uncontrolled 2011       Past Surgical History:   Procedure Laterality Date    ABCESS DRAINAGE      boil went over GA     SECTION  2012    DELIVERY- SECTION N/A 3/16/2017    Performed by [facility] at [facility] L[facility]D    INCISION AND DRAINAGE (I & D) Left 2013    Performed by Joshua Goldberg, MD at [facility] OR [facility] F[facility]    TONSILLECTOMY, ADENOIDECTOMY      WISDOM TOOTH EXTRACTION         OB History    Para Term  AB Living   2 2 1 1   2   SAB TAB Ectopic Multiple Live Births           2      # Outcome Date GA Lbr Hawk/2nd Weight Sex Delivery Anes PTL Lv   2  17 34w2d  3.16 kg (6 lb 15.5 oz) M CS-LTranv Spinal, EPI Y KRYSTIAN      Complications: Diabetes   1 Term 12 37w5d  3.286 kg (7 lb 3.9 oz) F CS-LTranv EPI  KRYSTIAN      Complications: Preeclampsia      Obstetric Comments   Gestational HTN, failed induction,  not past 6 cm   Menarche ~12       No LMP recorded. Patient is not currently having periods (Reason: Birth Control).   Date of Last Pap: 5/15/2018    Review of Systems  Review of Systems   Constitutional: Negative for fatigue and unexpected weight change.   Respiratory: Negative for shortness of breath.    Cardiovascular: Negative for chest pain.   Gastrointestinal: Negative for abdominal pain, constipation, diarrhea, nausea and vomiting.   Genitourinary: Negative for dysuria.   Musculoskeletal: Negative for back pain.   Skin: Negative for rash.   Neurological: Negative for headaches.   Hematological: Does not bruise/bleed easily.   Psychiatric/Behavioral: Negative for behavioral problems.        Objective:   Physical Exam:   Constitutional: She appears well-developed and well-nourished.                                  Assessment/ Plan:     1. Irregular bleeding       Schedule hsyteroscopy D&C  Gc/ Chl culture  Follow-up with me for preop

## 2018-12-17 ENCOUNTER — PATIENT MESSAGE (OUTPATIENT)
Dept: OBSTETRICS AND GYNECOLOGY | Facility: CLINIC | Age: 28
End: 2018-12-17

## 2018-12-19 ENCOUNTER — PATIENT MESSAGE (OUTPATIENT)
Dept: OBSTETRICS AND GYNECOLOGY | Facility: CLINIC | Age: 28
End: 2018-12-19

## 2018-12-19 ENCOUNTER — OFFICE VISIT (OUTPATIENT)
Dept: OBSTETRICS AND GYNECOLOGY | Facility: CLINIC | Age: 28
End: 2018-12-19
Attending: OBSTETRICS & GYNECOLOGY
Payer: MEDICAID

## 2018-12-19 VITALS
BODY MASS INDEX: 36.54 KG/M2 | DIASTOLIC BLOOD PRESSURE: 82 MMHG | SYSTOLIC BLOOD PRESSURE: 128 MMHG | HEIGHT: 63 IN | WEIGHT: 206.25 LBS

## 2018-12-19 DIAGNOSIS — N76.0 ACUTE VAGINITIS: ICD-10-CM

## 2018-12-19 DIAGNOSIS — N93.0 POSTCOITAL BLEEDING: Primary | ICD-10-CM

## 2018-12-19 LAB
CANDIDA RRNA VAG QL PROBE: NEGATIVE
G VAGINALIS RRNA GENITAL QL PROBE: POSITIVE
T VAGINALIS RRNA GENITAL QL PROBE: NEGATIVE

## 2018-12-19 PROCEDURE — 87660 TRICHOMONAS VAGIN DIR PROBE: CPT

## 2018-12-19 PROCEDURE — 99999 PR PBB SHADOW E&M-EST. PATIENT-LVL III: CPT | Mod: PBBFAC,,, | Performed by: OBSTETRICS & GYNECOLOGY

## 2018-12-19 PROCEDURE — 99213 OFFICE O/P EST LOW 20 MIN: CPT | Mod: S$PBB,,, | Performed by: OBSTETRICS & GYNECOLOGY

## 2018-12-19 PROCEDURE — 87491 CHLMYD TRACH DNA AMP PROBE: CPT

## 2018-12-19 PROCEDURE — 99213 OFFICE O/P EST LOW 20 MIN: CPT | Mod: PBBFAC | Performed by: OBSTETRICS & GYNECOLOGY

## 2018-12-19 RX ORDER — TERCONAZOLE 4 MG/G
1 CREAM VAGINAL NIGHTLY
Qty: 1 TUBE | Refills: 2 | Status: SHIPPED | OUTPATIENT
Start: 2018-12-19 | End: 2019-03-27

## 2018-12-19 RX ORDER — FLUCONAZOLE 100 MG/1
100 TABLET ORAL DAILY
Qty: 3 TABLET | Refills: 0 | Status: SHIPPED | OUTPATIENT
Start: 2018-12-19 | End: 2018-12-22

## 2018-12-19 NOTE — PROGRESS NOTES
Subjective:       Patient ID: Anusha Andrew is a 28 y.o. female.    Chief Complaint:  Vaginal Discharge (c/o white discharge , no odor for past 3 days)      Patient Active Problem List   Diagnosis    Obesity (BMI 30.0-34.9)    Poorly controlled type 1 diabetes mellitus    Depression       History of Present Illness  28 y.o. yo  here for evaluation of vaginal irritation, not better with Diflucan. IDDM, not well controlled.       Past Medical History:   Diagnosis Date    Depression     Hyperlipidemia     Hypertension     Type I (juvenile type) diabetes mellitus without mention of complication, uncontrolled 2011       Past Surgical History:   Procedure Laterality Date    ABCESS DRAINAGE      boil went over GA     SECTION  2012    DELIVERY- SECTION N/A 3/16/2017    [facility]    INCISION AND DRAINAGE (I & D) Left 2013    Performed by [facility] at [facility]    TONSILLECTOMY, ADENOIDECTOMY      WISDOM TOOTH EXTRACTION         OB History    Para Term  AB Living   2 2 1 1   2   SAB TAB Ectopic Multiple Live Births           2      # Outcome Date GA Lbr Hawk/2nd Weight Sex Delivery Anes PTL Lv   2  17 34w2d  3.16 kg (6 lb 15.5 oz) M CS-LTranv Spinal, EPI Y KRYSTIAN      Complications: Diabetes   1 Term 12 37w5d  3.286 kg (7 lb 3.9 oz) F CS-LTranv EPI  KRYSTIAN      Complications: Preeclampsia      Obstetric Comments   Gestational HTN, failed induction, not past 6 cm   Menarche ~12       No LMP recorded. Patient is not currently having periods (Reason: Birth Control).   Date of Last Pap: 5/15/2018    Review of Systems  Review of Systems   Constitutional: Negative for fatigue and unexpected weight change.   Respiratory: Negative for shortness of breath.    Cardiovascular: Negative for chest pain.   Gastrointestinal: Negative for abdominal pain, constipation, diarrhea, nausea and vomiting.    Genitourinary: Positive for vaginal pain. Negative for dysuria.   Musculoskeletal: Negative for back pain.   Skin: Negative for rash.   Neurological: Negative for headaches.   Hematological: Does not bruise/bleed easily.   Psychiatric/Behavioral: Negative for behavioral problems.        Objective:   Physical Exam:   Constitutional: She appears well-developed and well-nourished.               Genitourinary: Vagina normal. Cervix is normal. Cervix exhibits no motion tenderness, no discharge and no friability.   Genitourinary Comments: Cracks in skin c/w external yeast                      Assessment/ Plan:     1. Postcoital bleeding  C. trachomatis/N. gonorrhoeae by AMP DNA   2. Acute vaginitis  Vaginosis Screen by DNA Probe    fluconazole (DIFLUCAN) 100 MG tablet    terconazole (TERAZOL 7) 0.4 % Crea     External yeast- Diflucan and Terazol 7. Needs better DM control. Pt agrees  Follow-up with me in 1 year

## 2018-12-20 ENCOUNTER — TELEPHONE (OUTPATIENT)
Dept: OBSTETRICS AND GYNECOLOGY | Facility: CLINIC | Age: 28
End: 2018-12-20

## 2018-12-20 NOTE — TELEPHONE ENCOUNTER
----- Message from Purnima Kidd MD sent at 12/13/2018  6:30 PM CST -----  Requested Date:january  Case Length:30 minutes  Surgeon: Purnima Kidd      Co- Surgeon: none   Visit Type: Outpatient  PROCEDURE:hysteroscopy D&C, truclear of polyp  Diagnosis:endometrial polyp, irregular bleeding  Anesthesia type: General   Comments/Special Equipment Needed:  Rep needed: no  U/S needed at pre-op: no  PCP clearance: Not Needed

## 2018-12-23 LAB
C TRACH DNA SPEC QL NAA+PROBE: NOT DETECTED
N GONORRHOEA DNA SPEC QL NAA+PROBE: NOT DETECTED

## 2018-12-26 ENCOUNTER — PATIENT MESSAGE (OUTPATIENT)
Dept: OBSTETRICS AND GYNECOLOGY | Facility: CLINIC | Age: 28
End: 2018-12-26

## 2019-01-11 ENCOUNTER — PATIENT MESSAGE (OUTPATIENT)
Dept: OBSTETRICS AND GYNECOLOGY | Facility: CLINIC | Age: 29
End: 2019-01-11

## 2019-01-14 ENCOUNTER — TELEPHONE (OUTPATIENT)
Dept: OBSTETRICS AND GYNECOLOGY | Facility: CLINIC | Age: 29
End: 2019-01-14

## 2019-01-14 DIAGNOSIS — N92.6 IRREGULAR BLEEDING: ICD-10-CM

## 2019-01-14 DIAGNOSIS — N84.0 ENDOMETRIAL POLYP: Primary | ICD-10-CM

## 2019-02-04 ENCOUNTER — OFFICE VISIT (OUTPATIENT)
Dept: ENDOCRINOLOGY | Facility: CLINIC | Age: 29
End: 2019-02-04
Payer: MEDICAID

## 2019-02-04 ENCOUNTER — LAB VISIT (OUTPATIENT)
Dept: LAB | Facility: HOSPITAL | Age: 29
End: 2019-02-04
Attending: NURSE PRACTITIONER
Payer: MEDICAID

## 2019-02-04 VITALS
BODY MASS INDEX: 35.66 KG/M2 | SYSTOLIC BLOOD PRESSURE: 142 MMHG | WEIGHT: 201.25 LBS | HEIGHT: 63 IN | HEART RATE: 116 BPM | DIASTOLIC BLOOD PRESSURE: 83 MMHG

## 2019-02-04 DIAGNOSIS — Z71.9 VISIT FOR COUNSELING: ICD-10-CM

## 2019-02-04 PROCEDURE — 99214 OFFICE O/P EST MOD 30 MIN: CPT | Mod: PBBFAC,PN | Performed by: NURSE PRACTITIONER

## 2019-02-04 PROCEDURE — 99999 PR PBB SHADOW E&M-EST. PATIENT-LVL IV: ICD-10-PCS | Mod: PBBFAC,,, | Performed by: NURSE PRACTITIONER

## 2019-02-04 PROCEDURE — 99999 PR PBB SHADOW E&M-EST. PATIENT-LVL IV: CPT | Mod: PBBFAC,,, | Performed by: NURSE PRACTITIONER

## 2019-02-04 PROCEDURE — 83036 HEMOGLOBIN GLYCOSYLATED A1C: CPT

## 2019-02-04 PROCEDURE — 99214 OFFICE O/P EST MOD 30 MIN: CPT | Mod: S$PBB,,, | Performed by: NURSE PRACTITIONER

## 2019-02-04 PROCEDURE — 36415 COLL VENOUS BLD VENIPUNCTURE: CPT | Mod: PN

## 2019-02-04 PROCEDURE — 99214 PR OFFICE/OUTPT VISIT, EST, LEVL IV, 30-39 MIN: ICD-10-PCS | Mod: S$PBB,,, | Performed by: NURSE PRACTITIONER

## 2019-02-04 RX ORDER — FLUCONAZOLE 150 MG/1
TABLET ORAL
Qty: 1 TABLET | Refills: 1 | Status: SHIPPED | OUTPATIENT
Start: 2019-02-04 | End: 2019-02-13 | Stop reason: CLARIF

## 2019-02-04 RX ORDER — BUPROPION HYDROCHLORIDE 300 MG/1
300 TABLET ORAL DAILY
COMMUNITY
End: 2019-09-10

## 2019-02-04 NOTE — PATIENT INSTRUCTIONS
Reschedule appointment with diabetes educator to learn carb counting. Try carb ratio of 5.     Until then, inject Apidra before meals. But take 16 units of Apidra if eating a heavy meal, like for dinner. But try to avoid excessive carbohydrate intake.     Test blood sugar 8x/day.     Return to clinic in 5 months with labs prior.

## 2019-02-04 NOTE — PROGRESS NOTES
"CC: This 28 y.o. Black or  female  is here for evaluation of  T1DM along with comorbidities indicated in the Visit Diagnosis section of this encounter.    HPI: Anusha Andrew was diagnosed with T1DM at age 17. Insulin started at the time of diagnosis.   She has tried an Omnipod insulin pump before x 2 weeks. Has never tried CGM.     Initial visit on 10/23/18  Last seen by DANIELLE Hernandez NP, in 4/2018. New to me. Arrives with her 1 year son Rai.   She was admitted for DKA last week. She cut down on her prescribed insulin doses as her insurance no longer covered prior prescriptions, so she attempted to stretch out dosing to make it last longer.   She does have enough insulin supply now however.   She is interested in getting an insulin pump. She knows how to carb count "somewhat."   Plan Start keeping a blood sugar log.   Increase Basaglar to 50 units every night.   Unable to optimize diabetes control without blood sugar logs.   See Diabetes Educator/Registered Dietician for carb counting and insulin pump evaluation. Please start with carb ratio of 1:5.   Test blood sugar before meals and after meals, 6x/day.   Look into carb counting apps like carb manager or calorie arabella.   Difficult to fully discuss carb counting and insulin pump basics due to many interruptions from her young child. She was provided brochures for Dexcom CGM and Medtronic/Tslim pumps. She may switch from Medicaid insurance to commercial plan under [employer] her employer, which would give her more options.   rx for Freestyle Cinthia CGM sent which she can purchase now as cash payer. Return to clinic in a month.     Interval history  She met with dietician but would like to reschedule since her last visit was interrupted by her young son. She continues to inject apidra 13 units ac and has not started with carb counting/using carb ratio.         LAST DIABETES EDUCATION: 1/2016    HOSPITALIZED FOR DIABETES -  Yes -   For DKA - 10/18/18 x 4 " "days; presented with abdominal pain, n/v, SOB  A total of 4 hospital visits for DKA.     PRESCRIBED DIABETES MEDICATIONS: Basaglar 50 units every night ~ 9 pm, Admelog (vial) 13 units before meals   Misses medication doses - No    DM COMPLICATIONS: none    SIGNIFICANT DIABETES MED HISTORY: denies intolerance to prior insulin types     SELF MONITORING BLOOD GLUCOSE: Checks blood glucose at home 8x/day. Forgot her log.   Fasting -   After breakfast 150-175  prelunch 180-190s  After lunch 190s  After supper 200s; dinner tends to be larger.     HYPOGLYCEMIC EPISODES: rare; no overnight sweats     CURRENT DIET: drinks diet tea, crystal lite. Eats 3 meals/day.   Dinner last night was chicken sandwich and fries and her BG 2 hours later was 175. She had injected 13 units of apidra. This meal was about 64 grams of CHO. Of note, a carb ratio of 5 would equal a rec'd dose of 13 units as well.       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 No acute distress.   PSYCH: AAOx3, appropriate mood and affect, conversant, well-groomed. Judgement and insight good.   NEURO: Cranial nerves grossly intact. Speech clear, no tremor.   CHEST: Respirations even and unlabored.   MS: Gait steady. No clubbing.         Hemoglobin A1C   Date Value Ref Range Status   10/18/2018 9.6 (H) 4.0 - 5.6 % Final     Comment:     ADA Screening Guidelines:  5.7-6.4%  Consistent with prediabetes  >or=6.5%  Consistent with diabetes  High levels of fetal hemoglobin interfere with the HbA1C  assay. Heterozygous hemoglobin variants " (HbS, HgC, etc)do  not significantly interfere with this assay.   However, presence of multiple variants may affect accuracy.     04/12/2018 9.8 (H) 4.0 - 5.6 % Final     Comment:     According to ADA guidelines, hemoglobin A1c <7.0% represents  optimal control in non-pregnant diabetic patients. Different  metrics may apply to specific patient populations.   Standards of Medical Care in Diabetes-2016.  For the purpose of screening for the presence of diabetes:  <5.7%     Consistent with the absence of diabetes  5.7-6.4%  Consistent with increasing risk for diabetes   (prediabetes)  >or=6.5%  Consistent with diabetes  Currently, no consensus exists for use of hemoglobin A1c  for diagnosis of diabetes for children.  This Hemoglobin A1c assay has significant interference with fetal   hemoglobin   (HbF). The results are invalid for patients with abnormal amounts of   HbF,   including those with known Hereditary Persistence   of Fetal Hemoglobin. Heterozygous hemoglobin variants (HbAS, HbAC,   HbAD, HbAE, HbA2) do not significantly interfere with this assay;   however, presence of multiple variants in a sample may impact the %   interference.     12/06/2017 11.7 (H) 4.0 - 5.6 % Final     Comment:     According to ADA guidelines, hemoglobin A1c <7.0% represents  optimal control in non-pregnant diabetic patients. Different  metrics may apply to specific patient populations.   Standards of Medical Care in Diabetes-2016.  For the purpose of screening for the presence of diabetes:  <5.7%     Consistent with the absence of diabetes  5.7-6.4%  Consistent with increasing risk for diabetes   (prediabetes)  >or=6.5%  Consistent with diabetes  Currently, no consensus exists for use of hemoglobin A1c  for diagnosis of diabetes for children.  This Hemoglobin A1c assay has significant interference with fetal   hemoglobin   (HbF). The results are invalid for patients with abnormal amounts of   HbF,   including those with known  Hereditary Persistence   of Fetal Hemoglobin. Heterozygous hemoglobin variants (HbAS, HbAC,   HbAD, HbAE, HbA2) do not significantly interfere with this assay;   however, presence of multiple variants in a sample may impact the %   interference.             Chemistry        Component Value Date/Time     10/22/2018 0438    K 3.1 (L) 10/22/2018 0438     (H) 10/22/2018 0438    CO2 20 (L) 10/22/2018 0438    BUN 9 10/22/2018 0438    CREATININE 0.7 10/22/2018 0438     (H) 10/22/2018 0438        Component Value Date/Time    CALCIUM 8.7 10/22/2018 0438    ALKPHOS 118 10/18/2018 1115    AST 20 10/18/2018 1115    ALT 19 10/18/2018 1115    BILITOT 0.2 10/18/2018 1115    ESTGFRAFRICA >60 10/22/2018 0438    EGFRNONAA >60 10/22/2018 0438          Lab Results   Component Value Date    LDLCALC 143.8 12/07/2016       Lab Results   Component Value Date    MICALBCREAT Unable to calculate 04/12/2018        Ref. Range 4/12/2018 15:07   Microalbum.,U,Random Latest Units: ug/mL <2.5   Microalb Creat Ratio Latest Ref Range: 0.0 - 30.0 ug/mg Unable to calculate           STANDARDS of CARE:        ASA:               Last eye exam:       ASSESSMENT and PLAN:    A1C GOAL: < 7 % if no hypoglycemia     1. Diabetes mellitus type 1, uncontrolled, without complications  Reschedule appointment with diabetes educator to learn carb counting. Try carb ratio of 5.     Until then, inject Apidra before meals. But take 16 units of Apidra if eating a heavy meal, like for dinner. But try to avoid excessive carbohydrate intake.     Test blood sugar 8x/day.     Return to clinic in 5 months with labs prior.       Hemoglobin A1c    Microalbumin/creatinine urine ratio    Lipid panel       Spent 25 minutes with patient with > 50% time spent in counseling, as noted in # 1.     Orders Placed This Encounter   Procedures    Hemoglobin A1c     Standing Status:   Standing     Number of Occurrences:   2     Standing Expiration Date:   4/4/2020     Microalbumin/creatinine urine ratio     Standing Status:   Future     Number of Occurrences:   1     Standing Expiration Date:   4/4/2020     Order Specific Question:   Specimen Source     Answer:   Urine    Lipid panel     Standing Status:   Future     Standing Expiration Date:   2/4/2020        Follow-up in about 5 months (around 7/4/2019).

## 2019-02-05 LAB
ESTIMATED AVG GLUCOSE: 249 MG/DL
HBA1C MFR BLD HPLC: 10.3 %

## 2019-02-06 ENCOUNTER — PATIENT MESSAGE (OUTPATIENT)
Dept: ENDOCRINOLOGY | Facility: CLINIC | Age: 29
End: 2019-02-06

## 2019-02-13 ENCOUNTER — ANESTHESIA EVENT (OUTPATIENT)
Dept: SURGERY | Facility: OTHER | Age: 29
End: 2019-02-13
Payer: MEDICAID

## 2019-02-13 ENCOUNTER — HOSPITAL ENCOUNTER (OUTPATIENT)
Dept: PREADMISSION TESTING | Facility: OTHER | Age: 29
Discharge: HOME | End: 2019-02-13
Attending: OBSTETRICS & GYNECOLOGY
Payer: MEDICAID

## 2019-02-13 ENCOUNTER — OFFICE VISIT (OUTPATIENT)
Dept: OBSTETRICS AND GYNECOLOGY | Facility: CLINIC | Age: 29
End: 2019-02-13
Attending: OBSTETRICS & GYNECOLOGY
Payer: MEDICAID

## 2019-02-13 VITALS
HEIGHT: 63 IN | DIASTOLIC BLOOD PRESSURE: 90 MMHG | HEIGHT: 63 IN | TEMPERATURE: 99 F | SYSTOLIC BLOOD PRESSURE: 132 MMHG | DIASTOLIC BLOOD PRESSURE: 81 MMHG | BODY MASS INDEX: 36.11 KG/M2 | OXYGEN SATURATION: 99 % | SYSTOLIC BLOOD PRESSURE: 122 MMHG | WEIGHT: 203 LBS | WEIGHT: 203.81 LBS | HEART RATE: 99 BPM | BODY MASS INDEX: 35.97 KG/M2

## 2019-02-13 DIAGNOSIS — N93.8 DUB (DYSFUNCTIONAL UTERINE BLEEDING): ICD-10-CM

## 2019-02-13 DIAGNOSIS — N93.8 DUB (DYSFUNCTIONAL UTERINE BLEEDING): Primary | ICD-10-CM

## 2019-02-13 LAB
ABO + RH BLD: NORMAL
ANION GAP SERPL CALC-SCNC: 5 MMOL/L
BASOPHILS # BLD AUTO: 0.02 K/UL
BASOPHILS NFR BLD: 0.2 %
BLD GP AB SCN CELLS X3 SERPL QL: NORMAL
BUN SERPL-MCNC: 13 MG/DL
CALCIUM SERPL-MCNC: 9.7 MG/DL
CHLORIDE SERPL-SCNC: 105 MMOL/L
CO2 SERPL-SCNC: 26 MMOL/L
CREAT SERPL-MCNC: 0.9 MG/DL
DIFFERENTIAL METHOD: ABNORMAL
EOSINOPHIL # BLD AUTO: 0.1 K/UL
EOSINOPHIL NFR BLD: 0.7 %
ERYTHROCYTE [DISTWIDTH] IN BLOOD BY AUTOMATED COUNT: 14.3 %
EST. GFR  (AFRICAN AMERICAN): >60 ML/MIN/1.73 M^2
EST. GFR  (NON AFRICAN AMERICAN): >60 ML/MIN/1.73 M^2
GLUCOSE SERPL-MCNC: 216 MG/DL
HCT VFR BLD AUTO: 41.7 %
HGB BLD-MCNC: 13.2 G/DL
LYMPHOCYTES # BLD AUTO: 3.1 K/UL
LYMPHOCYTES NFR BLD: 35.7 %
MCH RBC QN AUTO: 25.5 PG
MCHC RBC AUTO-ENTMCNC: 31.7 G/DL
MCV RBC AUTO: 81 FL
MONOCYTES # BLD AUTO: 0.5 K/UL
MONOCYTES NFR BLD: 5.5 %
NEUTROPHILS # BLD AUTO: 5 K/UL
NEUTROPHILS NFR BLD: 57.8 %
PLATELET # BLD AUTO: 397 K/UL
PMV BLD AUTO: 9.7 FL
POTASSIUM SERPL-SCNC: 4.4 MMOL/L
RBC # BLD AUTO: 5.18 M/UL
SODIUM SERPL-SCNC: 136 MMOL/L
WBC # BLD AUTO: 8.58 K/UL

## 2019-02-13 PROCEDURE — 99999 PR PBB SHADOW E&M-EST. PATIENT-LVL III: CPT | Mod: PBBFAC,,, | Performed by: OBSTETRICS & GYNECOLOGY

## 2019-02-13 PROCEDURE — 99213 OFFICE O/P EST LOW 20 MIN: CPT | Mod: PBBFAC | Performed by: OBSTETRICS & GYNECOLOGY

## 2019-02-13 PROCEDURE — 99499 NO LOS: ICD-10-PCS | Mod: S$PBB,,, | Performed by: OBSTETRICS & GYNECOLOGY

## 2019-02-13 PROCEDURE — 86901 BLOOD TYPING SEROLOGIC RH(D): CPT

## 2019-02-13 PROCEDURE — 85025 COMPLETE CBC W/AUTO DIFF WBC: CPT

## 2019-02-13 PROCEDURE — 80048 BASIC METABOLIC PNL TOTAL CA: CPT

## 2019-02-13 PROCEDURE — 99999 PR PBB SHADOW E&M-EST. PATIENT-LVL III: ICD-10-PCS | Mod: PBBFAC,,, | Performed by: OBSTETRICS & GYNECOLOGY

## 2019-02-13 PROCEDURE — 99499 UNLISTED E&M SERVICE: CPT | Mod: S$PBB,,, | Performed by: OBSTETRICS & GYNECOLOGY

## 2019-02-13 RX ORDER — LIDOCAINE HYDROCHLORIDE 10 MG/ML
0.5 INJECTION, SOLUTION EPIDURAL; INFILTRATION; INTRACAUDAL; PERINEURAL ONCE
Status: CANCELLED | OUTPATIENT
Start: 2019-02-13 | End: 2019-02-13

## 2019-02-13 RX ORDER — ULIPRISTAL ACETATE 30 MG/1
TABLET ORAL
COMMUNITY
Start: 2019-02-05 | End: 2019-02-13

## 2019-02-13 RX ORDER — FAMOTIDINE 20 MG/1
20 TABLET, FILM COATED ORAL
Status: CANCELLED | OUTPATIENT
Start: 2019-02-13 | End: 2019-02-13

## 2019-02-13 RX ORDER — PREGABALIN 75 MG/1
150 CAPSULE ORAL ONCE
Status: CANCELLED | OUTPATIENT
Start: 2019-02-13 | End: 2019-02-13

## 2019-02-13 RX ORDER — ACETAMINOPHEN 500 MG
1000 TABLET ORAL
Status: CANCELLED | OUTPATIENT
Start: 2019-02-13 | End: 2019-02-13

## 2019-02-13 RX ORDER — DEXTROSE, SODIUM CHLORIDE, SODIUM LACTATE, POTASSIUM CHLORIDE, AND CALCIUM CHLORIDE 5; .6; .31; .03; .02 G/100ML; G/100ML; G/100ML; G/100ML; G/100ML
INJECTION, SOLUTION INTRAVENOUS CONTINUOUS
Status: CANCELLED | OUTPATIENT
Start: 2019-02-13

## 2019-02-13 RX ORDER — SODIUM CHLORIDE, SODIUM LACTATE, POTASSIUM CHLORIDE, CALCIUM CHLORIDE 600; 310; 30; 20 MG/100ML; MG/100ML; MG/100ML; MG/100ML
INJECTION, SOLUTION INTRAVENOUS CONTINUOUS
Status: CANCELLED | OUTPATIENT
Start: 2019-02-13

## 2019-02-13 NOTE — H&P
[facility] [address]  History & Physical  Gynecology    SUBJECTIVE:     Chief Complaint/Reason for Admission: abnormal vaginal bleeding    History of Present Illness:  Patient is a 28 y.o.  who presents with irregular bleeding especially post coital bleeding. Has been going on for awhile. PNHX pertinent for IDDM, poorly controlled. Last HbA1C was over 10%. On u/s there is a likely polyp. I rec hysteroscopy D&C and pt agrees. Other options include EMB or expt mgmt. Pt would like to proceed with Hysteroscopy. All questions answered. Consents signed. Encouraged better glucose control, especially before and after surgery.       (Not in a hospital admission)    Review of patient's allergies indicates:   Allergen Reactions    No known allergies        Past Medical History:   Diagnosis Date    Depression     Hyperlipidemia     Hypertension     Type I (juvenile type) diabetes mellitus without mention of complication, uncontrolled 2011     Past Surgical History:   Procedure Laterality Date    ABCESS DRAINAGE      boil went over [place]     SECTION  2012    DELIVERY- SECTION N/A 3/16/2017    Performed by [facility]urnima [facility]idd [facility] at [facility] L&D    INCISION AND DRAINAGE (I & D) Left 2013    Performed by Joshua Goldberg, [facility] at [facility] OR 2ND FLR    TONSILLECTOMY, ADENOIDECTOMY      WISDOM TOOTH EXTRACTION       Family History   Problem Relation Age of Onset    Diabetes Brother     No Known Problems Mother     No Known Problems Father     Hydrocephalus Daughter     No Known Problems Brother     No Known Problems Brother     No Known Problems Maternal Aunt     No Known Problems Maternal Uncle     No Known Problems Paternal Aunt     No Known Problems Paternal Uncle     No Known Problems Maternal Grandmother     No Known Problems Maternal Grandfather     No Known Problems Paternal Grandmother     No Known Problems Paternal Grandfather     Amblyopia Neg Hx     Blindness  Neg Hx     Cataracts Neg Hx     Glaucoma Neg Hx     Hypertension Neg Hx     Macular degeneration Neg Hx     Retinal detachment Neg Hx     Strabismus Neg Hx     Stroke Neg Hx     Thyroid disease Neg Hx     Breast cancer Neg Hx     Colon cancer Neg Hx     Ovarian cancer Neg Hx     Cancer Neg Hx      Social History     Tobacco Use    Smoking status: Never Smoker    Smokeless tobacco: Never Used   Substance Use Topics    Alcohol use: Yes     Comment: occasionally    Drug use: No       Review of Systems:  Constitutional: no fever or chills  Respiratory: no cough or shortness of breath  Cardiovascular: no chest pain or palpitations  Genitourinary: no hematuria or dysuria     OBJECTIVE:     Vital Signs (Most Recent):  BP: (!) 132/90 (02/13/19 1312)    Physical Exam:  General: well developed, well nourished  Lungs:  clear to auscultation bilaterally and normal respiratory effort  Cardiovascular: Heart: regular rate and rhythm, S1, S2 normal, no murmur, click, rub or gallop. Chest Wall: no tenderness. Extremities: no cyanosis or edema, or clubbing. Pulses: 2+ and symmetric.  Pelvic:   Uterus small    Laboratory:  Lab Results   Component Value Date    WBC 5.93 10/21/2018    HGB 11.4 (L) 10/21/2018    HCT 35.2 (L) 10/21/2018    MCV 79 (L) 10/21/2018     10/21/2018       Ultrasound:  Results for orders placed in visit on 12/13/18   US Pelvis Comp with Transvag NON-OB (xpd    Narrative EXAMINATION:  US PELVIS COMP WITH TRANSVAG NON-OB (XPD)    CLINICAL HISTORY:  Postcoital and contact bleeding    TECHNIQUE:  Transabdominal sonography of the pelvis was performed, followed by transvaginal sonography to better evaluate the uterus and ovaries.    COMPARISON:  None.    FINDINGS:  Uterus:    Size: 7.6 x 3.8 x 5.1 cm    Masses: There is an intramural fibroid identified measuring 1.7 x 1.5 x 1.3 cm within the posterior body of the uterus on the right.    Endometrium: Normal in thickness measuring 8 mm.  There is a  more focal echogenic area within the endometrial cavity measuring 1.0 x 0.9 x 0.7 cm which may represent an endometrial polyp.  Close follow-up or endometrial biopsy should be considered.    Right ovary:    Size: 2.9 x 3.6 x 1.8 cm    Appearance: Normal    Vascular flow: Normal.    Left ovary:    Size: 2.0 x 2.4 x 1.5 cm    Appearance: Normal    Vascular Flow: Normal.    Free Fluid:    None.      Impression 1.0 cm echogenic focus within the endometrial cavity possibly representing an endometrial polyp.  Close follow-up versus endometrial biopsy should be considered.    1.7 cm intramural uterine fibroid.      Electronically signed by: Jarod Ibarra MD  Date:    12/13/2018  Time:    15:06           ASSESSMENT/PLAN:     There are no hospital problems to display for this patient.      To OR for Hysteroscopy D&C  Risks and benefits explained in detail to patient, including but not limited to damage to internal organs, including but not limited to bowel, bladder, uterus, tubes, ovaries, nerves, arteries, veins, possible need for blood transfusion or hysterectomy. Patient is aware of all risks and desires surgery. All questions answered. Consents signed.

## 2019-02-13 NOTE — H&P (VIEW-ONLY)
[facility] [address]  History & Physical  Gynecology    SUBJECTIVE:     Chief Complaint/Reason for Admission: abnormal vaginal bleeding    History of Present Illness:  Patient is a 28 y.o.  who presents with irregular bleeding especially post coital bleeding. Has been going on for awhile. PNHX pertinent for IDDM, poorly controlled. Last HbA1C was over 10%. On u/s there is a likely polyp. I rec hysteroscopy D&C and pt agrees. Other options include EMB or expt mgmt. Pt would like to proceed with Hysteroscopy. All questions answered. Consents signed. Encouraged better glucose control, especially before and after surgery.       (Not in a hospital admission)    Review of patient's allergies indicates:   Allergen Reactions    No known allergies        Past Medical History:   Diagnosis Date    Depression     Hyperlipidemia     Hypertension     Type I (juvenile type) diabetes mellitus without mention of complication, uncontrolled 2011     Past Surgical History:   Procedure Laterality Date    ABCESS DRAINAGE      boil went over [place]     SECTION  2012    DELIVERY- SECTION N/A 3/16/2017    Performed [facility] at [facility] L[facility]D    INCISION AND DRAINAGE (I & D) Left 2013    [facility] OR 2ND FLR    TONSILLECTOMY, ADENOIDECTOMY      WISDOM TOOTH EXTRACTION       Family History   Problem Relation Age of Onset    Diabetes Brother     No Known Problems Mother     No Known Problems Father     Hydrocephalus Daughter     No Known Problems Brother     No Known Problems Brother     No Known Problems Maternal Aunt     No Known Problems Maternal Uncle     No Known Problems Paternal Aunt     No Known Problems Paternal Uncle     No Known Problems Maternal Grandmother     No Known Problems Maternal Grandfather     No Known Problems Paternal Grandmother     No Known Problems Paternal Grandfather     Amblyopia Neg Hx     Blindness  Neg Hx     Cataracts Neg Hx     Glaucoma Neg Hx     Hypertension Neg Hx     Macular degeneration Neg Hx     Retinal detachment Neg Hx     Strabismus Neg Hx     Stroke Neg Hx     Thyroid disease Neg Hx     Breast cancer Neg Hx     Colon cancer Neg Hx     Ovarian cancer Neg Hx     Cancer Neg Hx      Social History     Tobacco Use    Smoking status: Never Smoker    Smokeless tobacco: Never Used   Substance Use Topics    Alcohol use: Yes     Comment: occasionally    Drug use: No       Review of Systems:  Constitutional: no fever or chills  Respiratory: no cough or shortness of breath  Cardiovascular: no chest pain or palpitations  Genitourinary: no hematuria or dysuria     OBJECTIVE:     Vital Signs (Most Recent):  BP: (!) 132/90 (02/13/19 1312)    Physical Exam:  General: well developed, well nourished  Lungs:  clear to auscultation bilaterally and normal respiratory effort  Cardiovascular: Heart: regular rate and rhythm, S1, S2 normal, no murmur, click, rub or gallop. Chest Wall: no tenderness. Extremities: no cyanosis or edema, or clubbing. Pulses: 2+ and symmetric.  Pelvic:   Uterus small    Laboratory:  Lab Results   Component Value Date    WBC 5.93 10/21/2018    HGB 11.4 (L) 10/21/2018    HCT 35.2 (L) 10/21/2018    MCV 79 (L) 10/21/2018     10/21/2018       Ultrasound:  Results for orders placed in visit on 12/13/18   US Pelvis Comp with Transvag NON-OB (xpd    Narrative EXAMINATION:  US PELVIS COMP WITH TRANSVAG NON-OB (XPD)    CLINICAL HISTORY:  Postcoital and contact bleeding    TECHNIQUE:  Transabdominal sonography of the pelvis was performed, followed by transvaginal sonography to better evaluate the uterus and ovaries.    COMPARISON:  None.    FINDINGS:  Uterus:    Size: 7.6 x 3.8 x 5.1 cm    Masses: There is an intramural fibroid identified measuring 1.7 x 1.5 x 1.3 cm within the posterior body of the uterus on the right.    Endometrium: Normal in thickness measuring 8 mm.  There is a  more focal echogenic area within the endometrial cavity measuring 1.0 x 0.9 x 0.7 cm which may represent an endometrial polyp.  Close follow-up or endometrial biopsy should be considered.    Right ovary:    Size: 2.9 x 3.6 x 1.8 cm    Appearance: Normal    Vascular flow: Normal.    Left ovary:    Size: 2.0 x 2.4 x 1.5 cm    Appearance: Normal    Vascular Flow: Normal.    Free Fluid:    None.      Impression 1.0 cm echogenic focus within the endometrial cavity possibly representing an endometrial polyp.  Close follow-up versus endometrial biopsy should be considered.    1.7 cm intramural uterine fibroid.      Electronically signed by: Jaord Ibarra MD  Date:    12/13/2018  Time:    15:06           ASSESSMENT/PLAN:     There are no hospital problems to display for this patient.      To OR for Hysteroscopy D&C  Risks and benefits explained in detail to patient, including but not limited to damage to internal organs, including but not limited to bowel, bladder, uterus, tubes, ovaries, nerves, arteries, veins, possible need for blood transfusion or hysterectomy. Patient is aware of all risks and desires surgery. All questions answered. Consents signed.

## 2019-02-13 NOTE — PROGRESS NOTES
Patient ID: Anusha Andrew is a 28 y.o. female.    Chief Complaint:  Pre-op Exam      Patient Active Problem List   Diagnosis    Obesity (BMI 30.0-34.9)    Poorly controlled type 1 diabetes mellitus    Depression       History of Present Illness    Here for preop visit.     Past Medical History:   Diagnosis Date    Depression     Hyperlipidemia     Hypertension     Type I (juvenile type) diabetes mellitus without mention of complication, uncontrolled 2011       Past Surgical History:   Procedure Laterality Date    ABCESS DRAINAGE      boil went over [place]     SECTION  2012    DELIVERY- SECTION N/A 3/16/2017    Performed by [facility] at [facility] L&D    INCISION AND DRAINAGE (I & D) Left 2013    Performed by Joshua Goldberg, MD at [facility] OR [facility] FLR    TONSILLECTOMY, ADENOIDECTOMY      WISDOM TOOTH EXTRACTION         OB History    Para Term  AB Living   2 2 1 1   2   SAB TAB Ectopic Multiple Live Births           2      # Outcome Date GA Lbr Hawk/2nd Weight Sex Delivery Anes PTL Lv   2  17 34w2d  3.16 kg (6 lb 15.5 oz) M CS-LTranv Spinal, EPI Y KRYSTIAN      Complications: Diabetes   1 Term 12 37w5d  3.286 kg (7 lb 3.9 oz) F CS-LTranv EPI  KRYSTIAN      Complications: Preeclampsia      Obstetric Comments   Gestational HTN, failed induction, not past 6 cm   Menarche ~12       No LMP recorded (lmp unknown). Patient is not currently having periods (Reason: Birth Control).   Date of Last Pap: 5/15/2018    Review of Systems  Review of Systems     Objective:   Physical Exam     Assessment/ Plan:     1. DUB (dysfunctional uterine bleeding)         To OR for Hysteroscopy D&C  All risks and benefits explained, including but not limited to damage to internal organs, including but not limited to uterus, tubes, ovaries, vagina, vulva, urethra, possible inability to remove all tissue, possible hysterectomy, possible blood transfusion, possible need to convert to  open procedure or hysterectomy. Patient is aware of all risks and desires surgery. All questions were answered. Consents were signed.

## 2019-02-13 NOTE — DISCHARGE INSTRUCTIONS
PRE-ADMIT TESTING -  [phone]    [address]  [place] [facility]          Your surgery has been scheduled at [facility]. We are pleased to have the opportunity to serve you. For Further Information please call [phone].    On the day of surgery please report to the Information Desk on the 1st floor.    · CONTACT YOUR PHYSICIAN'S OFFICE THE DAY PRIOR TO YOUR SURGERY TO OBTAIN YOUR ARRIVAL TIME.     · The evening before surgery do not eat anything after 9 p.m. ( this includes hard candy, chewing gum and mints).  You may only have GATORADE, POWERADE AND WATER  from 9 p.m. until you leave your home.   DO NOT DRINK ANY LIQUIDS ON THE WAY TO THE HOSPITAL.      SPECIAL MEDICATION INSTRUCTIONS: TAKE medications checked off by the Anesthesiologist on your Medication List.    Angiogram Patients: Take medications as instructed by your physician, including aspirin.     Surgery Patients:    If you take ASPIRIN - Your PHYSICIAN/SURGEON will need to inform you IF/OR when you need to stop taking aspirin prior to your surgery.     Do Not take any medications containing IBUPROFEN.  Do Not Wear any make-up or dark nail polish   (especially eye make-up) to surgery. If you come to surgery with makeup on you will be required to remove the makeup or nail polish.    Do not shave your surgical area at least 5 days prior to your surgery. The surgical prep will be performed at the hospital according to Infection Control regulations.    Leave all valuables at home.   Do Not wear any jewelry or watches, including any metal in body piercings. Jewelry must be removed prior to coming to the hospital.  There is a possibility that rings that are unable to be removed may be cut off if they are on the surgical extremity.    Contact Lens must be removed before surgery. Either do not wear the contact lens or bring a case and solution for storage.  Please bring a container for eyeglasses or dentures as required.  Bring  any paperwork your physician has provided, such as consent forms,  history and physicals, doctor's orders, etc.   Bring comfortable clothes that are loose fitting to wear upon discharge. Take into consideration the type of surgery being performed.  Maintain your diet as advised per your physician the day prior to surgery.      Adequate rest the night before surgery is advised.   Park in the Parking lot behind the hospital or in the [facility] across the street from the parking lot. Parking is complimentary.  If you will be discharged the same day as your procedure, please arrange for a responsible adult to drive you home or to accompany you if traveling by taxi.   YOU WILL NOT BE PERMITTED TO DRIVE OR TO LEAVE THE HOSPITAL ALONE AFTER SURGERY.   It is strongly recommended that you arrange for someone to remain with you for the first 24 hrs following your surgery.       Thank you for your cooperation.  The Staff of [facility].        Bathing Instructions                                                                 Please shower the evening before and morning of your procedure with    ANTIBACTERIAL SOAP. ( DIAL, etc )  Concentrate on the surgical area   for at least 3 minutes and rinse completely. Dry off as usual.   Do not use any deodorant, powder, body lotions, perfume, after shave or    cologne.

## 2019-02-13 NOTE — ANESTHESIA PREPROCEDURE EVALUATION
02/13/2019  Anusha Andrew is a 28 y.o., female.    Anesthesia Evaluation    I have reviewed the Patient Summary Reports.    I have reviewed the Nursing Notes.   I have reviewed the Medications.     Review of Systems  Anesthesia Hx:  Denies Family Hx of Anesthesia complications.   Denies Personal Hx of Anesthesia complications.   Social:  Non-Smoker    Hematology/Oncology:     Oncology Normal    -- Anemia (10/18 hct 35):   EENT/Dental:EENT/Dental Normal   Cardiovascular:   Denies Hypertension. (with pregnancy only, no tx)  hyperlipidemia    Pulmonary:  Pulmonary Normal    Renal/:  Renal/ Normal     Hepatic/GI:  Hepatic/GI Normal    Musculoskeletal:  Musculoskeletal Normal    Neurological:  Neurology Normal    Endocrine:   Diabetes, type 1    Dermatological:  Skin Normal    Psych:  Psychiatric Normal           Physical Exam  General:  Obesity    Airway/Jaw/Neck:  Airway Findings: Mouth Opening: Normal Mallampati: II  TM Distance: Normal, at least 6 cm  Jaw/Neck Findings:  Neck ROM: Normal ROM      Dental:  Dental Findings: In tact        Mental Status:  Mental Status Findings:  Cooperative, Alert and Oriented         Anesthesia Plan  Type of Anesthesia, risks & benefits discussed:  Anesthesia Type:  general  Patient's Preference:   Intra-op Monitoring Plan: standard ASA monitors  Intra-op Monitoring Plan Comments:   Post Op Pain Control Plan: multimodal analgesia  Post Op Pain Control Plan Comments:   Induction:   IV  Beta Blocker:         Informed Consent: Patient understands risks and agrees with Anesthesia plan.  Questions answered. Anesthesia consent signed with patient.  ASA Score: 2     Day of Surgery Review of History & Physical:    H&P update referred to the surgeon.     Anesthesia Plan Notes: Labs today        Ready For Surgery From Anesthesia Perspective.

## 2019-02-14 ENCOUNTER — PATIENT MESSAGE (OUTPATIENT)
Dept: SURGERY | Facility: OTHER | Age: 29
End: 2019-02-14

## 2019-02-18 ENCOUNTER — TELEPHONE (OUTPATIENT)
Dept: ENDOCRINOLOGY | Facility: CLINIC | Age: 29
End: 2019-02-18

## 2019-02-18 NOTE — TELEPHONE ENCOUNTER
Called patient and informed her to contact [employer] in order to have Dexcom shipped and once she receives it to call and schedule training. Provided her with phone number. Patient verbalized understanding.

## 2019-02-20 ENCOUNTER — ANESTHESIA (OUTPATIENT)
Dept: SURGERY | Facility: OTHER | Age: 29
End: 2019-02-20
Payer: MEDICAID

## 2019-02-20 ENCOUNTER — HOSPITAL ENCOUNTER (OUTPATIENT)
Facility: OTHER | Age: 29
Discharge: HOME | End: 2019-02-20
Attending: OBSTETRICS & GYNECOLOGY | Admitting: OBSTETRICS & GYNECOLOGY
Payer: MEDICAID

## 2019-02-20 VITALS
BODY MASS INDEX: 35.97 KG/M2 | HEIGHT: 63 IN | DIASTOLIC BLOOD PRESSURE: 64 MMHG | OXYGEN SATURATION: 100 % | SYSTOLIC BLOOD PRESSURE: 120 MMHG | RESPIRATION RATE: 16 BRPM | HEART RATE: 107 BPM | WEIGHT: 203 LBS | TEMPERATURE: 98 F

## 2019-02-20 DIAGNOSIS — N93.8 DUB (DYSFUNCTIONAL UTERINE BLEEDING): ICD-10-CM

## 2019-02-20 DIAGNOSIS — E10.65 POORLY CONTROLLED TYPE 1 DIABETES MELLITUS: Primary | Chronic | ICD-10-CM

## 2019-02-20 LAB
B-HCG UR QL: NEGATIVE
B-HCG UR QL: NEGATIVE
CTP QC/QA: YES
CTP QC/QA: YES
POCT GLUCOSE: 123 MG/DL (ref 70–110)
POCT GLUCOSE: 154 MG/DL (ref 70–110)
POCT GLUCOSE: 160 MG/DL (ref 70–110)

## 2019-02-20 PROCEDURE — 36000706: Performed by: OBSTETRICS & GYNECOLOGY

## 2019-02-20 PROCEDURE — 00952 ANES VAG PX HYSTSC&/HSG: CPT | Performed by: OBSTETRICS & GYNECOLOGY

## 2019-02-20 PROCEDURE — 63600175 PHARM REV CODE 636 W HCPCS: Performed by: ANESTHESIOLOGY

## 2019-02-20 PROCEDURE — 88305 TISSUE SPECIMEN TO PATHOLOGY - SURGERY: ICD-10-PCS | Mod: 26,,, | Performed by: PATHOLOGY

## 2019-02-20 PROCEDURE — 81025 URINE PREGNANCY TEST: CPT | Performed by: OBSTETRICS & GYNECOLOGY

## 2019-02-20 PROCEDURE — 37000009 HC ANESTHESIA EA ADD 15 MINS: Performed by: OBSTETRICS & GYNECOLOGY

## 2019-02-20 PROCEDURE — 37000008 HC ANESTHESIA 1ST 15 MINUTES: Performed by: OBSTETRICS & GYNECOLOGY

## 2019-02-20 PROCEDURE — 63600175 PHARM REV CODE 636 W HCPCS: Performed by: NURSE ANESTHETIST, CERTIFIED REGISTERED

## 2019-02-20 PROCEDURE — 25000003 PHARM REV CODE 250: Performed by: ANESTHESIOLOGY

## 2019-02-20 PROCEDURE — 81025 URINE PREGNANCY TEST: CPT | Performed by: ANESTHESIOLOGY

## 2019-02-20 PROCEDURE — 25000003 PHARM REV CODE 250: Performed by: OBSTETRICS & GYNECOLOGY

## 2019-02-20 PROCEDURE — 58558 HYSTEROSCOPY BIOPSY: CPT | Mod: ,,, | Performed by: OBSTETRICS & GYNECOLOGY

## 2019-02-20 PROCEDURE — 71000015 HC POSTOP RECOV 1ST HR: Performed by: OBSTETRICS & GYNECOLOGY

## 2019-02-20 PROCEDURE — 71000016 HC POSTOP RECOV ADDL HR: Performed by: OBSTETRICS & GYNECOLOGY

## 2019-02-20 PROCEDURE — 82962 GLUCOSE BLOOD TEST: CPT | Performed by: OBSTETRICS & GYNECOLOGY

## 2019-02-20 PROCEDURE — 25000003 PHARM REV CODE 250: Performed by: NURSE ANESTHETIST, CERTIFIED REGISTERED

## 2019-02-20 PROCEDURE — 27201423 OPTIME MED/SURG SUP & DEVICES STERILE SUPPLY: Performed by: OBSTETRICS & GYNECOLOGY

## 2019-02-20 PROCEDURE — 71000033 HC RECOVERY, INTIAL HOUR: Performed by: OBSTETRICS & GYNECOLOGY

## 2019-02-20 PROCEDURE — 58558 PR HYSTEROSCOPY,W/ENDO BX: ICD-10-PCS | Mod: ,,, | Performed by: OBSTETRICS & GYNECOLOGY

## 2019-02-20 PROCEDURE — 88305 TISSUE EXAM BY PATHOLOGIST: CPT | Performed by: PATHOLOGY

## 2019-02-20 PROCEDURE — 36000707: Performed by: OBSTETRICS & GYNECOLOGY

## 2019-02-20 PROCEDURE — C1782 MORCELLATOR: HCPCS | Performed by: OBSTETRICS & GYNECOLOGY

## 2019-02-20 PROCEDURE — 88305 TISSUE EXAM BY PATHOLOGIST: CPT | Mod: 26,,, | Performed by: PATHOLOGY

## 2019-02-20 RX ORDER — AMOXICILLIN 250 MG
1 CAPSULE ORAL 2 TIMES DAILY
Status: DISCONTINUED | OUTPATIENT
Start: 2019-02-20 | End: 2019-02-20 | Stop reason: HOSPADM

## 2019-02-20 RX ORDER — DIPHENHYDRAMINE HCL 25 MG
25 CAPSULE ORAL EVERY 4 HOURS PRN
Status: DISCONTINUED | OUTPATIENT
Start: 2019-02-20 | End: 2019-02-20 | Stop reason: HOSPADM

## 2019-02-20 RX ORDER — ONDANSETRON HYDROCHLORIDE 2 MG/ML
INJECTION, SOLUTION INTRAMUSCULAR; INTRAVENOUS
Status: DISCONTINUED | OUTPATIENT
Start: 2019-02-20 | End: 2019-02-20

## 2019-02-20 RX ORDER — ACETAMINOPHEN 500 MG
1000 TABLET ORAL
Status: COMPLETED | OUTPATIENT
Start: 2019-02-20 | End: 2019-02-20

## 2019-02-20 RX ORDER — LIDOCAINE HCL/PF 100 MG/5ML
SYRINGE (ML) INTRAVENOUS
Status: DISCONTINUED | OUTPATIENT
Start: 2019-02-20 | End: 2019-02-20

## 2019-02-20 RX ORDER — SODIUM CHLORIDE, SODIUM LACTATE, POTASSIUM CHLORIDE, CALCIUM CHLORIDE 600; 310; 30; 20 MG/100ML; MG/100ML; MG/100ML; MG/100ML
INJECTION, SOLUTION INTRAVENOUS CONTINUOUS
Status: DISCONTINUED | OUTPATIENT
Start: 2019-02-20 | End: 2019-02-20 | Stop reason: HOSPADM

## 2019-02-20 RX ORDER — FENTANYL CITRATE 50 UG/ML
25 INJECTION, SOLUTION INTRAMUSCULAR; INTRAVENOUS EVERY 5 MIN PRN
Status: DISCONTINUED | OUTPATIENT
Start: 2019-02-20 | End: 2019-02-20 | Stop reason: HOSPADM

## 2019-02-20 RX ORDER — FENTANYL CITRATE 50 UG/ML
INJECTION, SOLUTION INTRAMUSCULAR; INTRAVENOUS
Status: DISCONTINUED | OUTPATIENT
Start: 2019-02-20 | End: 2019-02-20

## 2019-02-20 RX ORDER — DEXTROSE, SODIUM CHLORIDE, SODIUM LACTATE, POTASSIUM CHLORIDE, AND CALCIUM CHLORIDE 5; .6; .31; .03; .02 G/100ML; G/100ML; G/100ML; G/100ML; G/100ML
INJECTION, SOLUTION INTRAVENOUS CONTINUOUS
Status: DISCONTINUED | OUTPATIENT
Start: 2019-02-20 | End: 2019-02-20 | Stop reason: HOSPADM

## 2019-02-20 RX ORDER — KETOROLAC TROMETHAMINE 30 MG/ML
INJECTION, SOLUTION INTRAMUSCULAR; INTRAVENOUS
Status: DISCONTINUED | OUTPATIENT
Start: 2019-02-20 | End: 2019-02-20

## 2019-02-20 RX ORDER — OXYCODONE HYDROCHLORIDE 5 MG/1
5 TABLET ORAL
Status: DISCONTINUED | OUTPATIENT
Start: 2019-02-20 | End: 2019-02-20 | Stop reason: HOSPADM

## 2019-02-20 RX ORDER — LIDOCAINE HYDROCHLORIDE 10 MG/ML
0.5 INJECTION, SOLUTION EPIDURAL; INFILTRATION; INTRACAUDAL; PERINEURAL ONCE
Status: DISCONTINUED | OUTPATIENT
Start: 2019-02-20 | End: 2019-02-20 | Stop reason: HOSPADM

## 2019-02-20 RX ORDER — FAMOTIDINE 20 MG/1
20 TABLET, FILM COATED ORAL
Status: COMPLETED | OUTPATIENT
Start: 2019-02-20 | End: 2019-02-20

## 2019-02-20 RX ORDER — SODIUM CHLORIDE 0.9 % (FLUSH) 0.9 %
3 SYRINGE (ML) INJECTION
Status: DISCONTINUED | OUTPATIENT
Start: 2019-02-20 | End: 2019-02-20 | Stop reason: HOSPADM

## 2019-02-20 RX ORDER — DEXAMETHASONE SODIUM PHOSPHATE 4 MG/ML
INJECTION, SOLUTION INTRA-ARTICULAR; INTRALESIONAL; INTRAMUSCULAR; INTRAVENOUS; SOFT TISSUE
Status: DISCONTINUED | OUTPATIENT
Start: 2019-02-20 | End: 2019-02-20

## 2019-02-20 RX ORDER — ROCURONIUM BROMIDE 10 MG/ML
INJECTION, SOLUTION INTRAVENOUS
Status: DISCONTINUED | OUTPATIENT
Start: 2019-02-20 | End: 2019-02-20

## 2019-02-20 RX ORDER — OXYCODONE HYDROCHLORIDE 5 MG/1
5 TABLET ORAL EVERY 4 HOURS PRN
Status: DISCONTINUED | OUTPATIENT
Start: 2019-02-20 | End: 2019-02-20 | Stop reason: HOSPADM

## 2019-02-20 RX ORDER — ONDANSETRON 2 MG/ML
4 INJECTION INTRAMUSCULAR; INTRAVENOUS DAILY PRN
Status: DISCONTINUED | OUTPATIENT
Start: 2019-02-20 | End: 2019-02-20 | Stop reason: HOSPADM

## 2019-02-20 RX ORDER — ONDANSETRON 8 MG/1
8 TABLET, ORALLY DISINTEGRATING ORAL EVERY 8 HOURS PRN
Status: DISCONTINUED | OUTPATIENT
Start: 2019-02-20 | End: 2019-02-20 | Stop reason: HOSPADM

## 2019-02-20 RX ORDER — IBUPROFEN 600 MG/1
600 TABLET ORAL EVERY 6 HOURS PRN
Status: DISCONTINUED | OUTPATIENT
Start: 2019-02-20 | End: 2019-02-20 | Stop reason: HOSPADM

## 2019-02-20 RX ORDER — MIDAZOLAM HYDROCHLORIDE 1 MG/ML
INJECTION, SOLUTION INTRAMUSCULAR; INTRAVENOUS
Status: DISCONTINUED | OUTPATIENT
Start: 2019-02-20 | End: 2019-02-20

## 2019-02-20 RX ORDER — GLYCOPYRROLATE 0.2 MG/ML
INJECTION INTRAMUSCULAR; INTRAVENOUS
Status: DISCONTINUED | OUTPATIENT
Start: 2019-02-20 | End: 2019-02-20

## 2019-02-20 RX ORDER — PROPOFOL 10 MG/ML
VIAL (ML) INTRAVENOUS
Status: DISCONTINUED | OUTPATIENT
Start: 2019-02-20 | End: 2019-02-20

## 2019-02-20 RX ORDER — DEXTROSE MONOHYDRATE AND SODIUM CHLORIDE 5; .45 G/100ML; G/100ML
INJECTION, SOLUTION INTRAVENOUS CONTINUOUS
Status: DISCONTINUED | OUTPATIENT
Start: 2019-02-20 | End: 2019-02-20 | Stop reason: HOSPADM

## 2019-02-20 RX ORDER — OXYCODONE HYDROCHLORIDE 5 MG/1
10 TABLET ORAL EVERY 4 HOURS PRN
Status: DISCONTINUED | OUTPATIENT
Start: 2019-02-20 | End: 2019-02-20 | Stop reason: HOSPADM

## 2019-02-20 RX ORDER — PREGABALIN 75 MG/1
150 CAPSULE ORAL ONCE
Status: COMPLETED | OUTPATIENT
Start: 2019-02-20 | End: 2019-02-20

## 2019-02-20 RX ORDER — HYDROMORPHONE HYDROCHLORIDE 2 MG/ML
0.4 INJECTION, SOLUTION INTRAMUSCULAR; INTRAVENOUS; SUBCUTANEOUS EVERY 5 MIN PRN
Status: DISCONTINUED | OUTPATIENT
Start: 2019-02-20 | End: 2019-02-20 | Stop reason: HOSPADM

## 2019-02-20 RX ORDER — MEPERIDINE HYDROCHLORIDE 25 MG/ML
12.5 INJECTION INTRAMUSCULAR; INTRAVENOUS; SUBCUTANEOUS ONCE AS NEEDED
Status: DISCONTINUED | OUTPATIENT
Start: 2019-02-20 | End: 2019-02-20 | Stop reason: HOSPADM

## 2019-02-20 RX ORDER — NEOSTIGMINE METHYLSULFATE 1 MG/ML
INJECTION, SOLUTION INTRAVENOUS
Status: DISCONTINUED | OUTPATIENT
Start: 2019-02-20 | End: 2019-02-20

## 2019-02-20 RX ORDER — IBUPROFEN 600 MG/1
600 TABLET ORAL EVERY 6 HOURS PRN
Qty: 30 TABLET | Refills: 2 | Status: SHIPPED | OUTPATIENT
Start: 2019-02-20 | End: 2019-09-10

## 2019-02-20 RX ORDER — OXYCODONE AND ACETAMINOPHEN 5; 325 MG/1; MG/1
1 TABLET ORAL EVERY 4 HOURS PRN
Qty: 5 TABLET | Refills: 0 | Status: SHIPPED | OUTPATIENT
Start: 2019-02-20 | End: 2019-03-27

## 2019-02-20 RX ADMIN — PREGABALIN 150 MG: 75 CAPSULE ORAL at 11:02

## 2019-02-20 RX ADMIN — HYDROMORPHONE HYDROCHLORIDE 0.4 MG: 2 INJECTION INTRAMUSCULAR; INTRAVENOUS; SUBCUTANEOUS at 03:02

## 2019-02-20 RX ADMIN — ROCURONIUM BROMIDE 30 MG: 10 INJECTION, SOLUTION INTRAVENOUS at 01:02

## 2019-02-20 RX ADMIN — OXYCODONE HYDROCHLORIDE 5 MG: 5 TABLET ORAL at 02:02

## 2019-02-20 RX ADMIN — PROPOFOL 200 MG: 10 INJECTION, EMULSION INTRAVENOUS at 01:02

## 2019-02-20 RX ADMIN — SODIUM CHLORIDE, SODIUM LACTATE, POTASSIUM CHLORIDE, AND CALCIUM CHLORIDE: 600; 310; 30; 20 INJECTION, SOLUTION INTRAVENOUS at 12:02

## 2019-02-20 RX ADMIN — ONDANSETRON 8 MG: 8 TABLET, ORALLY DISINTEGRATING ORAL at 04:02

## 2019-02-20 RX ADMIN — ACETAMINOPHEN 1000 MG: 500 TABLET, FILM COATED ORAL at 11:02

## 2019-02-20 RX ADMIN — FAMOTIDINE 20 MG: 20 TABLET, FILM COATED ORAL at 11:02

## 2019-02-20 RX ADMIN — DEXAMETHASONE SODIUM PHOSPHATE 8 MG: 4 INJECTION, SOLUTION INTRAMUSCULAR; INTRAVENOUS at 02:02

## 2019-02-20 RX ADMIN — HYDROMORPHONE HYDROCHLORIDE 0.4 MG: 2 INJECTION INTRAMUSCULAR; INTRAVENOUS; SUBCUTANEOUS at 02:02

## 2019-02-20 RX ADMIN — KETOROLAC TROMETHAMINE 30 MG: 30 INJECTION, SOLUTION INTRAMUSCULAR; INTRAVENOUS at 02:02

## 2019-02-20 RX ADMIN — FENTANYL CITRATE 100 MCG: 50 INJECTION, SOLUTION INTRAMUSCULAR; INTRAVENOUS at 01:02

## 2019-02-20 RX ADMIN — NEOSTIGMINE METHYLSULFATE 3.5 MG: 1 INJECTION INTRAVENOUS at 02:02

## 2019-02-20 RX ADMIN — PROMETHAZINE HYDROCHLORIDE 6.25 MG: 25 INJECTION INTRAMUSCULAR; INTRAVENOUS at 04:02

## 2019-02-20 RX ADMIN — DOXYCYCLINE 100 MG: 100 INJECTION, POWDER, LYOPHILIZED, FOR SOLUTION INTRAVENOUS at 01:02

## 2019-02-20 RX ADMIN — LIDOCAINE HYDROCHLORIDE 50 MG: 20 INJECTION, SOLUTION INTRAVENOUS at 01:02

## 2019-02-20 RX ADMIN — MIDAZOLAM 2 MG: 1 INJECTION INTRAMUSCULAR; INTRAVENOUS at 12:02

## 2019-02-20 RX ADMIN — GLYCOPYRROLATE 0.6 MG: 0.2 INJECTION, SOLUTION INTRAMUSCULAR; INTRAVENOUS at 02:02

## 2019-02-20 RX ADMIN — ONDANSETRON 4 MG: 2 INJECTION, SOLUTION INTRAMUSCULAR; INTRAVENOUS at 02:02

## 2019-02-20 NOTE — OR NURSING
1605 Pt stated felt like blood sugar was low.  Accu check done and blood sugar 154.  Pt states feels nauseated.  Attempted to flush IV and IV will not flush or draw back. Medicated per orders.

## 2019-02-20 NOTE — ANESTHESIA POSTPROCEDURE EVALUATION
"Anesthesia Post Evaluation    Patient: Anusha Andrew    Procedure(s) Performed: Procedure(s) (LRB):  HYSTEROSCOPY (N/A)  DILATION AND CURETTAGE, UTERUS    Final Anesthesia Type: general  Patient location during evaluation: PACU  Patient participation: Yes- Able to Participate  Level of consciousness: awake and alert  Post-procedure vital signs: reviewed and stable  Pain management: adequate  Airway patency: patent  PONV status at discharge: No PONV  Anesthetic complications: no      Cardiovascular status: hemodynamically stable  Respiratory status: unassisted  Hydration status: euvolemic  Follow-up not needed.        Visit Vitals  /76 (BP Location: Right arm, Patient Position: Lying)   Pulse 87   Temp 36.6 °C (97.8 °F) (Oral)   Resp 16   Ht 5' 3" (1.6 m)   Wt 92.1 kg (202 lb 16 oz)   SpO2 100%   Breastfeeding? No   BMI 35.96 kg/m²       Pain/Sylvain Score: Pain Rating Prior to Med Admin: 7 (2/20/2019  3:01 PM)  Pain Rating Post Med Admin: 7 (2/20/2019  3:01 PM)  Sylvain Score: 9 (2/20/2019  3:02 PM)        "

## 2019-02-20 NOTE — OP NOTE
[facility]  Operative Note    SUMMARY     Date of Procedure: 2/20/2019     Procedure: Procedure(s) (LRB):  HYSTEROSCOPY (N/A)  DILATION AND CURETTAGE, UTERUS       Surgeon(s) and Role:     * Purnima Kidd MD - Primary    A qualified resident was not available to assist    Pre-Operative Diagnosis: Endometrial polyp [N84.0]  Irregular bleeding [N92.6]    Post-Operative Diagnosis: Post-Op Diagnosis Codes:     * Endometrial polyp [N84.0]     * Irregular bleeding [N92.6]    Anesthesia: General    Technical Procedures Used: Hysteroscopy D&C, Truclear excision of Fibroid    Description of the Findings of the Procedure: The patient was taken to the operating room. Time out was performed. She was then prepped and draped in the normal sterile fashion in the Trevor stirru in the dorsal lithotomy position. A straight catheter was used to drain her bladder. The Radha dilators were then used to dilate her cervix to a number 6. The hysteroscope was introduced. The findings within the uterus were normal endometrium with normal tubal ostia, small less than 1 cm fibroid on anterior wall of uterus. The Truclear device was then introduced in the usual fashion and activated in the usual fashion. The tissue was removed without difficulty. Sharp curettage was also performed in the usual fashion without difficulty. The patient had no active bleeding at the end of the procedure. The single tooth tenaculum was removed. AgNO3 placed on tenaculum site. The speculum was removed from the vagina. No vaginal lacerations were noted. The patient tolerated the procedure well. Sponge, lap and needle counts were correct x 2.     Complications: No    Estimated Blood Loss (EBL): * No values recorded between 2/20/2019  1:57 PM and 2/20/2019  2:12 PM * minimal    Specimens: Endometrial tissue           Condition: Good    Disposition: PACU - hemodynamically stable.    Attestation: I performed the procedure. A qualified resident  is not available.

## 2019-02-20 NOTE — DISCHARGE SUMMARY
[facility]  Discharge Summary  Gynecology      Admit Date: 2/20/2019    Discharge Date and Time: 2/20/2019    Attending Physician: Purnima Kidd MD     Discharge Provider: Purnima Kidd    Reason for Admission: Hysteroscopy D&C    Procedures Performed: Procedure(s) (LRB):  HYSTEROSCOPY (N/A)  DILATION AND CURETTAGE, UTERUS    Hospital Course (synopsis of major diagnoses, care, treatment, and services provided during the course of the hospital stay): Patient was admitted for surgery. Following surgery she was transferred to the floor and underwent routine postoperative care. She tolerated po, ambulated without difficulty, and pain was well controlled. She remained afebrile throughout hospital course and her labs were stable. She was discharged to home in stable condition.      Consults: none    Significant Diagnostic Studies: Labs: CBC   Lab Results   Component Value Date    WBC 8.58 02/13/2019    HGB 13.2 02/13/2019    HCT 41.7 02/13/2019    MCV 81 (L) 02/13/2019     (H) 02/13/2019       Final Diagnoses:   Principal Problem: DUB (dysfunctional uterine bleeding)   Secondary Diagnoses:   Active Hospital Problems    Diagnosis  POA    *DUB (dysfunctional uterine bleeding) [N93.8]  Yes      Resolved Hospital Problems   No resolved problems to display.       Discharged Condition: stable    Disposition: Home    Follow Up/Patient Instructions: 2 weeks    Medications:  Reconciled Home Medications:   Current Discharge Medication List      START taking these medications    Details   ibuprofen (ADVIL,MOTRIN) 600 MG tablet Take 1 tablet (600 mg total) by mouth every 6 (six) hours as needed for Pain.  Qty: 30 tablet, Refills: 2      oxyCODONE-acetaminophen (PERCOCET) 5-325 mg per tablet Take 1 tablet by mouth every 4 (four) hours as needed for Pain.  Qty: 5 tablet, Refills: 0         CONTINUE these medications which have NOT CHANGED    Details   insulin glargine (BASAGLAR KWIKPEN U-100 INSULIN) 100  "unit/mL (3 mL) InPn pen Inject 50 Units into the skin every evening.  Qty: 15 mL, Refills: 3      blood sugar diagnostic Strp 1 each by Misc.(Non-Drug; Combo Route) route 6 (six) times daily.  Qty: 200 each, Refills: 11    Associated Diagnoses: Type I diabetes mellitus, uncontrolled      buPROPion (WELLBUTRIN XL) 300 MG 24 hr tablet Take 300 mg by mouth once daily.      diabetic supplies, miscellan. Kit Please change sensor every 14 days. FreeStyle Cinthia Sensor 30-day supply (2 sensors/month)  Qty: 2 kit, Refills: 5      insulin lispro (ADMELOG U-100 INSULIN LISPRO) 100 unit/mL injection Inject 13 Units into the skin 3 (three) times daily before meals.  Qty: 20 mL, Refills: 3      insulin syringe-needle U-100 0.5 mL 31 gauge x 5/16 Syrg use with admelog  Qty: 100 each, Refills: 0      medroxyPROGESTERone (DEPO-PROVERA) 150 mg/mL Syrg Inject 1 mL (150 mg total) into the muscle every 3 (three) months.  Qty: 1 mL, Refills: 3    Associated Diagnoses: Encounter for Depo-Provera contraception      ONETOUCH DELICA LANCETS 33 gauge Misc 1 lancet 4 (four) times daily.       ONETOUCH ULTRA2 kit       pen needle, diabetic 31 gauge x 1/4" Ndle use with basaglar  Qty: 100 each, Refills: 0    Associated Diagnoses: Type I (juvenile type) diabetes mellitus without mention of complication, uncontrolled      pen needle, diabetic 31 gauge x 5/16" Ndle use with basaglar  Qty: 100 each, Refills: 0      terconazole (TERAZOL 7) 0.4 % Crea Place 1 applicator vaginally every evening.  Qty: 1 Tube, Refills: 2    Associated Diagnoses: Acute vaginitis           Discharge Procedure Orders   Diet general     Call MD for:  temperature >100.4     Call MD for:  persistent nausea and vomiting     Call MD for:  severe uncontrolled pain     Call MD for:  difficulty breathing, headache or visual disturbances     Call MD for:  redness, tenderness, or signs of infection (pain, swelling, redness, odor or green/yellow discharge around incision site) "     Call MD for:  hives     Call MD for:  persistent dizziness or light-headedness     Call MD for:  extreme fatigue     Call MD for:     Remove dressing in 24 hours

## 2019-02-20 NOTE — TRANSFER OF CARE
"Anesthesia Transfer of Care Note    Patient: Anusha Andrew    Procedure(s) Performed: Procedure(s) (LRB):  HYSTEROSCOPY (N/A)  DILATION AND CURETTAGE, UTERUS    Patient location: PACU    Anesthesia Type: general    Transport from OR: Transported from OR on 2-3 L/min O2 by NC with adequate spontaneous ventilation    Post pain: adequate analgesia    Post assessment: no apparent anesthetic complications    Post vital signs: stable    Level of consciousness: awake, alert and oriented    Nausea/Vomiting: no nausea/vomiting    Complications: none          Last vitals:   Visit Vitals  /76 (BP Location: Right arm, Patient Position: Lying)   Pulse 87   Temp 36.6 °C (97.8 °F) (Oral)   Resp 16   Ht 5' 3" (1.6 m)   Wt 92.1 kg (202 lb 16 oz)   SpO2 100%   Breastfeeding? No   BMI 35.96 kg/m²     "

## 2019-02-20 NOTE — DISCHARGE INSTRUCTIONS
Hysteroscopy  Hysteroscopy is a procedure that is done to see inside your uterus. It can help find the cause of problems in the uterus. This helps your health care provider decide on the best treatment. In some cases, it can be used to perform treatment. Hysteroscopy may be done in your health care provider's office or in the hospital.      What happens after hysteroscopy?  · You may have cramps and bleeding for 24 hours after the procedure. This is normal. Use pads instead of tampons.  · Do not douche or use tampons until your health care provider says its OK.  · Do not use any vaginal medicines until you are told its OK.  · Ask your health care provider when its OK to have sex again.    When should I call my health care provider?  Call your health care provider if you have:  · Heavy bleeding (more than 1 pad an hour for 2 or more hours)  · A fever over 100.4°F (38.0°C)  · Increasing abdominal pain or tenderness  · Foul-smelling discharge     Follow-up care  Schedule a follow-up visit with your health care provider. Based on the results of your test, you may need more treatment. Be sure to follow instructions and keep your appointments.        Discharge Instructions: After Your Surgery  Youve just had surgery. During surgery, you were given medicine called anesthesia to keep you relaxed and free of pain. After surgery, you may have some pain or nausea. This is common. Here are some tips for feeling better and getting well after surgery.     Stay on schedule with your medicine.     Going home  Your healthcare provider will show you how to take care of yourself when you go home. He or she will also answer your questions. Have an adult family member or friend drive you home. For the first 24 hours after your surgery:    · Do not drive or use heavy equipment.  · Do not make important decisions or sign legal papers.  · Do not drink alcohol.  · Have someone stay with you, if needed. He or she can watch for problems  and help keep you safe.    Be sure to go to all follow-up visits with your healthcare provider. And rest after your surgery for as long as your healthcare provider tells you to.    Coping with pain  If you have pain after surgery, pain medicine will help you feel better. Take it as told, before pain becomes severe. Also, ask your healthcare provider or pharmacist about other ways to control pain. This might be with heat, ice, or relaxation. And follow any other instructions your surgeon or nurse gives you.    Tips for taking pain medicine  To get the best relief possible, remember these points:    · Pain medicines can upset your stomach. Taking them with a little food may help.  · Most pain relievers taken by mouth need at least 20 to 30 minutes to start to work.  · Taking medicine on a schedule can help you remember to take it. Try to time your medicine so that you can take it before starting an activity. This might be before you get dressed, go for a walk, or sit down for dinner.  · Constipation is a common side effect of pain medicines. Call your healthcare provider before taking any medicines such as laxatives or stool softeners to help ease constipation. Also ask if you should skip any foods. Drinking lots of fluids and eating foods such as fruits and vegetables that are high in fiber can also help. Remember, do not take laxatives unless your surgeon has prescribed them.  · Drinking alcohol and taking pain medicine can cause dizziness and slow your breathing. It can even be deadly. Do not drink alcohol while taking pain medicine.  · Pain medicine can make you react more slowly to things. Do not drive or run machinery while taking pain medicine.    Your healthcare provider may tell you to take acetaminophen to help ease your pain. Ask him or her how much you are supposed to take each day. Acetaminophen or other pain relievers may interact with your prescription medicines or other over-the-counter (OTC)  medicines. Some prescription medicines have acetaminophen and other ingredients. Using both prescription and OTC acetaminophen for pain can cause you to overdose. Read the labels on your OTC medicines with care. This will help you to clearly know the list of ingredients, how much to take, and any warnings. It may also help you not take too much acetaminophen. If you have questions or do not understand the information, ask your pharmacist or healthcare provider to explain it to you before you take the OTC medicine.    Managing nausea  Some people have an upset stomach after surgery. This is often because of anesthesia, pain, or pain medicine, or the stress of surgery. These tips will help you handle nausea and eat healthy foods as you get better. If you were on a special food plan before surgery, ask your healthcare provider if you should follow it while you get better. These tips may help:    · Do not push yourself to eat. Your body will tell you when to eat and how much.  · Start off with clear liquids and soup. They are easier to digest.  · Next try semi-solid foods, such as mashed potatoes, applesauce, and gelatin, as you feel ready.  · Slowly move to solid foods. Dont eat fatty, rich, or spicy foods at first.  · Do not force yourself to have 3 large meals a day. Instead eat smaller amounts more often.  · Take pain medicines with a small amount of solid food, such as crackers or toast, to avoid nausea.     Call your surgeon if  · You still have pain an hour after taking medicine. The medicine may not be strong enough.  · You feel too sleepy, dizzy, or groggy. The medicine may be too strong.  · You have side effects like nausea, vomiting, or skin changes, such as rash, itching, or hives.       If you have obstructive sleep apnea  You were given anesthesia medicine during surgery to keep you comfortable and free of pain. After surgery, you may have more apnea spells because of this medicine and other medicines  you were given. The spells may last longer than usual.   At home:    · Keep using the continuous positive airway pressure (CPAP) device when you sleep. Unless your healthcare provider tells you not to, use it when you sleep, day or night. CPAP is a common device used to treat obstructive sleep apnea.  · Talk with your provider before taking any pain medicine, muscle relaxants, or sedatives. Your provider will tell you about the possible dangers of taking these medicines.    Date Last Reviewed: 12/1/2016 © 2000-2017 [employer]. [address] [place] [id number]. All rights reserved. This information is not intended as a substitute for professional medical care. Always follow your healthcare professional's instructions.    PLEASE FOLLOW ANY OTHER INSTRUCTIONS PROVIDED TO YOU BY DR. MARCUS!

## 2019-02-20 NOTE — OR NURSING
After working with IV, able to get it patent without any s/s of infiltration.  Phenergan given per orders for continuous nausea.

## 2019-02-21 NOTE — PLAN OF CARE
Anusha Andrew has met all discharge criteria from Phase II. Vital Signs are stable, ambulating  without difficulty. Nausea has now subsided. Discharge instructions given, patient verbalized understanding. Discharged from facility via wheelchair in stable condition.

## 2019-02-26 ENCOUNTER — PATIENT MESSAGE (OUTPATIENT)
Dept: OBSTETRICS AND GYNECOLOGY | Facility: CLINIC | Age: 29
End: 2019-02-26

## 2019-03-22 ENCOUNTER — PATIENT MESSAGE (OUTPATIENT)
Dept: OBSTETRICS AND GYNECOLOGY | Facility: CLINIC | Age: 29
End: 2019-03-22

## 2019-03-27 ENCOUNTER — DOCUMENTATION ONLY (OUTPATIENT)
Dept: PHARMACY | Facility: CLINIC | Age: 29
End: 2019-03-27

## 2019-03-27 ENCOUNTER — OFFICE VISIT (OUTPATIENT)
Dept: OBSTETRICS AND GYNECOLOGY | Facility: CLINIC | Age: 29
End: 2019-03-27
Attending: OBSTETRICS & GYNECOLOGY
Payer: MEDICAID

## 2019-03-27 VITALS
HEIGHT: 63 IN | SYSTOLIC BLOOD PRESSURE: 122 MMHG | BODY MASS INDEX: 35.53 KG/M2 | DIASTOLIC BLOOD PRESSURE: 84 MMHG | WEIGHT: 200.5 LBS

## 2019-03-27 DIAGNOSIS — N93.8 DUB (DYSFUNCTIONAL UTERINE BLEEDING): Primary | ICD-10-CM

## 2019-03-27 PROCEDURE — 99213 OFFICE O/P EST LOW 20 MIN: CPT | Mod: PBBFAC | Performed by: OBSTETRICS & GYNECOLOGY

## 2019-03-27 PROCEDURE — 99999 PR PBB SHADOW E&M-EST. PATIENT-LVL III: CPT | Mod: PBBFAC,,, | Performed by: OBSTETRICS & GYNECOLOGY

## 2019-03-27 PROCEDURE — 99024 PR POST-OP FOLLOW-UP VISIT: ICD-10-PCS | Mod: ,,, | Performed by: OBSTETRICS & GYNECOLOGY

## 2019-03-27 PROCEDURE — 99024 POSTOP FOLLOW-UP VISIT: CPT | Mod: ,,, | Performed by: OBSTETRICS & GYNECOLOGY

## 2019-03-27 PROCEDURE — 99999 PR PBB SHADOW E&M-EST. PATIENT-LVL III: ICD-10-PCS | Mod: PBBFAC,,, | Performed by: OBSTETRICS & GYNECOLOGY

## 2019-03-27 RX ORDER — SERTRALINE HYDROCHLORIDE 50 MG/1
50 TABLET, FILM COATED ORAL DAILY
Qty: 30 TABLET | Refills: 11 | Status: SHIPPED | OUTPATIENT
Start: 2019-03-27 | End: 2020-07-09

## 2019-03-27 NOTE — PROGRESS NOTES
"Subjective:       Anusha Andrew is a 28 y.o. female who presents to the clinic 5 weeks status post diagnostic hysteroscopy for abnormal uterine bleeding. Eating a regular diet without difficulty. Bowel movements are normal. The patient is not having any pain.    Called recently and wanted to increase the Wellbutrin. So we increased from 150 mg to 300 mg. She says it is not helping very much. Feels sad and depressed. No SI, no HI. I rec we try a different medicine. Pt wants to continue wellbutrin and add another. No anxiety. Will try Zoloft. Explained in detail. Pt agrees. Will let me know if symptoms worsen.      Objective:      /84   Ht 5' 3" (1.6 m)   Wt 90.9 kg (200 lb 8.1 oz)   BMI 35.52 kg/m²   General:  alert and cooperative               Assessment:      Doing well postoperatively.  Operative findings again reviewed. Pathology report discussed.    Depression    Plan:      1. Continue any current medications.  Will add Zoloft 50 mg. Rec call or message in 2 weeks to update me on her progress. Pt agrees.   "

## 2019-03-27 NOTE — PROGRESS NOTES
Previous injection done at pharmacy 01/02/2019 on left butttocks. Lot [id number] Exp 07/20   Next injection 03/20-04/03/2019    Patient received IM Depo Provera to the upper outer side of right buttock on 03/27/2019     The patient was instructed to get the next injection on 06/12-06/26/2019     Lot Number: [id number]  Expiration Date: 12/20  BY CHING WILLOUGHBY

## 2019-04-07 RX ORDER — INSULIN GLARGINE 100 [IU]/ML
INJECTION, SOLUTION SUBCUTANEOUS
Qty: 45 ML | Refills: 2 | Status: SHIPPED | OUTPATIENT
Start: 2019-04-07 | End: 2019-05-29 | Stop reason: CLARIF

## 2019-04-09 ENCOUNTER — PATIENT MESSAGE (OUTPATIENT)
Dept: OBSTETRICS AND GYNECOLOGY | Facility: CLINIC | Age: 29
End: 2019-04-09

## 2019-04-09 DIAGNOSIS — Z30.430 ENCOUNTER FOR INSERTION OF MIRENA IUD: Primary | ICD-10-CM

## 2019-04-15 ENCOUNTER — TELEPHONE (OUTPATIENT)
Dept: OBSTETRICS AND GYNECOLOGY | Facility: CLINIC | Age: 29
End: 2019-04-15

## 2019-04-17 ENCOUNTER — TELEPHONE (OUTPATIENT)
Dept: ENDOCRINOLOGY | Facility: CLINIC | Age: 29
End: 2019-04-17

## 2019-04-17 NOTE — TELEPHONE ENCOUNTER
Dexcom advised patient's chart notes need to be updated to reflect her Hypoglycemia episodes of 2-3 below 50 monthly.  And Chart notes will need to be sent to [facility] so she can get her Dexcom

## 2019-05-02 ENCOUNTER — PATIENT MESSAGE (OUTPATIENT)
Dept: OBSTETRICS AND GYNECOLOGY | Facility: CLINIC | Age: 29
End: 2019-05-02

## 2019-05-03 ENCOUNTER — TELEPHONE (OUTPATIENT)
Dept: ENDOCRINOLOGY | Facility: CLINIC | Age: 29
End: 2019-05-03

## 2019-05-03 RX ORDER — INSULIN LISPRO 100 [IU]/ML
13 INJECTION, SOLUTION INTRAVENOUS; SUBCUTANEOUS
Qty: 35.1 ML | Refills: 0 | Status: SHIPPED | OUTPATIENT
Start: 2019-05-03 | End: 2019-08-01

## 2019-05-03 NOTE — TELEPHONE ENCOUNTER
----- Message from Tricia Putnam sent at 5/3/2019  9:22 AM CDT -----  Contact: Mariia/ Jenelle/  [phone]  Type: Patient Call Back    Who called:  Mariia Rapp    What is the request in detail:  Patient medication   insulin lispro (ADMELOG U-100 INSULIN LISPRO) 100 unit/mL injection is no longer covered on insurance.  Can something else be called in for patient.  Thank you.    Best call back number:  [phone]

## 2019-05-15 ENCOUNTER — LAB VISIT (OUTPATIENT)
Dept: LAB | Facility: OTHER | Age: 29
End: 2019-05-15
Payer: MEDICAID

## 2019-05-15 ENCOUNTER — OFFICE VISIT (OUTPATIENT)
Dept: OBSTETRICS AND GYNECOLOGY | Facility: CLINIC | Age: 29
End: 2019-05-15
Attending: OBSTETRICS & GYNECOLOGY
Payer: MEDICAID

## 2019-05-15 VITALS
WEIGHT: 202.94 LBS | SYSTOLIC BLOOD PRESSURE: 114 MMHG | HEIGHT: 63 IN | DIASTOLIC BLOOD PRESSURE: 80 MMHG | BODY MASS INDEX: 35.96 KG/M2

## 2019-05-15 DIAGNOSIS — Z01.419 ENCOUNTER FOR GYNECOLOGICAL EXAMINATION (GENERAL) (ROUTINE) WITHOUT ABNORMAL FINDINGS: ICD-10-CM

## 2019-05-15 DIAGNOSIS — Z11.3 SCREENING EXAMINATION FOR STD (SEXUALLY TRANSMITTED DISEASE): ICD-10-CM

## 2019-05-15 DIAGNOSIS — Z30.42 ENCOUNTER FOR DEPO-PROVERA CONTRACEPTION: ICD-10-CM

## 2019-05-15 DIAGNOSIS — Z12.4 ENCOUNTER FOR PAPANICOLAOU SMEAR FOR CERVICAL CANCER SCREENING: Primary | ICD-10-CM

## 2019-05-15 PROCEDURE — 86592 SYPHILIS TEST NON-TREP QUAL: CPT

## 2019-05-15 PROCEDURE — 86703 HIV-1/HIV-2 1 RESULT ANTBDY: CPT

## 2019-05-15 PROCEDURE — 87340 HEPATITIS B SURFACE AG IA: CPT

## 2019-05-15 PROCEDURE — 99999 PR PBB SHADOW E&M-EST. PATIENT-LVL III: CPT | Mod: PBBFAC,,, | Performed by: OBSTETRICS & GYNECOLOGY

## 2019-05-15 PROCEDURE — 87491 CHLMYD TRACH DNA AMP PROBE: CPT

## 2019-05-15 PROCEDURE — 88175 CYTOPATH C/V AUTO FLUID REDO: CPT

## 2019-05-15 PROCEDURE — 36415 COLL VENOUS BLD VENIPUNCTURE: CPT

## 2019-05-15 PROCEDURE — 99213 OFFICE O/P EST LOW 20 MIN: CPT | Mod: PBBFAC | Performed by: OBSTETRICS & GYNECOLOGY

## 2019-05-15 PROCEDURE — 99395 PR PREVENTIVE VISIT,EST,18-39: ICD-10-PCS | Mod: 25,S$PBB,, | Performed by: OBSTETRICS & GYNECOLOGY

## 2019-05-15 PROCEDURE — 99395 PREV VISIT EST AGE 18-39: CPT | Mod: 25,S$PBB,, | Performed by: OBSTETRICS & GYNECOLOGY

## 2019-05-15 PROCEDURE — 99999 PR PBB SHADOW E&M-EST. PATIENT-LVL III: ICD-10-PCS | Mod: PBBFAC,,, | Performed by: OBSTETRICS & GYNECOLOGY

## 2019-05-15 RX ORDER — MEDROXYPROGESTERONE ACETATE 150 MG/ML
150 INJECTION, SUSPENSION INTRAMUSCULAR
Qty: 1 ML | Refills: 3 | Status: SHIPPED | OUTPATIENT
Start: 2019-05-15 | End: 2019-06-17 | Stop reason: SDUPTHER

## 2019-05-15 NOTE — PROGRESS NOTES
Subjective:       Patient ID: Anusha Andrew is a 28 y.o. female.    Chief Complaint:  Annual Exam (last pap 2018 normal )      Patient Active Problem List   Diagnosis    Obesity (BMI 30.0-34.9)    Poorly controlled type 1 diabetes mellitus    Depression    DUB (dysfunctional uterine bleeding)       History of Present Illness  28 y.o. yo  here for annual exam. No gyn complaints. Doing well. Depression meds with Wellbutrin and Zoloft are doing well, feeling better. Some weight gain, thinks may be from Depo. BTB went away. Still considering Mirena because of weight. Wants STD check      Past Medical History:   Diagnosis Date    Depression     Hyperlipidemia     Hypertension     Type I (juvenile type) diabetes mellitus without mention of complication, uncontrolled 2011       Past Surgical History:   Procedure Laterality Date    ABCESS DRAINAGE      boil went over GA     SECTION  2012    DELIVERY- SECTION N/A 3/16/2017    Performed by [facility]idd [facility] at [facility] L[facility]D    DILATION AND CURETTAGE, UTERUS  2019    Per[facility] at [facility] OR    HYSTEROSCOPY N/A 2019    Performed [facility] at [facility] OR    INCISION AND DRAINAGE (I & D) Left 2013    Performed by [facility] Goldberg[facility] at [facility] OR 2ND FLR    TONSILLECTOMY, ADENOIDECTOMY      WISDOM TOOTH EXTRACTION         OB History    Para Term  AB Living   2 2 1 1   2   SAB TAB Ectopic Multiple Live Births           2      # Outcome Date GA Lbr Hawk/2nd Weight Sex Delivery Anes PTL Lv   2  17 34w2d  3.16 kg (6 lb 15.5 oz) M CS-LTranv Spinal, EPI Y KRYSTIAN      Complications: Diabetes   1 Term 12 37w5d  3.286 kg (7 lb 3.9 oz) F CS-LTranv EPI  KRYSTIAN      Complications: Preeclampsia      Obstetric Comments   Gestational HTN, failed induction, not past 6 cm   Menarche ~12       No LMP recorded. (Menstrual status: Birth Control).   Date of Last Pap: 5/15/2018    Review of  Systems  Review of Systems   Constitutional: Negative for fatigue and unexpected weight change.   Respiratory: Negative for shortness of breath.    Cardiovascular: Negative for chest pain.   Gastrointestinal: Negative for abdominal pain, constipation, diarrhea, nausea and vomiting.   Genitourinary: Negative for dysuria.   Musculoskeletal: Negative for back pain.   Skin: Negative for rash.   Neurological: Negative for headaches.   Hematological: Does not bruise/bleed easily.   Psychiatric/Behavioral: Negative for behavioral problems.        Objective:   Physical Exam:   Constitutional: She is oriented to person, place, and time. Vital signs are normal. She appears well-developed and well-nourished. No distress.        Pulmonary/Chest: She exhibits no mass. Right breast exhibits no mass, no nipple discharge, no skin change, no tenderness, no bleeding and no swelling. Left breast exhibits no mass, no nipple discharge, no skin change, no tenderness, no bleeding and no swelling. Breasts are symmetrical.        Abdominal: Soft. Normal appearance and bowel sounds are normal. She exhibits no distension and no mass. There is no tenderness. There is no rebound.     Genitourinary: Vagina normal and uterus normal. There is no rash, tenderness, lesion or injury on the right labia. There is no rash, tenderness, lesion or injury on the left labia. Uterus is not deviated, not enlarged, not fixed, not tender, not hosting fibroids and not experiencing uterine prolapse. Cervix is normal. Right adnexum displays no mass, no tenderness and no fullness. Left adnexum displays no mass, no tenderness and no fullness. No erythema, tenderness, rectocele, cystocele or unspecified prolapse of vaginal walls in the vagina. No vaginal discharge found. Cervix exhibits no motion tenderness, no discharge and no friability.           Musculoskeletal: Normal range of motion and moves all extremeties.      Lymphadenopathy:     She has no axillary  adenopathy.        Right: No supraclavicular adenopathy present.        Left: No supraclavicular adenopathy present.    Neurological: She is alert and oriented to person, place, and time.    Skin: Skin is warm and dry.    Psychiatric: She has a normal mood and affect. Her behavior is normal. Judgment normal.        Assessment/ Plan:     1. Encounter for Papanicolaou smear for cervical cancer screening  Liquid-based pap smear, screening   2. Screening examination for STD (sexually transmitted disease)  C. trachomatis/N. gonorrhoeae by AMP DNA    RPR    HIV 1/2 Ag/Ab (4th Gen)    Hepatitis B surface antigen   3. Encounter for gynecological examination (general) (routine) without abnormal findings         Follow-up with me in 1 year

## 2019-05-16 LAB
C TRACH DNA SPEC QL NAA+PROBE: NOT DETECTED
HBV SURFACE AG SERPL QL IA: NEGATIVE
HIV 1+2 AB+HIV1 P24 AG SERPL QL IA: NEGATIVE
N GONORRHOEA DNA SPEC QL NAA+PROBE: NOT DETECTED

## 2019-05-17 LAB — RPR SER QL: NORMAL

## 2019-05-27 ENCOUNTER — TELEPHONE (OUTPATIENT)
Dept: FAMILY MEDICINE | Facility: CLINIC | Age: 29
End: 2019-05-27

## 2019-05-28 ENCOUNTER — TELEPHONE (OUTPATIENT)
Dept: ENDOCRINOLOGY | Facility: CLINIC | Age: 29
End: 2019-05-28

## 2019-05-28 ENCOUNTER — PATIENT MESSAGE (OUTPATIENT)
Dept: FAMILY MEDICINE | Facility: CLINIC | Age: 29
End: 2019-05-28

## 2019-05-29 ENCOUNTER — TELEPHONE (OUTPATIENT)
Dept: ENDOCRINOLOGY | Facility: CLINIC | Age: 29
End: 2019-05-29

## 2019-05-29 RX ORDER — INSULIN GLARGINE 100 [IU]/ML
50 INJECTION, SOLUTION SUBCUTANEOUS DAILY
Qty: 2 BOX | Refills: 2 | Status: SHIPPED | OUTPATIENT
Start: 2019-05-29 | End: 2019-09-10

## 2019-06-15 ENCOUNTER — PATIENT MESSAGE (OUTPATIENT)
Dept: OBSTETRICS AND GYNECOLOGY | Facility: CLINIC | Age: 29
End: 2019-06-15

## 2019-06-15 DIAGNOSIS — Z30.42 ENCOUNTER FOR DEPO-PROVERA CONTRACEPTION: ICD-10-CM

## 2019-06-17 RX ORDER — MEDROXYPROGESTERONE ACETATE 150 MG/ML
150 INJECTION, SUSPENSION INTRAMUSCULAR
Qty: 1 ML | Refills: 3 | Status: SHIPPED | OUTPATIENT
Start: 2019-06-17 | End: 2019-06-18 | Stop reason: SDUPTHER

## 2019-06-18 ENCOUNTER — PATIENT MESSAGE (OUTPATIENT)
Dept: OBSTETRICS AND GYNECOLOGY | Facility: CLINIC | Age: 29
End: 2019-06-18

## 2019-06-18 RX ORDER — MEDROXYPROGESTERONE ACETATE 150 MG/ML
150 INJECTION, SUSPENSION INTRAMUSCULAR
Qty: 1 ML | Refills: 3 | Status: SHIPPED | OUTPATIENT
Start: 2019-06-18 | End: 2019-09-10 | Stop reason: SDUPTHER

## 2019-06-22 ENCOUNTER — HOSPITAL ENCOUNTER (EMERGENCY)
Facility: HOSPITAL | Age: 29
Discharge: HOME | End: 2019-06-22
Attending: EMERGENCY MEDICINE
Payer: MEDICAID

## 2019-06-22 VITALS
HEIGHT: 63 IN | WEIGHT: 189 LBS | SYSTOLIC BLOOD PRESSURE: 137 MMHG | RESPIRATION RATE: 18 BRPM | BODY MASS INDEX: 33.49 KG/M2 | HEART RATE: 110 BPM | TEMPERATURE: 98 F | OXYGEN SATURATION: 99 % | DIASTOLIC BLOOD PRESSURE: 74 MMHG

## 2019-06-22 DIAGNOSIS — R51.9 NONINTRACTABLE HEADACHE, UNSPECIFIED CHRONICITY PATTERN, UNSPECIFIED HEADACHE TYPE: Primary | ICD-10-CM

## 2019-06-22 DIAGNOSIS — R73.9 HYPERGLYCEMIA: ICD-10-CM

## 2019-06-22 LAB
ALBUMIN SERPL BCP-MCNC: 3.2 G/DL (ref 3.5–5.2)
ALP SERPL-CCNC: 82 U/L (ref 55–135)
ALT SERPL W/O P-5'-P-CCNC: 10 U/L (ref 10–44)
ANION GAP SERPL CALC-SCNC: 6 MMOL/L (ref 8–16)
AST SERPL-CCNC: 11 U/L (ref 10–40)
B-HCG UR QL: NEGATIVE
B-OH-BUTYR BLD STRIP-SCNC: 0.6 MMOL/L (ref 0–0.5)
BACTERIA #/AREA URNS HPF: NORMAL /HPF
BASOPHILS # BLD AUTO: 0.01 K/UL (ref 0–0.2)
BASOPHILS NFR BLD: 0.1 % (ref 0–1.9)
BILIRUB SERPL-MCNC: 0.3 MG/DL (ref 0.1–1)
BILIRUB UR QL STRIP: NEGATIVE
BUN SERPL-MCNC: 14 MG/DL (ref 6–20)
CALCIUM SERPL-MCNC: 8.7 MG/DL (ref 8.7–10.5)
CHLORIDE SERPL-SCNC: 106 MMOL/L (ref 95–110)
CLARITY UR: CLEAR
CO2 SERPL-SCNC: 23 MMOL/L (ref 23–29)
COLOR UR: YELLOW
CREAT SERPL-MCNC: 0.9 MG/DL (ref 0.5–1.4)
CTP QC/QA: YES
DIFFERENTIAL METHOD: ABNORMAL
EOSINOPHIL # BLD AUTO: 0 K/UL (ref 0–0.5)
EOSINOPHIL NFR BLD: 0.5 % (ref 0–8)
ERYTHROCYTE [DISTWIDTH] IN BLOOD BY AUTOMATED COUNT: 12.8 % (ref 11.5–14.5)
EST. GFR  (AFRICAN AMERICAN): >60 ML/MIN/1.73 M^2
EST. GFR  (NON AFRICAN AMERICAN): >60 ML/MIN/1.73 M^2
GLUCOSE SERPL-MCNC: 354 MG/DL (ref 70–110)
GLUCOSE UR QL STRIP: ABNORMAL
HCT VFR BLD AUTO: 36.6 % (ref 37–48.5)
HGB BLD-MCNC: 12 G/DL (ref 12–16)
HGB UR QL STRIP: NEGATIVE
KETONES UR QL STRIP: ABNORMAL
LEUKOCYTE ESTERASE UR QL STRIP: NEGATIVE
LYMPHOCYTES # BLD AUTO: 2.3 K/UL (ref 1–4.8)
LYMPHOCYTES NFR BLD: 31.5 % (ref 18–48)
MCH RBC QN AUTO: 27 PG (ref 27–31)
MCHC RBC AUTO-ENTMCNC: 32.8 G/DL (ref 32–36)
MCV RBC AUTO: 82 FL (ref 82–98)
MICROSCOPIC COMMENT: NORMAL
MONOCYTES # BLD AUTO: 0.5 K/UL (ref 0.3–1)
MONOCYTES NFR BLD: 7.3 % (ref 4–15)
NEUTROPHILS # BLD AUTO: 4.4 K/UL (ref 1.8–7.7)
NEUTROPHILS NFR BLD: 60.6 % (ref 38–73)
NITRITE UR QL STRIP: NEGATIVE
PH UR STRIP: 6 [PH] (ref 5–8)
PLATELET # BLD AUTO: 344 K/UL (ref 150–350)
PMV BLD AUTO: 9.3 FL (ref 9.2–12.9)
POCT GLUCOSE: 303 MG/DL (ref 70–110)
POTASSIUM SERPL-SCNC: 4.2 MMOL/L (ref 3.5–5.1)
PROT SERPL-MCNC: 6.8 G/DL (ref 6–8.4)
PROT UR QL STRIP: NEGATIVE
RBC # BLD AUTO: 4.44 M/UL (ref 4–5.4)
SODIUM SERPL-SCNC: 135 MMOL/L (ref 136–145)
SP GR UR STRIP: 1.02 (ref 1–1.03)
URN SPEC COLLECT METH UR: ABNORMAL
UROBILINOGEN UR STRIP-ACNC: NEGATIVE EU/DL
WBC # BLD AUTO: 7.31 K/UL (ref 3.9–12.7)
YEAST URNS QL MICRO: NORMAL

## 2019-06-22 PROCEDURE — 85025 COMPLETE CBC W/AUTO DIFF WBC: CPT

## 2019-06-22 PROCEDURE — 96374 THER/PROPH/DIAG INJ IV PUSH: CPT

## 2019-06-22 PROCEDURE — 81025 URINE PREGNANCY TEST: CPT | Performed by: NURSE PRACTITIONER

## 2019-06-22 PROCEDURE — 99284 EMERGENCY DEPT VISIT MOD MDM: CPT | Mod: 25

## 2019-06-22 PROCEDURE — 82962 GLUCOSE BLOOD TEST: CPT

## 2019-06-22 PROCEDURE — 63600175 PHARM REV CODE 636 W HCPCS: Performed by: NURSE PRACTITIONER

## 2019-06-22 PROCEDURE — 25000003 PHARM REV CODE 250: Performed by: NURSE PRACTITIONER

## 2019-06-22 PROCEDURE — 96361 HYDRATE IV INFUSION ADD-ON: CPT

## 2019-06-22 PROCEDURE — 96375 TX/PRO/DX INJ NEW DRUG ADDON: CPT

## 2019-06-22 PROCEDURE — 82010 KETONE BODYS QUAN: CPT

## 2019-06-22 PROCEDURE — 80053 COMPREHEN METABOLIC PANEL: CPT

## 2019-06-22 PROCEDURE — 81000 URINALYSIS NONAUTO W/SCOPE: CPT

## 2019-06-22 RX ORDER — PROCHLORPERAZINE EDISYLATE 5 MG/ML
10 INJECTION INTRAMUSCULAR; INTRAVENOUS
Status: COMPLETED | OUTPATIENT
Start: 2019-06-22 | End: 2019-06-22

## 2019-06-22 RX ORDER — KETOROLAC TROMETHAMINE 30 MG/ML
10 INJECTION, SOLUTION INTRAMUSCULAR; INTRAVENOUS
Status: DISCONTINUED | OUTPATIENT
Start: 2019-06-22 | End: 2019-06-22

## 2019-06-22 RX ORDER — KETOROLAC TROMETHAMINE 30 MG/ML
10 INJECTION, SOLUTION INTRAMUSCULAR; INTRAVENOUS
Status: COMPLETED | OUTPATIENT
Start: 2019-06-22 | End: 2019-06-22

## 2019-06-22 RX ORDER — DIPHENHYDRAMINE HYDROCHLORIDE 50 MG/ML
25 INJECTION INTRAMUSCULAR; INTRAVENOUS
Status: COMPLETED | OUTPATIENT
Start: 2019-06-22 | End: 2019-06-22

## 2019-06-22 RX ORDER — BUTALBITAL, ACETAMINOPHEN AND CAFFEINE 50; 325; 40 MG/1; MG/1; MG/1
1 TABLET ORAL EVERY 6 HOURS PRN
Qty: 12 TABLET | Refills: 0 | Status: SHIPPED | OUTPATIENT
Start: 2019-06-22 | End: 2019-07-22

## 2019-06-22 RX ADMIN — DIPHENHYDRAMINE HYDROCHLORIDE 25 MG: 50 INJECTION INTRAMUSCULAR; INTRAVENOUS at 08:06

## 2019-06-22 RX ADMIN — SODIUM CHLORIDE 1000 ML: 0.9 INJECTION, SOLUTION INTRAVENOUS at 08:06

## 2019-06-22 RX ADMIN — PROCHLORPERAZINE EDISYLATE 10 MG: 5 INJECTION INTRAMUSCULAR; INTRAVENOUS at 08:06

## 2019-06-22 RX ADMIN — KETOROLAC TROMETHAMINE 10 MG: 30 INJECTION, SOLUTION INTRAMUSCULAR; INTRAVENOUS at 08:06

## 2019-06-22 NOTE — ED PROVIDER NOTES
Encounter Date: 2019    SCRIBE #1 NOTE: I, Vicente Davies, am scribing for, and in the presence of,  Jacob Barroso . I have scribed the following portions of the note - Other sections scribed: HPI, ROS, PE, and ED Management .       History     Chief Complaint   Patient presents with    Headache     Pt reports throbbing frontal headache x3 days with no relief from tylenol or goodys. Denies numbness, tingling or vision changes.      CC: HA    HPI:  This is a 28 y.o. Female, with a PMHx of HTN and HLD, who presents to the Emergency Department with a cc of a constant throbbing HA in the frontal region of the head of gradual onset x3 days. The patient reports no associated symptoms. She denies any watery eyes, itchy eyes, rhinorrhea, congestion, fever, chills, and rash. She states that she has taken Tylenol and Goodie Powder to help with pain, with no relief. The were no alleviating or worsening factors reported. Patient reports no prior history of similar symptoms.         The history is provided by the patient.     Review of patient's allergies indicates:   Allergen Reactions    No known allergies      Past Medical History:   Diagnosis Date    Depression     Hyperlipidemia     Hypertension     Type I (juvenile type) diabetes mellitus without mention of complication, uncontrolled 2011     Past Surgical History:   Procedure Laterality Date    ABCESS DRAINAGE      boil went over GA     SECTION  2012    DELIVERY- SECTION N/A 3/16/2017    [facility] L[facility]D    DILATION AND CURETTAGE, UTERUS  2019    [facility] OR    HYSTEROSCOPY N/A 2019    [facility] OR    INCISION AND DRAINAGE (I & D) Left 2013    Performed by [facility] OR 2ND FLR    TONSILLECTOMY, ADENOIDECTOMY      WISDOM TOOTH EXTRACTION       Family History   Problem Relation Age of Onset    Diabetes Brother      No Known Problems Mother     No Known Problems Father     Hydrocephalus Daughter     No Known Problems Brother     No Known Problems Brother     No Known Problems Maternal Aunt     No Known Problems Maternal Uncle     No Known Problems Paternal Aunt     No Known Problems Paternal Uncle     No Known Problems Maternal Grandmother     No Known Problems Maternal Grandfather     No Known Problems Paternal Grandmother     No Known Problems Paternal Grandfather     Amblyopia Neg Hx     Blindness Neg Hx     Cataracts Neg Hx     Glaucoma Neg Hx     Hypertension Neg Hx     Macular degeneration Neg Hx     Retinal detachment Neg Hx     Strabismus Neg Hx     Stroke Neg Hx     Thyroid disease Neg Hx     Breast cancer Neg Hx     Colon cancer Neg Hx     Ovarian cancer Neg Hx     Cancer Neg Hx      Social History     Tobacco Use    Smoking status: Never Smoker    Smokeless tobacco: Never Used   Substance Use Topics    Alcohol use: Yes     Comment: occasionally    Drug use: No     Review of Systems   Constitutional: Negative for chills, fatigue and fever.   HENT: Negative for congestion, ear discharge, ear pain, postnasal drip, rhinorrhea, sinus pressure, sneezing, sore throat and voice change.    Eyes: Negative for discharge and itching.        Negative for watery eyes   Respiratory: Negative for cough, shortness of breath and wheezing.    Cardiovascular: Negative for chest pain, palpitations and leg swelling.   Gastrointestinal: Negative for abdominal pain, constipation, diarrhea, nausea and vomiting.   Endocrine: Negative for polydipsia, polyphagia and polyuria.   Genitourinary: Negative for dysuria, flank pain, frequency, hematuria, urgency, vaginal bleeding, vaginal discharge and vaginal pain.   Musculoskeletal: Negative for arthralgias, back pain, myalgias and neck pain.   Skin: Negative for rash and wound.   Neurological: Positive for headaches. Negative for dizziness, seizures, syncope,  weakness and numbness.   Hematological: Negative for adenopathy. Does not bruise/bleed easily.   Psychiatric/Behavioral: Negative for confusion, self-injury and suicidal ideas. The patient is not nervous/anxious.        Physical Exam     Initial Vitals [06/22/19 0729]   BP Pulse Resp Temp SpO2   127/81 105 16 98.4 °F (36.9 °C) 98 %      MAP       --         Physical Exam    Nursing note and vitals reviewed.  Constitutional: She appears well-developed and well-nourished.   HENT:   Head: Normocephalic and atraumatic.   Eyes: EOM are normal. Pupils are equal, round, and reactive to light.   Neck: Normal range of motion. Neck supple. No thyromegaly present. No JVD present.   Cardiovascular: Normal rate and regular rhythm. Exam reveals no gallop and no friction rub.    No murmur heard.  Pulmonary/Chest: Breath sounds normal. No respiratory distress.   Abdominal: Soft. There is no tenderness.   Musculoskeletal: Normal range of motion. She exhibits no edema or tenderness.   Neurological: She is alert and oriented to person, place, and time. She has normal strength. No cranial nerve deficit or sensory deficit. She exhibits normal muscle tone. She displays a negative Romberg sign. Coordination and gait normal. GCS score is 15. GCS eye subscore is 4. GCS verbal subscore is 5. GCS motor subscore is 6.   Equal  strength bilaterally, equal bicep flexion and tricep extension strength, leg extension and flexion strength appropriate and equal, foot plantar- and dorsi-flexion equal and appropriate   Skin: Skin is warm and dry. Capillary refill takes less than 2 seconds.   Psychiatric: She has a normal mood and affect.         ED Course   Procedures  Labs Reviewed   CBC W/ AUTO DIFFERENTIAL - Abnormal; Notable for the following components:       Result Value    Hematocrit 36.6 (*)     All other components within normal limits   COMPREHENSIVE METABOLIC PANEL - Abnormal; Notable for the following components:    Sodium 135 (*)      Glucose 354 (*)     Albumin 3.2 (*)     Anion Gap 6 (*)     All other components within normal limits   URINALYSIS, REFLEX TO URINE CULTURE - Abnormal; Notable for the following components:    Glucose, UA 3+ (*)     Ketones, UA Trace (*)     All other components within normal limits   BETA - HYDROXYBUTYRATE, SERUM - Abnormal; Notable for the following components:    Beta-Hydroxybutyrate 0.6 (*)     All other components within normal limits   POCT GLUCOSE - Abnormal; Notable for the following components:    POCT Glucose 303 (*)     All other components within normal limits   URINALYSIS MICROSCOPIC   POCT URINE PREGNANCY   POCT GLUCOSE MONITORING CONTINUOUS          Imaging Results    None                     Scribe Attestation:   Scribe #1: I performed the above scribed service and the documentation accurately describes the services I performed. I attest to the accuracy of the note.            ED Course as of Jun 22 1157   Sat Jun 22, 2019   0818 Summary of medical pertinent medical records: 28y.o. Bf with hx of poorly controlled DM. NKDA. A1C 10.3 4 months ago.    Initial assessment:  presents for tx of frontal headache x3d, has used otc meds without success.  No nuchal rigidity, fever, n/v, light sensitivity, hx of migraines.  Reports having not had a ha this severe before but does report gradual onset. Aaox4 maex4 perrl.  Normal neuro exam.    Ddx: tension, cluster, migraine ha, meningitis, secondary headache, sinusitis    Plan: upt, poct glucose, toradol 10mg ivp, 1L NS ivp, compazine 10mg ivp, benadryl 25mg ivp    CBG returned 303.  Reported to Dr. Estrada, he requests cbc, cmp, beta-hydroxybutyrate, ua, 2nd liter of NS. These were ordered.    [VC]   0853 UA is negative for infection, no nitrites, leukocytes, blood, or protein present.     Urinalysis, Reflex to Urine Culture(!) [VC]   0921 CBC: leukocyte count was normal, the H&H was normal. The platelet count was normal.       CBC auto differential(!) [VC]    0921 The chemistry was negative for hypo-or hyper natremia, kalemia, chloridemia, or other electrolyte abnormalities; BUN and creatinine were within normal limits indicating normal kidney function, ALT and AST were within normal limits indicating normal liver function, there was no transaminitis.       Comprehensive metabolic panel(!) [VC]   0921 Mildly elevated over norm.  Will recheck cbg following 2nd L of NS.   Beta - Hydroxybutyrate, Serum(!) [VC]      ED Course User Index  [VC] Jacob Barroso DNP     Clinical Impression:     1. Nonintractable headache, unspecified chronicity pattern, unspecified headache type    2. Hyperglycemia      Labs indicate a low probability for DKA, HHNK.  Pt reports remission of headache.  AAO x4 maex4 perrl. Voices readiness for discharge.  Will be d/c'ed with rx for fioricet 1 tab po q6h prn pain, drowsy warning provided. Symptomatic therapies and return precautions on AVS.      Tachycardia present on d/c, this was also present during previous visits and is most likely the patient's baseline.        Scribe attestation: I, Jacob Barroso DNP, personally performed the services described in this documentation. All medical record entries made by the scribe were at my direction and in my presence.  I have reviewed the chart and agree that the record reflects my personal performance and is accurate and complete.                   Jacob Barroso DNP  06/22/19 8136

## 2019-06-22 NOTE — ED TRIAGE NOTES
Pt. Reports frontal lobe headaches for the past 3 days. Pt. States she was taking tylenol and Goody power however OTC medication does not alleviate her pain. Pt. Reports blurry vision on day 1 of headaches but non on today. Pt. Reports hx of DM and reports her blood sugars have been around 200's, normally it runs around 150's.

## 2019-06-22 NOTE — ED NOTES
Pt. Reports her mother Rhina will be picking her up. Explained ED policy to pt. And she verbalized understanding.

## 2019-07-05 ENCOUNTER — TELEPHONE (OUTPATIENT)
Dept: ENDOCRINOLOGY | Facility: CLINIC | Age: 29
End: 2019-07-05

## 2019-07-05 NOTE — TELEPHONE ENCOUNTER
----- Message from Juany Huggins sent at 7/5/2019 12:04 PM CDT -----  Contact: Pt   Pt is requesting a call back in regards to scheduling a f/u appt.       Pt can be contacted at [phone]

## 2019-08-15 DIAGNOSIS — Z30.42 ENCOUNTER FOR DEPO-PROVERA CONTRACEPTION: ICD-10-CM

## 2019-08-15 RX ORDER — MEDROXYPROGESTERONE ACETATE 150 MG/ML
150 INJECTION, SUSPENSION INTRAMUSCULAR
Qty: 1 ML | Refills: 3 | Status: SHIPPED | OUTPATIENT
Start: 2019-08-15 | End: 2020-07-09 | Stop reason: SDUPTHER

## 2019-09-10 ENCOUNTER — OFFICE VISIT (OUTPATIENT)
Dept: FAMILY MEDICINE | Facility: CLINIC | Age: 29
End: 2019-09-10
Payer: MEDICAID

## 2019-09-10 VITALS
TEMPERATURE: 97 F | BODY MASS INDEX: 36.41 KG/M2 | HEIGHT: 63 IN | OXYGEN SATURATION: 99 % | WEIGHT: 205.5 LBS | DIASTOLIC BLOOD PRESSURE: 74 MMHG | HEART RATE: 74 BPM | SYSTOLIC BLOOD PRESSURE: 120 MMHG

## 2019-09-10 DIAGNOSIS — Z00.00 ANNUAL PHYSICAL EXAM: Primary | ICD-10-CM

## 2019-09-10 DIAGNOSIS — F32.A DEPRESSION, UNSPECIFIED DEPRESSION TYPE: Chronic | ICD-10-CM

## 2019-09-10 DIAGNOSIS — E66.9 OBESITY (BMI 30.0-34.9): Chronic | ICD-10-CM

## 2019-09-10 PROCEDURE — 99999 PR PBB SHADOW E&M-EST. PATIENT-LVL IV: ICD-10-PCS | Mod: PBBFAC,,, | Performed by: FAMILY MEDICINE

## 2019-09-10 PROCEDURE — 99214 OFFICE O/P EST MOD 30 MIN: CPT | Mod: PBBFAC,PO | Performed by: FAMILY MEDICINE

## 2019-09-10 PROCEDURE — 99395 PR PREVENTIVE VISIT,EST,18-39: ICD-10-PCS | Mod: S$PBB,,, | Performed by: FAMILY MEDICINE

## 2019-09-10 PROCEDURE — 99999 PR PBB SHADOW E&M-EST. PATIENT-LVL IV: CPT | Mod: PBBFAC,,, | Performed by: FAMILY MEDICINE

## 2019-09-10 PROCEDURE — 99395 PREV VISIT EST AGE 18-39: CPT | Mod: S$PBB,,, | Performed by: FAMILY MEDICINE

## 2019-09-10 RX ORDER — INSULIN LISPRO 100 [IU]/ML
INJECTION, SOLUTION INTRAVENOUS; SUBCUTANEOUS
Refills: 0 | COMMUNITY
Start: 2019-07-24 | End: 2019-12-30 | Stop reason: CLARIF

## 2019-09-10 RX ORDER — INSULIN LISPRO 100 U/ML
INJECTION, SOLUTION INTRAVENOUS; SUBCUTANEOUS
Refills: 3 | COMMUNITY
Start: 2019-09-08 | End: 2019-12-30 | Stop reason: CLARIF

## 2019-09-10 RX ORDER — PHENTERMINE HYDROCHLORIDE 37.5 MG/1
18.75 TABLET ORAL 2 TIMES DAILY
Refills: 0 | COMMUNITY
Start: 2019-07-09 | End: 2021-12-09

## 2019-09-10 NOTE — PROGRESS NOTES
Routine Office Visit    Patient Name: Anusha Andrew    : 1990  MRN: [id number]    Subjective:  Anusha is a 28 y.o. female who presents today for     1.  Annual physical  2. Diabetes follow-up - she has not followed up since her pregnancy. She now has a baby and is here to follow-up.  Patient is scheduled to see Elva soon. She states she is still struggling with blood glucose control. She states she takes her medication as prescribed. Patient c/o difficulty with losing weight with her current eating.     Review of Systems   Constitutional: Negative for chills and fever.   HENT: Negative for congestion.    Eyes: Negative for blurred vision.   Respiratory: Negative for cough.    Cardiovascular: Negative for chest pain.   Gastrointestinal: Negative for abdominal pain, constipation, diarrhea, heartburn, nausea and vomiting.   Genitourinary: Negative for dysuria.   Musculoskeletal: Negative for myalgias.   Skin: Negative for itching and rash.   Neurological: Negative for dizziness and headaches.   Psychiatric/Behavioral: Negative for depression.       Active Problem List  Patient Active Problem List   Diagnosis    Obesity (BMI 30.0-34.9)    Poorly controlled type 1 diabetes mellitus    Depression    DUB (dysfunctional uterine bleeding)       Past Surgical History  Past Surgical History:   Procedure Laterality Date    ABCESS DRAINAGE      boil went over GA     SECTION  2012    DELIVERY- SECTION N/A 3/16/2017    [facility] L[facility]D    DILATION AND CURETTAGE, UTERUS  2019    [facility] OR    HYSTEROSCOPY N/A 2019    [facility] OR    INCISION AND DRAINAGE (I & D) Left 2013    [facility] by [facility] OR 2ND FLR    TONSILLECTOMY, ADENOIDECTOMY      WISDOM TOOTH EXTRACTION         Family History  Family History   Problem Relation Age of Onset    Diabetes Brother     No Known  Problems Mother     No Known Problems Father     Hydrocephalus Daughter     No Known Problems Brother     No Known Problems Brother     No Known Problems Maternal Aunt     No Known Problems Maternal Uncle     No Known Problems Paternal Aunt     No Known Problems Paternal Uncle     No Known Problems Maternal Grandmother     No Known Problems Maternal Grandfather     No Known Problems Paternal Grandmother     No Known Problems Paternal Grandfather     Amblyopia Neg Hx     Blindness Neg Hx     Cataracts Neg Hx     Glaucoma Neg Hx     Hypertension Neg Hx     Macular degeneration Neg Hx     Retinal detachment Neg Hx     Strabismus Neg Hx     Stroke Neg Hx     Thyroid disease Neg Hx     Breast cancer Neg Hx     Colon cancer Neg Hx     Ovarian cancer Neg Hx     Cancer Neg Hx        Social History  Social History     Socioeconomic History    Marital status: Single     Spouse name: Not on file    Number of children: Not on file    Years of education: Not on file    Highest education level: Not on file   Occupational History    Not on file   Social Needs    Financial resource strain: Not on file    Food insecurity:     Worry: Not on file     Inability: Not on file    Transportation needs:     Medical: Not on file     Non-medical: Not on file   Tobacco Use    Smoking status: Never Smoker    Smokeless tobacco: Never Used   Substance and Sexual Activity    Alcohol use: Yes     Comment: occasionally    Drug use: No    Sexual activity: Yes     Partners: Male     Birth control/protection: OCP   Lifestyle    Physical activity:     Days per week: Not on file     Minutes per session: Not on file    Stress: Rather much   Relationships    Social connections:     Talks on phone: Not on file     Gets together: Not on file     Attends Taoism service: Not on file     Active member of club or organization: Not on file     Attends meetings of clubs or organizations: Not on file     Relationship  "status: Not on file   Other Topics Concern    Not on file   Social History Narrative    Not on file       Medications and Allergies  Reviewed and updated.   Current Outpatient Medications   Medication Sig    ADMELOG U-100 INSULIN LISPRO 100 unit/mL injection INJECT 13 UNITS SUBCUTANEOUSLY THREE TIMES DAILY BEFORE MEAL(S)    blood sugar diagnostic Strp 1 each by Misc.(Non-Drug; Combo Route) route 6 (six) times daily. (Patient taking differently: 1 each by Misc.(Non-Drug; Combo Route) route 4 (four) times daily. )    diabetic supplies, miscellan. Kit Please change sensor every 14 days. FreeStyle Cinthia Sensor 30-day supply (2 sensors/month)    HUMALOG KWIKPEN INSULIN 100 unit/mL pen INJECT 13 UNITS SUBCUTANEOUSLY THREE TIMES DAILY BEFORE MEAL(S)    insulin syringe-needle U-100 0.5 mL 31 gauge x 5/16 Syrg use with admelog    medroxyPROGESTERone (DEPO-PROVERA) 150 mg/mL Syrg Inject 1 mL (150 mg total) into the muscle every 3 (three) months.    ONETOUCH DELICA LANCETS 33 gauge Misc 1 lancet 4 (four) times daily.     ONETOUCH ULTRA2 kit     pen needle, diabetic 31 gauge x 1/4" Ndle use with basaglar    pen needle, diabetic 31 gauge x 5/16" Ndle use with basaglar    phentermine (ADIPEX-P) 37.5 mg tablet Take 18.75 mg by mouth 2 (two) times daily.    sertraline (ZOLOFT) 50 MG tablet Take 1 tablet (50 mg total) by mouth once daily.     No current facility-administered medications for this visit.        Physical Exam  /74 (BP Location: Left arm, Patient Position: Sitting, BP Method: Medium (Manual))   Pulse 74   Temp 97.4 °F (36.3 °C) (Oral)   Ht 5' 3" (1.6 m)   Wt 93.2 kg (205 lb 7.5 oz)   LMP  (LMP Unknown)   SpO2 99%   BMI 36.40 kg/m²   Physical Exam   Constitutional: She is oriented to person, place, and time. She appears well-developed and well-nourished.   HENT:   Head: Normocephalic and atraumatic.   Eyes: Pupils are equal, round, and reactive to light. Conjunctivae and EOM are normal.   Neck: " Normal range of motion. Neck supple.   Cardiovascular: Normal rate, regular rhythm and normal heart sounds. Exam reveals no gallop and no friction rub.   No murmur heard.  Pulmonary/Chest: Breath sounds normal. No respiratory distress.   Abdominal: Soft. Bowel sounds are normal. She exhibits no distension. There is no tenderness.   Musculoskeletal: Normal range of motion.   Lymphadenopathy:     She has no cervical adenopathy.   Neurological: She is alert and oriented to person, place, and time.   Skin: Skin is warm.   Psychiatric: She has a normal mood and affect.   Nursing note and vitals reviewed.        Assessment/Plan:  Anusha Andrew is a 28 y.o. female who presents today for :    Annual physical exam  Health Maintenance       Date Due Completion Date    Pneumococcal Vaccine (Medium Risk) (1 of 1 - PPSV23) 12/03/2011 ---    TETANUS VACCINE 02/01/2015 2/1/2005    Lipid Panel 12/16/2017 12/16/2016    Foot Exam 04/11/2019 4/11/2018    Override on 4/11/2018: Done    Hemoglobin A1c 08/04/2019 2/4/2019    Influenza Vaccine (1) 09/01/2019 10/5/2018    Override on 11/15/2016: Done    Eye Exam 09/11/2019 9/11/2018    Override on 9/11/2018: Done    Override on 5/2/2017: Done    Urine Microalbumin 02/04/2020 2/4/2019    Pap Smear 05/15/2020 5/15/2019        I addressed all major concerns as it related to health maintenance.  All were ordered and scheduled based on the patients wishes.  Any additional health maintenance will be readdressed at the next physical if declined or deferred by the patient.    Uncontrolled type 1 diabetes mellitus without complication  -     CBC auto differential; Future; Expected date: 09/10/2019  -     Comprehensive metabolic panel; Future; Expected date: 09/10/2019  -     Lipid panel; Future; Expected date: 09/10/2019  -     Hemoglobin A1c; Future; Expected date: 09/10/2019  -     TSH; Future; Expected date: 09/10/2019  Recommend to obtain blood work  Scheduled to f/u with Elva; Patient is  interested in pump    Obesity (BMI 30.0-34.9)  The patient is asked to make an attempt to improve diet and exercise patterns to aid in medical management of this problem.  Diet and exercise planning discussed.  Decrease portion control and carbohydrate consumption.  Recommend 30 minutes of moderate intensity exercise 5 days/week.  PN handout given to patient     Depression, unspecified depression type  Noted in chart            Follow up in about 6 months (around 3/10/2020), or if symptoms worsen or fail to improve.

## 2019-09-13 ENCOUNTER — PATIENT MESSAGE (OUTPATIENT)
Dept: ENDOCRINOLOGY | Facility: CLINIC | Age: 29
End: 2019-09-13

## 2019-09-13 ENCOUNTER — PATIENT MESSAGE (OUTPATIENT)
Dept: OBSTETRICS AND GYNECOLOGY | Facility: CLINIC | Age: 29
End: 2019-09-13

## 2019-09-13 ENCOUNTER — CLINICAL SUPPORT (OUTPATIENT)
Dept: OBSTETRICS AND GYNECOLOGY | Facility: CLINIC | Age: 29
End: 2019-09-13
Payer: MEDICAID

## 2019-09-13 DIAGNOSIS — Z30.42 ENCOUNTER FOR MANAGEMENT AND INJECTION OF DEPO-PROVERA: Primary | ICD-10-CM

## 2019-09-13 LAB
B-HCG UR QL: NEGATIVE
CTP QC/QA: YES

## 2019-09-13 PROCEDURE — 81025 URINE PREGNANCY TEST: CPT | Mod: PBBFAC

## 2019-09-13 NOTE — PROGRESS NOTES
Ordering Provider: Dr. Kidd    Patient here for UPT to receive depo injection down in pharmacy. UPT collected and completed, result negative.

## 2019-09-21 ENCOUNTER — PATIENT OUTREACH (OUTPATIENT)
Dept: ADMINISTRATIVE | Facility: OTHER | Age: 29
End: 2019-09-21

## 2019-09-24 NOTE — PROGRESS NOTES
CC: This 28 y.o. Black or  female  is here for evaluation of  T1DM along with comorbidities indicated in the Visit Diagnosis section of this encounter.    HPI: Anusha Andrew was diagnosed with T1DM at age 17. Insulin started at the time of diagnosis.   She has tried an Omnipod insulin pump before x 2 weeks. Has never tried CGM.       Prior visit 2/2019  She met with dietician but would like to reschedule since her last visit was interrupted by her young son. She continues to inject apidra 13 units ac and has not started with carb counting/using carb ratio.   Plan Reschedule appointment with diabetes educator to learn carb counting. Try carb ratio of 5.   Until then, inject Apidra before meals. But take 16 units of Apidra if eating a heavy meal, like for dinner. But try to avoid excessive carbohydrate intake.   Test blood sugar 8x/day. Return to clinic in 5 months with labs prior.       Interval history  She has gained 5 lb since last visit. Feels hungry all the time.   She has increased prandial insulin from 13 to now 15 units ac. Takes 2-3 extra units if eating bigger meal.     She was told she does not qualify for Dexcom because she is not having frequent hypoglycemia.       LAST DIABETES EDUCATION: 1/2016    HOSPITALIZED FOR DIABETES -  Yes -   For DKA - 10/18/18 x 4 days; presented with abdominal pain, n/v, SOB  A total of 4 hospital visits for DKA.     DIABETES MEDICATIONS: Lantus Solostar 50 units every night ~ 9 pm, Admelog (vial) 15 units before meals     Misses medication doses - denies missed doses of Admelog; however, might fall asleep and skip Lantus 1-2x/week.     DM COMPLICATIONS: none    SIGNIFICANT DIABETES MED HISTORY: denies intolerance to prior insulin types     SELF MONITORING BLOOD GLUCOSE: Checks blood glucose at home 4x/day; mostly in the 200s but sometimes as low as 180s at lunchtime - likely because she sometimes eats low carb breakfast.     HYPOGLYCEMIC EPISODES: rare; no  overnight sweats     CURRENT DIET: drinks diet tea, crystal lite. Eats 3 meals/day.     Breakfast is oatmeal; or sausage with egg whites.   Dinner was chicken and fries for which she injected 15 units. predinner BG was 198. Bedtime BG was 215.   Lunch yesterday 2 grilled snack wraps from [employer]. Injected 15 units. 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 No acute distress.   PSYCH: AAOx3, appropriate mood and affect, conversant, well-groomed. Judgement and insight good.   NEURO: Cranial nerves grossly intact. Speech clear, no tremor.   CHEST: Respirations even and unlabored.   MS: Gait steady. No clubbing.         Hemoglobin A1C   Date Value Ref Range Status   02/04/2019 10.3 (H) 4.0 - 5.6 % Final     Comment:     ADA Screening Guidelines:  5.7-6.4%  Consistent with prediabetes  >or=6.5%  Consistent with diabetes  High levels of fetal hemoglobin interfere with the HbA1C  assay. Heterozygous hemoglobin variants (HbS, HgC, etc)do  not significantly interfere with this assay.   However, presence of multiple variants may affect accuracy.     10/18/2018 9.6 (H) 4.0 - 5.6 % Final     Comment:     ADA Screening Guidelines:  5.7-6.4%  Consistent with prediabetes  >or=6.5%  Consistent with diabetes  High levels of fetal hemoglobin interfere with the HbA1C  assay. Heterozygous hemoglobin variants (HbS, HgC, etc)do  not significantly interfere with this assay.   However, presence of multiple variants may affect accuracy.     04/12/2018 9.8 (H) 4.0 - 5.6 % Final     Comment:      According to ADA guidelines, hemoglobin A1c <7.0% represents  optimal control in non-pregnant diabetic patients. Different  metrics may apply to specific patient populations.   Standards of Medical Care in Diabetes-2016.  For the purpose of screening for the presence of diabetes:  <5.7%     Consistent with the absence of diabetes  5.7-6.4%  Consistent with increasing risk for diabetes   (prediabetes)  >or=6.5%  Consistent with diabetes  Currently, no consensus exists for use of hemoglobin A1c  for diagnosis of diabetes for children.  This Hemoglobin A1c assay has significant interference with fetal   hemoglobin   (HbF). The results are invalid for patients with abnormal amounts of   HbF,   including those with known Hereditary Persistence   of Fetal Hemoglobin. Heterozygous hemoglobin variants (HbAS, HbAC,   HbAD, HbAE, HbA2) do not significantly interfere with this assay;   however, presence of multiple variants in a sample may impact the %   interference.         Lab Results   Component Value Date    TSH 0.846 10/17/2018         Chemistry        Component Value Date/Time     (L) 06/22/2019 0835    K 4.2 06/22/2019 0835     06/22/2019 0835    CO2 23 06/22/2019 0835    BUN 14 06/22/2019 0835    CREATININE 0.9 06/22/2019 0835     (H) 06/22/2019 0835        Component Value Date/Time    CALCIUM 8.7 06/22/2019 0835    ALKPHOS 82 06/22/2019 0835    AST 11 06/22/2019 0835    ALT 10 06/22/2019 0835    BILITOT 0.3 06/22/2019 0835    ESTGFRAFRICA >60 06/22/2019 0835    EGFRNONAA >60 06/22/2019 0835          Lab Results   Component Value Date    LDLCALC 143.8 12/07/2016       Lab Results   Component Value Date    MICALBCREAT 7.8 02/04/2019       STANDARDS of CARE:        ASA:               Last eye exam:       ASSESSMENT and PLAN:    A1C GOAL: < 7 % if no hypoglycemia     1. Diabetes mellitus type 1, uncontrolled, without complications  Monitor glucose at least 4x/day with Freestyle Cinthia. A sample  Kit was  provided.   Try to take lantus with dinner nightly to help with adherence.   See Diabetes Educator/Registered Dietician for Medical Nutrition Therapy.  - needs to review carb counting and how to use correction scale; start with carb ratio of 3.   Use correction scale if blood sugar is high - Use on premeal readings: 150-200=+2, 201-250=+4; 251-300=+6; 301-350=+8, over 350=+10 units    Increase Lantus to 60 units once daily.     Return to clinic in 6 weeks. Try to log in insulin doses and keep a food diary for about a week prior to next visit so that we can optimize insulin doses and prepare you for insulin pump.         Ambulatory Referral to Diabetes Education    Hemoglobin A1c    Lipid panel    TSH   2. Family planning advice  She was counseled that she should avoid pregnancy since DM is uncontrolled. She is on DepoProvera and does not intend to have more children.    3. Obesity (BMI 30.0-34.9)  See dietician.   She will resume Adipex.      Spent 40 minutes with patient with >50% time spent in counseling, as noted in # 1-3.            Orders Placed This Encounter   Procedures    Hemoglobin A1c     Standing Status:   Future     Standing Expiration Date:   11/23/2020    Lipid panel     Standing Status:   Future     Standing Expiration Date:   9/24/2020    TSH     Standing Status:   Future     Standing Expiration Date:   11/23/2020    Ambulatory Referral to Diabetes Education     Standing Status:   Future     Standing Expiration Date:   9/25/2020     Referral Priority:   Routine     Referral Type:   Consultation     Referral Reason:   Specialty Services Required     Requested Specialty:   Endocrinology     Number of Visits Requested:   1     Expiration Date:   9/25/2020        Follow up in about 6 weeks (around 11/6/2019).

## 2019-09-25 ENCOUNTER — OFFICE VISIT (OUTPATIENT)
Dept: ENDOCRINOLOGY | Facility: CLINIC | Age: 29
End: 2019-09-25
Payer: MEDICAID

## 2019-09-25 ENCOUNTER — PATIENT MESSAGE (OUTPATIENT)
Dept: ENDOCRINOLOGY | Facility: CLINIC | Age: 29
End: 2019-09-25

## 2019-09-25 VITALS
BODY MASS INDEX: 36.92 KG/M2 | WEIGHT: 208.38 LBS | SYSTOLIC BLOOD PRESSURE: 125 MMHG | HEART RATE: 96 BPM | DIASTOLIC BLOOD PRESSURE: 84 MMHG

## 2019-09-25 DIAGNOSIS — E66.9 OBESITY (BMI 30.0-34.9): ICD-10-CM

## 2019-09-25 DIAGNOSIS — Z30.09 FAMILY PLANNING ADVICE: ICD-10-CM

## 2019-09-25 PROCEDURE — 99999 PR PBB SHADOW E&M-EST. PATIENT-LVL IV: ICD-10-PCS | Mod: PBBFAC,,, | Performed by: NURSE PRACTITIONER

## 2019-09-25 PROCEDURE — 99215 PR OFFICE/OUTPT VISIT, EST, LEVL V, 40-54 MIN: ICD-10-PCS | Mod: S$PBB,,, | Performed by: NURSE PRACTITIONER

## 2019-09-25 PROCEDURE — 99214 OFFICE O/P EST MOD 30 MIN: CPT | Mod: PBBFAC,PN | Performed by: NURSE PRACTITIONER

## 2019-09-25 PROCEDURE — 99999 PR PBB SHADOW E&M-EST. PATIENT-LVL IV: CPT | Mod: PBBFAC,,, | Performed by: NURSE PRACTITIONER

## 2019-09-25 PROCEDURE — 99215 OFFICE O/P EST HI 40 MIN: CPT | Mod: S$PBB,,, | Performed by: NURSE PRACTITIONER

## 2019-09-25 RX ORDER — INSULIN GLARGINE 100 [IU]/ML
50 INJECTION, SOLUTION SUBCUTANEOUS DAILY
COMMUNITY
End: 2020-06-04 | Stop reason: SDUPTHER

## 2019-09-25 NOTE — PATIENT INSTRUCTIONS
Monitor glucose at least 4x/day with Freestyle Cinthia. A sample  Kit was provided.   Try to take lantus with dinner nightly to help with adherence.   See Diabetes Educator/Registered Dietician for Medical Nutrition Therapy.  - needs to review carb counting and how to use correction scale; start with carb ratio of 3.   Use correction scale if blood sugar is high - Use on premeal readings: 150-200=+2, 201-250=+4; 251-300=+6; 301-350=+8, over 350=+10 units    Increase Lantus to 60 units once daily.     Return to clinic in 6 weeks. Try to log in insulin doses and keep a food diary for about a week prior to next visit so that we can optimize insulin doses and prepare you for insulin pump.

## 2019-09-26 ENCOUNTER — PATIENT MESSAGE (OUTPATIENT)
Dept: ENDOCRINOLOGY | Facility: CLINIC | Age: 29
End: 2019-09-26

## 2019-09-29 ENCOUNTER — PATIENT OUTREACH (OUTPATIENT)
Dept: ADMINISTRATIVE | Facility: OTHER | Age: 29
End: 2019-09-29

## 2019-10-21 ENCOUNTER — PATIENT OUTREACH (OUTPATIENT)
Dept: ADMINISTRATIVE | Facility: OTHER | Age: 29
End: 2019-10-21

## 2019-11-04 RX ORDER — PEN NEEDLE, DIABETIC 30 GX3/16"
NEEDLE, DISPOSABLE MISCELLANEOUS
Qty: 150 EACH | Refills: 11 | Status: SHIPPED | OUTPATIENT
Start: 2019-11-04 | End: 2021-02-24

## 2019-11-04 RX ORDER — INSULIN LISPRO 100 U/ML
INJECTION, SOLUTION INTRAVENOUS; SUBCUTANEOUS
Refills: 3 | OUTPATIENT
Start: 2019-11-04

## 2019-11-04 RX ORDER — INSULIN LISPRO 100 [IU]/ML
INJECTION, SOLUTION INTRAVENOUS; SUBCUTANEOUS
Qty: 2 BOX | Refills: 2 | Status: SHIPPED | OUTPATIENT
Start: 2019-11-04 | End: 2019-12-30 | Stop reason: CLARIF

## 2019-11-06 ENCOUNTER — PATIENT OUTREACH (OUTPATIENT)
Dept: ADMINISTRATIVE | Facility: OTHER | Age: 29
End: 2019-11-06

## 2019-11-11 ENCOUNTER — PATIENT MESSAGE (OUTPATIENT)
Dept: ENDOCRINOLOGY | Facility: CLINIC | Age: 29
End: 2019-11-11

## 2019-11-19 ENCOUNTER — PATIENT OUTREACH (OUTPATIENT)
Dept: ADMINISTRATIVE | Facility: OTHER | Age: 29
End: 2019-11-19

## 2019-11-23 ENCOUNTER — OFFICE VISIT (OUTPATIENT)
Dept: OPTOMETRY | Facility: CLINIC | Age: 29
End: 2019-11-23

## 2019-11-23 ENCOUNTER — OFFICE VISIT (OUTPATIENT)
Dept: OPTOMETRY | Facility: CLINIC | Age: 29
End: 2019-11-23
Payer: MEDICAID

## 2019-11-23 DIAGNOSIS — E10.9 TYPE 1 DIABETES MELLITUS WITHOUT RETINOPATHY: Primary | ICD-10-CM

## 2019-11-23 DIAGNOSIS — Z46.0 FITTING AND ADJUSTMENT OF SPECTACLES AND CONTACT LENSES: Primary | ICD-10-CM

## 2019-11-23 DIAGNOSIS — H52.203 MYOPIC ASTIGMATISM OF BOTH EYES: ICD-10-CM

## 2019-11-23 DIAGNOSIS — H52.13 MYOPIC ASTIGMATISM OF BOTH EYES: ICD-10-CM

## 2019-11-23 PROCEDURE — 99213 OFFICE O/P EST LOW 20 MIN: CPT | Mod: PBBFAC | Performed by: OPTOMETRIST

## 2019-11-23 PROCEDURE — 92015 DETERMINE REFRACTIVE STATE: CPT | Mod: ,,, | Performed by: OPTOMETRIST

## 2019-11-23 PROCEDURE — 92015 PR REFRACTION: ICD-10-PCS | Mod: ,,, | Performed by: OPTOMETRIST

## 2019-11-23 PROCEDURE — 92014 PR EYE EXAM, EST PATIENT,COMPREHESV: ICD-10-PCS | Mod: S$PBB,,, | Performed by: OPTOMETRIST

## 2019-11-23 PROCEDURE — 99999 PR PBB SHADOW E&M-EST. PATIENT-LVL III: ICD-10-PCS | Mod: PBBFAC,,, | Performed by: OPTOMETRIST

## 2019-11-23 PROCEDURE — 92310 PR CONTACT LENS FITTING (NO CHANGE): ICD-10-PCS | Mod: CSM,,, | Performed by: OPTOMETRIST

## 2019-11-23 PROCEDURE — 99999 PR PBB SHADOW E&M-EST. PATIENT-LVL III: CPT | Mod: PBBFAC,,, | Performed by: OPTOMETRIST

## 2019-11-23 PROCEDURE — 92014 COMPRE OPH EXAM EST PT 1/>: CPT | Mod: S$PBB,,, | Performed by: OPTOMETRIST

## 2019-11-23 PROCEDURE — 92310 CONTACT LENS FITTING OU: CPT | Mod: CSM,,, | Performed by: OPTOMETRIST

## 2019-11-23 NOTE — PROGRESS NOTES
HPI     Annual eye exam and contact fit.  No vision issues or complaints  Hemoglobin A1C       Date                     Value               Ref Range             Status                09/25/2019               10.9 (H)            4.0 - 5.6 %           Final                 02/04/2019               10.3 (H)            4.0 - 5.6 %           Final                  10/18/2018               9.6 (H)             4.0 - 5.6 %           Final              Last edited by Yoli Welsh, PCT on 11/23/2019 10:18 AM. (History)            Assessment /Plan     For exam results, see Encounter Report.    Type 1 diabetes mellitus without retinopathy  -No retinopathy noted today.  Continued control with primary care physician and annual comprehensive eye exam.    Myopic astigmatism of both eyes  Eyeglass Final Rx     Eyeglass Final Rx       Sphere Cylinder [place] [facility]    Right -3.00 +1.00 010 20/20    Left -2.75 +0.50 015 20/25    Expiration Date:  11/23/2020              Contact Lens Final Rx     Final Contact Lens Rx       Brand Base Curve Diameter Sphere Cylinder    Right Dailies Total 1 8.50 14.1 -2.50 Sphere    Left Dailies Total 1 8.50 14.1 -2.50 Sphere    Expiration Date:  11/23/2020    Replacement:  Daily    Wearing Schedule:  Daily wear                  RTC 1 yr

## 2019-11-30 ENCOUNTER — PATIENT OUTREACH (OUTPATIENT)
Dept: ADMINISTRATIVE | Facility: OTHER | Age: 29
End: 2019-11-30

## 2019-12-10 ENCOUNTER — PATIENT MESSAGE (OUTPATIENT)
Dept: OBSTETRICS AND GYNECOLOGY | Facility: CLINIC | Age: 29
End: 2019-12-10

## 2019-12-11 ENCOUNTER — PATIENT MESSAGE (OUTPATIENT)
Dept: OBSTETRICS AND GYNECOLOGY | Facility: CLINIC | Age: 29
End: 2019-12-11

## 2019-12-12 ENCOUNTER — DOCUMENTATION ONLY (OUTPATIENT)
Dept: PHARMACY | Facility: CLINIC | Age: 29
End: 2019-12-12

## 2019-12-12 NOTE — PROGRESS NOTES
Patient received IM Depo Provera to the upper outer side of left buttock on 09/13/2019     The patient was instructed to get the next injection between 11/29-12/13/2019 .     Lot Number: [id number]  Expiration: 05/21

## 2019-12-13 ENCOUNTER — CLINICAL SUPPORT (OUTPATIENT)
Dept: OBSTETRICS AND GYNECOLOGY | Facility: CLINIC | Age: 29
End: 2019-12-13
Payer: MEDICAID

## 2019-12-13 DIAGNOSIS — Z30.42 ENCOUNTER FOR MANAGEMENT AND INJECTION OF DEPO-PROVERA: Primary | ICD-10-CM

## 2019-12-13 LAB
B-HCG UR QL: NEGATIVE
CTP QC/QA: YES

## 2019-12-13 PROCEDURE — 99999 PR PBB SHADOW E&M-EST. PATIENT-LVL II: ICD-10-PCS | Mod: PBBFAC,,,

## 2019-12-13 PROCEDURE — 99999 PR PBB SHADOW E&M-EST. PATIENT-LVL II: CPT | Mod: PBBFAC,,,

## 2019-12-13 PROCEDURE — 99212 OFFICE O/P EST SF 10 MIN: CPT | Mod: PBBFAC

## 2019-12-13 PROCEDURE — 96372 THER/PROPH/DIAG INJ SC/IM: CPT | Mod: PBBFAC

## 2019-12-13 PROCEDURE — 81025 URINE PREGNANCY TEST: CPT | Mod: PBBFAC

## 2019-12-13 RX ORDER — MEDROXYPROGESTERONE ACETATE 150 MG/ML
150 INJECTION, SUSPENSION INTRAMUSCULAR
Status: COMPLETED | OUTPATIENT
Start: 2019-12-13 | End: 2019-12-13

## 2019-12-13 RX ADMIN — MEDROXYPROGESTERONE ACETATE 150 MG: 150 INJECTION, SUSPENSION, EXTENDED RELEASE INTRAMUSCULAR at 09:12

## 2019-12-13 NOTE — PROGRESS NOTES
Ordering Provider:       upt negative     Patient here to receive  Depo provera to the right ventrogluteal . Tolerated well, no reaction noted. Instructed to wait 15 minutes after administration for monitoring.      Pre pain scale: none     Post pain scale: none      12/28 - 03/04

## 2019-12-30 ENCOUNTER — TELEPHONE (OUTPATIENT)
Dept: ENDOCRINOLOGY | Facility: CLINIC | Age: 29
End: 2019-12-30

## 2019-12-30 RX ORDER — INSULIN ASPART 100 [IU]/ML
INJECTION, SOLUTION INTRAVENOUS; SUBCUTANEOUS
Qty: 2 BOX | Refills: 0 | Status: SHIPPED | OUTPATIENT
Start: 2019-12-30 | End: 2020-02-18 | Stop reason: SDUPTHER

## 2019-12-30 NOTE — TELEPHONE ENCOUNTER
----- Message from Sandra Patel MA sent at 12/30/2019 10:22 AM CST -----  Contact: [facility]  [phone] (Phone)      ----- Message -----  From: Katherine Encarnacion MA  Sent: 12/30/2019  10:18 AM CST  To: Philly Stevenson Staff    Any one that answers can assit:    [id number] - [place], [place] - [address] [place] [address] [phone] (Phone)  [phone] (Fax)    re:   Admelog is not covered by pt's insurance, would like to know if the doctor could change her Rx to  to Novolog instead.

## 2020-01-09 ENCOUNTER — TELEPHONE (OUTPATIENT)
Dept: ENDOCRINOLOGY | Facility: CLINIC | Age: 30
End: 2020-01-09

## 2020-01-09 NOTE — TELEPHONE ENCOUNTER
rx for Novolog Flexpen sent on 12/30 to [facility]. Call pharmacy to see if Novolog was covered. If so, please let them know to d/c the Admelog rx.

## 2020-01-09 NOTE — TELEPHONE ENCOUNTER
Spoke with the [facility] pharmacy and was told the Novolog was covered and is currently being filled.

## 2020-01-17 ENCOUNTER — PATIENT MESSAGE (OUTPATIENT)
Dept: OBSTETRICS AND GYNECOLOGY | Facility: CLINIC | Age: 30
End: 2020-01-17

## 2020-01-27 ENCOUNTER — CLINICAL SUPPORT (OUTPATIENT)
Dept: DIABETES | Facility: CLINIC | Age: 30
End: 2020-01-27
Payer: MEDICAID

## 2020-01-27 PROCEDURE — 99999 PR PBB SHADOW E&M-EST. PATIENT-LVL II: CPT | Mod: PBBFAC,,, | Performed by: DIETITIAN, REGISTERED

## 2020-01-27 PROCEDURE — 99212 OFFICE O/P EST SF 10 MIN: CPT | Mod: PBBFAC,PO | Performed by: DIETITIAN, REGISTERED

## 2020-01-27 PROCEDURE — 99999 PR PBB SHADOW E&M-EST. PATIENT-LVL II: ICD-10-PCS | Mod: PBBFAC,,, | Performed by: DIETITIAN, REGISTERED

## 2020-01-27 PROCEDURE — G0108 DIAB MANAGE TRN  PER INDIV: HCPCS | Mod: PBBFAC,PO | Performed by: DIETITIAN, REGISTERED

## 2020-01-27 NOTE — PROGRESS NOTES
Diabetes Education  Author: Adele Contreras RD, CDE  Date: 1/27/2020    Diabetes Care Management Summary  Diabetes Education Record Assessment/Progress: Initial     Last A1c:   Lab Results   Component Value Date    HGBA1C 10.9 (H) 09/25/2019     Last visit with Diabetes Educator: : 01/27/2020      Diabetes Type  Diabetes Type : Type I    Diabetes History  Diabetes Diagnosis: >10 years  Current Treatment: Insulin    Health Maintenance was reviewed today with patient. Discussed with patient importance of routine eye exams, foot exams/foot care, blood work (i.e.: A1c, microalbumin, and lipid), dental visits, yearly flu vaccine, and pneumonia vaccine as indicated by PCP. Patient verbalized understanding.     Health Maintenance Topics with due status: Not Due       Topic Last Completion Date    Pap Smear 05/15/2019    Lipid Panel 09/25/2019    Eye Exam 11/23/2019     Health Maintenance Due   Topic Date Due    Pneumococcal Vaccine (Medium Risk) (1 of 1 - PPSV23) 12/03/2011    TETANUS VACCINE  02/01/2015    Foot Exam  04/11/2019    Hemoglobin A1c  12/25/2019    Urine Microalbumin  02/04/2020       Nutrition  Meal Planning: 3 meals per day, water, diet drinks, artificial sweeteners, eats out often(Denies snacks)  What type of sweetener do you use?: Splenda  What type of beverages do you drink?: water, diet soda/tea, milk, other (see comments)(Coffee sometimes; powerade zero)    Monitoring   Self Monitoring : (Checks 5 times a day)  Blood Glucose Logs: No  Do you use a personal continuous glucose monitor?: No  In the last month, how often have you had a low blood sugar reaction?: never  What are your symptoms of low blood sugar?: (Weak, sweaty, hungry)  How do you treat low blood sugar?: (Juice, glucose tablets)  Can you tell when your blood sugar is too high?: yes(Headaches, dry mouth)  How do you treat high blood sugar?: (Correction scale)    Exercise   Exercise Type: (Gym - 3 days a week for 1 hour)    Current Diabetes  Treatment   Current Treatment: Insulin    Social History  Preferred Learning Method: Face to Face  Primary Support: Self  Smoking Status: Never a Smoker  Alcohol Use: Rarely    Barriers to Change  Barriers to Change: None  Learning Challenges : None    Readiness to Learn   Readiness to Learn : Acceptance    Cultural Influences  Cultural Influences: No    Diabetes Education Assessment/Progress  Diabetes Disease Process (diabetes disease process and treatment options): Instructed, Discussion, Individual Session, Written Materials Provided, Demonstrates Understanding/Competency(verbalizes/demonstrates)  Nutrition (Incorporating nutritional management into one's lifestyle): Instructed, Discussion, Individual Session, Written Materials Provided, Demonstrates Understanding/Competency (verbalizes/demonstrates)(Instructed patient on CHO counting and the use of I:CHO ratio of 1:3 as prescribed by Elva Fraga NP)  Physical Activity (incorporating physical activity into one's lifestyle): Demonstrates Understanding/Competency (verbalizes/demonstrates)(Meeting goal)  Medications (states correct name, dose, onset, peak, duration, side effects & timing of meds): Instructed, Discussion, Individual Session, Written Materials Provided, Demonstrates Understanding/Competency(verbalizes/demonstrates)(Reviewed medication regimen - may be interested in either T-Slim insulin pump or inpen; wanted Omnipod, but not covered)  Monitoring (monitoring blood glucose/other parameters & using results): Instructed, Discussion, Individual Session, Written Materials Provided, Demonstrates Understanding/Competency (verbalizes/demonstrates)(Patient appears to meet criteria for Dexcom - resent patient info to Dexcom)  Acute Complications (preventing, detecting, and treating acute complications): Demonstrates Understanding/Competency (verbalizes/demonstrates)  Chronic Complications (preventing, detecting, and treating chronic complications): Not  Covered/Deferred(Time constraints)  Clinical (diabetes, other pertinent medical history, and relevant comorbidities reviewed during visit): Discussion, Comprehends Key Points  Cognitive (knowledge of self-management skills, functional health literacy): Discussion, Comprehends Key Points  Psychosocial (emotional response to diabetes): Not Covered/Deferred(Time constraints)  Diabetes Distress and Support Systems: Not Covered/Deferred(Time constraints)  Behavioral (readiness for change, lifestyle practices, self-care behaviors): Not Covered/Deferred(Time constraints)    Goals  Patient has selected/evaluated goals during today's session: No(Time constraints)    Diabetes Care Plan/Intervention  Education Plan/Intervention: Individual Follow-Up DSMT(Will wait to hear from Dexcom and for patient to decide between insulin pump or inpen (not sure she wants to be connected to something))    Diabetes Meal Plan  Carbohydrate Per Meal: 30-45g  Carbohydrate Per Snack : 7-15g    Today's Self-Management Care Plan was developed with the patient's input and is based on barriers identified during today's assessment.    The long and short-term goals in the care plan were written with the patient/caregiver's input. The patient has agreed to work toward these goals to improve her overall diabetes control.      The patient received a copy of today's self-management plan and verbalized understanding of the care plan, goals, and all of today's instructions.      The patient was encouraged to communicate with her physician and care team regarding her condition(s) and treatment.  I provided the patient with my contact information today and encouraged her to contact me via phone or patient portal as needed.     Education Units of Time   Time Spent: 60 min

## 2020-02-17 ENCOUNTER — OFFICE VISIT (OUTPATIENT)
Dept: ENDOCRINOLOGY | Facility: CLINIC | Age: 30
End: 2020-02-17
Payer: MEDICAID

## 2020-02-17 VITALS
WEIGHT: 205.31 LBS | BODY MASS INDEX: 36.37 KG/M2 | HEART RATE: 90 BPM | DIASTOLIC BLOOD PRESSURE: 75 MMHG | SYSTOLIC BLOOD PRESSURE: 119 MMHG

## 2020-02-17 DIAGNOSIS — E78.5 HYPERLIPIDEMIA, UNSPECIFIED HYPERLIPIDEMIA TYPE: ICD-10-CM

## 2020-02-17 DIAGNOSIS — E11.649 HYPOGLYCEMIA ASSOCIATED WITH DIABETES: ICD-10-CM

## 2020-02-17 PROCEDURE — 99999 PR PBB SHADOW E&M-EST. PATIENT-LVL IV: ICD-10-PCS | Mod: PBBFAC,,, | Performed by: NURSE PRACTITIONER

## 2020-02-17 PROCEDURE — 99214 OFFICE O/P EST MOD 30 MIN: CPT | Mod: S$PBB,,, | Performed by: NURSE PRACTITIONER

## 2020-02-17 PROCEDURE — 99214 OFFICE O/P EST MOD 30 MIN: CPT | Mod: PBBFAC,PN | Performed by: NURSE PRACTITIONER

## 2020-02-17 PROCEDURE — 99999 PR PBB SHADOW E&M-EST. PATIENT-LVL IV: CPT | Mod: PBBFAC,,, | Performed by: NURSE PRACTITIONER

## 2020-02-17 PROCEDURE — 99214 PR OFFICE/OUTPT VISIT, EST, LEVL IV, 30-39 MIN: ICD-10-PCS | Mod: S$PBB,,, | Performed by: NURSE PRACTITIONER

## 2020-02-17 RX ORDER — ATORVASTATIN CALCIUM 20 MG/1
20 TABLET, FILM COATED ORAL DAILY
Qty: 90 TABLET | Refills: 3 | Status: SHIPPED | OUTPATIENT
Start: 2020-02-17 | End: 2021-02-24 | Stop reason: SDUPTHER

## 2020-02-17 NOTE — PATIENT INSTRUCTIONS
Increase carb ratio to 2 at breakfast. Continue 3 before lunch and dinner.   Continue Lantus 60 units once daily in the evening. Patient would like to continue evening schedule and will work on adherence. Recommend setting an alarm for the Lantus.     Test glucose 4x/day. Will send orders plus chart notes to [facility]. Please call or message office if you don't hear back from Dexcom within a week.     Will send orders for Tslim X2 pump as well. Of note, with patient's high insulin needs, with the Omnipod she'd have to change pods out every other day. Prefer Tslim X2 pump over Omnipod.     Labs today.   Start atorvastatin 20 mg once daily.   Return to clinic in 3 months with labs prior. Return to clinic if insulin pump is initiated before then.

## 2020-02-17 NOTE — PROGRESS NOTES
CC: This 29 y.o. Black or  female  is here for evaluation of  T1DM along with comorbidities indicated in the Visit Diagnosis section of this encounter.    HPI: Anusha Andrew was diagnosed with T1DM at age 17. Insulin started at the time of diagnosis.   She has tried an Omnipod insulin pump before x 2 weeks. Has never tried CGM.       Prior visit 9/25/19  She has gained 5 lb since last visit. Feels hungry all the time.   She has increased prandial insulin from 13 to now 15 units ac. Takes 2-3 extra units if eating bigger meal.   She was told she does not qualify for Dexcom because she is not having frequent hypoglycemia.   Plan Monitor glucose at least 4x/day with Freestyle Cinthia. A sample  Kit was provided.   Try to take lantus with dinner nightly to help with adherence.   See Diabetes Educator/Registered Dietician for Medical Nutrition Therapy.  - needs to review carb counting and how to use correction scale; start with carb ratio of 3.   Use correction scale if blood sugar is high - Use on premeal readings: 150-200=+2, 201-250=+4; 251-300=+6; 301-350=+8, over 350=+10 units  Increase Lantus to 60 units once daily.   Return to clinic in 6 weeks. Try to log in insulin doses and keep a food diary for about a week prior to next visit so that we can optimize insulin doses and prepare you for insulin pump.       Interval history  No recent labs available. Last seen several months ago. She met with Diabetes Education recently and orders for Dexcom were resubmitted again. However, patient has not heard back about the Dexcom. She has been dosing Novolog ac based off ICR of 3. Thinks her BGs are maybe a bit better now.       LAST DIABETES EDUCATION: 1/2016 1/27/20 - VASHTI De Paz, RD     HOSPITALIZED FOR DIABETES -  Yes -   For DKA - 10/18/18 x 4 days; presented with abdominal pain, n/v, SOB  A total of 4 hospital visits for DKA.     DIABETES MEDICATIONS: Lantus Solostar 60 units every night ~ 9 pm,  Novolog Flexpen ac based on ICR 3    Takes Novolog 10-25 units ac. Averages 20 units.     Misses medication doses - denies missed doses of Novolog; however, might fall asleep and skip Lantus 1-2x/week.     DM COMPLICATIONS: none    SIGNIFICANT DIABETES MED HISTORY: denies intolerance to prior insulin types     SELF MONITORING BLOOD GLUCOSE: Checks blood glucose at home 4x/day. Brings a log. Most glucoses are in the 100s with highest glucoses in the mid to high 100s at lunchtime.     HYPOGLYCEMIC EPISODES: BG drops to 50-60s a couple times a week.      CURRENT DIET: drinks diet tea, crystal lite. Eats 3 meals/day. Wants to be back on Adipex to cut appetite.     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 No acute distress.   PSYCH: AAOx3, appropriate mood and affect, conversant, well-groomed. Judgement and insight good.   NEURO: Cranial nerves grossly intact. Speech clear, no tremor.   CHEST: Respirations even and unlabored.   MS: Gait steady. No clubbing.         Hemoglobin A1C   Date Value Ref Range Status   09/25/2019 10.9 (H) 4.0 - 5.6 % Final     Comment:     ADA Screening Guidelines:  5.7-6.4%  Consistent with prediabetes  >or=6.5%  Consistent with diabetes  High levels of fetal hemoglobin interfere with the HbA1C  assay. Heterozygous hemoglobin variants (HbS, HgC, etc)do  not significantly interfere with this assay.   However, presence of multiple variants may affect accuracy.     02/04/2019 10.3 (H) 4.0 - 5.6 % Final     Comment:     ADA Screening  Guidelines:  5.7-6.4%  Consistent with prediabetes  >or=6.5%  Consistent with diabetes  High levels of fetal hemoglobin interfere with the HbA1C  assay. Heterozygous hemoglobin variants (HbS, HgC, etc)do  not significantly interfere with this assay.   However, presence of multiple variants may affect accuracy.     10/18/2018 9.6 (H) 4.0 - 5.6 % Final     Comment:     ADA Screening Guidelines:  5.7-6.4%  Consistent with prediabetes  >or=6.5%  Consistent with diabetes  High levels of fetal hemoglobin interfere with the HbA1C  assay. Heterozygous hemoglobin variants (HbS, HgC, etc)do  not significantly interfere with this assay.   However, presence of multiple variants may affect accuracy.         Lab Results   Component Value Date    TSH 1.086 09/25/2019         Chemistry        Component Value Date/Time     (L) 06/22/2019 0835    K 4.2 06/22/2019 0835     06/22/2019 0835    CO2 23 06/22/2019 0835    BUN 14 06/22/2019 0835    CREATININE 0.9 06/22/2019 0835     (H) 06/22/2019 0835        Component Value Date/Time    CALCIUM 8.7 06/22/2019 0835    ALKPHOS 82 06/22/2019 0835    AST 11 06/22/2019 0835    ALT 10 06/22/2019 0835    BILITOT 0.3 06/22/2019 0835    ESTGFRAFRICA >60 06/22/2019 0835    EGFRNONAA >60 06/22/2019 0835          Lab Results   Component Value Date    LDLCALC 147.2 09/25/2019       Lab Results   Component Value Date    MICALBCREAT 7.8 02/04/2019       STANDARDS of CARE:        ASA:               Last eye exam:       ASSESSMENT and PLAN:    A1C GOAL: < 7 % if no hypoglycemia         1. Diabetes mellitus type 1, uncontrolled, without complications  Increase carb ratio to 2 at breakfast. Continue 3 before lunch and dinner.   Continue Lantus 60 units once daily in the evening. Patient would like to continue evening schedule and will work on adherence. Recommend setting an alarm for the Lantus.     Test glucose 4x/day. Will send orders plus chart notes to [facility]. Please call or message  office if you don't hear back from [facility] within a week.     Will send orders for Tslim X2 pump as well. Of note, with patient's high insulin needs, with the Omnipod she'd have to change pods out every other day. Prefer Tslim X2 pump over Omnipod.     Labs today.   Start atorvastatin 20 mg once daily.   Return to clinic in 3 months with labs prior. Return to clinic if insulin pump is initiated before then.       Patient is testing blood sugars 4x/day and taking 4 injections of insulin a day. The patient's insulin treatment regimen requires frequent adjustments by the patient on the basis of therapeutic CGM testing results.     Hemoglobin A1c    C-peptide    Glutamic acid decarboxylase    Glucose, fasting    Microalbumin/creatinine urine ratio    Hemoglobin A1c   2. Hyperlipidemia, unspecified hyperlipidemia type  atorvastatin (LIPITOR) 20 MG tablet    Lipid panel    Hepatic function panel   3. Hypoglycemia associated with diabetes  Start Dexcom CGM             Orders Placed This Encounter   Procedures    Hemoglobin A1c     Standing Status:   Future     Number of Occurrences:   1     Standing Expiration Date:   4/17/2021    C-peptide     Standing Status:   Future     Number of Occurrences:   1     Standing Expiration Date:   4/17/2021    Glutamic acid decarboxylase     Standing Status:   Future     Number of Occurrences:   1     Standing Expiration Date:   4/17/2021    Glucose, fasting     Standing Status:   Future     Number of Occurrences:   1     Standing Expiration Date:   4/17/2021    Lipid panel     Standing Status:   Future     Standing Expiration Date:   2/16/2021    Hepatic function panel     Standing Status:   Future     Standing Expiration Date:   4/17/2021    Microalbumin/creatinine urine ratio     Standing Status:   Future     Number of Occurrences:   1     Standing Expiration Date:   4/17/2021     Order Specific Question:   Specimen Source     Answer:   Urine    Hemoglobin A1c     Standing  Status:   Future     Standing Expiration Date:   4/17/2021        Follow up in about 3 months (around 5/17/2020).

## 2020-02-18 ENCOUNTER — PATIENT MESSAGE (OUTPATIENT)
Dept: ENDOCRINOLOGY | Facility: CLINIC | Age: 30
End: 2020-02-18

## 2020-02-18 RX ORDER — INSULIN ASPART 100 [IU]/ML
INJECTION, SOLUTION INTRAVENOUS; SUBCUTANEOUS
Qty: 2 BOX | Refills: 5 | Status: SHIPPED | OUTPATIENT
Start: 2020-02-18 | End: 2021-02-24

## 2020-02-19 ENCOUNTER — TELEPHONE (OUTPATIENT)
Dept: ENDOCRINOLOGY | Facility: CLINIC | Age: 30
End: 2020-02-19

## 2020-03-05 ENCOUNTER — CLINICAL SUPPORT (OUTPATIENT)
Dept: OBSTETRICS AND GYNECOLOGY | Facility: CLINIC | Age: 30
End: 2020-03-05
Payer: MEDICAID

## 2020-03-05 DIAGNOSIS — Z30.42 ENCOUNTER FOR MANAGEMENT AND INJECTION OF DEPO-PROVERA: Primary | ICD-10-CM

## 2020-03-05 PROCEDURE — 96372 THER/PROPH/DIAG INJ SC/IM: CPT | Mod: PBBFAC

## 2020-03-05 PROCEDURE — 99999 PR PBB SHADOW E&M-EST. PATIENT-LVL II: ICD-10-PCS | Mod: PBBFAC,,,

## 2020-03-05 PROCEDURE — 99999 PR PBB SHADOW E&M-EST. PATIENT-LVL II: CPT | Mod: PBBFAC,,,

## 2020-03-05 PROCEDURE — 99212 OFFICE O/P EST SF 10 MIN: CPT | Mod: PBBFAC

## 2020-03-05 RX ORDER — MEDROXYPROGESTERONE ACETATE 150 MG/ML
150 INJECTION, SUSPENSION INTRAMUSCULAR
Status: COMPLETED | OUTPATIENT
Start: 2020-03-05 | End: 2020-03-05

## 2020-03-05 RX ADMIN — MEDROXYPROGESTERONE ACETATE 150 MG: 150 INJECTION, SUSPENSION, EXTENDED RELEASE INTRAMUSCULAR at 10:03

## 2020-03-05 NOTE — PROGRESS NOTES
Ordering Provider: Dr. Kidd     Patient here to receive Depo Provera to Left ventrogluteal. Tolerated well, no reaction noted. Instructed to wait 15 minutes after administration for monitoring.      Pre pain scale: none     Post pain scale: none

## 2020-05-12 ENCOUNTER — PATIENT MESSAGE (OUTPATIENT)
Dept: ENDOCRINOLOGY | Facility: CLINIC | Age: 30
End: 2020-05-12

## 2020-05-15 ENCOUNTER — LAB VISIT (OUTPATIENT)
Dept: LAB | Facility: HOSPITAL | Age: 30
End: 2020-05-15
Attending: FAMILY MEDICINE
Payer: MEDICAID

## 2020-05-15 DIAGNOSIS — E78.5 HYPERLIPIDEMIA, UNSPECIFIED HYPERLIPIDEMIA TYPE: ICD-10-CM

## 2020-05-15 LAB
ALBUMIN SERPL BCP-MCNC: 3.4 G/DL (ref 3.5–5.2)
ALP SERPL-CCNC: 86 U/L (ref 55–135)
ALT SERPL W/O P-5'-P-CCNC: 20 U/L (ref 10–44)
AST SERPL-CCNC: 12 U/L (ref 10–40)
BILIRUB DIRECT SERPL-MCNC: 0.1 MG/DL (ref 0.1–0.3)
BILIRUB SERPL-MCNC: 0.3 MG/DL (ref 0.1–1)
CHOLEST SERPL-MCNC: 267 MG/DL (ref 120–199)
CHOLEST/HDLC SERPL: 3.7 {RATIO} (ref 2–5)
ESTIMATED AVG GLUCOSE: 214 MG/DL (ref 68–131)
HBA1C MFR BLD HPLC: 9.1 % (ref 4–5.6)
HDLC SERPL-MCNC: 72 MG/DL (ref 40–75)
HDLC SERPL: 27 % (ref 20–50)
LDLC SERPL CALC-MCNC: 178.2 MG/DL (ref 63–159)
NONHDLC SERPL-MCNC: 195 MG/DL
PROT SERPL-MCNC: 7.8 G/DL (ref 6–8.4)
TRIGL SERPL-MCNC: 84 MG/DL (ref 30–150)

## 2020-05-15 PROCEDURE — 36415 COLL VENOUS BLD VENIPUNCTURE: CPT | Mod: PN

## 2020-05-15 PROCEDURE — 80076 HEPATIC FUNCTION PANEL: CPT

## 2020-05-15 PROCEDURE — 80061 LIPID PANEL: CPT

## 2020-05-15 PROCEDURE — 83036 HEMOGLOBIN GLYCOSYLATED A1C: CPT

## 2020-05-19 ENCOUNTER — PATIENT MESSAGE (OUTPATIENT)
Dept: ENDOCRINOLOGY | Facility: CLINIC | Age: 30
End: 2020-05-19

## 2020-05-19 ENCOUNTER — TELEPHONE (OUTPATIENT)
Dept: ENDOCRINOLOGY | Facility: CLINIC | Age: 30
End: 2020-05-19

## 2020-05-19 NOTE — TELEPHONE ENCOUNTER
Tried contacting pt about her appt on 05/21 with Elva. Wanted to inform her that Elva really prefers her pt's to not come out during this time due them being high risk for the COVID-19. Wanted to offer her a virtual/audio appt. LVM to return call.

## 2020-05-21 ENCOUNTER — PATIENT MESSAGE (OUTPATIENT)
Dept: ENDOCRINOLOGY | Facility: CLINIC | Age: 30
End: 2020-05-21

## 2020-05-21 ENCOUNTER — OFFICE VISIT (OUTPATIENT)
Dept: ENDOCRINOLOGY | Facility: CLINIC | Age: 30
End: 2020-05-21
Payer: MEDICAID

## 2020-05-21 DIAGNOSIS — E78.5 HYPERLIPIDEMIA, UNSPECIFIED HYPERLIPIDEMIA TYPE: ICD-10-CM

## 2020-05-21 PROCEDURE — 99213 OFFICE O/P EST LOW 20 MIN: CPT | Mod: 95,,, | Performed by: NURSE PRACTITIONER

## 2020-05-21 PROCEDURE — 99213 PR OFFICE/OUTPT VISIT, EST, LEVL III, 20-29 MIN: ICD-10-PCS | Mod: 95,,, | Performed by: NURSE PRACTITIONER

## 2020-05-21 NOTE — PROGRESS NOTES
The patient location is: home  The chief complaint leading to consultation is: type 1 diabetes     Visit type: audiovisual    Face to Face time with patient: 20 minutes of total time spent on the encounter, which includes face to face time and non-face to face time preparing to see the patient (eg, review of tests), Obtaining and/or reviewing separately obtained history, Documenting clinical information in the electronic or other health record, Independently interpreting results (not separately reported) and communicating results to the patient/family/caregiver, or Care coordination (not separately reported).         Each patient to whom he or she provides medical services by telemedicine is:  (1) informed of the relationship between the physician and patient and the respective role of any other health care provider with respect to management of the patient; and (2) notified that he or she may decline to receive medical services by telemedicine and may withdraw from such care at any time.    CC: This 29 y.o. Black or  female  is here for evaluation of  T1DM along with comorbidities indicated in the Visit Diagnosis section of this encounter.    HPI: Anusha Andrew was diagnosed with T1DM at age 17. Insulin started at the time of diagnosis.   She has tried an Omnipod insulin pump before x 2 weeks. Has never tried CGM.       Prior visit 9/25/19  She has gained 5 lb since last visit. Feels hungry all the time.   She has increased prandial insulin from 13 to now 15 units ac. Takes 2-3 extra units if eating bigger meal.   She was told she does not qualify for Dexcom because she is not having frequent hypoglycemia.   Plan Monitor glucose at least 4x/day with Freestyle Cinthia. A sample  Kit was provided.   Try to take lantus with dinner nightly to help with adherence.   See Diabetes Educator/Registered Dietician for Medical Nutrition Therapy.  - needs to review carb counting and how to use correction scale;  start with carb ratio of 3.   Use correction scale if blood sugar is high - Use on premeal readings: 150-200=+2, 201-250=+4; 251-300=+6; 301-350=+8, over 350=+10 units  Increase Lantus to 60 units once daily.   Return to clinic in 6 weeks. Try to log in insulin doses and keep a food diary for about a week prior to next visit so that we can optimize insulin doses and prepare you for insulin pump.       Prior visit 2/1720  No recent labs available. Last seen several months ago. She met with Diabetes Education recently and orders for Dexcom were resubmitted again. However, patient has not heard back about the Dexcom. She has been dosing Novolog ac based off ICR of 3. Thinks her BGs are maybe a bit better now.   Plan Increase carb ratio to 2 at breakfast. Continue 3 before lunch and dinner.   Continue Lantus 60 units once daily in the evening. Patient would like to continue evening schedule and will work on adherence. Recommend setting an alarm for the Lantus.   Test glucose 4x/day. Will send orders plus chart notes to Dex[employer]. Please call or message office if you don't hear back from Dexcom within a week.   Will send orders for Tslim X2 pump as well. Of note, with patient's high insulin needs, with the Omnipod she'd have to change pods out every other day. Prefer Tslim X2 pump over Omnipod.   Labs today.   Start atorvastatin 20 mg once daily.   Return to clinic in 3 months with labs prior. Return to clinic if insulin pump is initiated before then.       Interval history  a1c is down from 10.9 to 9.1%, the lowest it's been in 3 years. Has been drinking vinegar before meals - 1 tsp before each meal.   She still hasn't gotten phone calls about getting either Dexcom CGM or Tslim insulin pump.   She did try using Cinthia for a few days and did like it. Would like to restart.   She just started atorvastatin a week ago. She denies muscle cramps/fatigue.     LAST DIABETES EDUCATION: 1/2016 1/27/20 - Adele Contreras, ALENAE, RD      HOSPITALIZED FOR DIABETES -  Yes -   For DKA - 10/18/18 x 4 days; presented with abdominal pain, n/v, SOB  A total of 4 hospital visits for DKA.     DIABETES MEDICATIONS: Lantus Solostar 60 units every night ~ 9 pm, Novolog Flexpen ac based on ICR 2 before breakfast and ICR 3 before lunch and dinner     Takes Novolog 10-25 units ac. Averages 20-25 unitsac.     Misses medication doses - denies missed doses -  This is an improvement.     DM COMPLICATIONS: none    SIGNIFICANT DIABETES MED HISTORY: denies intolerance to prior insulin types     SELF MONITORING BLOOD GLUCOSE: Checks blood glucose at home 4x/day.   Fasting 80-110s.   prelunch 150s  predinner 180-200s  Bedtime 130-140s     HYPOGLYCEMIC EPISODES: has BGs in the 50s about 2x/week.      CURRENT DIET: drinks diet tea, crystal lite. Eats 3 meals/day. She snacks between lunch and dinner and dose not take Novolog for them.      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      ROS:   CONSTITUTIONAL: Appetite good, denies fatigue  RESPIRATORY: No shortness of breath  CARDIAC: No chest pain        PHYSICAL EXAM:  GENERAL: Well developed, well nourished. No acute distress.   PSYCH: AAOx3, appropriate mood and affect, conversant, well-groomed. Judgement and insight good.   CHEST: Respirations even and unlabored.           Hemoglobin A1C   Date Value Ref Range Status   05/15/2020 9.1 (H) 4.0 - 5.6 % Final     Comment:     ADA Screening Guidelines:  5.7-6.4%  Consistent with prediabetes  >or=6.5%  Consistent with diabetes  High levels of fetal hemoglobin interfere with the HbA1C  assay. Heterozygous hemoglobin variants (HbS, HgC, etc)do  not significantly interfere with this assay.   However, presence of multiple variants may affect accuracy.     09/25/2019 10.9 (H) 4.0 - 5.6 % Final     Comment:     ADA Screening Guidelines:  5.7-6.4%   Consistent with prediabetes  >or=6.5%  Consistent with diabetes  High levels of fetal hemoglobin interfere with the HbA1C  assay. Heterozygous hemoglobin variants (HbS, HgC, etc)do  not significantly interfere with this assay.   However, presence of multiple variants may affect accuracy.     02/04/2019 10.3 (H) 4.0 - 5.6 % Final     Comment:     ADA Screening Guidelines:  5.7-6.4%  Consistent with prediabetes  >or=6.5%  Consistent with diabetes  High levels of fetal hemoglobin interfere with the HbA1C  assay. Heterozygous hemoglobin variants (HbS, HgC, etc)do  not significantly interfere with this assay.   However, presence of multiple variants may affect accuracy.         Lab Results   Component Value Date    TSH 1.086 09/25/2019         Chemistry        Component Value Date/Time     (L) 06/22/2019 0835    K 4.2 06/22/2019 0835     06/22/2019 0835    CO2 23 06/22/2019 0835    BUN 14 06/22/2019 0835    CREATININE 0.9 06/22/2019 0835     (H) 06/22/2019 0835        Component Value Date/Time    CALCIUM 8.7 06/22/2019 0835    ALKPHOS 86 05/15/2020 0931    AST 12 05/15/2020 0931    ALT 20 05/15/2020 0931    BILITOT 0.3 05/15/2020 0931    ESTGFRAFRICA >60 06/22/2019 0835    EGFRNONAA >60 06/22/2019 0835          Lab Results   Component Value Date    LDLCALC 178.2 (H) 05/15/2020        Ref. Range 9/25/2019 09:24 5/15/2020 09:31   Cholesterol Latest Ref Range: 120 - 199 mg/dL 234 (H) 267 (H)   HDL Latest Ref Range: 40 - 75 mg/dL 78 (H) 72   Hdl/Cholesterol Ratio Latest Ref Range: 20.0 - 50.0 % 33.3 27.0   LDL Cholesterol External Latest Ref Range: 63.0 - 159.0 mg/dL 147.2 178.2 (H)   Non-HDL Cholesterol Latest Units: mg/dL 156 195   Total Cholesterol/HDL Ratio Latest Ref Range: 2.0 - 5.0  3.0 3.7   Triglycerides Latest Ref Range: 30 - 150 mg/dL 44 84     Lab Results   Component Value Date    MICALBCREAT 24.5 02/17/2020       STANDARDS of CARE:        ASA:               Last eye exam:       ASSESSMENT and  PLAN:    A1C GOAL: < 7 % if no hypoglycemia     1. Diabetes mellitus type 1, uncontrolled, without complications  Make sure to take Novolog prior to any food intake that has carbs including snacks. This should help to reduce high glucoses before dinner. If they are still high at dinner, then increase carb ratio at lunch to 2.     Will send out orders again for Tslim insulin pump and Dexcom.   Please contact office if you do not hear back from these companies after a week.   Test glucose 4x/day.   Return to clinic in 3 months with labs prior, or sooner if insulin pump can be started earlier.     Patient is testing blood sugars 4x/day and taking 4 injections of insulin a day. The patient's insulin treatment regimen requires frequent adjustments by the patient on the basis of therapeutic CGM testing results.       diabetic supplies, [employer]. Kit    Hemoglobin A1C    Lipid Panel    Hepatic function panel   2. Hyperlipidemia, unspecified hyperlipidemia type  Avoid saturated fat. Saturated fats are found in animal foods such as butter, lard, gonsalves, sausage, dark meat chicken, chicken skin, and high-fat dairy like cheese.       Lipid Panel    Hepatic function panel       Orders Placed This Encounter   Procedures    Hemoglobin A1C     Standing Status:   Future     Standing Expiration Date:   7/20/2021    Lipid Panel     Standing Status:   Future     Standing Expiration Date:   5/21/2021    Hepatic function panel     Standing Status:   Future     Standing Expiration Date:   7/20/2021        Follow up in about 3 months (around 8/21/2020).

## 2020-05-21 NOTE — PATIENT INSTRUCTIONS
Make sure to take Novolog prior to any food intake that has carbs including snacks. This should help to reduce high glucoses before dinner. If they are still high at dinner, then increase carb ratio at lunch to 2.     Will send out orders again for Tslim insulin pump and Dexcom.   Please contact office if you do not hear back from these companies after a week.   Test glucose 4x/day.   Return to clinic in 3 months with labs prior, or sooner if insulin pump can be started earlier.     Avoid saturated fat. Saturated fats are found in animal foods such as butter, lard, gonsalves, sausage, dark meat chicken, chicken skin, and high-fat dairy like cheese.

## 2020-05-25 ENCOUNTER — PATIENT MESSAGE (OUTPATIENT)
Dept: ENDOCRINOLOGY | Facility: CLINIC | Age: 30
End: 2020-05-25

## 2020-05-25 NOTE — TELEPHONE ENCOUNTER
Tried contacting pt to inform her that one of her prescriptions is not covered by her insurance and that she would have to pay out of pocket. The pt did not answer LVM and sent a message through the portal.

## 2020-05-25 NOTE — TELEPHONE ENCOUNTER
Advise pt she will need to purchase sensors for Cinthia out of pocket because it's not covered at this time. Orders were sent last week for Dexcom. Let us know if she doesn't get a call from [employer] within the next week. She can use the Cinthia until Dexcom is approved and set up.

## 2020-06-01 ENCOUNTER — CLINICAL SUPPORT (OUTPATIENT)
Dept: OBSTETRICS AND GYNECOLOGY | Facility: CLINIC | Age: 30
End: 2020-06-01
Payer: MEDICAID

## 2020-06-01 DIAGNOSIS — Z30.42 ENCOUNTER FOR SURVEILLANCE OF INJECTABLE CONTRACEPTIVE: Primary | ICD-10-CM

## 2020-06-01 PROCEDURE — 96372 THER/PROPH/DIAG INJ SC/IM: CPT | Mod: PBBFAC

## 2020-06-01 PROCEDURE — 99999 PR PBB SHADOW E&M-EST. PATIENT-LVL I: ICD-10-PCS | Mod: PBBFAC,,,

## 2020-06-01 PROCEDURE — 99999 PR PBB SHADOW E&M-EST. PATIENT-LVL I: CPT | Mod: PBBFAC,,,

## 2020-06-01 RX ORDER — MEDROXYPROGESTERONE ACETATE 150 MG/ML
150 INJECTION, SUSPENSION INTRAMUSCULAR
Status: DISCONTINUED | OUTPATIENT
Start: 2020-06-01 | End: 2021-12-09

## 2020-06-01 RX ADMIN — MEDROXYPROGESTERONE ACETATE 150 MG: 150 INJECTION, SUSPENSION INTRAMUSCULAR at 10:06

## 2020-06-01 NOTE — PROGRESS NOTES
Ordering Physician: Bernabe    Order Type: Written    During visit today patient received injection of depo to right ventrogluteal. Patient tolerated well, no allergic reaction noted. Requested patient to remain 10 minutes after injection.     Pre-Pain Scale:None     Post Pain Scale:None

## 2020-06-12 ENCOUNTER — TELEPHONE (OUTPATIENT)
Dept: ENDOCRINOLOGY | Facility: CLINIC | Age: 30
End: 2020-06-12

## 2020-06-12 NOTE — TELEPHONE ENCOUNTER
Pt needs to get fasting labs drawn for insulin pump approval. Make sure her fasting glucose is in the 100s that morning because a fasting glucose of 225 or greater will not count towards approval.

## 2020-06-15 RX ORDER — INSULIN GLARGINE 100 [IU]/ML
60 INJECTION, SOLUTION SUBCUTANEOUS DAILY
Qty: 30 ML | Refills: 0 | Status: SHIPPED | OUTPATIENT
Start: 2020-06-15 | End: 2020-08-20

## 2020-06-19 ENCOUNTER — TELEPHONE (OUTPATIENT)
Dept: ENDOCRINOLOGY | Facility: CLINIC | Age: 30
End: 2020-06-19

## 2020-06-19 ENCOUNTER — LAB VISIT (OUTPATIENT)
Dept: LAB | Facility: HOSPITAL | Age: 30
End: 2020-06-19
Attending: NURSE PRACTITIONER
Payer: MEDICAID

## 2020-06-19 DIAGNOSIS — E78.5 HYPERLIPIDEMIA, UNSPECIFIED HYPERLIPIDEMIA TYPE: ICD-10-CM

## 2020-06-19 LAB
ALBUMIN SERPL BCP-MCNC: 3.8 G/DL (ref 3.5–5.2)
ALP SERPL-CCNC: 89 U/L (ref 55–135)
ALT SERPL W/O P-5'-P-CCNC: 16 U/L (ref 10–44)
AST SERPL-CCNC: 15 U/L (ref 10–40)
BILIRUB DIRECT SERPL-MCNC: 0.2 MG/DL (ref 0.1–0.3)
BILIRUB SERPL-MCNC: 0.3 MG/DL (ref 0.1–1)
C PEPTIDE SERPL-MCNC: <0.08 NG/ML (ref 0.78–5.19)
CHOLEST SERPL-MCNC: 217 MG/DL (ref 120–199)
CHOLEST/HDLC SERPL: 3.9 {RATIO} (ref 2–5)
ESTIMATED AVG GLUCOSE: 206 MG/DL (ref 68–131)
GLUCOSE SERPL-MCNC: 114 MG/DL (ref 70–110)
HBA1C MFR BLD HPLC: 8.8 % (ref 4–5.6)
HDLC SERPL-MCNC: 56 MG/DL (ref 40–75)
HDLC SERPL: 25.8 % (ref 20–50)
LDLC SERPL CALC-MCNC: 148 MG/DL (ref 63–159)
NONHDLC SERPL-MCNC: 161 MG/DL
PROT SERPL-MCNC: 7.9 G/DL (ref 6–8.4)
TRIGL SERPL-MCNC: 65 MG/DL (ref 30–150)
TSH SERPL DL<=0.005 MIU/L-ACNC: 2.13 UIU/ML (ref 0.4–4)

## 2020-06-19 PROCEDURE — 84681 ASSAY OF C-PEPTIDE: CPT

## 2020-06-19 PROCEDURE — 80076 HEPATIC FUNCTION PANEL: CPT

## 2020-06-19 PROCEDURE — 84443 ASSAY THYROID STIM HORMONE: CPT

## 2020-06-19 PROCEDURE — 36415 COLL VENOUS BLD VENIPUNCTURE: CPT | Mod: PN

## 2020-06-19 PROCEDURE — 83036 HEMOGLOBIN GLYCOSYLATED A1C: CPT

## 2020-06-19 PROCEDURE — 82947 ASSAY GLUCOSE BLOOD QUANT: CPT

## 2020-06-19 PROCEDURE — 80061 LIPID PANEL: CPT

## 2020-06-19 NOTE — TELEPHONE ENCOUNTER
----- Message from Va Cuenca sent at 6/18/2020  3:52 PM CDT -----   Name of Who is Calling:     What is the request in detail:  request call back in reference to fax request /  clinical notes and labs and  blood glucose testing drawing on same date Please contact to further discuss and advise      Can the clinic reply by [facility]: no     What Number to Call Back [facility]:  [employer] [facility] / [phone] ext#  [phone]

## 2020-06-23 ENCOUNTER — TELEPHONE (OUTPATIENT)
Dept: ENDOCRINOLOGY | Facility: CLINIC | Age: 30
End: 2020-06-23

## 2020-06-23 NOTE — TELEPHONE ENCOUNTER
Fasting glucose and c-peptide results faxed to [facility] fax# [phone].  Spoke with Adilia

## 2020-07-09 ENCOUNTER — OFFICE VISIT (OUTPATIENT)
Dept: OBSTETRICS AND GYNECOLOGY | Facility: CLINIC | Age: 30
End: 2020-07-09
Attending: OBSTETRICS & GYNECOLOGY
Payer: MEDICAID

## 2020-07-09 VITALS
HEIGHT: 63 IN | SYSTOLIC BLOOD PRESSURE: 108 MMHG | WEIGHT: 212.06 LBS | DIASTOLIC BLOOD PRESSURE: 76 MMHG | BODY MASS INDEX: 37.57 KG/M2

## 2020-07-09 DIAGNOSIS — Z30.42 ENCOUNTER FOR DEPO-PROVERA CONTRACEPTION: ICD-10-CM

## 2020-07-09 DIAGNOSIS — Z11.3 SCREENING EXAMINATION FOR STD (SEXUALLY TRANSMITTED DISEASE): ICD-10-CM

## 2020-07-09 DIAGNOSIS — Z12.4 ENCOUNTER FOR PAPANICOLAOU SMEAR FOR CERVICAL CANCER SCREENING: Primary | ICD-10-CM

## 2020-07-09 DIAGNOSIS — Z01.419 ENCOUNTER FOR GYNECOLOGICAL EXAMINATION (GENERAL) (ROUTINE) WITHOUT ABNORMAL FINDINGS: ICD-10-CM

## 2020-07-09 PROCEDURE — 87491 CHLMYD TRACH DNA AMP PROBE: CPT

## 2020-07-09 PROCEDURE — 99395 PR PREVENTIVE VISIT,EST,18-39: ICD-10-PCS | Mod: S$PBB,,, | Performed by: OBSTETRICS & GYNECOLOGY

## 2020-07-09 PROCEDURE — 99999 PR PBB SHADOW E&M-EST. PATIENT-LVL IV: CPT | Mod: PBBFAC,,, | Performed by: OBSTETRICS & GYNECOLOGY

## 2020-07-09 PROCEDURE — 99999 PR PBB SHADOW E&M-EST. PATIENT-LVL IV: ICD-10-PCS | Mod: PBBFAC,,, | Performed by: OBSTETRICS & GYNECOLOGY

## 2020-07-09 PROCEDURE — 99214 OFFICE O/P EST MOD 30 MIN: CPT | Mod: PBBFAC | Performed by: OBSTETRICS & GYNECOLOGY

## 2020-07-09 PROCEDURE — 99395 PREV VISIT EST AGE 18-39: CPT | Mod: S$PBB,,, | Performed by: OBSTETRICS & GYNECOLOGY

## 2020-07-09 PROCEDURE — 88175 CYTOPATH C/V AUTO FLUID REDO: CPT

## 2020-07-09 RX ORDER — MEDROXYPROGESTERONE ACETATE 150 MG/ML
150 INJECTION, SUSPENSION INTRAMUSCULAR
Qty: 1 ML | Refills: 3 | Status: SHIPPED | OUTPATIENT
Start: 2020-07-09 | End: 2020-08-19 | Stop reason: SDUPTHER

## 2020-07-09 NOTE — PROGRESS NOTES
Subjective:       Patient ID: Anusha Andrew is a 29 y.o. female.    Chief Complaint:  Annual Exam (Last pap: 2019 (normal) )      Patient Active Problem List   Diagnosis    Obesity (BMI 30.0-34.9)    Poorly controlled type 1 diabetes mellitus    Depression    DUB (dysfunctional uterine bleeding)       History of Present Illness  29 y.o. yo  here for annual exam. No gyn complaints. Weight gain with Depo. Doing better with diet and has lost some weight. HbA1c around 8% per pt which is good for her. Wants to switch off Depo later on this year. Discussed options. Done with kids. Likes not having a period but not with the weight gain. Discussed options. Interested in Mirena. Will call when ready    Past Medical History:   Diagnosis Date    Depression     Hyperlipidemia     Hypertension     Type I (juvenile type) diabetes mellitus without mention of complication, uncontrolled 2011       Past Surgical History:   Procedure Laterality Date    ABCESS DRAINAGE      boil went over GA     SECTION  2012    DILATION AND CURETTAGE OF UTERUS  2019    Procedure: DILATION AND CURETTAGE, UTERUS;  Surgeon: Purnima Kidd MD;  Location: [facility] OR;  Service: OB/GYN;;    HYSTEROSCOPY N/A 2019    Procedure: HYSTEROSCOPY;  Surgeon: Purnima Kidd MD;  Location: [facility] OR;  Service: OB/GYN;  Laterality: N/A;    TONSILLECTOMY, ADENOIDECTOMY      WISDOM TOOTH EXTRACTION         OB History    Para Term  AB Living   2 2 1 1   2   SAB TAB Ectopic Multiple Live Births           2      # Outcome Date GA Lbr Hawk/2nd Weight Sex Delivery Anes PTL Lv   2  17 34w2d  3.16 kg (6 lb 15.5 oz) M CS-LTranv Spinal, EPI Y KRYSTIAN      Complications: Diabetes   1 Term 12 37w5d  3.286 kg (7 lb 3.9 oz) F CS-LTranv EPI  KRYSTIAN      Complications: Preeclampsia      Obstetric Comments   Gestational HTN, failed induction, not past 6 cm   Menarche ~12       No LMP recorded (lmp unknown). Patient  has had an injection.   Date of Last Pap: 5/20/2019    Review of Systems  Review of Systems   Constitutional: Negative for fatigue and unexpected weight change.   Respiratory: Negative for shortness of breath.    Cardiovascular: Negative for chest pain.   Gastrointestinal: Negative for abdominal pain, constipation, diarrhea, nausea and vomiting.   Genitourinary: Negative for dysuria.   Musculoskeletal: Negative for back pain.   Skin: Negative for rash.   Neurological: Negative for headaches.   Hematological: Does not bruise/bleed easily.   Psychiatric/Behavioral: Negative for behavioral problems.        Objective:   Physical Exam:   Constitutional: She is oriented to person, place, and time. She appears well-developed and well-nourished. No distress.        Pulmonary/Chest: She exhibits no mass. Right breast exhibits no mass, no nipple discharge, no skin change, no tenderness, no bleeding and no swelling. Left breast exhibits no mass, no nipple discharge, no skin change, no tenderness, no bleeding and no swelling. Breasts are symmetrical.        Abdominal: Soft. Normal appearance and bowel sounds are normal. She exhibits no distension and no mass. There is no abdominal tenderness. There is no rebound.     Genitourinary:    Vagina and uterus normal.   There is no rash, tenderness, lesion or injury on the right labia. There is no rash, tenderness, lesion or injury on the left labia. Uterus is not deviated, not enlarged, not fixed, not tender, not hosting fibroids and not experiencing uterine prolapse. Cervix is normal. Right adnexum displays no mass, no tenderness and no fullness. Left adnexum displays no mass, no tenderness and no fullness. No erythema, tenderness, rectocele, cystocele or unspecified prolapse of vaginal walls in the vagina. Cervix exhibits no motion tenderness, no discharge and no friability.           Musculoskeletal: Normal range of motion and moves all extremeties.      Lymphadenopathy:         Right: No supraclavicular adenopathy present.        Left: No supraclavicular adenopathy present.    Neurological: She is alert and oriented to person, place, and time.    Skin: Skin is warm and dry.    Psychiatric: She has a normal mood and affect. Her behavior is normal. Judgment normal.        Assessment/ Plan:     1. Encounter for Papanicolaou smear for cervical cancer screening  Liquid-Based Pap Smear, Screening   2. Screening examination for STD (sexually transmitted disease)  C. trachomatis/N. gonorrhoeae by AMP DNA [facility]; Cervicovaginal   3. Encounter for gynecological examination (general) (routine) without abnormal findings     4. Encounter for Depo-Provera contraception  medroxyPROGESTERone (DEPO-PROVERA) 150 mg/mL Syrg       Follow-up with me in 1 year

## 2020-07-11 LAB
C TRACH DNA SPEC QL NAA+PROBE: NOT DETECTED
N GONORRHOEA DNA SPEC QL NAA+PROBE: NOT DETECTED

## 2020-07-15 ENCOUNTER — TELEPHONE (OUTPATIENT)
Dept: ENDOCRINOLOGY | Facility: CLINIC | Age: 30
End: 2020-07-15

## 2020-07-15 RX ORDER — INSULIN ASPART 100 [IU]/ML
INJECTION, SOLUTION INTRAVENOUS; SUBCUTANEOUS
Qty: 4 VIAL | Refills: 1 | Status: SHIPPED | OUTPATIENT
Start: 2020-07-15 | End: 2021-09-09

## 2020-07-15 NOTE — TELEPHONE ENCOUNTER
She will start insulin pump next Monday 7/20. Pt is taking Lantus 60 units hs and Novolog 16-20 units based on ICR of 3. BGs are high 100s.  Has not yet started Dexcom but set it up herself at home.     Current TDD ~ 115 units, reduced by 10% is 103. Will order the following initial insulin pump settings:   Basal rate 2 units/hr  ICR 4  Correction factor 20, active insulin time 3 hours.

## 2020-07-17 LAB
FINAL PATHOLOGIC DIAGNOSIS: NORMAL
Lab: NORMAL

## 2020-08-14 DIAGNOSIS — Z11.59 NEED FOR HEPATITIS C SCREENING TEST: ICD-10-CM

## 2020-08-20 ENCOUNTER — CLINICAL SUPPORT (OUTPATIENT)
Dept: OBSTETRICS AND GYNECOLOGY | Facility: CLINIC | Age: 30
End: 2020-08-20
Payer: MEDICAID

## 2020-08-20 DIAGNOSIS — Z30.42 ENCOUNTER FOR DEPO-PROVERA CONTRACEPTION: Primary | ICD-10-CM

## 2020-08-20 PROCEDURE — 99999 PR PBB SHADOW E&M-EST. PATIENT-LVL I: CPT | Mod: PBBFAC,,,

## 2020-08-20 PROCEDURE — 96372 THER/PROPH/DIAG INJ SC/IM: CPT | Mod: PBBFAC

## 2020-08-20 PROCEDURE — 99999 PR PBB SHADOW E&M-EST. PATIENT-LVL I: ICD-10-PCS | Mod: PBBFAC,,,

## 2020-08-20 RX ADMIN — MEDROXYPROGESTERONE ACETATE 150 MG: 150 INJECTION, SUSPENSION INTRAMUSCULAR at 04:08

## 2020-09-17 ENCOUNTER — TELEPHONE (OUTPATIENT)
Dept: ENDOCRINOLOGY | Facility: CLINIC | Age: 30
End: 2020-09-17

## 2020-09-17 NOTE — TELEPHONE ENCOUNTER
Tried contacting the pt to inform her that a fax was sent to Elva from [employer]. Informing her that they have tried to contact her several times for training and they have not got a response. In order for her to do the training she has to reach out. The pt did not answer LVM.

## 2020-09-23 ENCOUNTER — PATIENT MESSAGE (OUTPATIENT)
Dept: ENDOCRINOLOGY | Facility: CLINIC | Age: 30
End: 2020-09-23

## 2020-09-24 ENCOUNTER — PATIENT MESSAGE (OUTPATIENT)
Dept: ENDOCRINOLOGY | Facility: CLINIC | Age: 30
End: 2020-09-24

## 2020-09-24 ENCOUNTER — TELEPHONE (OUTPATIENT)
Dept: ENDOCRINOLOGY | Facility: CLINIC | Age: 30
End: 2020-09-24

## 2020-10-05 ENCOUNTER — PATIENT MESSAGE (OUTPATIENT)
Dept: ADMINISTRATIVE | Facility: HOSPITAL | Age: 30
End: 2020-10-05

## 2020-10-07 RX ORDER — PEN NEEDLE, DIABETIC 29 G X1/2"
NEEDLE, DISPOSABLE MISCELLANEOUS
COMMUNITY
Start: 2020-08-07 | End: 2021-02-24

## 2020-10-14 ENCOUNTER — OFFICE VISIT (OUTPATIENT)
Dept: ENDOCRINOLOGY | Facility: CLINIC | Age: 30
End: 2020-10-14
Payer: MEDICAID

## 2020-10-14 DIAGNOSIS — E78.5 HYPERLIPIDEMIA, UNSPECIFIED HYPERLIPIDEMIA TYPE: ICD-10-CM

## 2020-10-14 DIAGNOSIS — E11.649 HYPOGLYCEMIA ASSOCIATED WITH DIABETES: ICD-10-CM

## 2020-10-14 DIAGNOSIS — E10.65 POORLY CONTROLLED TYPE 1 DIABETES MELLITUS: Primary | ICD-10-CM

## 2020-10-14 PROCEDURE — 99213 PR OFFICE/OUTPT VISIT, EST, LEVL III, 20-29 MIN: ICD-10-PCS | Mod: 95,,, | Performed by: NURSE PRACTITIONER

## 2020-10-14 PROCEDURE — 99213 OFFICE O/P EST LOW 20 MIN: CPT | Mod: 95,,, | Performed by: NURSE PRACTITIONER

## 2020-10-14 NOTE — PROGRESS NOTES
The patient location is: [place]   The chief complaint leading to consultation is: type 1 DM     Visit type: audiovisual    Face to Face time with patient: 23 minutes of total time spent on the encounter, which includes face to face time and non-face to face time preparing to see the patient (eg, review of tests), Obtaining and/or reviewing separately obtained history, Documenting clinical information in the electronic or other health record, Independently interpreting results (not separately reported) and communicating results to the patient/family/caregiver, or Care coordination (not separately reported).         Each patient to whom he or she provides medical services by telemedicine is:  (1) informed of the relationship between the physician and patient and the respective role of any other health care provider with respect to management of the patient; and (2) notified that he or she may decline to receive medical services by telemedicine and may withdraw from such care at any time.    Notes:       CC: This 29 y.o. Black or  female  is here for evaluation of  T1DM along with comorbidities indicated in the Visit Diagnosis section of this encounter.    HPI: Anusha Andrew was diagnosed with T1DM at age 17. Insulin started at the time of diagnosis.   She has tried an Omnipod insulin pump before x 2 weeks. Has never tried CGM.         Prior visit 5/21/20 virtual visit   a1c is down from 10.9 to 9.1%, the lowest it's been in 3 years. Has been drinking vinegar before meals - 1 tsp before each meal.   She still hasn't gotten phone calls about getting either Dexcom CGM or Tslim insulin pump.   She did try using Cinthia for a few days and did like it. Would like to restart.   She just started atorvastatin a week ago. She denies muscle cramps/fatigue.   Plan Make sure to take Novolog prior to any food intake that has carbs including snacks. This should help to reduce high glucoses before dinner. If they  are still high at dinner, then increase carb ratio at lunch to 2.   Will send out orders again for Tslim insulin pump and Dexcom.   Please contact office if you do not hear back from these companies after a week.   Test glucose 4x/day.   Return to clinic in 3 months with labs prior, or sooner if insulin pump can be started earlier.       Interval history virtual visit   a1c is up from 8.8% in June and now 10%. Pt wonders why her BG is high since she's exercising and eating healthy. She's been on phentermine.   She suspects that maybe it's because she keeps injecting insulin in the same spot on the left side.     She stopped using Dexcom but will get another supply and resume soon. She also has her insulin pump and will contact the educator to start training.         LAST DIABETES EDUCATION: 1/2016 1/27/20 - VASHTI De Paz, RD     HOSPITALIZED FOR DIABETES -  Yes -   For DKA - 10/18/18 x 4 days; presented with abdominal pain, n/v, SOB  A total of 4 hospital visits for DKA.     DIABETES MEDICATIONS: Lantus Solostar 60 units every night ~ 9 pm, Novolog Flexpen ac based on ICR 2 before breakfast and ICR 3 before lunch and dinner     Takes Novolog 10-25 units ac. Averages 20-25 unitsac.     Misses medication doses - denies missed doses       DM COMPLICATIONS: none    SIGNIFICANT DIABETES MED HISTORY: denies intolerance to prior insulin types     SELF MONITORING BLOOD GLUCOSE: Checks blood glucose at home 4x/day. BGs averaging 230s.     HYPOGLYCEMIC EPISODES: has BGs in the 50s about 2x/week during work     CURRENT DIET: drinks diet tea, crystal lite. Eats 3 meals/day. She snacks between lunch and dinner and dose not take Novolog for them.      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              Hemoglobin A1C   Date Value Ref Range Status   10/07/2020 10.0 (H) 4.0 - 5.6 % Final      Comment:     ADA Screening Guidelines:  5.7-6.4%  Consistent with prediabetes  >or=6.5%  Consistent with diabetes  High levels of fetal hemoglobin interfere with the HbA1C  assay. Heterozygous hemoglobin variants (HbS, HgC, etc)do  not significantly interfere with this assay.   However, presence of multiple variants may affect accuracy.     06/19/2020 8.8 (H) 4.0 - 5.6 % Final     Comment:     ADA Screening Guidelines:  5.7-6.4%  Consistent with prediabetes  >or=6.5%  Consistent with diabetes  High levels of fetal hemoglobin interfere with the HbA1C  assay. Heterozygous hemoglobin variants (HbS, HgC, etc)do  not significantly interfere with this assay.   However, presence of multiple variants may affect accuracy.     05/15/2020 9.1 (H) 4.0 - 5.6 % Final     Comment:     ADA Screening Guidelines:  5.7-6.4%  Consistent with prediabetes  >or=6.5%  Consistent with diabetes  High levels of fetal hemoglobin interfere with the HbA1C  assay. Heterozygous hemoglobin variants (HbS, HgC, etc)do  not significantly interfere with this assay.   However, presence of multiple variants may affect accuracy.         Lab Results   Component Value Date    TSH 2.133 06/19/2020         Chemistry        Component Value Date/Time     (L) 06/22/2019 0835    K 4.2 06/22/2019 0835     06/22/2019 0835    CO2 23 06/22/2019 0835    BUN 14 06/22/2019 0835    CREATININE 0.9 06/22/2019 0835     (H) 06/22/2019 0835        Component Value Date/Time    CALCIUM 8.7 06/22/2019 0835    ALKPHOS 89 06/19/2020 0905    AST 15 06/19/2020 0905    ALT 16 06/19/2020 0905    BILITOT 0.3 06/19/2020 0905    ESTGFRAFRICA >60 06/22/2019 0835    EGFRNONAA >60 06/22/2019 0835          Lab Results   Component Value Date    LDLCALC 148.0 06/19/2020       Lab Results   Component Value Date    MICALBCREAT 24.5 02/17/2020       STANDARDS of CARE:        ASA:               Last eye exam:       ASSESSMENT and PLAN:    A1C GOAL: < 7 % if no hypoglycemia        1. Poorly controlled type 1 diabetes mellitus  Rotate insulin injection sites.   Make sure to contact  to start insulin pump training. And resume Dexcom CGM asap.     rtc about 2 weeks after insulin pump training.    2. Hyperlipidemia, unspecified hyperlipidemia type  Needs to take atorvastatin.    3. Hypoglycemia associated with diabetes  Resume CGM.          Patient is testing blood sugars 4x/day and taking 4 injections of insulin a day. The patient's insulin treatment regimen requires frequent adjustments by the patient on the basis of therapeutic CGM testing results.     No orders of the defined types were placed in this encounter.       No follow-ups on file.

## 2020-10-14 NOTE — PATIENT INSTRUCTIONS
Rotate insulin injection sites.   Make sure to contact  to start insulin pump training. And resume Dexcom CGM asap.

## 2020-10-22 ENCOUNTER — TELEPHONE (OUTPATIENT)
Dept: PRIMARY CARE CLINIC | Facility: CLINIC | Age: 30
End: 2020-10-22

## 2020-10-22 ENCOUNTER — PATIENT MESSAGE (OUTPATIENT)
Dept: OBSTETRICS AND GYNECOLOGY | Facility: CLINIC | Age: 30
End: 2020-10-22

## 2020-10-22 ENCOUNTER — OCCUPATIONAL HEALTH (OUTPATIENT)
Dept: URGENT CARE | Facility: CLINIC | Age: 30
End: 2020-10-22

## 2020-10-22 DIAGNOSIS — R05.9 COUGH: ICD-10-CM

## 2020-10-22 DIAGNOSIS — R05.9 COUGH: Primary | ICD-10-CM

## 2020-10-22 DIAGNOSIS — J02.9 SORE THROAT: ICD-10-CM

## 2020-10-22 DIAGNOSIS — R51.9 NONINTRACTABLE HEADACHE, UNSPECIFIED CHRONICITY PATTERN, UNSPECIFIED HEADACHE TYPE: ICD-10-CM

## 2020-10-22 LAB
CTP QC/QA: YES
SARS-COV-2 RDRP RESP QL NAA+PROBE: NEGATIVE

## 2020-10-22 PROCEDURE — U0002 COVID-19 LAB TEST NON-CDC: HCPCS | Mod: QW,S$GLB,, | Performed by: INTERNAL MEDICINE

## 2020-10-22 PROCEDURE — U0002: ICD-10-PCS | Mod: QW,S$GLB,, | Performed by: INTERNAL MEDICINE

## 2020-10-26 ENCOUNTER — PATIENT MESSAGE (OUTPATIENT)
Dept: ENDOCRINOLOGY | Facility: CLINIC | Age: 30
End: 2020-10-26

## 2020-11-05 ENCOUNTER — PATIENT MESSAGE (OUTPATIENT)
Dept: OBSTETRICS AND GYNECOLOGY | Facility: CLINIC | Age: 30
End: 2020-11-05

## 2020-11-05 ENCOUNTER — PATIENT MESSAGE (OUTPATIENT)
Dept: ENDOCRINOLOGY | Facility: CLINIC | Age: 30
End: 2020-11-05

## 2020-11-06 ENCOUNTER — CLINICAL SUPPORT (OUTPATIENT)
Dept: OBSTETRICS AND GYNECOLOGY | Facility: CLINIC | Age: 30
End: 2020-11-06
Payer: MEDICAID

## 2020-11-06 DIAGNOSIS — Z30.42 ENCOUNTER FOR DEPO-PROVERA CONTRACEPTION: Primary | ICD-10-CM

## 2020-11-06 PROCEDURE — 96372 THER/PROPH/DIAG INJ SC/IM: CPT | Mod: PBBFAC

## 2020-11-06 RX ORDER — MEDROXYPROGESTERONE ACETATE 150 MG/ML
150 INJECTION, SUSPENSION INTRAMUSCULAR
Status: COMPLETED | OUTPATIENT
Start: 2020-11-06 | End: 2020-11-06

## 2020-11-06 RX ADMIN — MEDROXYPROGESTERONE ACETATE 150 MG: 150 INJECTION, SUSPENSION, EXTENDED RELEASE INTRAMUSCULAR at 08:11

## 2020-11-11 NOTE — ED NOTES
IGG report  Please refer to fax/correspondence folder [facility]-Dr. Hanna Daniels      Pt rounding complete. Pt updated on POC and pt verbalized understanding.  Pt denies any pain or needs at the moment. Pt does not appear to be in acute distress. Respirations are even and unlabored. . Bed is locked and in lowest position with side rails up x2. Call light is within reach. Will continue to monitor.

## 2020-12-04 DIAGNOSIS — Z01.84 ANTIBODY RESPONSE EXAMINATION: ICD-10-CM

## 2020-12-14 ENCOUNTER — PATIENT MESSAGE (OUTPATIENT)
Dept: ENDOCRINOLOGY | Facility: CLINIC | Age: 30
End: 2020-12-14

## 2020-12-15 ENCOUNTER — TELEPHONE (OUTPATIENT)
Dept: ORTHOPEDICS | Facility: CLINIC | Age: 30
End: 2020-12-15

## 2020-12-15 ENCOUNTER — PATIENT MESSAGE (OUTPATIENT)
Dept: ORTHOPEDICS | Facility: CLINIC | Age: 30
End: 2020-12-15

## 2020-12-18 ENCOUNTER — PATIENT MESSAGE (OUTPATIENT)
Dept: FAMILY MEDICINE | Facility: CLINIC | Age: 30
End: 2020-12-18

## 2020-12-21 ENCOUNTER — PATIENT MESSAGE (OUTPATIENT)
Dept: ENDOCRINOLOGY | Facility: CLINIC | Age: 30
End: 2020-12-21

## 2021-01-15 ENCOUNTER — PATIENT MESSAGE (OUTPATIENT)
Dept: INTERNAL MEDICINE | Facility: CLINIC | Age: 31
End: 2021-01-15

## 2021-01-15 DIAGNOSIS — G56.03 BILATERAL CARPAL TUNNEL SYNDROME: Primary | ICD-10-CM

## 2021-01-19 ENCOUNTER — OFFICE VISIT (OUTPATIENT)
Dept: ORTHOPEDICS | Facility: CLINIC | Age: 31
End: 2021-01-19
Payer: MEDICAID

## 2021-01-19 ENCOUNTER — TELEPHONE (OUTPATIENT)
Dept: INTERNAL MEDICINE | Facility: CLINIC | Age: 31
End: 2021-01-19

## 2021-01-19 VITALS
OXYGEN SATURATION: 97 % | HEIGHT: 63 IN | DIASTOLIC BLOOD PRESSURE: 80 MMHG | WEIGHT: 212.5 LBS | HEART RATE: 85 BPM | RESPIRATION RATE: 18 BRPM | BODY MASS INDEX: 37.65 KG/M2 | SYSTOLIC BLOOD PRESSURE: 124 MMHG

## 2021-01-19 DIAGNOSIS — M65.339 TRIGGER MIDDLE FINGER, UNSPECIFIED LATERALITY: Primary | ICD-10-CM

## 2021-01-19 DIAGNOSIS — E10.65 POORLY CONTROLLED TYPE 1 DIABETES MELLITUS: Primary | Chronic | ICD-10-CM

## 2021-01-19 PROCEDURE — 99204 PR OFFICE/OUTPT VISIT, NEW, LEVL IV, 45-59 MIN: ICD-10-PCS | Mod: S$PBB,,, | Performed by: ORTHOPAEDIC SURGERY

## 2021-01-19 PROCEDURE — 99999 PR PBB SHADOW E&M-EST. PATIENT-LVL IV: ICD-10-PCS | Mod: PBBFAC,,, | Performed by: ORTHOPAEDIC SURGERY

## 2021-01-19 PROCEDURE — 99999 PR PBB SHADOW E&M-EST. PATIENT-LVL IV: CPT | Mod: PBBFAC,,, | Performed by: ORTHOPAEDIC SURGERY

## 2021-01-19 PROCEDURE — 99204 OFFICE O/P NEW MOD 45 MIN: CPT | Mod: S$PBB,,, | Performed by: ORTHOPAEDIC SURGERY

## 2021-01-19 PROCEDURE — 99214 OFFICE O/P EST MOD 30 MIN: CPT | Mod: PBBFAC,25,PN | Performed by: ORTHOPAEDIC SURGERY

## 2021-01-19 RX ORDER — DICLOFENAC SODIUM 50 MG/1
50 TABLET, DELAYED RELEASE ORAL 2 TIMES DAILY
Qty: 60 TABLET | Refills: 0 | Status: SHIPPED | OUTPATIENT
Start: 2021-01-19 | End: 2021-02-24

## 2021-01-25 ENCOUNTER — PATIENT MESSAGE (OUTPATIENT)
Dept: INTERNAL MEDICINE | Facility: CLINIC | Age: 31
End: 2021-01-25

## 2021-01-25 ENCOUNTER — PATIENT MESSAGE (OUTPATIENT)
Dept: OBSTETRICS AND GYNECOLOGY | Facility: CLINIC | Age: 31
End: 2021-01-25

## 2021-02-01 ENCOUNTER — CLINICAL SUPPORT (OUTPATIENT)
Dept: OBSTETRICS AND GYNECOLOGY | Facility: CLINIC | Age: 31
End: 2021-02-01
Payer: MEDICAID

## 2021-02-01 DIAGNOSIS — Z30.9 ENCOUNTER FOR CONTRACEPTIVE MANAGEMENT, UNSPECIFIED TYPE: Primary | ICD-10-CM

## 2021-02-01 PROCEDURE — 99999 PR PBB SHADOW E&M-EST. PATIENT-LVL I: CPT | Mod: PBBFAC,,,

## 2021-02-01 PROCEDURE — 99999 PR PBB SHADOW E&M-EST. PATIENT-LVL I: ICD-10-PCS | Mod: PBBFAC,,,

## 2021-02-01 PROCEDURE — 96372 THER/PROPH/DIAG INJ SC/IM: CPT | Mod: PBBFAC

## 2021-02-01 RX ORDER — MEDROXYPROGESTERONE ACETATE 150 MG/ML
150 INJECTION, SUSPENSION INTRAMUSCULAR
Status: DISCONTINUED | OUTPATIENT
Start: 2021-02-01 | End: 2021-12-09

## 2021-02-01 RX ADMIN — MEDROXYPROGESTERONE ACETATE 150 MG: 150 INJECTION, SUSPENSION INTRAMUSCULAR at 08:02

## 2021-02-15 ENCOUNTER — PATIENT MESSAGE (OUTPATIENT)
Dept: ORTHOPEDICS | Facility: CLINIC | Age: 31
End: 2021-02-15

## 2021-02-15 ENCOUNTER — PATIENT MESSAGE (OUTPATIENT)
Dept: INTERNAL MEDICINE | Facility: CLINIC | Age: 31
End: 2021-02-15

## 2021-02-18 ENCOUNTER — PATIENT MESSAGE (OUTPATIENT)
Dept: ORTHOPEDICS | Facility: CLINIC | Age: 31
End: 2021-02-18

## 2021-02-18 DIAGNOSIS — M65.339 TRIGGER MIDDLE FINGER, UNSPECIFIED LATERALITY: Primary | ICD-10-CM

## 2021-02-18 DIAGNOSIS — R21 RASH: Primary | ICD-10-CM

## 2021-02-24 ENCOUNTER — OFFICE VISIT (OUTPATIENT)
Dept: INTERNAL MEDICINE | Facility: CLINIC | Age: 31
End: 2021-02-24
Payer: MEDICAID

## 2021-02-24 VITALS
HEART RATE: 107 BPM | SYSTOLIC BLOOD PRESSURE: 128 MMHG | HEIGHT: 63 IN | BODY MASS INDEX: 37.23 KG/M2 | WEIGHT: 210.13 LBS | OXYGEN SATURATION: 99 % | DIASTOLIC BLOOD PRESSURE: 80 MMHG

## 2021-02-24 DIAGNOSIS — Z71.89 COUNSELING AND COORDINATION OF CARE: ICD-10-CM

## 2021-02-24 DIAGNOSIS — E78.5 HYPERLIPIDEMIA, UNSPECIFIED HYPERLIPIDEMIA TYPE: ICD-10-CM

## 2021-02-24 DIAGNOSIS — M65.331 TRIGGER MIDDLE FINGER OF RIGHT HAND: ICD-10-CM

## 2021-02-24 DIAGNOSIS — E66.9 OBESITY (BMI 30.0-34.9): Chronic | ICD-10-CM

## 2021-02-24 DIAGNOSIS — M65.332 TRIGGER MIDDLE FINGER OF LEFT HAND: ICD-10-CM

## 2021-02-24 DIAGNOSIS — Z11.59 NEED FOR HEPATITIS C SCREENING TEST: ICD-10-CM

## 2021-02-24 DIAGNOSIS — E10.65 TYPE 1 DIABETES MELLITUS WITH HYPERGLYCEMIA: Primary | ICD-10-CM

## 2021-02-24 PROCEDURE — 99215 OFFICE O/P EST HI 40 MIN: CPT | Mod: PBBFAC | Performed by: NURSE PRACTITIONER

## 2021-02-24 PROCEDURE — 99214 OFFICE O/P EST MOD 30 MIN: CPT | Mod: S$PBB,,, | Performed by: NURSE PRACTITIONER

## 2021-02-24 PROCEDURE — 99999 PR PBB SHADOW E&M-EST. PATIENT-LVL V: ICD-10-PCS | Mod: PBBFAC,,, | Performed by: NURSE PRACTITIONER

## 2021-02-24 PROCEDURE — 99214 PR OFFICE/OUTPT VISIT, EST, LEVL IV, 30-39 MIN: ICD-10-PCS | Mod: S$PBB,,, | Performed by: NURSE PRACTITIONER

## 2021-02-24 PROCEDURE — 99999 PR PBB SHADOW E&M-EST. PATIENT-LVL V: CPT | Mod: PBBFAC,,, | Performed by: NURSE PRACTITIONER

## 2021-02-24 RX ORDER — GLUCAGON 3 MG/1
POWDER NASAL
Qty: 1 EACH | Refills: 2 | Status: SHIPPED | OUTPATIENT
Start: 2021-02-24 | End: 2021-09-09

## 2021-02-24 RX ORDER — INSULIN PUMP SYRINGE, 3 ML
EACH MISCELLANEOUS
Qty: 1 EACH | Refills: 0 | Status: SHIPPED | OUTPATIENT
Start: 2021-02-24 | End: 2021-12-09

## 2021-02-24 RX ORDER — INSULIN ASPART 100 [IU]/ML
INJECTION, SOLUTION INTRAVENOUS; SUBCUTANEOUS
Qty: 2 BOX | Refills: 6 | Status: SHIPPED | OUTPATIENT
Start: 2021-02-24 | End: 2021-09-06 | Stop reason: SDUPTHER

## 2021-02-24 RX ORDER — INSULIN GLARGINE 100 [IU]/ML
INJECTION, SOLUTION SUBCUTANEOUS
Qty: 30 ML | Refills: 6 | Status: SHIPPED | OUTPATIENT
Start: 2021-02-24 | End: 2021-09-06 | Stop reason: SDUPTHER

## 2021-02-24 RX ORDER — ATORVASTATIN CALCIUM 20 MG/1
20 TABLET, FILM COATED ORAL DAILY
Qty: 90 TABLET | Refills: 3 | Status: SHIPPED | OUTPATIENT
Start: 2021-02-24 | End: 2021-12-09

## 2021-02-24 RX ORDER — LANCETS
EACH MISCELLANEOUS
Qty: 400 EACH | Refills: 3 | Status: ON HOLD | OUTPATIENT
Start: 2021-02-24 | End: 2024-01-29 | Stop reason: HOSPADM

## 2021-02-25 ENCOUNTER — PATIENT MESSAGE (OUTPATIENT)
Dept: DIABETES | Facility: CLINIC | Age: 31
End: 2021-02-25

## 2021-02-25 ENCOUNTER — PATIENT MESSAGE (OUTPATIENT)
Dept: INTERNAL MEDICINE | Facility: CLINIC | Age: 31
End: 2021-02-25

## 2021-02-25 ENCOUNTER — TELEPHONE (OUTPATIENT)
Dept: INTERNAL MEDICINE | Facility: CLINIC | Age: 31
End: 2021-02-25

## 2021-03-11 ENCOUNTER — PATIENT OUTREACH (OUTPATIENT)
Dept: ADMINISTRATIVE | Facility: OTHER | Age: 31
End: 2021-03-11

## 2021-03-11 DIAGNOSIS — E10.65 TYPE 1 DIABETES MELLITUS WITH HYPERGLYCEMIA: Primary | ICD-10-CM

## 2021-03-15 ENCOUNTER — TELEPHONE (OUTPATIENT)
Dept: INTERNAL MEDICINE | Facility: CLINIC | Age: 31
End: 2021-03-15

## 2021-03-16 ENCOUNTER — PATIENT OUTREACH (OUTPATIENT)
Dept: ADMINISTRATIVE | Facility: HOSPITAL | Age: 31
End: 2021-03-16

## 2021-03-16 DIAGNOSIS — E10.65 TYPE 1 DIABETES MELLITUS WITH HYPERGLYCEMIA: Primary | ICD-10-CM

## 2021-03-16 RX ORDER — LANCETS 33 GAUGE
EACH MISCELLANEOUS
Status: ON HOLD | COMMUNITY
Start: 2021-02-24 | End: 2024-01-29 | Stop reason: HOSPADM

## 2021-03-16 RX ORDER — LANCETS 30 GAUGE
EACH MISCELLANEOUS
COMMUNITY
Start: 2021-02-24 | End: 2023-08-23

## 2021-03-17 ENCOUNTER — PATIENT MESSAGE (OUTPATIENT)
Dept: INTERNAL MEDICINE | Facility: CLINIC | Age: 31
End: 2021-03-17

## 2021-03-18 ENCOUNTER — PATIENT MESSAGE (OUTPATIENT)
Dept: INTERNAL MEDICINE | Facility: CLINIC | Age: 31
End: 2021-03-18

## 2021-03-18 DIAGNOSIS — M79.606 ACUTE PAIN OF LOWER EXTREMITY, UNSPECIFIED LATERALITY: ICD-10-CM

## 2021-03-26 ENCOUNTER — PATIENT MESSAGE (OUTPATIENT)
Dept: DIABETES | Facility: CLINIC | Age: 31
End: 2021-03-26

## 2021-03-30 ENCOUNTER — OFFICE VISIT (OUTPATIENT)
Dept: OPTOMETRY | Facility: CLINIC | Age: 31
End: 2021-03-30
Payer: MEDICAID

## 2021-03-30 ENCOUNTER — OFFICE VISIT (OUTPATIENT)
Dept: FAMILY MEDICINE | Facility: CLINIC | Age: 31
End: 2021-03-30
Payer: MEDICAID

## 2021-03-30 VITALS
DIASTOLIC BLOOD PRESSURE: 80 MMHG | TEMPERATURE: 98 F | HEIGHT: 63 IN | SYSTOLIC BLOOD PRESSURE: 132 MMHG | BODY MASS INDEX: 37.42 KG/M2 | OXYGEN SATURATION: 99 % | WEIGHT: 211.19 LBS | HEART RATE: 95 BPM

## 2021-03-30 DIAGNOSIS — E10.3293 MILD NONPROLIFERATIVE DIABETIC RETINOPATHY OF BOTH EYES WITHOUT MACULAR EDEMA ASSOCIATED WITH TYPE 1 DIABETES MELLITUS: Primary | ICD-10-CM

## 2021-03-30 DIAGNOSIS — E66.01 SEVERE OBESITY (BMI 35.0-39.9) WITH COMORBIDITY: ICD-10-CM

## 2021-03-30 DIAGNOSIS — E10.3293 MILD NONPROLIFERATIVE DIABETIC RETINOPATHY OF BOTH EYES WITHOUT MACULAR EDEMA ASSOCIATED WITH TYPE 1 DIABETES MELLITUS: ICD-10-CM

## 2021-03-30 DIAGNOSIS — E10.65 TYPE 1 DIABETES MELLITUS WITH HYPERGLYCEMIA: ICD-10-CM

## 2021-03-30 DIAGNOSIS — Z00.00 ANNUAL PHYSICAL EXAM: Primary | ICD-10-CM

## 2021-03-30 LAB
LEFT EYE DM RETINOPATHY: POSITIVE
RIGHT EYE DM RETINOPATHY: POSITIVE

## 2021-03-30 PROCEDURE — 99999 PR PBB SHADOW E&M-EST. PATIENT-LVL III: CPT | Mod: PBBFAC,,, | Performed by: OPTOMETRIST

## 2021-03-30 PROCEDURE — 99214 OFFICE O/P EST MOD 30 MIN: CPT | Mod: PBBFAC,27,PO | Performed by: FAMILY MEDICINE

## 2021-03-30 PROCEDURE — 99999 PR PBB SHADOW E&M-EST. PATIENT-LVL IV: ICD-10-PCS | Mod: PBBFAC,,, | Performed by: FAMILY MEDICINE

## 2021-03-30 PROCEDURE — 99213 OFFICE O/P EST LOW 20 MIN: CPT | Mod: PBBFAC,PO,27 | Performed by: OPTOMETRIST

## 2021-03-30 PROCEDURE — 92015 PR REFRACTION: ICD-10-PCS | Mod: ,,, | Performed by: OPTOMETRIST

## 2021-03-30 PROCEDURE — 99999 PR PBB SHADOW E&M-EST. PATIENT-LVL III: ICD-10-PCS | Mod: PBBFAC,,, | Performed by: OPTOMETRIST

## 2021-03-30 PROCEDURE — 99395 PREV VISIT EST AGE 18-39: CPT | Mod: S$PBB,,, | Performed by: FAMILY MEDICINE

## 2021-03-30 PROCEDURE — 99999 PR PBB SHADOW E&M-EST. PATIENT-LVL IV: CPT | Mod: PBBFAC,,, | Performed by: FAMILY MEDICINE

## 2021-03-30 PROCEDURE — 92014 PR EYE EXAM, EST PATIENT,COMPREHESV: ICD-10-PCS | Mod: S$PBB,,, | Performed by: OPTOMETRIST

## 2021-03-30 PROCEDURE — 92014 COMPRE OPH EXAM EST PT 1/>: CPT | Mod: S$PBB,,, | Performed by: OPTOMETRIST

## 2021-03-30 PROCEDURE — 92015 DETERMINE REFRACTIVE STATE: CPT | Mod: ,,, | Performed by: OPTOMETRIST

## 2021-03-30 PROCEDURE — 99395 PR PREVENTIVE VISIT,EST,18-39: ICD-10-PCS | Mod: S$PBB,,, | Performed by: FAMILY MEDICINE

## 2021-04-01 ENCOUNTER — PATIENT MESSAGE (OUTPATIENT)
Dept: OBSTETRICS AND GYNECOLOGY | Facility: CLINIC | Age: 31
End: 2021-04-01

## 2021-04-06 ENCOUNTER — PATIENT MESSAGE (OUTPATIENT)
Dept: DIABETES | Facility: CLINIC | Age: 31
End: 2021-04-06

## 2021-04-06 ENCOUNTER — PATIENT MESSAGE (OUTPATIENT)
Dept: FAMILY MEDICINE | Facility: CLINIC | Age: 31
End: 2021-04-06

## 2021-04-09 ENCOUNTER — PATIENT MESSAGE (OUTPATIENT)
Dept: INTERNAL MEDICINE | Facility: CLINIC | Age: 31
End: 2021-04-09

## 2021-04-09 ENCOUNTER — TELEPHONE (OUTPATIENT)
Dept: DIABETES | Facility: CLINIC | Age: 31
End: 2021-04-09

## 2021-04-09 ENCOUNTER — CLINICAL SUPPORT (OUTPATIENT)
Dept: DIABETES | Facility: CLINIC | Age: 31
End: 2021-04-09
Payer: MEDICAID

## 2021-04-09 DIAGNOSIS — E10.65 TYPE 1 DIABETES MELLITUS WITH HYPERGLYCEMIA: ICD-10-CM

## 2021-04-09 PROCEDURE — 99999 PR PBB SHADOW E&M-EST. PATIENT-LVL II: ICD-10-PCS | Mod: PBBFAC,,,

## 2021-04-09 PROCEDURE — G0108 DIAB MANAGE TRN  PER INDIV: HCPCS | Mod: PBBFAC

## 2021-04-09 PROCEDURE — 99212 OFFICE O/P EST SF 10 MIN: CPT | Mod: PBBFAC

## 2021-04-09 PROCEDURE — 99999 PR PBB SHADOW E&M-EST. PATIENT-LVL II: CPT | Mod: PBBFAC,,,

## 2021-04-16 ENCOUNTER — PATIENT MESSAGE (OUTPATIENT)
Dept: RESEARCH | Facility: HOSPITAL | Age: 31
End: 2021-04-16

## 2021-04-19 ENCOUNTER — PATIENT MESSAGE (OUTPATIENT)
Dept: FAMILY MEDICINE | Facility: CLINIC | Age: 31
End: 2021-04-19

## 2021-04-19 ENCOUNTER — IMMUNIZATION (OUTPATIENT)
Dept: PRIMARY CARE CLINIC | Facility: CLINIC | Age: 31
End: 2021-04-19
Payer: MEDICAID

## 2021-04-19 DIAGNOSIS — Z23 NEED FOR VACCINATION: Primary | ICD-10-CM

## 2021-04-19 PROCEDURE — 91300 COVID-19, MRNA, LNP-S, PF, 30 MCG/0.3 ML DOSE VACCINE: CPT | Mod: ,,, | Performed by: FAMILY MEDICINE

## 2021-04-19 PROCEDURE — 0001A COVID-19, MRNA, LNP-S, PF, 30 MCG/0.3 ML DOSE VACCINE: CPT | Mod: CV19,,, | Performed by: FAMILY MEDICINE

## 2021-04-19 PROCEDURE — 91300 COVID-19, MRNA, LNP-S, PF, 30 MCG/0.3 ML DOSE VACCINE: ICD-10-PCS | Mod: ,,, | Performed by: FAMILY MEDICINE

## 2021-04-19 PROCEDURE — 0001A COVID-19, MRNA, LNP-S, PF, 30 MCG/0.3 ML DOSE VACCINE: ICD-10-PCS | Mod: CV19,,, | Performed by: FAMILY MEDICINE

## 2021-04-20 ENCOUNTER — PATIENT MESSAGE (OUTPATIENT)
Dept: FAMILY MEDICINE | Facility: CLINIC | Age: 31
End: 2021-04-20

## 2021-04-20 ENCOUNTER — PATIENT MESSAGE (OUTPATIENT)
Dept: OBSTETRICS AND GYNECOLOGY | Facility: CLINIC | Age: 31
End: 2021-04-20

## 2021-04-20 DIAGNOSIS — L73.9 FOLLICULITIS: Primary | ICD-10-CM

## 2021-04-20 RX ORDER — AMOXICILLIN AND CLAVULANATE POTASSIUM 875; 125 MG/1; MG/1
1 TABLET, FILM COATED ORAL EVERY 12 HOURS
Qty: 20 TABLET | Refills: 0 | Status: SHIPPED | OUTPATIENT
Start: 2021-04-20 | End: 2021-07-14

## 2021-04-23 ENCOUNTER — CLINICAL SUPPORT (OUTPATIENT)
Dept: OBSTETRICS AND GYNECOLOGY | Facility: CLINIC | Age: 31
End: 2021-04-23
Payer: MEDICAID

## 2021-04-23 DIAGNOSIS — Z30.9 ENCOUNTER FOR CONTRACEPTIVE MANAGEMENT, UNSPECIFIED TYPE: Primary | ICD-10-CM

## 2021-04-23 PROCEDURE — 96372 THER/PROPH/DIAG INJ SC/IM: CPT | Mod: PBBFAC

## 2021-04-23 RX ADMIN — MEDROXYPROGESTERONE ACETATE 150 MG: 150 INJECTION, SUSPENSION INTRAMUSCULAR at 04:04

## 2021-04-26 ENCOUNTER — PATIENT MESSAGE (OUTPATIENT)
Dept: INTERNAL MEDICINE | Facility: CLINIC | Age: 31
End: 2021-04-26

## 2021-04-29 ENCOUNTER — TELEPHONE (OUTPATIENT)
Dept: INFECTIOUS DISEASES | Facility: HOSPITAL | Age: 31
End: 2021-04-29

## 2021-04-29 ENCOUNTER — LAB VISIT (OUTPATIENT)
Dept: INTERNAL MEDICINE | Facility: CLINIC | Age: 31
End: 2021-04-29

## 2021-04-29 DIAGNOSIS — R51.9 NONINTRACTABLE HEADACHE, UNSPECIFIED CHRONICITY PATTERN, UNSPECIFIED HEADACHE TYPE: Primary | ICD-10-CM

## 2021-04-29 DIAGNOSIS — R52 BODY ACHES: ICD-10-CM

## 2021-04-29 DIAGNOSIS — R53.83 FATIGUE, UNSPECIFIED TYPE: ICD-10-CM

## 2021-04-29 DIAGNOSIS — R51.9 NONINTRACTABLE HEADACHE, UNSPECIFIED CHRONICITY PATTERN, UNSPECIFIED HEADACHE TYPE: ICD-10-CM

## 2021-04-29 LAB
CTP QC/QA: YES
SARS-COV-2 RDRP RESP QL NAA+PROBE: NEGATIVE

## 2021-04-29 PROCEDURE — U0002 COVID-19 LAB TEST NON-CDC: HCPCS | Mod: QW,S$GLB,, | Performed by: PREVENTIVE MEDICINE

## 2021-04-29 PROCEDURE — U0002: ICD-10-PCS | Mod: QW,S$GLB,, | Performed by: PREVENTIVE MEDICINE

## 2021-05-03 ENCOUNTER — PATIENT MESSAGE (OUTPATIENT)
Dept: FAMILY MEDICINE | Facility: CLINIC | Age: 31
End: 2021-05-03

## 2021-05-03 DIAGNOSIS — M65.30 TRIGGER FINGER, UNSPECIFIED FINGER, UNSPECIFIED LATERALITY: Primary | ICD-10-CM

## 2021-05-10 ENCOUNTER — IMMUNIZATION (OUTPATIENT)
Dept: PRIMARY CARE CLINIC | Facility: CLINIC | Age: 31
End: 2021-05-10
Payer: MEDICAID

## 2021-05-10 DIAGNOSIS — Z23 NEED FOR VACCINATION: Primary | ICD-10-CM

## 2021-05-10 PROCEDURE — 91300 COVID-19, MRNA, LNP-S, PF, 30 MCG/0.3 ML DOSE VACCINE: CPT | Mod: PBBFAC,PN

## 2021-05-10 PROCEDURE — 0002A COVID-19, MRNA, LNP-S, PF, 30 MCG/0.3 ML DOSE VACCINE: CPT | Mod: PBBFAC,PN

## 2021-05-18 ENCOUNTER — PATIENT OUTREACH (OUTPATIENT)
Dept: ADMINISTRATIVE | Facility: OTHER | Age: 31
End: 2021-05-18

## 2021-05-25 ENCOUNTER — OFFICE VISIT (OUTPATIENT)
Dept: ORTHOPEDICS | Facility: CLINIC | Age: 31
End: 2021-05-25
Payer: MEDICAID

## 2021-05-25 VITALS
OXYGEN SATURATION: 96 % | WEIGHT: 216.94 LBS | RESPIRATION RATE: 18 BRPM | DIASTOLIC BLOOD PRESSURE: 70 MMHG | BODY MASS INDEX: 38.44 KG/M2 | SYSTOLIC BLOOD PRESSURE: 120 MMHG | HEIGHT: 63 IN | HEART RATE: 111 BPM

## 2021-05-25 DIAGNOSIS — M65.30 TRIGGER FINGER, UNSPECIFIED FINGER, UNSPECIFIED LATERALITY: ICD-10-CM

## 2021-05-25 PROCEDURE — 99215 OFFICE O/P EST HI 40 MIN: CPT | Mod: PBBFAC,PN | Performed by: ORTHOPAEDIC SURGERY

## 2021-05-25 PROCEDURE — 99999 PR PBB SHADOW E&M-EST. PATIENT-LVL V: ICD-10-PCS | Mod: PBBFAC,,, | Performed by: ORTHOPAEDIC SURGERY

## 2021-05-25 PROCEDURE — 99213 OFFICE O/P EST LOW 20 MIN: CPT | Mod: S$PBB,,, | Performed by: ORTHOPAEDIC SURGERY

## 2021-05-25 PROCEDURE — 99213 PR OFFICE/OUTPT VISIT, EST, LEVL III, 20-29 MIN: ICD-10-PCS | Mod: S$PBB,,, | Performed by: ORTHOPAEDIC SURGERY

## 2021-05-25 PROCEDURE — 99999 PR PBB SHADOW E&M-EST. PATIENT-LVL V: CPT | Mod: PBBFAC,,, | Performed by: ORTHOPAEDIC SURGERY

## 2021-06-07 ENCOUNTER — PATIENT MESSAGE (OUTPATIENT)
Dept: OBSTETRICS AND GYNECOLOGY | Facility: CLINIC | Age: 31
End: 2021-06-07

## 2021-06-11 ENCOUNTER — PATIENT MESSAGE (OUTPATIENT)
Dept: INTERNAL MEDICINE | Facility: CLINIC | Age: 31
End: 2021-06-11

## 2021-06-21 ENCOUNTER — PATIENT OUTREACH (OUTPATIENT)
Dept: ADMINISTRATIVE | Facility: OTHER | Age: 31
End: 2021-06-21

## 2021-06-23 DIAGNOSIS — E11.9 TYPE 2 DIABETES MELLITUS WITHOUT COMPLICATION: ICD-10-CM

## 2021-06-25 ENCOUNTER — OFFICE VISIT (OUTPATIENT)
Dept: INTERNAL MEDICINE | Facility: CLINIC | Age: 31
End: 2021-06-25
Payer: MEDICAID

## 2021-06-25 VITALS
DIASTOLIC BLOOD PRESSURE: 78 MMHG | BODY MASS INDEX: 37.77 KG/M2 | SYSTOLIC BLOOD PRESSURE: 130 MMHG | OXYGEN SATURATION: 99 % | WEIGHT: 213.19 LBS | HEIGHT: 63 IN | HEART RATE: 99 BPM

## 2021-06-25 DIAGNOSIS — E10.65 TYPE 1 DIABETES MELLITUS WITH HYPERGLYCEMIA: Primary | ICD-10-CM

## 2021-06-25 DIAGNOSIS — Z71.89 COUNSELING AND COORDINATION OF CARE: ICD-10-CM

## 2021-06-25 DIAGNOSIS — E10.3293 MILD NONPROLIFERATIVE DIABETIC RETINOPATHY OF BOTH EYES WITHOUT MACULAR EDEMA ASSOCIATED WITH TYPE 1 DIABETES MELLITUS: ICD-10-CM

## 2021-06-25 DIAGNOSIS — E66.9 OBESITY (BMI 30.0-34.9): Chronic | ICD-10-CM

## 2021-06-25 PROCEDURE — 99999 PR PBB SHADOW E&M-EST. PATIENT-LVL IV: ICD-10-PCS | Mod: PBBFAC,,, | Performed by: NURSE PRACTITIONER

## 2021-06-25 PROCEDURE — 99214 OFFICE O/P EST MOD 30 MIN: CPT | Mod: PBBFAC | Performed by: NURSE PRACTITIONER

## 2021-06-25 PROCEDURE — 99214 OFFICE O/P EST MOD 30 MIN: CPT | Mod: S$PBB,,, | Performed by: NURSE PRACTITIONER

## 2021-06-25 PROCEDURE — 99214 PR OFFICE/OUTPT VISIT, EST, LEVL IV, 30-39 MIN: ICD-10-PCS | Mod: S$PBB,,, | Performed by: NURSE PRACTITIONER

## 2021-06-25 PROCEDURE — 99999 PR PBB SHADOW E&M-EST. PATIENT-LVL IV: CPT | Mod: PBBFAC,,, | Performed by: NURSE PRACTITIONER

## 2021-07-01 ENCOUNTER — PATIENT MESSAGE (OUTPATIENT)
Dept: FAMILY MEDICINE | Facility: CLINIC | Age: 31
End: 2021-07-01

## 2021-07-04 ENCOUNTER — PATIENT MESSAGE (OUTPATIENT)
Dept: FAMILY MEDICINE | Facility: CLINIC | Age: 31
End: 2021-07-04

## 2021-07-08 ENCOUNTER — PATIENT MESSAGE (OUTPATIENT)
Dept: FAMILY MEDICINE | Facility: CLINIC | Age: 31
End: 2021-07-08

## 2021-07-08 ENCOUNTER — PATIENT MESSAGE (OUTPATIENT)
Dept: OBSTETRICS AND GYNECOLOGY | Facility: CLINIC | Age: 31
End: 2021-07-08

## 2021-07-14 ENCOUNTER — OFFICE VISIT (OUTPATIENT)
Dept: OBSTETRICS AND GYNECOLOGY | Facility: CLINIC | Age: 31
End: 2021-07-14
Attending: OBSTETRICS & GYNECOLOGY
Payer: MEDICAID

## 2021-07-14 VITALS
SYSTOLIC BLOOD PRESSURE: 134 MMHG | HEIGHT: 63 IN | DIASTOLIC BLOOD PRESSURE: 84 MMHG | BODY MASS INDEX: 37.11 KG/M2 | WEIGHT: 209.44 LBS

## 2021-07-14 DIAGNOSIS — F41.1 GAD (GENERALIZED ANXIETY DISORDER): ICD-10-CM

## 2021-07-14 DIAGNOSIS — Z01.419 ENCOUNTER FOR GYNECOLOGICAL EXAMINATION (GENERAL) (ROUTINE) WITHOUT ABNORMAL FINDINGS: ICD-10-CM

## 2021-07-14 DIAGNOSIS — Z01.419 ENCOUNTER FOR GYNECOLOGICAL EXAMINATION: Primary | ICD-10-CM

## 2021-07-14 DIAGNOSIS — Z11.3 SCREENING EXAMINATION FOR STD (SEXUALLY TRANSMITTED DISEASE): ICD-10-CM

## 2021-07-14 DIAGNOSIS — Z12.4 ENCOUNTER FOR PAPANICOLAOU SMEAR FOR CERVICAL CANCER SCREENING: ICD-10-CM

## 2021-07-14 PROCEDURE — 87591 N.GONORRHOEAE DNA AMP PROB: CPT | Performed by: OBSTETRICS & GYNECOLOGY

## 2021-07-14 PROCEDURE — 99395 PREV VISIT EST AGE 18-39: CPT | Mod: S$PBB,,, | Performed by: OBSTETRICS & GYNECOLOGY

## 2021-07-14 PROCEDURE — 99395 PR PREVENTIVE VISIT,EST,18-39: ICD-10-PCS | Mod: S$PBB,,, | Performed by: OBSTETRICS & GYNECOLOGY

## 2021-07-14 PROCEDURE — 99999 PR PBB SHADOW E&M-EST. PATIENT-LVL III: ICD-10-PCS | Mod: PBBFAC,,, | Performed by: OBSTETRICS & GYNECOLOGY

## 2021-07-14 PROCEDURE — 88175 CYTOPATH C/V AUTO FLUID REDO: CPT | Performed by: OBSTETRICS & GYNECOLOGY

## 2021-07-14 PROCEDURE — 99999 PR PBB SHADOW E&M-EST. PATIENT-LVL III: CPT | Mod: PBBFAC,,, | Performed by: OBSTETRICS & GYNECOLOGY

## 2021-07-14 PROCEDURE — 87491 CHLMYD TRACH DNA AMP PROBE: CPT | Performed by: OBSTETRICS & GYNECOLOGY

## 2021-07-14 PROCEDURE — 99213 OFFICE O/P EST LOW 20 MIN: CPT | Mod: PBBFAC | Performed by: OBSTETRICS & GYNECOLOGY

## 2021-07-14 RX ORDER — FLUOXETINE 10 MG/1
10 CAPSULE ORAL DAILY
Qty: 90 CAPSULE | Refills: 3 | Status: SHIPPED | OUTPATIENT
Start: 2021-07-14 | End: 2021-08-27 | Stop reason: SDUPTHER

## 2021-07-16 ENCOUNTER — PATIENT MESSAGE (OUTPATIENT)
Dept: OBSTETRICS AND GYNECOLOGY | Facility: CLINIC | Age: 31
End: 2021-07-16

## 2021-07-18 LAB
CLINICAL INFO: NORMAL
CYTO CVX: NORMAL
CYTOLOGIST CVX/VAG CYTO: NORMAL
CYTOLOGY CMNT CVX/VAG CYTO-IMP: NORMAL
CYTOLOGY PAP THIN PREP EXPLANATION: NORMAL
DATE OF PREVIOUS PAP: NORMAL
DATE PREVIOUS BX: NO
LMP START DATE: NORMAL
SPECIMEN SOURCE CVX/VAG CYTO: NORMAL
STAT OF ADQ CVX/VAG CYTO-IMP: NORMAL

## 2021-07-20 LAB
C TRACH DNA SPEC QL NAA+PROBE: NOT DETECTED
N GONORRHOEA DNA SPEC QL NAA+PROBE: NOT DETECTED

## 2021-07-23 ENCOUNTER — PATIENT MESSAGE (OUTPATIENT)
Dept: FAMILY MEDICINE | Facility: CLINIC | Age: 31
End: 2021-07-23

## 2021-07-23 ENCOUNTER — PATIENT OUTREACH (OUTPATIENT)
Dept: ADMINISTRATIVE | Facility: OTHER | Age: 31
End: 2021-07-23

## 2021-07-28 ENCOUNTER — TELEPHONE (OUTPATIENT)
Dept: OBSTETRICS AND GYNECOLOGY | Facility: CLINIC | Age: 31
End: 2021-07-28
Payer: MEDICAID

## 2021-07-28 DIAGNOSIS — Z30.2 STERILIZATION: Primary | ICD-10-CM

## 2021-07-30 ENCOUNTER — TELEPHONE (OUTPATIENT)
Dept: OBSTETRICS AND GYNECOLOGY | Facility: CLINIC | Age: 31
End: 2021-07-30

## 2021-07-30 DIAGNOSIS — Z01.818 PRE-PROCEDURAL EXAMINATION: Primary | ICD-10-CM

## 2021-08-24 ENCOUNTER — PATIENT MESSAGE (OUTPATIENT)
Dept: PRIMARY CARE CLINIC | Facility: CLINIC | Age: 31
End: 2021-08-24

## 2021-08-24 ENCOUNTER — OFFICE VISIT (OUTPATIENT)
Dept: PRIMARY CARE CLINIC | Facility: CLINIC | Age: 31
End: 2021-08-24
Payer: MEDICAID

## 2021-08-24 DIAGNOSIS — J02.9 ACUTE PHARYNGITIS, UNSPECIFIED ETIOLOGY: Primary | ICD-10-CM

## 2021-08-24 DIAGNOSIS — E10.65 TYPE 1 DIABETES MELLITUS WITH HYPERGLYCEMIA: ICD-10-CM

## 2021-08-24 DIAGNOSIS — F32.A DEPRESSION, UNSPECIFIED DEPRESSION TYPE: ICD-10-CM

## 2021-08-24 PROCEDURE — 99214 PR OFFICE/OUTPT VISIT, EST, LEVL IV, 30-39 MIN: ICD-10-PCS | Mod: 95,,, | Performed by: FAMILY MEDICINE

## 2021-08-24 PROCEDURE — 99214 OFFICE O/P EST MOD 30 MIN: CPT | Mod: 95,,, | Performed by: FAMILY MEDICINE

## 2021-08-27 ENCOUNTER — PATIENT MESSAGE (OUTPATIENT)
Dept: SURGERY | Facility: OTHER | Age: 31
End: 2021-08-27

## 2021-08-27 DIAGNOSIS — F41.1 GAD (GENERALIZED ANXIETY DISORDER): ICD-10-CM

## 2021-08-27 RX ORDER — FLUOXETINE 10 MG/1
10 CAPSULE ORAL DAILY
Qty: 90 CAPSULE | Refills: 2 | Status: SHIPPED | OUTPATIENT
Start: 2021-08-27 | End: 2021-12-06

## 2021-08-31 ENCOUNTER — TELEPHONE (OUTPATIENT)
Dept: ADMINISTRATIVE | Facility: CLINIC | Age: 31
End: 2021-08-31

## 2021-09-06 ENCOUNTER — PATIENT MESSAGE (OUTPATIENT)
Dept: INTERNAL MEDICINE | Facility: CLINIC | Age: 31
End: 2021-09-06

## 2021-09-06 ENCOUNTER — PATIENT MESSAGE (OUTPATIENT)
Dept: SURGERY | Facility: OTHER | Age: 31
End: 2021-09-06

## 2021-09-06 RX ORDER — ONDANSETRON 4 MG/1
4 TABLET, FILM COATED ORAL 2 TIMES DAILY
Qty: 60 TABLET | Refills: 1 | Status: SHIPPED | OUTPATIENT
Start: 2021-09-06 | End: 2022-04-06

## 2021-09-09 ENCOUNTER — HOSPITAL ENCOUNTER (INPATIENT)
Facility: HOSPITAL | Age: 31
LOS: 1 days | Discharge: HOME | DRG: 639 | End: 2021-09-10
Attending: EMERGENCY MEDICINE | Admitting: FAMILY MEDICINE
Payer: MEDICAID

## 2021-09-09 DIAGNOSIS — R11.2 NON-INTRACTABLE VOMITING WITH NAUSEA, UNSPECIFIED VOMITING TYPE: ICD-10-CM

## 2021-09-09 DIAGNOSIS — R10.13 EPIGASTRIC ABDOMINAL PAIN: ICD-10-CM

## 2021-09-09 DIAGNOSIS — R06.02 SHORTNESS OF BREATH: ICD-10-CM

## 2021-09-09 DIAGNOSIS — E10.10 TYPE 1 DIABETES MELLITUS WITH KETOACIDOSIS WITHOUT COMA: Primary | ICD-10-CM

## 2021-09-09 LAB
ALBUMIN SERPL BCP-MCNC: 4 G/DL (ref 3.5–5.2)
ALP SERPL-CCNC: 88 U/L (ref 55–135)
ALT SERPL W/O P-5'-P-CCNC: 16 U/L (ref 10–44)
ANION GAP SERPL CALC-SCNC: 10 MMOL/L (ref 8–16)
ANION GAP SERPL CALC-SCNC: 12 MMOL/L (ref 8–16)
ANION GAP SERPL CALC-SCNC: 13 MMOL/L (ref 8–16)
ANION GAP SERPL CALC-SCNC: 19 MMOL/L (ref 8–16)
AST SERPL-CCNC: 13 U/L (ref 10–40)
B-HCG UR QL: NEGATIVE
B-OH-BUTYR BLD STRIP-SCNC: 3.4 MMOL/L (ref 0–0.5)
BACTERIA #/AREA URNS HPF: NORMAL /HPF
BASOPHILS # BLD AUTO: 0.04 K/UL (ref 0–0.2)
BASOPHILS NFR BLD: 0.3 % (ref 0–1.9)
BILIRUB SERPL-MCNC: 0.6 MG/DL (ref 0.1–1)
BILIRUB UR QL STRIP: NEGATIVE
BUN SERPL-MCNC: 10 MG/DL (ref 6–20)
BUN SERPL-MCNC: 11 MG/DL (ref 6–20)
BUN SERPL-MCNC: 11 MG/DL (ref 6–20)
BUN SERPL-MCNC: 13 MG/DL (ref 6–20)
CALCIUM SERPL-MCNC: 8.7 MG/DL (ref 8.7–10.5)
CALCIUM SERPL-MCNC: 8.9 MG/DL (ref 8.7–10.5)
CALCIUM SERPL-MCNC: 8.9 MG/DL (ref 8.7–10.5)
CALCIUM SERPL-MCNC: 9.7 MG/DL (ref 8.7–10.5)
CHLORIDE SERPL-SCNC: 103 MMOL/L (ref 95–110)
CHLORIDE SERPL-SCNC: 106 MMOL/L (ref 95–110)
CHLORIDE SERPL-SCNC: 110 MMOL/L (ref 95–110)
CHLORIDE SERPL-SCNC: 110 MMOL/L (ref 95–110)
CLARITY UR: CLEAR
CO2 SERPL-SCNC: 15 MMOL/L (ref 23–29)
CO2 SERPL-SCNC: 15 MMOL/L (ref 23–29)
CO2 SERPL-SCNC: 17 MMOL/L (ref 23–29)
CO2 SERPL-SCNC: 18 MMOL/L (ref 23–29)
COLOR UR: COLORLESS
CREAT SERPL-MCNC: 0.8 MG/DL (ref 0.5–1.4)
CREAT SERPL-MCNC: 0.8 MG/DL (ref 0.5–1.4)
CREAT SERPL-MCNC: 0.9 MG/DL (ref 0.5–1.4)
CREAT SERPL-MCNC: 1.1 MG/DL (ref 0.5–1.4)
CTP QC/QA: YES
DIFFERENTIAL METHOD: ABNORMAL
EOSINOPHIL # BLD AUTO: 0 K/UL (ref 0–0.5)
EOSINOPHIL NFR BLD: 0.1 % (ref 0–8)
ERYTHROCYTE [DISTWIDTH] IN BLOOD BY AUTOMATED COUNT: 13 % (ref 11.5–14.5)
EST. GFR  (AFRICAN AMERICAN): >60 ML/MIN/1.73 M^2
EST. GFR  (NON AFRICAN AMERICAN): >60 ML/MIN/1.73 M^2
ESTIMATED AVG GLUCOSE: 209 MG/DL (ref 68–131)
GLUCOSE SERPL-MCNC: 178 MG/DL (ref 70–110)
GLUCOSE SERPL-MCNC: 218 MG/DL (ref 70–110)
GLUCOSE SERPL-MCNC: 316 MG/DL (ref 70–110)
GLUCOSE SERPL-MCNC: 414 MG/DL (ref 70–110)
GLUCOSE UR QL STRIP: ABNORMAL
HBA1C MFR BLD: 8.9 % (ref 4–5.6)
HCG INTACT+B SERPL-ACNC: <1.2 MIU/ML
HCT VFR BLD AUTO: 44.2 % (ref 37–48.5)
HGB BLD-MCNC: 14.1 G/DL (ref 12–16)
HGB UR QL STRIP: NEGATIVE
IMM GRANULOCYTES # BLD AUTO: 0.04 K/UL (ref 0–0.04)
IMM GRANULOCYTES NFR BLD AUTO: 0.3 % (ref 0–0.5)
KETONES UR QL STRIP: ABNORMAL
LACTATE SERPL-SCNC: 2 MMOL/L (ref 0.5–2.2)
LEUKOCYTE ESTERASE UR QL STRIP: NEGATIVE
LIPASE SERPL-CCNC: 27 U/L (ref 4–60)
LYMPHOCYTES # BLD AUTO: 1.7 K/UL (ref 1–4.8)
LYMPHOCYTES NFR BLD: 14 % (ref 18–48)
MCH RBC QN AUTO: 26.7 PG (ref 27–31)
MCHC RBC AUTO-ENTMCNC: 31.9 G/DL (ref 32–36)
MCV RBC AUTO: 84 FL (ref 82–98)
MICROSCOPIC COMMENT: NORMAL
MONOCYTES # BLD AUTO: 0.3 K/UL (ref 0.3–1)
MONOCYTES NFR BLD: 2.1 % (ref 4–15)
NEUTROPHILS # BLD AUTO: 10.2 K/UL (ref 1.8–7.7)
NEUTROPHILS NFR BLD: 83.2 % (ref 38–73)
NITRITE UR QL STRIP: NEGATIVE
NRBC BLD-RTO: 0 /100 WBC
PH UR STRIP: 6 [PH] (ref 5–8)
PLATELET # BLD AUTO: 451 K/UL (ref 150–450)
PMV BLD AUTO: 9.5 FL (ref 9.2–12.9)
POCT GLUCOSE: 207 MG/DL (ref 70–110)
POCT GLUCOSE: 325 MG/DL (ref 70–110)
POCT GLUCOSE: 366 MG/DL (ref 70–110)
POTASSIUM SERPL-SCNC: 4.3 MMOL/L (ref 3.5–5.1)
POTASSIUM SERPL-SCNC: 4.5 MMOL/L (ref 3.5–5.1)
POTASSIUM SERPL-SCNC: 4.7 MMOL/L (ref 3.5–5.1)
POTASSIUM SERPL-SCNC: 5.1 MMOL/L (ref 3.5–5.1)
PROT SERPL-MCNC: 8.6 G/DL (ref 6–8.4)
PROT UR QL STRIP: NEGATIVE
RBC # BLD AUTO: 5.28 M/UL (ref 4–5.4)
SARS-COV-2 RDRP RESP QL NAA+PROBE: NEGATIVE
SODIUM SERPL-SCNC: 134 MMOL/L (ref 136–145)
SODIUM SERPL-SCNC: 137 MMOL/L (ref 136–145)
SODIUM SERPL-SCNC: 138 MMOL/L (ref 136–145)
SODIUM SERPL-SCNC: 139 MMOL/L (ref 136–145)
SP GR UR STRIP: >1.03 (ref 1–1.03)
SQUAMOUS #/AREA URNS HPF: 2 /HPF
URN SPEC COLLECT METH UR: ABNORMAL
UROBILINOGEN UR STRIP-ACNC: NEGATIVE EU/DL
WBC # BLD AUTO: 12.32 K/UL (ref 3.9–12.7)
YEAST URNS QL MICRO: NORMAL

## 2021-09-09 PROCEDURE — 82962 GLUCOSE BLOOD TEST: CPT

## 2021-09-09 PROCEDURE — 93010 ELECTROCARDIOGRAM REPORT: CPT | Mod: ,,, | Performed by: INTERNAL MEDICINE

## 2021-09-09 PROCEDURE — 63600175 PHARM REV CODE 636 W HCPCS: Performed by: EMERGENCY MEDICINE

## 2021-09-09 PROCEDURE — 25000003 PHARM REV CODE 250: Performed by: PHYSICIAN ASSISTANT

## 2021-09-09 PROCEDURE — 83690 ASSAY OF LIPASE: CPT | Performed by: PHYSICIAN ASSISTANT

## 2021-09-09 PROCEDURE — 80053 COMPREHEN METABOLIC PANEL: CPT | Performed by: PHYSICIAN ASSISTANT

## 2021-09-09 PROCEDURE — 25000003 PHARM REV CODE 250: Performed by: NURSE PRACTITIONER

## 2021-09-09 PROCEDURE — 80048 BASIC METABOLIC PNL TOTAL CA: CPT | Performed by: NURSE PRACTITIONER

## 2021-09-09 PROCEDURE — 93010 EKG 12-LEAD: ICD-10-PCS | Mod: ,,, | Performed by: INTERNAL MEDICINE

## 2021-09-09 PROCEDURE — 81000 URINALYSIS NONAUTO W/SCOPE: CPT | Performed by: PHYSICIAN ASSISTANT

## 2021-09-09 PROCEDURE — 85025 COMPLETE CBC W/AUTO DIFF WBC: CPT | Performed by: PHYSICIAN ASSISTANT

## 2021-09-09 PROCEDURE — 82010 KETONE BODYS QUAN: CPT | Performed by: EMERGENCY MEDICINE

## 2021-09-09 PROCEDURE — 99285 EMERGENCY DEPT VISIT HI MDM: CPT | Mod: 25

## 2021-09-09 PROCEDURE — 81025 URINE PREGNANCY TEST: CPT | Performed by: EMERGENCY MEDICINE

## 2021-09-09 PROCEDURE — 96361 HYDRATE IV INFUSION ADD-ON: CPT

## 2021-09-09 PROCEDURE — 20000000 HC ICU ROOM

## 2021-09-09 PROCEDURE — 93005 ELECTROCARDIOGRAM TRACING: CPT

## 2021-09-09 PROCEDURE — 83605 ASSAY OF LACTIC ACID: CPT | Performed by: NURSE PRACTITIONER

## 2021-09-09 PROCEDURE — S5010 5% DEXTROSE AND 0.45% SALINE: HCPCS | Performed by: NURSE PRACTITIONER

## 2021-09-09 PROCEDURE — 25000003 PHARM REV CODE 250: Performed by: EMERGENCY MEDICINE

## 2021-09-09 PROCEDURE — 84702 CHORIONIC GONADOTROPIN TEST: CPT | Performed by: NURSE PRACTITIONER

## 2021-09-09 PROCEDURE — 83036 HEMOGLOBIN GLYCOSYLATED A1C: CPT | Performed by: NURSE PRACTITIONER

## 2021-09-09 PROCEDURE — U0002 COVID-19 LAB TEST NON-CDC: HCPCS | Performed by: EMERGENCY MEDICINE

## 2021-09-09 PROCEDURE — 63600175 PHARM REV CODE 636 W HCPCS: Performed by: NURSE PRACTITIONER

## 2021-09-09 PROCEDURE — C9399 UNCLASSIFIED DRUGS OR BIOLOG: HCPCS | Performed by: NURSE PRACTITIONER

## 2021-09-09 PROCEDURE — 36415 COLL VENOUS BLD VENIPUNCTURE: CPT | Performed by: NURSE PRACTITIONER

## 2021-09-09 PROCEDURE — 96365 THER/PROPH/DIAG IV INF INIT: CPT

## 2021-09-09 RX ORDER — PANTOPRAZOLE SODIUM 40 MG/1
40 TABLET, DELAYED RELEASE ORAL DAILY
Status: DISCONTINUED | OUTPATIENT
Start: 2021-09-10 | End: 2021-09-09

## 2021-09-09 RX ORDER — DEXTROSE MONOHYDRATE AND SODIUM CHLORIDE 5; .45 G/100ML; G/100ML
125 INJECTION, SOLUTION INTRAVENOUS CONTINUOUS PRN
Status: DISCONTINUED | OUTPATIENT
Start: 2021-09-09 | End: 2021-09-10 | Stop reason: HOSPADM

## 2021-09-09 RX ORDER — PANTOPRAZOLE SODIUM 40 MG/1
40 TABLET, DELAYED RELEASE ORAL DAILY
Status: DISCONTINUED | OUTPATIENT
Start: 2021-09-09 | End: 2021-09-10 | Stop reason: HOSPADM

## 2021-09-09 RX ORDER — DEXTROSE MONOHYDRATE 100 MG/ML
INJECTION, SOLUTION INTRAVENOUS
Status: DISCONTINUED | OUTPATIENT
Start: 2021-09-09 | End: 2021-09-09

## 2021-09-09 RX ORDER — POLYETHYLENE GLYCOL 3350 17 G/17G
17 POWDER, FOR SOLUTION ORAL DAILY
Status: DISCONTINUED | OUTPATIENT
Start: 2021-09-09 | End: 2021-09-10 | Stop reason: HOSPADM

## 2021-09-09 RX ORDER — INSULIN ASPART 100 [IU]/ML
16 INJECTION, SOLUTION INTRAVENOUS; SUBCUTANEOUS
COMMUNITY
End: 2021-09-13

## 2021-09-09 RX ORDER — ENOXAPARIN SODIUM 100 MG/ML
40 INJECTION SUBCUTANEOUS EVERY 12 HOURS
Status: DISCONTINUED | OUTPATIENT
Start: 2021-09-09 | End: 2021-09-10 | Stop reason: HOSPADM

## 2021-09-09 RX ORDER — DEXTROSE MONOHYDRATE 100 MG/ML
INJECTION, SOLUTION INTRAVENOUS
Status: DISCONTINUED | OUTPATIENT
Start: 2021-09-09 | End: 2021-09-10 | Stop reason: HOSPADM

## 2021-09-09 RX ORDER — ACETAMINOPHEN 325 MG/1
650 TABLET ORAL EVERY 4 HOURS PRN
Status: DISCONTINUED | OUTPATIENT
Start: 2021-09-09 | End: 2021-09-10 | Stop reason: HOSPADM

## 2021-09-09 RX ORDER — ATORVASTATIN CALCIUM 20 MG/1
20 TABLET, FILM COATED ORAL DAILY
Status: DISCONTINUED | OUTPATIENT
Start: 2021-09-09 | End: 2021-09-10 | Stop reason: HOSPADM

## 2021-09-09 RX ORDER — FLUOXETINE 10 MG/1
10 CAPSULE ORAL DAILY
Status: DISCONTINUED | OUTPATIENT
Start: 2021-09-09 | End: 2021-09-10 | Stop reason: HOSPADM

## 2021-09-09 RX ORDER — SODIUM CHLORIDE 0.9 % (FLUSH) 0.9 %
10 SYRINGE (ML) INJECTION
Status: DISCONTINUED | OUTPATIENT
Start: 2021-09-09 | End: 2021-09-10 | Stop reason: HOSPADM

## 2021-09-09 RX ORDER — ONDANSETRON 4 MG/1
4 TABLET, ORALLY DISINTEGRATING ORAL
Status: COMPLETED | OUTPATIENT
Start: 2021-09-09 | End: 2021-09-09

## 2021-09-09 RX ORDER — GLUCAGON 1 MG
1 KIT INJECTION
Status: DISCONTINUED | OUTPATIENT
Start: 2021-09-09 | End: 2021-09-10 | Stop reason: HOSPADM

## 2021-09-09 RX ORDER — INSULIN ASPART 100 [IU]/ML
12 INJECTION, SOLUTION INTRAVENOUS; SUBCUTANEOUS 2 TIMES DAILY
COMMUNITY
End: 2021-09-13

## 2021-09-09 RX ORDER — INSULIN ASPART 100 [IU]/ML
0-5 INJECTION, SOLUTION INTRAVENOUS; SUBCUTANEOUS
Status: DISCONTINUED | OUTPATIENT
Start: 2021-09-09 | End: 2021-09-10 | Stop reason: HOSPADM

## 2021-09-09 RX ORDER — SODIUM CHLORIDE AND POTASSIUM CHLORIDE 150; 900 MG/100ML; MG/100ML
INJECTION, SOLUTION INTRAVENOUS
Status: COMPLETED | OUTPATIENT
Start: 2021-09-09 | End: 2021-09-09

## 2021-09-09 RX ORDER — IBUPROFEN 200 MG
16 TABLET ORAL
Status: DISCONTINUED | OUTPATIENT
Start: 2021-09-09 | End: 2021-09-10 | Stop reason: HOSPADM

## 2021-09-09 RX ORDER — ONDANSETRON 2 MG/ML
4 INJECTION INTRAMUSCULAR; INTRAVENOUS EVERY 6 HOURS PRN
Status: DISCONTINUED | OUTPATIENT
Start: 2021-09-09 | End: 2021-09-10 | Stop reason: HOSPADM

## 2021-09-09 RX ORDER — IBUPROFEN 200 MG
24 TABLET ORAL
Status: DISCONTINUED | OUTPATIENT
Start: 2021-09-09 | End: 2021-09-10 | Stop reason: HOSPADM

## 2021-09-09 RX ADMIN — INSULIN ASPART 1 UNITS: 100 INJECTION, SOLUTION INTRAVENOUS; SUBCUTANEOUS at 11:09

## 2021-09-09 RX ADMIN — DEXTROSE AND SODIUM CHLORIDE 125 ML/HR: 5; .45 INJECTION, SOLUTION INTRAVENOUS at 03:09

## 2021-09-09 RX ADMIN — SODIUM CHLORIDE 1000 ML: 0.9 INJECTION, SOLUTION INTRAVENOUS at 01:09

## 2021-09-09 RX ADMIN — SODIUM CHLORIDE 1000 ML: 0.9 INJECTION, SOLUTION INTRAVENOUS at 11:09

## 2021-09-09 RX ADMIN — ONDANSETRON 4 MG: 4 TABLET, ORALLY DISINTEGRATING ORAL at 11:09

## 2021-09-09 RX ADMIN — ENOXAPARIN SODIUM 40 MG: 40 INJECTION SUBCUTANEOUS at 09:09

## 2021-09-09 RX ADMIN — POLYETHYLENE GLYCOL 3350 17 G: 17 POWDER, FOR SOLUTION ORAL at 09:09

## 2021-09-09 RX ADMIN — SODIUM CHLORIDE 0.1 UNITS/KG/HR: 9 INJECTION, SOLUTION INTRAVENOUS at 03:09

## 2021-09-09 RX ADMIN — SODIUM CHLORIDE AND POTASSIUM CHLORIDE: .9; .15 SOLUTION INTRAVENOUS at 01:09

## 2021-09-09 RX ADMIN — PANTOPRAZOLE SODIUM 40 MG: 40 TABLET, DELAYED RELEASE ORAL at 06:09

## 2021-09-09 RX ADMIN — INSULIN ASPART 2 UNITS: 100 INJECTION, SOLUTION INTRAVENOUS; SUBCUTANEOUS at 06:09

## 2021-09-09 RX ADMIN — SODIUM CHLORIDE 0.1 UNITS/KG/HR: 9 INJECTION, SOLUTION INTRAVENOUS at 12:09

## 2021-09-09 RX ADMIN — ATORVASTATIN CALCIUM 20 MG: 20 TABLET, FILM COATED ORAL at 02:09

## 2021-09-09 RX ADMIN — INSULIN DETEMIR 25 UNITS: 100 INJECTION, SOLUTION SUBCUTANEOUS at 09:09

## 2021-09-09 RX ADMIN — LIDOCAINE HYDROCHLORIDE: 20 SOLUTION ORAL; TOPICAL at 11:09

## 2021-09-10 ENCOUNTER — PATIENT MESSAGE (OUTPATIENT)
Dept: INTERNAL MEDICINE | Facility: CLINIC | Age: 31
End: 2021-09-10

## 2021-09-10 VITALS
HEIGHT: 63 IN | OXYGEN SATURATION: 98 % | DIASTOLIC BLOOD PRESSURE: 57 MMHG | TEMPERATURE: 99 F | WEIGHT: 205.69 LBS | SYSTOLIC BLOOD PRESSURE: 120 MMHG | RESPIRATION RATE: 26 BRPM | HEART RATE: 78 BPM | BODY MASS INDEX: 36.45 KG/M2

## 2021-09-10 LAB
ANION GAP SERPL CALC-SCNC: 12 MMOL/L (ref 8–16)
ANION GAP SERPL CALC-SCNC: 9 MMOL/L (ref 8–16)
BASOPHILS # BLD AUTO: 0.03 K/UL (ref 0–0.2)
BASOPHILS NFR BLD: 0.4 % (ref 0–1.9)
BUN SERPL-MCNC: 10 MG/DL (ref 6–20)
BUN SERPL-MCNC: 10 MG/DL (ref 6–20)
BUN SERPL-MCNC: 11 MG/DL (ref 6–20)
BUN SERPL-MCNC: 11 MG/DL (ref 6–20)
CALCIUM SERPL-MCNC: 8.7 MG/DL (ref 8.7–10.5)
CALCIUM SERPL-MCNC: 8.8 MG/DL (ref 8.7–10.5)
CALCIUM SERPL-MCNC: 8.9 MG/DL (ref 8.7–10.5)
CALCIUM SERPL-MCNC: 9.1 MG/DL (ref 8.7–10.5)
CHLORIDE SERPL-SCNC: 103 MMOL/L (ref 95–110)
CHLORIDE SERPL-SCNC: 104 MMOL/L (ref 95–110)
CHLORIDE SERPL-SCNC: 105 MMOL/L (ref 95–110)
CHLORIDE SERPL-SCNC: 105 MMOL/L (ref 95–110)
CO2 SERPL-SCNC: 17 MMOL/L (ref 23–29)
CO2 SERPL-SCNC: 17 MMOL/L (ref 23–29)
CO2 SERPL-SCNC: 18 MMOL/L (ref 23–29)
CO2 SERPL-SCNC: 20 MMOL/L (ref 23–29)
CREAT SERPL-MCNC: 0.8 MG/DL (ref 0.5–1.4)
DIFFERENTIAL METHOD: ABNORMAL
EOSINOPHIL # BLD AUTO: 0.1 K/UL (ref 0–0.5)
EOSINOPHIL NFR BLD: 0.7 % (ref 0–8)
ERYTHROCYTE [DISTWIDTH] IN BLOOD BY AUTOMATED COUNT: 13.2 % (ref 11.5–14.5)
EST. GFR  (AFRICAN AMERICAN): >60 ML/MIN/1.73 M^2
EST. GFR  (NON AFRICAN AMERICAN): >60 ML/MIN/1.73 M^2
GLUCOSE SERPL-MCNC: 285 MG/DL (ref 70–110)
GLUCOSE SERPL-MCNC: 313 MG/DL (ref 70–110)
GLUCOSE SERPL-MCNC: 314 MG/DL (ref 70–110)
GLUCOSE SERPL-MCNC: 354 MG/DL (ref 70–110)
HCT VFR BLD AUTO: 39.9 % (ref 37–48.5)
HGB BLD-MCNC: 12.6 G/DL (ref 12–16)
IMM GRANULOCYTES # BLD AUTO: 0.03 K/UL (ref 0–0.04)
IMM GRANULOCYTES NFR BLD AUTO: 0.4 % (ref 0–0.5)
LYMPHOCYTES # BLD AUTO: 2.8 K/UL (ref 1–4.8)
LYMPHOCYTES NFR BLD: 37.2 % (ref 18–48)
MCH RBC QN AUTO: 26.4 PG (ref 27–31)
MCHC RBC AUTO-ENTMCNC: 31.6 G/DL (ref 32–36)
MCV RBC AUTO: 84 FL (ref 82–98)
MONOCYTES # BLD AUTO: 0.4 K/UL (ref 0.3–1)
MONOCYTES NFR BLD: 5.4 % (ref 4–15)
NEUTROPHILS # BLD AUTO: 4.3 K/UL (ref 1.8–7.7)
NEUTROPHILS NFR BLD: 55.9 % (ref 38–73)
NRBC BLD-RTO: 0 /100 WBC
PHOSPHATE SERPL-MCNC: 2.7 MG/DL (ref 2.7–4.5)
PLATELET # BLD AUTO: 369 K/UL (ref 150–450)
PMV BLD AUTO: 9.5 FL (ref 9.2–12.9)
POCT GLUCOSE: 245 MG/DL (ref 70–110)
POCT GLUCOSE: 246 MG/DL (ref 70–110)
POCT GLUCOSE: 290 MG/DL (ref 70–110)
POTASSIUM SERPL-SCNC: 4.4 MMOL/L (ref 3.5–5.1)
POTASSIUM SERPL-SCNC: 4.5 MMOL/L (ref 3.5–5.1)
POTASSIUM SERPL-SCNC: 4.5 MMOL/L (ref 3.5–5.1)
POTASSIUM SERPL-SCNC: 4.7 MMOL/L (ref 3.5–5.1)
RBC # BLD AUTO: 4.77 M/UL (ref 4–5.4)
SODIUM SERPL-SCNC: 133 MMOL/L (ref 136–145)
SODIUM SERPL-SCNC: 133 MMOL/L (ref 136–145)
SODIUM SERPL-SCNC: 134 MMOL/L (ref 136–145)
SODIUM SERPL-SCNC: 134 MMOL/L (ref 136–145)
WBC # BLD AUTO: 7.59 K/UL (ref 3.9–12.7)

## 2021-09-10 PROCEDURE — 85025 COMPLETE CBC W/AUTO DIFF WBC: CPT | Performed by: NURSE PRACTITIONER

## 2021-09-10 PROCEDURE — 36415 COLL VENOUS BLD VENIPUNCTURE: CPT | Performed by: NURSE PRACTITIONER

## 2021-09-10 PROCEDURE — 25000003 PHARM REV CODE 250: Performed by: NURSE PRACTITIONER

## 2021-09-10 PROCEDURE — 84100 ASSAY OF PHOSPHORUS: CPT | Performed by: NURSE PRACTITIONER

## 2021-09-10 PROCEDURE — 80048 BASIC METABOLIC PNL TOTAL CA: CPT | Mod: 91 | Performed by: NURSE PRACTITIONER

## 2021-09-10 PROCEDURE — 63600175 PHARM REV CODE 636 W HCPCS: Performed by: NURSE PRACTITIONER

## 2021-09-10 RX ORDER — INSULIN ASPART 100 [IU]/ML
10 INJECTION, SOLUTION INTRAVENOUS; SUBCUTANEOUS
Status: DISCONTINUED | OUTPATIENT
Start: 2021-09-10 | End: 2021-09-10

## 2021-09-10 RX ORDER — INSULIN ASPART 100 [IU]/ML
15 INJECTION, SOLUTION INTRAVENOUS; SUBCUTANEOUS
Status: DISCONTINUED | OUTPATIENT
Start: 2021-09-10 | End: 2021-09-10 | Stop reason: HOSPADM

## 2021-09-10 RX ORDER — POLYETHYLENE GLYCOL 3350 17 G/17G
17 POWDER, FOR SOLUTION ORAL DAILY
Qty: 510 G | Refills: 0 | Status: SHIPPED | OUTPATIENT
Start: 2021-09-11 | End: 2021-12-09

## 2021-09-10 RX ORDER — MUPIROCIN 20 MG/G
OINTMENT TOPICAL 2 TIMES DAILY
Status: DISCONTINUED | OUTPATIENT
Start: 2021-09-10 | End: 2021-09-10 | Stop reason: HOSPADM

## 2021-09-10 RX ADMIN — PANTOPRAZOLE SODIUM 40 MG: 40 TABLET, DELAYED RELEASE ORAL at 09:09

## 2021-09-10 RX ADMIN — INSULIN ASPART 10 UNITS: 100 INJECTION, SOLUTION INTRAVENOUS; SUBCUTANEOUS at 10:09

## 2021-09-10 RX ADMIN — INSULIN ASPART 15 UNITS: 100 INJECTION, SOLUTION INTRAVENOUS; SUBCUTANEOUS at 05:09

## 2021-09-10 RX ADMIN — INSULIN ASPART 3 UNITS: 100 INJECTION, SOLUTION INTRAVENOUS; SUBCUTANEOUS at 06:09

## 2021-09-10 RX ADMIN — FLUOXETINE HYDROCHLORIDE 10 MG: 10 CAPSULE ORAL at 10:09

## 2021-09-10 RX ADMIN — INSULIN ASPART 10 UNITS: 100 INJECTION, SOLUTION INTRAVENOUS; SUBCUTANEOUS at 12:09

## 2021-09-10 RX ADMIN — ENOXAPARIN SODIUM 40 MG: 40 INJECTION SUBCUTANEOUS at 09:09

## 2021-09-10 RX ADMIN — POLYETHYLENE GLYCOL 3350 17 G: 17 POWDER, FOR SOLUTION ORAL at 09:09

## 2021-09-10 RX ADMIN — ATORVASTATIN CALCIUM 20 MG: 20 TABLET, FILM COATED ORAL at 09:09

## 2021-09-12 ENCOUNTER — PATIENT MESSAGE (OUTPATIENT)
Dept: INTERNAL MEDICINE | Facility: CLINIC | Age: 31
End: 2021-09-12

## 2021-09-13 ENCOUNTER — PATIENT MESSAGE (OUTPATIENT)
Dept: INTERNAL MEDICINE | Facility: CLINIC | Age: 31
End: 2021-09-13

## 2021-09-13 RX ORDER — PEN NEEDLE, DIABETIC 30 GX3/16"
NEEDLE, DISPOSABLE MISCELLANEOUS
Qty: 400 EACH | Refills: 3 | Status: ON HOLD | OUTPATIENT
Start: 2021-09-13 | End: 2022-10-04 | Stop reason: SDUPTHER

## 2021-09-13 RX ORDER — INSULIN GLARGINE 100 [IU]/ML
52-60 INJECTION, SOLUTION SUBCUTANEOUS NIGHTLY
Qty: 30 ML | Refills: 6 | Status: SHIPPED | OUTPATIENT
Start: 2021-09-13 | End: 2021-12-09

## 2021-09-13 RX ORDER — INSULIN ASPART 100 [IU]/ML
INJECTION, SOLUTION INTRAVENOUS; SUBCUTANEOUS
Qty: 30 ML | Refills: 6 | Status: SHIPPED | OUTPATIENT
Start: 2021-09-13 | End: 2021-12-09

## 2021-09-20 ENCOUNTER — PATIENT MESSAGE (OUTPATIENT)
Dept: SURGERY | Facility: OTHER | Age: 31
End: 2021-09-20

## 2021-09-30 ENCOUNTER — PATIENT MESSAGE (OUTPATIENT)
Dept: INTERNAL MEDICINE | Facility: CLINIC | Age: 31
End: 2021-09-30

## 2021-10-01 RX ORDER — DULAGLUTIDE 0.75 MG/.5ML
0.75 INJECTION, SOLUTION SUBCUTANEOUS
Qty: 4 PEN | Refills: 3 | Status: SHIPPED | OUTPATIENT
Start: 2021-10-01 | End: 2021-11-17

## 2021-10-04 ENCOUNTER — PATIENT MESSAGE (OUTPATIENT)
Dept: OBSTETRICS AND GYNECOLOGY | Facility: CLINIC | Age: 31
End: 2021-10-04

## 2021-10-04 DIAGNOSIS — R39.89 SUSPECTED UTI: Primary | ICD-10-CM

## 2021-10-05 ENCOUNTER — PATIENT OUTREACH (OUTPATIENT)
Dept: ADMINISTRATIVE | Facility: OTHER | Age: 31
End: 2021-10-05

## 2021-10-05 RX ORDER — NITROFURANTOIN 25; 75 MG/1; MG/1
100 CAPSULE ORAL 2 TIMES DAILY
Qty: 14 CAPSULE | Refills: 0 | Status: SHIPPED | OUTPATIENT
Start: 2021-10-05 | End: 2021-10-12

## 2021-10-06 RX ORDER — TERCONAZOLE 8 MG/G
1 CREAM VAGINAL NIGHTLY
Qty: 20 G | Refills: 1 | Status: SHIPPED | OUTPATIENT
Start: 2021-10-06 | End: 2021-12-09

## 2021-10-07 LAB
POCT GLUCOSE: 150 MG/DL (ref 70–110)
POCT GLUCOSE: 247 MG/DL (ref 70–110)
POCT GLUCOSE: 275 MG/DL (ref 70–110)
POCT GLUCOSE: 301 MG/DL (ref 70–110)

## 2021-10-22 ENCOUNTER — PATIENT MESSAGE (OUTPATIENT)
Dept: FAMILY MEDICINE | Facility: CLINIC | Age: 31
End: 2021-10-22
Payer: MEDICAID

## 2021-10-26 ENCOUNTER — PATIENT MESSAGE (OUTPATIENT)
Dept: INTERNAL MEDICINE | Facility: CLINIC | Age: 31
End: 2021-10-26
Payer: MEDICAID

## 2021-11-05 PROBLEM — Z86.39: Status: ACTIVE | Noted: 2021-09-09

## 2021-11-08 ENCOUNTER — PATIENT MESSAGE (OUTPATIENT)
Dept: OBSTETRICS AND GYNECOLOGY | Facility: CLINIC | Age: 31
End: 2021-11-08
Payer: MEDICAID

## 2021-11-08 ENCOUNTER — PATIENT MESSAGE (OUTPATIENT)
Dept: INTERNAL MEDICINE | Facility: CLINIC | Age: 31
End: 2021-11-08
Payer: MEDICAID

## 2021-11-09 ENCOUNTER — PATIENT MESSAGE (OUTPATIENT)
Dept: INTERNAL MEDICINE | Facility: CLINIC | Age: 31
End: 2021-11-09
Payer: MEDICAID

## 2021-11-17 ENCOUNTER — PATIENT MESSAGE (OUTPATIENT)
Dept: INTERNAL MEDICINE | Facility: CLINIC | Age: 31
End: 2021-11-17
Payer: MEDICAID

## 2021-11-17 RX ORDER — DULAGLUTIDE 1.5 MG/.5ML
1.5 INJECTION, SOLUTION SUBCUTANEOUS
Qty: 4 PEN | Refills: 6 | Status: SHIPPED | OUTPATIENT
Start: 2021-11-17 | End: 2021-12-09

## 2021-12-05 ENCOUNTER — PATIENT MESSAGE (OUTPATIENT)
Dept: OBSTETRICS AND GYNECOLOGY | Facility: CLINIC | Age: 31
End: 2021-12-05
Payer: MEDICAID

## 2021-12-06 RX ORDER — FLUOXETINE HYDROCHLORIDE 20 MG/1
20 CAPSULE ORAL DAILY
Qty: 90 CAPSULE | Refills: 3 | Status: SHIPPED | OUTPATIENT
Start: 2021-12-06 | End: 2021-12-09 | Stop reason: SDUPTHER

## 2021-12-09 ENCOUNTER — OFFICE VISIT (OUTPATIENT)
Dept: INTERNAL MEDICINE | Facility: CLINIC | Age: 31
End: 2021-12-09
Payer: MEDICAID

## 2021-12-09 ENCOUNTER — PATIENT MESSAGE (OUTPATIENT)
Dept: INTERNAL MEDICINE | Facility: CLINIC | Age: 31
End: 2021-12-09

## 2021-12-09 VITALS
HEART RATE: 108 BPM | WEIGHT: 202.63 LBS | SYSTOLIC BLOOD PRESSURE: 118 MMHG | HEIGHT: 63 IN | DIASTOLIC BLOOD PRESSURE: 75 MMHG | BODY MASS INDEX: 35.9 KG/M2 | OXYGEN SATURATION: 100 %

## 2021-12-09 DIAGNOSIS — Z86.39 H/O DIABETES WITH KETOACIDOSIS: ICD-10-CM

## 2021-12-09 DIAGNOSIS — E10.65 TYPE 1 DIABETES MELLITUS WITH HYPERGLYCEMIA: Primary | ICD-10-CM

## 2021-12-09 DIAGNOSIS — Z71.89 COUNSELING AND COORDINATION OF CARE: ICD-10-CM

## 2021-12-09 DIAGNOSIS — E66.9 OBESITY (BMI 30-39.9): ICD-10-CM

## 2021-12-09 DIAGNOSIS — E10.3293 MILD NONPROLIFERATIVE DIABETIC RETINOPATHY OF BOTH EYES WITHOUT MACULAR EDEMA ASSOCIATED WITH TYPE 1 DIABETES MELLITUS: ICD-10-CM

## 2021-12-09 PROBLEM — M79.606 ACUTE LEG PAIN: Status: RESOLVED | Noted: 2021-03-18 | Resolved: 2021-12-09

## 2021-12-09 PROBLEM — M65.339 TRIGGER MIDDLE FINGER: Chronic | Status: ACTIVE | Noted: 2021-12-09

## 2021-12-09 PROBLEM — R10.13 EPIGASTRIC PAIN: Chronic | Status: ACTIVE | Noted: 2021-12-09

## 2021-12-09 PROBLEM — N93.8 DUB (DYSFUNCTIONAL UTERINE BLEEDING): Status: RESOLVED | Noted: 2019-02-20 | Resolved: 2021-12-09

## 2021-12-09 PROBLEM — R06.09 DYSPNEA ON EXERTION: Chronic | Status: ACTIVE | Noted: 2021-12-09

## 2021-12-09 PROCEDURE — 3061F PR NEG MICROALBUMINURIA RESULT DOCUMENTED/REVIEW: ICD-10-PCS | Mod: CPTII,,, | Performed by: NURSE PRACTITIONER

## 2021-12-09 PROCEDURE — 99214 OFFICE O/P EST MOD 30 MIN: CPT | Mod: S$PBB,,, | Performed by: NURSE PRACTITIONER

## 2021-12-09 PROCEDURE — 99215 OFFICE O/P EST HI 40 MIN: CPT | Mod: PBBFAC | Performed by: NURSE PRACTITIONER

## 2021-12-09 PROCEDURE — 99999 PR PBB SHADOW E&M-EST. PATIENT-LVL V: CPT | Mod: PBBFAC,,, | Performed by: NURSE PRACTITIONER

## 2021-12-09 PROCEDURE — 3066F PR DOCUMENTATION OF TREATMENT FOR NEPHROPATHY: ICD-10-PCS | Mod: CPTII,,, | Performed by: NURSE PRACTITIONER

## 2021-12-09 PROCEDURE — 99214 PR OFFICE/OUTPT VISIT, EST, LEVL IV, 30-39 MIN: ICD-10-PCS | Mod: S$PBB,,, | Performed by: NURSE PRACTITIONER

## 2021-12-09 PROCEDURE — 99999 PR PBB SHADOW E&M-EST. PATIENT-LVL V: ICD-10-PCS | Mod: PBBFAC,,, | Performed by: NURSE PRACTITIONER

## 2021-12-09 PROCEDURE — 3066F NEPHROPATHY DOC TX: CPT | Mod: CPTII,,, | Performed by: NURSE PRACTITIONER

## 2021-12-09 PROCEDURE — 3061F NEG MICROALBUMINURIA REV: CPT | Mod: CPTII,,, | Performed by: NURSE PRACTITIONER

## 2021-12-09 RX ORDER — DULAGLUTIDE 1.5 MG/.5ML
1.5 INJECTION, SOLUTION SUBCUTANEOUS
Qty: 4 PEN | Refills: 6
Start: 2021-12-09 | End: 2021-12-29 | Stop reason: SDUPTHER

## 2021-12-09 RX ORDER — INSULIN ASPART 100 [IU]/ML
INJECTION, SOLUTION INTRAVENOUS; SUBCUTANEOUS
Qty: 30 ML | Refills: 6
Start: 2021-12-09 | End: 2021-12-29 | Stop reason: SDUPTHER

## 2021-12-09 RX ORDER — INSULIN GLARGINE 100 [IU]/ML
52 INJECTION, SOLUTION SUBCUTANEOUS NIGHTLY
Qty: 30 ML | Refills: 6
Start: 2021-12-09 | End: 2021-12-29 | Stop reason: SDUPTHER

## 2021-12-10 ENCOUNTER — TELEPHONE (OUTPATIENT)
Dept: DIABETES | Facility: CLINIC | Age: 31
End: 2021-12-10
Payer: MEDICAID

## 2021-12-10 ENCOUNTER — PATIENT MESSAGE (OUTPATIENT)
Dept: INTERNAL MEDICINE | Facility: CLINIC | Age: 31
End: 2021-12-10
Payer: MEDICAID

## 2021-12-20 ENCOUNTER — PATIENT MESSAGE (OUTPATIENT)
Dept: OBSTETRICS AND GYNECOLOGY | Facility: CLINIC | Age: 31
End: 2021-12-20
Payer: MEDICAID

## 2021-12-20 RX ORDER — NORGESTREL AND ETHINYL ESTRADIOL 0.3-0.03MG
1 KIT ORAL DAILY
Qty: 28 TABLET | Refills: 3 | Status: SHIPPED | OUTPATIENT
Start: 2021-12-20 | End: 2022-04-21 | Stop reason: SDUPTHER

## 2021-12-23 ENCOUNTER — PATIENT MESSAGE (OUTPATIENT)
Dept: INTERNAL MEDICINE | Facility: CLINIC | Age: 31
End: 2021-12-23
Payer: MEDICAID

## 2021-12-29 ENCOUNTER — PATIENT MESSAGE (OUTPATIENT)
Dept: INTERNAL MEDICINE | Facility: CLINIC | Age: 31
End: 2021-12-29
Payer: MEDICAID

## 2021-12-29 ENCOUNTER — PATIENT MESSAGE (OUTPATIENT)
Dept: OBSTETRICS AND GYNECOLOGY | Facility: CLINIC | Age: 31
End: 2021-12-29
Payer: MEDICAID

## 2022-01-07 ENCOUNTER — LAB VISIT (OUTPATIENT)
Dept: PRIMARY CARE CLINIC | Facility: CLINIC | Age: 32
End: 2022-01-07
Payer: MEDICAID

## 2022-01-07 ENCOUNTER — HOSPITAL ENCOUNTER (EMERGENCY)
Facility: HOSPITAL | Age: 32
Discharge: HOME | End: 2022-01-07
Attending: EMERGENCY MEDICINE
Payer: MEDICAID

## 2022-01-07 VITALS
RESPIRATION RATE: 20 BRPM | DIASTOLIC BLOOD PRESSURE: 78 MMHG | SYSTOLIC BLOOD PRESSURE: 130 MMHG | TEMPERATURE: 99 F | HEART RATE: 131 BPM | OXYGEN SATURATION: 99 %

## 2022-01-07 DIAGNOSIS — R05.9 COUGH: ICD-10-CM

## 2022-01-07 DIAGNOSIS — R06.02 SOB (SHORTNESS OF BREATH): ICD-10-CM

## 2022-01-07 DIAGNOSIS — R53.83 FATIGUE, UNSPECIFIED TYPE: ICD-10-CM

## 2022-01-07 DIAGNOSIS — J02.9 SORE THROAT: ICD-10-CM

## 2022-01-07 DIAGNOSIS — R50.9 FEVER, UNSPECIFIED FEVER CAUSE: ICD-10-CM

## 2022-01-07 DIAGNOSIS — U07.1 COVID-19: Primary | ICD-10-CM

## 2022-01-07 DIAGNOSIS — R52 BODY ACHES: ICD-10-CM

## 2022-01-07 DIAGNOSIS — J02.9 SORE THROAT: Primary | ICD-10-CM

## 2022-01-07 LAB
B-HCG UR QL: NEGATIVE
BILIRUB UR QL STRIP: NEGATIVE
CLARITY UR: CLEAR
COLOR UR: YELLOW
CTP QC/QA: YES
CTP QC/QA: YES
GLUCOSE UR QL STRIP: NEGATIVE
HGB UR QL STRIP: NEGATIVE
KETONES UR QL STRIP: NEGATIVE
LEUKOCYTE ESTERASE UR QL STRIP: NEGATIVE
NITRITE UR QL STRIP: NEGATIVE
PH UR STRIP: 8 [PH] (ref 5–8)
PROT UR QL STRIP: NEGATIVE
SARS-COV-2 AG RESP QL IA.RAPID: POSITIVE
SP GR UR STRIP: 1.02 (ref 1–1.03)
URN SPEC COLLECT METH UR: NORMAL
UROBILINOGEN UR STRIP-ACNC: NEGATIVE EU/DL

## 2022-01-07 PROCEDURE — 81025 URINE PREGNANCY TEST: CPT | Performed by: NURSE PRACTITIONER

## 2022-01-07 PROCEDURE — 93010 ELECTROCARDIOGRAM REPORT: CPT | Mod: ,,, | Performed by: INTERNAL MEDICINE

## 2022-01-07 PROCEDURE — 99283 EMERGENCY DEPT VISIT LOW MDM: CPT | Mod: 25

## 2022-01-07 PROCEDURE — 81003 URINALYSIS AUTO W/O SCOPE: CPT | Performed by: NURSE PRACTITIONER

## 2022-01-07 PROCEDURE — 93005 ELECTROCARDIOGRAM TRACING: CPT

## 2022-01-07 PROCEDURE — 93010 EKG 12-LEAD: ICD-10-PCS | Mod: ,,, | Performed by: INTERNAL MEDICINE

## 2022-01-07 PROCEDURE — 87811 SARS-COV-2 COVID19 W/OPTIC: CPT

## 2022-01-07 NOTE — DISCHARGE INSTRUCTIONS
Buy a pulse oximter and check your oxygenation. Return to the ED if your oxygen level is 89 or less.  Get in a steam shower and breathe in the steam if you are feeling short of breath.  Use a vaporizer or humidifier if needed.  Take tylenol or ibuprofen for the aches and pains and fever.

## 2022-01-07 NOTE — FIRST PROVIDER EVALUATION
Emergency Department TeleTriage Encounter Note      CHIEF COMPLAINT    Chief Complaint   Patient presents with    Abdominal Pain     Pt complains of body aches/  with SOB/ and epi gastric pain x 2 days, pt has pending covid test, and reports blood sugars at home have been around 250's and staying there even with adjusting for sliding scale        VITAL SIGNS   Initial Vitals [01/07/22 1104]   BP Pulse Resp Temp SpO2   130/78 (!) 131 20 99.2 °F (37.3 °C) 99 %      MAP       --            ALLERGIES    Review of patient's allergies indicates:   Allergen Reactions    No known allergies        PROVIDER TRIAGE NOTE  This is a teletriage evaluation of a 31 y.o. female presenting to the ED complaining of body aches, cough, and SOB for two days.  PT has DM and states that glucose has been in 250s despite sliding scale adjustment.  Possible exposure to COVID.     Initial orders will be placed and care will be transferred to an alternate provider when patient is roomed for a full evaluation. Any additional orders and the final disposition will be determined by that provider.           ORDERS  Labs Reviewed   SARS-COV-2 (COVID-19) QUALITATIVE PCR   POCT URINE PREGNANCY   POCT GLUCOSE MONITORING CONTINUOUS       ED Orders (720h ago, onward)    Start Ordered     Status Ordering Provider    01/07/22 1108 01/07/22 1107  Urinalysis, Reflex to Urine Culture Urine, Clean Catch  STAT         Ordered MONICA BURTON    01/07/22 1107 01/07/22 1106  COVID-19 Routine Screening  STAT         Ordered MONICA BURTON    01/07/22 1107 01/07/22 1106  X-Ray Chest AP Portable  1 time imaging         Ordered MONICA BURTON N.    01/07/22 1107 01/07/22 1106  EKG 12-lead  Once         Ordered MONICA BURTON    01/07/22 1107 01/07/22 1106  POCT urine pregnancy  Once         Ordered MONICA BURTON    01/07/22 1107 01/07/22 1106  POCT glucose  Once         Ordered MONICA BURTON     01/07/22 1106 01/07/22 1106  Airborne and Contact and Droplet Isolation Status  Continuous         Ordered MONICA BURTON            Virtual Visit Note: The provider triage portion of this emergency department evaluation and documentation was performed via [employer], a HIPAA-compliant telemedicine application, in concert with a tele-presenter in the room. A face to face patient evaluation with one of my colleagues will occur once the patient is placed in an emergency department room.      DISCLAIMER: This note was prepared with [employer] voice recognition transcription software. Garbled syntax, mangled pronouns, and other bizarre constructions may be attributed to that software system.

## 2022-01-07 NOTE — ED PROVIDER NOTES
Encounter Date: 2022       History     Chief Complaint   Patient presents with    Abdominal Pain     Pt complains of body aches/  with SOB/ and epi gastric pain x 2 days, pt has pending covid test, and reports blood sugars at home have been around 250's and staying there even with adjusting for sliding scale      The patient is a 31-year-old female who was diagnosed with COVID yesterday.  She is having some abdominal pain and shortness of breath with malaise.  She has had subjective fevers.  Her son is also sick.  The patient states her main concern is her shortness of breath.        Review of patient's allergies indicates:   Allergen Reactions    No known allergies      Past Medical History:   Diagnosis Date    Depression     Hyperlipidemia     Hypertension     Mild nonproliferative diabetic retinopathy of both eyes without macular edema associated with type 1 diabetes mellitus 3/30/2021    Type I (juvenile type) diabetes mellitus without mention of complication, uncontrolled 2011     Past Surgical History:   Procedure Laterality Date    ABCESS DRAINAGE      boil went over GA     SECTION  2012    DILATION AND CURETTAGE OF UTERUS  2019    Procedure: DILATION AND CURETTAGE, UTERUS;  Surgeon: Purnima Kidd MD;  Location: [facility];  Service: OB/GYN;;    HYSTEROSCOPY N/A 2019    Procedure: HYSTEROSCOPY;  Surgeon: Purnima Kidd MD;  Location: [facility];  Service: OB/GYN;  Laterality: N/A;    TONSILLECTOMY, ADENOIDECTOMY      WISDOM TOOTH EXTRACTION       Family History   Problem Relation Age of Onset    Diabetes Brother     No Known Problems Mother     No Known Problems Father     Hydrocephalus Daughter     No Known Problems Brother     No Known Problems Brother     No Known Problems Maternal Aunt     No Known Problems Maternal Uncle     No Known Problems Paternal Aunt     No Known Problems Paternal Uncle     No Known Problems Maternal Grandmother     No Known  Problems Maternal Grandfather     No Known Problems Paternal Grandmother     No Known Problems Paternal Grandfather     Amblyopia Neg Hx     Blindness Neg Hx     Cataracts Neg Hx     Glaucoma Neg Hx     Hypertension Neg Hx     Macular degeneration Neg Hx     Retinal detachment Neg Hx     Strabismus Neg Hx     Stroke Neg Hx     Thyroid disease Neg Hx     Breast cancer Neg Hx     Colon cancer Neg Hx     Ovarian cancer Neg Hx     Cancer Neg Hx      Social History     Tobacco Use    Smoking status: Never Smoker    Smokeless tobacco: Never Used   Substance Use Topics    Alcohol use: Yes     Comment: occasionally    Drug use: No     Review of Systems   Constitutional: Positive for activity change, fatigue and fever.   Respiratory: Positive for cough and shortness of breath. Negative for wheezing.    Gastrointestinal: Positive for abdominal pain. Negative for diarrhea, nausea and vomiting.       Physical Exam     Initial Vitals [01/07/22 1104]   BP Pulse Resp Temp SpO2   130/78 (!) 131 20 99.2 °F (37.3 °C) 99 %      MAP       --         Physical Exam    Nursing note and vitals reviewed.  Constitutional: She appears well-developed and well-nourished.   HENT:   Head: Normocephalic and atraumatic.   Neck: Neck supple.   Normal range of motion.  Pulmonary/Chest: Breath sounds normal.   Abdominal: Abdomen is soft.   Musculoskeletal:      Cervical back: Normal range of motion and neck supple.     Neurological: She is alert and oriented to person, place, and time.   Skin: Skin is warm and dry.   Psychiatric: She has a normal mood and affect. Her behavior is normal. Judgment and thought content normal.         ED Course   Procedures  Labs Reviewed   URINALYSIS, REFLEX TO URINE CULTURE    Narrative:     Specimen Source->Urine   SARS-COV-2 (COVID-19) QUALITATIVE PCR   POCT URINE PREGNANCY   POCT GLUCOSE MONITORING CONTINUOUS          Imaging Results          X-Ray Chest AP Portable (Final result)  Result time  01/07/22 11:48:10    Final result by Hugo Ny MD (01/07/22 11:48:10)                 Impression:      Stable exam.  No focal consolidation.      Electronically signed by: Hugo Ny MD  Date:    01/07/2022  Time:    11:48             Narrative:    EXAMINATION:  XR CHEST AP PORTABLE    CLINICAL HISTORY:  COVID-19;    TECHNIQUE:  Frontal view of the chest was performed.    COMPARISON:  09/09/2021    FINDINGS:  The cardiomediastinal silhouette is normal in size and midline. Pulmonary vascularity appears within normal limits.    The lungs appear clear without confluent pulmonary parenchymal opacity. No pleural fluid or pneumothorax.                                 Medications - No data to display                       Clinical Impression:   Final diagnoses:  [R06.02] SOB (shortness of breath)  [U07.1] COVID-19 (Primary)          ED Disposition Condition    Discharge Stable        ED Prescriptions     None        Follow-up Information     Follow up With Specialties Details Why Contact [facility] - Emergency Dept Emergency Medicine  As needed, If symptoms worsen [address] [facility] [id number] [phone]           Kathy Sanchez MD  01/07/22 [id number]

## 2022-01-10 ENCOUNTER — PATIENT MESSAGE (OUTPATIENT)
Dept: PRIMARY CARE CLINIC | Facility: CLINIC | Age: 32
End: 2022-01-10
Payer: MEDICAID

## 2022-01-11 DIAGNOSIS — R10.9 STOMACH ACHE: ICD-10-CM

## 2022-01-11 DIAGNOSIS — U07.1 CLINICAL DIAGNOSIS OF COVID-19: Primary | ICD-10-CM

## 2022-01-13 ENCOUNTER — PATIENT MESSAGE (OUTPATIENT)
Dept: OBSTETRICS AND GYNECOLOGY | Facility: CLINIC | Age: 32
End: 2022-01-13
Payer: MEDICAID

## 2022-01-14 ENCOUNTER — LAB VISIT (OUTPATIENT)
Dept: PRIMARY CARE CLINIC | Facility: CLINIC | Age: 32
End: 2022-01-14
Payer: MEDICAID

## 2022-01-14 DIAGNOSIS — Z20.822 CONTACT WITH AND (SUSPECTED) EXPOSURE TO COVID-19: ICD-10-CM

## 2022-01-14 LAB
CTP QC/QA: YES
SARS-COV-2 AG RESP QL IA.RAPID: NEGATIVE

## 2022-01-14 PROCEDURE — 87811 SARS-COV-2 COVID19 W/OPTIC: CPT

## 2022-01-27 PROBLEM — R00.0 TACHYCARDIA: Chronic | Status: ACTIVE | Noted: 2022-01-27

## 2022-02-03 ENCOUNTER — PATIENT MESSAGE (OUTPATIENT)
Dept: OBSTETRICS AND GYNECOLOGY | Facility: CLINIC | Age: 32
End: 2022-02-03
Payer: MEDICAID

## 2022-02-04 ENCOUNTER — HOSPITAL ENCOUNTER (OUTPATIENT)
Dept: PREADMISSION TESTING | Facility: HOSPITAL | Age: 32
Discharge: HOME | End: 2022-02-04
Attending: INTERNAL MEDICINE
Payer: MEDICAID

## 2022-02-04 DIAGNOSIS — Z01.812 ENCOUNTER FOR PREOPERATIVE SCREENING LABORATORY TESTING FOR COVID-19 VIRUS: Primary | ICD-10-CM

## 2022-02-04 DIAGNOSIS — Z11.52 ENCOUNTER FOR PREOPERATIVE SCREENING LABORATORY TESTING FOR COVID-19 VIRUS: Primary | ICD-10-CM

## 2022-02-07 ENCOUNTER — HOSPITAL ENCOUNTER (OUTPATIENT)
Dept: SLEEP MEDICINE | Facility: HOSPITAL | Age: 32
Discharge: HOME | End: 2022-02-07
Attending: NURSE PRACTITIONER
Payer: MEDICAID

## 2022-02-07 ENCOUNTER — TELEPHONE (OUTPATIENT)
Dept: SLEEP MEDICINE | Facility: HOSPITAL | Age: 32
End: 2022-02-07
Payer: MEDICAID

## 2022-02-07 DIAGNOSIS — G47.30 SLEEP APNEA IN ADULT: ICD-10-CM

## 2022-02-07 DIAGNOSIS — R06.83 PRIMARY SNORING: ICD-10-CM

## 2022-02-07 PROCEDURE — 95810 POLYSOM 6/> YRS 4/> PARAM: CPT

## 2022-02-07 PROCEDURE — 95810 PR POLYSOMNOGRAPHY, 4 OR MORE: ICD-10-PCS | Mod: 26,,, | Performed by: INTERNAL MEDICINE

## 2022-02-07 PROCEDURE — 95810 POLYSOM 6/> YRS 4/> PARAM: CPT | Mod: 26,,, | Performed by: INTERNAL MEDICINE

## 2022-02-08 NOTE — PATIENT INSTRUCTIONS
Your sleep study will be scored and interpreted by one of our physicians who are board certified in sleep medicine.  Within two weeks the results will be sent to the physician who referred you. Your physician should then contact you to go over the results, along with any recommendations. If you do not hear from your physician within two weeks, please call them.

## 2022-02-08 NOTE — PROGRESS NOTES
End of the night summary    Type of study performed on (Anushamyra Andrew-) PSG  ?  Patient education/cpap information prior to study/setup    Pt was informed, of emergency cord in bathroom and given spectra link phone#    EKG Appears to be- NSR    Low spo2 -  93%    Any difficulties recording:  none      Pt reaction to CPAP: pt reports she will try cpap if she needed one     Tech summary Comments:    pt did not meet criteria for split on cpap, soft snoring observed, pt has few events, some events observed in REM sleep, pt observed frequently tossing and turning at night some leg movements and grinding her teeth , pt slept okay no reports of discomfort

## 2022-02-15 ENCOUNTER — TELEPHONE (OUTPATIENT)
Dept: ORTHOPEDICS | Facility: CLINIC | Age: 32
End: 2022-02-15
Payer: MEDICAID

## 2022-02-15 NOTE — PROCEDURES
"Dear Provider,     You have ordered sleep LAB services to perform the sleep study for Anusha Andrew.  The sleep study that you ordered is complete.      Please find Sleep Study result in "Chart Review" under the "Media tab."      As the ordering provider, you are responsible for reviewing the results and implementing a treatment plan with your patient.    If you need a Sleep Medicine provider to explain the sleep study findings and arrange treatment for the patient, please refer patient for consultation to our Sleep Clinic via [facility] with Ambulatory Consult Sleep.    To do that please place an order for an  "Ambulatory Consult Sleep" - it will go to our clinic work queue for our Medical Assistant to contact the patient for an appointment.     For any questions, please contact our clinic staff at [phone] to talk to clinical staff.   "

## 2022-02-16 ENCOUNTER — TELEPHONE (OUTPATIENT)
Dept: ORTHOPEDICS | Facility: CLINIC | Age: 32
End: 2022-02-16
Payer: MEDICAID

## 2022-02-16 NOTE — TELEPHONE ENCOUNTER
Spoke with pt. appt rescheduled with Hafsa Larkin PA-C due to Dr Burt being out of clinic. All questions answered. Pt verbalized understanding.    Consent: Written consent obtained. Risks include but not limited to lid/brow ptosis, bruising, swelling, diplopia, temporary effect, incomplete chemical denervation.

## 2022-02-21 ENCOUNTER — PATIENT MESSAGE (OUTPATIENT)
Dept: OBSTETRICS AND GYNECOLOGY | Facility: CLINIC | Age: 32
End: 2022-02-21
Payer: MEDICAID

## 2022-02-22 ENCOUNTER — PATIENT MESSAGE (OUTPATIENT)
Dept: INTERNAL MEDICINE | Facility: CLINIC | Age: 32
End: 2022-02-22
Payer: MEDICAID

## 2022-02-22 ENCOUNTER — OFFICE VISIT (OUTPATIENT)
Dept: ORTHOPEDICS | Facility: CLINIC | Age: 32
End: 2022-02-22
Payer: MEDICAID

## 2022-02-22 VITALS
WEIGHT: 197.31 LBS | HEIGHT: 63 IN | BODY MASS INDEX: 34.96 KG/M2 | DIASTOLIC BLOOD PRESSURE: 78 MMHG | SYSTOLIC BLOOD PRESSURE: 116 MMHG | HEART RATE: 105 BPM

## 2022-02-22 DIAGNOSIS — M65.332 TRIGGER MIDDLE FINGER OF LEFT HAND: Primary | ICD-10-CM

## 2022-02-22 DIAGNOSIS — M65.331 TRIGGER MIDDLE FINGER OF RIGHT HAND: ICD-10-CM

## 2022-02-22 PROCEDURE — 3078F DIAST BP <80 MM HG: CPT | Mod: CPTII,,, | Performed by: PHYSICIAN ASSISTANT

## 2022-02-22 PROCEDURE — 3008F PR BODY MASS INDEX (BMI) DOCUMENTED: ICD-10-PCS | Mod: CPTII,,, | Performed by: PHYSICIAN ASSISTANT

## 2022-02-22 PROCEDURE — 1160F RVW MEDS BY RX/DR IN RCRD: CPT | Mod: CPTII,,, | Performed by: PHYSICIAN ASSISTANT

## 2022-02-22 PROCEDURE — 3074F PR MOST RECENT SYSTOLIC BLOOD PRESSURE < 130 MM HG: ICD-10-PCS | Mod: CPTII,,, | Performed by: PHYSICIAN ASSISTANT

## 2022-02-22 PROCEDURE — 1159F MED LIST DOCD IN RCRD: CPT | Mod: CPTII,,, | Performed by: PHYSICIAN ASSISTANT

## 2022-02-22 PROCEDURE — 20550 PR INJECT TENDON SHEATH/LIGAMENT: ICD-10-PCS | Mod: 50,S$PBB,, | Performed by: PHYSICIAN ASSISTANT

## 2022-02-22 PROCEDURE — 3008F BODY MASS INDEX DOCD: CPT | Mod: CPTII,,, | Performed by: PHYSICIAN ASSISTANT

## 2022-02-22 PROCEDURE — 3078F PR MOST RECENT DIASTOLIC BLOOD PRESSURE < 80 MM HG: ICD-10-PCS | Mod: CPTII,,, | Performed by: PHYSICIAN ASSISTANT

## 2022-02-22 PROCEDURE — 20550 NJX 1 TENDON SHEATH/LIGAMENT: CPT | Mod: 50,S$PBB,, | Performed by: PHYSICIAN ASSISTANT

## 2022-02-22 PROCEDURE — 99213 PR OFFICE/OUTPT VISIT, EST, LEVL III, 20-29 MIN: ICD-10-PCS | Mod: S$PBB,25,, | Performed by: PHYSICIAN ASSISTANT

## 2022-02-22 PROCEDURE — 99213 OFFICE O/P EST LOW 20 MIN: CPT | Mod: S$PBB,25,, | Performed by: PHYSICIAN ASSISTANT

## 2022-02-22 PROCEDURE — 99215 OFFICE O/P EST HI 40 MIN: CPT | Mod: PBBFAC,PN,25 | Performed by: PHYSICIAN ASSISTANT

## 2022-02-22 PROCEDURE — 20550 NJX 1 TENDON SHEATH/LIGAMENT: CPT | Mod: 50,PBBFAC,PN | Performed by: PHYSICIAN ASSISTANT

## 2022-02-22 PROCEDURE — 3074F SYST BP LT 130 MM HG: CPT | Mod: CPTII,,, | Performed by: PHYSICIAN ASSISTANT

## 2022-02-22 PROCEDURE — 1159F PR MEDICATION LIST DOCUMENTED IN MEDICAL RECORD: ICD-10-PCS | Mod: CPTII,,, | Performed by: PHYSICIAN ASSISTANT

## 2022-02-22 PROCEDURE — 99999 PR PBB SHADOW E&M-EST. PATIENT-LVL V: CPT | Mod: PBBFAC,,, | Performed by: PHYSICIAN ASSISTANT

## 2022-02-22 PROCEDURE — 1160F PR REVIEW ALL MEDS BY PRESCRIBER/CLIN PHARMACIST DOCUMENTED: ICD-10-PCS | Mod: CPTII,,, | Performed by: PHYSICIAN ASSISTANT

## 2022-02-22 PROCEDURE — 99999 PR PBB SHADOW E&M-EST. PATIENT-LVL V: ICD-10-PCS | Mod: PBBFAC,,, | Performed by: PHYSICIAN ASSISTANT

## 2022-02-22 RX ORDER — METHYLPREDNISOLONE ACETATE 80 MG/ML
20 INJECTION, SUSPENSION INTRA-ARTICULAR; INTRALESIONAL; INTRAMUSCULAR; SOFT TISSUE
Status: COMPLETED | OUTPATIENT
Start: 2022-02-22 | End: 2022-02-22

## 2022-02-22 RX ADMIN — METHYLPREDNISOLONE ACETATE 20 MG: 80 INJECTION, SUSPENSION INTRALESIONAL; INTRAMUSCULAR; INTRASYNOVIAL; SOFT TISSUE at 10:02

## 2022-02-22 NOTE — PROGRESS NOTES
Subjective:      Patient ID: Anusha Andrew is a 31 y.o. female.    Chief Complaint: Pain of the Left Hand and Pain of the Right Hand    Review of patient's allergies indicates:   Allergen Reactions    No known allergies         32 yo RHD F presents to clinic with c/o bilateral middle trigger finger x 1 year.  No injury or trauma.  Pain and stiffness to both middle fingers.  Has to manually release fingers.  No numbness/tingling.  She reports that she received IM steroid injection from PCP a few months ago with minimal relief.      Review of Systems   Constitutional: Negative for chills, diaphoresis and fever.   HENT: Negative for congestion, ear discharge and ear pain.    Eyes: Negative for blurred vision, discharge, double vision and pain.   Cardiovascular: Negative for chest pain, claudication and cyanosis.   Respiratory: Negative for cough, hemoptysis and shortness of breath.    Endocrine: Negative for cold intolerance and heat intolerance.   Skin: Negative for color change, dry skin, itching and rash.   Musculoskeletal: Positive for joint pain. Negative for arthritis, back pain, falls, gout, joint swelling, muscle weakness and neck pain.   Gastrointestinal: Negative for abdominal pain and change in bowel habit.   Neurological: Negative for brief paralysis, disturbances in coordination, dizziness, numbness and paresthesias.   Psychiatric/Behavioral: Negative for altered mental status and depression.         Objective:          General    Constitutional: She is oriented to person, place, and time. She appears well-developed and well-nourished. No distress.   HENT:   Head: Atraumatic.   Eyes: EOM are normal. Right eye exhibits no discharge. Left eye exhibits no discharge.   Cardiovascular: Normal rate.    Pulmonary/Chest: Effort normal. No respiratory distress.   Abdominal: Soft.   Neurological: She is alert and oriented to person, place, and time.   Psychiatric: She has a normal mood and affect. Her behavior  is normal.             Right Hand/Wrist Exam     Inspection   Scars: Wrist - absent Hand -  absent  Effusion: Wrist - absent Hand -  absent  Bruising: Wrist - absent Hand -  absent  Deformity: Wrist - deformity Hand -  deformity    Range of Motion     Wrist   Extension: normal   Flexion: normal   Pronation: normal   Supination: normal     Tests   Phalens Sign: negative  Tinel's sign (median nerve): negative  Finkelstein's test: negative  Carpal Tunnel Compression Test: negative  Cubital Tunnel Compression Test: negative      Other     Neuorologic Exam    Median Distribution: normal  Ulnar Distribution: normal  Radial Distribution: normal    Comments:  Tenderness at level of A1 pulley of right long finger finger with palpable nodule of the flexor tendon          Left Hand/Wrist Exam     Inspection   Scars: Wrist - absent Hand -  absent  Effusion: Wrist - absent Hand -  absent  Bruising: Wrist - absent Hand -  absent  Deformity: Wrist - absent Hand -  absent    Range of Motion     Wrist   Extension: normal   Flexion: normal   Pronation: normal   Supination: normal     Tests   Phalens Sign: negative  Tinel's sign (median nerve): negative  Finkelstein's test: negative  Carpal Tunnel Compression Test: negative  Cubital Tunnel Compression Test: negative      Other     Sensory Exam  Median Distribution: normal  Ulnar Distribution: normal  Radial Distribution: normal    Comments:  Tenderness at level of A1 pulley of left long finger finger with palpable nodule of the flexor tendon          Right Elbow Exam     Tests   Tinel's sign (cubital tunnel): negative      Left Elbow Exam     Tests   Tinel's sign (cubital tunnel): negative        Muscle Strength   Right Upper Extremity   Wrist extension: 5/5   Wrist flexion: 5/5   : 5/5   Left Upper Extremity  Wrist extension: 5/5   Wrist flexion: 5/5   :  5/5     Vascular Exam       Capillary Refill  Right Hand: normal capillary refill  Left Hand: normal capillary  refill              Assessment:             Encounter Diagnoses   Name Primary?    Trigger middle finger of right hand     Trigger middle finger of left hand Yes    Trigger middle finger of left hand  -     methylPREDNISolone acetate injection 20 mg  -     methylPREDNISolone acetate injection 20 mg    Trigger middle finger of right hand  -     Ambulatory referral/consult to Orthopedics  -     methylPREDNISolone acetate injection 20 mg  -     methylPREDNISolone acetate injection 20 mg               Plan:         I made the decision to obtain old records of the patient including previous notes and imaging. New imaging was ordered today of the extremity or extremities evaluated. I independently reviewed and interpreted the radiographs today as well as prior imaging.    The total face-to-face encounter time with this patient was 30 minutes and greater than 50% of of the encounter time was spent counseling the patient, coordinating care, and education regarding the pathology of his/her diagnosis. We have discussed a variety of treatment options including medications, splinting, nerve glide exercises, injections, occupational therapy and surgery. Pt is requesting splints and bilateral middle trigger finger injections.  Today, the patient chooses  a CSI and understands a minimum of 3 months time must lapse after injection, prior to a surgical procedure due to increased risk of infection.     1. PROCEDURE:  The injection site was identified and the skin was prepared with an ETOH  solution. The A-1 pulley of the bilateral long finger finger was injected with 20 mg depo medrol and 0.5 ml Lidocaine under sterile technique. Anusha Andrew tolerated the procedure well, she was advised to rest the finger today, ice and elevation. she did receive immediate relief of the pain and triggering in and about her finger he was told this would be short lived and is secondary to the lidocaine. she may have an increase in her discomfort  tonight followed by steady improvement over the next several days. Following that time, she can resume regular activities.  It may take 1-3 weeks following the injection to get the full benefit of the medication.  Discussed with pt that she should monitor blood sugar over next few days and notify clinic/PCP if elevated. She was reminded to call the clinic immediately for any adverse side effects as explained in clinic today.     2. Oval 8 splint to be worn at night and as needed during the day.  [id number] - I performed a custom orthotic / brace adjustment, fitting and training with the patient. The patient demonstrated understanding and proper care. This was performed for 10 minutes.  3. Ice compress to the affected area 2-3x a day for 15-20 minutes as needed for pain management.  4. RTC in 6 weeks for follow-up, sooner if needed.      Patient voices understanding of and agreement with treatment plan. All of the patient's questions were answered and the patient will contact us if she has any questions or concerns in the interim.

## 2022-02-24 ENCOUNTER — PATIENT MESSAGE (OUTPATIENT)
Dept: OPTOMETRY | Facility: CLINIC | Age: 32
End: 2022-02-24
Payer: MEDICAID

## 2022-02-25 ENCOUNTER — PATIENT MESSAGE (OUTPATIENT)
Dept: INTERNAL MEDICINE | Facility: CLINIC | Age: 32
End: 2022-02-25
Payer: MEDICAID

## 2022-03-08 ENCOUNTER — OFFICE VISIT (OUTPATIENT)
Dept: OPTOMETRY | Facility: CLINIC | Age: 32
End: 2022-03-08
Payer: MEDICAID

## 2022-03-08 ENCOUNTER — PATIENT MESSAGE (OUTPATIENT)
Dept: INTERNAL MEDICINE | Facility: CLINIC | Age: 32
End: 2022-03-08
Payer: MEDICAID

## 2022-03-08 DIAGNOSIS — Z97.3 WEARS CONTACT LENSES: ICD-10-CM

## 2022-03-08 DIAGNOSIS — E10.3293 MILD NONPROLIFERATIVE DIABETIC RETINOPATHY OF BOTH EYES WITHOUT MACULAR EDEMA ASSOCIATED WITH TYPE 1 DIABETES MELLITUS: Primary | ICD-10-CM

## 2022-03-08 DIAGNOSIS — H52.13 MYOPIA, BILATERAL: ICD-10-CM

## 2022-03-08 PROCEDURE — 92014 PR EYE EXAM, EST PATIENT,COMPREHESV: ICD-10-PCS | Mod: S$PBB,,, | Performed by: OPTOMETRIST

## 2022-03-08 PROCEDURE — 1159F PR MEDICATION LIST DOCUMENTED IN MEDICAL RECORD: ICD-10-PCS | Mod: CPTII,,, | Performed by: OPTOMETRIST

## 2022-03-08 PROCEDURE — 99212 OFFICE O/P EST SF 10 MIN: CPT | Mod: PBBFAC,PO | Performed by: OPTOMETRIST

## 2022-03-08 PROCEDURE — 92310 CONTACT LENS FITTING OU: CPT | Mod: CSM,,, | Performed by: OPTOMETRIST

## 2022-03-08 PROCEDURE — 2022F PR DILATED RETINAL EYE EXAM WITH INTERP/REVIEW: ICD-10-PCS | Mod: CPTII,,, | Performed by: OPTOMETRIST

## 2022-03-08 PROCEDURE — 99999 PR PBB SHADOW E&M-EST. PATIENT-LVL II: CPT | Mod: PBBFAC,,, | Performed by: OPTOMETRIST

## 2022-03-08 PROCEDURE — 1159F MED LIST DOCD IN RCRD: CPT | Mod: CPTII,,, | Performed by: OPTOMETRIST

## 2022-03-08 PROCEDURE — 2022F DILAT RTA XM EVC RTNOPTHY: CPT | Mod: CPTII,,, | Performed by: OPTOMETRIST

## 2022-03-08 PROCEDURE — 92014 COMPRE OPH EXAM EST PT 1/>: CPT | Mod: S$PBB,,, | Performed by: OPTOMETRIST

## 2022-03-08 PROCEDURE — 92015 DETERMINE REFRACTIVE STATE: CPT | Mod: ,,, | Performed by: OPTOMETRIST

## 2022-03-08 PROCEDURE — 99999 PR PBB SHADOW E&M-EST. PATIENT-LVL II: ICD-10-PCS | Mod: PBBFAC,,, | Performed by: OPTOMETRIST

## 2022-03-08 PROCEDURE — 92310 PR CONTACT LENS FITTING (NO CHANGE): ICD-10-PCS | Mod: CSM,,, | Performed by: OPTOMETRIST

## 2022-03-08 PROCEDURE — 92015 PR REFRACTION: ICD-10-PCS | Mod: ,,, | Performed by: OPTOMETRIST

## 2022-03-08 NOTE — LETTER
03/08/2022               [facility]  [address]  [place] [address]  Phone: [phone]  Fax: [phone]   03/08/2022    Patient: Anusha Andrew   YOB: 1990   Date of Visit: 3/8/2022       To Whom it May Concern:    Anusha Andrew was seen in my clinic on 3/8/2022. She may return to work on 3/8/2022.    If you have any questions or concerns, please don't hesitate to call.    Sincerely,           Raad Cuenca OD

## 2022-03-08 NOTE — PROGRESS NOTES
HPI     Patient has received  steroid shot in both trigger fingers. Which is on   left and right hand. This is her first time trying steroids . On   02/24/2022 she  noticed her vision is blurry kind of in both eyes but a   lot in my left eye she has bleeding in the back of her  eyes already  and   sugars been a little elevated do to her endo increasing it. Increased   insulin until recently has seen improved sugars     Light headaches and blurry vision    Hemoglobin A1C       Date                     Value               Ref Range             Status                12/10/2021               8.1 (H)             4.0 - 5.6 %           Final             10/22/2021               8.2 (H)             4.0 - 5.6 %           Final              09/09/2021               8.9 (H)             4.0 - 5.6 %           Final                Last edited by Raad Cuenca, OD on 3/8/2022 10:00 AM. (History)            Assessment /Plan     For exam results, see Encounter Report.    Mild nonproliferative diabetic retinopathy of both eyes without macular edema associated with type 1 diabetes mellitus  -improved over previous  -monitor annual    Myopia, bilateral  Eyeglass Final Rx     Eyeglass Final Rx       Sphere Cylinder [place] [facility]    Right -2.50 +0.75 010 20/20    Left -2.75 Sphere  20/20    Expiration Date: 3/8/2023              Trials -2.50 ou vs -2.75 OS.  Ok to final preferred      RTC 1 wk PHREV

## 2022-03-14 ENCOUNTER — PATIENT MESSAGE (OUTPATIENT)
Dept: INTERNAL MEDICINE | Facility: CLINIC | Age: 32
End: 2022-03-14
Payer: MEDICAID

## 2022-03-14 ENCOUNTER — TELEPHONE (OUTPATIENT)
Dept: OPHTHALMOLOGY | Facility: CLINIC | Age: 32
End: 2022-03-14
Payer: MEDICAID

## 2022-03-14 ENCOUNTER — PATIENT MESSAGE (OUTPATIENT)
Dept: OPTOMETRY | Facility: CLINIC | Age: 32
End: 2022-03-14
Payer: MEDICAID

## 2022-03-14 NOTE — TELEPHONE ENCOUNTER
Final  Contact Lens Final Rx     Final Contact Lens Rx       Brand Base Curve Diameter Sphere Cylinder    Right Dailies Total 1 8.50 14.1 -2.50 Sphere    Left Dailies Total 1 8.50 14.1 -2.75 Sphere    Expiration Date: 3/14/2023    Replacement: Daily    Wearing Schedule: Daily Wear

## 2022-03-16 DIAGNOSIS — E10.65 TYPE 1 DIABETES MELLITUS WITH HYPERGLYCEMIA: Primary | ICD-10-CM

## 2022-03-23 ENCOUNTER — PATIENT MESSAGE (OUTPATIENT)
Dept: INTERNAL MEDICINE | Facility: CLINIC | Age: 32
End: 2022-03-23
Payer: MEDICAID

## 2022-03-25 ENCOUNTER — PATIENT MESSAGE (OUTPATIENT)
Dept: INTERNAL MEDICINE | Facility: CLINIC | Age: 32
End: 2022-03-25
Payer: MEDICAID

## 2022-04-05 ENCOUNTER — OFFICE VISIT (OUTPATIENT)
Dept: ORTHOPEDICS | Facility: CLINIC | Age: 32
End: 2022-04-05
Payer: MEDICAID

## 2022-04-05 VITALS
BODY MASS INDEX: 35.98 KG/M2 | HEART RATE: 116 BPM | SYSTOLIC BLOOD PRESSURE: 139 MMHG | WEIGHT: 203.06 LBS | HEIGHT: 63 IN | DIASTOLIC BLOOD PRESSURE: 92 MMHG

## 2022-04-05 DIAGNOSIS — M65.332 TRIGGER FINGER, LEFT MIDDLE FINGER: ICD-10-CM

## 2022-04-05 DIAGNOSIS — M65.331 TRIGGER FINGER, RIGHT MIDDLE FINGER: ICD-10-CM

## 2022-04-05 PROCEDURE — 3075F PR MOST RECENT SYSTOLIC BLOOD PRESS GE 130-139MM HG: ICD-10-PCS | Mod: CPTII,,, | Performed by: ORTHOPAEDIC SURGERY

## 2022-04-05 PROCEDURE — 3052F PR MOST RECENT HEMOGLOBIN A1C LEVEL 8.0 - < 9.0%: ICD-10-PCS | Mod: CPTII,,, | Performed by: ORTHOPAEDIC SURGERY

## 2022-04-05 PROCEDURE — 3075F SYST BP GE 130 - 139MM HG: CPT | Mod: CPTII,,, | Performed by: ORTHOPAEDIC SURGERY

## 2022-04-05 PROCEDURE — 99213 PR OFFICE/OUTPT VISIT, EST, LEVL III, 20-29 MIN: ICD-10-PCS | Mod: S$PBB,,, | Performed by: ORTHOPAEDIC SURGERY

## 2022-04-05 PROCEDURE — 3008F BODY MASS INDEX DOCD: CPT | Mod: CPTII,,, | Performed by: ORTHOPAEDIC SURGERY

## 2022-04-05 PROCEDURE — 99213 OFFICE O/P EST LOW 20 MIN: CPT | Mod: S$PBB,,, | Performed by: ORTHOPAEDIC SURGERY

## 2022-04-05 PROCEDURE — 1160F PR REVIEW ALL MEDS BY PRESCRIBER/CLIN PHARMACIST DOCUMENTED: ICD-10-PCS | Mod: CPTII,,, | Performed by: ORTHOPAEDIC SURGERY

## 2022-04-05 PROCEDURE — 1159F PR MEDICATION LIST DOCUMENTED IN MEDICAL RECORD: ICD-10-PCS | Mod: CPTII,,, | Performed by: ORTHOPAEDIC SURGERY

## 2022-04-05 PROCEDURE — 3008F PR BODY MASS INDEX (BMI) DOCUMENTED: ICD-10-PCS | Mod: CPTII,,, | Performed by: ORTHOPAEDIC SURGERY

## 2022-04-05 PROCEDURE — 3080F PR MOST RECENT DIASTOLIC BLOOD PRESSURE >= 90 MM HG: ICD-10-PCS | Mod: CPTII,,, | Performed by: ORTHOPAEDIC SURGERY

## 2022-04-05 PROCEDURE — 1159F MED LIST DOCD IN RCRD: CPT | Mod: CPTII,,, | Performed by: ORTHOPAEDIC SURGERY

## 2022-04-05 PROCEDURE — 3080F DIAST BP >= 90 MM HG: CPT | Mod: CPTII,,, | Performed by: ORTHOPAEDIC SURGERY

## 2022-04-05 PROCEDURE — 3052F HG A1C>EQUAL 8.0%<EQUAL 9.0%: CPT | Mod: CPTII,,, | Performed by: ORTHOPAEDIC SURGERY

## 2022-04-05 PROCEDURE — 99214 OFFICE O/P EST MOD 30 MIN: CPT | Mod: PBBFAC,PN | Performed by: ORTHOPAEDIC SURGERY

## 2022-04-05 PROCEDURE — 99999 PR PBB SHADOW E&M-EST. PATIENT-LVL IV: ICD-10-PCS | Mod: PBBFAC,,, | Performed by: ORTHOPAEDIC SURGERY

## 2022-04-05 PROCEDURE — 1160F RVW MEDS BY RX/DR IN RCRD: CPT | Mod: CPTII,,, | Performed by: ORTHOPAEDIC SURGERY

## 2022-04-05 PROCEDURE — 99999 PR PBB SHADOW E&M-EST. PATIENT-LVL IV: CPT | Mod: PBBFAC,,, | Performed by: ORTHOPAEDIC SURGERY

## 2022-04-05 NOTE — PROGRESS NOTES
"Subjective:    Patient ID:  Anusha Andrew is a 31 y.o. y.o. female who presents for f/u visit for Pain of the Left Hand and Pain of the Right Hand      Patient returns for follow-up for bilateral middle finger triggering. She was seen previously y MARCELLO Snider and her history is well-documented in the 2/22/2022 office note. Reports no improvement with CSIs and splints.         Objective:     BP (!) 139/92 (BP Location: Right arm, Patient Position: Sitting, BP Method: Small (Automatic))   Pulse (!) 116   Ht 5' 3" (1.6 m)   Wt 92.1 kg (203 lb 0.7 oz)   BMI 35.97 kg/m²     Ortho Exam     32 yo female in NAD; alert, oriented x 3    Head: atraumatic  Eyes: EOM are normal. Right eye exhibits no discharge. Left eye exhibits no discharge  Cardiovascular: normal rate    Pulmonary/Chest: effort normal; no respiratory distress  Abdominal: soft    Bilateral middle finger: N/V intact; tender nodule A1 pulley with triggering; FROM       Assessment & Plan:     1. Trigger finger, left middle finger    2. Trigger finger, right middle finger      1.  Treatment options discussed. Patient counseled re: recommended minimum 3 month period between CSI and surgery due to increased risk of post-op infection. She is informed that I am leaving the practice here and will not be available to perform this procedure.   2.  Refer to hand surgery   3.  Follow-up here on prn basis  "

## 2022-04-05 NOTE — PROGRESS NOTES
CHIEF COMPLAINT: Type 1 Diabetes     HPI: Ms. Anusha Andrew is a 31 y.o. female who was diagnosed with Type 1  DM at 17 y/o.  Pending psych eval  ISC8 taiwo -----remote access  2-3/2022- for review TIR ~25-30%  Hypoglycemia 2.8-3%     H/o DKA. 10/2018 DKA/admission  Seen pt in the past, 2016.  Last office visits w/ LALIT Fraga NP [phone].  Last seen by me in fall 2021  Has dexcom g6---[employer] supplier   Pending bariatric surgery-pending   Paperwork needed during visit.  F/u with retinal specialist- last visit in march 2021   Steroid therapy for hands --noted hyperglycemia    No recent utis, yeast infections.  Stopped trulicity, did not see benefits (off label)    Exercise 3x a week 1 hour session- home videos  Dietary habits:  3 meals     Lab Results   Component Value Date    HGBA1C 8.7 (H) 03/30/2022     In past, tandem  Uses dexcom   [employer] supplier     +h/o Trigger finger- adverse reactions voltaren -hyperglycemia, worried about elevated platelet counts---[facility] -possible for treatment    PREVIOUS DIABETES MEDICATIONS TRIED  humalog  levemir  novolog  lantus     CURRENT DIABETIC MEDS: lantus 52 units at night 9p,  novolog 1:5  insulin/carb ratio, target 150, isf 25   Self adjustment per scale    On MDI (injections 4 x a day)  Makes frequent changes to his/her insulin regimen on the basis of blood glucose data  Testing 4 x a day  Patient is willing and able to use the device  Demonstrated an understanding of the technology and is motivated to use CGM  Patient expected to adhere to a comprehensive diabetes treatment plan and patient has adequate medical supervision    Fasting 180s  Lunch time  190s-200s  Dinner time 180s-200s  Bedtime lower readings 70-mid 100s    Lowest: 70  Highest: mid 200s    Has multiple impaired awareness of hypoglycemia (hypoglycemia unawareness)    Diabetes Management Status    Statin: Not taking  ACE/ARB: Not taking    Screening or Prevention Patient's value Goal  "Complete/Controlled?   HgA1C Testing and Control   Lab Results   Component Value Date    HGBA1C 8.7 (H) 03/30/2022      Annually/Less than 8% No   Lipid profile : 12/10/2021 Annually Yes   LDL control Lab Results   Component Value Date    LDLCALC 148.2 12/10/2021    Annually/Less than 100 mg/dl  No   Nephropathy screening Lab Results   Component Value Date    LABMICR 9.0 10/22/2021     Lab Results   Component Value Date    PROTEINUA Negative 01/07/2022    Annually No   Blood pressure BP Readings from Last 1 Encounters:   04/06/22 119/85    Less than 140/90 Yes   Dilated retinal exam : 03/08/2022 Annually No   Foot exam   : 02/24/2021 Annually No     REVIEW OF SYSTEMS  General: no weakness, fatigue, + weight fluctuations 1-4#  Eyes: no double or blurred vision, eye pain, or redness  Cardiovascular: no chest pain, palpitations, edema, or murmurs.   Respiratory: no cough or dyspnea.   GI: no heartburn, + occ nausea, or changes in bowel patterns; good appetite.   Skin: no rashes, dryness, itching, or reactions at insulin injection sites.  Neuro: + numbness, tingling, tremors, or vertigo.   Endocrine: no polyuria, polydipsia, polyphagia, heat or cold intolerance.     Vital Signs  /85 (BP Location: Left arm, Patient Position: Sitting, BP Method: Medium (Manual))   Pulse 103   Ht 5' 3" (1.6 m)   Wt 92.4 kg (203 lb 11.3 oz)   SpO2 98%   BMI 36.08 kg/m²     Hemoglobin A1C   Date Value Ref Range Status   03/30/2022 8.7 (H) 4.0 - 5.6 % Final     Comment:     ADA Screening Guidelines:  5.7-6.4%  Consistent with prediabetes  >or=6.5%  Consistent with diabetes    High levels of fetal hemoglobin interfere with the HbA1C  assay. Heterozygous hemoglobin variants (HbS, HgC, etc)do  not significantly interfere with this assay.   However, presence of multiple variants may affect accuracy.     12/10/2021 8.1 (H) 4.0 - 5.6 % Final     Comment:     ADA Screening Guidelines:  5.7-6.4%  Consistent with prediabetes  >or=6.5%  " Consistent with diabetes    High levels of fetal hemoglobin interfere with the HbA1C  assay. Heterozygous hemoglobin variants (HbS, HgC, etc)do  not significantly interfere with this assay.   However, presence of multiple variants may affect accuracy.     10/22/2021 8.2 (H) 4.0 - 5.6 % Final     Comment:     ADA Screening Guidelines:  5.7-6.4%  Consistent with prediabetes  >or=6.5%  Consistent with diabetes    High levels of fetal hemoglobin interfere with the HbA1C  assay. Heterozygous hemoglobin variants (HbS, HgC, etc)do  not significantly interfere with this assay.   However, presence of multiple variants may affect accuracy.          Chemistry        Component Value Date/Time     12/10/2021 1118    K 4.6 12/10/2021 1118     12/10/2021 1118    CO2 20 (L) 12/10/2021 1118    BUN 9 12/10/2021 1118    CREATININE 0.8 12/10/2021 1118     (H) 12/10/2021 1118        Component Value Date/Time    CALCIUM 9.5 12/10/2021 1118    ALKPHOS 64 12/10/2021 1118    AST 12 12/10/2021 1118    ALT 15 12/10/2021 1118    BILITOT 0.4 12/10/2021 1118    ESTGFRAFRICA >60.0 12/10/2021 1118    EGFRNONAA >60.0 12/10/2021 1118           Lab Results   Component Value Date    TSH 1.296 12/10/2021      Lab Results   Component Value Date    CHOL 236 (H) 12/10/2021    CHOL 221 (H) 10/22/2021    CHOL 217 (H) 06/19/2020     Lab Results   Component Value Date    HDL 73 12/10/2021    HDL 67 10/22/2021    HDL 56 06/19/2020     Lab Results   Component Value Date    LDLCALC 148.2 12/10/2021    LDLCALC 133.8 10/22/2021    LDLCALC 148.0 06/19/2020     Lab Results   Component Value Date    TRIG 74 12/10/2021    TRIG 101 10/22/2021    TRIG 65 06/19/2020     Lab Results   Component Value Date    CHOLHDL 30.9 12/10/2021    CHOLHDL 30.3 10/22/2021    CHOLHDL 25.8 06/19/2020         PHYSICAL EXAMINATION  Constitutional: Appears well, no distress. Reviewed vitals above.  Eyes: conjunctivae & lids intact; PERRLA, EOMs intact; optic discs   Neck:  Supple, trachea midline.   Respiratory: No wheezes, even and unlabored  Cardiovascular: RRR;  no edema or varicosities  Lymph: deferred   Skin: warm and dry; no injection site reactions, no acanthosis nigracans observed.  Neuro:patient alert and cooperative, normal affect; steady gait.  Psychiatric: judgement & insight intact, orientation of time, place & person intact, memory; mood & affect wnl     Diabetes Foot Exam:   Protective Sensation (w/ 10 gram monofilament):  Right: Intact 6/7  Left: Intact 7/7     Visual Inspection:  Normal -  Bilateral    Pedal Pulses:   Right: Present  Left: Present    Posterior tibialis:   Right:Present  Left: Present        Assessment/Plan    1. Type 1 diabetes mellitus with hyperglycemia  Hemoglobin A1C   2. Obesity (BMI 30-39.9)     3. Mild nonproliferative diabetic retinopathy of both eyes without macular edema associated with type 1 diabetes mellitus     4. Sleep apnea in adult       1-4. Filled out paperwork  Pre op/clears  Advise  lantus 26-30 units at night  Novolog 1:10 or 1:12  Scale 180-230+2, 231-280+4, 281-330+6, 331-380+8, etc.  As of now Insulin/carb ratio   1:4 with breakfast and lunch  1:6 with dinner   Scale use 150-200+2, 201-250+4, etc.  Continue lantus 52 units at night   Use dexcom g6   Body mass index is 36.08 kg/m².  Pending bariatric sx  Not using machine   F/u w/ retinal specialist     FOLLOW UP  Follow up in about 3 months (around 7/6/2022).

## 2022-04-06 ENCOUNTER — OFFICE VISIT (OUTPATIENT)
Dept: INTERNAL MEDICINE | Facility: CLINIC | Age: 32
End: 2022-04-06
Payer: MEDICAID

## 2022-04-06 ENCOUNTER — PATIENT MESSAGE (OUTPATIENT)
Dept: INTERNAL MEDICINE | Facility: CLINIC | Age: 32
End: 2022-04-06

## 2022-04-06 VITALS
OXYGEN SATURATION: 98 % | SYSTOLIC BLOOD PRESSURE: 119 MMHG | HEART RATE: 103 BPM | HEIGHT: 63 IN | DIASTOLIC BLOOD PRESSURE: 85 MMHG | BODY MASS INDEX: 36.09 KG/M2 | WEIGHT: 203.69 LBS

## 2022-04-06 DIAGNOSIS — E10.3293 MILD NONPROLIFERATIVE DIABETIC RETINOPATHY OF BOTH EYES WITHOUT MACULAR EDEMA ASSOCIATED WITH TYPE 1 DIABETES MELLITUS: ICD-10-CM

## 2022-04-06 DIAGNOSIS — E10.65 TYPE 1 DIABETES MELLITUS WITH HYPERGLYCEMIA: Primary | ICD-10-CM

## 2022-04-06 DIAGNOSIS — E66.9 OBESITY (BMI 30-39.9): ICD-10-CM

## 2022-04-06 DIAGNOSIS — G47.30 SLEEP APNEA IN ADULT: ICD-10-CM

## 2022-04-06 PROCEDURE — 99214 OFFICE O/P EST MOD 30 MIN: CPT | Mod: S$PBB,,, | Performed by: NURSE PRACTITIONER

## 2022-04-06 PROCEDURE — 3008F PR BODY MASS INDEX (BMI) DOCUMENTED: ICD-10-PCS | Mod: CPTII,,, | Performed by: NURSE PRACTITIONER

## 2022-04-06 PROCEDURE — 3052F HG A1C>EQUAL 8.0%<EQUAL 9.0%: CPT | Mod: CPTII,,, | Performed by: NURSE PRACTITIONER

## 2022-04-06 PROCEDURE — 3079F DIAST BP 80-89 MM HG: CPT | Mod: CPTII,,, | Performed by: NURSE PRACTITIONER

## 2022-04-06 PROCEDURE — 3008F BODY MASS INDEX DOCD: CPT | Mod: CPTII,,, | Performed by: NURSE PRACTITIONER

## 2022-04-06 PROCEDURE — 99214 PR OFFICE/OUTPT VISIT, EST, LEVL IV, 30-39 MIN: ICD-10-PCS | Mod: S$PBB,,, | Performed by: NURSE PRACTITIONER

## 2022-04-06 PROCEDURE — 3052F PR MOST RECENT HEMOGLOBIN A1C LEVEL 8.0 - < 9.0%: ICD-10-PCS | Mod: CPTII,,, | Performed by: NURSE PRACTITIONER

## 2022-04-06 PROCEDURE — 99999 PR PBB SHADOW E&M-EST. PATIENT-LVL IV: CPT | Mod: PBBFAC,,, | Performed by: NURSE PRACTITIONER

## 2022-04-06 PROCEDURE — 1159F MED LIST DOCD IN RCRD: CPT | Mod: CPTII,,, | Performed by: NURSE PRACTITIONER

## 2022-04-06 PROCEDURE — 99214 OFFICE O/P EST MOD 30 MIN: CPT | Mod: PBBFAC | Performed by: NURSE PRACTITIONER

## 2022-04-06 PROCEDURE — 3074F PR MOST RECENT SYSTOLIC BLOOD PRESSURE < 130 MM HG: ICD-10-PCS | Mod: CPTII,,, | Performed by: NURSE PRACTITIONER

## 2022-04-06 PROCEDURE — 1160F PR REVIEW ALL MEDS BY PRESCRIBER/CLIN PHARMACIST DOCUMENTED: ICD-10-PCS | Mod: CPTII,,, | Performed by: NURSE PRACTITIONER

## 2022-04-06 PROCEDURE — 1159F PR MEDICATION LIST DOCUMENTED IN MEDICAL RECORD: ICD-10-PCS | Mod: CPTII,,, | Performed by: NURSE PRACTITIONER

## 2022-04-06 PROCEDURE — 99999 PR PBB SHADOW E&M-EST. PATIENT-LVL IV: ICD-10-PCS | Mod: PBBFAC,,, | Performed by: NURSE PRACTITIONER

## 2022-04-06 PROCEDURE — 1160F RVW MEDS BY RX/DR IN RCRD: CPT | Mod: CPTII,,, | Performed by: NURSE PRACTITIONER

## 2022-04-06 PROCEDURE — 3079F PR MOST RECENT DIASTOLIC BLOOD PRESSURE 80-89 MM HG: ICD-10-PCS | Mod: CPTII,,, | Performed by: NURSE PRACTITIONER

## 2022-04-06 PROCEDURE — 3074F SYST BP LT 130 MM HG: CPT | Mod: CPTII,,, | Performed by: NURSE PRACTITIONER

## 2022-04-06 RX ORDER — GLUCAGON 3 MG/1
POWDER NASAL
Qty: 2 EACH | Refills: 3 | Status: SHIPPED | OUTPATIENT
Start: 2022-04-06 | End: 2022-07-27

## 2022-04-06 NOTE — PATIENT INSTRUCTIONS
Preop/clears-recs below:  Lantus 26-30 units at night  Novolog 1:10 or 1:12  Scale 180-230+2, 231-280+4, 281-330+6, 331-380+8, etc.    As of now Insulin/carb ratio   1:4 with breakfast and lunch  1:6 with dinner     Scale use 150-200+2, 201-250+4, etc.    Continue lantus 52 units at night     Follow up in 3-3.5 months w/Irielle  A1c prior (1-7 days before appt)     [email or web address]   [email or web address]     Goal less than 7%   Lab Results   Component Value Date    HGBA1C 8.7 (H) 03/30/2022

## 2022-04-22 RX ORDER — NORGESTREL AND ETHINYL ESTRADIOL 0.3-0.03MG
1 KIT ORAL DAILY
Qty: 28 TABLET | Refills: 3 | Status: SHIPPED | OUTPATIENT
Start: 2022-04-22 | End: 2022-07-22 | Stop reason: SDUPTHER

## 2022-04-27 ENCOUNTER — PATIENT MESSAGE (OUTPATIENT)
Dept: ORTHOPEDICS | Facility: CLINIC | Age: 32
End: 2022-04-27
Payer: MEDICAID

## 2022-04-28 ENCOUNTER — TELEPHONE (OUTPATIENT)
Dept: PHARMACY | Facility: CLINIC | Age: 32
End: 2022-04-28
Payer: MEDICAID

## 2022-04-28 NOTE — TELEPHONE ENCOUNTER
Spoke with pt. Pt states her light duty restrictions need kellee more specific for her job. 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 Letter written for pt. All questions answered. Pt verbalized understanding.

## 2022-05-03 ENCOUNTER — PATIENT MESSAGE (OUTPATIENT)
Dept: INTERNAL MEDICINE | Facility: CLINIC | Age: 32
End: 2022-05-03
Payer: MEDICAID

## 2022-05-31 ENCOUNTER — PATIENT MESSAGE (OUTPATIENT)
Dept: OBSTETRICS AND GYNECOLOGY | Facility: CLINIC | Age: 32
End: 2022-05-31

## 2022-05-31 ENCOUNTER — OFFICE VISIT (OUTPATIENT)
Dept: OBSTETRICS AND GYNECOLOGY | Facility: CLINIC | Age: 32
End: 2022-05-31
Attending: OBSTETRICS & GYNECOLOGY
Payer: MEDICAID

## 2022-05-31 VITALS
SYSTOLIC BLOOD PRESSURE: 120 MMHG | WEIGHT: 207.44 LBS | HEIGHT: 63 IN | DIASTOLIC BLOOD PRESSURE: 76 MMHG | BODY MASS INDEX: 36.75 KG/M2

## 2022-05-31 DIAGNOSIS — B37.31 YEAST VAGINITIS: Primary | ICD-10-CM

## 2022-05-31 PROCEDURE — 99213 PR OFFICE/OUTPT VISIT, EST, LEVL III, 20-29 MIN: ICD-10-PCS | Mod: S$PBB,,, | Performed by: OBSTETRICS & GYNECOLOGY

## 2022-05-31 PROCEDURE — 3074F PR MOST RECENT SYSTOLIC BLOOD PRESSURE < 130 MM HG: ICD-10-PCS | Mod: CPTII,,, | Performed by: OBSTETRICS & GYNECOLOGY

## 2022-05-31 PROCEDURE — 3074F SYST BP LT 130 MM HG: CPT | Mod: CPTII,,, | Performed by: OBSTETRICS & GYNECOLOGY

## 2022-05-31 PROCEDURE — 3052F PR MOST RECENT HEMOGLOBIN A1C LEVEL 8.0 - < 9.0%: ICD-10-PCS | Mod: CPTII,,, | Performed by: OBSTETRICS & GYNECOLOGY

## 2022-05-31 PROCEDURE — 1160F RVW MEDS BY RX/DR IN RCRD: CPT | Mod: CPTII,,, | Performed by: OBSTETRICS & GYNECOLOGY

## 2022-05-31 PROCEDURE — 3052F HG A1C>EQUAL 8.0%<EQUAL 9.0%: CPT | Mod: CPTII,,, | Performed by: OBSTETRICS & GYNECOLOGY

## 2022-05-31 PROCEDURE — 99999 PR PBB SHADOW E&M-EST. PATIENT-LVL III: CPT | Mod: PBBFAC,,, | Performed by: OBSTETRICS & GYNECOLOGY

## 2022-05-31 PROCEDURE — 3078F DIAST BP <80 MM HG: CPT | Mod: CPTII,,, | Performed by: OBSTETRICS & GYNECOLOGY

## 2022-05-31 PROCEDURE — 99999 PR PBB SHADOW E&M-EST. PATIENT-LVL III: ICD-10-PCS | Mod: PBBFAC,,, | Performed by: OBSTETRICS & GYNECOLOGY

## 2022-05-31 PROCEDURE — 3008F PR BODY MASS INDEX (BMI) DOCUMENTED: ICD-10-PCS | Mod: CPTII,,, | Performed by: OBSTETRICS & GYNECOLOGY

## 2022-05-31 PROCEDURE — 1160F PR REVIEW ALL MEDS BY PRESCRIBER/CLIN PHARMACIST DOCUMENTED: ICD-10-PCS | Mod: CPTII,,, | Performed by: OBSTETRICS & GYNECOLOGY

## 2022-05-31 PROCEDURE — 3008F BODY MASS INDEX DOCD: CPT | Mod: CPTII,,, | Performed by: OBSTETRICS & GYNECOLOGY

## 2022-05-31 PROCEDURE — 1159F PR MEDICATION LIST DOCUMENTED IN MEDICAL RECORD: ICD-10-PCS | Mod: CPTII,,, | Performed by: OBSTETRICS & GYNECOLOGY

## 2022-05-31 PROCEDURE — 99213 OFFICE O/P EST LOW 20 MIN: CPT | Mod: PBBFAC | Performed by: OBSTETRICS & GYNECOLOGY

## 2022-05-31 PROCEDURE — 99213 OFFICE O/P EST LOW 20 MIN: CPT | Mod: S$PBB,,, | Performed by: OBSTETRICS & GYNECOLOGY

## 2022-05-31 PROCEDURE — 3078F PR MOST RECENT DIASTOLIC BLOOD PRESSURE < 80 MM HG: ICD-10-PCS | Mod: CPTII,,, | Performed by: OBSTETRICS & GYNECOLOGY

## 2022-05-31 PROCEDURE — 1159F MED LIST DOCD IN RCRD: CPT | Mod: CPTII,,, | Performed by: OBSTETRICS & GYNECOLOGY

## 2022-05-31 RX ORDER — CLOTRIMAZOLE AND BETAMETHASONE DIPROPIONATE 10; .64 MG/G; MG/G
CREAM TOPICAL
Qty: 15 G | Refills: 1 | Status: SHIPPED | OUTPATIENT
Start: 2022-05-31 | End: 2022-07-13

## 2022-05-31 RX ORDER — TERCONAZOLE 8 MG/G
1 CREAM VAGINAL NIGHTLY
Qty: 20 G | Refills: 0 | Status: SHIPPED | OUTPATIENT
Start: 2022-05-31 | End: 2022-06-03

## 2022-05-31 NOTE — PROGRESS NOTES
Subjective:       Patient ID: Anusha Andrew is a 31 y.o. female.    Chief Complaint:  Vulvar Itch and Vaginal Discharge      History of Present Illness  - patient presents with c/o white vaginal discharge and vulvar itching since . Has type 1 diabetes; last HgbA1C was 8.7. Reports her groin lymph nodes also feel inflamed/swollen.    Past Medical History:   Diagnosis Date    Depression     Hyperlipidemia     Hypertension     Mild nonproliferative diabetic retinopathy of both eyes without macular edema associated with type 1 diabetes mellitus 3/30/2021    Type I (juvenile type) diabetes mellitus without mention of complication, uncontrolled 2011       Past Surgical History:   Procedure Laterality Date    ABCESS DRAINAGE      boil went over GA     SECTION  2012    DILATION AND CURETTAGE OF UTERUS  2019    Procedure: DILATION AND CURETTAGE, UTERUS;  Surgeon: Purnima Kidd MD;  Location: [facility];  Service: OB/GYN;;    HYSTEROSCOPY N/A 2019    Procedure: HYSTEROSCOPY;  Surgeon: Purnima Kidd MD;  Location: [facility];  Service: OB/GYN;  Laterality: N/A;    TONSILLECTOMY, ADENOIDECTOMY      WISDOM TOOTH EXTRACTION          Family History   Problem Relation Age of Onset    Diabetes Brother     No Known Problems Mother     No Known Problems Father     Hydrocephalus Daughter     No Known Problems Brother     No Known Problems Brother     No Known Problems Maternal Aunt     No Known Problems Maternal Uncle     No Known Problems Paternal Aunt     No Known Problems Paternal Uncle     No Known Problems Maternal Grandmother     No Known Problems Maternal Grandfather     No Known Problems Paternal Grandmother     No Known Problems Paternal Grandfather     Amblyopia Neg Hx     Blindness Neg Hx     Cataracts Neg Hx     Glaucoma Neg Hx     Hypertension Neg Hx     Macular degeneration Neg Hx     Retinal detachment Neg Hx     Strabismus Neg Hx     Stroke Neg Hx      "Thyroid disease Neg Hx     Breast cancer Neg Hx     Colon cancer Neg Hx     Ovarian cancer Neg Hx     Cancer Neg Hx         Social History     Socioeconomic History    Marital status: Single   Tobacco Use    Smoking status: Never Smoker    Smokeless tobacco: Never Used   Substance and Sexual Activity    Alcohol use: Yes     Comment: occasionally    Drug use: No    Sexual activity: Yes     Partners: Male     Birth control/protection: Injection           Objective:     Vitals:    05/31/22 1344   BP: 120/76   Weight: 94.1 kg (207 lb 7.3 oz)   Height: 5' 3" (1.6 m)       Physical Exam:   Constitutional: She appears well-developed and well-nourished. She is cooperative. No distress.             Abdominal: Soft. There is no abdominal tenderness.     Genitourinary:    Uterus normal.   There is no rash, tenderness or lesion on the right labia. There is no rash, tenderness or lesion on the left labia. Cervix is normal. There is vaginal discharge in the vagina.    Genitourinary Comments: Thick white vaginal discharge c/w yeast. Erythema of labia majora.                 Neurological: She is alert.          Assessment/ Plan:     Orders Placed This Encounter    terconazole (TERAZOL 3) 0.8 % vaginal cream    clotrimazole-betamethasone 1-0.05% (LOTRISONE) cream       Anusha was seen today for vulvar itch and vaginal discharge.    Diagnoses and all orders for this visit:    Yeast vaginitis    Other orders  -     terconazole (TERAZOL 3) 0.8 % vaginal cream; Place 1 applicator vaginally every evening. for 3 days  -     clotrimazole-betamethasone 1-0.05% (LOTRISONE) cream; Apply to affected area 2 times daily    - discussed controlling blood sugars to help prevent future yeast infections.  - will give 3 night Terazol vaginal treatment and gave lotrisone for comfort.    Follow up if symptoms worsen or fail to improve.    As of April 1, 2021, the Cures Act has been passed nationally. This new law requires that all doctors " progress notes, lab results, pathology reports and radiology reports be released IMMEDIATELY to the patient in the patient portal. That means that the results are released to you at the EXACT same time they are released to me. Therefore, with all of the patients that I have I am not able to reply to each patient exactly when the results come in. So there will be a delay from when you see the results to when I see them and have time to come up with a response to send you. Also I only see these results when I am on the computer at work. So if the results come in over the weekend or after 5 pm of a work day, I will not see them until the next business day. As you can tell, this is a challenge as a physician to give every patient the quick response they hope for and deserve. So please be patient!   Thanks for your understanding and patience.

## 2022-06-10 ENCOUNTER — OFFICE VISIT (OUTPATIENT)
Dept: URGENT CARE | Facility: CLINIC | Age: 32
End: 2022-06-10
Payer: MEDICAID

## 2022-06-10 VITALS
TEMPERATURE: 98 F | BODY MASS INDEX: 35.44 KG/M2 | DIASTOLIC BLOOD PRESSURE: 83 MMHG | HEART RATE: 114 BPM | HEIGHT: 63 IN | WEIGHT: 200 LBS | RESPIRATION RATE: 17 BRPM | OXYGEN SATURATION: 98 % | SYSTOLIC BLOOD PRESSURE: 128 MMHG

## 2022-06-10 DIAGNOSIS — J06.9 UPPER RESPIRATORY VIRUS: Primary | ICD-10-CM

## 2022-06-10 DIAGNOSIS — R05.9 COUGH: ICD-10-CM

## 2022-06-10 LAB
CTP QC/QA: YES
CTP QC/QA: YES
MOLECULAR STREP A: NEGATIVE
SARS-COV-2 RDRP RESP QL NAA+PROBE: NEGATIVE

## 2022-06-10 PROCEDURE — 3008F PR BODY MASS INDEX (BMI) DOCUMENTED: ICD-10-PCS | Mod: CPTII,S$GLB,, | Performed by: PHYSICIAN ASSISTANT

## 2022-06-10 PROCEDURE — 99213 PR OFFICE/OUTPT VISIT, EST, LEVL III, 20-29 MIN: ICD-10-PCS | Mod: S$GLB,,, | Performed by: PHYSICIAN ASSISTANT

## 2022-06-10 PROCEDURE — U0002 COVID-19 LAB TEST NON-CDC: HCPCS | Mod: QW,S$GLB,, | Performed by: PHYSICIAN ASSISTANT

## 2022-06-10 PROCEDURE — 1159F MED LIST DOCD IN RCRD: CPT | Mod: CPTII,S$GLB,, | Performed by: PHYSICIAN ASSISTANT

## 2022-06-10 PROCEDURE — 3074F PR MOST RECENT SYSTOLIC BLOOD PRESSURE < 130 MM HG: ICD-10-PCS | Mod: CPTII,S$GLB,, | Performed by: PHYSICIAN ASSISTANT

## 2022-06-10 PROCEDURE — 1160F PR REVIEW ALL MEDS BY PRESCRIBER/CLIN PHARMACIST DOCUMENTED: ICD-10-PCS | Mod: CPTII,S$GLB,, | Performed by: PHYSICIAN ASSISTANT

## 2022-06-10 PROCEDURE — 3008F BODY MASS INDEX DOCD: CPT | Mod: CPTII,S$GLB,, | Performed by: PHYSICIAN ASSISTANT

## 2022-06-10 PROCEDURE — 3079F DIAST BP 80-89 MM HG: CPT | Mod: CPTII,S$GLB,, | Performed by: PHYSICIAN ASSISTANT

## 2022-06-10 PROCEDURE — 1160F RVW MEDS BY RX/DR IN RCRD: CPT | Mod: CPTII,S$GLB,, | Performed by: PHYSICIAN ASSISTANT

## 2022-06-10 PROCEDURE — 3052F PR MOST RECENT HEMOGLOBIN A1C LEVEL 8.0 - < 9.0%: ICD-10-PCS | Mod: CPTII,S$GLB,, | Performed by: PHYSICIAN ASSISTANT

## 2022-06-10 PROCEDURE — 3079F PR MOST RECENT DIASTOLIC BLOOD PRESSURE 80-89 MM HG: ICD-10-PCS | Mod: CPTII,S$GLB,, | Performed by: PHYSICIAN ASSISTANT

## 2022-06-10 PROCEDURE — 87651 STREP A DNA AMP PROBE: CPT | Mod: QW,S$GLB,, | Performed by: PHYSICIAN ASSISTANT

## 2022-06-10 PROCEDURE — 3074F SYST BP LT 130 MM HG: CPT | Mod: CPTII,S$GLB,, | Performed by: PHYSICIAN ASSISTANT

## 2022-06-10 PROCEDURE — 3052F HG A1C>EQUAL 8.0%<EQUAL 9.0%: CPT | Mod: CPTII,S$GLB,, | Performed by: PHYSICIAN ASSISTANT

## 2022-06-10 PROCEDURE — 99213 OFFICE O/P EST LOW 20 MIN: CPT | Mod: S$GLB,,, | Performed by: PHYSICIAN ASSISTANT

## 2022-06-10 PROCEDURE — 87651 POCT STREP A MOLECULAR: ICD-10-PCS | Mod: QW,S$GLB,, | Performed by: PHYSICIAN ASSISTANT

## 2022-06-10 PROCEDURE — 1159F PR MEDICATION LIST DOCUMENTED IN MEDICAL RECORD: ICD-10-PCS | Mod: CPTII,S$GLB,, | Performed by: PHYSICIAN ASSISTANT

## 2022-06-10 PROCEDURE — U0002: ICD-10-PCS | Mod: QW,S$GLB,, | Performed by: PHYSICIAN ASSISTANT

## 2022-06-10 RX ORDER — FLUTICASONE PROPIONATE 50 MCG
1 SPRAY, SUSPENSION (ML) NASAL DAILY
Qty: 18.2 ML | Refills: 0 | Status: SHIPPED | OUTPATIENT
Start: 2022-06-10 | End: 2022-07-27

## 2022-06-10 RX ORDER — BENZONATATE 200 MG/1
200 CAPSULE ORAL 3 TIMES DAILY PRN
Qty: 30 CAPSULE | Refills: 0 | Status: SHIPPED | OUTPATIENT
Start: 2022-06-10 | End: 2022-06-20

## 2022-06-10 NOTE — PROGRESS NOTES
"Subjective:       Patient ID: Anusha Andrew is a 31 y.o. female.    Vitals:  height is 5' 3" (1.6 m) and weight is 90.7 kg (200 lb). Her oral temperature is 98.2 °F (36.8 °C). Her blood pressure is 128/83 and her pulse is 114 (abnormal). Her respiration is 17 and oxygen saturation is 98%.     Chief Complaint: URI    Ms. Andrew presents for evaluation of cough, congestion, headache, sinus pressure, sore throat, PND that started 3 days ago.  She denies any COVId-19 exposures.  She denies any fevers, chills, shortness of breath, chest pain, leg swelling, nausea, vomiting, diarrhea, anosmia or ageusia.  She has taken tylenol for symptoms with some relief.           Sinus Problem  This is a new problem. The current episode started in the past 7 days. The problem has been gradually worsening since onset. There has been no fever. The fever has been present for less than 1 day. Her pain is at a severity of 7/10. The pain is moderate. Associated symptoms include congestion, coughing, headaches, sinus pressure, sneezing, a sore throat and swollen glands. Pertinent negatives include no chills, diaphoresis, ear pain or shortness of breath. Past treatments include acetaminophen. The treatment provided mild relief.       Constitution: Negative for appetite change, chills, sweating, fatigue and fever.   HENT: Positive for congestion, sinus pressure and sore throat. Negative for ear pain, ear discharge, hearing loss, drooling, postnasal drip and sinus pain.    Neck: Negative for neck stiffness and painful lymph nodes.   Cardiovascular: Negative for chest trauma, chest pain, leg swelling, palpitations, sob on exertion and passing out.   Eyes: Negative for eye pain and blurred vision.   Respiratory: Positive for cough. Negative for chest tightness, sputum production, shortness of breath and wheezing.    Gastrointestinal: Negative for abdominal pain, nausea, vomiting and diarrhea.   Genitourinary: Negative for dysuria, frequency and " urgency.   Musculoskeletal: Negative for joint pain, joint swelling, muscle cramps and muscle ache.   Skin: Negative for rash.   Allergic/Immunologic: Positive for sneezing. Negative for itching.   Neurological: Positive for headaches. Negative for dizziness, history of vertigo, light-headedness, passing out, facial drooping, speech difficulty, coordination disturbances, loss of balance and altered mental status.   Hematologic/Lymphatic: Negative for swollen lymph nodes and easy bruising/bleeding. Does not bruise/bleed easily.   Psychiatric/Behavioral: Negative for altered mental status.       Objective:      Physical Exam   Constitutional: She is oriented to person, place, and time. She appears well-developed. She is cooperative.  Non-toxic appearance. She does not appear ill. No distress.   HENT:   Head: Normocephalic and atraumatic.   Ears:   Right Ear: Hearing, tympanic membrane, external ear and ear canal normal.   Left Ear: Hearing, tympanic membrane, external ear and ear canal normal.   Nose: Rhinorrhea present. No mucosal edema or nasal deformity. No epistaxis. Right sinus exhibits no maxillary sinus tenderness and no frontal sinus tenderness. Left sinus exhibits no maxillary sinus tenderness and no frontal sinus tenderness.   Mouth/Throat: Uvula is midline and mucous membranes are normal. No trismus in the jaw. Normal dentition. No uvula swelling. Posterior oropharyngeal erythema present. No oropharyngeal exudate or posterior oropharyngeal edema. No tonsillar exudate.   Eyes: Conjunctivae and lids are normal. No scleral icterus.   Neck: Trachea normal and phonation normal. Neck supple. No edema present. No erythema present. No neck rigidity present.   Cardiovascular: Normal rate, regular rhythm, normal heart sounds and normal pulses.   Pulmonary/Chest: Effort normal and breath sounds normal. No stridor. No respiratory distress. She has no decreased breath sounds. She has no wheezes. She has no rhonchi. She  has no rales.   Abdominal: Normal appearance.   Musculoskeletal: Normal range of motion.         General: No deformity. Normal range of motion.   Lymphadenopathy:     She has no cervical adenopathy.   Neurological: She is alert and oriented to person, place, and time. She exhibits normal muscle tone. Coordination normal.   Skin: Skin is warm, dry, intact, not diaphoretic and not pale.   Psychiatric: Her speech is normal and behavior is normal. Judgment and thought content normal.   Nursing note and vitals reviewed.          Results for orders placed or performed in visit on 06/10/22   POCT COVID-19 Rapid Screening   Result Value Ref Range    POC Rapid COVID Negative Negative     Acceptable Yes    POCT Strep A, Molecular   Result Value Ref Range    Molecular Strep A, POC Negative Negative     Acceptable Yes      *Note: Due to a large number of results and/or encounters for the requested time period, some results have not been displayed. A complete set of results can be found in Results Review.         Assessment:       1. Upper respiratory virus    2. Cough          Plan:         Upper respiratory virus    Cough  -     POCT COVID-19 Rapid Screening  -     POCT Strep A, Molecular    Other orders  -     benzonatate (TESSALON) 200 MG capsule; Take 1 capsule (200 mg total) by mouth 3 (three) times daily as needed for Cough (cough).  Dispense: 30 capsule; Refill: 0  -     fluticasone propionate (FLONASE) 50 mcg/actuation nasal spray; 1 spray (50 mcg total) by Each Nostril route once daily.  Dispense: 18.2 mL; Refill: 0  -     (Magic mouthwash) 1:1:1 diphenhydramine(Benadryl) 12.5mg/5ml liq, aluminum & magnesium hydroxide-simethicone (Maalox), LIDOcaine viscous 2%; Swish and spit 10 mLs every 4 (four) hours as needed (sore throat). for mouth sores  Dispense: 360 mL; Refill: 0    Diagnoses and plan discussed with the patient, as well as the expected course and duration of her symptoms. All  questions and concerns were addressed prior to discharge.  She was advised to follow up with her PCP within 1 week if symptoms do not improve. Emergency department precautions were given. Patient verbalized understanding and was happy with the plan of care.   Note dictated with voice recognition software, please excuse any grammatical errors.    Patient Instructions   PLEASE READ YOUR DISCHARGE INSTRUCTIONS ENTIRELY AS IT CONTAINS IMPORTANT INFORMATION.  Please start taking zyrtec or claritin daily until symptoms resolve.  - Rest.    - Drink plenty of fluids.    - Tylenol or Ibuprofen as directed as needed for fever/pain.    - If you were prescribed antibiotics, please take them to completion.  - If you are female and on birth control pills - please use additional methods of contraception to prevent pregnancy while on antibiotics and for one cycle after.   - If you were prescribed a narcotic medication, a cough syrup, or a muscle relaxer, do not drive or operate heavy equipment or machinery while taking these medications, as they can cause drowsiness.   - If a referral to a specialty was made today, you should receive a phone call in the next few days to schedule an appt.  Please call [phone] to schedule an appt if have not gotten a phone call in the next few days.  - If you smoke, please stop smoking.  -You must understand that you've received an Urgent Care treatment only and that you may be released before all your medical problems are known or treated. You, the patient, will arrange for follow up care as instructed. Please arrange follow up with your primary medical clinic as soon as possible.   - Follow up with your PCP or specialty clinic as directed in the next 1-2 weeks if not improved or as needed.  You can call [phone] to schedule an appointment with the appropriate provider.    - Please return to Urgent Care or to the Emergency Department if your symptoms worsen.    Patient aware and  verbalized understanding.

## 2022-06-10 NOTE — PATIENT INSTRUCTIONS
PLEASE READ YOUR DISCHARGE INSTRUCTIONS ENTIRELY AS IT CONTAINS IMPORTANT INFORMATION.  Please start taking zyrtec or claritin daily until symptoms resolve.  - Rest.    - Drink plenty of fluids.    - Tylenol or Ibuprofen as directed as needed for fever/pain.    - If you were prescribed antibiotics, please take them to completion.  - If you are female and on birth control pills - please use additional methods of contraception to prevent pregnancy while on antibiotics and for one cycle after.   - If you were prescribed a narcotic medication, a cough syrup, or a muscle relaxer, do not drive or operate heavy equipment or machinery while taking these medications, as they can cause drowsiness.   - If a referral to a specialty was made today, you should receive a phone call in the next few days to schedule an appt.  Please call [phone] to schedule an appt if have not gotten a phone call in the next few days.  - If you smoke, please stop smoking.  -You must understand that you've received an Urgent Care treatment only and that you may be released before all your medical problems are known or treated. You, the patient, will arrange for follow up care as instructed. Please arrange follow up with your primary medical clinic as soon as possible.   - Follow up with your PCP or specialty clinic as directed in the next 1-2 weeks if not improved or as needed.  You can call [phone] to schedule an appointment with the appropriate provider.    - Please return to Urgent Care or to the Emergency Department if your symptoms worsen.    Patient aware and verbalized understanding.

## 2022-06-15 ENCOUNTER — PATIENT MESSAGE (OUTPATIENT)
Dept: ORTHOPEDICS | Facility: CLINIC | Age: 32
End: 2022-06-15
Payer: MEDICAID

## 2022-06-15 ENCOUNTER — TELEPHONE (OUTPATIENT)
Dept: ORTHOPEDICS | Facility: CLINIC | Age: 32
End: 2022-06-15
Payer: MEDICAID

## 2022-06-15 NOTE — TELEPHONE ENCOUNTER
----- Message from Arlene Lopez sent at 6/15/2022 10:22 AM CDT -----  Contact: Pt  Type:  Patient Requesting Referral    Who Called:New Pt   Does the patient already have the specialty appointment scheduled?:  If yes, what is the date of that appointment?:  Referral to What Specialty:orthopedics   Reason for Referral:trigger finger   Does the patient want the referral with a specific physician?:Dr Rojo  [facility]  Patient Requesting a Response?:Yes  Would the patient rather a call back or a response via [facility]? Call or [facility]   Best Call Back Number:[phone]  Additional Information:     Referred by Dr Burt

## 2022-07-08 ENCOUNTER — OFFICE VISIT (OUTPATIENT)
Dept: URGENT CARE | Facility: CLINIC | Age: 32
End: 2022-07-08
Payer: MEDICAID

## 2022-07-08 VITALS
SYSTOLIC BLOOD PRESSURE: 150 MMHG | DIASTOLIC BLOOD PRESSURE: 70 MMHG | HEART RATE: 68 BPM | OXYGEN SATURATION: 98 % | BODY MASS INDEX: 35.44 KG/M2 | HEIGHT: 63 IN | TEMPERATURE: 98 F | WEIGHT: 200 LBS | RESPIRATION RATE: 20 BRPM

## 2022-07-08 DIAGNOSIS — L73.9 FOLLICULITIS: Primary | ICD-10-CM

## 2022-07-08 PROCEDURE — 3078F PR MOST RECENT DIASTOLIC BLOOD PRESSURE < 80 MM HG: ICD-10-PCS | Mod: CPTII,S$GLB,, | Performed by: NURSE PRACTITIONER

## 2022-07-08 PROCEDURE — 3077F PR MOST RECENT SYSTOLIC BLOOD PRESSURE >= 140 MM HG: ICD-10-PCS | Mod: CPTII,S$GLB,, | Performed by: NURSE PRACTITIONER

## 2022-07-08 PROCEDURE — 1159F PR MEDICATION LIST DOCUMENTED IN MEDICAL RECORD: ICD-10-PCS | Mod: CPTII,S$GLB,, | Performed by: NURSE PRACTITIONER

## 2022-07-08 PROCEDURE — 3008F BODY MASS INDEX DOCD: CPT | Mod: CPTII,S$GLB,, | Performed by: NURSE PRACTITIONER

## 2022-07-08 PROCEDURE — 3078F DIAST BP <80 MM HG: CPT | Mod: CPTII,S$GLB,, | Performed by: NURSE PRACTITIONER

## 2022-07-08 PROCEDURE — 3008F PR BODY MASS INDEX (BMI) DOCUMENTED: ICD-10-PCS | Mod: CPTII,S$GLB,, | Performed by: NURSE PRACTITIONER

## 2022-07-08 PROCEDURE — 1160F PR REVIEW ALL MEDS BY PRESCRIBER/CLIN PHARMACIST DOCUMENTED: ICD-10-PCS | Mod: CPTII,S$GLB,, | Performed by: NURSE PRACTITIONER

## 2022-07-08 PROCEDURE — 3077F SYST BP >= 140 MM HG: CPT | Mod: CPTII,S$GLB,, | Performed by: NURSE PRACTITIONER

## 2022-07-08 PROCEDURE — 99213 PR OFFICE/OUTPT VISIT, EST, LEVL III, 20-29 MIN: ICD-10-PCS | Mod: S$GLB,,, | Performed by: NURSE PRACTITIONER

## 2022-07-08 PROCEDURE — 99213 OFFICE O/P EST LOW 20 MIN: CPT | Mod: S$GLB,,, | Performed by: NURSE PRACTITIONER

## 2022-07-08 PROCEDURE — 1159F MED LIST DOCD IN RCRD: CPT | Mod: CPTII,S$GLB,, | Performed by: NURSE PRACTITIONER

## 2022-07-08 PROCEDURE — 3052F HG A1C>EQUAL 8.0%<EQUAL 9.0%: CPT | Mod: CPTII,S$GLB,, | Performed by: NURSE PRACTITIONER

## 2022-07-08 PROCEDURE — 1160F RVW MEDS BY RX/DR IN RCRD: CPT | Mod: CPTII,S$GLB,, | Performed by: NURSE PRACTITIONER

## 2022-07-08 PROCEDURE — 3052F PR MOST RECENT HEMOGLOBIN A1C LEVEL 8.0 - < 9.0%: ICD-10-PCS | Mod: CPTII,S$GLB,, | Performed by: NURSE PRACTITIONER

## 2022-07-08 RX ORDER — MUPIROCIN 20 MG/G
OINTMENT TOPICAL 2 TIMES DAILY
Qty: 30 G | Refills: 0 | Status: SHIPPED | OUTPATIENT
Start: 2022-07-08 | End: 2022-07-13

## 2022-07-08 NOTE — PROGRESS NOTES
Subjective:       Patient ID: Anusha Andrew is a 31 y.o. female.    Chief Complaint: No chief complaint on file.    HPI  ROS     Objective:      Physical Exam    Assessment:       No diagnosis found.    Plan:                   No follow-ups on file.

## 2022-07-08 NOTE — PROGRESS NOTES
"Subjective:       Patient ID: Anusha Andrew is a 31 y.o. female.    Vitals:  height is 5' 3" (1.6 m) and weight is 90.7 kg (200 lb). Her temperature is 98.2 °F (36.8 °C). Her blood pressure is 150/70 (abnormal) and her pulse is 68. Her respiration is 20 and oxygen saturation is 98%.     Chief Complaint: ingrown hair, groin area    Pt states she was waxed 4 weeks ago in her groin area , 4 days ago she noticed an ingrown hair and its painful, she has used hot compresses with cortisone cream with relief  Provider note below:  This is a 31 y.o. female who presents today with a chief complaint of possible ingrown hair on her groin area, patient reports she waxed four week ago in her groin area, patient reports she noticed 4 days ago ingrown hair on her right sided groin, patient reports she is doing the warm compresses, and using cortisone cream, patient denies any redness to the area, denies fever, body aches or chills, denies cough, wheezing or shortness of breath, denies nausea, vomiting, diarrhea or abdominal pain, denies chest pain or dizziness positional lightheadedness, denies sore throat or trouble swallowing, denies loss of taste or smell, or any other symptoms      Other  This is a new problem. The current episode started in the past 7 days. The problem occurs every several days. The problem has been gradually worsening. Associated symptoms comments: Ingrown hair in groin hair s/p waxing 4 weeks ago .     ROS    Objective:      Physical Exam   Constitutional: She is oriented to person, place, and time. She appears well-developed.   HENT:   Head: Normocephalic and atraumatic.   Ears:   Right Ear: External ear normal.   Left Ear: External ear normal.   Nose: Nose normal.   Mouth/Throat: Mucous membranes are normal.   Eyes: Conjunctivae and lids are normal.   Neck: Trachea normal. Neck supple.   Cardiovascular: Normal rate, regular rhythm and normal heart sounds.   Pulmonary/Chest: Effort normal and breath " sounds normal. No respiratory distress.   Abdominal: Normal appearance and bowel sounds are normal. She exhibits no distension, no abdominal bruit, no pulsatile midline mass and no mass. Soft. There is no abdominal tenderness.   Genitourinary:         Musculoskeletal: Normal range of motion.         General: Normal range of motion.   Neurological: She is alert and oriented to person, place, and time. She has normal strength.   Skin: Skin is warm, dry, intact, not diaphoretic and not pale.   Psychiatric: Her speech is normal and behavior is normal. Judgment and thought content normal.   Nursing note and vitals reviewed.chaperone present (kory in room for exam)             Patient in no acute distress.  Vitals reassuring.  Medication prescribed and over-the-counter medications discussed with patient in detail.  I discussed with patient in detail that based on physical examination likely superficial folliculitis.  No deep folliculitis noted.  No concern for cellulitis or abscess at this time.  No systemic symptoms.  I discussed oral antibiotics with patient in detail and the recommendation.   Patient will monitor it closely and will apply the topical medication prescribed.  Red flags discussed with patient detail.  I reiterated the importance of further evaluation if no improvement in symptoms.  Discussed results/diagnosis/plan in depth with patient in clinic. Strict precautions given to patient to monitor for worsening signs and symptoms. Advised to follow up with primary.All questions answered. Strict ER precautions given. If your symptoms worsens or fail to improve you should go to the Emergency Room. Discharge and follow-up instructions given verbally/printed. Discharge and follow-up instructions discussed with the patient who expressed understanding and willingness to comply with my recommendations.Patient voiced understanding and in agreement with current treatment plan.     Please be advised this text was  dictated with [employer] software and may contain errors due to translation.      Assessment:       1. Folliculitis          Plan:         Folliculitis  Comments:  Superficial folliculitis  Orders:  -     mupirocin (BACTROBAN) 2 % ointment; Apply topically 2 (two) times daily. for 5 days  Dispense: 30 g; Refill: 0                 Patient Instructions   If your condition worsens or fails to improve we recommend that you receive another evaluation at the ER immediately or contact your PCP to discuss your concerns or return here. You must understand that you've received an urgent care treatment only and that you may be released before all your medical problems are known or treated. You the patient will arrange for followup care as instructed.    If you were prescribed antibiotics, please take them to completion.  If you were prescribed a narcotic medication, do not drive or operate heavy equipment or machinery while taking these medications.  Please follow up with your primary care doctor or specialist as needed.  If you  smoke, please stop smoking.

## 2022-07-13 ENCOUNTER — PATIENT MESSAGE (OUTPATIENT)
Dept: INTERNAL MEDICINE | Facility: CLINIC | Age: 32
End: 2022-07-13
Payer: MEDICAID

## 2022-07-13 ENCOUNTER — PATIENT MESSAGE (OUTPATIENT)
Dept: INTERNAL MEDICINE | Facility: CLINIC | Age: 32
End: 2022-07-13

## 2022-07-13 ENCOUNTER — OFFICE VISIT (OUTPATIENT)
Dept: INTERNAL MEDICINE | Facility: CLINIC | Age: 32
End: 2022-07-13
Payer: MEDICAID

## 2022-07-13 ENCOUNTER — TELEPHONE (OUTPATIENT)
Dept: INTERNAL MEDICINE | Facility: CLINIC | Age: 32
End: 2022-07-13

## 2022-07-13 DIAGNOSIS — E10.65 TYPE 1 DIABETES MELLITUS WITH HYPERGLYCEMIA: Primary | ICD-10-CM

## 2022-07-13 DIAGNOSIS — E10.3293 MILD NONPROLIFERATIVE DIABETIC RETINOPATHY OF BOTH EYES WITHOUT MACULAR EDEMA ASSOCIATED WITH TYPE 1 DIABETES MELLITUS: ICD-10-CM

## 2022-07-13 DIAGNOSIS — E66.9 OBESITY (BMI 30-39.9): ICD-10-CM

## 2022-07-13 PROCEDURE — 99214 PR OFFICE/OUTPT VISIT, EST, LEVL IV, 30-39 MIN: ICD-10-PCS | Mod: 95,,, | Performed by: NURSE PRACTITIONER

## 2022-07-13 PROCEDURE — 1159F PR MEDICATION LIST DOCUMENTED IN MEDICAL RECORD: ICD-10-PCS | Mod: CPTII,95,, | Performed by: NURSE PRACTITIONER

## 2022-07-13 PROCEDURE — 3051F HG A1C>EQUAL 7.0%<8.0%: CPT | Mod: CPTII,95,, | Performed by: NURSE PRACTITIONER

## 2022-07-13 PROCEDURE — 3052F HG A1C>EQUAL 8.0%<EQUAL 9.0%: CPT | Mod: CPTII,95,, | Performed by: NURSE PRACTITIONER

## 2022-07-13 PROCEDURE — 3051F PR MOST RECENT HEMOGLOBIN A1C LEVEL 7.0 - < 8.0%: ICD-10-PCS | Mod: CPTII,95,, | Performed by: NURSE PRACTITIONER

## 2022-07-13 PROCEDURE — 1159F MED LIST DOCD IN RCRD: CPT | Mod: CPTII,95,, | Performed by: NURSE PRACTITIONER

## 2022-07-13 PROCEDURE — 1160F PR REVIEW ALL MEDS BY PRESCRIBER/CLIN PHARMACIST DOCUMENTED: ICD-10-PCS | Mod: CPTII,95,, | Performed by: NURSE PRACTITIONER

## 2022-07-13 PROCEDURE — 3052F PR MOST RECENT HEMOGLOBIN A1C LEVEL 8.0 - < 9.0%: ICD-10-PCS | Mod: CPTII,95,, | Performed by: NURSE PRACTITIONER

## 2022-07-13 PROCEDURE — 1160F RVW MEDS BY RX/DR IN RCRD: CPT | Mod: CPTII,95,, | Performed by: NURSE PRACTITIONER

## 2022-07-13 PROCEDURE — 99214 OFFICE O/P EST MOD 30 MIN: CPT | Mod: 95,,, | Performed by: NURSE PRACTITIONER

## 2022-07-13 RX ORDER — INSULIN ASPART 100 [IU]/ML
INJECTION, SOLUTION INTRAVENOUS; SUBCUTANEOUS
Qty: 45 ML | Refills: 3
Start: 2022-07-13 | End: 2022-07-24 | Stop reason: SDUPTHER

## 2022-07-13 RX ORDER — INSULIN GLARGINE 100 [IU]/ML
22 INJECTION, SOLUTION SUBCUTANEOUS NIGHTLY
Qty: 30 ML | Refills: 3
Start: 2022-07-13 | End: 2022-07-24 | Stop reason: SDUPTHER

## 2022-07-13 NOTE — TELEPHONE ENCOUNTER
----- Message from NAA Zaragoza, JESSICAP sent at 7/13/2022  4:14 PM CDT -----  Regarding: f/u  A1c In 3 months  A1c in 6 months  F/u in 6 months- [place]

## 2022-07-13 NOTE — PROGRESS NOTES
CHIEF COMPLAINT: Type 1 Diabetes     HPI: Ms. Anusha Andrew is a 31 y.o. female who was diagnosed with Type 1  DM at 17 y/o.  S/p gastric sleeve 7/11/22  Discharge today   F/u 7/26/22   Has clarity taiwo- dexcom  Not wearing over the past week  Dietary will advance to full liquids for 2 weeks  In hospital lantus was stopped   Variable bg readings  Reports better control with lantus 24 units at night, prandial insulin 1:10/scale use 180-230+2, etc.    H/o DKA. 10/2018 DKA/admission  Last seen by in spring 2022-April   Last office visits w/ LALIT Fraga NP [id number].  Has dexcom g6---[employer] supplier   F/u with retinal specialist  Steroid therapy for hands --in past    No recent utis, yeast infections.  Stopped trulicity, did not see benefits (off label)  a1c trending down  Lab Results   Component Value Date    HGBA1C 7.9 (H) 07/07/2022     In past, tandem  Uses dexcom   [employer] supplier     +h/o Trigger finger- adverse reactions voltaren -hyperglycemia, worried about elevated platelet counts---[facility] -possible for treatment    The patient location is: home  The chief complaint leading to consultation is: type 2 dm    Visit type: audiovisual    Face to Face time with patient: 12,15   minutes of total time spent on the encounter, which includes face to face time and non-face to face time preparing to see the patient (eg, review of tests), Obtaining and/or reviewing separately obtained history, Documenting clinical information in the electronic or other health record, Independently interpreting results (not separately reported) and communicating results to the patient/family/caregiver, or Care coordination (not separately reported).         Each patient to whom he or she provides medical services by telemedicine is:  (1) informed of the relationship between the physician and patient and the respective role of any other health care provider with respect to management of the patient; and (2) notified that he or  she may decline to receive medical services by telemedicine and may withdraw from such care at any time.    Notes:     PREVIOUS DIABETES MEDICATIONS TRIED  humalog  levemir  novolog  lantus     CURRENT DIABETIC MEDS: lantus 24 units at night 9p,  novolog 1:10  insulin/carb ratio, target 180, isf 25   Self adjustment per scale    On MDI (injections 4 x a day)  Makes frequent changes to his/her insulin regimen on the basis of blood glucose data  Testing 4 x a day  Patient is willing and able to use the device  Demonstrated an understanding of the technology and is motivated to use CGM  Patient expected to adhere to a comprehensive diabetes treatment plan and patient has adequate medical supervision  Lowest 40s  Mostly under 200 with plan above    Has multiple impaired awareness of hypoglycemia (hypoglycemia unawareness)    Diabetes Management Status    Statin: Not taking  ACE/ARB: Not taking    Screening or Prevention Patient's value Goal Complete/Controlled?   HgA1C Testing and Control   Lab Results   Component Value Date    HGBA1C 7.9 (H) 07/07/2022      Annually/Less than 8% No   Lipid profile : 12/10/2021 Annually Yes   LDL control Lab Results   Component Value Date    LDLCALC 148.2 12/10/2021    Annually/Less than 100 mg/dl  No   Nephropathy screening Lab Results   Component Value Date    LABMICR 9.0 10/22/2021     Lab Results   Component Value Date    PROTEINUA Negative 01/07/2022    Annually No   Blood pressure BP Readings from Last 1 Encounters:   07/08/22 (!) 150/70    Less than 140/90 Yes   Dilated retinal exam : 03/08/2022 Annually No   Foot exam   : 04/06/2022 Annually No     REVIEW OF SYSTEMS  General: no weakness, fatigue, + weight fluctuations   Eyes: no double or blurred vision, eye pain, or redness  Cardiovascular: no chest pain, palpitations, edema, or murmurs.   Respiratory: no cough or dyspnea.   GI: no heartburn, + occ nausea, or changes in bowel patterns; good appetite.   Skin: no rashes,  dryness, itching, or reactions at insulin injection sites.  Neuro: + numbness, tingling, tremors, or vertigo.   Endocrine: no polyuria, polydipsia, polyphagia, heat or cold intolerance.     Vital Signs  There were no vitals taken for this visit.    Hemoglobin A1C   Date Value Ref Range Status   07/07/2022 7.9 (H) <5.7 % Final   07/05/2022 8.4 (H) 4.0 - 5.6 % Final     Comment:     ADA Screening Guidelines:  5.7-6.4%  Consistent with prediabetes  >or=6.5%  Consistent with diabetes    High levels of fetal hemoglobin interfere with the HbA1C  assay. Heterozygous hemoglobin variants (HbS, HgC, etc)do  not significantly interfere with this assay.   However, presence of multiple variants may affect accuracy.     03/30/2022 8.7 (H) 4.0 - 5.6 % Final     Comment:     ADA Screening Guidelines:  5.7-6.4%  Consistent with prediabetes  >or=6.5%  Consistent with diabetes    High levels of fetal hemoglobin interfere with the HbA1C  assay. Heterozygous hemoglobin variants (HbS, HgC, etc)do  not significantly interfere with this assay.   However, presence of multiple variants may affect accuracy.     12/10/2021 8.1 (H) 4.0 - 5.6 % Final     Comment:     ADA Screening Guidelines:  5.7-6.4%  Consistent with prediabetes  >or=6.5%  Consistent with diabetes    High levels of fetal hemoglobin interfere with the HbA1C  assay. Heterozygous hemoglobin variants (HbS, HgC, etc)do  not significantly interfere with this assay.   However, presence of multiple variants may affect accuracy.          Chemistry        Component Value Date/Time     12/10/2021 1118    K 4.6 12/10/2021 1118     12/10/2021 1118    CO2 20 (L) 12/10/2021 1118    BUN 9 12/10/2021 1118    CREATININE 0.8 12/10/2021 1118     (H) 12/10/2021 1118        Component Value Date/Time    CALCIUM 9.5 12/10/2021 1118    ALKPHOS 64 12/10/2021 1118    AST 12 12/10/2021 1118    ALT 15 12/10/2021 1118    BILITOT 0.4 12/10/2021 1118    ESTGFRAFRICA >60.0 12/10/2021 1118     EGFRNONAA >60.0 12/10/2021 1118           Lab Results   Component Value Date    TSH 1.296 12/10/2021      Lab Results   Component Value Date    CHOL 236 (H) 12/10/2021    CHOL 221 (H) 10/22/2021    CHOL 217 (H) 06/19/2020     Lab Results   Component Value Date    HDL 73 12/10/2021    HDL 67 10/22/2021    HDL 56 06/19/2020     Lab Results   Component Value Date    LDLCALC 148.2 12/10/2021    LDLCALC 133.8 10/22/2021    LDLCALC 148.0 06/19/2020     Lab Results   Component Value Date    TRIG 74 12/10/2021    TRIG 101 10/22/2021    TRIG 65 06/19/2020     Lab Results   Component Value Date    CHOLHDL 30.9 12/10/2021    CHOLHDL 30.3 10/22/2021    CHOLHDL 25.8 06/19/2020         PHYSICAL EXAMINATION  Constitutional: Appears well, no distress. Reviewed vitals above.  Eyes: conjunctivae & lids intact; PERRLA, EOMs intact; optic discs   Neck: Supple, trachea midline.   Respiratory: No wheezes, even and unlabored  Cardiovascular: RRR;  no edema or varicosities  Lymph: deferred   Skin: warm and dry; no injection site reactions, no acanthosis nigracans observed.  Neuro:patient alert and cooperative, normal affect; steady gait.  Psychiatric: judgement & insight intact, orientation of time, place & person intact, memory; mood & affect wnl     Diabetes Foot Exam: deferred     Assessment/Plan    1. Type 1 diabetes mellitus with hyperglycemia     2. Obesity (BMI 30-39.9)     3. Mild nonproliferative diabetic retinopathy of both eyes without macular edema associated with type 1 diabetes mellitus       1-4.   Add back lantus 20-22 units at night   Continue humalog 1:10, target 180, isf 25   Sent message via portal   F/u wed -courtesy call next week   Place dexcom g6 today   a1c goal less than 75   F/u with retinal specialist  Discussed sx, 6 lap sites, watching for ssi   Extremely important to have better bg control     FOLLOW UP  In 6 months

## 2022-07-19 ENCOUNTER — PATIENT MESSAGE (OUTPATIENT)
Dept: INTERNAL MEDICINE | Facility: CLINIC | Age: 32
End: 2022-07-19
Payer: MEDICAID

## 2022-07-22 RX ORDER — NORGESTREL AND ETHINYL ESTRADIOL 0.3-0.03MG
1 KIT ORAL DAILY
Qty: 28 TABLET | Refills: 3 | Status: SHIPPED | OUTPATIENT
Start: 2022-07-22 | End: 2022-08-10 | Stop reason: SDUPTHER

## 2022-07-25 RX ORDER — INSULIN ASPART 100 [IU]/ML
INJECTION, SOLUTION INTRAVENOUS; SUBCUTANEOUS
Qty: 60 ML | Refills: 3 | Status: ON HOLD | OUTPATIENT
Start: 2022-07-25 | End: 2022-10-04 | Stop reason: HOSPADM

## 2022-07-26 ENCOUNTER — PATIENT MESSAGE (OUTPATIENT)
Dept: INTERNAL MEDICINE | Facility: CLINIC | Age: 32
End: 2022-07-26
Payer: MEDICAID

## 2022-07-27 PROBLEM — R00.0 TACHYCARDIA: Chronic | Status: RESOLVED | Noted: 2022-01-27 | Resolved: 2022-07-27

## 2022-07-27 PROBLEM — R06.09 DYSPNEA ON EXERTION: Chronic | Status: RESOLVED | Noted: 2021-12-09 | Resolved: 2022-07-27

## 2022-07-27 PROBLEM — R10.13 EPIGASTRIC PAIN: Chronic | Status: RESOLVED | Noted: 2021-12-09 | Resolved: 2022-07-27

## 2022-07-27 PROBLEM — I27.20 PULMONARY HYPERTENSION: Status: ACTIVE | Noted: 2022-07-27

## 2022-08-01 ENCOUNTER — PATIENT MESSAGE (OUTPATIENT)
Dept: OPTOMETRY | Facility: CLINIC | Age: 32
End: 2022-08-01
Payer: MEDICAID

## 2022-08-03 ENCOUNTER — PATIENT MESSAGE (OUTPATIENT)
Dept: ORTHOPEDICS | Facility: CLINIC | Age: 32
End: 2022-08-03

## 2022-08-03 ENCOUNTER — OFFICE VISIT (OUTPATIENT)
Dept: ORTHOPEDICS | Facility: CLINIC | Age: 32
End: 2022-08-03
Payer: MEDICAID

## 2022-08-03 VITALS — BODY MASS INDEX: 34.18 KG/M2 | HEIGHT: 63 IN | WEIGHT: 192.88 LBS

## 2022-08-03 DIAGNOSIS — M65.332 TRIGGER FINGER, LEFT MIDDLE FINGER: ICD-10-CM

## 2022-08-03 DIAGNOSIS — G56.03 BILATERAL CARPAL TUNNEL SYNDROME: ICD-10-CM

## 2022-08-03 DIAGNOSIS — M65.331 TRIGGER FINGER, RIGHT MIDDLE FINGER: ICD-10-CM

## 2022-08-03 DIAGNOSIS — M79.642 LEFT HAND PAIN: Primary | ICD-10-CM

## 2022-08-03 PROCEDURE — 99214 OFFICE O/P EST MOD 30 MIN: CPT | Mod: PBBFAC,PN | Performed by: ORTHOPAEDIC SURGERY

## 2022-08-03 PROCEDURE — 99203 OFFICE O/P NEW LOW 30 MIN: CPT | Mod: S$PBB,,, | Performed by: ORTHOPAEDIC SURGERY

## 2022-08-03 PROCEDURE — 3008F BODY MASS INDEX DOCD: CPT | Mod: CPTII,,, | Performed by: ORTHOPAEDIC SURGERY

## 2022-08-03 PROCEDURE — 1159F PR MEDICATION LIST DOCUMENTED IN MEDICAL RECORD: ICD-10-PCS | Mod: CPTII,,, | Performed by: ORTHOPAEDIC SURGERY

## 2022-08-03 PROCEDURE — 3052F HG A1C>EQUAL 8.0%<EQUAL 9.0%: CPT | Mod: CPTII,,, | Performed by: ORTHOPAEDIC SURGERY

## 2022-08-03 PROCEDURE — 3008F PR BODY MASS INDEX (BMI) DOCUMENTED: ICD-10-PCS | Mod: CPTII,,, | Performed by: ORTHOPAEDIC SURGERY

## 2022-08-03 PROCEDURE — 3052F PR MOST RECENT HEMOGLOBIN A1C LEVEL 8.0 - < 9.0%: ICD-10-PCS | Mod: CPTII,,, | Performed by: ORTHOPAEDIC SURGERY

## 2022-08-03 PROCEDURE — 99203 PR OFFICE/OUTPT VISIT, NEW, LEVL III, 30-44 MIN: ICD-10-PCS | Mod: S$PBB,,, | Performed by: ORTHOPAEDIC SURGERY

## 2022-08-03 PROCEDURE — 99999 PR PBB SHADOW E&M-EST. PATIENT-LVL IV: ICD-10-PCS | Mod: PBBFAC,,, | Performed by: ORTHOPAEDIC SURGERY

## 2022-08-03 PROCEDURE — 99999 PR PBB SHADOW E&M-EST. PATIENT-LVL IV: CPT | Mod: PBBFAC,,, | Performed by: ORTHOPAEDIC SURGERY

## 2022-08-03 PROCEDURE — 1159F MED LIST DOCD IN RCRD: CPT | Mod: CPTII,,, | Performed by: ORTHOPAEDIC SURGERY

## 2022-08-03 RX ORDER — KETOROLAC TROMETHAMINE 10 MG/1
TABLET, FILM COATED ORAL
COMMUNITY
Start: 2022-05-05 | End: 2022-08-25

## 2022-08-03 NOTE — PROGRESS NOTES
Subjective:      Patient ID: Anusha Andrew is a 31 y.o. female.    Chief Complaint: Consult and Hand Pain (Bilateral )      HPI  Anusha Andrew is a  31 y.o. female presenting today for bilateral hand symptoms.  There was not a history of trauma.  Onset of symptoms began more than a year ago  She has been treated for triggering of the middle fingers intermittently over the past year to  She has had injections in the past  She was considering surgery for both hands although the right is worse than the left she does have intermittent locking of the middle fingers bilateral and occasionally in the left ring finger as well  She also has some numbness tingling which is worse at night  She has not been evaluated with a nerve test yet.      Review of patient's allergies indicates:   Allergen Reactions    No known allergies          Current Outpatient Medications   Medication Sig Dispense Refill    ergocalciferol (VITAMIN D2) 50,000 unit Cap Take 1 capsule (50,000 Units total) by mouth every 7 days. 5 capsule 3    insulin (LANTUS SOLOSTAR U-100 INSULIN) glargine 100 units/mL SubQ pen Inject 16-22 units at night. (medication will last 136 days) 30 mL 3    insulin aspart U-100 (NOVOLOG FLEXPEN U-100 INSULIN) 100 unit/mL (3 mL) InPn pen Inject insulin/carb ratio 1:12, target 150, correction factor 25. 150-200+2, 201-250+4, 251-300+6, 301-350+8, >350+10.Max daily 60 units. 60 mL 3    insulin syringe-needle U-100 0.5 mL 31 gauge x 5/16 Syrg use with admelog 100 each 0    ketorolac (TORADOL) 10 mg tablet TAKE 1 TABLET BY MOUTH 4 TIMES DAILY AS NEEDED FOR PAIN FOR 5 DAYS . DO NOT EXCEED 4 PER 24 HOURS      lancets Misc To check BG 4 times daily, to use with insurance preferred meter, e10.65 400 each 3    norgestrel-ethinyl estradioL (CRYSELLE, Enrico,) 0.3-30 mg-mcg per tablet Take 1 tablet by mouth once daily. 28 tablet 3    ONETOUCH DELICA PLUS LANCET 33 gauge Misc USE 1 UNIT TO CHECK GLUCOSE 4 TIMES DAILY       "ONETOUCH VERIO FLEX METER Misc USE AS DIRECTED TO CHECK BLOOD GLUCOSE 4 TIMES DAILY      pantoprazole (PROTONIX) 40 MG tablet Take 1 tablet by mouth once daily.      pen needle, diabetic (BD EBEN 2ND GEN PEN NEEDLE) 32 gauge x 5/32" Ndle Use 5 times daily as directed (Patient taking differently: Use 5 times daily as directed) 400 each 3    blood sugar diagnostic Strp To check BG 4  times daily, to use with insurance preferred meter, e 10.65 (Patient not taking: Reported on 8/3/2022) 400 each 3    hydrocortisone (ANUSOL-HC) 2.5 % rectal cream Place rectally 2 (two) times daily. (Patient not taking: Reported on 8/3/2022) 28 g 2    ondansetron (ZOFRAN-ODT) 4 MG TbDL Take 1 tablet by mouth every 8 (eight) hours as needed for Nausea.      polyethylene glycol (GLYCOLAX) 17 gram/dose powder Take 17 g by mouth once daily. (Patient not taking: Reported on 8/3/2022) 30 each 3     No current facility-administered medications for this visit.       Past Medical History:   Diagnosis Date    Depression     Hyperlipidemia     Hypertension     Mild nonproliferative diabetic retinopathy of both eyes without macular edema associated with type 1 diabetes mellitus 3/30/2021    Type I (juvenile type) diabetes mellitus without mention of complication, uncontrolled 2011       Past Surgical History:   Procedure Laterality Date    ABCESS DRAINAGE      boil went over [place]     SECTION  2012    DILATION AND CURETTAGE OF UTERUS  2019    Procedure: DILATION AND CURETTAGE, UTERUS;  Surgeon: Purnima Kidd MD;  Location: [facility], [place] OR;  Service: OB/GYN;;    HYSTEROSCOPY N/A 2019    Procedure: HYSTEROSCOPY;  Surgeon: Purnima Kidd MD;  Location: [facility], [place] OR;  Service: OB/GYN;  Laterality: N/A;    TONSILLECTOMY, ADENOIDECTOMY      WISDOM TOOTH EXTRACTION         Review of Systems:  ROS    OBJECTIVE:     PHYSICAL EXAM:  Height: 5' 3" (160 cm) Weight: 87.5 kg (192 lb 14.4 oz)  Vitals:    22 1437   Weight: 87.5 " "kg (192 lb 14.4 oz)   Height: 5' 3" (1.6 m)   PainSc:   6   PainLoc: Hand     Well developed, well nourished female in no acute distress  Alert and oriented x 3  HEENT- Normal exam  Lungs- Clear to auscultation  Heart- Regular rate and rhythm  Abdomen- Soft nontender  Extremity exam- examination hands demonstrates some mild swelling bilateral  Positive Tinel sign on the right negative on the left  Range of motion wrist fingers full  Mild triggering of the middle fingers bilateral in the left ring finger   strength slightly decreased both hands sensation intact no atrophy    RADIOGRAPHS:  None  Comments: I have personally reviewed the imaging and I agree with the above radiologist's report.    ASSESSMENT/PLAN:     IMPRESSION:  1. Bilateral triggering of the middle fingers and left ring finger.    2. Possible bilateral carpal tunnel syndrome     PLAN:  The patient is considering surgery for trigger release but I think before we schedule surgery I would like to get a nerve test in case some of her symptoms related to carpal tunnel syndrome  She is in agreement   I have also given her wrist splints for nighttime use  Follow-up after the nerve test is complete       - We talked at length about the anatomy and pathophysiology of   Encounter Diagnoses   Name Primary?    Trigger finger, left middle finger     Trigger finger, right middle finger     Bilateral carpal tunnel syndrome            Disclaimer: This note has been generated using voice-recognition software. There may be typographical errors that have been missed during proof-reading.    "

## 2022-08-04 ENCOUNTER — PATIENT MESSAGE (OUTPATIENT)
Dept: INTERNAL MEDICINE | Facility: CLINIC | Age: 32
End: 2022-08-04
Payer: MEDICAID

## 2022-08-10 ENCOUNTER — PATIENT MESSAGE (OUTPATIENT)
Dept: OBSTETRICS AND GYNECOLOGY | Facility: CLINIC | Age: 32
End: 2022-08-10
Payer: MEDICAID

## 2022-08-11 RX ORDER — NORGESTREL AND ETHINYL ESTRADIOL 0.3-0.03MG
1 KIT ORAL DAILY
Qty: 84 TABLET | Refills: 0 | Status: SHIPPED | OUTPATIENT
Start: 2022-08-11 | End: 2022-08-11

## 2022-08-12 ENCOUNTER — PATIENT MESSAGE (OUTPATIENT)
Dept: OBSTETRICS AND GYNECOLOGY | Facility: CLINIC | Age: 32
End: 2022-08-12
Payer: MEDICAID

## 2022-08-15 ENCOUNTER — HOSPITAL ENCOUNTER (OUTPATIENT)
Facility: HOSPITAL | Age: 32
Discharge: HOME | DRG: 395 | End: 2022-08-16
Attending: STUDENT IN AN ORGANIZED HEALTH CARE EDUCATION/TRAINING PROGRAM | Admitting: SURGERY
Payer: MEDICAID

## 2022-08-15 DIAGNOSIS — Z90.3 HISTORY OF SLEEVE GASTRECTOMY: ICD-10-CM

## 2022-08-15 DIAGNOSIS — I82.90 THROMBOSIS/EMBOLISM, VENOUS: ICD-10-CM

## 2022-08-15 DIAGNOSIS — K55.069 SUPERIOR MESENTERIC VEIN THROMBOSIS: ICD-10-CM

## 2022-08-15 LAB
ALBUMIN SERPL BCP-MCNC: 3.2 G/DL (ref 3.5–5.2)
ALLENS TEST: ABNORMAL
ALP SERPL-CCNC: 70 U/L (ref 55–135)
ALT SERPL W/O P-5'-P-CCNC: 49 U/L (ref 10–44)
ANION GAP SERPL CALC-SCNC: 11 MMOL/L (ref 8–16)
AST SERPL-CCNC: 26 U/L (ref 10–40)
B-HCG UR QL: NEGATIVE
BASOPHILS # BLD AUTO: 0.02 K/UL (ref 0–0.2)
BASOPHILS NFR BLD: 0.3 % (ref 0–1.9)
BILIRUB SERPL-MCNC: 0.4 MG/DL (ref 0.1–1)
BILIRUB UR QL STRIP: NEGATIVE
BUN SERPL-MCNC: 8 MG/DL (ref 6–20)
CALCIUM SERPL-MCNC: 9.5 MG/DL (ref 8.7–10.5)
CHLORIDE SERPL-SCNC: 107 MMOL/L (ref 95–110)
CLARITY UR REFRACT.AUTO: CLEAR
CO2 SERPL-SCNC: 20 MMOL/L (ref 23–29)
COLOR UR AUTO: YELLOW
CREAT SERPL-MCNC: 0.7 MG/DL (ref 0.5–1.4)
DIFFERENTIAL METHOD: NORMAL
EOSINOPHIL # BLD AUTO: 0.4 K/UL (ref 0–0.5)
EOSINOPHIL NFR BLD: 5.2 % (ref 0–8)
ERYTHROCYTE [DISTWIDTH] IN BLOOD BY AUTOMATED COUNT: 13.4 % (ref 11.5–14.5)
EST. GFR  (NO RACE VARIABLE): >60 ML/MIN/1.73 M^2
GLUCOSE SERPL-MCNC: 78 MG/DL (ref 70–110)
GLUCOSE UR QL STRIP: NEGATIVE
HCO3 UR-SCNC: 23.1 MMOL/L (ref 24–28)
HCT VFR BLD AUTO: 37.3 % (ref 37–48.5)
HGB BLD-MCNC: 12.3 G/DL (ref 12–16)
HGB UR QL STRIP: NEGATIVE
IMM GRANULOCYTES # BLD AUTO: 0.01 K/UL (ref 0–0.04)
IMM GRANULOCYTES NFR BLD AUTO: 0.1 % (ref 0–0.5)
KETONES UR QL STRIP: ABNORMAL
LEUKOCYTE ESTERASE UR QL STRIP: NEGATIVE
LIPASE SERPL-CCNC: 30 U/L (ref 4–60)
LYMPHOCYTES # BLD AUTO: 2.1 K/UL (ref 1–4.8)
LYMPHOCYTES NFR BLD: 27.7 % (ref 18–48)
MCH RBC QN AUTO: 27.2 PG (ref 27–31)
MCHC RBC AUTO-ENTMCNC: 33 G/DL (ref 32–36)
MCV RBC AUTO: 83 FL (ref 82–98)
MONOCYTES # BLD AUTO: 0.4 K/UL (ref 0.3–1)
MONOCYTES NFR BLD: 5.6 % (ref 4–15)
NEUTROPHILS # BLD AUTO: 4.7 K/UL (ref 1.8–7.7)
NEUTROPHILS NFR BLD: 61.1 % (ref 38–73)
NITRITE UR QL STRIP: NEGATIVE
NRBC BLD-RTO: 0 /100 WBC
PCO2 BLDA: 33.2 MMHG (ref 35–45)
PH SMN: 7.45 [PH] (ref 7.35–7.45)
PH UR STRIP: 7 [PH] (ref 5–8)
PLATELET # BLD AUTO: 317 K/UL (ref 150–450)
PMV BLD AUTO: 9.9 FL (ref 9.2–12.9)
PO2 BLDA: 40 MMHG (ref 40–60)
POC BE: -1 MMOL/L
POC SATURATED O2: 78 % (ref 95–100)
POC TCO2: 24 MMOL/L (ref 24–29)
POCT GLUCOSE: 116 MG/DL (ref 70–110)
POCT GLUCOSE: 71 MG/DL (ref 70–110)
POTASSIUM SERPL-SCNC: 3.9 MMOL/L (ref 3.5–5.1)
PROT SERPL-MCNC: 7.5 G/DL (ref 6–8.4)
PROT UR QL STRIP: NEGATIVE
RBC # BLD AUTO: 4.52 M/UL (ref 4–5.4)
SAMPLE: ABNORMAL
SITE: ABNORMAL
SODIUM SERPL-SCNC: 138 MMOL/L (ref 136–145)
SP GR UR STRIP: 1.02 (ref 1–1.03)
URN SPEC COLLECT METH UR: ABNORMAL
WBC # BLD AUTO: 7.72 K/UL (ref 3.9–12.7)

## 2022-08-15 PROCEDURE — 85025 COMPLETE CBC W/AUTO DIFF WBC: CPT | Performed by: STUDENT IN AN ORGANIZED HEALTH CARE EDUCATION/TRAINING PROGRAM

## 2022-08-15 PROCEDURE — 83690 ASSAY OF LIPASE: CPT | Performed by: STUDENT IN AN ORGANIZED HEALTH CARE EDUCATION/TRAINING PROGRAM

## 2022-08-15 PROCEDURE — U0002 COVID-19 LAB TEST NON-CDC: HCPCS | Performed by: STUDENT IN AN ORGANIZED HEALTH CARE EDUCATION/TRAINING PROGRAM

## 2022-08-15 PROCEDURE — G0378 HOSPITAL OBSERVATION PER HR: HCPCS

## 2022-08-15 PROCEDURE — 86803 HEPATITIS C AB TEST: CPT | Performed by: PHYSICIAN ASSISTANT

## 2022-08-15 PROCEDURE — 96374 THER/PROPH/DIAG INJ IV PUSH: CPT

## 2022-08-15 PROCEDURE — 96361 HYDRATE IV INFUSION ADD-ON: CPT

## 2022-08-15 PROCEDURE — 25500020 PHARM REV CODE 255: Performed by: STUDENT IN AN ORGANIZED HEALTH CARE EDUCATION/TRAINING PROGRAM

## 2022-08-15 PROCEDURE — 99284 EMERGENCY DEPT VISIT MOD MDM: CPT | Mod: CS,,, | Performed by: STUDENT IN AN ORGANIZED HEALTH CARE EDUCATION/TRAINING PROGRAM

## 2022-08-15 PROCEDURE — 12000002 HC ACUTE/MED SURGE SEMI-PRIVATE ROOM

## 2022-08-15 PROCEDURE — 81025 URINE PREGNANCY TEST: CPT | Performed by: STUDENT IN AN ORGANIZED HEALTH CARE EDUCATION/TRAINING PROGRAM

## 2022-08-15 PROCEDURE — 99284 PR EMERGENCY DEPT VISIT,LEVEL IV: ICD-10-PCS | Mod: CS,,, | Performed by: STUDENT IN AN ORGANIZED HEALTH CARE EDUCATION/TRAINING PROGRAM

## 2022-08-15 PROCEDURE — 99285 EMERGENCY DEPT VISIT HI MDM: CPT | Mod: 25

## 2022-08-15 PROCEDURE — 99900035 HC TECH TIME PER 15 MIN (STAT)

## 2022-08-15 PROCEDURE — 82962 GLUCOSE BLOOD TEST: CPT

## 2022-08-15 PROCEDURE — 80053 COMPREHEN METABOLIC PANEL: CPT | Performed by: STUDENT IN AN ORGANIZED HEALTH CARE EDUCATION/TRAINING PROGRAM

## 2022-08-15 PROCEDURE — 96375 TX/PRO/DX INJ NEW DRUG ADDON: CPT

## 2022-08-15 PROCEDURE — 25000003 PHARM REV CODE 250: Performed by: STUDENT IN AN ORGANIZED HEALTH CARE EDUCATION/TRAINING PROGRAM

## 2022-08-15 PROCEDURE — 87389 HIV-1 AG W/HIV-1&-2 AB AG IA: CPT | Performed by: PHYSICIAN ASSISTANT

## 2022-08-15 PROCEDURE — 82803 BLOOD GASES ANY COMBINATION: CPT

## 2022-08-15 PROCEDURE — 63600175 PHARM REV CODE 636 W HCPCS: Performed by: STUDENT IN AN ORGANIZED HEALTH CARE EDUCATION/TRAINING PROGRAM

## 2022-08-15 PROCEDURE — 81003 URINALYSIS AUTO W/O SCOPE: CPT | Performed by: STUDENT IN AN ORGANIZED HEALTH CARE EDUCATION/TRAINING PROGRAM

## 2022-08-15 RX ORDER — IBUPROFEN 200 MG
24 TABLET ORAL
Status: DISCONTINUED | OUTPATIENT
Start: 2022-08-16 | End: 2022-08-15

## 2022-08-15 RX ORDER — GLUCAGON 1 MG
1 KIT INJECTION
Status: DISCONTINUED | OUTPATIENT
Start: 2022-08-16 | End: 2022-08-16 | Stop reason: HOSPADM

## 2022-08-15 RX ORDER — SODIUM CHLORIDE 0.9 % (FLUSH) 0.9 %
10 SYRINGE (ML) INJECTION
Status: DISCONTINUED | OUTPATIENT
Start: 2022-08-15 | End: 2022-08-16 | Stop reason: HOSPADM

## 2022-08-15 RX ORDER — DEXTROSE MONOHYDRATE 100 MG/ML
25 INJECTION, SOLUTION INTRAVENOUS
Status: DISCONTINUED | OUTPATIENT
Start: 2022-08-16 | End: 2022-08-15

## 2022-08-15 RX ORDER — MORPHINE SULFATE 4 MG/ML
4 INJECTION, SOLUTION INTRAMUSCULAR; INTRAVENOUS
Status: COMPLETED | OUTPATIENT
Start: 2022-08-15 | End: 2022-08-15

## 2022-08-15 RX ORDER — IBUPROFEN 200 MG
16 TABLET ORAL
Status: DISCONTINUED | OUTPATIENT
Start: 2022-08-16 | End: 2022-08-15

## 2022-08-15 RX ORDER — DEXTROSE MONOHYDRATE 100 MG/ML
INJECTION, SOLUTION INTRAVENOUS
Status: DISCONTINUED | OUTPATIENT
Start: 2022-08-16 | End: 2022-08-15

## 2022-08-15 RX ORDER — OXYCODONE HYDROCHLORIDE 5 MG/1
5 TABLET ORAL EVERY 4 HOURS PRN
Status: DISCONTINUED | OUTPATIENT
Start: 2022-08-15 | End: 2022-08-16 | Stop reason: HOSPADM

## 2022-08-15 RX ORDER — ONDANSETRON 8 MG/1
8 TABLET, ORALLY DISINTEGRATING ORAL EVERY 8 HOURS PRN
Status: DISCONTINUED | OUTPATIENT
Start: 2022-08-15 | End: 2022-08-16 | Stop reason: HOSPADM

## 2022-08-15 RX ORDER — INSULIN ASPART 100 [IU]/ML
1-10 INJECTION, SOLUTION INTRAVENOUS; SUBCUTANEOUS
Status: DISCONTINUED | OUTPATIENT
Start: 2022-08-16 | End: 2022-08-15

## 2022-08-15 RX ORDER — DEXTROSE MONOHYDRATE 100 MG/ML
12.5 INJECTION, SOLUTION INTRAVENOUS
Status: DISCONTINUED | OUTPATIENT
Start: 2022-08-16 | End: 2022-08-15

## 2022-08-15 RX ORDER — HEPARIN SODIUM,PORCINE/D5W 25000/250
0-40 INTRAVENOUS SOLUTION INTRAVENOUS CONTINUOUS
Status: DISCONTINUED | OUTPATIENT
Start: 2022-08-15 | End: 2022-08-15

## 2022-08-15 RX ORDER — GLUCAGON 1 MG
1 KIT INJECTION
Status: DISCONTINUED | OUTPATIENT
Start: 2022-08-16 | End: 2022-08-15

## 2022-08-15 RX ORDER — ACETAMINOPHEN 325 MG/1
650 TABLET ORAL EVERY 4 HOURS PRN
Status: DISCONTINUED | OUTPATIENT
Start: 2022-08-15 | End: 2022-08-16 | Stop reason: HOSPADM

## 2022-08-15 RX ORDER — ONDANSETRON 2 MG/ML
4 INJECTION INTRAMUSCULAR; INTRAVENOUS
Status: COMPLETED | OUTPATIENT
Start: 2022-08-15 | End: 2022-08-15

## 2022-08-15 RX ORDER — HEPARIN SODIUM,PORCINE/D5W 25000/250
0-40 INTRAVENOUS SOLUTION INTRAVENOUS CONTINUOUS
Status: DISCONTINUED | OUTPATIENT
Start: 2022-08-15 | End: 2022-08-16 | Stop reason: HOSPADM

## 2022-08-15 RX ORDER — INSULIN ASPART 100 [IU]/ML
1-10 INJECTION, SOLUTION INTRAVENOUS; SUBCUTANEOUS EVERY 6 HOURS PRN
Status: DISCONTINUED | OUTPATIENT
Start: 2022-08-16 | End: 2022-08-16 | Stop reason: HOSPADM

## 2022-08-15 RX ORDER — LIDOCAINE HYDROCHLORIDE 10 MG/ML
1 INJECTION, SOLUTION EPIDURAL; INFILTRATION; INTRACAUDAL; PERINEURAL ONCE
Status: DISCONTINUED | OUTPATIENT
Start: 2022-08-16 | End: 2022-08-16 | Stop reason: HOSPADM

## 2022-08-15 RX ADMIN — MORPHINE SULFATE 4 MG: 4 INJECTION INTRAVENOUS at 07:08

## 2022-08-15 RX ADMIN — SODIUM CHLORIDE 1000 ML: 0.9 INJECTION, SOLUTION INTRAVENOUS at 06:08

## 2022-08-15 RX ADMIN — ONDANSETRON 4 MG: 2 INJECTION INTRAMUSCULAR; INTRAVENOUS at 07:08

## 2022-08-15 RX ADMIN — IOHEXOL 100 ML: 350 INJECTION, SOLUTION INTRAVENOUS at 08:08

## 2022-08-15 NOTE — ED NOTES
Patient identifiers for Anusha Andrew 31 y.o. female checked and correct.  Past Medical History:   Diagnosis Date    Depression     Hyperlipidemia     Hypertension     Mild nonproliferative diabetic retinopathy of both eyes without macular edema associated with type 1 diabetes mellitus 3/30/2021    Type I (juvenile type) diabetes mellitus without mention of complication, uncontrolled 11/23/2011     Allergies reported:   Review of patient's allergies indicates:   Allergen Reactions    No known allergies          LOC: Patient is awake, alert, and aware of environment with an appropriate affect. Patient is oriented x 4 and speaking appropriately.  APPEARANCE: Patient is well appearing and appears stated age. Patient is clean, well groomed, and dressed appropriately for season.   HEENT:   SKIN: The skin is warm and dry. Patient has normal skin turgor and moist mucus membranes.   MUSCULOSKELETAL: Patient is moving all extremities well with no obvious deformities. 3+ pulses palpated in all extremities.   RESPIRATORY: Airway is open and patent. Respirations are spontaneous and non-labored with normal effort and rate.  CARDIAC: Patient has a regular rate and rhythm. No peripheral edema noted.   ABDOMEN: Soft and non-tender upon palpation and non distended.   NEUROLOGICAL: pupils 3 mm, PERRL. Facial expression is symmetrical. Hand grasps are equal bilaterally. Normal sensation in all extremities when touched with finger.

## 2022-08-15 NOTE — ED PROVIDER NOTES
Encounter Date: 8/15/2022    SCRIBE #1 NOTE: I, An Busby, am scribing for, and in the presence of,  Thelma Rivas MD. I have scribed the entire note.       History     Chief Complaint   Patient presents with    Abdominal Pain     Nausea, started 4d ago, had gastric sleeve July 11     The history is provided by the patient and medical records. No  was used.     Time patient was seen by the provider: 6:33 PM      The patient is a 31 y.o. female with past medical history of gastric sleeve procedure on 7/11/22 with subsequent 18 lb weight loss and type 1 diabetes who presents to the ED with abdominal pain.  Onset 4 days ago.  She tells me that she was initially doing well postoperatively until 4 days ago when she started to have abdominal pain.  She describes it as mostly in the epigastrium.  She tells me that radiates across her upper abdomen and into her back.  She describes it is sharp at times and also achy.  She tells me that is worse after eating.  She states that she went to an outside hospital emergency department 2 days ago and had an ultrasound that was negative.  She tells me that her symptoms have not improved since then.  She reports significant nausea but denies vomiting or diarrhea.  She states that her glucose has been well controlled and has been running in the low 100s to 150s range.  She denies fever, chills, congestion, rhinorrhea, chest pain, shortness of breath.        Review of patient's allergies indicates:   Allergen Reactions    No known allergies      Past Medical History:   Diagnosis Date    Depression     Hyperlipidemia     Hypertension     Mild nonproliferative diabetic retinopathy of both eyes without macular edema associated with type 1 diabetes mellitus 3/30/2021    Type I (juvenile type) diabetes mellitus without mention of complication, uncontrolled 11/23/2011     Past Surgical History:   Procedure Laterality Date    ABCESS DRAINAGE      boil  went over [place]     SECTION  2012    DILATION AND CURETTAGE OF UTERUS  2019    Procedure: DILATION AND CURETTAGE, UTERUS;  Surgeon: Purnima Kidd MD;  Location: [facility] OR;  Service: OB/GYN;;    HYSTEROSCOPY N/A 2019    Procedure: HYSTEROSCOPY;  Surgeon: Purnima Kidd MD;  Location: [facility] OR;  Service: OB/GYN;  Laterality: N/A;    TONSILLECTOMY, ADENOIDECTOMY      WISDOM TOOTH EXTRACTION       Family History   Problem Relation Age of Onset    Diabetes Brother     No Known Problems Mother     No Known Problems Father     Hydrocephalus Daughter     No Known Problems Brother     No Known Problems Brother     No Known Problems Maternal Aunt     No Known Problems Maternal Uncle     No Known Problems Paternal Aunt     No Known Problems Paternal Uncle     No Known Problems Maternal Grandmother     No Known Problems Maternal Grandfather     No Known Problems Paternal Grandmother     No Known Problems Paternal Grandfather     Amblyopia Neg Hx     Blindness Neg Hx     Cataracts Neg Hx     Glaucoma Neg Hx     Hypertension Neg Hx     Macular degeneration Neg Hx     Retinal detachment Neg Hx     Strabismus Neg Hx     Stroke Neg Hx     Thyroid disease Neg Hx     Breast cancer Neg Hx     Colon cancer Neg Hx     Ovarian cancer Neg Hx     Cancer Neg Hx      Social History     Tobacco Use    Smoking status: Never Smoker    Smokeless tobacco: Never Used   Substance Use Topics    Alcohol use: Yes     Comment: occasionally    Drug use: No     Review of Systems    Constitutional: No fever  HENT: No sore throat  Eyes: No eye pain  Respiratory: No shortness of breath. No cough.  Cardiovascular: No chest pain  Gastrointestinal: + abdominal pain. + nausea. No vomiting. No diarrhea.  Genitourinary: No dysuria  Musculoskeletal: No neck pain  Neurological: No headache  Psychiatric: No agitation    Physical Exam     Initial Vitals [08/15/22 1649]   BP Pulse Resp Temp SpO2   131/67 100 18  98.2 °F (36.8 °C) 98 %      MAP       --         Physical Exam  Constitutional: No acute distress but appears mildly uncomfortable due to pain  Respiratory: Non-labored, lungs clear bilaterally  Cardiovascular: Well perfused, normal rate, regular rhythm  Gastrointestinal: Soft, non-distended, tender to palpation across the upper abdomen, worse in the epigastrium, negative calvo's sign, no guarding Integumentary: Warm and dry  Musculoskeletal: No deformity  Neurological: Awake and alert  Psychiatric: Cooperative    ED Course   Procedures  Labs Reviewed   COMPREHENSIVE METABOLIC PANEL - Abnormal; Notable for the following components:       Result Value    CO2 20 (*)     Albumin 3.2 (*)     ALT 49 (*)     All other components within normal limits   URINALYSIS, REFLEX TO URINE CULTURE - Abnormal; Notable for the following components:    Ketones, UA 1+ (*)     All other components within normal limits    Narrative:     Specimen Source->Urine   CBC W/ AUTO DIFFERENTIAL - Abnormal; Notable for the following components:    Hemoglobin 11.1 (*)     Hematocrit 33.7 (*)     All other components within normal limits    Narrative:     Draw baseline aPTT prior to starting the heparin bolus or  infusion  (if patient is on warfarin prior to heparin therapy)   ISTAT PROCEDURE - Abnormal; Notable for the following components:    POC PCO2 33.2 (*)     POC HCO3 23.1 (*)     POC SATURATED O2 78 (*)     All other components within normal limits   POCT GLUCOSE - Abnormal; Notable for the following components:    POCT Glucose 116 (*)     All other components within normal limits   LIPASE   CBC W/ AUTO DIFFERENTIAL   PREGNANCY TEST, URINE RAPID    Narrative:     Specimen Source->Urine   SARS-COV-2 RNA AMPLIFICATION, QUAL   HIV 1 / 2 ANTIBODY   HEPATITIS C ANTIBODY   SARS-COV-2 RDRP GENE   POCT GLUCOSE   POCT GLUCOSE MONITORING CONTINUOUS   POCT GLUCOSE MONITORING CONTINUOUS          Imaging Results           CT Abdomen Pelvis With Contrast  (Final result)  Result time 08/15/22 22:13:15    Final result by Marck Parsons MD (08/15/22 22:13:15)                 Impression:      Hypodensity within the superior mesenteric vein concerning for thrombus with significant surrounding stranding of the mesentery and about the pancreas.  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  Recommend correlation with serum lipase for possibility of pancreatitis.    Postsurgical changes of sleeve gastrectomy.    Questionable mild wall thickening of small bowel loops in the left hemiabdomen which may be reactive related to venous congestion from SMV thrombosis.    Prominent mesenteric lymph nodes which have developed since 09/09/2021 examination.  Suspect reactive to mesenteric venous congestion/inflammation.    Trace free fluid in the pelvis.    Findings directly discussed via telephone by resident physician Dr. Curly Montilla with Dr. Thelma Rivas  at 21:28    This report was flagged in Epic as abnormal.    Electronically signed by resident: Curly Montilla  Date:    08/15/2022  Time:    21:23    Electronically signed by: Marck Parsons MD  Date:    08/15/2022  Time:    22:13             Narrative:    EXAMINATION:  CT ABDOMEN PELVIS WITH CONTRAST    CLINICAL HISTORY:  Abdominal pain, post-op;    TECHNIQUE:  Routine axial CT images of the abdomen were obtained after administration 100cc of IV Omnipaque 350.  .  Coronal and Sagittal reformatted images were also obtained.    COMPARISON:  CT abdomen pelvis 09/09/2021    FINDINGS:  Inferior thorax demonstrates no cardiomegaly or pericardial fluid.  Lung bases demonstrate no focal consolidation or pleural fluid.    Liver is upper limits of normal size measuring 17.7 cm with normal attenuation.  The spleen is normal in size.  Gallbladder demonstrates no radiopaque stone.  No intrahepatic or extrahepatic dilatation.    The adrenal glands are unremarkable.    There is a  soft tissue haziness surrounding the pancreas and throughout the mesentery.  Superior mesenteric vein is hypodense just caudal to the confluence with the splenic vein concerning for thrombus.  There is congestion of unopacified veins throughout the mesentery with comb sign present near the mesenteric border of the small bowel.  Trace free fluid in the pelvis.  Increased number of normal sized mesenteric lymph nodes.  No free intraperitoneal air.    Postsurgical changes of sleeve gastrectomy.  Questionable mild wall thickening of small bowel loops in the left hemiabdomen.  No small or large bowel obstruction.  Appendix not visualized however no superimposed right lower quadrant inflammatory changes in the expected pericecal location.    Kidneys are normal in size and location without hydronephrosis or nephrolithiasis.  Ureters are difficult to follow up to urinary bladder.  Mild wall thickening of the urinary bladder diffusely favored to be related to incomplete distension.    Uterus is unremarkable.    No calcific atherosclerosis or aneurysm.    Tiny fat containing umbilical hernia.    Osseous structures are intact without acute fracture or suspicious osseous lesion.                                 Medications   heparin 25,000 units in dextrose 5% (100 units/ml) IV bolus from bag INITIAL BOLUS (5,272 Units Intravenous Not Given 8/15/22 2230)   heparin 25,000 units in dextrose 5% 250 mL (100 units/mL) infusion HIGH INTENSITY nomogram - [facility] (18 Units/kg/hr × 65.9 kg (Adjusted) Intravenous New Bag 8/16/22 0147)   heparin 25,000 units in dextrose 5% (100 units/ml) IV bolus from bag - ADDITIONAL PRN BOLUS - 60 units/kg (3,954 Units Intravenous Bolus from Bag 8/16/22 0149)   heparin 25,000 units in dextrose 5% (100 units/ml) IV bolus from bag - ADDITIONAL PRN BOLUS - 30 units/kg (has no administration in time range)   LIDOcaine (PF) 10 mg/ml (1%) injection 10 mg (10 mg Other Not Given 8/16/22 0000)   sodium chloride 0.9%  flush 10 mL (has no administration in time range)   ondansetron disintegrating tablet 8 mg (8 mg Oral Given 8/16/22 0106)   acetaminophen tablet 650 mg (has no administration in time range)   oxyCODONE immediate release tablet 5 mg (5 mg Oral Given 8/16/22 0100)   insulin detemir U-100 pen 16 Units (has no administration in time range)   glucagon (human recombinant) injection 1 mg (has no administration in time range)   insulin aspart U-100 pen 1-10 Units (has no administration in time range)   dextrose 10% bolus 125 mL (has no administration in time range)   dextrose 10% bolus 250 mL (has no administration in time range)   sodium chloride 0.9% bolus 1,000 mL (0 mLs Intravenous Stopped 8/15/22 2331)   morphine injection 4 mg (4 mg Intravenous Given 8/15/22 1931)   ondansetron injection 4 mg (4 mg Intravenous Given 8/15/22 1930)   iohexoL (OMNIPAQUE 350) injection 100 mL (100 mLs Intravenous Given 8/15/22 2020)   heparin 25,000 units in dextrose 5% (100 units/ml) IV bolus from bag INITIAL BOLUS (1,318 Units Intravenous Bolus from Bag 8/16/22 0219)     Medical Decision Making:   History:   Old Medical Records: I decided to obtain old medical records.  Clinical Tests:   Lab Tests: Ordered and Reviewed  Radiological Study: Ordered and Reviewed  ED Management:  Patient presents for abdominal pain and nausea.  She is postoperative from a gastric sleeve.  Labs and imaging ordered.  Labs without DKA. 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  Heparin drip was ordered.  General surgery was consulted and admitted the patient.  Patient stable at time of admission.  Other:   I have discussed this case with another health care provider.       <> Summary of the Discussion: General Surgery              Scribe Attestation:   Scribe #1: I performed the above scribed service and the documentation accurately describes the services I performed. I attest to the accuracy of the note.        ED Course  as of 08/16/22 0322   Mon Aug 15, 2022   2126 CT findings c/f thrombus in superior mesenteric vein with surrounding stranding per radiology.   [NN]      ED Course User Index  [NN] Thelma Rivas MD             Clinical Impression:   Final diagnoses:  [I82.90] Thrombosis/embolism, venous          ED Disposition Condition    Admit               Thelma Rivas MD  08/16/22 0323

## 2022-08-15 NOTE — ED NOTES
"Pt reports upper abd pain, worse after eating. Post gastric sleeve 7/11/22. [facility] [place] [facility]. +nausea, denies vomiting. Describes as "sharp and burning." Taking Protonix daily. Denies fever, chills.   "

## 2022-08-16 VITALS
RESPIRATION RATE: 18 BRPM | WEIGHT: 190 LBS | DIASTOLIC BLOOD PRESSURE: 58 MMHG | SYSTOLIC BLOOD PRESSURE: 109 MMHG | OXYGEN SATURATION: 98 % | HEART RATE: 74 BPM | HEIGHT: 63 IN | BODY MASS INDEX: 33.66 KG/M2 | TEMPERATURE: 99 F

## 2022-08-16 PROBLEM — K55.069 SUPERIOR MESENTERIC VEIN THROMBOSIS: Status: ACTIVE | Noted: 2022-08-16

## 2022-08-16 LAB
ANION GAP SERPL CALC-SCNC: 13 MMOL/L (ref 8–16)
APTT BLDCRRT: 24.5 SEC (ref 21–32)
APTT BLDCRRT: 40.6 SEC (ref 21–32)
APTT BLDCRRT: 41.3 SEC (ref 21–32)
APTT BLDCRRT: 92.2 SEC (ref 21–32)
BASOPHILS # BLD AUTO: 0.03 K/UL (ref 0–0.2)
BASOPHILS # BLD AUTO: 0.03 K/UL (ref 0–0.2)
BASOPHILS NFR BLD: 0.4 % (ref 0–1.9)
BASOPHILS NFR BLD: 0.4 % (ref 0–1.9)
BUN SERPL-MCNC: 7 MG/DL (ref 6–20)
CALCIUM SERPL-MCNC: 8.3 MG/DL (ref 8.7–10.5)
CHLORIDE SERPL-SCNC: 108 MMOL/L (ref 95–110)
CO2 SERPL-SCNC: 16 MMOL/L (ref 23–29)
CREAT SERPL-MCNC: 0.7 MG/DL (ref 0.5–1.4)
DIFFERENTIAL METHOD: ABNORMAL
DIFFERENTIAL METHOD: ABNORMAL
EOSINOPHIL # BLD AUTO: 0.3 K/UL (ref 0–0.5)
EOSINOPHIL # BLD AUTO: 0.4 K/UL (ref 0–0.5)
EOSINOPHIL NFR BLD: 4.2 % (ref 0–8)
EOSINOPHIL NFR BLD: 5.1 % (ref 0–8)
ERYTHROCYTE [DISTWIDTH] IN BLOOD BY AUTOMATED COUNT: 13.3 % (ref 11.5–14.5)
ERYTHROCYTE [DISTWIDTH] IN BLOOD BY AUTOMATED COUNT: 13.3 % (ref 11.5–14.5)
EST. GFR  (NO RACE VARIABLE): >60 ML/MIN/1.73 M^2
FERRITIN SERPL-MCNC: 161 NG/ML (ref 20–300)
FOLATE SERPL-MCNC: 14.3 NG/ML (ref 4–24)
GLUCOSE SERPL-MCNC: 253 MG/DL (ref 70–110)
HCT VFR BLD AUTO: 33.7 % (ref 37–48.5)
HCT VFR BLD AUTO: 35 % (ref 37–48.5)
HGB BLD-MCNC: 10.6 G/DL (ref 12–16)
HGB BLD-MCNC: 11.1 G/DL (ref 12–16)
HIV 1+2 AB+HIV1 P24 AG SERPL QL IA: NEGATIVE
IMM GRANULOCYTES # BLD AUTO: 0.01 K/UL (ref 0–0.04)
IMM GRANULOCYTES # BLD AUTO: 0.01 K/UL (ref 0–0.04)
IMM GRANULOCYTES NFR BLD AUTO: 0.1 % (ref 0–0.5)
IMM GRANULOCYTES NFR BLD AUTO: 0.1 % (ref 0–0.5)
INR PPP: 1 (ref 0.8–1.2)
IRON SERPL-MCNC: 43 UG/DL (ref 30–160)
LYMPHOCYTES # BLD AUTO: 1.5 K/UL (ref 1–4.8)
LYMPHOCYTES # BLD AUTO: 1.6 K/UL (ref 1–4.8)
LYMPHOCYTES NFR BLD: 21 % (ref 18–48)
LYMPHOCYTES NFR BLD: 21.3 % (ref 18–48)
MAGNESIUM SERPL-MCNC: 1.8 MG/DL (ref 1.6–2.6)
MCH RBC QN AUTO: 26.6 PG (ref 27–31)
MCH RBC QN AUTO: 27.8 PG (ref 27–31)
MCHC RBC AUTO-ENTMCNC: 30.3 G/DL (ref 32–36)
MCHC RBC AUTO-ENTMCNC: 32.9 G/DL (ref 32–36)
MCV RBC AUTO: 84 FL (ref 82–98)
MCV RBC AUTO: 88 FL (ref 82–98)
MONOCYTES # BLD AUTO: 0.5 K/UL (ref 0.3–1)
MONOCYTES # BLD AUTO: 0.6 K/UL (ref 0.3–1)
MONOCYTES NFR BLD: 6.7 % (ref 4–15)
MONOCYTES NFR BLD: 8 % (ref 4–15)
NEUTROPHILS # BLD AUTO: 5 K/UL (ref 1.8–7.7)
NEUTROPHILS # BLD AUTO: 5 K/UL (ref 1.8–7.7)
NEUTROPHILS NFR BLD: 65.1 % (ref 38–73)
NEUTROPHILS NFR BLD: 67.6 % (ref 38–73)
NRBC BLD-RTO: 0 /100 WBC
NRBC BLD-RTO: 0 /100 WBC
PHOSPHATE SERPL-MCNC: 3.1 MG/DL (ref 2.7–4.5)
PLATELET # BLD AUTO: 248 K/UL (ref 150–450)
PLATELET # BLD AUTO: 256 K/UL (ref 150–450)
PMV BLD AUTO: 10.8 FL (ref 9.2–12.9)
PMV BLD AUTO: 9.8 FL (ref 9.2–12.9)
POCT GLUCOSE: 88 MG/DL (ref 70–110)
POCT GLUCOSE: 98 MG/DL (ref 70–110)
POTASSIUM SERPL-SCNC: 4.3 MMOL/L (ref 3.5–5.1)
PROTHROMBIN TIME: 10.4 SEC (ref 9–12.5)
RBC # BLD AUTO: 3.99 M/UL (ref 4–5.4)
RBC # BLD AUTO: 4 M/UL (ref 4–5.4)
SARS-COV-2 RDRP RESP QL NAA+PROBE: NEGATIVE
SATURATED IRON: 13 % (ref 20–50)
SODIUM SERPL-SCNC: 137 MMOL/L (ref 136–145)
TOTAL IRON BINDING CAPACITY: 339 UG/DL (ref 250–450)
TRANSFERRIN SERPL-MCNC: 229 MG/DL (ref 200–375)
VIT B12 SERPL-MCNC: 1101 PG/ML (ref 210–950)
WBC # BLD AUTO: 7.35 K/UL (ref 3.9–12.7)
WBC # BLD AUTO: 7.61 K/UL (ref 3.9–12.7)

## 2022-08-16 PROCEDURE — 88188 FLOWCYTOMETRY/READ 9-15: CPT | Mod: 91

## 2022-08-16 PROCEDURE — 85610 PROTHROMBIN TIME: CPT

## 2022-08-16 PROCEDURE — 80048 BASIC METABOLIC PNL TOTAL CA: CPT

## 2022-08-16 PROCEDURE — 82525 ASSAY OF COPPER: CPT

## 2022-08-16 PROCEDURE — 96365 THER/PROPH/DIAG IV INF INIT: CPT

## 2022-08-16 PROCEDURE — 99223 PR INITIAL HOSPITAL CARE,LEVL III: ICD-10-PCS | Mod: ,,, | Performed by: SURGERY

## 2022-08-16 PROCEDURE — 85730 THROMBOPLASTIN TIME PARTIAL: CPT | Mod: 91

## 2022-08-16 PROCEDURE — 36415 COLL VENOUS BLD VENIPUNCTURE: CPT

## 2022-08-16 PROCEDURE — 86147 CARDIOLIPIN ANTIBODY EA IG: CPT

## 2022-08-16 PROCEDURE — 85635 REPTILASE TEST: CPT

## 2022-08-16 PROCEDURE — 85525 HEPARIN NEUTRALIZATION: CPT

## 2022-08-16 PROCEDURE — 83735 ASSAY OF MAGNESIUM: CPT

## 2022-08-16 PROCEDURE — 85670 THROMBIN TIME PLASMA: CPT

## 2022-08-16 PROCEDURE — 84466 ASSAY OF TRANSFERRIN: CPT

## 2022-08-16 PROCEDURE — 88185 FLOWCYTOMETRY/TC ADD-ON: CPT | Mod: 91

## 2022-08-16 PROCEDURE — 85025 COMPLETE CBC W/AUTO DIFF WBC: CPT

## 2022-08-16 PROCEDURE — 85730 THROMBOPLASTIN TIME PARTIAL: CPT

## 2022-08-16 PROCEDURE — 99223 1ST HOSP IP/OBS HIGH 75: CPT | Mod: ,,, | Performed by: SURGERY

## 2022-08-16 PROCEDURE — 63600175 PHARM REV CODE 636 W HCPCS

## 2022-08-16 PROCEDURE — G0378 HOSPITAL OBSERVATION PER HR: HCPCS

## 2022-08-16 PROCEDURE — 82746 ASSAY OF FOLIC ACID SERUM: CPT

## 2022-08-16 PROCEDURE — 99222 1ST HOSP IP/OBS MODERATE 55: CPT | Mod: ,,, | Performed by: INTERNAL MEDICINE

## 2022-08-16 PROCEDURE — 81241 F5 GENE: CPT

## 2022-08-16 PROCEDURE — 96372 THER/PROPH/DIAG INJ SC/IM: CPT

## 2022-08-16 PROCEDURE — 85610 PROTHROMBIN TIME: CPT | Mod: 91

## 2022-08-16 PROCEDURE — 99222 PR INITIAL HOSPITAL CARE,LEVL II: ICD-10-PCS | Mod: ,,, | Performed by: INTERNAL MEDICINE

## 2022-08-16 PROCEDURE — 94761 N-INVAS EAR/PLS OXIMETRY MLT: CPT

## 2022-08-16 PROCEDURE — 81240 F2 GENE: CPT

## 2022-08-16 PROCEDURE — 86146 BETA-2 GLYCOPROTEIN ANTIBODY: CPT

## 2022-08-16 PROCEDURE — 96366 THER/PROPH/DIAG IV INF ADDON: CPT

## 2022-08-16 PROCEDURE — 81270 JAK2 GENE: CPT

## 2022-08-16 PROCEDURE — 85730 THROMBOPLASTIN TIME PARTIAL: CPT | Mod: 91 | Performed by: SURGERY

## 2022-08-16 PROCEDURE — 84100 ASSAY OF PHOSPHORUS: CPT

## 2022-08-16 PROCEDURE — 82607 VITAMIN B-12: CPT

## 2022-08-16 PROCEDURE — 82728 ASSAY OF FERRITIN: CPT

## 2022-08-16 PROCEDURE — 25000003 PHARM REV CODE 250

## 2022-08-16 PROCEDURE — 36415 COLL VENOUS BLD VENIPUNCTURE: CPT | Performed by: SURGERY

## 2022-08-16 RX ADMIN — HEPARIN SODIUM 18 UNITS/KG/HR: 5000 INJECTION INTRAVENOUS; SUBCUTANEOUS at 01:08

## 2022-08-16 RX ADMIN — ONDANSETRON 8 MG: 8 TABLET, ORALLY DISINTEGRATING ORAL at 01:08

## 2022-08-16 RX ADMIN — OXYCODONE 5 MG: 5 TABLET ORAL at 08:08

## 2022-08-16 RX ADMIN — INSULIN ASPART 8 UNITS: 100 INJECTION, SOLUTION INTRAVENOUS; SUBCUTANEOUS at 06:08

## 2022-08-16 RX ADMIN — OXYCODONE 5 MG: 5 TABLET ORAL at 01:08

## 2022-08-16 RX ADMIN — ONDANSETRON 8 MG: 8 TABLET, ORALLY DISINTEGRATING ORAL at 09:08

## 2022-08-16 NOTE — SUBJECTIVE & OBJECTIVE
Interval History: No acute events overnight. Pain a little better.     Medications:  Continuous Infusions:   heparin (porcine) in D5W 18 Units/kg/hr (08/16/22 0147)     Scheduled Meds:   heparin (PORCINE)  80 Units/kg (Adjusted) Intravenous Once    insulin detemir U-100  16 Units Subcutaneous QHS    LIDOcaine (PF) 10 mg/ml (1%)  1 mL Other Once     PRN Meds:acetaminophen, dextrose 10%, dextrose 10%, glucagon (human recombinant), heparin (PORCINE), heparin (PORCINE), insulin aspart U-100, ondansetron, oxyCODONE, sodium chloride 0.9%     Review of patient's allergies indicates:   Allergen Reactions    No known allergies      Objective:     Vital Signs (Most Recent):  Temp: 98 °F (36.7 °C) (08/16/22 0700)  Pulse: 76 (08/16/22 0700)  Resp: 18 (08/16/22 0700)  BP: 101/60 (08/16/22 0700)  SpO2: 98 % (08/16/22 0700) Vital Signs (24h Range):  Temp:  [97.8 °F (36.6 °C)-98.6 °F (37 °C)] 98 °F (36.7 °C)  Pulse:  [] 76  Resp:  [16-18] 18  SpO2:  [98 %-99 %] 98 %  BP: (101-131)/(60-67) 101/60     Weight: 86.2 kg (190 lb)  Body mass index is 33.66 kg/m².    Intake/Output - Last 3 Shifts         08/14 0700  08/15 0659 08/15 0700  08/16 0659 08/16 0700 08/17 0659    P.O.  240     IV Piggyback  999     Total Intake(mL/kg)  1239 (14.4)     Net  +1239            Urine Occurrence  2 x             Physical Exam  Vitals and nursing note reviewed.   Pulmonary:      Effort: Pulmonary effort is normal. No respiratory distress.   Abdominal:      Tenderness: There is abdominal tenderness (mild mid epigastric tenderness). There is no guarding or rebound.      Comments: Incisions clean, dry, and intact. Well healing.        Significant Labs:  I have reviewed all pertinent lab results within the past 24 hours.  CBC:   Recent Labs   Lab 08/16/22  0339   WBC 7.35   RBC 3.99*   HGB 10.6*   HCT 35.0*      MCV 88   MCH 26.6*   MCHC 30.3*     BMP:   Recent Labs   Lab 08/16/22  0339   *      K 4.3      CO2 16*   BUN 7    CREATININE 0.7   CALCIUM 8.3*   MG 1.8     Coagulation:   Recent Labs   Lab 08/16/22  0013 08/16/22  0339   LABPROT 10.4  --    INR 1.0  --    APTT 24.5 92.2*       Significant Diagnostics:  CT reviewed. SMV thrombus.

## 2022-08-16 NOTE — HPI
Ms. Andrew is a 31 year old female s/p recent gastric sleeve on 7/11/22 with subsequent 18 lb weight loss and pmhx of T1DM. She had presented to the hospital with complaints of epigastric abdominal pain and nausea that started on 8/11/22. Pain radiated to her back and worsened after eating. Otherwise, patient reported that her post-operative course has been unremarkable. Denied any vomiting and diarrhea. CT abdomen performed in the ED and showed SMV thrombosis with reactive mesenteric edema and venous congestion. No concerns for compromised bowel. She was admitted and started on heparin gtt. Patient denied hx of prior blood clots and family hx of blood clots or hypercoagulable disorders. Denied recent travels. Patient is taking birth control pills.    Hematology consulted for outpatient anticoagulant recommendations and follow up.

## 2022-08-16 NOTE — ASSESSMENT & PLAN NOTE
Ms. 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 Patient has no hx of blood clots and no family hx of hypercoagulable diseases. Denied recent long travels. Patient is taking birth control pills.     Plan:  - Continue heparin gtt while hospitalized.  - d/c with Eliquis BID  - Hypercoagulable work up including Factor V, PNH screen, prothrombin, lupus, anticardiolipin, and JAK2 test. Remainder of hypercoagulable work up will be done in outpatient setting due patient's active SMV thrombus and being on heparin.   - Anemia work up including folate, B12, iron, and copper deficiency due to patient's gastric sleeve procedure being high risk for absorption abnormalities.   - Discussed the risks and benefits of anticoagulant usage with risks including life-threatening bleeds. Patient informed that the benefits of treating her active thrombus outweighs the risk of possible bleeds from anticoagulant usage. Patient also advised to hold her birth control pill until she follows up with her ob/gyn. She verbally expressed understanding and agreed with the plan.

## 2022-08-16 NOTE — CONSULTS
Donald [facility]  Hematology/Oncology  Consult Note    Patient Name: Anusha Andrew  MRN: [id number]  Admission Date: 8/15/2022  Hospital Length of Stay: 1 days  Code Status: Full Code   Attending Provider: Rogerio Hook Jr.,*  Consulting Provider: Wen Fraga DO  Primary Care Physician: Magaly Muhammad NP  Principal Problem:<principal problem not specified>    Inpatient consult to Hematology/Oncology  Consult performed by: Wen Fraga DO  Consult ordered by: Chaparro Watson MD        Subjective:     HPI:  Ms. Andrew is a 31 year old female s/p recent gastric sleeve on 7/11/22 with subsequent 18 lb weight loss and pmhx of T1DM. She had presented to the hospital with complaints of epigastric abdominal pain and nausea that started on 8/11/22. Pain radiated to her back and worsened after eating. Otherwise, patient reported that her post-operative course has been unremarkable. Denied any vomiting and diarrhea. CT abdomen performed in the ED and showed SMV thrombosis with reactive mesenteric edema and venous congestion. No concerns for compromised bowel. She was admitted and started on heparin gtt. Patient denied hx of prior blood clots and family hx of blood clots or hypercoagulable disorders. Denied recent travels. Patient is taking birth control pills.    Hematology consulted for outpatient anticoagulant recommendations and follow up.       Oncology Treatment Plan:   [Could not find a treatment plan. This SmartLink may be configured incorrectly. Contact a  for help.]    Medications:  Continuous Infusions:   heparin (porcine) in D5W 18 Units/kg/hr (08/16/22 0147)     Scheduled Meds:   heparin (PORCINE)  80 Units/kg (Adjusted) Intravenous Once    insulin detemir U-100  16 Units Subcutaneous QHS    LIDOcaine (PF) 10 mg/ml (1%)  1 mL Other Once     PRN Meds:acetaminophen, dextrose 10%, dextrose 10%, glucagon (human recombinant), heparin (PORCINE), heparin (PORCINE), insulin aspart U-100,  ondansetron, oxyCODONE, sodium chloride 0.9%     Review of patient's allergies indicates:   Allergen Reactions    No known allergies         Past Medical History:   Diagnosis Date    Depression     Hyperlipidemia     Hypertension     Mild nonproliferative diabetic retinopathy of both eyes without macular edema associated with type 1 diabetes mellitus 3/30/2021    Type I (juvenile type) diabetes mellitus without mention of complication, uncontrolled 2011     Past Surgical History:   Procedure Laterality Date    ABCESS DRAINAGE      boil went over [place]     SECTION  2012    DILATION AND CURETTAGE OF UTERUS  2019    Procedure: DILATION AND CURETTAGE, UTERUS;  Surgeon: Purnima Kidd MD;  Location: [facility] OR;  Service: OB/GYN;;    HYSTEROSCOPY N/A 2019    Procedure: HYSTEROSCOPY;  Surgeon: Purnima Kidd MD;  Location: [facility] OR;  Service: OB/GYN;  Laterality: N/A;    TONSILLECTOMY, ADENOIDECTOMY      WISDOM TOOTH EXTRACTION       Family History       Problem Relation (Age of Onset)    Diabetes Brother    Hydrocephalus Daughter    No Known Problems Mother, Father, Brother, Brother, Maternal Aunt, Maternal Uncle, Paternal Aunt, Paternal Uncle, Maternal Grandmother, Maternal Grandfather, Paternal Grandmother, Paternal Grandfather          Tobacco Use    Smoking status: Never Smoker    Smokeless tobacco: Never Used   Substance and Sexual Activity    Alcohol use: Yes     Comment: occasionally    Drug use: No    Sexual activity: Yes     Partners: Male     Birth control/protection: Injection       Review of Systems   Constitutional:  Negative for chills, fatigue and fever.   HENT:  Negative for nosebleeds, sinus pressure and sore throat.    Respiratory:  Negative for cough, chest tightness, shortness of breath and wheezing.    Cardiovascular:  Negative for chest pain, palpitations and leg swelling.   Gastrointestinal:  Positive for abdominal pain. Negative for diarrhea, nausea and  vomiting.   Genitourinary:  Negative for dysuria and hematuria.   Musculoskeletal:  Negative for back pain and neck pain.   Skin:  Negative for rash and wound.   Neurological:  Negative for weakness and headaches.   All other systems reviewed and are negative.  Objective:     Vital Signs (Most Recent):  Temp: 98.5 °F (36.9 °C) (08/16/22 0814)  Pulse: 85 (08/16/22 0814)  Resp: 18 (08/16/22 0814)  BP: (!) 107/56 (08/16/22 0814)  SpO2: 99 % (08/16/22 0814)   Vital Signs (24h Range):  Temp:  [97.8 °F (36.6 °C)-98.6 °F (37 °C)] 98.5 °F (36.9 °C)  Pulse:  [] 85  Resp:  [16-18] 18  SpO2:  [98 %-99 %] 99 %  BP: (101-131)/(56-67) 107/56     Weight: 86.2 kg (190 lb)  Body mass index is 33.66 kg/m².  Body surface area is 1.96 meters squared.      Intake/Output Summary (Last 24 hours) at 8/16/2022 1416  Last data filed at 8/16/2022 0500  Gross per 24 hour   Intake 1239 ml   Output --   Net 1239 ml       Physical Exam  Vitals and nursing note reviewed.   Constitutional:       General: She is not in acute distress.     Appearance: She is not toxic-appearing.   HENT:      Head: Normocephalic.      Nose: Nose normal.      Mouth/Throat:      Mouth: Mucous membranes are moist.      Pharynx: Oropharynx is clear.   Eyes:      General: No scleral icterus.     Conjunctiva/sclera: Conjunctivae normal.   Cardiovascular:      Rate and Rhythm: Normal rate and regular rhythm.      Heart sounds: No murmur heard.  Pulmonary:      Effort: Pulmonary effort is normal. No respiratory distress.   Abdominal:      General: Bowel sounds are normal.      Palpations: Abdomen is soft.      Tenderness: There is abdominal tenderness (mild mid epigastric tenderness). There is no guarding.      Comments: Incisions clean, dry, and intact. Well healing.    Musculoskeletal:      Cervical back: Normal range of motion and neck supple. No tenderness.      Right lower leg: No edema.      Left lower leg: No edema.   Skin:     General: Skin is warm and dry.       Capillary Refill: Capillary refill takes less than 2 seconds.   Neurological:      Mental Status: She is alert and oriented to person, place, and time. Mental status is at baseline.       Significant Labs:   CBC:   Recent Labs   Lab 08/15/22  1905 08/16/22  0013 08/16/22  0339   WBC 7.72 7.61 7.35   HGB 12.3 11.1* 10.6*   HCT 37.3 33.7* 35.0*    248 256    and CMP:   Recent Labs   Lab 08/15/22  1905 08/16/22  0339    137   K 3.9 4.3    108   CO2 20* 16*   GLU 78 253*   BUN 8 7   CREATININE 0.7 0.7   CALCIUM 9.5 8.3*   PROT 7.5  --    ALBUMIN 3.2*  --    BILITOT 0.4  --    ALKPHOS 70  --    AST 26  --    ALT 49*  --    ANIONGAP 11 13       Diagnostic Results:  I have reviewed all pertinent imaging results/findings within the past 24 hours.    Assessment/Plan:     Superior mesenteric vein thrombosis  Ms. 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 Patient has no hx of blood clots and no family hx of hypercoagulable diseases. Denied recent long travels. Patient is taking birth control pills.     Plan:  - Transition heparin drip to eliquis  - Start Eliquis 10 mg BID x7 days, then 5mg BID  - Hypercoagulable work up including Factor V, PNH screen, prothrombin, lupus, anticardiolipin, and JAK2 test. Remainder of hypercoagulable work up will be done in outpatient setting due patient's active SMV thrombus and being on heparin.   - Anemia work up including folate, B12, iron, and copper deficiency due to patient's gastric sleeve procedure being high risk for absorption abnormalities.   - Discussed the risks and benefits of anticoagulant usage with risks including life-threatening bleeds. Patient informed that the benefits of treating her active thrombus outweighs the risk of possible bleeds from anticoagulant usage. Patient also advised to hold her birth control pill until she follows up with her ob/gyn. She verbally expressed understanding and agreed with the plan.    - We will arrange out-patient follow up        Thank you for your consult. I will sign off. Please contact us if you have any additional questions.    Wen Fraga,   Hematology/Oncology  [facility]

## 2022-08-16 NOTE — PLAN OF CARE
Donald [facility]ano - [facility]  Discharge Final Note    Primary Care Provider: Magaly Muhammad NP    Expected Discharge Date: 8/16/2022    Final Discharge Note (most recent)     Final Note - 08/16/22 1646        Final Note    Assessment Type Final Discharge Note     Anticipated Discharge Disposition Home or Self Care     What phone number can be called within the next 1-3 days to see how you are doing after discharge? [phone]     Hospital Resources/Appts/Education Provided Community resources provided;Appointments scheduled and added to AVS        Post-Acute Status    Post-Acute Authorization Other     Other Status No Post-Acute Service Needs     Discharge Delays None known at this time                 Important Message from Medicare           Patient medically ready for discharge to home. Any necessary transport setup by . This SW scheduled or requested necessary follow-up appointments. Family/patient aware of discharge.f/u with outpatient hematology/oncology clinic on 9/22/2022@ 10:30am    Future Appointments   Date Time Provider Department Center   8/25/2022 11:15 AM Magaly Muhammad NP [facility] INMED [place]   9/15/2022  1:00 PM Purnima Kidd MD [facility] WMN GRP [place]   9/21/2022  3:20 PM Sekou Rojo Jr., MD [facility] ORTHO [place]   9/22/2022  9:30 AM [facility] LAB BMT NOM LABBMT [place]   9/22/2022 10:30 AM Shelby Rodarte DO [facility] HC BMT [place]   10/17/2022  8:05 AM LAB, [facility]PH [facility]PH LAB [facility]   1/9/2023  8:00 AM LAB, [facility]PH [facility]PH LAB [facility]   1/17/2023  2:30 PM NAA Zaragoza, FNP [facility] IM [facility] PCTERI Dewitt LMSW  PRN - [facility]  [phone]

## 2022-08-16 NOTE — ASSESSMENT & PLAN NOTE
31-year-old female s/p sleeve gastrectomy 7/11/22 at outside hospital now with SMV thrombus.     -advance to clears  -continue heparin gtt  -home meds  -PRN pain and nausea meds  -discuss case with hematology regarding outpatient AC and follow up  -possible home later today

## 2022-08-16 NOTE — PLAN OF CARE
Hematology Consults    See resident note written by Dr. Wen Fraga.     Labs for hypercoagulable state and anemia were ordered. Sent message to BMT schedulers to arrange for an outpatient appointment with me in one month.     Also discussed need to stop estrogen-containing OCP with the patient. I sent a message to her OB-Gyn, Dr. Kidd as well.     Shelby Rodarte, DO  PGY-V  Hematology/Oncology Fellow

## 2022-08-16 NOTE — PLAN OF CARE
Donald [facility]  Initial Discharge Assessment       Primary Care Provider: Magaly Muhammad NP    Admission Diagnosis: Thrombosis/embolism, venous [I82.90]    Admission Date: 8/15/2022  Expected Discharge Date:     Discharge Barriers Identified: None    Payor: MEDICAID / Plan: [facility] COMMUNITY PLAN [facility] ([place] MEDICAID) / Product Type: Managed Medicaid /     Extended Emergency Contact Information  Primary Emergency Contact: Stephanie Andrew  Address: [address] [place] [address] [facility]  Home Phone: [phone]  Mobile Phone: [phone]  Relation: Mother    Discharge Plan A: Home  Discharge Plan B: Home with family      [facility]  [address]  [place] [address]  Phone: [phone] Fax: [phone]    [facility] - [place] - [address]  [address] [place] [address]  Phone: [phone] Fax: [phone]    [facility] - [place], [place] - [address]  [address] [facility]  [place] [address]  Phone: [phone] Fax: [phone]      Initial Assessment (most recent)     Adult Discharge Assessment - 08/16/22 1308        Discharge Assessment    Assessment Type Discharge Planning Brief Assessment     Confirmed/corrected address, phone number and insurance Yes     Confirmed Demographics Contacted registration to update   [address] [place] [address]    Source of Information patient     When was your last doctors appointment? 06/13/22     Does patient/caregiver understand observation status Yes     Communicated RYAN with patient/caregiver Yes     Reason For Admission Thrombosis     Lives With alone     Facility Arrived From: Home     Do you expect to return to your current living situation? Yes     Do you have help at home or someone to help you manage your care at home? Yes     Who are your caregiver(s) and their phone number(s)? Alma-[phone]     Prior to hospitilization cognitive  status: Alert/Oriented     Current cognitive status: Alert/Oriented     Walking or Climbing Stairs Difficulty none     Dressing/Bathing Difficulty none     Home Layout Able to live on 1st floor     Equipment Currently Used at Home none     Readmission within 30 days? No     Patient currently being followed by outpatient case management? No     Do you currently have service(s) that help you manage your care at home? No     Do you take prescription medications? Yes     Do you have prescription coverage? Yes     Coverage Medicaid     Do you have any problems affording any of your prescribed medications? No     Is the patient taking medications as prescribed? yes     Who is going to help you get home at discharge? [phone]     How do you get to doctors appointments? car, drives self     Are you on dialysis? No     Do you take coumadin? No     Discharge Plan A Home     Discharge Plan B Home with family     DME Needed Upon Discharge  other (see comments)   Unknown at this time    Discharge Plan discussed with: Patient     Discharge Barriers Identified None                   Spoke to pt. Pt lives at home with [phone]. Post hospital stay mother will be pt support person and pt. has transportation at d/c with mother. There have been no hospitalizations within the last 30 days per pt. Verified pt PCP and preferred pharmacy. Pt stated not on Coumadin and is not receiving dialysis. All questions answered regarding case management/ discharge planning , pt verbalized understanding. Discharge booklet with SW contact information given to pt.        Bernie Rausch LCSW  Case Management/[facility]  [phone]

## 2022-08-16 NOTE — H&P
Donald [facility]ano - Emergency Dept  General Surgery  History & Physical    Patient Name: Anusha Andrew  MRN: [id number]  Admission Date: 8/15/2022  Attending Physician: Rogerio Hook MD  Primary Care Provider: Magaly Muhammad NP    Patient information was obtained from patient and ER records.     Subjective:     Chief Complaint/Reason for Admission: Abdominal pain    History of Present Illness:  Patient is a 31 y.o. female s/p recent LSG on 7/11/2022 at [facility] with a history of T1DM presenting with 4 days of persistent epigastric pain and nausea. Seen 2 days ago at [facility] ED where RUQUS was reportedly normal and patient discharged. Returns today due to ongoing pain. States that her post-operative course has been otherwise unremarkable and she has lost 18 lbs.    Denies fevers/chills/diarrhea/constipation and denies vomiting and bloody BMs. Continues to tolerate PO intake though state she has post-prandial pain.     No current facility-administered medications on file prior to encounter.     Current Outpatient Medications on File Prior to Encounter   Medication Sig    blood sugar diagnostic Strp To check BG 4  times daily, to use with insurance preferred meter, e 10.65 (Patient taking differently: To check BG 4  times daily, to use with insurance preferred meter, e 10.65)    ergocalciferol (VITAMIN D2) 50,000 unit Cap Take 1 capsule (50,000 Units total) by mouth every 7 days.    hydrocortisone (ANUSOL-HC) 2.5 % rectal cream Place rectally 2 (two) times daily.    insulin (LANTUS SOLOSTAR U-100 INSULIN) glargine 100 units/mL SubQ pen Inject 16-22 units at night. (medication will last 136 days)    insulin aspart U-100 (NOVOLOG FLEXPEN U-100 INSULIN) 100 unit/mL (3 mL) InPn pen Inject insulin/carb ratio 1:12, target 150, correction factor 25. 150-200+2, 201-250+4, 251-300+6, 301-350+8, >350+10.Max daily 60 units.    insulin syringe-needle U-100 0.5 mL 31 gauge x 5/16 Syrg use with admelog    ketorolac (TORADOL) 10 mg  "tablet TAKE 1 TABLET BY MOUTH 4 TIMES DAILY AS NEEDED FOR PAIN FOR 5 DAYS . DO NOT EXCEED 4 PER 24 HOURS    lancets Misc To check BG 4 times daily, to use with insurance preferred meter, e10.65    norgestrel-ethinyl estradioL (LO/OVRAL) 0.3-30 mg-mcg per tablet Take 1 tablet by mouth once daily. CONTINUOUSLY, NO PLACEBO PILLS    ondansetron (ZOFRAN-ODT) 4 MG TbDL Take 1 tablet by mouth every 8 (eight) hours as needed for Nausea.    ONETOUCH DELICA PLUS LANCET 33 gauge Misc USE 1 UNIT TO CHECK GLUCOSE 4 TIMES DAILY    ONETOUCH VERIO FLEX METER Misc USE AS DIRECTED TO CHECK BLOOD GLUCOSE 4 TIMES DAILY    pantoprazole (PROTONIX) 40 MG tablet Take 1 tablet by mouth once daily.    pen needle, diabetic (BD EBEN 2ND GEN PEN NEEDLE) 32 gauge x 5/32" Ndle Use 5 times daily as directed (Patient taking differently: Use 5 times daily as directed)    polyethylene glycol (GLYCOLAX) 17 gram/dose powder Take 17 g by mouth once daily.     Review of patient's allergies indicates:   Allergen Reactions    No known allergies      Past Medical History:   Diagnosis Date    Depression     Hyperlipidemia     Hypertension     Mild nonproliferative diabetic retinopathy of both eyes without macular edema associated with type 1 diabetes mellitus 3/30/2021    Type I (juvenile type) diabetes mellitus without mention of complication, uncontrolled 2011     Past Surgical History:   Procedure Laterality Date    ABCESS DRAINAGE      boil went over [place]     SECTION  2012    DILATION AND CURETTAGE OF UTERUS  2019    Procedure: DILATION AND CURETTAGE, UTERUS;  Surgeon: Purnima Kidd MD;  Location: [facility] OR;  Service: OB/GYN;;    HYSTEROSCOPY N/A 2019    Procedure: HYSTEROSCOPY;  Surgeon: Purnima Kidd MD;  Location: [facility] OR;  Service: OB/GYN;  Laterality: N/A;    TONSILLECTOMY, ADENOIDECTOMY      WISDOM TOOTH EXTRACTION       Family History     Problem Relation (Age of Onset)    Diabetes Brother    " Hydrocephalus Daughter    No Known Problems Mother, Father, Brother, Brother, Maternal Aunt, Maternal Uncle, Paternal Aunt, Paternal Uncle, Maternal Grandmother, Maternal Grandfather, Paternal Grandmother, Paternal Grandfather        Tobacco Use    Smoking status: Never Smoker    Smokeless tobacco: Never Used   Substance and Sexual Activity    Alcohol use: Yes     Comment: occasionally    Drug use: No    Sexual activity: Yes     Partners: Male     Birth control/protection: Injection     Review of Systems   All other systems reviewed and are negative.    Objective:     Vital Signs (Most Recent):  Temp: 98.2 °F (36.8 °C) (08/15/22 1649)  Pulse: 100 (08/15/22 1649)  Resp: 18 (08/15/22 1931)  BP: 131/67 (08/15/22 1649)  SpO2: 98 % (08/15/22 1649) Vital Signs (24h Range):  Temp:  [98.2 °F (36.8 °C)] 98.2 °F (36.8 °C)  Pulse:  [100] 100  Resp:  [18] 18  SpO2:  [98 %] 98 %  BP: (131)/(67) 131/67     Weight: 86.2 kg (190 lb)  Body mass index is 33.66 kg/m².    Physical Exam  Constitutional:       General: She is not in acute distress.     Appearance: Normal appearance. She is obese. She is not ill-appearing.   HENT:      Head: Normocephalic.      Mouth/Throat:      Mouth: Mucous membranes are moist.   Cardiovascular:      Rate and Rhythm: Normal rate and regular rhythm.      Pulses: Normal pulses.   Pulmonary:      Effort: Pulmonary effort is normal. No respiratory distress.      Breath sounds: No stridor.   Abdominal:      General: There is no distension.      Palpations: Abdomen is soft.      Tenderness: There is abdominal tenderness.      Comments: Laparoscopic incisions C/D/I, moderate tenderness in epigastrium/RUQ   Neurological:      Mental Status: She is alert.       Significant Labs:  I have reviewed all pertinent lab results within the past 24 hours.  CBC:   Recent Labs   Lab 08/15/22  1905   WBC 7.72   RBC 4.52   HGB 12.3   HCT 37.3      MCV 83   MCH 27.2   MCHC 33.0     BMP:   Recent Labs   Lab  08/15/22  1905   GLU 78      K 3.9      CO2 20*   BUN 8   CREATININE 0.7   CALCIUM 9.5     Coagulation: No results for input(s): LABPROT, INR, APTT in the last 168 hours.    Significant Diagnostics:  I have reviewed all pertinent imaging results/findings within the past 24 hours.    Assessment/Plan:     VTE Risk Mitigation (From admission, onward)         Ordered     heparin 25,000 units in dextrose 5% (100 units/ml) IV bolus from bag - ADDITIONAL PRN BOLUS - 60 units/kg  As needed (PRN)        Question:  Heparin Infusion Adjustment (DO NOT MODIFY ANSWER)  Answer:  \\[email or web address]\epic\Images\Pharmacy\HeparinInfusions\heparin HIGH INTENSITY nomogram for [facility] [id number]    08/15/22 2217     heparin 25,000 units in dextrose 5% (100 units/ml) IV bolus from bag - ADDITIONAL PRN BOLUS - 30 units/kg  As needed (PRN)        Question:  Heparin Infusion Adjustment (DO NOT MODIFY ANSWER)  Answer:  \\[email or web address]\epic\Images\Pharmacy\HeparinInfusions\heparin HIGH INTENSITY nomogram for [facility] [id number].pdf    08/15/22 2217     IP VTE HIGH RISK PATIENT  Once         08/15/22 2257     heparin 25,000 units in dextrose 5% (100 units/ml) IV bolus from bag INITIAL BOLUS  Once        Question:  Heparin Infusion Adjustment (DO NOT MODIFY ANSWER)  Answer:  \\[email or web address]\epic\Images\Pharmacy\HeparinInfusions\heparin HIGH INTENSITY nomogram for [facility] [id number].pdf    08/15/22 2217     heparin 25,000 units in dextrose 5% 250 mL (100 units/mL) infusion HIGH INTENSITY nomogram - [facility]  Continuous        Question Answer Comment   Heparin Infusion Adjustment (DO NOT MODIFY ANSWER) \\[email or web address]\epic\Images\Pharmacy\HeparinInfusions\heparin HIGH INTENSITY nomogram for [facility] [id number].pdf    Begin at (in units/kg/hr) 18        08/15/22 2217              31YOF s/p LSG on 7/11/2022 presenting with abdominal pain/nausea likely secondary to acute SMV thrombosis without concerning findings for compromised bowel.    - Admit  - Heparin gtt  - Clear liquid  diet  - Continue QHS basal insulin/NPO SSI  - Daily labs    Angel Laura MD  General Surgery  [facility] - Emergency Dept    I have personally taken the history and examined this patient and agree with the resident's note as stated above.         Rogerio Hook MD

## 2022-08-16 NOTE — PROGRESS NOTES
Donald Avendano - [facility]  General Surgery  Progress Note    Subjective:     Interval History: No acute events overnight. Pain a little better.     Medications:  Continuous Infusions:   heparin (porcine) in D5W 18 Units/kg/hr (08/16/22 0147)     Scheduled Meds:   heparin (PORCINE)  80 Units/kg (Adjusted) Intravenous Once    insulin detemir U-100  16 Units Subcutaneous QHS    LIDOcaine (PF) 10 mg/ml (1%)  1 mL Other Once     PRN Meds:acetaminophen, dextrose 10%, dextrose 10%, glucagon (human recombinant), heparin (PORCINE), heparin (PORCINE), insulin aspart U-100, ondansetron, oxyCODONE, sodium chloride 0.9%     Review of patient's allergies indicates:   Allergen Reactions    No known allergies      Objective:     Vital Signs (Most Recent):  Temp: 98 °F (36.7 °C) (08/16/22 0700)  Pulse: 76 (08/16/22 0700)  Resp: 18 (08/16/22 0700)  BP: 101/60 (08/16/22 0700)  SpO2: 98 % (08/16/22 0700) Vital Signs (24h Range):  Temp:  [97.8 °F (36.6 °C)-98.6 °F (37 °C)] 98 °F (36.7 °C)  Pulse:  [] 76  Resp:  [16-18] 18  SpO2:  [98 %-99 %] 98 %  BP: (101-131)/(60-67) 101/60     Weight: 86.2 kg (190 lb)  Body mass index is 33.66 kg/m².    Intake/Output - Last 3 Shifts         08/14 0700  08/15 0659 08/15 0700  08/16 0659 08/16 0700 08/17 0659    P.O.  240     IV Piggyback  999     Total Intake(mL/kg)  1239 (14.4)     Net  +1239            Urine Occurrence  2 x             Physical Exam  Vitals and nursing note reviewed.   Pulmonary:      Effort: Pulmonary effort is normal. No respiratory distress.   Abdominal:      Tenderness: There is abdominal tenderness (mild mid epigastric tenderness). There is no guarding or rebound.      Comments: Incisions clean, dry, and intact. Well healing.        Significant Labs:  I have reviewed all pertinent lab results within the past 24 hours.  CBC:   Recent Labs   Lab 08/16/22  0339   WBC 7.35   RBC 3.99*   HGB 10.6*   HCT 35.0*      MCV 88   MCH 26.6*   MCHC 30.3*     BMP:   Recent Labs    Lab 08/16/22  0339   *      K 4.3      CO2 16*   BUN 7   CREATININE 0.7   CALCIUM 8.3*   MG 1.8     Coagulation:   Recent Labs   Lab 08/16/22  0013 08/16/22 0339   LABPROT 10.4  --    INR 1.0  --    APTT 24.5 92.2*       Significant Diagnostics:  CT reviewed. SMV thrombus.     Assessment/Plan:     Superior mesenteric vein thrombosis  31-year-old female s/p sleeve gastrectomy 7/11/22 at outside hospital now with SMV thrombus.     -advance to clears  -continue heparin gtt  -home meds  -PRN pain and nausea meds  -discuss case with hematology regarding outpatient AC and follow up  -possible home later today        Chaparro Watson MD  General Surgery  [facility]IG[facility]

## 2022-08-16 NOTE — DISCHARGE INSTRUCTIONS
You are to start Eliquis 10mg twice a day for 7 days. After that, you are then to take 5mg twice a day until you are seen for follow up in the out patient hematology oncology clinic. They are scheduling follow up for you. Please call their office if you have not been contacted after a few days after discharge.    You are also to stop taking your oral birth control per hematology/oncology

## 2022-08-16 NOTE — DISCHARGE SUMMARY
[facility]  DISCHARGE SUMMARY  General Surgery      Admit Date:  8/15/2022    Discharge Date:  8/16/2022      Attending Physician:  Rogerio Hook Jr.,*     Discharge Provider:  Laura Kemp MD     Reason for Admission:  Superior mesenteric vein thrombosis     Procedures Performed:  * No surgery found *      HPI: Patient is a 31 y.o. female [place] [facility] a [place] of [place]1DM presenting with 4 days of persistent epigastric pain and nausea. Se[place] 2 days ago [place] [facility] [place] where RUQUS was reportedly normal and patient discharged. Returns today due to ongoing pain. States that her post-operative course has been otherwise unremarkable and she has lost 18 lbs.     Denies fevers/chills/diarrhea/constipation and denies vomiting and bloody BMs. Continues to tolerate PO intake though state she has post-prandial pain.     Hospital Course:  Please see the H&P and other available documentation for full details related to history prior to this admission.  Briefly, Anusha Andrew is a 31 y.o. female who was admitted following diagnosis of  Superior mesenteric vein thrombosis    Following resolution of abdominal pain and consultation with hematology/oncology for anticoagulation recommendations and beginning hypercoagulability work up, the patient was deemed to be stable for discharge with close follow up with the hematology/oncology clinic.     Labs and vital signs remained stable and appropriate throughout course.  She was tolerating a diet and the patient's pain was controlled. Currently, the patient is doing well and is stable and appropriate for discharge at this time.      Consults:  None.    Significant Diagnostic Studies:   Recent Labs   Lab 08/15/22  1905 08/16/22  0013 08/16/22  0339   WBC 7.72 7.61 7.35   HGB 12.3 11.1* 10.6*   HCT 37.3 33.7* 35.0*    248 256     Recent Labs   Lab 08/15/22  1905 08/16/22  0339    137   K 3.9 4.3    108   CO2 20* 16*   BUN 8 7    CREATININE 0.7 0.7   GLU 78 253*   CALCIUM 9.5 8.3*   MG  --  1.8   PHOS  --  3.1   AST 26  --    ALT 49*  --    ALKPHOS 70  --    BILITOT 0.4  --    PROT 7.5  --    ALBUMIN 3.2*  --    LIPASE 30  --      Recent Labs   Lab 08/16/22  0013   INR 1.0     Recent Labs   Lab 08/15/22  1911   PH 7.450   PCO2 33.2*   PO2 40   HCO3 23.1*         Final Diagnoses:   Principal Problem:  Superior mesenteric vein thrombosis   Secondary Diagnoses:    Active Hospital Problems    Diagnosis  POA    *Superior mesenteric vein thrombosis [K55.069]  Unknown    History of sleeve gastrectomy [Z90.3]  Not Applicable      Resolved Hospital Problems   No resolved problems to display.       Discharged Condition:  Good    Disposition: home    Follow Up/Patient Instructions: f/u with outpatient hematology/oncology clinic    Medications:  Reconciled Home Medications:    Current Discharge Medication List      START taking these medications    Details   !! apixaban (ELIQUIS) 5 mg Tab Take 2 tablets (10 mg total) by mouth 2 (two) times daily. for 7 days  Qty: 28 tablet, Refills: 0    Comments: Start Eliquis 10 mg BID x7 days, then 5mg BID      !! apixaban (ELIQUIS) 5 mg Tab Take 1 tablet (5 mg total) by mouth 2 (two) times daily.  Qty: 180 tablet, Refills: 0    Comments: Start Eliquis 10 mg BID x7 days, then 5mg BID       !! - Potential duplicate medications found. Please discuss with provider.      CONTINUE these medications which have NOT CHANGED    Details   blood sugar diagnostic Strp To check BG 4  times daily, to use with insurance preferred meter, e 10.65  Qty: 400 each, Refills: 3    Associated Diagnoses: Type 1 diabetes mellitus with hyperglycemia      ergocalciferol (VITAMIN D2) 50,000 unit Cap Take 1 capsule (50,000 Units total) by mouth every 7 days.  Qty: 5 capsule, Refills: 3      hydrocortisone (ANUSOL-HC) 2.5 % rectal cream Place rectally 2 (two) times daily.  Qty: 28 g, Refills: 2      insulin (LANTUS SOLOSTAR U-100 INSULIN)  "glargine 100 units/mL SubQ pen Inject 16-22 units at night. (medication will last 136 days)  Qty: 30 mL, Refills: 3      insulin aspart U-100 (NOVOLOG FLEXPEN U-100 INSULIN) 100 unit/mL (3 mL) InPn pen Inject insulin/carb ratio 1:12, target 150, correction factor 25. 150-200+2, 201-250+4, 251-300+6, 301-350+8, >350+10.Max daily 60 units.  Qty: 60 mL, Refills: 3      insulin syringe-needle U-100 0.5 mL 31 gauge x 5/16 Syrg use with admelog  Qty: 100 each, Refills: 0      ketorolac (TORADOL) 10 mg tablet TAKE 1 TABLET BY MOUTH 4 TIMES DAILY AS NEEDED FOR PAIN FOR 5 DAYS . DO NOT EXCEED 4 PER 24 HOURS      !! lancets Misc To check BG 4 times daily, to use with insurance preferred meter, e10.65  Qty: 400 each, Refills: 3    Associated Diagnoses: Type 1 diabetes mellitus with hyperglycemia      norgestrel-ethinyl estradioL (LO/OVRAL) 0.3-30 mg-mcg per tablet Take 1 tablet by mouth once daily. CONTINUOUSLY, NO PLACEBO PILLS  Qty: 112 tablet, Refills: 0      ondansetron (ZOFRAN-ODT) 4 MG TbDL Take 1 tablet by mouth every 8 (eight) hours as needed for Nausea.      !! ONETOUCH DELICA PLUS LANCET 33 gauge Misc USE 1 UNIT TO CHECK GLUCOSE 4 TIMES DAILY      ONETOUCH VERIO FLEX METER Misc USE AS DIRECTED TO CHECK BLOOD GLUCOSE 4 TIMES DAILY      pantoprazole (PROTONIX) 40 MG tablet Take 1 tablet by mouth once daily.      pen needle, diabetic (BD EBEN 2ND GEN PEN NEEDLE) 32 gauge x 5/32" Ndle Use 5 times daily as directed  Qty: 400 each, Refills: 3      polyethylene glycol (GLYCOLAX) 17 gram/dose powder Take 17 g by mouth once daily.  Qty: 30 each, Refills: 3       !! - Potential duplicate medications found. Please discuss with provider.        Discharge Procedure Orders   CBC Auto Differential   Standing Status: Future Standing Exp. Date: 10/15/23     COMPREHENSIVE METABOLIC PANEL   Standing Status: Future Standing Exp. Date: 10/15/23     Diet diabetic     Notify your health care provider if you experience any of the following: "  increased confusion or weakness     Notify your health care provider if you experience any of the following:  persistent dizziness, light-headedness, or visual disturbances     Notify your health care provider if you experience any of the following:  worsening rash     Notify your health care provider if you experience any of the following:  severe persistent headache     Notify your health care provider if you experience any of the following:  difficulty breathing or increased cough     Notify your health care provider if you experience any of the following:  redness, tenderness, or signs of infection (pain, swelling, redness, odor or green/yellow discharge around incision site)     Notify your health care provider if you experience any of the following:  severe uncontrolled pain     Notify your health care provider if you experience any of the following:  temperature >100.4     Notify your health care provider if you experience any of the following:  persistent nausea and vomiting or diarrhea     Activity as tolerated         Laura Kemp MD  General Surgery, PGY-1

## 2022-08-16 NOTE — PLAN OF CARE
POC reviewed w/ pt, verbalized understanding. Pt AAOx4. VSS on RA.     - pain managed with PRN pain meds  -skin intact  - tolerating clear liquid diet 1 episode of nausea this AM, resolved with prn meds.  - voids per toilet, ambulated independently.  -Heparin drip infused at 18units/kg/hr. X2 therapeutic levels. Patient to be discharged with elequis.    Frequent rounds made for pt safety. Call light in reach and bed in lowest position. Will continue to monitor POC.     Discharge instructions reviewed with patient. PIV removed. Heparin infusion stopped. Wheelchair offered.

## 2022-08-16 NOTE — SUBJECTIVE & OBJECTIVE
Oncology Treatment Plan:   [Could not find a treatment plan. This SmartLink may be configured incorrectly. Contact a  for help.]    Medications:  Continuous Infusions:   heparin (porcine) in D5W 18 Units/kg/hr (22 0147)     Scheduled Meds:   heparin (PORCINE)  80 Units/kg (Adjusted) Intravenous Once    insulin detemir U-100  16 Units Subcutaneous QHS    LIDOcaine (PF) 10 mg/ml (1%)  1 mL Other Once     PRN Meds:acetaminophen, dextrose 10%, dextrose 10%, glucagon (human recombinant), heparin (PORCINE), heparin (PORCINE), insulin aspart U-100, ondansetron, oxyCODONE, sodium chloride 0.9%     Review of patient's allergies indicates:   Allergen Reactions    No known allergies         Past Medical History:   Diagnosis Date    Depression     Hyperlipidemia     Hypertension     Mild nonproliferative diabetic retinopathy of both eyes without macular edema associated with type 1 diabetes mellitus 3/30/2021    Type I (juvenile type) diabetes mellitus without mention of complication, uncontrolled 2011     Past Surgical History:   Procedure Laterality Date    ABCESS DRAINAGE      boil went over GA     SECTION  2012    DILATION AND CURETTAGE OF UTERUS  2019    Procedure: DILATION AND CURETTAGE, UTERUS;  Surgeon: Purnima Kidd MD;  Location: [facility] OR;  Service: OB/GYN;;    HYSTEROSCOPY N/A 2019    Procedure: HYSTEROSCOPY;  Surgeon: Purnima Kidd MD;  Location: [facility] OR;  Service: OB/GYN;  Laterality: N/A;    TONSILLECTOMY, ADENOIDECTOMY      WISDOM TOOTH EXTRACTION       Family History       Problem Relation (Age of Onset)    Diabetes Brother    Hydrocephalus Daughter    No Known Problems Mother, Father, Brother, Brother, Maternal Aunt, Maternal Uncle, Paternal Aunt, Paternal Uncle, Maternal Grandmother, Maternal Grandfather, Paternal Grandmother, Paternal Grandfather          Tobacco Use    Smoking status: Never Smoker    Smokeless tobacco: Never Used   Substance and Sexual  Activity    Alcohol use: Yes     Comment: occasionally    Drug use: No    Sexual activity: Yes     Partners: Male     Birth control/protection: Injection       Review of Systems   Constitutional:  Negative for chills, fatigue and fever.   HENT:  Negative for nosebleeds, sinus pressure and sore throat.    Respiratory:  Negative for cough, chest tightness, shortness of breath and wheezing.    Cardiovascular:  Negative for chest pain, palpitations and leg swelling.   Gastrointestinal:  Positive for abdominal pain. Negative for diarrhea, nausea and vomiting.   Genitourinary:  Negative for dysuria and hematuria.   Musculoskeletal:  Negative for back pain and neck pain.   Skin:  Negative for rash and wound.   Neurological:  Negative for weakness and headaches.   All other systems reviewed and are negative.  Objective:     Vital Signs (Most Recent):  Temp: 98.5 °F (36.9 °C) (08/16/22 0814)  Pulse: 85 (08/16/22 0814)  Resp: 18 (08/16/22 0814)  BP: (!) 107/56 (08/16/22 0814)  SpO2: 99 % (08/16/22 0814)   Vital Signs (24h Range):  Temp:  [97.8 °F (36.6 °C)-98.6 °F (37 °C)] 98.5 °F (36.9 °C)  Pulse:  [] 85  Resp:  [16-18] 18  SpO2:  [98 %-99 %] 99 %  BP: (101-131)/(56-67) 107/56     Weight: 86.2 kg (190 lb)  Body mass index is 33.66 kg/m².  Body surface area is 1.96 meters squared.      Intake/Output Summary (Last 24 hours) at 8/16/2022 1416  Last data filed at 8/16/2022 0500  Gross per 24 hour   Intake 1239 ml   Output --   Net 1239 ml       Physical Exam  Vitals and nursing note reviewed.   Constitutional:       General: She is not in acute distress.     Appearance: She is not toxic-appearing.   HENT:      Head: Normocephalic.      Nose: Nose normal.      Mouth/Throat:      Mouth: Mucous membranes are moist.      Pharynx: Oropharynx is clear.   Eyes:      General: No scleral icterus.     Conjunctiva/sclera: Conjunctivae normal.   Cardiovascular:      Rate and Rhythm: Normal rate and regular rhythm.      Heart sounds:  No murmur heard.  Pulmonary:      Effort: Pulmonary effort is normal. No respiratory distress.   Abdominal:      General: Bowel sounds are normal.      Palpations: Abdomen is soft.      Tenderness: There is abdominal tenderness (mild mid epigastric tenderness). There is no guarding.      Comments: Incisions clean, dry, and intact. Well healing.    Musculoskeletal:      Cervical back: Normal range of motion and neck supple. No tenderness.      Right lower leg: No edema.      Left lower leg: No edema.   Skin:     General: Skin is warm and dry.      Capillary Refill: Capillary refill takes less than 2 seconds.   Neurological:      Mental Status: She is alert and oriented to person, place, and time. Mental status is at baseline.       Significant Labs:   CBC:   Recent Labs   Lab 08/15/22  1905 08/16/22  0013 08/16/22  0339   WBC 7.72 7.61 7.35   HGB 12.3 11.1* 10.6*   HCT 37.3 33.7* 35.0*    248 256    and CMP:   Recent Labs   Lab 08/15/22  1905 08/16/22  0339    137   K 3.9 4.3    108   CO2 20* 16*   GLU 78 253*   BUN 8 7   CREATININE 0.7 0.7   CALCIUM 9.5 8.3*   PROT 7.5  --    ALBUMIN 3.2*  --    BILITOT 0.4  --    ALKPHOS 70  --    AST 26  --    ALT 49*  --    ANIONGAP 11 13       Diagnostic Results:  I have reviewed all pertinent imaging results/findings within the past 24 hours.

## 2022-08-17 ENCOUNTER — PATIENT MESSAGE (OUTPATIENT)
Dept: OBSTETRICS AND GYNECOLOGY | Facility: CLINIC | Age: 32
End: 2022-08-17
Payer: MEDICAID

## 2022-08-17 ENCOUNTER — PATIENT OUTREACH (OUTPATIENT)
Dept: ADMINISTRATIVE | Facility: CLINIC | Age: 32
End: 2022-08-17
Payer: MEDICAID

## 2022-08-17 LAB — HCV AB SERPL QL IA: NEGATIVE

## 2022-08-17 RX ORDER — ACETAMINOPHEN AND CODEINE PHOSPHATE 120; 12 MG/5ML; MG/5ML
1 SOLUTION ORAL DAILY
Qty: 28 TABLET | Refills: 11 | Status: SHIPPED | OUTPATIENT
Start: 2022-08-17 | End: 2023-07-13 | Stop reason: SDUPTHER

## 2022-08-17 NOTE — PROGRESS NOTES
C3 nurse spoke with Anusha Andrew   for a TCC post hospital discharge follow up call. The patient has a scheduled HOSFU appointment with Magaly Muhammad NP   on 8/25/22  @ 6958.

## 2022-08-18 LAB
FLOW CYTOMETRY SPECIALIST REVIEW: NORMAL
JAK2 P.V617F BLD/T QL: NORMAL
JAK2 V617F MUTATION DETECTION BLD RESULT: NORMAL
PNH GRANULOCYTES: 0 % (ref 0–0.01)
PNH MONOCYTES: 0 % (ref 0–0.05)
PNH RBC-COMPLETE AG LOSS: 0 % (ref 0–0.01)
PNH RBC-PARTIAL AG LOSS: 0.01 % (ref 0–0.99)

## 2022-08-19 LAB
CARDIOLIPIN IGG SER IA-ACNC: <9.4 GPL (ref 0–14.99)
CARDIOLIPIN IGM SER IA-ACNC: 10.1 MPL (ref 0–12.49)
F2 C.20210G>A GENO BLD/T: NEGATIVE
F5 P.R506Q BLD/T QL: NEGATIVE

## 2022-08-20 LAB
B2 GLYCOPROT1 IGA SER QL: <9 SAU
B2 GLYCOPROT1 IGG SER QL: <9 SGU
B2 GLYCOPROT1 IGM SER QL: <9 SMU

## 2022-08-23 ENCOUNTER — PATIENT MESSAGE (OUTPATIENT)
Dept: OBSTETRICS AND GYNECOLOGY | Facility: CLINIC | Age: 32
End: 2022-08-23
Payer: MEDICAID

## 2022-08-23 LAB
APTT IMM NP PPP: ABNORMAL SEC (ref 32–48)
APTT P HEP NEUT PPP: 40 SEC (ref 32–48)
CONFIRM APTT STACLOT: ABNORMAL
COPPER SERPL-MCNC: 2801 UG/L (ref 810–1990)
DRVVT SCREEN TO CONFIRM RATIO: ABNORMAL RATIO
LA 3 SCREEN W REFLEX-IMP: ABNORMAL
LA NT DPL PPP QL: ABNORMAL
MIXING DRVVT: ABNORMAL SEC (ref 33–44)
PROTHROMBIN TIME: 13.4 SEC (ref 12–15.5)
REPTILASE TIME: 15.3 SEC
SCREEN APTT: 54 SEC (ref 32–48)
SCREEN DRVVT: 34 SEC (ref 33–44)
THROMBIN TIME: 28.8 SEC (ref 14.7–19.5)

## 2022-08-31 ENCOUNTER — PATIENT MESSAGE (OUTPATIENT)
Dept: ORTHOPEDICS | Facility: CLINIC | Age: 32
End: 2022-08-31
Payer: MEDICAID

## 2022-09-15 ENCOUNTER — OFFICE VISIT (OUTPATIENT)
Dept: OBSTETRICS AND GYNECOLOGY | Facility: CLINIC | Age: 32
End: 2022-09-15
Attending: OBSTETRICS & GYNECOLOGY
Payer: MEDICAID

## 2022-09-15 VITALS — DIASTOLIC BLOOD PRESSURE: 70 MMHG | SYSTOLIC BLOOD PRESSURE: 110 MMHG | WEIGHT: 183 LBS | BODY MASS INDEX: 32.41 KG/M2

## 2022-09-15 DIAGNOSIS — Z00.00 ROUTINE GENERAL MEDICAL EXAMINATION AT A HEALTH CARE FACILITY: ICD-10-CM

## 2022-09-15 DIAGNOSIS — Z11.3 SCREENING EXAMINATION FOR VENEREAL DISEASE: ICD-10-CM

## 2022-09-15 DIAGNOSIS — Z01.419 ENCOUNTER FOR GYNECOLOGICAL EXAMINATION (GENERAL) (ROUTINE) WITHOUT ABNORMAL FINDINGS: Primary | ICD-10-CM

## 2022-09-15 PROCEDURE — 99999 PR PBB SHADOW E&M-EST. PATIENT-LVL III: ICD-10-PCS | Mod: PBBFAC,,, | Performed by: OBSTETRICS & GYNECOLOGY

## 2022-09-15 PROCEDURE — 87591 N.GONORRHOEAE DNA AMP PROB: CPT | Performed by: OBSTETRICS & GYNECOLOGY

## 2022-09-15 PROCEDURE — 3008F BODY MASS INDEX DOCD: CPT | Mod: CPTII,,, | Performed by: OBSTETRICS & GYNECOLOGY

## 2022-09-15 PROCEDURE — 3052F HG A1C>EQUAL 8.0%<EQUAL 9.0%: CPT | Mod: CPTII,,, | Performed by: OBSTETRICS & GYNECOLOGY

## 2022-09-15 PROCEDURE — 99999 PR PBB SHADOW E&M-EST. PATIENT-LVL III: CPT | Mod: PBBFAC,,, | Performed by: OBSTETRICS & GYNECOLOGY

## 2022-09-15 PROCEDURE — 87491 CHLMYD TRACH DNA AMP PROBE: CPT | Performed by: OBSTETRICS & GYNECOLOGY

## 2022-09-15 PROCEDURE — 1159F PR MEDICATION LIST DOCUMENTED IN MEDICAL RECORD: ICD-10-PCS | Mod: CPTII,,, | Performed by: OBSTETRICS & GYNECOLOGY

## 2022-09-15 PROCEDURE — 1159F MED LIST DOCD IN RCRD: CPT | Mod: CPTII,,, | Performed by: OBSTETRICS & GYNECOLOGY

## 2022-09-15 PROCEDURE — 88175 CYTOPATH C/V AUTO FLUID REDO: CPT | Performed by: OBSTETRICS & GYNECOLOGY

## 2022-09-15 PROCEDURE — 3052F PR MOST RECENT HEMOGLOBIN A1C LEVEL 8.0 - < 9.0%: ICD-10-PCS | Mod: CPTII,,, | Performed by: OBSTETRICS & GYNECOLOGY

## 2022-09-15 PROCEDURE — 99395 PR PREVENTIVE VISIT,EST,18-39: ICD-10-PCS | Mod: S$PBB,,, | Performed by: OBSTETRICS & GYNECOLOGY

## 2022-09-15 PROCEDURE — 99395 PREV VISIT EST AGE 18-39: CPT | Mod: S$PBB,,, | Performed by: OBSTETRICS & GYNECOLOGY

## 2022-09-15 PROCEDURE — 3074F PR MOST RECENT SYSTOLIC BLOOD PRESSURE < 130 MM HG: ICD-10-PCS | Mod: CPTII,,, | Performed by: OBSTETRICS & GYNECOLOGY

## 2022-09-15 PROCEDURE — 99213 OFFICE O/P EST LOW 20 MIN: CPT | Mod: PBBFAC | Performed by: OBSTETRICS & GYNECOLOGY

## 2022-09-15 PROCEDURE — 3008F PR BODY MASS INDEX (BMI) DOCUMENTED: ICD-10-PCS | Mod: CPTII,,, | Performed by: OBSTETRICS & GYNECOLOGY

## 2022-09-15 PROCEDURE — 3074F SYST BP LT 130 MM HG: CPT | Mod: CPTII,,, | Performed by: OBSTETRICS & GYNECOLOGY

## 2022-09-15 PROCEDURE — 3078F PR MOST RECENT DIASTOLIC BLOOD PRESSURE < 80 MM HG: ICD-10-PCS | Mod: CPTII,,, | Performed by: OBSTETRICS & GYNECOLOGY

## 2022-09-15 PROCEDURE — 3078F DIAST BP <80 MM HG: CPT | Mod: CPTII,,, | Performed by: OBSTETRICS & GYNECOLOGY

## 2022-09-15 NOTE — PROGRESS NOTES
Subjective:       Patient ID: Anusha Andrew is a 31 y.o. female.    Chief Complaint:  Annual Exam        History of Present Illness  Anusha Andrew is a 31 y.o. female  who presents for annual. Currently anticoagulated for blood clot in the small intestine following gastric bypass surgery while on OCP. Still ultimately wants her tubes tied. Discussed in detail. I recommend talk with oncologist and make sure that is ok. For now will continue with POP. Info Mirena, IUD as well.     No LMP recorded. (Menstrual status: Birth Control).   Date of Last Pap: 2020    Review of Systems  Review of Systems   Constitutional:  Negative for chills and fever.      Objective:   Physical Exam:   Constitutional: She is oriented to person, place, and time. Vital signs are normal. She appears well-developed and well-nourished. No distress.        Pulmonary/Chest: She exhibits no mass. Right breast exhibits no mass, no nipple discharge, no skin change, no tenderness, no bleeding and no swelling. Left breast exhibits no mass, no nipple discharge, no skin change, no tenderness, no bleeding and no swelling. Breasts are symmetrical.        Abdominal: Soft. Bowel sounds are normal. She exhibits no distension and no mass. There is no abdominal tenderness. There is no rebound.     Genitourinary:    Vagina and uterus normal.   There is no rash, tenderness, lesion or injury on the right labia. There is no rash, tenderness, lesion or injury on the left labia. Cervix is normal. Right adnexum displays no mass, no tenderness and no fullness. Left adnexum displays no mass, no tenderness and no fullness. No erythema,  no vaginal discharge, tenderness, rectocele, cystocele or unspecified prolapse of vaginal walls in the vagina. Cervix exhibits no motion tenderness, no discharge and no friability. Uterus is not deviated, not enlarged, not fixed, not tender and not hosting fibroids.           Musculoskeletal: Normal range of motion and  moves all extremeties.      Lymphadenopathy:        Right: No supraclavicular adenopathy present.        Left: No supraclavicular adenopathy present.    Neurological: She is alert and oriented to person, place, and time.    Skin: Skin is warm and dry.    Psychiatric: She has a normal mood and affect. Her behavior is normal. Judgment normal.      Assessment/ Plan:     1. Encounter for gynecological examination (general) (routine) without abnormal findings            Follow up in about 1 year (around 9/15/2023) for Annual exam.    As of April 1, 2021, the Cures Act has been passed nationally. This new law requires that all doctors progress notes, lab results, pathology reports and radiology reports be released IMMEDIATELY to the patient in the patient portal. That means that the results are released to you at the EXACT same time they are released to me. Therefore, with all of the patients that I have I am not able to reply to each patient exactly when the results come in. So there will be a delay from when you see the results to when I see them and have time to come up with a response to send you. Also I only see these results when I am on the computer at work. So if the results come in over the weekend or after 5 pm of a work day, I will not see them until the next business day. As you can tell, this is a challenge as a physician to give every patient the quick response they hope for and deserve. So please be patient! Thanks for understanding, Dr. Kidd

## 2022-09-19 ENCOUNTER — PATIENT MESSAGE (OUTPATIENT)
Dept: INTERNAL MEDICINE | Facility: CLINIC | Age: 32
End: 2022-09-19
Payer: MEDICAID

## 2022-09-20 LAB
C TRACH DNA SPEC QL NAA+PROBE: NOT DETECTED
N GONORRHOEA DNA SPEC QL NAA+PROBE: NOT DETECTED

## 2022-09-21 ENCOUNTER — OFFICE VISIT (OUTPATIENT)
Dept: ORTHOPEDICS | Facility: CLINIC | Age: 32
End: 2022-09-21
Payer: MEDICAID

## 2022-09-21 VITALS — HEIGHT: 63 IN | WEIGHT: 182 LBS | BODY MASS INDEX: 32.25 KG/M2

## 2022-09-21 DIAGNOSIS — G56.02 CARPAL TUNNEL SYNDROME OF LEFT WRIST: ICD-10-CM

## 2022-09-21 DIAGNOSIS — G56.03 BILATERAL CARPAL TUNNEL SYNDROME: ICD-10-CM

## 2022-09-21 DIAGNOSIS — M65.331 TRIGGER FINGER, RIGHT MIDDLE FINGER: Primary | ICD-10-CM

## 2022-09-21 PROCEDURE — 99214 OFFICE O/P EST MOD 30 MIN: CPT | Mod: PBBFAC,PN | Performed by: ORTHOPAEDIC SURGERY

## 2022-09-21 PROCEDURE — 99214 OFFICE O/P EST MOD 30 MIN: CPT | Mod: S$PBB,,, | Performed by: ORTHOPAEDIC SURGERY

## 2022-09-21 PROCEDURE — 3008F PR BODY MASS INDEX (BMI) DOCUMENTED: ICD-10-PCS | Mod: CPTII,,, | Performed by: ORTHOPAEDIC SURGERY

## 2022-09-21 PROCEDURE — 99999 PR PBB SHADOW E&M-EST. PATIENT-LVL IV: CPT | Mod: PBBFAC,,, | Performed by: ORTHOPAEDIC SURGERY

## 2022-09-21 PROCEDURE — 3008F BODY MASS INDEX DOCD: CPT | Mod: CPTII,,, | Performed by: ORTHOPAEDIC SURGERY

## 2022-09-21 PROCEDURE — 3052F PR MOST RECENT HEMOGLOBIN A1C LEVEL 8.0 - < 9.0%: ICD-10-PCS | Mod: CPTII,,, | Performed by: ORTHOPAEDIC SURGERY

## 2022-09-21 PROCEDURE — 1159F PR MEDICATION LIST DOCUMENTED IN MEDICAL RECORD: ICD-10-PCS | Mod: CPTII,,, | Performed by: ORTHOPAEDIC SURGERY

## 2022-09-21 PROCEDURE — 99999 PR PBB SHADOW E&M-EST. PATIENT-LVL IV: ICD-10-PCS | Mod: PBBFAC,,, | Performed by: ORTHOPAEDIC SURGERY

## 2022-09-21 PROCEDURE — 3052F HG A1C>EQUAL 8.0%<EQUAL 9.0%: CPT | Mod: CPTII,,, | Performed by: ORTHOPAEDIC SURGERY

## 2022-09-21 PROCEDURE — 1159F MED LIST DOCD IN RCRD: CPT | Mod: CPTII,,, | Performed by: ORTHOPAEDIC SURGERY

## 2022-09-21 PROCEDURE — 99214 PR OFFICE/OUTPT VISIT, EST, LEVL IV, 30-39 MIN: ICD-10-PCS | Mod: S$PBB,,, | Performed by: ORTHOPAEDIC SURGERY

## 2022-09-21 RX ORDER — CEFAZOLIN SODIUM 2 G/50ML
2 SOLUTION INTRAVENOUS
Status: CANCELLED | OUTPATIENT
Start: 2022-09-21

## 2022-09-21 RX ORDER — NORGESTREL AND ETHINYL ESTRADIOL 0.3-0.03MG
KIT ORAL
Status: ON HOLD | COMMUNITY
Start: 2022-09-15 | End: 2022-10-02 | Stop reason: CLARIF

## 2022-09-21 NOTE — PROGRESS NOTES
CC:  Left carpal tunnel syndrome with triggering of the left index and middle fingers        HPI:  Anusha Andrew is a very pleasant 31 y.o. female with ongoing symptoms left hand for the past several months   Recent nerve conduction study shows evidence of bilateral carpal tunnel syndrome  I went over the results and gave the patient a copy the report   Clinically she continues to have pain and numbness both hands  Additionally she has painful locking of the left index and middle finger   We have tried injections in the past she would like to consider surgery for the left hand        PAST MEDICAL HISTORY:   Past Medical History:   Diagnosis Date    Depression     Hyperlipidemia     Hypertension     Mild nonproliferative diabetic retinopathy of both eyes without macular edema associated with type 1 diabetes mellitus 3/30/2021    Type I (juvenile type) diabetes mellitus without mention of complication, uncontrolled 2011     PAST SURGICAL HISTORY:   Past Surgical History:   Procedure Laterality Date    ABCESS DRAINAGE      boil went over GA     SECTION  2012    DILATION AND CURETTAGE OF UTERUS  2019    Procedure: DILATION AND CURETTAGE, UTERUS;  Surgeon: Purnima Kidd MD;  Location: [facility] OR;  Service: OB/GYN;;    HYSTEROSCOPY N/A 2019    Procedure: HYSTEROSCOPY;  Surgeon: Purnima Kidd MD;  Location: [facility];  Service: OB/GYN;  Laterality: N/A;    TONSILLECTOMY, ADENOIDECTOMY      WISDOM TOOTH EXTRACTION       FAMILY HISTORY:   Family History   Problem Relation Age of Onset    Diabetes Brother     No Known Problems Mother     No Known Problems Father     Hydrocephalus Daughter     No Known Problems Brother     No Known Problems Brother     No Known Problems Maternal Aunt     No Known Problems Maternal Uncle     No Known Problems Paternal Aunt     No Known Problems Paternal Uncle     No Known Problems Maternal Grandmother     No Known Problems Maternal Grandfather     No Known Problems  Paternal Grandmother     No Known Problems Paternal Grandfather     Amblyopia Neg Hx     Blindness Neg Hx     Cataracts Neg Hx     Glaucoma Neg Hx     Hypertension Neg Hx     Macular degeneration Neg Hx     Retinal detachment Neg Hx     Strabismus Neg Hx     Stroke Neg Hx     Thyroid disease Neg Hx     Breast cancer Neg Hx     Colon cancer Neg Hx     Ovarian cancer Neg Hx     Cancer Neg Hx      SOCIAL HISTORY:   Social History     Socioeconomic History    Marital status: Single   Tobacco Use    Smoking status: Never    Smokeless tobacco: Never   Substance and Sexual Activity    Alcohol use: Yes     Comment: occasionally    Drug use: No    Sexual activity: Yes     Partners: Male     Birth control/protection: Injection     Social Determinants of Health     Financial Resource Strain: Low Risk     Difficulty of Paying Living Expenses: Not hard at all   Food Insecurity: No Food Insecurity    Worried About Running Out of Food in the Last Year: Never true    Ran Out of Food in the Last Year: Never true   Transportation Needs: No Transportation Needs    Lack of Transportation (Medical): No    Lack of Transportation (Non-Medical): No   Physical Activity: Sufficiently Active    Days of Exercise per Week: 4 days    Minutes of Exercise per Session: 60 min   Stress: No Stress Concern Present    Feeling of Stress : Only a little   Social Connections: Unknown    Frequency of Communication with Friends and Family: More than three times a week    Frequency of Social Gatherings with Friends and Family: Once a week    Active Member of Clubs or Organizations: No    Attends Club or Organization Meetings: Never    Marital Status: Never    Housing Stability: Low Risk     Unable to Pay for Housing in the Last Year: No    Number of Places Lived in the Last Year: 1    Unstable Housing in the Last Year: No       MEDICATIONS:   Current Outpatient Medications:     apixaban (ELIQUIS) 5 mg Tab, Take 1 tablet (5 mg total) by mouth 2 (two)  "times daily. Take SECOND., Disp: 180 tablet, Rfl: 0    blood sugar diagnostic Strp, To check BG 4  times daily, to use with insurance preferred meter, e 10.65 (Patient taking differently: To check BG 4  times daily, to use with insurance preferred meter, e 10.65), Disp: 400 each, Rfl: 3    ELINEST 0.3-30 mg-mcg per tablet, , Disp: , Rfl:     ergocalciferol (VITAMIN D2) 50,000 unit Cap, Take 1 capsule (50,000 Units total) by mouth every 7 days., Disp: 5 capsule, Rfl: 3    hydrocortisone (ANUSOL-HC) 2.5 % rectal cream, Place rectally 2 (two) times daily., Disp: 28 g, Rfl: 2    insulin (LANTUS SOLOSTAR U-100 INSULIN) glargine 100 units/mL SubQ pen, Inject 16-22 units at night. (medication will last 136 days), Disp: 30 mL, Rfl: 3    insulin aspart U-100 (NOVOLOG FLEXPEN U-100 INSULIN) 100 unit/mL (3 mL) InPn pen, Inject insulin/carb ratio 1:12, target 150, correction factor 25. 150-200+2, 201-250+4, 251-300+6, 301-350+8, >350+10.Max daily 60 units., Disp: 60 mL, Rfl: 3    insulin syringe-needle U-100 0.5 mL 31 gauge x 5/16 Syrg, use with admelog, Disp: 100 each, Rfl: 0    lancets Misc, To check BG 4 times daily, to use with insurance preferred meter, e10.65, Disp: 400 each, Rfl: 3    norethindrone (ORTHO MICRONOR) 0.35 mg tablet, Take 1 tablet (0.35 mg total) by mouth once daily., Disp: 28 tablet, Rfl: 11    ondansetron (ZOFRAN-ODT) 4 MG TbDL, Take 1 tablet by mouth every 8 (eight) hours as needed for Nausea., Disp: , Rfl:     ONETOUCH DELICA PLUS LANCET 33 gauge Misc, USE 1 UNIT TO CHECK GLUCOSE 4 TIMES DAILY, Disp: , Rfl:     ONETOUCH VERIO FLEX METER Misc, USE AS DIRECTED TO CHECK BLOOD GLUCOSE 4 TIMES DAILY, Disp: , Rfl:     pantoprazole (PROTONIX) 40 MG tablet, Take 1 tablet by mouth once daily., Disp: , Rfl:     pen needle, diabetic (BD EBEN 2ND GEN PEN NEEDLE) 32 gauge x 5/32" Ndle, Use 5 times daily as directed (Patient taking differently: Use 5 times daily as directed), Disp: 400 each, Rfl: 3    polyethylene " "glycol (GLYCOLAX) 17 gram/dose powder, Take 17 g by mouth once daily., Disp: 30 each, Rfl: 3  ALLERGIES:   Review of patient's allergies indicates:   Allergen Reactions    No known allergies        Review of Systems:  Constitutional: no fever or chills  ENT: no nasal congestion or sore throat  Respiratory: no cough or shortness of breath  Cardiovascular: no chest pain or palpitations  Gastrointestinal: no nausea or vomiting, PUD, GERD, NSAID intolerance  Genitourinary: no hematuria or dysuria  Integument/Breast: no rash or pruritis  Hematologic/Lymphatic: no easy bruising or lymphadenopathy  Musculoskeletal: see HPI  Neurological: no seizures or tremors  Behavioral/Psych: no auditory or visual hallucinations      Physical Exam   Vitals:    09/21/22 1554   Weight: 82.6 kg (182 lb)   Height: 5' 3" (1.6 m)   PainSc:   7   PainLoc: Hand       Constitutional: Oriented to person, place, and time. Appears well-developed and well-nourished.   HENT:   Head: Normocephalic and atraumatic.   Nose: Nose normal.   Eyes: No scleral icterus.   Neck: Normal range of motion.   Cardiovascular: Normal rate and regular rhythm.    Pulses:       Radial pulses are 2+ on the right side, and 2+ on the left side.   Pulmonary/Chest: Effort normal and breath sounds normal.   Abdominal: Soft.   Neurological: Alert and oriented to person, place, and time.   Skin: Skin is warm.   Psychiatric: Normal mood and affect.     MUSCULOSKELETAL UPPER EXTREMITY:  Examination left hand there is slight swelling   Positive Tinel sign  Positive Phalen's  test   Range of motion slightly decreased with triggering of the left index and middle fingers   Sensation intact   No atrophy               Diagnostic Studies:  Nerve conduction study demonstrates bilateral carpal tunnel syndrome        Assessment:  1. Left carpal tunnel syndrome.      2. Triggering of the left index and left middle fingers    Plan:  Patient would like to set up surgery combined procedure for " "left carpal tunnel release with trigger release left index and left middle fingers as an outpatient surgery      The risks and benefits of surgery were discussed with the patient today and they understand.  The consent was signed in the office for surgery.      Sekou Rojo MD (Jay)  [facility]  Orthopedic Upper Extremity Surgery        "

## 2022-09-22 ENCOUNTER — LAB VISIT (OUTPATIENT)
Dept: LAB | Facility: HOSPITAL | Age: 32
End: 2022-09-22
Payer: MEDICAID

## 2022-09-22 ENCOUNTER — PATIENT MESSAGE (OUTPATIENT)
Dept: OBSTETRICS AND GYNECOLOGY | Facility: CLINIC | Age: 32
End: 2022-09-22
Payer: MEDICAID

## 2022-09-22 ENCOUNTER — OFFICE VISIT (OUTPATIENT)
Dept: HEMATOLOGY/ONCOLOGY | Facility: CLINIC | Age: 32
End: 2022-09-22
Payer: MEDICAID

## 2022-09-22 VITALS
TEMPERATURE: 98 F | SYSTOLIC BLOOD PRESSURE: 122 MMHG | RESPIRATION RATE: 16 BRPM | HEIGHT: 63 IN | BODY MASS INDEX: 32.23 KG/M2 | OXYGEN SATURATION: 99 % | WEIGHT: 181.88 LBS | HEART RATE: 90 BPM | DIASTOLIC BLOOD PRESSURE: 66 MMHG

## 2022-09-22 DIAGNOSIS — Z90.3 HISTORY OF SLEEVE GASTRECTOMY: ICD-10-CM

## 2022-09-22 DIAGNOSIS — K55.069 SUPERIOR MESENTERIC VEIN THROMBOSIS: Primary | ICD-10-CM

## 2022-09-22 DIAGNOSIS — K55.069 SUPERIOR MESENTERIC VEIN THROMBOSIS: ICD-10-CM

## 2022-09-22 LAB
ALBUMIN SERPL BCP-MCNC: 3.5 G/DL (ref 3.5–5.2)
ALP SERPL-CCNC: 73 U/L (ref 55–135)
ALT SERPL W/O P-5'-P-CCNC: 42 U/L (ref 10–44)
ANION GAP SERPL CALC-SCNC: 8 MMOL/L (ref 8–16)
AST SERPL-CCNC: 30 U/L (ref 10–40)
BASOPHILS # BLD AUTO: 0.02 K/UL (ref 0–0.2)
BASOPHILS NFR BLD: 0.3 % (ref 0–1.9)
BILIRUB SERPL-MCNC: 0.5 MG/DL (ref 0.1–1)
BUN SERPL-MCNC: 12 MG/DL (ref 6–20)
CALCIUM SERPL-MCNC: 9.3 MG/DL (ref 8.7–10.5)
CHLORIDE SERPL-SCNC: 108 MMOL/L (ref 95–110)
CLINICAL INFO: NORMAL
CO2 SERPL-SCNC: 23 MMOL/L (ref 23–29)
CREAT SERPL-MCNC: 0.8 MG/DL (ref 0.5–1.4)
CYTO CVX: NORMAL
CYTOLOGIST CVX/VAG CYTO: NORMAL
CYTOLOGIST CVX/VAG CYTO: NORMAL
CYTOLOGY CMNT CVX/VAG CYTO-IMP: NORMAL
CYTOLOGY PAP THIN PREP EXPLANATION: NORMAL
DATE OF PREVIOUS PAP: NORMAL
DATE PREVIOUS BX: NO
DIFFERENTIAL METHOD: ABNORMAL
EOSINOPHIL # BLD AUTO: 0.1 K/UL (ref 0–0.5)
EOSINOPHIL NFR BLD: 1.8 % (ref 0–8)
ERYTHROCYTE [DISTWIDTH] IN BLOOD BY AUTOMATED COUNT: 14.1 % (ref 11.5–14.5)
EST. GFR  (NO RACE VARIABLE): >60 ML/MIN/1.73 M^2
GEN CATEG CVX/VAG CYTO-IMP: NORMAL
GLUCOSE SERPL-MCNC: 211 MG/DL (ref 70–110)
HCT VFR BLD AUTO: 42 % (ref 37–48.5)
HGB BLD-MCNC: 12.9 G/DL (ref 12–16)
IMM GRANULOCYTES # BLD AUTO: 0.02 K/UL (ref 0–0.04)
IMM GRANULOCYTES NFR BLD AUTO: 0.3 % (ref 0–0.5)
LMP START DATE: NORMAL
LYMPHOCYTES # BLD AUTO: 1.8 K/UL (ref 1–4.8)
LYMPHOCYTES NFR BLD: 27.8 % (ref 18–48)
MCH RBC QN AUTO: 27 PG (ref 27–31)
MCHC RBC AUTO-ENTMCNC: 30.7 G/DL (ref 32–36)
MCV RBC AUTO: 88 FL (ref 82–98)
MICROORGANISM CVX/VAG CYTO: NORMAL
MONOCYTES # BLD AUTO: 0.3 K/UL (ref 0.3–1)
MONOCYTES NFR BLD: 5.1 % (ref 4–15)
NEUTROPHILS # BLD AUTO: 4.2 K/UL (ref 1.8–7.7)
NEUTROPHILS NFR BLD: 64.7 % (ref 38–73)
NRBC BLD-RTO: 0 /100 WBC
PATHOLOGIST CVX/VAG CYTO: NORMAL
PLATELET # BLD AUTO: 396 K/UL (ref 150–450)
PMV BLD AUTO: 9.9 FL (ref 9.2–12.9)
POTASSIUM SERPL-SCNC: 4 MMOL/L (ref 3.5–5.1)
PROT SERPL-MCNC: 7.3 G/DL (ref 6–8.4)
RBC # BLD AUTO: 4.77 M/UL (ref 4–5.4)
SERVICE CMNT-IMP: NORMAL
SODIUM SERPL-SCNC: 139 MMOL/L (ref 136–145)
SPECIMEN SOURCE CVX/VAG CYTO: NORMAL
STAT OF ADQ CVX/VAG CYTO-IMP: NORMAL
WBC # BLD AUTO: 6.52 K/UL (ref 3.9–12.7)

## 2022-09-22 PROCEDURE — 3078F PR MOST RECENT DIASTOLIC BLOOD PRESSURE < 80 MM HG: ICD-10-PCS | Mod: CPTII,,, | Performed by: STUDENT IN AN ORGANIZED HEALTH CARE EDUCATION/TRAINING PROGRAM

## 2022-09-22 PROCEDURE — 36415 COLL VENOUS BLD VENIPUNCTURE: CPT | Performed by: STUDENT IN AN ORGANIZED HEALTH CARE EDUCATION/TRAINING PROGRAM

## 2022-09-22 PROCEDURE — 3052F PR MOST RECENT HEMOGLOBIN A1C LEVEL 8.0 - < 9.0%: ICD-10-PCS | Mod: CPTII,,, | Performed by: STUDENT IN AN ORGANIZED HEALTH CARE EDUCATION/TRAINING PROGRAM

## 2022-09-22 PROCEDURE — 1160F RVW MEDS BY RX/DR IN RCRD: CPT | Mod: CPTII,,, | Performed by: STUDENT IN AN ORGANIZED HEALTH CARE EDUCATION/TRAINING PROGRAM

## 2022-09-22 PROCEDURE — 1159F MED LIST DOCD IN RCRD: CPT | Mod: CPTII,,, | Performed by: STUDENT IN AN ORGANIZED HEALTH CARE EDUCATION/TRAINING PROGRAM

## 2022-09-22 PROCEDURE — 99213 OFFICE O/P EST LOW 20 MIN: CPT | Mod: S$PBB,,, | Performed by: STUDENT IN AN ORGANIZED HEALTH CARE EDUCATION/TRAINING PROGRAM

## 2022-09-22 PROCEDURE — 99213 PR OFFICE/OUTPT VISIT, EST, LEVL III, 20-29 MIN: ICD-10-PCS | Mod: S$PBB,,, | Performed by: STUDENT IN AN ORGANIZED HEALTH CARE EDUCATION/TRAINING PROGRAM

## 2022-09-22 PROCEDURE — 85025 COMPLETE CBC W/AUTO DIFF WBC: CPT | Performed by: STUDENT IN AN ORGANIZED HEALTH CARE EDUCATION/TRAINING PROGRAM

## 2022-09-22 PROCEDURE — 1160F PR REVIEW ALL MEDS BY PRESCRIBER/CLIN PHARMACIST DOCUMENTED: ICD-10-PCS | Mod: CPTII,,, | Performed by: STUDENT IN AN ORGANIZED HEALTH CARE EDUCATION/TRAINING PROGRAM

## 2022-09-22 PROCEDURE — 99999 PR PBB SHADOW E&M-EST. PATIENT-LVL V: ICD-10-PCS | Mod: PBBFAC,,, | Performed by: STUDENT IN AN ORGANIZED HEALTH CARE EDUCATION/TRAINING PROGRAM

## 2022-09-22 PROCEDURE — 3074F SYST BP LT 130 MM HG: CPT | Mod: CPTII,,, | Performed by: STUDENT IN AN ORGANIZED HEALTH CARE EDUCATION/TRAINING PROGRAM

## 2022-09-22 PROCEDURE — 3052F HG A1C>EQUAL 8.0%<EQUAL 9.0%: CPT | Mod: CPTII,,, | Performed by: STUDENT IN AN ORGANIZED HEALTH CARE EDUCATION/TRAINING PROGRAM

## 2022-09-22 PROCEDURE — 80053 COMPREHEN METABOLIC PANEL: CPT | Performed by: STUDENT IN AN ORGANIZED HEALTH CARE EDUCATION/TRAINING PROGRAM

## 2022-09-22 PROCEDURE — 3008F BODY MASS INDEX DOCD: CPT | Mod: CPTII,,, | Performed by: STUDENT IN AN ORGANIZED HEALTH CARE EDUCATION/TRAINING PROGRAM

## 2022-09-22 PROCEDURE — 99999 PR PBB SHADOW E&M-EST. PATIENT-LVL V: CPT | Mod: PBBFAC,,, | Performed by: STUDENT IN AN ORGANIZED HEALTH CARE EDUCATION/TRAINING PROGRAM

## 2022-09-22 PROCEDURE — 3074F PR MOST RECENT SYSTOLIC BLOOD PRESSURE < 130 MM HG: ICD-10-PCS | Mod: CPTII,,, | Performed by: STUDENT IN AN ORGANIZED HEALTH CARE EDUCATION/TRAINING PROGRAM

## 2022-09-22 PROCEDURE — 3078F DIAST BP <80 MM HG: CPT | Mod: CPTII,,, | Performed by: STUDENT IN AN ORGANIZED HEALTH CARE EDUCATION/TRAINING PROGRAM

## 2022-09-22 PROCEDURE — 1159F PR MEDICATION LIST DOCUMENTED IN MEDICAL RECORD: ICD-10-PCS | Mod: CPTII,,, | Performed by: STUDENT IN AN ORGANIZED HEALTH CARE EDUCATION/TRAINING PROGRAM

## 2022-09-22 PROCEDURE — 99215 OFFICE O/P EST HI 40 MIN: CPT | Mod: PBBFAC | Performed by: STUDENT IN AN ORGANIZED HEALTH CARE EDUCATION/TRAINING PROGRAM

## 2022-09-22 PROCEDURE — 3008F PR BODY MASS INDEX (BMI) DOCUMENTED: ICD-10-PCS | Mod: CPTII,,, | Performed by: STUDENT IN AN ORGANIZED HEALTH CARE EDUCATION/TRAINING PROGRAM

## 2022-09-23 ENCOUNTER — TELEPHONE (OUTPATIENT)
Dept: ORTHOPEDICS | Facility: CLINIC | Age: 32
End: 2022-09-23
Payer: MEDICAID

## 2022-09-23 NOTE — TELEPHONE ENCOUNTER
----- Message from Kimberlee Cha LPN sent at 9/22/2022  4:57 PM CDT -----    ----- Message -----  From: Sekou Rojo Jr., MD  Sent: 9/22/2022   4:39 PM CDT  To: Darrel DELAROSA Staff    They want her to delay surgery until mid November  Can you please call patient and explain that we need to push it back.  After mid November please have her pick day and then ask scheduling to change it  Thx!

## 2022-09-23 NOTE — TELEPHONE ENCOUNTER
Spoke with patient and patient rescheduled to 11/18. [facility] notified of changes. All questions answered and patient verbalized understanding.

## 2022-10-02 ENCOUNTER — PATIENT MESSAGE (OUTPATIENT)
Dept: OBSTETRICS AND GYNECOLOGY | Facility: CLINIC | Age: 32
End: 2022-10-02
Payer: MEDICAID

## 2022-10-02 ENCOUNTER — HOSPITAL ENCOUNTER (INPATIENT)
Facility: HOSPITAL | Age: 32
LOS: 2 days | Discharge: HOME | DRG: 637 | End: 2022-10-04
Attending: EMERGENCY MEDICINE | Admitting: EMERGENCY MEDICINE
Payer: MEDICAID

## 2022-10-02 DIAGNOSIS — E10.10 TYPE 1 DIABETES MELLITUS WITH KETOACIDOSIS WITHOUT COMA: Primary | ICD-10-CM

## 2022-10-02 DIAGNOSIS — R73.9 HYPERGLYCEMIA: ICD-10-CM

## 2022-10-02 DIAGNOSIS — R82.4 KETONURIA: ICD-10-CM

## 2022-10-02 DIAGNOSIS — F33.1 MODERATE EPISODE OF RECURRENT MAJOR DEPRESSIVE DISORDER: Primary | ICD-10-CM

## 2022-10-02 PROBLEM — E11.10 DKA (DIABETIC KETOACIDOSIS): Status: ACTIVE | Noted: 2018-01-07

## 2022-10-02 LAB
ALBUMIN SERPL BCP-MCNC: 3.5 G/DL (ref 3.5–5.2)
ALLENS TEST: ABNORMAL
ALP SERPL-CCNC: 86 U/L (ref 55–135)
ALT SERPL W/O P-5'-P-CCNC: 47 U/L (ref 10–44)
ANION GAP SERPL CALC-SCNC: 16 MMOL/L (ref 8–16)
AST SERPL-CCNC: 19 U/L (ref 10–40)
B-HCG UR QL: NEGATIVE
B-OH-BUTYR BLD STRIP-SCNC: 4.6 MMOL/L (ref 0–0.5)
BACTERIA #/AREA URNS AUTO: NORMAL /HPF
BASOPHILS # BLD AUTO: 0.02 K/UL (ref 0–0.2)
BASOPHILS NFR BLD: 0.2 % (ref 0–1.9)
BILIRUB SERPL-MCNC: 0.9 MG/DL (ref 0.1–1)
BILIRUB UR QL STRIP: NEGATIVE
BUN SERPL-MCNC: 12 MG/DL (ref 6–20)
CALCIUM SERPL-MCNC: 9.8 MG/DL (ref 8.7–10.5)
CHLORIDE SERPL-SCNC: 105 MMOL/L (ref 95–110)
CLARITY UR REFRACT.AUTO: CLEAR
CO2 SERPL-SCNC: 13 MMOL/L (ref 23–29)
COLOR UR AUTO: YELLOW
CREAT SERPL-MCNC: 0.9 MG/DL (ref 0.5–1.4)
CTP QC/QA: YES
DELSYS: ABNORMAL
DIFFERENTIAL METHOD: ABNORMAL
EOSINOPHIL # BLD AUTO: 0 K/UL (ref 0–0.5)
EOSINOPHIL NFR BLD: 0.2 % (ref 0–8)
ERYTHROCYTE [DISTWIDTH] IN BLOOD BY AUTOMATED COUNT: 14 % (ref 11.5–14.5)
EST. GFR  (NO RACE VARIABLE): >60 ML/MIN/1.73 M^2
GLUCOSE SERPL-MCNC: 353 MG/DL (ref 70–110)
GLUCOSE UR QL STRIP: ABNORMAL
HCO3 UR-SCNC: 14.9 MMOL/L (ref 24–28)
HCT VFR BLD AUTO: 41.8 % (ref 37–48.5)
HGB BLD-MCNC: 13.2 G/DL (ref 12–16)
HGB UR QL STRIP: NEGATIVE
IMM GRANULOCYTES # BLD AUTO: 0.02 K/UL (ref 0–0.04)
IMM GRANULOCYTES NFR BLD AUTO: 0.2 % (ref 0–0.5)
INFLUENZA A, MOLECULAR: NEGATIVE
INFLUENZA B, MOLECULAR: NEGATIVE
KETONES UR QL STRIP: ABNORMAL
LEUKOCYTE ESTERASE UR QL STRIP: NEGATIVE
LIPASE SERPL-CCNC: 18 U/L (ref 4–60)
LYMPHOCYTES # BLD AUTO: 1.6 K/UL (ref 1–4.8)
LYMPHOCYTES NFR BLD: 15.6 % (ref 18–48)
MCH RBC QN AUTO: 27.6 PG (ref 27–31)
MCHC RBC AUTO-ENTMCNC: 31.6 G/DL (ref 32–36)
MCV RBC AUTO: 87 FL (ref 82–98)
MICROSCOPIC COMMENT: NORMAL
MODE: ABNORMAL
MONOCYTES # BLD AUTO: 0.5 K/UL (ref 0.3–1)
MONOCYTES NFR BLD: 5.1 % (ref 4–15)
NEUTROPHILS # BLD AUTO: 8.3 K/UL (ref 1.8–7.7)
NEUTROPHILS NFR BLD: 78.7 % (ref 38–73)
NITRITE UR QL STRIP: NEGATIVE
NRBC BLD-RTO: 0 /100 WBC
PCO2 BLDA: 32.1 MMHG (ref 35–45)
PH SMN: 7.27 [PH] (ref 7.35–7.45)
PH UR STRIP: 6 [PH] (ref 5–8)
PLATELET # BLD AUTO: 377 K/UL (ref 150–450)
PMV BLD AUTO: 9.6 FL (ref 9.2–12.9)
PO2 BLDA: 66 MMHG (ref 80–100)
POC BE: -12 MMOL/L
POC SATURATED O2: 90 % (ref 95–100)
POC TCO2: 16 MMOL/L (ref 23–27)
POCT GLUCOSE: 184 MG/DL (ref 70–110)
POCT GLUCOSE: 187 MG/DL (ref 70–110)
POCT GLUCOSE: 205 MG/DL (ref 70–110)
POCT GLUCOSE: 266 MG/DL (ref 70–110)
POCT GLUCOSE: 300 MG/DL (ref 70–110)
POCT GLUCOSE: 335 MG/DL (ref 70–110)
POCT GLUCOSE: 373 MG/DL (ref 70–110)
POTASSIUM SERPL-SCNC: 4.4 MMOL/L (ref 3.5–5.1)
PROT SERPL-MCNC: 7.4 G/DL (ref 6–8.4)
PROT UR QL STRIP: NEGATIVE
RBC # BLD AUTO: 4.79 M/UL (ref 4–5.4)
RBC #/AREA URNS AUTO: 2 /HPF (ref 0–4)
SAMPLE: ABNORMAL
SARS-COV-2 RDRP RESP QL NAA+PROBE: NEGATIVE
SITE: ABNORMAL
SODIUM SERPL-SCNC: 134 MMOL/L (ref 136–145)
SP GR UR STRIP: 1.03 (ref 1–1.03)
SPECIMEN SOURCE: NORMAL
SQUAMOUS #/AREA URNS AUTO: 0 /HPF
URN SPEC COLLECT METH UR: ABNORMAL
WBC # BLD AUTO: 10.48 K/UL (ref 3.9–12.7)
WBC #/AREA URNS AUTO: 1 /HPF (ref 0–5)
YEAST UR QL AUTO: NORMAL

## 2022-10-02 PROCEDURE — 36600 WITHDRAWAL OF ARTERIAL BLOOD: CPT

## 2022-10-02 PROCEDURE — 81001 URINALYSIS AUTO W/SCOPE: CPT | Performed by: EMERGENCY MEDICINE

## 2022-10-02 PROCEDURE — 87502 INFLUENZA DNA AMP PROBE: CPT

## 2022-10-02 PROCEDURE — 81025 URINE PREGNANCY TEST: CPT | Performed by: EMERGENCY MEDICINE

## 2022-10-02 PROCEDURE — 80053 COMPREHEN METABOLIC PANEL: CPT | Performed by: EMERGENCY MEDICINE

## 2022-10-02 PROCEDURE — 99900035 HC TECH TIME PER 15 MIN (STAT)

## 2022-10-02 PROCEDURE — 25000003 PHARM REV CODE 250: Performed by: HOSPITALIST

## 2022-10-02 PROCEDURE — 93010 EKG 12-LEAD: ICD-10-PCS | Mod: ,,, | Performed by: INTERNAL MEDICINE

## 2022-10-02 PROCEDURE — 93010 ELECTROCARDIOGRAM REPORT: CPT | Mod: ,,, | Performed by: INTERNAL MEDICINE

## 2022-10-02 PROCEDURE — 85025 COMPLETE CBC W/AUTO DIFF WBC: CPT | Performed by: EMERGENCY MEDICINE

## 2022-10-02 PROCEDURE — 82962 GLUCOSE BLOOD TEST: CPT

## 2022-10-02 PROCEDURE — 96374 THER/PROPH/DIAG INJ IV PUSH: CPT

## 2022-10-02 PROCEDURE — 99285 EMERGENCY DEPT VISIT HI MDM: CPT | Mod: CS,,, | Performed by: EMERGENCY MEDICINE

## 2022-10-02 PROCEDURE — 93005 ELECTROCARDIOGRAM TRACING: CPT

## 2022-10-02 PROCEDURE — 99285 EMERGENCY DEPT VISIT HI MDM: CPT | Mod: 25

## 2022-10-02 PROCEDURE — 80048 BASIC METABOLIC PNL TOTAL CA: CPT | Mod: XB | Performed by: HOSPITALIST

## 2022-10-02 PROCEDURE — 36415 COLL VENOUS BLD VENIPUNCTURE: CPT | Performed by: HOSPITALIST

## 2022-10-02 PROCEDURE — 87502 INFLUENZA DNA AMP PROBE: CPT | Performed by: EMERGENCY MEDICINE

## 2022-10-02 PROCEDURE — 20600001 HC STEP DOWN PRIVATE ROOM

## 2022-10-02 PROCEDURE — 82803 BLOOD GASES ANY COMBINATION: CPT

## 2022-10-02 PROCEDURE — 63600175 PHARM REV CODE 636 W HCPCS: Performed by: EMERGENCY MEDICINE

## 2022-10-02 PROCEDURE — 96361 HYDRATE IV INFUSION ADD-ON: CPT

## 2022-10-02 PROCEDURE — U0002 COVID-19 LAB TEST NON-CDC: HCPCS | Performed by: EMERGENCY MEDICINE

## 2022-10-02 PROCEDURE — 99285 PR EMERGENCY DEPT VISIT,LEVEL V: ICD-10-PCS | Mod: CS,,, | Performed by: EMERGENCY MEDICINE

## 2022-10-02 PROCEDURE — 83690 ASSAY OF LIPASE: CPT | Performed by: EMERGENCY MEDICINE

## 2022-10-02 PROCEDURE — 25000003 PHARM REV CODE 250: Performed by: EMERGENCY MEDICINE

## 2022-10-02 PROCEDURE — 82010 KETONE BODYS QUAN: CPT | Performed by: EMERGENCY MEDICINE

## 2022-10-02 RX ORDER — SODIUM CHLORIDE 0.9 % (FLUSH) 0.9 %
10 SYRINGE (ML) INJECTION
Status: DISCONTINUED | OUTPATIENT
Start: 2022-10-02 | End: 2022-10-04 | Stop reason: HOSPADM

## 2022-10-02 RX ORDER — ONDANSETRON 2 MG/ML
4 INJECTION INTRAMUSCULAR; INTRAVENOUS
Status: COMPLETED | OUTPATIENT
Start: 2022-10-02 | End: 2022-10-02

## 2022-10-02 RX ORDER — ACETAMINOPHEN 325 MG/1
650 TABLET ORAL EVERY 4 HOURS PRN
Status: DISCONTINUED | OUTPATIENT
Start: 2022-10-02 | End: 2022-10-04 | Stop reason: HOSPADM

## 2022-10-02 RX ORDER — TALC
6 POWDER (GRAM) TOPICAL NIGHTLY PRN
Status: DISCONTINUED | OUTPATIENT
Start: 2022-10-02 | End: 2022-10-04 | Stop reason: HOSPADM

## 2022-10-02 RX ORDER — ACETAMINOPHEN AND CODEINE PHOSPHATE 120; 12 MG/5ML; MG/5ML
1 SOLUTION ORAL DAILY
COMMUNITY
End: 2023-08-03 | Stop reason: CLARIF

## 2022-10-02 RX ORDER — PROCHLORPERAZINE EDISYLATE 5 MG/ML
5 INJECTION INTRAMUSCULAR; INTRAVENOUS EVERY 6 HOURS PRN
Status: DISCONTINUED | OUTPATIENT
Start: 2022-10-02 | End: 2022-10-04 | Stop reason: HOSPADM

## 2022-10-02 RX ORDER — SODIUM CHLORIDE AND POTASSIUM CHLORIDE 150; 900 MG/100ML; MG/100ML
INJECTION, SOLUTION INTRAVENOUS
Status: COMPLETED | OUTPATIENT
Start: 2022-10-02 | End: 2022-10-02

## 2022-10-02 RX ORDER — SODIUM CHLORIDE 9 MG/ML
125 INJECTION, SOLUTION INTRAVENOUS CONTINUOUS
Status: DISCONTINUED | OUTPATIENT
Start: 2022-10-02 | End: 2022-10-03

## 2022-10-02 RX ORDER — ACETAMINOPHEN AND CODEINE PHOSPHATE 120; 12 MG/5ML; MG/5ML
0.35 SOLUTION ORAL DAILY
Status: DISCONTINUED | OUTPATIENT
Start: 2022-10-03 | End: 2022-10-04 | Stop reason: HOSPADM

## 2022-10-02 RX ORDER — PANTOPRAZOLE SODIUM 40 MG/1
40 TABLET, DELAYED RELEASE ORAL DAILY
Status: DISCONTINUED | OUTPATIENT
Start: 2022-10-03 | End: 2022-10-04 | Stop reason: HOSPADM

## 2022-10-02 RX ORDER — ONDANSETRON 2 MG/ML
4 INJECTION INTRAMUSCULAR; INTRAVENOUS EVERY 6 HOURS PRN
Status: DISCONTINUED | OUTPATIENT
Start: 2022-10-02 | End: 2022-10-04 | Stop reason: HOSPADM

## 2022-10-02 RX ORDER — DEXTROSE MONOHYDRATE AND SODIUM CHLORIDE 5; .45 G/100ML; G/100ML
125 INJECTION, SOLUTION INTRAVENOUS CONTINUOUS PRN
Status: DISCONTINUED | OUTPATIENT
Start: 2022-10-02 | End: 2022-10-04 | Stop reason: HOSPADM

## 2022-10-02 RX ORDER — ACETAMINOPHEN 500 MG
1000 TABLET ORAL
Status: COMPLETED | OUTPATIENT
Start: 2022-10-02 | End: 2022-10-02

## 2022-10-02 RX ADMIN — APIXABAN 5 MG: 5 TABLET, FILM COATED ORAL at 08:10

## 2022-10-02 RX ADMIN — INSULIN HUMAN 4 UNITS/HR: 1 INJECTION, SOLUTION INTRAVENOUS at 08:10

## 2022-10-02 RX ADMIN — ACETAMINOPHEN 1000 MG: 500 TABLET ORAL at 08:10

## 2022-10-02 RX ADMIN — SODIUM CHLORIDE 1000 ML: 0.9 INJECTION, SOLUTION INTRAVENOUS at 06:10

## 2022-10-02 RX ADMIN — SODIUM CHLORIDE AND POTASSIUM CHLORIDE: .9; .15 SOLUTION INTRAVENOUS at 08:10

## 2022-10-02 RX ADMIN — ONDANSETRON 4 MG: 2 INJECTION INTRAMUSCULAR; INTRAVENOUS at 06:10

## 2022-10-02 NOTE — ED NOTES
Operative Note    Patient: Reinaldo Girard 37 year old female    MRN: [id number]    Surgeon(s): Bill PFEIFFER DO  Phone Number: [phone]                       Surgeon(s) and Role:     * Bill PFEIFFER DO - Primary    Assistant(s):     Pre-Op Diagnosis: MENORRHAGIA  ABNORMAL ENDOMETRIUM ON SONOGRAM     Post-Op Diagnosis: Endometrial polyps     Procedure: Procedure(s):  HYSTEROSCOPY WITH MYOSURE, Polypectomy, Endometrial Sampling    Anesthesia Type: General                                   Complications: None    Description: The patient was met in the preop area and informed consent was obtained questions were answered her  was present for the discussion.  She was then brought back to the operating room and placed in the standard operating table where a preliminary timeout was performed.  She was given a sedation by anesthesia then placed in the dorsal lithotomy position and the perineum was sterilely prepped and draped in the usual fashion.  Timeout was officially performed a careful bimanual examination was performed the lower half of the Graves bivalve speculum was used to visualize the cervix and the cervix was grasped anteriorly with a single-tooth tenaculum.  Paracervical and intracervical block were given.  The os was dilated to a size 6 Hegar dilator and hysteroscopy was performed.  The cavity was well visualized and there were multiple endometrial polyps and these were excised with the García sure reach device.  The patient tolerated procedure well there were no apparent complications sponge instrument needle counts have been called for correct and the blood loss was less than 5 mL.  The tenaculum and speculum had been removed prior to the count.    Findings: The vulva vagina cervix were grossly normal there was a result from her previous LEEP procedure hysteroscopically the cavity was filled with multiple endometrial polyps that had a whitish discoloration consistent with the endometrial  POCT glucose 335 mg/dL   lining as well there were multiple areas where there was pocketing and some bluish discoloration suggestive of adenomyosis uteri.    Specimens Removed:   ID Type Source Tests Collected by Time   A : Polyp/Endometrium  Tissue Endometrium SURGICAL PATHOLOGY Bill Barnett V, DO 4/1/2022 1217        Estimated Blood Loss: less than 5 mL    Assistant Tasks: Opening and closing, Dissecting tissue, Removing tissue and Altering tissue     Implants: * No implants in log *    I was present for the key portions of the procedure and was immediately available for the non-key portions

## 2022-10-02 NOTE — ED PROVIDER NOTES
Encounter Date: 10/2/2022       History     Chief Complaint   Patient presents with    Hyperglycemia     Typr 1 diabetic. No short acting insulin today. Thinks she had long acting last night.      32 yo female presenting with concern for high blood sugar, shortness of breath, nausea.    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  She her blood sugars have been in the 190 range.  Onset:  Gradual  Location:  Epigastric  Duration:  Since yesterday  Associated Symptoms:  Denies fever, productive cough, vomiting, positive polydipsia       The history is provided by the patient and medical records. No  was used.   Review of patient's allergies indicates:   Allergen Reactions    No known allergies      Past Medical History:   Diagnosis Date    Depression     Hyperlipidemia     Hypertension     Mild nonproliferative diabetic retinopathy of both eyes without macular edema associated with type 1 diabetes mellitus 3/30/2021    Type I (juvenile type) diabetes mellitus without mention of complication, uncontrolled 2011     Past Surgical History:   Procedure Laterality Date    ABCESS DRAINAGE      boil went over GA     SECTION  2012    DILATION AND CURETTAGE OF UTERUS  2019    Procedure: DILATION AND CURETTAGE, UTERUS;  Surgeon: Purnima Kidd MD;  Location: [facility] OR;  Service: OB/GYN;;    HYSTEROSCOPY N/A 2019    Procedure: HYSTEROSCOPY;  Surgeon: Purnima Kidd MD;  Location: [facility] OR;  Service: OB/GYN;  Laterality: N/A;    TONSILLECTOMY, ADENOIDECTOMY      WISDOM TOOTH EXTRACTION       Family History   Problem Relation Age of Onset    Diabetes Brother     No Known Problems Mother     No Known Problems Father     Hydrocephalus Daughter     No Known Problems Brother      No Known Problems Brother     No Known Problems Maternal Aunt     No Known Problems Maternal Uncle     No Known Problems Paternal Aunt     No Known Problems Paternal Uncle     No Known Problems Maternal Grandmother     No Known Problems Maternal Grandfather     No Known Problems Paternal Grandmother     No Known Problems Paternal Grandfather     Amblyopia Neg Hx     Blindness Neg Hx     Cataracts Neg Hx     Glaucoma Neg Hx     Hypertension Neg Hx     Macular degeneration Neg Hx     Retinal detachment Neg Hx     Strabismus Neg Hx     Stroke Neg Hx     Thyroid disease Neg Hx     Breast cancer Neg Hx     Colon cancer Neg Hx     Ovarian cancer Neg Hx     Cancer Neg Hx      Social History     Tobacco Use    Smoking status: Never    Smokeless tobacco: Never   Substance Use Topics    Alcohol use: Yes     Comment: occasionally    Drug use: No     Review of Systems   Constitutional:  Negative for chills and fever.   HENT:  Negative for rhinorrhea and sore throat.    Respiratory:  Positive for shortness of breath. Negative for cough.    Cardiovascular:  Negative for chest pain.   Gastrointestinal:  Positive for abdominal pain and nausea. Negative for vomiting.   Endocrine: Positive for polydipsia.   Musculoskeletal:  Negative for back pain.   Skin:  Negative for rash.   Allergic/Immunologic: Negative for food allergies.   Neurological:  Negative for headaches.     Physical Exam     Initial Vitals [10/02/22 1808]   BP Pulse Resp Temp SpO2   (!) 100/54 (!) 122 18 98.9 °F (37.2 °C) 99 %      MAP       --         Physical Exam    Nursing note and vitals reviewed.  Constitutional: She is not diaphoretic. No distress.   HENT:   Head: Normocephalic and atraumatic.   Tacky mucous membranes   Eyes: Right eye exhibits no discharge. Left eye exhibits no discharge.   Neck: Neck supple. No tracheal deviation present.   Cardiovascular:  Regular rhythm.   Tachycardia present.         Pulmonary/Chest: Breath sounds normal. No respiratory  distress.   Abdominal: Abdomen is soft. There is abdominal tenderness (mild, epigastric). There is no tenderness at McBurney's point and negative Issa's sign.   Musculoskeletal:      Cervical back: Neck supple.     Neurological: She is alert and oriented to person, place, and time.   Skin: Skin is warm. No rash noted.   Psychiatric:   Flat affect        ED Course   Procedures  Labs Reviewed   CBC W/ AUTO DIFFERENTIAL - Abnormal; Notable for the following components:       Result Value    MCHC 31.6 (*)     Gran # (ANC) 8.3 (*)     Gran % 78.7 (*)     Lymph % 15.6 (*)     All other components within normal limits   COMPREHENSIVE METABOLIC PANEL - Abnormal; Notable for the following components:    Sodium 134 (*)     CO2 13 (*)     Glucose 353 (*)     ALT 47 (*)     All other components within normal limits    Narrative:     ADD ON LIPASE PER DR VASQUEZ MARTINEZ/ORDER# [id number] @ 19:17   BETA - HYDROXYBUTYRATE, SERUM - Abnormal; Notable for the following components:    Beta-Hydroxybutyrate 4.6 (*)     All other components within normal limits   URINALYSIS, REFLEX TO URINE CULTURE - Abnormal; Notable for the following components:    Glucose, UA 4+ (*)     Ketones, UA 3+ (*)     All other components within normal limits    Narrative:     Specimen Source->Urine   POCT GLUCOSE - Abnormal; Notable for the following components:    POCT Glucose 373 (*)     All other components within normal limits   ISTAT PROCEDURE - Abnormal; Notable for the following components:    POC PH 7.273 (*)     POC PCO2 32.1 (*)     POC PO2 66 (*)     POC HCO3 14.9 (*)     POC SATURATED O2 90 (*)     POC TCO2 16 (*)     All other components within normal limits   POCT GLUCOSE - Abnormal; Notable for the following components:    POCT Glucose 335 (*)     All other components within normal limits   POCT GLUCOSE - Abnormal; Notable for the following components:    POCT Glucose 266 (*)     All other components within normal limits   POCT GLUCOSE -  Abnormal; Notable for the following components:    POCT Glucose 300 (*)     All other components within normal limits   INFLUENZA A & B BY MOLECULAR   SARS-COV-2 RNA AMPLIFICATION, QUAL   LIPASE   LIPASE    Narrative:     ADD ON LIPASE PER DR VASQUEZ MARTINEZ/ORDER# [id number] @ 19:17   URINALYSIS MICROSCOPIC    Narrative:     Specimen Source->Urine   POCT URINE PREGNANCY   POCT GLUCOSE MONITORING CONTINUOUS     EKG Readings: (Independently Interpreted)   Initial Reading: No STEMI. Previous EKG: Compared with most recent EKG Rhythm: Sinus Tachycardia. Heart Rate: 108. Ectopy: No Ectopy. Conduction: Normal. Clinical Impression: Sinus Tachycardia     Imaging Results              X-Ray Chest AP Portable (Final result)  Result time 10/02/22 19:12:13      Final result by Lee Germain MD (10/02/22 19:12:13)                   Impression:      1. No acute cardiopulmonary process.      Electronically signed by: Lee Germain MD  Date:    10/02/2022  Time:    19:12               Narrative:    EXAMINATION:  XR CHEST AP PORTABLE    CLINICAL HISTORY:  hyperglycemia;    TECHNIQUE:  Single frontal view of the chest was performed.    COMPARISON:  01/07/2022    FINDINGS:  The cardiomediastinal silhouette is not enlarged.  There is no pleural effusion.  The trachea is midline.  The lungs are symmetrically expanded bilaterally without evidence of acute parenchymal process. No large focal consolidation seen.  There is no pneumothorax.  The osseous structures are unremarkable.                                       Medications   insulin regular in 0.9 % NaCl 100 unit/100 mL (1 unit/mL) infusion (4 Units/hr Intravenous New Bag 10/2/22 2033)   sodium chloride 0.9% flush 10 mL (has no administration in time range)   melatonin tablet 6 mg (has no administration in time range)   apixaban tablet 5 mg (5 mg Oral Given 10/2/22 2057)   pantoprazole EC tablet 40 mg (has no administration in time range)   sodium chloride 0.9% flush 10 mL (has no  administration in time range)   0.9%  NaCl infusion (has no administration in time range)   dextrose 5 % and 0.45 % NaCl infusion (has no administration in time range)   ondansetron injection 4 mg (has no administration in time range)   acetaminophen tablet 650 mg (0 mg Oral Hold 10/2/22 2050)   dextrose 10% bolus 125 mL (has no administration in time range)   dextrose 10% bolus 250 mL (has no administration in time range)   prochlorperazine injection Soln 5 mg (has no administration in time range)   sodium chloride 0.9% bolus 1,000 mL (1,000 mLs Intravenous New Bag 10/2/22 1853)   ondansetron injection 4 mg (4 mg Intravenous Given 10/2/22 1852)   0.9 % NaCl with KCl 20 mEq infusion ( Intravenous New Bag 10/2/22 2024)   acetaminophen tablet 1,000 mg (1,000 mg Oral Given 10/2/22 2057)     Medical Decision Making:   History:   Old Medical Records: I decided to obtain old medical records.  Initial Assessment:   31-year-old female presenting with shortness of breath and nausea, epigastric pain.  On arrival, she is tachycardic, but in no acute distress.  Plan for lab evaluation, IV fluids for dehydration.   Differential Diagnosis:   Including, but not limited to:    DKA  Hyperglycemia  Viral illness  Pancreatitis  Independently Interpreted Test(s):   I have ordered and independently interpreted X-rays - see summary below.       <> Summary of X-Ray Reading(s): No acute infiltrate  I have ordered and independently interpreted EKG Reading(s) - see prior notes  Clinical Tests:   Lab Tests: Reviewed and Ordered  Radiological Study: Ordered and Reviewed  Medical Tests: Reviewed and Ordered  ED Management:  Workup notable for metabolic acidosis, hyperglycemia, ketonuria, elevated beta hydroxybutyrate.  Concern for DKA.  Patient received IV fluids for hydration and hyperglycemia, initiating insulin drip.  Her symptoms improved in the emergency department and she subsequently requested to drink water, which she tolerated.  I do  not believe advanced abdominal imaging is indicated at this time and her clinical symptoms are most likely explained by DKA.  However, will admit for serial exams, continued close glucose monitoring.  No ICU criteria at this time.    Patient understands and agrees with plan for admission.           ED Course as of 10/02/22 2132   Sun Oct 02, 2022   1851 Pulse(!): 122  Tachycardia  [AB]   1851 POCT Glucose(!): 373  Hyperglycemia  [AB]   1903 Beta-Hydroxybutyrate(!): 4.6 [AB]   1903 WBC: 10.48  No leukocytosis  [AB]   1903 Hemoglobin: 13.2  No anemia  [AB]   1940 Anion Gap: 16 [AB]   2009 Ketones, UA(!): 3+ [AB]   2009 Leukocytes, UA: Negative [AB]   2009 NITRITE UA: Negative  No UTI  [AB]      ED Course User Index  [AB] Javi Clifford MD                   Clinical Impression:   Final diagnoses:  [R73.9] Hyperglycemia  [E10.10] Type 1 diabetes mellitus with ketoacidosis without coma (Primary)  [R82.4] Ketonuria      ED Disposition Condition    Admit Stable                Javi Clifford MD  10/02/22 2132

## 2022-10-03 ENCOUNTER — PATIENT MESSAGE (OUTPATIENT)
Dept: OBSTETRICS AND GYNECOLOGY | Facility: CLINIC | Age: 32
End: 2022-10-03
Payer: MEDICAID

## 2022-10-03 DIAGNOSIS — F32.2 SEVERE MAJOR DEPRESSION: Primary | ICD-10-CM

## 2022-10-03 DIAGNOSIS — F32.89 OTHER DEPRESSION: Primary | ICD-10-CM

## 2022-10-03 LAB
ANION GAP SERPL CALC-SCNC: 10 MMOL/L (ref 8–16)
ANION GAP SERPL CALC-SCNC: 6 MMOL/L (ref 8–16)
ANION GAP SERPL CALC-SCNC: 7 MMOL/L (ref 8–16)
ANION GAP SERPL CALC-SCNC: 8 MMOL/L (ref 8–16)
ANION GAP SERPL CALC-SCNC: 9 MMOL/L (ref 8–16)
BASOPHILS # BLD AUTO: 0.03 K/UL (ref 0–0.2)
BASOPHILS NFR BLD: 0.3 % (ref 0–1.9)
BUN SERPL-MCNC: 10 MG/DL (ref 6–20)
BUN SERPL-MCNC: 11 MG/DL (ref 6–20)
BUN SERPL-MCNC: 7 MG/DL (ref 6–20)
BUN SERPL-MCNC: 8 MG/DL (ref 6–20)
BUN SERPL-MCNC: 9 MG/DL (ref 6–20)
CALCIUM SERPL-MCNC: 8.7 MG/DL (ref 8.7–10.5)
CALCIUM SERPL-MCNC: 8.8 MG/DL (ref 8.7–10.5)
CALCIUM SERPL-MCNC: 8.8 MG/DL (ref 8.7–10.5)
CALCIUM SERPL-MCNC: 8.9 MG/DL (ref 8.7–10.5)
CALCIUM SERPL-MCNC: 9.2 MG/DL (ref 8.7–10.5)
CHLORIDE SERPL-SCNC: 107 MMOL/L (ref 95–110)
CHLORIDE SERPL-SCNC: 110 MMOL/L (ref 95–110)
CHLORIDE SERPL-SCNC: 112 MMOL/L (ref 95–110)
CO2 SERPL-SCNC: 15 MMOL/L (ref 23–29)
CO2 SERPL-SCNC: 17 MMOL/L (ref 23–29)
CO2 SERPL-SCNC: 19 MMOL/L (ref 23–29)
CO2 SERPL-SCNC: 20 MMOL/L (ref 23–29)
CO2 SERPL-SCNC: 21 MMOL/L (ref 23–29)
CREAT SERPL-MCNC: 0.7 MG/DL (ref 0.5–1.4)
CREAT SERPL-MCNC: 0.7 MG/DL (ref 0.5–1.4)
CREAT SERPL-MCNC: 0.8 MG/DL (ref 0.5–1.4)
DIFFERENTIAL METHOD: ABNORMAL
EOSINOPHIL # BLD AUTO: 0.1 K/UL (ref 0–0.5)
EOSINOPHIL NFR BLD: 1.5 % (ref 0–8)
ERYTHROCYTE [DISTWIDTH] IN BLOOD BY AUTOMATED COUNT: 14 % (ref 11.5–14.5)
EST. GFR  (NO RACE VARIABLE): >60 ML/MIN/1.73 M^2
ESTIMATED AVG GLUCOSE: 148 MG/DL (ref 68–131)
GLUCOSE SERPL-MCNC: 180 MG/DL (ref 70–110)
GLUCOSE SERPL-MCNC: 184 MG/DL (ref 70–110)
GLUCOSE SERPL-MCNC: 192 MG/DL (ref 70–110)
GLUCOSE SERPL-MCNC: 192 MG/DL (ref 70–110)
GLUCOSE SERPL-MCNC: 217 MG/DL (ref 70–110)
HBA1C MFR BLD: 6.8 % (ref 4–5.6)
HCT VFR BLD AUTO: 36.5 % (ref 37–48.5)
HGB BLD-MCNC: 11.2 G/DL (ref 12–16)
IMM GRANULOCYTES # BLD AUTO: 0.01 K/UL (ref 0–0.04)
IMM GRANULOCYTES NFR BLD AUTO: 0.1 % (ref 0–0.5)
LYMPHOCYTES # BLD AUTO: 3.3 K/UL (ref 1–4.8)
LYMPHOCYTES NFR BLD: 37.8 % (ref 18–48)
MCH RBC QN AUTO: 27.1 PG (ref 27–31)
MCHC RBC AUTO-ENTMCNC: 30.7 G/DL (ref 32–36)
MCV RBC AUTO: 88 FL (ref 82–98)
MONOCYTES # BLD AUTO: 0.7 K/UL (ref 0.3–1)
MONOCYTES NFR BLD: 8.2 % (ref 4–15)
NEUTROPHILS # BLD AUTO: 4.6 K/UL (ref 1.8–7.7)
NEUTROPHILS NFR BLD: 52.1 % (ref 38–73)
NRBC BLD-RTO: 0 /100 WBC
PLATELET # BLD AUTO: 337 K/UL (ref 150–450)
PMV BLD AUTO: 10.1 FL (ref 9.2–12.9)
POCT GLUCOSE: 138 MG/DL (ref 70–110)
POCT GLUCOSE: 174 MG/DL (ref 70–110)
POCT GLUCOSE: 178 MG/DL (ref 70–110)
POCT GLUCOSE: 178 MG/DL (ref 70–110)
POCT GLUCOSE: 183 MG/DL (ref 70–110)
POCT GLUCOSE: 186 MG/DL (ref 70–110)
POCT GLUCOSE: 189 MG/DL (ref 70–110)
POCT GLUCOSE: 194 MG/DL (ref 70–110)
POCT GLUCOSE: 195 MG/DL (ref 70–110)
POCT GLUCOSE: 195 MG/DL (ref 70–110)
POCT GLUCOSE: 201 MG/DL (ref 70–110)
POCT GLUCOSE: 203 MG/DL (ref 70–110)
POTASSIUM SERPL-SCNC: 4.3 MMOL/L (ref 3.5–5.1)
POTASSIUM SERPL-SCNC: 4.5 MMOL/L (ref 3.5–5.1)
POTASSIUM SERPL-SCNC: 4.7 MMOL/L (ref 3.5–5.1)
POTASSIUM SERPL-SCNC: 4.8 MMOL/L (ref 3.5–5.1)
POTASSIUM SERPL-SCNC: 4.8 MMOL/L (ref 3.5–5.1)
RBC # BLD AUTO: 4.14 M/UL (ref 4–5.4)
SODIUM SERPL-SCNC: 135 MMOL/L (ref 136–145)
SODIUM SERPL-SCNC: 136 MMOL/L (ref 136–145)
SODIUM SERPL-SCNC: 136 MMOL/L (ref 136–145)
SODIUM SERPL-SCNC: 137 MMOL/L (ref 136–145)
SODIUM SERPL-SCNC: 137 MMOL/L (ref 136–145)
WBC # BLD AUTO: 8.79 K/UL (ref 3.9–12.7)

## 2022-10-03 PROCEDURE — 99223 PR INITIAL HOSPITAL CARE,LEVL III: ICD-10-PCS | Mod: ,,, | Performed by: HOSPITALIST

## 2022-10-03 PROCEDURE — 20600001 HC STEP DOWN PRIVATE ROOM

## 2022-10-03 PROCEDURE — 25000003 PHARM REV CODE 250: Performed by: HOSPITALIST

## 2022-10-03 PROCEDURE — 80048 BASIC METABOLIC PNL TOTAL CA: CPT | Performed by: HOSPITALIST

## 2022-10-03 PROCEDURE — 25000003 PHARM REV CODE 250: Performed by: EMERGENCY MEDICINE

## 2022-10-03 PROCEDURE — 85025 COMPLETE CBC W/AUTO DIFF WBC: CPT | Performed by: HOSPITALIST

## 2022-10-03 PROCEDURE — 99223 1ST HOSP IP/OBS HIGH 75: CPT | Mod: ,,, | Performed by: HOSPITALIST

## 2022-10-03 PROCEDURE — 94761 N-INVAS EAR/PLS OXIMETRY MLT: CPT

## 2022-10-03 PROCEDURE — 99222 1ST HOSP IP/OBS MODERATE 55: CPT | Mod: ,,, | Performed by: GENERAL ACUTE CARE HOSPITAL

## 2022-10-03 PROCEDURE — 83036 HEMOGLOBIN GLYCOSYLATED A1C: CPT | Performed by: HOSPITALIST

## 2022-10-03 PROCEDURE — S5010 5% DEXTROSE AND 0.45% SALINE: HCPCS | Performed by: HOSPITALIST

## 2022-10-03 PROCEDURE — 36415 COLL VENOUS BLD VENIPUNCTURE: CPT | Performed by: HOSPITALIST

## 2022-10-03 PROCEDURE — 99222 PR INITIAL HOSPITAL CARE,LEVL II: ICD-10-PCS | Mod: ,,, | Performed by: GENERAL ACUTE CARE HOSPITAL

## 2022-10-03 PROCEDURE — 63600175 PHARM REV CODE 636 W HCPCS: Performed by: STUDENT IN AN ORGANIZED HEALTH CARE EDUCATION/TRAINING PROGRAM

## 2022-10-03 RX ORDER — INSULIN ASPART 100 [IU]/ML
0-5 INJECTION, SOLUTION INTRAVENOUS; SUBCUTANEOUS
Status: DISCONTINUED | OUTPATIENT
Start: 2022-10-03 | End: 2022-10-04 | Stop reason: HOSPADM

## 2022-10-03 RX ORDER — INSULIN ASPART 100 [IU]/ML
10 INJECTION, SOLUTION INTRAVENOUS; SUBCUTANEOUS
Status: DISCONTINUED | OUTPATIENT
Start: 2022-10-03 | End: 2022-10-04 | Stop reason: HOSPADM

## 2022-10-03 RX ORDER — GLUCAGON 1 MG
1 KIT INJECTION
Status: DISCONTINUED | OUTPATIENT
Start: 2022-10-03 | End: 2022-10-04

## 2022-10-03 RX ORDER — FLUOXETINE 10 MG/1
10 CAPSULE ORAL DAILY
Status: DISCONTINUED | OUTPATIENT
Start: 2022-10-03 | End: 2022-10-04 | Stop reason: HOSPADM

## 2022-10-03 RX ORDER — FLUOXETINE 10 MG/1
10 CAPSULE ORAL DAILY
Qty: 30 CAPSULE | Refills: 2 | Status: SHIPPED | OUTPATIENT
Start: 2022-10-03 | End: 2023-07-07

## 2022-10-03 RX ORDER — FLUOXETINE 10 MG/1
10 CAPSULE ORAL DAILY
Qty: 30 CAPSULE | Refills: 2 | Status: SHIPPED | OUTPATIENT
Start: 2022-10-03 | End: 2022-10-03

## 2022-10-03 RX ORDER — MUPIROCIN 20 MG/G
OINTMENT TOPICAL 2 TIMES DAILY
Status: DISCONTINUED | OUTPATIENT
Start: 2022-10-03 | End: 2022-10-04 | Stop reason: HOSPADM

## 2022-10-03 RX ORDER — IBUPROFEN 200 MG
24 TABLET ORAL
Status: DISCONTINUED | OUTPATIENT
Start: 2022-10-03 | End: 2022-10-04

## 2022-10-03 RX ORDER — IBUPROFEN 200 MG
16 TABLET ORAL
Status: CANCELLED | OUTPATIENT
Start: 2022-10-03

## 2022-10-03 RX ORDER — GLUCAGON 1 MG
1 KIT INJECTION
Status: CANCELLED | OUTPATIENT
Start: 2022-10-03

## 2022-10-03 RX ORDER — IBUPROFEN 200 MG
24 TABLET ORAL
Status: CANCELLED | OUTPATIENT
Start: 2022-10-03

## 2022-10-03 RX ORDER — IBUPROFEN 200 MG
16 TABLET ORAL
Status: DISCONTINUED | OUTPATIENT
Start: 2022-10-03 | End: 2022-10-04 | Stop reason: HOSPADM

## 2022-10-03 RX ADMIN — Medication 6 MG: at 09:10

## 2022-10-03 RX ADMIN — DEXTROSE AND SODIUM CHLORIDE 125 ML/HR: 5; .45 INJECTION, SOLUTION INTRAVENOUS at 11:10

## 2022-10-03 RX ADMIN — DEXTROSE AND SODIUM CHLORIDE 125 ML/HR: 5; .45 INJECTION, SOLUTION INTRAVENOUS at 02:10

## 2022-10-03 RX ADMIN — Medication 6 MG: at 12:10

## 2022-10-03 RX ADMIN — APIXABAN 5 MG: 5 TABLET, FILM COATED ORAL at 09:10

## 2022-10-03 RX ADMIN — INSULIN ASPART 2 UNITS: 100 INJECTION, SOLUTION INTRAVENOUS; SUBCUTANEOUS at 01:10

## 2022-10-03 RX ADMIN — INSULIN ASPART 10 UNITS: 100 INJECTION, SOLUTION INTRAVENOUS; SUBCUTANEOUS at 05:10

## 2022-10-03 RX ADMIN — PANTOPRAZOLE SODIUM 40 MG: 40 TABLET, DELAYED RELEASE ORAL at 09:10

## 2022-10-03 RX ADMIN — INSULIN ASPART 10 UNITS: 100 INJECTION, SOLUTION INTRAVENOUS; SUBCUTANEOUS at 01:10

## 2022-10-03 RX ADMIN — FLUOXETINE 10 MG: 10 CAPSULE ORAL at 06:10

## 2022-10-03 NOTE — SUBJECTIVE & OBJECTIVE
Interval HPI:   Overnight events:   Eating:   NPO  Nausea: Yes, mild - improving  Hypoglycemia and intervention: No  Fever: No  TPN and/or TF: No  If yes, type of TF/TPN and rate: n/a    PMH, PSH, FH, SH updated and reviewed     ROS:  Review of Systems   Constitutional:  Negative for chills and fever.   HENT:  Negative for congestion, sore throat and trouble swallowing.    Eyes:  Negative for pain and visual disturbance.   Respiratory:  Positive for shortness of breath (mild, improving). Negative for cough.    Cardiovascular:  Negative for chest pain and palpitations.   Gastrointestinal:  Positive for nausea. Negative for abdominal distention, abdominal pain and vomiting.   Endocrine: Negative for polydipsia and polyuria.   Genitourinary:  Negative for difficulty urinating and dysuria.   Musculoskeletal:  Negative for arthralgias and gait problem.   Skin:  Negative for rash and wound.   Neurological:  Negative for dizziness, tremors, weakness and light-headedness.   Hematological:  Bruises/bleeds easily.   Psychiatric/Behavioral:  Negative for behavioral problems and confusion.      Current Medications and/or Treatments Impacting Glycemic Control  Immunotherapy:    Immunosuppressants       None          Steroids:   Hormones (From admission, onward)      Start     Stop Route Frequency Ordered    10/02/22 2120  melatonin tablet 6 mg         -- Oral Nightly PRN 10/02/22 2021          Pressors:    Autonomic Drugs (From admission, onward)      None          Hyperglycemia/Diabetes Medications:   Antihyperglycemics (From admission, onward)      Start     Stop Route Frequency Ordered    10/03/22 1230  insulin aspart U-100 pen 0-5 Units         -- SubQ As needed (PRN) 10/03/22 1148    10/03/22 1230  insulin regular in 0.9 % NaCl 100 unit/100 mL (1 unit/mL) infusion        Question:  Enter initial dose (Units/hr):  Answer:  1.4    -- IV Continuous 10/03/22 1148    10/03/22 1200  insulin aspart U-100 pen 10 Units         --  SubQ 3 times daily with meals 10/03/22 1148             PHYSICAL EXAMINATION:  Vitals:    10/03/22 1245   BP: 98/64   Pulse: 85   Resp: 17   Temp: 98.5 °F (36.9 °C)     Body mass index is 32.02 kg/m².    Physical Exam  Vitals and nursing note reviewed.   Constitutional:       General: She is not in acute distress.     Appearance: She is obese. She is not ill-appearing.   HENT:      Head: Normocephalic.      Mouth/Throat:      Mouth: Mucous membranes are moist.   Eyes:      Extraocular Movements: Extraocular movements intact.      Conjunctiva/sclera: Conjunctivae normal.   Cardiovascular:      Rate and Rhythm: Normal rate and regular rhythm.   Pulmonary:      Effort: Pulmonary effort is normal.   Abdominal:      General: There is no distension.      Palpations: Abdomen is soft.      Tenderness: There is no abdominal tenderness. There is no guarding.   Musculoskeletal:      Cervical back: Normal range of motion and neck supple.      Right lower leg: No edema.      Left lower leg: No edema.   Skin:     General: Skin is warm and dry.   Neurological:      General: No focal deficit present.      Mental Status: She is alert and oriented to person, place, and time.   Psychiatric:         Mood and Affect: Mood normal.         Behavior: Behavior normal.

## 2022-10-03 NOTE — H&P
[facility] ([address] Medicine  History & Physical    Patient Name: Anusha Andrew  MRN: [id number]  Patient Class: IP- Inpatient  Admission Date: 10/2/2022  Attending Physician: Cris Jay MD   Primary Care Provider: Magaly Muhammad NP         Patient information was obtained from patient, past medical records and ER records.     Subjective:     Principal Problem:DKA (diabetic ketoacidosis)    Chief Complaint:   Chief Complaint   Patient presents with    Hyperglycemia     Typr 1 diabetic. No short acting insulin today. Thinks she had long acting last night.         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 Pt. Reports that she started having nausera and generalized malaise tyhis morning. She check her BG at home and noted it to be elevated. She was concerned sghe might be in DKA, so she came to the hospital. Pt. Home regimen of insulin is levemir 16-22 units sliding scale with prandial novolog 1:10 insulin:carbohydrate scale. Pt. Reports that she took her levemir last night, but she did not take any humalog today because she did not eat 2/2 nausea. She denies any cough, fevers, chills, dysuria, or other infectious symptoms. No other reported provoking facotrs for her symptoms.      Past Medical History:   Diagnosis Date    Depression     Hyperlipidemia     Hypertension     Mild nonproliferative diabetic retinopathy of both eyes without macular edema associated with type 1 diabetes mellitus 3/30/2021    Type I (juvenile type) diabetes mellitus without mention of complication, uncontrolled 2011       Past Surgical History:   Procedure Laterality Date    ABCESS DRAINAGE      boil went over GA     SECTION  2012    DILATION AND CURETTAGE OF UTERUS  2019    Procedure: DILATION AND CURETTAGE, UTERUS;   Surgeon: Purnima Kidd MD;  Location: [facility] [place] OR;  Service: OB/GYN;;    HYSTEROSCOPY N/A 2/20/2019    Procedure: HYSTEROSCOPY;  Surgeon: Purnima Kidd MD;  Location: [facility] [place] OR;  Service: OB/GYN;  Laterality: N/A;    TONSILLECTOMY, ADENOIDECTOMY      WISDOM TOOTH EXTRACTION         Review of patient's allergies indicates:   Allergen Reactions    No known allergies        No current facility-administered medications on file prior to encounter.     Current Outpatient Medications on File Prior to Encounter   Medication Sig    apixaban (ELIQUIS) 5 mg Tab Take 1 tablet (5 mg total) by mouth 2 (two) times daily. Take SECOND.    insulin (LANTUS SOLOSTAR U-100 INSULIN) glargine 100 units/mL SubQ pen Inject 16-22 units at night. (medication will last 136 days)    insulin aspart U-100 (NOVOLOG FLEXPEN U-100 INSULIN) 100 unit/mL (3 mL) InPn pen Inject insulin/carb ratio 1:12, target 150, correction factor 25. 150-200+2, 201-250+4, 251-300+6, 301-350+8, >350+10.Max daily 60 units.    blood sugar diagnostic Strp To check BG 4  times daily, to use with insurance preferred meter, e 10.65 (Patient taking differently: To check BG 4  times daily, to use with insurance preferred meter, e 10.65)    ELINEST 0.3-30 mg-mcg per tablet     ergocalciferol (VITAMIN D2) 50,000 unit Cap Take 1 capsule (50,000 Units total) by mouth every 7 days.    hydrocortisone (ANUSOL-HC) 2.5 % rectal cream Place rectally 2 (two) times daily.    insulin syringe-needle U-100 0.5 mL 31 gauge x 5/16 Syrg use with admelog    lancets Misc To check BG 4 times daily, to use with insurance preferred meter, e10.65    norethindrone (ORTHO MICRONOR) 0.35 mg tablet Take 1 tablet (0.35 mg total) by mouth once daily.    ondansetron (ZOFRAN-ODT) 4 MG TbDL Take 1 tablet by mouth every 8 (eight) hours as needed for Nausea.    ONETOUCH DELICA PLUS LANCET 33 gauge Misc USE 1 UNIT TO CHECK GLUCOSE 4 TIMES DAILY    ONETOUCH VERIO FLEX METER Misc USE AS DIRECTED TO  "CHECK BLOOD GLUCOSE 4 TIMES DAILY    pantoprazole (PROTONIX) 40 MG tablet Take 1 tablet by mouth once daily.    pen needle, diabetic (BD EBEN 2ND GEN PEN NEEDLE) 32 gauge x 5/32" Ndle Use 5 times daily as directed (Patient taking differently: Use 5 times daily as directed)    polyethylene glycol (GLYCOLAX) 17 gram/dose powder Take 17 g by mouth once daily.     Family History       Problem Relation (Age of Onset)    Diabetes Brother    Hydrocephalus Daughter    No Known Problems Mother, Father, Brother, Brother, Maternal Aunt, Maternal Uncle, Paternal Aunt, Paternal Uncle, Maternal Grandmother, Maternal Grandfather, Paternal Grandmother, Paternal Grandfather          Tobacco Use    Smoking status: Never    Smokeless tobacco: Never   Substance and Sexual Activity    Alcohol use: Yes     Comment: occasionally    Drug use: No    Sexual activity: Yes     Partners: Male     Birth control/protection: Injection     Review of Systems   Constitutional:  Negative for activity change, appetite change, chills, fever and unexpected weight change.   HENT:  Negative for congestion and sore throat.    Respiratory:  Positive for shortness of breath. Negative for cough.    Cardiovascular:  Negative for chest pain, palpitations and leg swelling.   Gastrointestinal:  Positive for nausea. Negative for abdominal distention, abdominal pain, blood in stool, constipation, diarrhea and vomiting.   Genitourinary:  Negative for difficulty urinating, dysuria and hematuria.   Musculoskeletal:  Positive for arthralgias. Negative for myalgias.   Skin:  Negative for color change and rash.   Neurological:  Negative for dizziness, tremors and seizures.   Objective:     Vital Signs (Most Recent):  Temp: 98.9 °F (37.2 °C) (10/02/22 1830)  Pulse: 100 (10/02/22 2057)  Resp: 14 (10/02/22 2057)  BP: 110/60 (10/02/22 2057)  SpO2: 100 % (10/02/22 2057) Vital Signs (24h Range):  Temp:  [98.9 °F (37.2 °C)] 98.9 °F (37.2 °C)  Pulse:  [100-122] 100  Resp: "  [14-23] 14  SpO2:  [99 %-100 %] 100 %  BP: (100-111)/(54-60) 110/60     Weight: 79.4 kg (175 lb)  Body mass index is 31 kg/m².    Physical Exam  Vitals reviewed.   Constitutional:       General: She is not in acute distress.     Appearance: She is well-developed.   HENT:      Head: Normocephalic and atraumatic.   Eyes:      Extraocular Movements: Extraocular movements intact.      Pupils: Pupils are equal, round, and reactive to light.   Neck:      Vascular: No JVD.      Trachea: No tracheal deviation.   Cardiovascular:      Rate and Rhythm: Normal rate and regular rhythm.      Heart sounds: No murmur heard.    No friction rub. No gallop.   Pulmonary:      Effort: No respiratory distress.      Breath sounds: Normal breath sounds. No wheezing or rales.   Abdominal:      General: Bowel sounds are normal. There is no distension.      Palpations: Abdomen is soft. There is no mass.      Tenderness: There is no abdominal tenderness.   Musculoskeletal:         General: No deformity.      Cervical back: Neck supple.   Lymphadenopathy:      Cervical: No cervical adenopathy.   Skin:     General: Skin is warm and dry.      Findings: No rash.   Neurological:      Mental Status: She is alert and oriented to person, place, and time.         CRANIAL NERVES     CN III, IV, VI   Pupils are equal, round, and reactive to light.     Significant Labs: All pertinent labs within the past 24 hours have been reviewed.    Significant Imaging: I have reviewed all pertinent imaging results/findings within the past 24 hours.    Assessment/Plan:     * DKA (diabetic ketoacidosis)  -Pt. Presenting with nausea and SOB. Reports high BG levels despite compliance with home insulin regimen  - on admit, bicarb 13 with anion gap 16. Ketones present in urine, pH 7.27 consistent with DKA  -DKA pathway ordered with IV insulin gtt, IV fluids. BMP Q4hrs. Plan to transition to subQ insulin once gap resolves, bicarb >15, and pt. Tolerating diet.  Endocrine consult for assistance    Superior mesenteric vein thrombosis  - Pt. Admitted 8/2022 for SMV thrombus which was provoked in the setting of bariatric surgery and OCPs. She has had negative hypercoagulable workup so far and is tolerating apixaban well.   -Continue apixaban until 11/16/22 per hematology recommendations      Type 1 diabetes mellitus with hyperglycemia  -See DKA. Last A1c 7/9 7/2022. Repeat ordered  -Home regimen levemur 16-22 units with novolog insulin:CHO 1:10  -Endocrinology consulted for assistance with management and transition off gtt in the Type 1 DM patient      VTE Risk Mitigation (From admission, onward)         Ordered     apixaban tablet 5 mg  2 times daily         10/02/22 2030     IP VTE HIGH RISK PATIENT  Once         10/02/22 2021     Place sequential compression device  Until discontinued         10/02/22 2021                   Anders Huggins MD  Department of Hospital Medicine   [facility] - Intensive Care ([place]-[facility])

## 2022-10-03 NOTE — PLAN OF CARE
[facility] ([place] [address])  Initial Discharge Assessment       Primary Care Provider: Magaly Muhammad NP    Admission Diagnosis: Ketonuria [R82.4]  Hyperglycemia [R73.9]  Type 1 diabetes mellitus with ketoacidosis without coma [E10.10]    Admission Date: 10/2/2022  Expected Discharge Date:     Discharge Barriers Identified: (P) None    Payor: MEDICAID / Plan: [facility] COMMUNITY PLAN [facility] ([place] MEDICAID) / Product Type: Managed Medicaid /     Extended Emergency Contact Information  Primary Emergency Contact: Stephanie Andrew  Address: [address] [place] [address] [facility]  Home Phone: [phone]  Mobile Phone: [phone]  Relation: Mother    Discharge Plan A: (P) Home with family  Discharge Plan B: (P) Home with family      [facility]  [address]  [facility] [address]  Phone: [phone] Fax: [phone]    [facility] [place] - [address]  [address] [place] [address]  Phone: [phone] Fax: [phone]    [facility] [address] [place], [place] [id number] [address] [address]  Phone: [phone] Fax: [phone]      Initial Assessment (most recent)       Adult Discharge Assessment - 10/03/22 1403          Discharge Assessment    Assessment Type Discharge Planning Assessment (P)      Confirmed/corrected address, phone number and insurance Yes (P)      Confirmed Demographics Correct on Facesheet (P)      Source of Information patient (P)      Does patient/caregiver understand observation status Yes (P)      Communicated RYAN with patient/caregiver Yes (P)      Reason For Admission Ketonuria (P)      Lives With parent(s) (P)      Facility Arrived From: Home (P)      Do you expect to return to your current living situation? Yes (P)      Do you have help at home or someone to help you manage your care at home? Yes (P)      Who are your caregiver(s) and their  phone number(s)? Stephanie Andrew, mother, [phone] (P)      Prior to hospitilization cognitive status: Alert/Oriented (P)      Current cognitive status: Alert/Oriented (P)      Walking or Climbing Stairs Difficulty none (P)      Dressing/Bathing Difficulty none (P)      Do you have any problems with: Errands/Grocery (P)      Home Layout Able to live on 1st floor (P)      Equipment Currently Used at Home none (P)      Readmission within 30 days? No (P)      Patient currently being followed by outpatient case management? No (P)      Do you currently have service(s) that help you manage your care at home? No (P)      Is the pt/caregiver preference to resume services with current agency No (P)      Do you take prescription medications? Yes (P)      Do you have prescription coverage? Yes (P)      Coverage MEDICAID /[facility] COMMUNITY PLAN (P)      Do you have any problems affording any of your prescribed medications? No (P)      Is the patient taking medications as prescribed? yes (P)      Who is going to help you get home at discharge? Stephanie Andrew, mother, [phone] (P)      How do you get to doctors appointments? car, drives self;family or friend will provide (P)      Are you on dialysis? No (P)      Do you take coumadin? No (P)      Discharge Plan A Home with family (P)      Discharge Plan B Home with family (P)      DME Needed Upon Discharge  none (P)      Discharge Plan discussed with: Patient (P)      Discharge Barriers Identified None (P)                         Ann Warner LMSW  [facility]   [id number]

## 2022-10-03 NOTE — ASSESSMENT & PLAN NOTE
-See DKA. Last A1c 7/9 7/2022. Repeat ordered  -Home regimen janak 16-22 units with novolog insulin:CHO 1:10  -Endocrinology consulted for assistance with management and transition off gtt in the Type 1 DM patient

## 2022-10-03 NOTE — ASSESSMENT & PLAN NOTE
-Pt. Presenting with nausea and SOB. Reports high BG levels despite compliance with home insulin regimen  - on admit, bicarb 13 with anion gap 16. Ketones present in urine, pH 7.27 consistent with DKA  -DKA pathway ordered with IV insulin gtt, IV fluids. BMP Q4hrs. Plan to transition to subQ insulin once gap resolves, bicarb >15, and pt. Tolerating diet. Endocrine consult for assistance

## 2022-10-03 NOTE — PLAN OF CARE
-----------------------------------------------------------------------------------------  Problem: Adult Inpatient Plan of Care  Goal: Plan of Care Review  Outcome: Ongoing, Progressing  Goal: Patient-Specific Goal (Individualized)  Outcome: Ongoing, Progressing  Goal: Absence of Hospital-Acquired Illness or Injury  Outcome: Ongoing, Progressing  Goal: Optimal Comfort and Wellbeing  Outcome: Ongoing, Progressing  Goal: Readiness for Transition of Care  Outcome: Ongoing, Progressing     Problem: Diabetic Ketoacidosis  Goal: Fluid and Electrolyte Balance with Absence of Ketosis  Outcome: Ongoing, Progressing     Problem: Diabetes Comorbidity  Goal: Blood Glucose Level Within Targeted Range  Outcome: Ongoing, Progressing

## 2022-10-03 NOTE — ED TRIAGE NOTES
32 y/o F presents to ER with c/c abd pain and SOB. Pt states that starting today pt developed SOB, abd pain, depressive feelings, nausea, decreased appetite, blurred vision, increased urination frequency and thirst. Pt denies c/p and vomiting. Pt has h/x type I diabetes, states that she took her long acting insulin last night but not her short acting today because she was nauseous and could not eat.

## 2022-10-03 NOTE — SUBJECTIVE & OBJECTIVE
Past Medical History:   Diagnosis Date    Depression     Hyperlipidemia     Hypertension     Mild nonproliferative diabetic retinopathy of both eyes without macular edema associated with type 1 diabetes mellitus 3/30/2021    Type I (juvenile type) diabetes mellitus without mention of complication, uncontrolled 2011       Past Surgical History:   Procedure Laterality Date    ABCESS DRAINAGE      boil went over GA     SECTION  2012    DILATION AND CURETTAGE OF UTERUS  2019    Procedure: DILATION AND CURETTAGE, UTERUS;  Surgeon: Purnima Kidd MD;  Location: [facility] OR;  Service: OB/GYN;;    HYSTEROSCOPY N/A 2019    Procedure: HYSTEROSCOPY;  Surgeon: Purnima Kidd MD;  Location: [facility] OR;  Service: OB/GYN;  Laterality: N/A;    TONSILLECTOMY, ADENOIDECTOMY      WISDOM TOOTH EXTRACTION         Review of patient's allergies indicates:   Allergen Reactions    No known allergies        No current facility-administered medications on file prior to encounter.     Current Outpatient Medications on File Prior to Encounter   Medication Sig    apixaban (ELIQUIS) 5 mg Tab Take 1 tablet (5 mg total) by mouth 2 (two) times daily. Take SECOND.    insulin (LANTUS SOLOSTAR U-100 INSULIN) glargine 100 units/mL SubQ pen Inject 16-22 units at night. (medication will last 136 days)    insulin aspart U-100 (NOVOLOG FLEXPEN U-100 INSULIN) 100 unit/mL (3 mL) InPn pen Inject insulin/carb ratio 1:12, target 150, correction factor 25. 150-200+2, 201-250+4, 251-300+6, 301-350+8, >350+10.Max daily 60 units.    blood sugar diagnostic Strp To check BG 4  times daily, to use with insurance preferred meter, e 10.65 (Patient taking differently: To check BG 4  times daily, to use with insurance preferred meter, e 10.65)    ELINEST 0.3-30 mg-mcg per tablet     ergocalciferol (VITAMIN D2) 50,000 unit Cap Take 1 capsule (50,000 Units total) by mouth every 7 days.    hydrocortisone (ANUSOL-HC) 2.5 % rectal cream Place rectally 2  "(two) times daily.    insulin syringe-needle U-100 0.5 mL 31 gauge x 5/16 Syrg use with admelog    lancets Misc To check BG 4 times daily, to use with insurance preferred meter, e10.65    norethindrone (ORTHO MICRONOR) 0.35 mg tablet Take 1 tablet (0.35 mg total) by mouth once daily.    ondansetron (ZOFRAN-ODT) 4 MG TbDL Take 1 tablet by mouth every 8 (eight) hours as needed for Nausea.    ONETOUCH DELICA PLUS LANCET 33 gauge Misc USE 1 UNIT TO CHECK GLUCOSE 4 TIMES DAILY    ONETOUCH VERIO FLEX METER Misc USE AS DIRECTED TO CHECK BLOOD GLUCOSE 4 TIMES DAILY    pantoprazole (PROTONIX) 40 MG tablet Take 1 tablet by mouth once daily.    pen needle, diabetic (BD EBEN 2ND GEN PEN NEEDLE) 32 gauge x 5/32" Ndle Use 5 times daily as directed (Patient taking differently: Use 5 times daily as directed)    polyethylene glycol (GLYCOLAX) 17 gram/dose powder Take 17 g by mouth once daily.     Family History       Problem Relation (Age of Onset)    Diabetes Brother    Hydrocephalus Daughter    No Known Problems Mother, Father, Brother, Brother, Maternal Aunt, Maternal Uncle, Paternal Aunt, Paternal Uncle, Maternal Grandmother, Maternal Grandfather, Paternal Grandmother, Paternal Grandfather          Tobacco Use    Smoking status: Never    Smokeless tobacco: Never   Substance and Sexual Activity    Alcohol use: Yes     Comment: occasionally    Drug use: No    Sexual activity: Yes     Partners: Male     Birth control/protection: Injection     Review of Systems   Constitutional:  Negative for activity change, appetite change, chills, fever and unexpected weight change.   HENT:  Negative for congestion and sore throat.    Respiratory:  Positive for shortness of breath. Negative for cough.    Cardiovascular:  Negative for chest pain, palpitations and leg swelling.   Gastrointestinal:  Positive for nausea. Negative for abdominal distention, abdominal pain, blood in stool, constipation, diarrhea and vomiting.   Genitourinary:  Negative " for difficulty urinating, dysuria and hematuria.   Musculoskeletal:  Positive for arthralgias. Negative for myalgias.   Skin:  Negative for color change and rash.   Neurological:  Negative for dizziness, tremors and seizures.   Objective:     Vital Signs (Most Recent):  Temp: 98.9 °F (37.2 °C) (10/02/22 1830)  Pulse: 100 (10/02/22 2057)  Resp: 14 (10/02/22 2057)  BP: 110/60 (10/02/22 2057)  SpO2: 100 % (10/02/22 2057) Vital Signs (24h Range):  Temp:  [98.9 °F (37.2 °C)] 98.9 °F (37.2 °C)  Pulse:  [100-122] 100  Resp:  [14-23] 14  SpO2:  [99 %-100 %] 100 %  BP: (100-111)/(54-60) 110/60     Weight: 79.4 kg (175 lb)  Body mass index is 31 kg/m².    Physical Exam  Vitals reviewed.   Constitutional:       General: She is not in acute distress.     Appearance: She is well-developed.   HENT:      Head: Normocephalic and atraumatic.   Eyes:      Extraocular Movements: Extraocular movements intact.      Pupils: Pupils are equal, round, and reactive to light.   Neck:      Vascular: No JVD.      Trachea: No tracheal deviation.   Cardiovascular:      Rate and Rhythm: Normal rate and regular rhythm.      Heart sounds: No murmur heard.    No friction rub. No gallop.   Pulmonary:      Effort: No respiratory distress.      Breath sounds: Normal breath sounds. No wheezing or rales.   Abdominal:      General: Bowel sounds are normal. There is no distension.      Palpations: Abdomen is soft. There is no mass.      Tenderness: There is no abdominal tenderness.   Musculoskeletal:         General: No deformity.      Cervical back: Neck supple.   Lymphadenopathy:      Cervical: No cervical adenopathy.   Skin:     General: Skin is warm and dry.      Findings: No rash.   Neurological:      Mental Status: She is alert and oriented to person, place, and time.         CRANIAL NERVES     CN III, IV, VI   Pupils are equal, round, and reactive to light.     Significant Labs: All pertinent labs within the past 24 hours have been  reviewed.    Significant Imaging: I have reviewed all pertinent imaging results/findings within the past 24 hours.

## 2022-10-03 NOTE — ASSESSMENT & PLAN NOTE
T1DM dx at age 17, om MDI at home with unclear regimen  Takes Levemir 22u qhs + Novolog using ICR and correction - some confusion regarding regimen  At discharge, will simplify regimen to fixed doses with a specific SSI  Follows with Braeden Ochoa for her DM

## 2022-10-03 NOTE — CONSULTS
Nutrition-Related Diabetes Education      Time Spent: 10 minutes     Learners: Patient     Current HbA1c: 6.8    Is patient aware of their A1c and their goal A1c? Yes    Nutrition Education with handouts: MyPlate, CHO counting     Comments: Pt familiar with diabetic diet. Provided and explained handout detailing sources of carbohydrates, appropriate serving sizes, and the plate method for meal planning. Pt voiced understanding. All questions and concerns answered.    Barriers to Learning: None identified     Follow up: Yes    Please consult as needed.    Thank you!  MS Adali, RD, LDN

## 2022-10-03 NOTE — HPI
Reason for Consult: Management of DKA in pt [facility] T1DM    Surgical Procedure and Date: n/a    Diabetes diagnosis year: at age 17    Home Diabetes Medications:  Levemir 22 units qhs + Novolog AC - pt states she uses ICR 1:5 (she thinks?) and a sliding scale but there is some uncertainty in answers. She reports most meals she takes between 15-22 units of Novolog.     How often checking glucose at home? 1-3 x day   BG readings on regimen: Variable, but mostly <200  Hypoglycemia on the regimen?  No  Missed doses on regimen?  Yes    Diabetes Complications include:     Diabetic retinopathy     Complicating diabetes co morbidities: none      HPI:   Patient is a 31 y.o. 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 Pt states she took her Levemir Saturday night (but told ED provider she is not sure). Then Sunday she states she had a very stressful day emotionally and did not take any Novolog. When she began to develop symptoms Sunday evening she was concerned for DKA and came to the ED. She denies fevers, chills, cough, vomiting, dysuria, recent illness, or sick contacts. She was admitted to hospital medicine for DKA and started on intensive insulin gtt.     Pt states she has not had DKA in last 2yrs. She sees Braeden Ochoa NP in the primary care center for her diabetes. She has seen another provider in that clinic while Braeden has been out on maternity leave though.     Endocrine was consulted for DKA.

## 2022-10-03 NOTE — ASSESSMENT & PLAN NOTE
- Pt. Admitted 8/2022 for SMV thrombus which was provoked in the setting of bariatric surgery and OCPs. She has had negative hypercoagulable workup so far and is tolerating apixaban well.   -Continue apixaban until 11/16/22 per hematology recommendations

## 2022-10-03 NOTE — CONSULTS
[facility] - Intensive Care ([facility]-)  Endocrinology  Diabetes Consult Note    Consult Requested by: Cris Jay MD   Reason for admit: DKA (diabetic ketoacidosis)    HISTORY OF PRESENT ILLNESS:  Reason for Consult: Management of DKA in pt wih T1DM    Surgical Procedure and Date: n/a    Diabetes diagnosis year: at age 17    Home Diabetes Medications:  Levemir 22 units qhs + Novolog AC - pt states she uses ICR 1:5 (she thinks?) and a sliding scale but there is some uncertainty in answers. She reports most meals she takes between 15-22 units of Novolog.     How often checking glucose at home? 1-3 x day   BG readings on regimen: Variable, but mostly <200  Hypoglycemia on the regimen?  No  Missed doses on regimen?  Yes    Diabetes Complications include:     Diabetic retinopathy     Complicating diabetes co morbidities: none      HPI:   Patient is a 31 y.o. 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 Pt states she took her Levemir Saturday night (but told ED provider she is not sure). Then Sunday she states she had a very stressful day emotionally and did not take any Novolog. When she began to develop symptoms Sunday evening she was concerned for DKA and came to the ED. She denies fevers, chills, cough, vomiting, dysuria, recent illness, or sick contacts. She was admitted to hospital medicine for DKA and started on intensive insulin gtt.     Pt states she has not had DKA in last 2yrs. She sees Braeden Ochoa NP in the primary care center for her diabetes. She has seen another provider in that clinic while Braeden has been out on maternity leave though.     Endocrine was consulted for DKA.          Interval HPI:   Overnight events:   Eating:   NPO  Nausea: Yes, mild - improving  Hypoglycemia and intervention: No  Fever: No  TPN  and/or TF: No  If yes, type of TF/TPN and rate: n/a    PMH, PSH, FH, SH updated and reviewed     ROS:  Review of Systems   Constitutional:  Negative for chills and fever.   HENT:  Negative for congestion, sore throat and trouble swallowing.    Eyes:  Negative for pain and visual disturbance.   Respiratory:  Positive for shortness of breath (mild, improving). Negative for cough.    Cardiovascular:  Negative for chest pain and palpitations.   Gastrointestinal:  Positive for nausea. Negative for abdominal distention, abdominal pain and vomiting.   Endocrine: Negative for polydipsia and polyuria.   Genitourinary:  Negative for difficulty urinating and dysuria.   Musculoskeletal:  Negative for arthralgias and gait problem.   Skin:  Negative for rash and wound.   Neurological:  Negative for dizziness, tremors, weakness and light-headedness.   Hematological:  Bruises/bleeds easily.   Psychiatric/Behavioral:  Negative for behavioral problems and confusion.      Current Medications and/or Treatments Impacting Glycemic Control  Immunotherapy:    Immunosuppressants       None          Steroids:   Hormones (From admission, onward)      Start     Stop Route Frequency Ordered    10/02/22 2120  melatonin tablet 6 mg         -- Oral Nightly PRN 10/02/22 2021          Pressors:    Autonomic Drugs (From admission, onward)      None          Hyperglycemia/Diabetes Medications:   Antihyperglycemics (From admission, onward)      Start     Stop Route Frequency Ordered    10/03/22 1230  insulin aspart U-100 pen 0-5 Units         -- SubQ As needed (PRN) 10/03/22 1148    10/03/22 1230  insulin regular in 0.9 % NaCl 100 unit/100 mL (1 unit/mL) infusion        Question:  Enter initial dose (Units/hr):  Answer:  1.4    -- IV Continuous 10/03/22 1148    10/03/22 1200  insulin aspart U-100 pen 10 Units         -- SubQ 3 times daily with meals 10/03/22 1148             PHYSICAL EXAMINATION:  Vitals:    10/03/22 1245   BP: 98/64   Pulse: 85    Resp: 17   Temp: 98.5 °F (36.9 °C)     Body mass index is 32.02 kg/m².    Physical Exam  Vitals and nursing note reviewed.   Constitutional:       General: She is not in acute distress.     Appearance: She is obese. She is not ill-appearing.   HENT:      Head: Normocephalic.      Mouth/Throat:      Mouth: Mucous membranes are moist.   Eyes:      Extraocular Movements: Extraocular movements intact.      Conjunctiva/sclera: Conjunctivae normal.   Cardiovascular:      Rate and Rhythm: Normal rate and regular rhythm.   Pulmonary:      Effort: Pulmonary effort is normal.   Abdominal:      General: There is no distension.      Palpations: Abdomen is soft.      Tenderness: There is no abdominal tenderness. There is no guarding.   Musculoskeletal:      Cervical back: Normal range of motion and neck supple.      Right lower leg: No edema.      Left lower leg: No edema.   Skin:     General: Skin is warm and dry.   Neurological:      General: No focal deficit present.      Mental Status: She is alert and oriented to person, place, and time.   Psychiatric:         Mood and Affect: Mood normal.         Behavior: Behavior normal.         Labs Reviewed and Include   Recent Labs   Lab 10/02/22  1853 10/02/22  2339 10/03/22  1103   *   < > 217*   CALCIUM 9.8   < > 8.8   ALBUMIN 3.5  --   --    PROT 7.4  --   --    *   < > 136   K 4.4   < > 4.8   CO2 13*   < > 20*      < > 110   BUN 12   < > 8   CREATININE 0.9   < > 0.8   ALKPHOS 86  --   --    ALT 47*  --   --    AST 19  --   --    BILITOT 0.9  --   --     < > = values in this interval not displayed.     Lab Results   Component Value Date    WBC 8.79 10/03/2022    HGB 11.2 (L) 10/03/2022    HCT 36.5 (L) 10/03/2022    MCV 88 10/03/2022     10/03/2022     No results for input(s): TSH, FREET4 in the last 168 hours.  Lab Results   Component Value Date    HGBA1C 6.8 (H) 10/03/2022       Nutritional status:   Body mass index is 32.02 kg/m².  Lab Results    Component Value Date    ALBUMIN 3.5 10/02/2022    ALBUMIN 3.5 09/22/2022    ALBUMIN 3.2 (L) 08/15/2022     No results found for: PREALBUMIN    Estimated Creatinine Clearance: 105.2 mL/min (based on SCr of 0.8 mg/dL).    Accu-Checks  Recent Labs     10/03/22  0146 10/03/22  0252 10/03/22  0353 10/03/22  0458 10/03/22  0545 10/03/22  0715 10/03/22  0832 10/03/22  0934 10/03/22  1051 10/03/22  1239   POCTGLUCOSE 189* 186* 195* 194* 183* 174* 178* 201* 195* 203*        ASSESSMENT and PLAN    * DKA (diabetic ketoacidosis)  - Unclear what triggered DKA, however labs on admission c/w true DKA - high AG metabolic acidosis, elevated BHB and hyperglycemia in a pt with T1DM  - I suspect she did not actually give her basal insulin 10/1 PM -- as she was uncertain initially despite reporting to me that she did take it  - Other than missing basal insulin, there is no obvious trigger for DKA -- no s/sx of infection or other acute illness   - DKA appears to have resolved quickly, with steady rate on intensive insulin gtt for >12h with near-resolution of symptoms    - Will start transition insulin gtt at rate of 1.4 u/hr with stepdown parameters   - Start Aspart 10 units TIDAC + low dose correction  - DM diet  - Accuchecks q4h while on transition gtt  - Will plan to transition back to MDI 10/4 AM       Type 1 diabetes mellitus with hyperglycemia  T1DM dx at age 17, om MDI at home with unclear regimen  Takes Levemir 22u qhs + Novolog using ICR and correction - some confusion regarding regimen  At discharge, will simplify regimen to fixed doses with a specific SSI  Follows with Braeden Ochoa for her DM    Obesity (BMI 30-39.9)  Obesity s/p sleeve gastrectomy in 8/2022          Plan discussed with patient, family, and RN at bedside.     Cammie Lyon MD  Endocrinology  [facility] - Intensive Care ([place]-)

## 2022-10-03 NOTE — ED NOTES
Patient identifiers for Anusha Andrew 31 y.o. female checked and correct.  Chief Complaint   Patient presents with    Hyperglycemia     Typr 1 diabetic. No short acting insulin today. Thinks she had long acting last night.      Past Medical History:   Diagnosis Date    Depression     Hyperlipidemia     Hypertension     Mild nonproliferative diabetic retinopathy of both eyes without macular edema associated with type 1 diabetes mellitus 3/30/2021    Type I (juvenile type) diabetes mellitus without mention of complication, uncontrolled 11/23/2011     Allergies reported:   Review of patient's allergies indicates:   Allergen Reactions    No known allergies          LOC: Patient is awake, alert, and aware of environment with an appropriate affect. Pt is very quiet when talking and seemingly sluggish. Patient is oriented x 4 and speaking appropriately.  APPEARANCE: Patient resting comfortably and in no acute distress. Patient is clean and well groomed, patient's clothing is properly fastened.  HEENT: - JVD, + midline trach.  SKIN: The skin is warm and dry. Patient has normal skin turgor and moist mucus membranes.   MUSKULOSKELETAL: Patient is moving all extremities well, no obvious deformities noted. Pulses intact. PMS x 4  RESPIRATORY: Airway is open and patent. Respirations are spontaneous and non-labored with normal effort and rate. = CBBS  CARDIAC: Patient has a tachycardic rate and regular rhythm. 114 on cardiac monitor. No peripheral edema noted.   ABDOMEN: No distention noted. Soft and non-tender upon palpation. Pt has scar from gastric sleeve operation.  NEUROLOGICAL: pupils 5 mm, PERRL. Facial expression is symmetrical. Hand grasps are equal bilaterally. Normal sensation in all extremities when touched with finger.

## 2022-10-03 NOTE — ASSESSMENT & PLAN NOTE
- Unclear what triggered DKA, however labs on admission c/w true DKA - high AG metabolic acidosis, elevated BHB and hyperglycemia in a pt with T1DM  - I suspect she did not actually give her basal insulin 10/1 PM -- as she was uncertain initially despite reporting to me that she did take it  - Other than missing basal insulin, there is no obvious trigger for DKA -- no s/sx of infection or other acute illness   - DKA appears to have resolved quickly, with steady rate on intensive insulin gtt for >12h with near-resolution of symptoms    - Will start transition insulin gtt at rate of 1.4 u/hr with stepdown parameters   - Start Aspart 10 units TIDAC + low dose correction  - DM diet  - Accuchecks q4h while on transition gtt  - Will plan to transition back to MDI 10/4 AM

## 2022-10-03 NOTE — HPI
30 yo F with PMHx Type 1 DM, morbid obesity s/p gastric sleeve (7/2022) complicated by superior mesentatic thrombus now on eliquis who presents to the ED complaining of SOB and nausea x 1 day. Pt. Reports that she started having nausera and generalized malaise tyhis morning. She check her BG at home and noted it to be elevated. She was concerned sghe might be in DKA, so she came to the hospital. Pt. Home regimen of insulin is levemir 16-22 units sliding scale with prandial novolog 1:10 insulin:carbohydrate scale. Pt. Reports that she took her levemir last night, but she did not take any humalog today because she did not eat 2/2 nausea. She denies any cough, fevers, chills, dysuria, or other infectious symptoms. No other reported provoking facotrs for her symptoms.

## 2022-10-04 VITALS
OXYGEN SATURATION: 99 % | DIASTOLIC BLOOD PRESSURE: 63 MMHG | SYSTOLIC BLOOD PRESSURE: 107 MMHG | HEIGHT: 63 IN | RESPIRATION RATE: 20 BRPM | HEART RATE: 86 BPM | TEMPERATURE: 98 F | WEIGHT: 180.75 LBS | BODY MASS INDEX: 32.03 KG/M2

## 2022-10-04 PROBLEM — E11.10 DKA (DIABETIC KETOACIDOSIS): Status: RESOLVED | Noted: 2018-01-07 | Resolved: 2022-10-04

## 2022-10-04 PROBLEM — K55.069 SUPERIOR MESENTERIC VEIN THROMBOSIS: Status: RESOLVED | Noted: 2022-08-16 | Resolved: 2022-10-04

## 2022-10-04 LAB
ANION GAP SERPL CALC-SCNC: 7 MMOL/L (ref 8–16)
BUN SERPL-MCNC: 4 MG/DL (ref 6–20)
CALCIUM SERPL-MCNC: 8.3 MG/DL (ref 8.7–10.5)
CHLORIDE SERPL-SCNC: 109 MMOL/L (ref 95–110)
CO2 SERPL-SCNC: 20 MMOL/L (ref 23–29)
CREAT SERPL-MCNC: 0.7 MG/DL (ref 0.5–1.4)
EST. GFR  (NO RACE VARIABLE): >60 ML/MIN/1.73 M^2
GLUCOSE SERPL-MCNC: 189 MG/DL (ref 70–110)
POCT GLUCOSE: 114 MG/DL (ref 70–110)
POCT GLUCOSE: 128 MG/DL (ref 70–110)
POCT GLUCOSE: 131 MG/DL (ref 70–110)
POCT GLUCOSE: 151 MG/DL (ref 70–110)
POCT GLUCOSE: 196 MG/DL (ref 70–110)
POCT GLUCOSE: 210 MG/DL (ref 70–110)
POTASSIUM SERPL-SCNC: 3.8 MMOL/L (ref 3.5–5.1)
SODIUM SERPL-SCNC: 136 MMOL/L (ref 136–145)

## 2022-10-04 PROCEDURE — 25000003 PHARM REV CODE 250: Performed by: HOSPITALIST

## 2022-10-04 PROCEDURE — 36415 COLL VENOUS BLD VENIPUNCTURE: CPT | Performed by: STUDENT IN AN ORGANIZED HEALTH CARE EDUCATION/TRAINING PROGRAM

## 2022-10-04 PROCEDURE — 99232 PR SUBSEQUENT HOSPITAL CARE,LEVL II: ICD-10-PCS | Mod: ,,, | Performed by: GENERAL ACUTE CARE HOSPITAL

## 2022-10-04 PROCEDURE — 25000003 PHARM REV CODE 250: Performed by: STUDENT IN AN ORGANIZED HEALTH CARE EDUCATION/TRAINING PROGRAM

## 2022-10-04 PROCEDURE — 63700000 PHARM REV CODE 250 ALT 637 W/O HCPCS: Performed by: HOSPITALIST

## 2022-10-04 PROCEDURE — S5010 5% DEXTROSE AND 0.45% SALINE: HCPCS | Performed by: HOSPITALIST

## 2022-10-04 PROCEDURE — 80048 BASIC METABOLIC PNL TOTAL CA: CPT | Performed by: STUDENT IN AN ORGANIZED HEALTH CARE EDUCATION/TRAINING PROGRAM

## 2022-10-04 PROCEDURE — 99232 SBSQ HOSP IP/OBS MODERATE 35: CPT | Mod: ,,, | Performed by: GENERAL ACUTE CARE HOSPITAL

## 2022-10-04 RX ORDER — INSULIN ASPART 100 [IU]/ML
10 INJECTION, SOLUTION INTRAVENOUS; SUBCUTANEOUS
Qty: 9 ML | Refills: 0
Start: 2022-10-04 | End: 2022-10-04 | Stop reason: SDUPTHER

## 2022-10-04 RX ORDER — INSULIN ASPART 100 [IU]/ML
10 INJECTION, SOLUTION INTRAVENOUS; SUBCUTANEOUS
Qty: 9 ML | Refills: 0 | Status: SHIPPED | OUTPATIENT
Start: 2022-10-04 | End: 2022-12-27 | Stop reason: SDUPTHER

## 2022-10-04 RX ORDER — GLUCAGON 1 MG
1 KIT INJECTION
Status: DISCONTINUED | OUTPATIENT
Start: 2022-10-04 | End: 2022-10-04 | Stop reason: HOSPADM

## 2022-10-04 RX ORDER — IBUPROFEN 200 MG
16 TABLET ORAL
Status: DISCONTINUED | OUTPATIENT
Start: 2022-10-05 | End: 2022-10-04 | Stop reason: HOSPADM

## 2022-10-04 RX ORDER — INSULIN ASPART 100 [IU]/ML
0-5 INJECTION, SOLUTION INTRAVENOUS; SUBCUTANEOUS
Qty: 18 ML | Refills: 0 | Status: SHIPPED | OUTPATIENT
Start: 2022-10-04 | End: 2022-11-12

## 2022-10-04 RX ORDER — INSULIN ASPART 100 [IU]/ML
0-5 INJECTION, SOLUTION INTRAVENOUS; SUBCUTANEOUS
Qty: 3 ML | Refills: 0 | Status: SHIPPED | OUTPATIENT
Start: 2022-10-04 | End: 2022-10-04 | Stop reason: SDUPTHER

## 2022-10-04 RX ORDER — INSULIN ASPART 100 [IU]/ML
0-5 INJECTION, SOLUTION INTRAVENOUS; SUBCUTANEOUS
Qty: 18 ML | Refills: 0 | Status: SHIPPED | OUTPATIENT
Start: 2022-10-04 | End: 2022-10-04 | Stop reason: SDUPTHER

## 2022-10-04 RX ORDER — IBUPROFEN 200 MG
24 TABLET ORAL
Status: DISCONTINUED | OUTPATIENT
Start: 2022-10-04 | End: 2022-10-04 | Stop reason: HOSPADM

## 2022-10-04 RX ADMIN — PANTOPRAZOLE SODIUM 40 MG: 40 TABLET, DELAYED RELEASE ORAL at 08:10

## 2022-10-04 RX ADMIN — INSULIN ASPART 10 UNITS: 100 INJECTION, SOLUTION INTRAVENOUS; SUBCUTANEOUS at 06:10

## 2022-10-04 RX ADMIN — INSULIN ASPART 10 UNITS: 100 INJECTION, SOLUTION INTRAVENOUS; SUBCUTANEOUS at 08:10

## 2022-10-04 RX ADMIN — INSULIN ASPART 10 UNITS: 100 INJECTION, SOLUTION INTRAVENOUS; SUBCUTANEOUS at 01:10

## 2022-10-04 RX ADMIN — INSULIN ASPART 2 UNITS: 100 INJECTION, SOLUTION INTRAVENOUS; SUBCUTANEOUS at 08:10

## 2022-10-04 RX ADMIN — APIXABAN 5 MG: 5 TABLET, FILM COATED ORAL at 08:10

## 2022-10-04 RX ADMIN — INSULIN DETEMIR 15 UNITS: 100 INJECTION, SOLUTION SUBCUTANEOUS at 10:10

## 2022-10-04 RX ADMIN — DEXTROSE AND SODIUM CHLORIDE 125 ML/HR: 5; .45 INJECTION, SOLUTION INTRAVENOUS at 07:10

## 2022-10-04 RX ADMIN — FLUOXETINE 10 MG: 10 CAPSULE ORAL at 08:10

## 2022-10-04 RX ADMIN — NORETHINDRONE 0.35 MG: 0.35 TABLET ORAL at 08:10

## 2022-10-04 RX ADMIN — INSULIN ASPART 1 UNITS: 100 INJECTION, SOLUTION INTRAVENOUS; SUBCUTANEOUS at 04:10

## 2022-10-04 NOTE — SUBJECTIVE & OBJECTIVE
"Interval HPI:   Overnight events: No acute events o/n. Pt did well on transition gtt and prandial insulin yesterday and o/n; transitioned to MDI this morning.   Eatin%  Nausea: No  Hypoglycemia and intervention: No  Fever: No  TPN and/or TF: No  If yes, type of TF/TPN and rate: n/a    /63 (BP Location: Left arm)   Pulse 86   Temp 98.3 °F (36.8 °C)   Resp 20   Ht 5' 3" (1.6 m)   Wt 82 kg (180 lb 12.4 oz)   SpO2 99%   BMI 32.02 kg/m²     Labs Reviewed and Include    Recent Labs   Lab 10/04/22  0540   *   CALCIUM 8.3*      K 3.8   CO2 20*      BUN 4*   CREATININE 0.7     Lab Results   Component Value Date    WBC 8.79 10/03/2022    HGB 11.2 (L) 10/03/2022    HCT 36.5 (L) 10/03/2022    MCV 88 10/03/2022     10/03/2022     No results for input(s): TSH, FREET4 in the last 168 hours.  Lab Results   Component Value Date    HGBA1C 6.8 (H) 10/03/2022       Nutritional status:   Body mass index is 32.02 kg/m².  Lab Results   Component Value Date    ALBUMIN 3.5 10/02/2022    ALBUMIN 3.5 2022    ALBUMIN 3.2 (L) 08/15/2022     No results found for: PREALBUMIN    Estimated Creatinine Clearance: 120.2 mL/min (based on SCr of 0.7 mg/dL).    Accu-Checks  Recent Labs     10/03/22  0934 10/03/22  1051 10/03/22  1239 10/03/22  1743 10/03/22  2134 10/04/22  0104 10/04/22  0438 10/04/22  0728 10/04/22  1125 10/04/22  1501   POCTGLUCOSE 201* 195* 203* 138* 114* 131* 196* 210* 151* 128*       Current Medications and/or Treatments Impacting Glycemic Control  Immunotherapy:    Immunosuppressants       None          Steroids:   Hormones (From admission, onward)      Start     Stop Route Frequency Ordered    10/02/22 2120  melatonin tablet 6 mg         -- Oral Nightly PRN 10/02/22 2021          Pressors:    Autonomic Drugs (From admission, onward)      None          Hyperglycemia/Diabetes Medications:   Antihyperglycemics (From admission, onward)      Start     Stop Route Frequency Ordered    " 10/04/22 0845  insulin detemir U-100 pen 15 Units         -- SubQ 2 times daily 10/04/22 0838    10/03/22 1230  insulin aspart U-100 pen 0-5 Units         -- SubQ As needed (PRN) 10/03/22 1148    10/03/22 1230  insulin regular in 0.9 % NaCl 100 unit/100 mL (1 unit/mL) infusion        Question:  Enter initial dose (Units/hr):  Answer:  1.4    10/04 1130 IV Continuous 10/03/22 1148    10/03/22 1200  insulin aspart U-100 pen 10 Units         -- SubQ 3 times daily with meals 10/03/22 1148

## 2022-10-04 NOTE — PROGRESS NOTES
[facility] - Intensive Care ([facility]  Endocrinology  Progress Note    Admit Date: 10/2/2022     Reason for Consult: Management of DKA in pt wih T1DM    Surgical Procedure and Date: n/a    Diabetes diagnosis year: at age 17    Home Diabetes Medications:  Levemir 22 units qhs + Novolog AC - pt states she uses ICR 1:5 (she thinks?) and a sliding scale but there is some uncertainty in answers. She reports most meals she takes between 15-22 units of Novolog.     How often checking glucose at home? 1-3 x day   BG readings on regimen: Variable, but mostly <200  Hypoglycemia on the regimen?  No  Missed doses on regimen?  Yes    Diabetes Complications include:     Diabetic retinopathy     Complicating diabetes co morbidities: none      HPI:   Patient is a 31 y.o. 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 Pt states she took her Levemir Saturday night (but told ED provider she is not sure). Then  she states she had a very stressful day emotionally and did not take any Novolog. When she began to develop symptoms  evening she was concerned for DKA and came to the ED. She denies fevers, chills, cough, vomiting, dysuria, recent illness, or sick contacts. She was admitted to hospital medicine for DKA and started on intensive insulin gtt.     Pt states she has not had DKA in last 2yrs. She sees Braeden Ochoa NP in the primary care center for her diabetes. She has seen another provider in that clinic while Braeden has been out on maternity leave though.     Endocrine was consulted for DKA.          Interval HPI:   Overnight events: No acute events o/n. Pt did well on transition gtt and prandial insulin yesterday and o/n; transitioned to MDI this morning.   Eatin%  Nausea: No  Hypoglycemia and intervention: No  Fever:  "No  TPN and/or TF: No  If yes, type of TF/TPN and rate: n/a    /63 (BP Location: Left arm)   Pulse 86   Temp 98.3 °F (36.8 °C)   Resp 20   Ht 5' 3" (1.6 m)   Wt 82 kg (180 lb 12.4 oz)   SpO2 99%   BMI 32.02 kg/m²     Labs Reviewed and Include    Recent Labs   Lab 10/04/22  0540   *   CALCIUM 8.3*      K 3.8   CO2 20*      BUN 4*   CREATININE 0.7     Lab Results   Component Value Date    WBC 8.79 10/03/2022    HGB 11.2 (L) 10/03/2022    HCT 36.5 (L) 10/03/2022    MCV 88 10/03/2022     10/03/2022     No results for input(s): TSH, FREET4 in the last 168 hours.  Lab Results   Component Value Date    HGBA1C 6.8 (H) 10/03/2022       Nutritional status:   Body mass index is 32.02 kg/m².  Lab Results   Component Value Date    ALBUMIN 3.5 10/02/2022    ALBUMIN 3.5 09/22/2022    ALBUMIN 3.2 (L) 08/15/2022     No results found for: PREALBUMIN    Estimated Creatinine Clearance: 120.2 mL/min (based on SCr of 0.7 mg/dL).    Accu-Checks  Recent Labs     10/03/22  0934 10/03/22  1051 10/03/22  1239 10/03/22  1743 10/03/22  2134 10/04/22  0104 10/04/22  0438 10/04/22  0728 10/04/22  1125 10/04/22  1501   POCTGLUCOSE 201* 195* 203* 138* 114* 131* 196* 210* 151* 128*       Current Medications and/or Treatments Impacting Glycemic Control  Immunotherapy:    Immunosuppressants       None          Steroids:   Hormones (From admission, onward)      Start     Stop Route Frequency Ordered    10/02/22 2120  melatonin tablet 6 mg         -- Oral Nightly PRN 10/02/22 2021          Pressors:    Autonomic Drugs (From admission, onward)      None          Hyperglycemia/Diabetes Medications:   Antihyperglycemics (From admission, onward)      Start     Stop Route Frequency Ordered    10/04/22 0845  insulin detemir U-100 pen 15 Units         -- SubQ 2 times daily 10/04/22 0838    10/03/22 1230  insulin aspart U-100 pen 0-5 Units         -- SubQ As needed (PRN) 10/03/22 1148    10/03/22 1230  insulin regular in " 0.9 % NaCl 100 unit/100 mL (1 unit/mL) infusion        Question:  Enter initial dose (Units/hr):  Answer:  1.4    10/04 1130 IV Continuous 10/03/22 1148    10/03/22 1200  insulin aspart U-100 pen 10 Units         -- SubQ 3 times daily with meals 10/03/22 1148            ASSESSMENT and PLAN    * DKA (diabetic ketoacidosis)  Now resolved   - Unclear what triggered DKA, however labs on admission c/w true DKA - high AG metabolic acidosis, elevated BHB and hyperglycemia in a pt with T1DM  - I suspect she did not actually give her basal insulin 10/1 PM -- as she was uncertain initially despite reporting to me that she did take it  - Other than missing basal insulin, there is no obvious trigger for DKA -- no s/sx of infection or other acute illness   - DKA appears to have resolved quickly, with steady rate on intensive insulin gtt for >12h with near-resolution of symptoms  - Was on transition gtt + aspart subq with good glycemic control and tolerating diet     - Will begin Detemir 5u BID and stop transition insulin gtt (overlapped detemir and gtt by 2hrs)  - Continue aspart 10 units TIDAC + low dose correction  - DM diet  - Accuchecks TIDAC+qhs    - Hypoglycemia protocol in place  - If blood glucose greater than 300, please ask patient not to eat food or drink anything other than water until correctional insulin has brought it back below 250  - **Please ensure patient receives their p.r.n. insulin aspart if they eat a snack with more than 30 g of carbohydrate    ** Please notify Endocrine for any change and/or advance in diet**  ** Please call Endocrine for any BG related issues **        Type 1 diabetes mellitus with hyperglycemia  T1DM dx at age 17, om MDI at home with unclear regimen  Takes Levemir 22u qhs + Novolog using ICR and correction - some confusion regarding regimen  At discharge, will simplify regimen to fixed doses with a specific SSI  Follows with Braeden Ochoa for her DM    Obesity (BMI 30-39.9)  Obesity  s/p sleeve gastrectomy in 8/2022          Cammie Lyon MD  Endocrinology  [facility] - Intensive Care ([place]

## 2022-10-04 NOTE — ASSESSMENT & PLAN NOTE
Now resolved   - Unclear what triggered DKA, however labs on admission c/w true DKA - high AG metabolic acidosis, elevated BHB and hyperglycemia in a pt with T1DM  - I suspect she did not actually give her basal insulin 10/1 PM -- as she was uncertain initially despite reporting to me that she did take it  - Other than missing basal insulin, there is no obvious trigger for DKA -- no s/sx of infection or other acute illness   - DKA appears to have resolved quickly, with steady rate on intensive insulin gtt for >12h with near-resolution of symptoms  - Was on transition gtt + aspart subq with good glycemic control and tolerating diet     - Will begin Detemir 5u BID and stop transition insulin gtt (overlapped detemir and gtt by 2hrs)  - Continue aspart 10 units TIDAC + low dose correction  - DM diet  - Accuchecks TIDAC+qhs    - Hypoglycemia protocol in place  - If blood glucose greater than 300, please ask patient not to eat food or drink anything other than water until correctional insulin has brought it back below 250  - **Please ensure patient receives their p.r.n. insulin aspart if they eat a snack with more than 30 g of carbohydrate    ** Please notify Endocrine for any change and/or advance in diet**  ** Please call Endocrine for any BG related issues **

## 2022-10-04 NOTE — NURSING
Patient remains free from falls and injuries through out shift. Patient telemetry removed. IV removed with catheter tip in place. Patient VSS. Discharge instructions, medication list reviewed, and patient verbalizes understanding. Patient states she will walk down when mom arrives. Will continue to  monitor.

## 2022-10-05 ENCOUNTER — PATIENT OUTREACH (OUTPATIENT)
Dept: ADMINISTRATIVE | Facility: CLINIC | Age: 32
End: 2022-10-05
Payer: MEDICAID

## 2022-10-05 NOTE — PATIENT INSTRUCTIONS
Rani teaching reviewed with Anusha Andrew . She verbalized understanding.    Education was provided based on the patient's discharge diagnosis using the attached Rani patient education as a reference.

## 2022-10-05 NOTE — PROGRESS NOTES
C3 nurse spoke with Anusha Andrew for a TCC post hospital discharge follow up call. The patient reports does not have a scheduled HOSFU appointment. C3 nurse was unable to schedule HOSFU appointment for [facility]. Patient advised to contact their PCP to schedule a HOSPFU within 5-7 days.     Patient states that she has an appt with Magaly Muhammad NP 10/10/22 but not sure of the time.

## 2022-10-06 ENCOUNTER — PATIENT MESSAGE (OUTPATIENT)
Dept: RESEARCH | Facility: HOSPITAL | Age: 32
End: 2022-10-06
Payer: MEDICAID

## 2022-10-08 NOTE — DISCHARGE SUMMARY
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 Pt. Reports that she started having nausera and generalized malaise tyhis morning. She check her BG at home and noted it to be elevated. She was concerned sghe might be in DKA, so she came to the hospital. Pt. Home regimen of insulin is levemir 16-22 units sliding scale with prandial novolog 1:10 insulin:carbohydrate scale. Pt. Reports that she took her levemir last night, but she did not take any humalog today because she did not eat 2/2 nausea. She denies any cough, fevers, chills, dysuria, or other infectious symptoms. No other reported provoking facotrs for her symptoms.      * No surgery found *      Hospital Course:   DKA criteria met based on the following data:  on admit, bicarb 13 with anion gap 16. Ketones present in urine, pH 7.27. A1c <7. No obvious triggers/reversible cause such as infection.   Endocrinology consulted and DKA pathway ordered.   DKA resolved quickly and was transitioned to SQ regimen. Tolerated a full diet and is appropriate for discharge home today.  Insulin regimen adjusted per Endo's recs, reflected in medications section below.  OP Endocrine clinic follow up as instructed.          Goals of Care Treatment Preferences:  Code Status: Full Code        Consults:   Consults (From admission, onward)        Status Ordering Provider     Inpatient consult to Midline team  Once         Provider:  (Not yet assigned)    Completed NOEMI DE LA FUENTE     Inpatient consult to Spiritual Care  Once        Provider:  (Not yet assigned)    Completed NOEMI DE LA FUENTE     Inpatient consult to Endocrinology  Once        Provider:  (Not yet assigned)    Completed PIERRE SCOTT     Inpatient consult to Registered Dietitian/Nutritionist  Once        Provider:  (Not yet assigned)    Completed PIERRE SCOTT          Type 1 diabetes mellitus with hyperglycemia  -See DKA. Last A1c 7/9 7/2022. Repeat ordered  -Home regimen levemur 16-22 units with novolog insulin:CHO 1:10  -Endocrinology consulted for assistance with management and transition off gtt in the Type 1 DM patient      Final Active Diagnoses:    Diagnosis Date Noted POA    Type 1 diabetes mellitus with hyperglycemia [E10.65] 09/14/2016 Yes    Obesity (BMI 30-39.9) [E66.9] 07/01/2015 Yes      Problems Resolved During this Admission:    Diagnosis Date Noted Date Resolved POA    PRINCIPAL PROBLEM:  DKA (diabetic ketoacidosis) [E11.10] 01/07/2018 10/04/2022 Yes    Superior mesenteric vein thrombosis [K55.069] 08/16/2022 10/04/2022 Yes       Discharged Condition: stable    Disposition: Home or Self Care    Follow Up:    Patient Instructions:   No discharge procedures on file.    Significant Diagnostic Studies: Labs: CMP No results for input(s): NA, K, CL, CO2, GLU, BUN, CREATININE, CALCIUM, PROT, ALBUMIN, BILITOT, ALKPHOS, AST, ALT, ANIONGAP, ESTGFRAFRICA, EGFRNONAA in the last 48 hours. and CBC No results for input(s): WBC, HGB, HCT, PLT in the last 48 hours.    Pending Diagnostic Studies:     None         Medications:  Reconciled Home Medications:      Medication List      START taking these medications    FLUoxetine 10 MG capsule  Take 1 capsule (10 mg total) by mouth once daily.        CHANGE how you take these medications    * insulin aspart U-100 100 unit/mL (3 mL) Inpn pen  Commonly known as: NovoLOG  Inject 10 Units into the skin 3 (three) times  "daily with meals.  What changed:   · how much to take  · how to take this  · when to take this  · additional instructions     * insulin aspart U-100 100 unit/mL (3 mL) Inpn pen  Commonly known as: NovoLOG  Inject 0-5 Units into the skin as needed ((see chart below)). 150-200+1, 201-250 +2, 251-300+3, 301-350+4,>350+ 5. TDD is 60 units  What changed: You were already taking a medication with the same name, and this prescription was added. Make sure you understand how and when to take each.     insulin detemir U-100 100 unit/mL (3 mL) Inpn pen  Commonly known as: Levemir FLEXTOUCH  Inject 15 Units into the skin 2 (two) times daily.  What changed:   · medication strength  · how much to take  · when to take this     pen needle, diabetic 32 gauge x 5/32" Ndle  Use as directed  What changed: additional instructions         * This list has 2 medication(s) that are the same as other medications prescribed for you. Read the directions carefully, and ask your doctor or other care provider to review them with you.            CONTINUE taking these medications    BD INSULIN SYRINGE ULTRA-FINE 0.5 mL 31 gauge x 5/16" Syrg  Generic drug: insulin syringe-needle U-100  use with admelog     blood sugar diagnostic Strp  To check BG 4  times daily, to use with insurance preferred meter, e 10.65     ELIQUIS 5 mg Tab  Generic drug: apixaban  Take 1 tablet (5 mg total) by mouth 2 (two) times daily. Take SECOND.     ergocalciferol 50,000 unit Cap  Commonly known as: VITAMIN D2  Take 1 capsule (50,000 Units total) by mouth every 7 days.     * YANNA 0.35 mg tablet  Generic drug: norethindrone  Take 1 tablet (0.35 mg total) by mouth once daily.     * norethindrone 0.35 mg tablet  Commonly known as: MICRONOR  Take 1 tablet by mouth once daily.     hydrocortisone 2.5 % rectal cream  Commonly known as: ANUSOL-HC  Place rectally 2 (two) times daily.     * lancets Misc  To check BG 4 times daily, to use with insurance preferred meter, e10.65     * " ONETOUCH DELICA PLUS LANCET 33 gauge Misc  Generic drug: lancets  USE 1 UNIT TO CHECK GLUCOSE 4 TIMES DAILY     ONETOUCH VERIO FLEX METER Misc  Generic drug: blood-glucose meter  USE AS DIRECTED TO CHECK BLOOD GLUCOSE 4 TIMES DAILY     pantoprazole 40 MG tablet  Commonly known as: PROTONIX  Take 1 tablet by mouth once daily.     polyethylene glycol 17 gram/dose powder  Commonly known as: GLYCOLAX  Take 17 g by mouth once daily.         * This list has 4 medication(s) that are the same as other medications prescribed for you. Read the directions carefully, and ask your doctor or other care provider to review them with you.            STOP taking these medications    ondansetron 4 MG Tbdl  Commonly known as: ZOFRAN-ODT            Indwelling Lines/Drains at time of discharge:   Lines/Drains/Airways     None                 Time spent on the discharge of patient: 45 minutes         Cris Jay MD  Department of Hospital Medicine  [facility] - Intensive Care ([place])

## 2022-10-08 NOTE — HOSPITAL COURSE
DKA criteria met based on the following data:  on admit, bicarb 13 with anion gap 16. Ketones present in urine, pH 7.27. A1c <7. No obvious triggers/reversible cause such as infection.   Endocrinology consulted and DKA pathway ordered.   DKA resolved quickly and was transitioned to SQ regimen. Tolerated a full diet and is appropriate for discharge home today.  Insulin regimen adjusted per Endo's recs, reflected in medications section below.  OP Endocrine clinic follow up as instructed.

## 2022-10-10 ENCOUNTER — PATIENT MESSAGE (OUTPATIENT)
Dept: SURGERY | Facility: HOSPITAL | Age: 32
End: 2022-10-10
Payer: MEDICAID

## 2022-10-10 ENCOUNTER — OFFICE VISIT (OUTPATIENT)
Dept: OBSTETRICS AND GYNECOLOGY | Facility: CLINIC | Age: 32
End: 2022-10-10
Attending: OBSTETRICS & GYNECOLOGY
Payer: MEDICAID

## 2022-10-10 DIAGNOSIS — F32.2 SEVERE MAJOR DEPRESSION: Primary | ICD-10-CM

## 2022-10-10 PROCEDURE — 99213 OFFICE O/P EST LOW 20 MIN: CPT | Mod: 95,,, | Performed by: OBSTETRICS & GYNECOLOGY

## 2022-10-10 PROCEDURE — 3044F HG A1C LEVEL LT 7.0%: CPT | Mod: CPTII,95,, | Performed by: OBSTETRICS & GYNECOLOGY

## 2022-10-10 PROCEDURE — 1111F PR DISCHARGE MEDS RECONCILED W/ CURRENT OUTPATIENT MED LIST: ICD-10-PCS | Mod: CPTII,95,, | Performed by: OBSTETRICS & GYNECOLOGY

## 2022-10-10 PROCEDURE — 99213 PR OFFICE/OUTPT VISIT, EST, LEVL III, 20-29 MIN: ICD-10-PCS | Mod: 95,,, | Performed by: OBSTETRICS & GYNECOLOGY

## 2022-10-10 PROCEDURE — 3044F PR MOST RECENT HEMOGLOBIN A1C LEVEL <7.0%: ICD-10-PCS | Mod: CPTII,95,, | Performed by: OBSTETRICS & GYNECOLOGY

## 2022-10-10 PROCEDURE — 1111F DSCHRG MED/CURRENT MED MERGE: CPT | Mod: CPTII,95,, | Performed by: OBSTETRICS & GYNECOLOGY

## 2022-10-10 NOTE — PROGRESS NOTES
The patient location is: [place]  The chief complaint leading to consultation is: follow up for admission for depression and antidepressants    Visit type: audiovisual    Face to Face time with patient: 15 min  20 minutes of total time spent on the encounter, which includes face to face time and non-face to face time preparing to see the patient (eg, review of tests), Obtaining and/or reviewing separately obtained history, Documenting clinical information in the electronic or other health record, Independently interpreting results (not separately reported) and communicating results to the patient/family/caregiver, or Care coordination (not separately reported).         Each patient to whom he or she provides medical services by telemedicine is:  (1) informed of the relationship between the physician and patient and the respective role of any other health care provider with respect to management of the patient; and (2) notified that he or she may decline to receive medical services by telemedicine and may withdraw from such care at any time.    Notes:  Here for follow-up telemedicine visit.  Was admitted 1 week ago for DKA.  Patient reports she feels the reason she went into DKA was because of severe depression.  She had no energy to eat or take her medicine or take care of herself.  This has happened 1 time before.  In the past I prescribed Prozac for her and while she was in the hospital she requested that I refill her medication.  She was restarted back on 10 mg while in the hospital.  She felt that she did not see much progress and increased herself to 20 mg.  She has been on that dose for approximately 3 days.  Today the patient reports she is not seeing much improvement.  I recommend she increase to 20 mg in the morning and 20 mg in the evening.  She does say it is a little bit better but then it wears off.  I schedule an appointment for her to see Psychiatry Dr. Salinas in December and Dr. Johnson psychology  this Friday.  I recommend she keep both of these appointments.  All questions answered.  No suicidal or homicidal ideations.  Overall she plans to touch base with me if with any future changes.

## 2022-10-14 ENCOUNTER — PATIENT MESSAGE (OUTPATIENT)
Dept: PSYCHIATRY | Facility: CLINIC | Age: 32
End: 2022-10-14

## 2022-10-14 ENCOUNTER — OFFICE VISIT (OUTPATIENT)
Dept: PSYCHIATRY | Facility: CLINIC | Age: 32
End: 2022-10-14
Payer: MEDICAID

## 2022-10-14 DIAGNOSIS — F41.1 GAD (GENERALIZED ANXIETY DISORDER): ICD-10-CM

## 2022-10-14 DIAGNOSIS — F33.2 SEVERE EPISODE OF RECURRENT MAJOR DEPRESSIVE DISORDER, WITHOUT PSYCHOTIC FEATURES: Primary | ICD-10-CM

## 2022-10-14 PROCEDURE — 90791 PR PSYCHIATRIC DIAGNOSTIC EVALUATION: ICD-10-PCS | Mod: AH,HB,95, | Performed by: PSYCHOLOGIST

## 2022-10-14 PROCEDURE — 90791 PSYCH DIAGNOSTIC EVALUATION: CPT | Mod: AH,HB,95, | Performed by: PSYCHOLOGIST

## 2022-10-14 PROCEDURE — 3044F PR MOST RECENT HEMOGLOBIN A1C LEVEL <7.0%: ICD-10-PCS | Mod: AH,HB,CPTII,95 | Performed by: PSYCHOLOGIST

## 2022-10-14 PROCEDURE — 3044F HG A1C LEVEL LT 7.0%: CPT | Mod: AH,HB,CPTII,95 | Performed by: PSYCHOLOGIST
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
## 2022-10-18 ENCOUNTER — PATIENT MESSAGE (OUTPATIENT)
Dept: INTERNAL MEDICINE | Facility: CLINIC | Age: 32
End: 2022-10-18
Payer: MEDICAID

## 2022-10-29 ENCOUNTER — IMMUNIZATION (OUTPATIENT)
Dept: PRIMARY CARE CLINIC | Facility: CLINIC | Age: 32
End: 2022-10-29
Payer: MEDICAID

## 2022-10-29 PROCEDURE — 90471 IMMUNIZATION ADMIN: CPT | Mod: PBBFAC,PN

## 2022-10-31 ENCOUNTER — OFFICE VISIT (OUTPATIENT)
Dept: PSYCHIATRY | Facility: CLINIC | Age: 32
End: 2022-10-31
Payer: MEDICAID

## 2022-10-31 DIAGNOSIS — F41.1 GAD (GENERALIZED ANXIETY DISORDER): Primary | ICD-10-CM

## 2022-10-31 DIAGNOSIS — F33.2 SEVERE EPISODE OF RECURRENT MAJOR DEPRESSIVE DISORDER, WITHOUT PSYCHOTIC FEATURES: ICD-10-CM

## 2022-10-31 PROCEDURE — 3044F PR MOST RECENT HEMOGLOBIN A1C LEVEL <7.0%: ICD-10-PCS | Mod: AH,HB,CPTII,95 | Performed by: PSYCHOLOGIST

## 2022-10-31 PROCEDURE — 3044F HG A1C LEVEL LT 7.0%: CPT | Mod: AH,HB,CPTII,95 | Performed by: PSYCHOLOGIST

## 2022-10-31 PROCEDURE — 90834 PR PSYCHOTHERAPY W/PATIENT, 45 MIN: ICD-10-PCS | Mod: AH,HB,95, | Performed by: PSYCHOLOGIST

## 2022-10-31 PROCEDURE — 90834 PSYTX W PT 45 MINUTES: CPT | Mod: AH,HB,95, | Performed by: PSYCHOLOGIST

## 2022-10-31 NOTE — PROGRESS NOTES
Individual Psychotherapy (PhD/LCSW)    10/31/2022    Site:  Telemed        The patient location is: Patient's car in [place], [place].  The chief complaint leading to consultation is: depression, anxiety    Visit type: audiovisual    Face to Face time with patient: 45 minutes of total time spent on the encounter, which includes face to face time and non-face to face time preparing to see the patient (eg, review of tests), Obtaining and/or reviewing separately obtained history, Documenting clinical information in the electronic or other health record, Independently interpreting results (not separately reported) and communicating results to the patient/family/caregiver, or Care coordination (not separately reported).     Each patient to whom he or she provides medical services by telemedicine is:  (1) informed of the relationship between the physician and patient and the respective role of any other health care provider with respect to management of the patient; and (2) notified that he or she may decline to receive medical services by telemedicine and may withdraw from such care at any time.    Therapeutic Intervention: Met with patient.  Outpatient - Behavior modifying psychotherapy 45 min - CPT code 75324 and Outpatient - Supportive psychotherapy 45 min - CPT Code 27978    Chief complaint/reason for encounter: depression and anxiety     Interval history and content of current session: Patient was timely for her individual therapy session. She reported her mood has been about the same, some days are better than others. Most of the time she feels better at work as she is around others and distracted. However, Friday at work, she still felt down. We tried to work on identifying automatic thoughts in situations with low mood, but the patient was not able to and may be resistant. We switched to working on behavioral changes. The cognitive model and behavioral activation was presented. Goal was set for the next two weeks  of getting out of the house on her days off more often with at least once going to the park, a coffee shop, or school library to study.    Treatment plan:  Target symptoms: depression, anxiety   Why chosen therapy is appropriate versus another modality: relevant to diagnosis  Outcome monitoring methods: self-report, observation  Therapeutic intervention type: behavior modifying psychotherapy, supportive psychotherapy    Risk parameters:  Patient reports no suicidal ideation  Patient reports no homicidal ideation  Patient reports no self-injurious behavior  Patient reports no violent behavior    Verbal deficits: None    Patient's response to intervention:  The patient's response to intervention is guarded.    Progress toward goals and other mental status changes:  The patient's progress toward goals is limited.    Mental Status Exam:  General Appearance:  unremarkable, age appropriate   Speech: normal tone, normal rate, normal pitch, normal volume      Level of Cooperation: guarded      Thought Processes: normal and logical   Mood: depressed      Thought Content: normal, no suicidality, no homicidality, delusions, or paranoia   Affect: congruent and appropriate   Orientation: Oriented x3   Attention Span & Concentration: intact   Judgment & Insight: fair     Language  intact     Diagnosis:     ICD-10-CM ICD-9-CM   1. CRYSTAL (generalized anxiety disorder)  F41.1 300.02   2. Severe episode of recurrent major depressive disorder, without psychotic features  F33.2 296.33     Plan:  individual psychotherapy and medication management by physician    Return to clinic: 2 weeks    Length of Service (minutes): 45

## 2022-11-01 ENCOUNTER — PATIENT MESSAGE (OUTPATIENT)
Dept: OBSTETRICS AND GYNECOLOGY | Facility: CLINIC | Age: 32
End: 2022-11-01
Payer: MEDICAID

## 2022-11-01 ENCOUNTER — PATIENT MESSAGE (OUTPATIENT)
Dept: SURGERY | Facility: HOSPITAL | Age: 32
End: 2022-11-01
Payer: MEDICAID

## 2022-11-01 ENCOUNTER — PATIENT MESSAGE (OUTPATIENT)
Dept: HEMATOLOGY/ONCOLOGY | Facility: CLINIC | Age: 32
End: 2022-11-01
Payer: MEDICAID

## 2022-11-02 ENCOUNTER — TELEPHONE (OUTPATIENT)
Dept: PREADMISSION TESTING | Facility: HOSPITAL | Age: 32
End: 2022-11-02
Payer: MEDICAID

## 2022-11-02 DIAGNOSIS — Z01.818 PRE-OP TESTING: Primary | ICD-10-CM

## 2022-11-08 ENCOUNTER — PATIENT MESSAGE (OUTPATIENT)
Dept: PSYCHIATRY | Facility: CLINIC | Age: 32
End: 2022-11-08
Payer: MEDICAID

## 2022-11-11 ENCOUNTER — HOSPITAL ENCOUNTER (OUTPATIENT)
Dept: PREADMISSION TESTING | Facility: HOSPITAL | Age: 32
Discharge: HOME | End: 2022-11-11
Attending: ORTHOPAEDIC SURGERY
Payer: MEDICAID

## 2022-11-11 NOTE — DISCHARGE INSTRUCTIONS
Your surgery is scheduled for 11/18/22.    Please report to [facility] on the 1st Floor at 0530 a.m.    THIS TIME IS SUBJECT TO CHANGE.  YOU WILL RECEIVE A PHONE CALL THE DAY BEFORE SURGERY BY 3:30 PM TO CONFIRM YOUR TIME OF ARRIVAL.  IF YOU HAVE NOT RECEIVED A PHONE CALL BY 3:30 PM THE DAY BEFORE YOUR SURGERY PLEASE CALL [phone]     INSTRUCTIONS IMPORTANT!!!  ¨ Do not eat or drink after 12 midnight-including water, candy, gum, & mints. OK to brush teeth.      ____  Proceed to [facility] on *** for additional testing.        ____  Do not wear makeup, including mascara.  ____  No powder, lotions or creams to surgical area.  ____  Please remove all jewelry, including piercings and leave at home.  ____  No money or valuables needed. Please leave at home.  ____  Please bring any documents given by your doctor.  ____  If going home the same day, arrange for a ride home. You will not be able to             drive if Anesthesia was used.  ____  Children under 18 years require a parent / guardian present the entire time             they are in surgery / recovery.  ____  Wear loose fitting clothing. Allow for dressings, bandages.  ____  Stop Aspirin, Ibuprofen, Motrin, Aleve, Goody's/BC powders, Excedrine and Naproxen (NSAIDS) at least 3-5 days before surgery, unless otherwise instructed by your doctor, or the nurse.   You MAY use Tylenol/acetaminophen until day of surgery.  ____  Wash the surgical area with Hibiclens the night before surgery, and again the             morning of surgery.  Be sure to rinse hibiclens off completely (if instructed by   nurse).  ____  If you take diabetic medication, do not take am of surgery unless instructed by Doctor.  ____  Call MD for temperature above 101 degrees or any other signs of infection such as Urinary (bladder) infection, Upper respiratory infection, skin boils, etc.  ____ Stop taking any Fish Oil supplement or any Vitamins that contain Vitamin E at  least 5 days prior to surgery.  ____ Do Not wear your contact lenses the day of your procedure.  You may wear your glasses.      ____Do not shave surgical site for 3 days prior to surgery.  ____ Practice Good hand washing before, during, and after procedure.      I have read or had read and explained to me, and understand the above information.  Additional comments or instructions:  For additional questions call [phone]      ANESTHESIA SIDE EFFECTS  -For the first 24 hours after surgery:  Do not drive, use heavy equipment, make important decisions, or drink alcohol  -It is normal to feel sleepy for several hours.  Rest until you are more awake.  -Have someone stay with you, if needed.  They can watch for problems and help keep you safe.  -Some possible post anesthesia side effects include: nausea and vomiting, sore throat and hoarseness, sleepiness, and dizziness.        Pre-Op Bathing Instructions    Before surgery, you can play an important role in your own health.    Because skin is not sterile, we need to be sure that your skin is as free of germs as possible. By following the instructions below, you can reduce the number of germs on your skin before surgery.    IMPORTANT: You will need to shower with a special soap called Hibiclens*, available at any pharmacy.  If you are allergic to Chlorhexidine (the antiseptic in Hibiclens), use an antibacterial soap such as Dial Soap for your preoperative shower.  You will shower with Hibiclens both the night before your surgery and the morning of your surgery.  Do not use Hibiclens on the head, face or genitals to avoid injury to those areas.    STEP #1: THE NIGHT BEFORE YOUR SURGERY     Do not shave the area of your body where your surgery will be performed.  Shower and wash your hair and body as usual with your normal soap and shampoo.  Rinse your hair and body thoroughly after you shower to remove all soap residue.  With your hand, apply one packet of Hibiclens soap to  the surgical site.   Wash the site gently for five (5) minutes. Do not scrub your skin too hard.   Do not wash with your regular soap after Hibiclens is used.  Rinse your body thoroughly.  Pat yourself dry with a clean, soft towel.  Do not use lotion, cream, or powder.  Wear clean clothes.    STEP #2: THE MORNING OF YOUR SURGERY     Repeat Step #1.    * Not to be used by people allergic to Chlorhexidine.

## 2022-11-16 ENCOUNTER — OFFICE VISIT (OUTPATIENT)
Dept: BEHAVIORAL HEALTH | Facility: CLINIC | Age: 32
End: 2022-11-16
Payer: MEDICAID

## 2022-11-16 DIAGNOSIS — F33.1 MODERATE EPISODE OF RECURRENT MAJOR DEPRESSIVE DISORDER: ICD-10-CM

## 2022-11-16 DIAGNOSIS — F41.1 GAD (GENERALIZED ANXIETY DISORDER): Primary | ICD-10-CM

## 2022-11-16 PROCEDURE — 90834 PR PSYCHOTHERAPY W/PATIENT, 45 MIN: ICD-10-PCS | Mod: AH,HB,95,S$GLB | Performed by: PSYCHOLOGIST

## 2022-11-16 PROCEDURE — 3044F PR MOST RECENT HEMOGLOBIN A1C LEVEL <7.0%: ICD-10-PCS | Mod: AH,HB,CPTII,95 | Performed by: PSYCHOLOGIST

## 2022-11-16 PROCEDURE — 3044F HG A1C LEVEL LT 7.0%: CPT | Mod: AH,HB,CPTII,95 | Performed by: PSYCHOLOGIST

## 2022-11-16 PROCEDURE — 90834 PSYTX W PT 45 MINUTES: CPT | Mod: AH,HB,95,S$GLB | Performed by: PSYCHOLOGIST

## 2022-11-16 NOTE — PROGRESS NOTES
Individual Psychotherapy (PhD/LCSW)    11/16/2022    Site:  Telemed        The patient location is: Patient's car in [place], [place].  The chief complaint leading to consultation is: depression, anxiety    Visit type: audiovisual    Face to Face time with patient: 45 minutes of total time spent on the encounter, which includes face to face time and non-face to face time preparing to see the patient (eg, review of tests), Obtaining and/or reviewing separately obtained history, Documenting clinical information in the electronic or other health record, Independently interpreting results (not separately reported) and communicating results to the patient/family/caregiver, or Care coordination (not separately reported).     Each patient to whom he or she provides medical services by telemedicine is:  (1) informed of the relationship between the physician and patient and the respective role of any other health care provider with respect to management of the patient; and (2) notified that he or she may decline to receive medical services by telemedicine and may withdraw from such care at any time.    Therapeutic Intervention: Met with patient.  Outpatient - Behavior modifying psychotherapy 45 min - CPT code 86192 and Outpatient - Supportive psychotherapy 45 min - CPT Code 84461    Chief complaint/reason for encounter: depression and anxiety     Interval history and content of current session: Patient was timely for her individual therapy session. She presented for session from her car awaiting the start of her daughter's thanksgiving feast at school. She just came from her son's program. She shared her mood has improved some in the past two weeks which she partially attributes to being approved for an apartment in [place] close to her work. She is now thinking more positively and believes moving out of her mother's home on her own with her two children will be very helpful for her mood. She wants to be self-sufficient  again, does not have a great relationship with her mother, and wants to feel comfortable in her own space when home on her off days. She noted she has been taking extra shifts, likely working too much, but she does not have anything to do on her days off and thinks too much about the past and future. We worked on thought identification and testing. She reported her friendship with her supervisor outside of work has been growing and feels supported by her. The patient did not go to places on her own for homework, only sat in the car in parking lots alone. She will try again this coming week.    Treatment plan:  Target symptoms: depression, anxiety   Why chosen therapy is appropriate versus another modality: relevant to diagnosis  Outcome monitoring methods: self-report, observation  Therapeutic intervention type: behavior modifying psychotherapy, supportive psychotherapy    Risk parameters:  Patient reports no suicidal ideation  Patient reports no homicidal ideation  Patient reports no self-injurious behavior  Patient reports no violent behavior    Verbal deficits: None    Patient's response to intervention:  The patient's response to intervention is accepting.    Progress toward goals and other mental status changes:  The patient's progress toward goals is limited.    Mental Status Exam:  General Appearance:  unremarkable, age appropriate   Speech: normal tone, normal rate, normal pitch, normal volume      Level of Cooperation: cooperative      Thought Processes: normal and logical   Mood: depressed      Thought Content: normal, no suicidality, no homicidality, delusions, or paranoia   Affect: congruent and appropriate   Orientation: Oriented x3   Attention Span & Concentration: intact   Judgment & Insight: fair     Language  intact     Diagnosis:     ICD-10-CM ICD-9-CM   1. CRYSTAL (generalized anxiety disorder)  F41.1 300.02   2. Moderate episode of recurrent major depressive disorder  F33.1 296.32      Plan:  individual psychotherapy and medication management by physician    Return to clinic: 2 weeks    Length of Service (minutes): 45

## 2022-11-22 ENCOUNTER — PATIENT MESSAGE (OUTPATIENT)
Dept: OBSTETRICS AND GYNECOLOGY | Facility: CLINIC | Age: 32
End: 2022-11-22
Payer: MEDICAID

## 2022-11-23 RX ORDER — FLUOXETINE HYDROCHLORIDE 20 MG/1
20 CAPSULE ORAL 2 TIMES DAILY
Qty: 60 CAPSULE | Refills: 10 | Status: SHIPPED | OUTPATIENT
Start: 2022-11-23 | End: 2023-12-11 | Stop reason: SDUPTHER

## 2022-11-28 ENCOUNTER — TELEPHONE (OUTPATIENT)
Dept: PREADMISSION TESTING | Facility: HOSPITAL | Age: 32
End: 2022-11-28
Payer: MEDICAID

## 2022-11-28 NOTE — TELEPHONE ENCOUNTER
Morning, she is scheduled for surgery on 12/13/22 with Dr Rojo and will need Eliquis recommendations.

## 2022-12-05 ENCOUNTER — OFFICE VISIT (OUTPATIENT)
Dept: PSYCHIATRY | Facility: CLINIC | Age: 32
End: 2022-12-05
Payer: MEDICAID

## 2022-12-05 DIAGNOSIS — F41.1 GAD (GENERALIZED ANXIETY DISORDER): Primary | ICD-10-CM

## 2022-12-05 DIAGNOSIS — F33.1 MDD (MAJOR DEPRESSIVE DISORDER), RECURRENT EPISODE, MODERATE: ICD-10-CM

## 2022-12-05 PROCEDURE — 3044F HG A1C LEVEL LT 7.0%: CPT | Mod: HB,CPTII,95, | Performed by: PSYCHOLOGIST

## 2022-12-05 PROCEDURE — 90834 PSYTX W PT 45 MINUTES: CPT | Mod: HB,95,, | Performed by: PSYCHOLOGIST

## 2022-12-05 PROCEDURE — 90834 PR PSYCHOTHERAPY W/PATIENT, 45 MIN: ICD-10-PCS | Mod: HB,95,, | Performed by: PSYCHOLOGIST

## 2022-12-05 PROCEDURE — 3044F PR MOST RECENT HEMOGLOBIN A1C LEVEL <7.0%: ICD-10-PCS | Mod: HB,CPTII,95, | Performed by: PSYCHOLOGIST

## 2022-12-06 ENCOUNTER — ANESTHESIA EVENT (OUTPATIENT)
Dept: SURGERY | Facility: HOSPITAL | Age: 32
End: 2022-12-06
Payer: MEDICAID

## 2022-12-06 ENCOUNTER — HOSPITAL ENCOUNTER (OUTPATIENT)
Dept: PREADMISSION TESTING | Facility: HOSPITAL | Age: 32
Discharge: HOME | End: 2022-12-06
Attending: ORTHOPAEDIC SURGERY
Payer: MEDICAID

## 2022-12-06 VITALS
DIASTOLIC BLOOD PRESSURE: 69 MMHG | TEMPERATURE: 98 F | HEIGHT: 63 IN | RESPIRATION RATE: 16 BRPM | HEART RATE: 86 BPM | SYSTOLIC BLOOD PRESSURE: 108 MMHG | BODY MASS INDEX: 29.84 KG/M2 | OXYGEN SATURATION: 98 % | WEIGHT: 168.44 LBS

## 2022-12-06 RX ORDER — LIDOCAINE HYDROCHLORIDE 10 MG/ML
1 INJECTION, SOLUTION EPIDURAL; INFILTRATION; INTRACAUDAL; PERINEURAL ONCE
Status: CANCELLED | OUTPATIENT
Start: 2022-12-06 | End: 2022-12-06

## 2022-12-06 RX ORDER — SCOLOPAMINE TRANSDERMAL SYSTEM 1 MG/1
1 PATCH, EXTENDED RELEASE TRANSDERMAL
Status: CANCELLED | OUTPATIENT
Start: 2022-12-06

## 2022-12-06 RX ORDER — SODIUM CHLORIDE, SODIUM LACTATE, POTASSIUM CHLORIDE, CALCIUM CHLORIDE 600; 310; 30; 20 MG/100ML; MG/100ML; MG/100ML; MG/100ML
INJECTION, SOLUTION INTRAVENOUS CONTINUOUS
Status: CANCELLED | OUTPATIENT
Start: 2022-12-06

## 2022-12-06 NOTE — ANESTHESIA PREPROCEDURE EVALUATION
2022  Anusha Andrew is a 32 y.o., female scheduled for RELEASE, CARPAL TUNNEL (Left: Hand) and RELEASE, TRIGGER FINGER (Left: Hand) 22.    Past Medical History:   Diagnosis Date    Depression     Hyperlipidemia     Hypertension     Mild nonproliferative diabetic retinopathy of both eyes without macular edema associated with type 1 diabetes mellitus 3/30/2021    Type I (juvenile type) diabetes mellitus without mention of complication, uncontrolled 2011     Past Surgical History:   Procedure Laterality Date    ABCESS DRAINAGE      boil went over [place]     SECTION  2012    DILATION AND CURETTAGE OF UTERUS  2019    Procedure: DILATION AND CURETTAGE, UTERUS;  Surgeon: Purnima Kidd MD;  Location: [facility] OR;  Service: OB/GYN;;    HYSTEROSCOPY N/A 2019    Procedure: HYSTEROSCOPY;  Surgeon: Purnima Kidd MD;  Location: [facility] OR;  Service: OB/GYN;  Laterality: N/A;    TONSILLECTOMY, ADENOIDECTOMY      WISDOM TOOTH EXTRACTION       Current Outpatient Medications   Medication Instructions    blood sugar diagnostic Strp To check BG 4  times daily, to use with insurance preferred meter, e 10.65    ergocalciferol (VITAMIN D2) 50,000 Units, Oral, Every 7 days    FLUoxetine 10 mg, Oral, Daily    FLUoxetine 20 mg, Oral, 2 times daily    hydrocortisone (ANUSOL-HC) 2.5 % rectal cream Rectal, 2 times daily    insulin aspart U-100 (NOVOLOG) 10 Units, Subcutaneous, 3 times daily with meals    insulin syringe-needle U-100 0.5 mL 31 gauge x 5/16 Syrg use with admelog    insulin 22 Units, Subcutaneous, Nightly    lancets Misc To check BG 4 times daily, to use with insurance preferred meter, e10.65    norethindrone (MICRONOR) 0.35 mg tablet 1 tablet, Oral, Daily    norethindrone (ORTHO MICRONOR) 0.35 mg, Oral, Daily    ONETOUCH DELICA PLUS LANCET 33 gauge Misc USE 1 UNIT  "TO CHECK GLUCOSE 4 TIMES DAILY    ONETOUCH VERIO FLEX METER Misc USE AS DIRECTED TO CHECK BLOOD GLUCOSE 4 TIMES DAILY    pantoprazole (PROTONIX) 40 MG tablet 1 tablet, Oral, Daily    pen needle, diabetic 32 gauge x 5/32" Ndle Use as directed    polyethylene glycol (GLYCOLAX) 17 g, Oral, Daily     Pre-op Assessment    I have reviewed the Patient Summary Reports.     I have reviewed the Nursing Notes.    I have reviewed the Medications.     Review of Systems  Anesthesia Hx:  History of prior surgery of interest to airway management or planning: (sleeve gastrectomy) Personal Hx of Anesthesia complications, Post-Operative Nausea/Vomiting, in the past, but not with recent anesthetics / prophylaxis   Social:  Non-Smoker, Social Alcohol Use    Hematology/Oncology:  Hematology Normal        Cardiovascular:   Exercise tolerance: good Hypertension, well controlled  Denies Angina.    Pulmonary:   Denies Shortness of breath.    Renal/:  Renal/ Normal     Hepatic/GI:  Hepatic/GI Normal  Denies GERD.    Neurological:   Denies TIA. Denies CVA. Neuromuscular Disease,  Denies Seizures.    Endocrine:   Diabetes, well controlled, type 1, using insulin Denies Hypothyroidism. Denies Hyperthyroidism.    Psych:   depression          Physical Exam  General: Well nourished and Cooperative    Airway:  Mallampati: III / I  Mouth Opening: Normal  TM Distance: Normal  Tongue: Normal  Neck ROM: Normal ROM    Dental:  Intact, Retainer    Chest/Lungs:  Clear to auscultation, Normal Respiratory Rate    Heart:  Rate: Normal  Rhythm: Regular Rhythm  Sounds: Normal      Lab Results   Component Value Date    WBC 8.79 10/03/2022    HGB 11.2 (L) 10/03/2022    HCT 36.5 (L) 10/03/2022     10/03/2022    CHOL 236 (H) 12/10/2021    TRIG 74 12/10/2021    HDL 73 12/10/2021    ALT 47 (H) 10/02/2022    AST 19 10/02/2022     10/04/2022    K 3.8 10/04/2022     10/04/2022    CREATININE 0.7 10/04/2022    BUN 4 (L) 10/04/2022    CO2 20 (L) " 10/04/2022    TSH 1.454 07/27/2022    INR 1.0 08/16/2022    GLUF 114 (H) 06/19/2020    HGBA1C 6.8 (H) 10/03/2022     Results for orders placed or performed during the hospital encounter of 10/02/22   EKG 12-lead    Collection Time: 10/02/22  6:52 PM    Narrative    Test Reason : R73.9,    Vent. Rate : 108 BPM     Atrial Rate : 108 BPM     P-R Int : 144 ms          QRS Dur : 070 ms      QT Int : 354 ms       P-R-T Axes : 080 110 050 degrees     QTc Int : 474 ms    Sinus tachycardia  Right atrial enlargement  Right axis deviation  Borderline Abnormal ECG  When compared with ECG of 07-JAN-2022 11:11,  No significant change was found  Confirmed by David Truong MD ([id number]) on 10/3/2022 10:07:24 AM    Referred By: [email or web address]   SELF           Confirmed By:David Truong MD     CXR 10/2/22  The cardiomediastinal silhouette is not enlarged.  There is no pleural effusion.  The trachea is midline.  The lungs are symmetrically expanded bilaterally without evidence of acute parenchymal process. No large focal consolidation seen.  There is no pneumothorax.  The osseous structures are unremarkable.  Impression:  1. No acute cardiopulmonary process.      Anesthesia Plan  Type of Anesthesia, risks & benefits discussed:    Anesthesia Type: MAC  Intra-op Monitoring Plan: Standard ASA Monitors  Post Op Pain Control Plan: multimodal analgesia  Induction:  IV  Informed Consent: Informed consent signed with the Patient and all parties understand the risks and agree with anesthesia plan.  All questions answered.   ASA Score: 2  Anesthesia Plan Notes: Anesthesia consent to be signed prior to surgery 12/13/22      Ready For Surgery From Anesthesia Perspective.     .

## 2022-12-06 NOTE — DISCHARGE INSTRUCTIONS
Your surgery is scheduled for 12/13/22.    Please report to [facility] on the 1st Floor at 0830 a.m.    THIS TIME IS SUBJECT TO CHANGE.  YOU WILL RECEIVE A PHONE CALL THE DAY BEFORE SURGERY BY 3:30 PM TO CONFIRM YOUR TIME OF ARRIVAL.  IF YOU HAVE NOT RECEIVED A PHONE CALL BY 3:30 PM THE DAY BEFORE YOUR SURGERY PLEASE CALL [phone]     INSTRUCTIONS IMPORTANT!!!  ¨ Do not eat or drink after 12 midnight-including water, candy, gum, & mints. OK to brush teeth.      ____  Proceed to [facility] on *** for additional testing.        ____  Do not wear makeup, including mascara.  ____  No powder, lotions or creams to surgical area.  ____  Please remove all jewelry, including piercings and leave at home.  ____  No money or valuables needed. Please leave at home.  ____  Please bring any documents given by your doctor.  ____  If going home the same day, arrange for a ride home. You will not be able to             drive if Anesthesia was used.  ____  Children under 18 years require a parent / guardian present the entire time             they are in surgery / recovery.  ____  Wear loose fitting clothing. Allow for dressings, bandages.  ____  Stop Aspirin, Ibuprofen, Motrin, Aleve, Goody's/BC powders, Excedrine and Naproxen (NSAIDS) at least 3-5 days before surgery, unless otherwise instructed by your doctor, or the nurse.   You MAY use Tylenol/acetaminophen until day of surgery.  ____  Wash the surgical area with Hibiclens the night before surgery, and again the             morning of surgery.  Be sure to rinse hibiclens off completely (if instructed by   nurse).  ____  If you take diabetic medication, do not take am of surgery unless instructed by Doctor.  ____  Call MD for temperature above 101 degrees or any other signs of infection such as Urinary (bladder) infection, Upper respiratory infection, skin boils, etc.  ____ Stop taking any Fish Oil supplement or any Vitamins that contain Vitamin E at  least 5 days prior to surgery.  ____ Do Not wear your contact lenses the day of your procedure.  You may wear your glasses.      ____Do not shave surgical site for 3 days prior to surgery.  ____ Practice Good hand washing before, during, and after procedure.      I have read or had read and explained to me, and understand the above information.  Additional comments or instructions:  For additional questions call [phone]      ANESTHESIA SIDE EFFECTS  -For the first 24 hours after surgery:  Do not drive, use heavy equipment, make important decisions, or drink alcohol  -It is normal to feel sleepy for several hours.  Rest until you are more awake.  -Have someone stay with you, if needed.  They can watch for problems and help keep you safe.  -Some possible post anesthesia side effects include: nausea and vomiting, sore throat and hoarseness, sleepiness, and dizziness.        Pre-Op Bathing Instructions    Before surgery, you can play an important role in your own health.    Because skin is not sterile, we need to be sure that your skin is as free of germs as possible. By following the instructions below, you can reduce the number of germs on your skin before surgery.    IMPORTANT: You will need to shower with a special soap called Hibiclens*, available at any pharmacy.  If you are allergic to Chlorhexidine (the antiseptic in Hibiclens), use an antibacterial soap such as Dial Soap for your preoperative shower.  You will shower with Hibiclens both the night before your surgery and the morning of your surgery.  Do not use Hibiclens on the head, face or genitals to avoid injury to those areas.    STEP #1: THE NIGHT BEFORE YOUR SURGERY     Do not shave the area of your body where your surgery will be performed.  Shower and wash your hair and body as usual with your normal soap and shampoo.  Rinse your hair and body thoroughly after you shower to remove all soap residue.  With your hand, apply one packet of Hibiclens soap to  the surgical site.   Wash the site gently for five (5) minutes. Do not scrub your skin too hard.   Do not wash with your regular soap after Hibiclens is used.  Rinse your body thoroughly.  Pat yourself dry with a clean, soft towel.  Do not use lotion, cream, or powder.  Wear clean clothes.    STEP #2: THE MORNING OF YOUR SURGERY     Repeat Step #1.    * Not to be used by people allergic to Chlorhexidine.

## 2022-12-07 ENCOUNTER — PATIENT MESSAGE (OUTPATIENT)
Dept: SURGERY | Facility: HOSPITAL | Age: 32
End: 2022-12-07
Payer: MEDICAID

## 2022-12-07 ENCOUNTER — TELEPHONE (OUTPATIENT)
Dept: ORTHOPEDICS | Facility: CLINIC | Age: 32
End: 2022-12-07
Payer: MEDICAID

## 2022-12-07 NOTE — TELEPHONE ENCOUNTER
----- Message from Rhoda Aleman sent at 12/7/2022 10:05 AM CST -----  Type:  Needs Medical Advice    Who Called: pt  Symptoms (please be specific): pt would like to get a return call to discuss surgery     Would the patient rather a call back or a response via [facility]? call  Best Call Back Number: [phone]  Additional Information:

## 2022-12-07 NOTE — PROGRESS NOTES
Individual Psychotherapy (PhD/LCSW)    12/5/2022    Site:  Telemed        The patient location is: Patient's car in [place], [place].  The chief complaint leading to consultation is: depression, anxiety    Visit type: audiovisual    Face to Face time with patient: 45 minutes of total time spent on the encounter, which includes face to face time and non-face to face time preparing to see the patient (eg, review of tests), Obtaining and/or reviewing separately obtained history, Documenting clinical information in the electronic or other health record, Independently interpreting results (not separately reported) and communicating results to the patient/family/caregiver, or Care coordination (not separately reported).     Each patient to whom he or she provides medical services by telemedicine is:  (1) informed of the relationship between the physician and patient and the respective role of any other health care provider with respect to management of the patient; and (2) notified that he or she may decline to receive medical services by telemedicine and may withdraw from such care at any time.    Therapeutic Intervention: Met with patient.  Outpatient - Behavior modifying psychotherapy 45 min - CPT code 39451 and Outpatient - Supportive psychotherapy 45 min - CPT Code 73151    Chief complaint/reason for encounter: depression and anxiety     Interval history and content of current session: Patient was timely for her individual therapy session. She reported her mood has been improving since she moved into a three-bedroom apartment with her children, out of her mother's home. She feels more accomplished and comfortable in their own space. She noted her mother still tries to put influence on her daughter's behaviors, which can be frustrating. However, she feels stuck from doing or saying anything because she relies on her mother one day a week for childcare. She reported getting closer to a coworker who has been helping her  with transportation while her car was getting repaired. She is starting to learn to how to take better care of herself and chose healthy relationships in her life. She is more hopeful for the future and feels she is on a good path.    Treatment plan:  Target symptoms: depression, anxiety   Why chosen therapy is appropriate versus another modality: relevant to diagnosis  Outcome monitoring methods: self-report, observation  Therapeutic intervention type: behavior modifying psychotherapy, supportive psychotherapy    Risk parameters:  Patient reports no suicidal ideation  Patient reports no homicidal ideation  Patient reports no self-injurious behavior  Patient reports no violent behavior    Verbal deficits: None    Patient's response to intervention:  The patient's response to intervention is accepting.    Progress toward goals and other mental status changes:  The patient's progress toward goals is fair .    Mental Status Exam:  General Appearance:  unremarkable, age appropriate   Speech: normal tone, normal rate, normal pitch, normal volume      Level of Cooperation: cooperative      Thought Processes: normal and logical   Mood: euthymic      Thought Content: normal, no suicidality, no homicidality, delusions, or paranoia   Affect: congruent and appropriate   Orientation: Oriented x3   Attention Span & Concentration: intact   Judgment & Insight: fair     Language  intact     Diagnosis:     ICD-10-CM ICD-9-CM   1. CRYSTAL (generalized anxiety disorder)  F41.1 300.02   2. MDD (major depressive disorder), recurrent episode, moderate  F33.1 296.32     Plan:  individual psychotherapy and medication management by physician    Return to clinic: 2 weeks    Length of Service (minutes): 45

## 2022-12-07 NOTE — TELEPHONE ENCOUNTER
Left message to contact clinic re: surgery. Patient is scheduled on 12/13 for Carpal tunnel surgery.

## 2022-12-13 ENCOUNTER — HOSPITAL ENCOUNTER (OUTPATIENT)
Facility: HOSPITAL | Age: 32
Discharge: HOME | End: 2022-12-13
Attending: ORTHOPAEDIC SURGERY | Admitting: ORTHOPAEDIC SURGERY
Payer: MEDICAID

## 2022-12-13 ENCOUNTER — ANESTHESIA (OUTPATIENT)
Dept: SURGERY | Facility: HOSPITAL | Age: 32
End: 2022-12-13
Payer: MEDICAID

## 2022-12-13 VITALS
TEMPERATURE: 98 F | DIASTOLIC BLOOD PRESSURE: 74 MMHG | HEIGHT: 63 IN | SYSTOLIC BLOOD PRESSURE: 113 MMHG | HEART RATE: 67 BPM | RESPIRATION RATE: 19 BRPM | BODY MASS INDEX: 28.53 KG/M2 | WEIGHT: 161 LBS | OXYGEN SATURATION: 98 %

## 2022-12-13 DIAGNOSIS — M65.331 TRIGGER FINGER, RIGHT MIDDLE FINGER: ICD-10-CM

## 2022-12-13 DIAGNOSIS — G56.02 CARPAL TUNNEL SYNDROME OF LEFT WRIST: ICD-10-CM

## 2022-12-13 DIAGNOSIS — G56.03 BILATERAL CARPAL TUNNEL SYNDROME: ICD-10-CM

## 2022-12-13 LAB — POCT GLUCOSE: 187 MG/DL (ref 70–110)

## 2022-12-13 PROCEDURE — 63600175 PHARM REV CODE 636 W HCPCS: Performed by: NURSE PRACTITIONER

## 2022-12-13 PROCEDURE — 63600175 PHARM REV CODE 636 W HCPCS: Performed by: ORTHOPAEDIC SURGERY

## 2022-12-13 PROCEDURE — 25000003 PHARM REV CODE 250: Performed by: NURSE ANESTHETIST, CERTIFIED REGISTERED

## 2022-12-13 PROCEDURE — 36000707: Performed by: ORTHOPAEDIC SURGERY

## 2022-12-13 PROCEDURE — 64721 PR REVISE MEDIAN N/CARPAL TUNNEL SURG: ICD-10-PCS | Mod: LT,,, | Performed by: ORTHOPAEDIC SURGERY

## 2022-12-13 PROCEDURE — D9220A PRA ANESTHESIA: Mod: ANES,,, | Performed by: ANESTHESIOLOGY

## 2022-12-13 PROCEDURE — 64721 CARPAL TUNNEL SURGERY: CPT | Mod: LT,,, | Performed by: ORTHOPAEDIC SURGERY

## 2022-12-13 PROCEDURE — 37000009 HC ANESTHESIA EA ADD 15 MINS: Performed by: ORTHOPAEDIC SURGERY

## 2022-12-13 PROCEDURE — D9220A PRA ANESTHESIA: Mod: CRNA,,, | Performed by: NURSE ANESTHETIST, CERTIFIED REGISTERED

## 2022-12-13 PROCEDURE — 37000008 HC ANESTHESIA 1ST 15 MINUTES: Performed by: ORTHOPAEDIC SURGERY

## 2022-12-13 PROCEDURE — 63600175 PHARM REV CODE 636 W HCPCS: Performed by: NURSE ANESTHETIST, CERTIFIED REGISTERED

## 2022-12-13 PROCEDURE — D9220A PRA ANESTHESIA: ICD-10-PCS | Mod: CRNA,,, | Performed by: NURSE ANESTHETIST, CERTIFIED REGISTERED

## 2022-12-13 PROCEDURE — 01810 ANES PX NRV MUSC F/ARM WRST: CPT | Performed by: ORTHOPAEDIC SURGERY

## 2022-12-13 PROCEDURE — 36000706: Performed by: ORTHOPAEDIC SURGERY

## 2022-12-13 PROCEDURE — 26055 PR INCISE FINGER TENDON SHEATH: ICD-10-PCS | Mod: 51,F2,, | Performed by: ORTHOPAEDIC SURGERY

## 2022-12-13 PROCEDURE — 26055 INCISE FINGER TENDON SHEATH: CPT | Mod: 51,F2,, | Performed by: ORTHOPAEDIC SURGERY

## 2022-12-13 PROCEDURE — 25000003 PHARM REV CODE 250: Performed by: NURSE PRACTITIONER

## 2022-12-13 PROCEDURE — 25000003 PHARM REV CODE 250: Performed by: ORTHOPAEDIC SURGERY

## 2022-12-13 PROCEDURE — 71000016 HC POSTOP RECOV ADDL HR: Performed by: ORTHOPAEDIC SURGERY

## 2022-12-13 PROCEDURE — D9220A PRA ANESTHESIA: ICD-10-PCS | Mod: ANES,,, | Performed by: ANESTHESIOLOGY

## 2022-12-13 PROCEDURE — 71000015 HC POSTOP RECOV 1ST HR: Performed by: ORTHOPAEDIC SURGERY

## 2022-12-13 RX ORDER — OXYCODONE HYDROCHLORIDE 5 MG/1
10 TABLET ORAL EVERY 4 HOURS PRN
Status: DISCONTINUED | OUTPATIENT
Start: 2022-12-13 | End: 2022-12-13 | Stop reason: HOSPADM

## 2022-12-13 RX ORDER — CEFAZOLIN SODIUM 2 G/50ML
2 SOLUTION INTRAVENOUS
Status: DISCONTINUED | OUTPATIENT
Start: 2022-12-13 | End: 2022-12-13 | Stop reason: HOSPADM

## 2022-12-13 RX ORDER — HYDROCODONE BITARTRATE AND ACETAMINOPHEN 5; 325 MG/1; MG/1
1 TABLET ORAL EVERY 4 HOURS PRN
Qty: 20 TABLET | Refills: 0 | Status: SHIPPED | OUTPATIENT
Start: 2022-12-13 | End: 2022-12-22

## 2022-12-13 RX ORDER — ACETAMINOPHEN 325 MG/1
650 TABLET ORAL EVERY 4 HOURS PRN
Status: DISCONTINUED | OUTPATIENT
Start: 2022-12-13 | End: 2022-12-13 | Stop reason: HOSPADM

## 2022-12-13 RX ORDER — PROPOFOL 10 MG/ML
VIAL (ML) INTRAVENOUS CONTINUOUS PRN
Status: DISCONTINUED | OUTPATIENT
Start: 2022-12-13 | End: 2022-12-13

## 2022-12-13 RX ORDER — SCOLOPAMINE TRANSDERMAL SYSTEM 1 MG/1
1 PATCH, EXTENDED RELEASE TRANSDERMAL
Status: DISCONTINUED | OUTPATIENT
Start: 2022-12-13 | End: 2022-12-13 | Stop reason: HOSPADM

## 2022-12-13 RX ORDER — ONDANSETRON 2 MG/ML
INJECTION INTRAMUSCULAR; INTRAVENOUS
Status: DISCONTINUED | OUTPATIENT
Start: 2022-12-13 | End: 2022-12-13

## 2022-12-13 RX ORDER — ONDANSETRON 8 MG/1
8 TABLET, ORALLY DISINTEGRATING ORAL EVERY 8 HOURS PRN
Status: DISCONTINUED | OUTPATIENT
Start: 2022-12-13 | End: 2022-12-13 | Stop reason: HOSPADM

## 2022-12-13 RX ORDER — KETOROLAC TROMETHAMINE 30 MG/ML
30 INJECTION, SOLUTION INTRAMUSCULAR; INTRAVENOUS ONCE
Status: COMPLETED | OUTPATIENT
Start: 2022-12-13 | End: 2022-12-13

## 2022-12-13 RX ORDER — LIDOCAINE HYDROCHLORIDE 20 MG/ML
INJECTION INTRAVENOUS
Status: DISCONTINUED | OUTPATIENT
Start: 2022-12-13 | End: 2022-12-13

## 2022-12-13 RX ORDER — BUPIVACAINE HYDROCHLORIDE 5 MG/ML
INJECTION, SOLUTION EPIDURAL; INTRACAUDAL
Status: DISCONTINUED | OUTPATIENT
Start: 2022-12-13 | End: 2022-12-13 | Stop reason: HOSPADM

## 2022-12-13 RX ORDER — LIDOCAINE HYDROCHLORIDE 10 MG/ML
1 INJECTION, SOLUTION EPIDURAL; INFILTRATION; INTRACAUDAL; PERINEURAL ONCE
Status: DISCONTINUED | OUTPATIENT
Start: 2022-12-13 | End: 2022-12-13 | Stop reason: HOSPADM

## 2022-12-13 RX ORDER — PROPOFOL 10 MG/ML
VIAL (ML) INTRAVENOUS
Status: DISCONTINUED | OUTPATIENT
Start: 2022-12-13 | End: 2022-12-13

## 2022-12-13 RX ORDER — SODIUM CHLORIDE, SODIUM LACTATE, POTASSIUM CHLORIDE, CALCIUM CHLORIDE 600; 310; 30; 20 MG/100ML; MG/100ML; MG/100ML; MG/100ML
INJECTION, SOLUTION INTRAVENOUS CONTINUOUS
Status: DISCONTINUED | OUTPATIENT
Start: 2022-12-13 | End: 2022-12-13 | Stop reason: HOSPADM

## 2022-12-13 RX ORDER — LIDOCAINE HYDROCHLORIDE 10 MG/ML
INJECTION, SOLUTION EPIDURAL; INFILTRATION; INTRACAUDAL; PERINEURAL
Status: DISCONTINUED | OUTPATIENT
Start: 2022-12-13 | End: 2022-12-13 | Stop reason: HOSPADM

## 2022-12-13 RX ORDER — MIDAZOLAM HYDROCHLORIDE 1 MG/ML
INJECTION, SOLUTION INTRAMUSCULAR; INTRAVENOUS
Status: DISCONTINUED | OUTPATIENT
Start: 2022-12-13 | End: 2022-12-13

## 2022-12-13 RX ADMIN — PROPOFOL 70 MG: 10 INJECTION, EMULSION INTRAVENOUS at 09:12

## 2022-12-13 RX ADMIN — PROPOFOL 35 MG: 10 INJECTION, EMULSION INTRAVENOUS at 09:12

## 2022-12-13 RX ADMIN — CEFAZOLIN SODIUM 2 G: 2 SOLUTION INTRAVENOUS at 09:12

## 2022-12-13 RX ADMIN — SCOPALAMINE 1 PATCH: 1 PATCH, EXTENDED RELEASE TRANSDERMAL at 09:12

## 2022-12-13 RX ADMIN — SODIUM CHLORIDE, SODIUM LACTATE, POTASSIUM CHLORIDE, AND CALCIUM CHLORIDE: .6; .31; .03; .02 INJECTION, SOLUTION INTRAVENOUS at 09:12

## 2022-12-13 RX ADMIN — PROPOFOL 40 MG: 10 INJECTION, EMULSION INTRAVENOUS at 10:12

## 2022-12-13 RX ADMIN — ONDANSETRON 8 MG: 2 INJECTION, SOLUTION INTRAMUSCULAR; INTRAVENOUS at 10:12

## 2022-12-13 RX ADMIN — PROPOFOL 150 MCG/KG/MIN: 10 INJECTION, EMULSION INTRAVENOUS at 09:12

## 2022-12-13 RX ADMIN — MIDAZOLAM 2 MG: 1 INJECTION INTRAMUSCULAR; INTRAVENOUS at 09:12

## 2022-12-13 RX ADMIN — LIDOCAINE HYDROCHLORIDE 40 MG: 20 INJECTION, SOLUTION INTRAVENOUS at 09:12

## 2022-12-13 RX ADMIN — KETOROLAC TROMETHAMINE 30 MG: 30 INJECTION, SOLUTION INTRAMUSCULAR; INTRAVENOUS at 11:12

## 2022-12-13 NOTE — TRANSFER OF CARE
"Anesthesia Transfer of Care Note    Patient: Anusha Andrew    Procedure(s) Performed: Procedure(s) (LRB):  RELEASE, CARPAL TUNNEL (Left)  RELEASE, TRIGGER FINGER (Left)    Patient location: [facility]    Anesthesia Type: general    Transport from OR: Transported from OR on room air with adequate spontaneous ventilation    Post pain: adequate analgesia    Post assessment: no apparent anesthetic complications and tolerated procedure well    Post vital signs: stable    Level of consciousness: awake and alert    Nausea/Vomiting: no nausea/vomiting    Complications: none    Transfer of care protocol was followed      Last vitals:   Visit Vitals  /72 (BP Location: Right arm, Patient Position: Sitting)   Pulse 69   Temp 37 °C (98.6 °F) (Temporal)   Resp 16   Ht 5' 3" (1.6 m)   Wt 73 kg (161 lb)   SpO2 99%   Breastfeeding No   BMI 28.52 kg/m²     "

## 2022-12-13 NOTE — ANESTHESIA POSTPROCEDURE EVALUATION
Anesthesia Post Evaluation      Patient: Anusha Andrew    Procedure(s) Performed: Procedure(s) (LRB):  RELEASE, CARPAL TUNNEL (Left)  RELEASE, TRIGGER FINGER (Left)    Final Anesthesia Type: MAC      Patient location during evaluation: PACU  Patient participation: Yes- Able to Participate  Level of consciousness: awake and alert  Post-procedure vital signs: reviewed and stable  Pain management: adequate  Airway patency: patent    PONV status at discharge: No PONV  Anesthetic complications: no      Cardiovascular status: blood pressure returned to baseline  Respiratory status: unassisted  Hydration status: euvolemic            Vitals Value Taken Time   /72 12/13/22 1253   Temp 98.4 12/13/22 1253   Pulse 69 12/13/22 1253   Resp 16 12/13/22 1253   SpO2 99% 12/13/22 1253         No case tracking events are documented in the log.      Pain/Sylvain Score: Pain Rating Prior to Med Admin: 1 (12/13/2022 11:39 AM)

## 2022-12-13 NOTE — OP NOTE
[facility] [place] [facility] (Hospital)  Operative Note      Date of Procedure: 12/13/2022     Procedure: Procedure(s) (LRB):  RELEASE, CARPAL TUNNEL (Left)  RELEASE, TRIGGER FINGER (Left)     Surgeon(s) and Role:     * Sekou Rojo Jr., MD - Primary    Assisting Surgeon: None    Pre-Operative Diagnosis: Trigger finger, right middle finger [M65.331]  Bilateral carpal tunnel syndrome [G56.03]    Post-Operative Diagnosis: Post-Op Diagnosis Codes:     * Trigger finger, right middle finger [M65.331]     * Bilateral carpal tunnel syndrome [G56.03]    Anesthesia: Local MAC    Operative Findings (including complications, if any):  Left carpal tunnel syndrome and triggering of the left index and middle finger     Description of Technical Procedures:     Preop diagnosis:  1. Left carpal tunnel syndrome.      2. Triggering left index finger.      3. Triggering left middle finger.      Postop diagnosis: Same.      Operative procedure:  1. Left carpal tunnel release.      2. Trigger release left index finger.      3. Trigger release left middle finger.      Anesthesia:  Mac.      Surgeon: Darrel.      EBL:  Minimal.      Specimen:  None.      Complications:  None.      Operative procedure in detail as follows:    After operative consent was obtained patient brought the operating room placed supine operating table.  Anesthesia by IV sedation followed by injection of xylocaine Marcaine combination into the left hand and wrist.  Tourniquet applied left arm left upper extremity prepped and draped out normal sterile fashion.  Esmarch used to exsanguinate the limb tourniquet inflated 225 mmHg.      Carpal tunnel was approached with a 2.5 cm incision thenar crease left palm with a 15 blade.  Palmar fascia divided deep retractors placed.  Transverse carpal ligament identified carefully divided with the [place] blade.  The median nerve protected with the [place] elevator and division of the ligament continued proximally and distally under direct  visualization.  After complete release the contents of the canal were inspected and noted to be intact including the recurrent branch.      The wound irrigated and closed with interrupted 4-0 Monocryl on the subcutaneous layer Dermabond on the skin.      Attention then turned to the left index finger where a volar oblique incision made at the base of the index finger with a 15 blade.  Careful dissection used to expose the A1 pulley protecting the digital nerves.  The pulley was released longitudinally with the [place] blade and scissors to allow full range of motion without triggering.  The wound irrigated and closed with interrupted 5 O nylon horizontal mattress suture on the skin.      A 3rd incision made at the volar base of the left middle finger with a 15 .    blade again carefully exposing the A1 pulley protecting the digital nerves  The A1 pulley released longitudinally with the [place] blade and scissors allowing full range of motion without triggering.  The wound irrigated and closed with interrupted 5 O nylon horizontal mattress suture on the skin.  Sterile dressings applied all 3 incisions followed by light wrap tourniquet deflated patient brought to the recovery room stable condition all sponge needle counts reported as correct no complications  Significant Surgical Tasks Conducted by the Assistant(s), if Applicable: none    Estimated Blood Loss (EBL): * No values recorded between 12/13/2022 10:13 AM and 12/13/2022 10:51 AM *           Implants: * No implants in log *    Specimens:   Specimen (24h ago, onward)      None                    Condition: Good    Disposition: PACU - hemodynamically stable.    Attestation: I was present and scrubbed for the entire procedure.    Discharge Note    OUTCOME: Patient tolerated treatment/procedure well without complication and is now ready for discharge.    DISPOSITION: Home or Self Care    FINAL DIAGNOSIS:  Bilateral carpal tunnel syndrome    FOLLOWUP: In  clinic    DISCHARGE INSTRUCTIONS:    Discharge Procedure Orders   Diet general     Call MD for:  temperature >100.4     Call MD for:  persistent nausea and vomiting     Call MD for:  severe uncontrolled pain     Keep surgical extremity elevated     Remove dressing in 72 hours

## 2022-12-16 ENCOUNTER — PATIENT MESSAGE (OUTPATIENT)
Dept: ORTHOPEDICS | Facility: CLINIC | Age: 32
End: 2022-12-16
Payer: MEDICAID

## 2022-12-17 ENCOUNTER — PATIENT MESSAGE (OUTPATIENT)
Dept: ORTHOPEDICS | Facility: CLINIC | Age: 32
End: 2022-12-17
Payer: MEDICAID

## 2022-12-17 ENCOUNTER — NURSE TRIAGE (OUTPATIENT)
Dept: ADMINISTRATIVE | Facility: CLINIC | Age: 32
End: 2022-12-17
Payer: MEDICAID

## 2022-12-18 NOTE — TELEPHONE ENCOUNTER
Reason for Disposition   [1] Incision gaping open AND [2] length of opening > 2 inches (5 cm)    Additional Information   Negative: [1] Major abdominal surgical incision AND [2] wound gaping open AND [3] visible internal organs   Negative: Sounds like a life-threatening emergency to the triager   Negative: [1] Bleeding from incision AND [2] won't stop after 10 minutes of direct pressure   Negative: [1] Widespread rash AND [2] bright red, sunburn-like   Negative: Severe pain in the incision   Negative: [1] Suture came out early AND [2] wound gaping AND [3] < 48 hours since sutures placed    Protocols used: Post-Op Incision Symptoms-A-AH  pt called re had surg on tues for Carpal tunnel. Pt reports she put bandage on as ordered. Pt noted drainage on the bandage this evening. afeb. pain level = 6. on pain meds. last dose at 7pm , hx DM1. BG stable. appear dressing has pulled on the skin and may have opened part of wound , rec ED or option to get in touch with MD on call. Spoke with dr Rojo pt may take the bandage off and used soap and water regular. pt notified. call back with questions

## 2022-12-19 ENCOUNTER — PATIENT MESSAGE (OUTPATIENT)
Dept: PSYCHIATRY | Facility: CLINIC | Age: 32
End: 2022-12-19
Payer: MEDICAID

## 2022-12-19 ENCOUNTER — PATIENT MESSAGE (OUTPATIENT)
Dept: ORTHOPEDICS | Facility: CLINIC | Age: 32
End: 2022-12-19
Payer: MEDICAID

## 2022-12-20 ENCOUNTER — PATIENT MESSAGE (OUTPATIENT)
Dept: ORTHOPEDICS | Facility: CLINIC | Age: 32
End: 2022-12-20
Payer: MEDICAID

## 2022-12-21 ENCOUNTER — PATIENT MESSAGE (OUTPATIENT)
Dept: ORTHOPEDICS | Facility: CLINIC | Age: 32
End: 2022-12-21
Payer: MEDICAID

## 2022-12-22 ENCOUNTER — OFFICE VISIT (OUTPATIENT)
Dept: ORTHOPEDICS | Facility: CLINIC | Age: 32
End: 2022-12-22
Payer: MEDICAID

## 2022-12-22 ENCOUNTER — OFFICE VISIT (OUTPATIENT)
Dept: HEMATOLOGY/ONCOLOGY | Facility: CLINIC | Age: 32
End: 2022-12-22
Payer: MEDICAID

## 2022-12-22 VITALS — BODY MASS INDEX: 24.98 KG/M2 | WEIGHT: 164.81 LBS | HEIGHT: 68 IN

## 2022-12-22 DIAGNOSIS — M65.322 TRIGGER FINGER, LEFT INDEX FINGER: ICD-10-CM

## 2022-12-22 DIAGNOSIS — Z98.890 S/P TRIGGER FINGER RELEASE: ICD-10-CM

## 2022-12-22 DIAGNOSIS — Z98.890 S/P CARPAL TUNNEL RELEASE: ICD-10-CM

## 2022-12-22 DIAGNOSIS — K55.069 SUPERIOR MESENTERIC VEIN THROMBOSIS: Primary | ICD-10-CM

## 2022-12-22 DIAGNOSIS — M65.332 TRIGGER MIDDLE FINGER OF LEFT HAND: ICD-10-CM

## 2022-12-22 DIAGNOSIS — G56.02 CARPAL TUNNEL SYNDROME OF LEFT WRIST: Primary | ICD-10-CM

## 2022-12-22 PROCEDURE — 3008F PR BODY MASS INDEX (BMI) DOCUMENTED: ICD-10-PCS | Mod: CPTII,,, | Performed by: PHYSICIAN ASSISTANT

## 2022-12-22 PROCEDURE — 99024 PR POST-OP FOLLOW-UP VISIT: ICD-10-PCS | Mod: ,,, | Performed by: PHYSICIAN ASSISTANT

## 2022-12-22 PROCEDURE — 1160F PR REVIEW ALL MEDS BY PRESCRIBER/CLIN PHARMACIST DOCUMENTED: ICD-10-PCS | Mod: CPTII,,, | Performed by: PHYSICIAN ASSISTANT

## 2022-12-22 PROCEDURE — 3044F HG A1C LEVEL LT 7.0%: CPT | Mod: CPTII,,, | Performed by: PHYSICIAN ASSISTANT

## 2022-12-22 PROCEDURE — 3044F HG A1C LEVEL LT 7.0%: CPT | Mod: CPTII,95,, | Performed by: STUDENT IN AN ORGANIZED HEALTH CARE EDUCATION/TRAINING PROGRAM

## 2022-12-22 PROCEDURE — 99024 POSTOP FOLLOW-UP VISIT: CPT | Mod: ,,, | Performed by: PHYSICIAN ASSISTANT

## 2022-12-22 PROCEDURE — 99214 PR OFFICE/OUTPT VISIT, EST, LEVL IV, 30-39 MIN: ICD-10-PCS | Mod: 95,,, | Performed by: STUDENT IN AN ORGANIZED HEALTH CARE EDUCATION/TRAINING PROGRAM

## 2022-12-22 PROCEDURE — 3044F PR MOST RECENT HEMOGLOBIN A1C LEVEL <7.0%: ICD-10-PCS | Mod: CPTII,95,, | Performed by: STUDENT IN AN ORGANIZED HEALTH CARE EDUCATION/TRAINING PROGRAM

## 2022-12-22 PROCEDURE — 1160F RVW MEDS BY RX/DR IN RCRD: CPT | Mod: CPTII,,, | Performed by: PHYSICIAN ASSISTANT

## 2022-12-22 PROCEDURE — 3008F BODY MASS INDEX DOCD: CPT | Mod: CPTII,,, | Performed by: PHYSICIAN ASSISTANT

## 2022-12-22 PROCEDURE — 1159F PR MEDICATION LIST DOCUMENTED IN MEDICAL RECORD: ICD-10-PCS | Mod: CPTII,,, | Performed by: PHYSICIAN ASSISTANT

## 2022-12-22 PROCEDURE — 3044F PR MOST RECENT HEMOGLOBIN A1C LEVEL <7.0%: ICD-10-PCS | Mod: CPTII,,, | Performed by: PHYSICIAN ASSISTANT

## 2022-12-22 PROCEDURE — 99999 PR PBB SHADOW E&M-EST. PATIENT-LVL IV: ICD-10-PCS | Mod: PBBFAC,,, | Performed by: PHYSICIAN ASSISTANT

## 2022-12-22 PROCEDURE — 99214 OFFICE O/P EST MOD 30 MIN: CPT | Mod: PBBFAC,PN | Performed by: PHYSICIAN ASSISTANT

## 2022-12-22 PROCEDURE — 1159F MED LIST DOCD IN RCRD: CPT | Mod: CPTII,,, | Performed by: PHYSICIAN ASSISTANT

## 2022-12-22 PROCEDURE — 99999 PR PBB SHADOW E&M-EST. PATIENT-LVL IV: CPT | Mod: PBBFAC,,, | Performed by: PHYSICIAN ASSISTANT

## 2022-12-22 PROCEDURE — 99214 OFFICE O/P EST MOD 30 MIN: CPT | Mod: 95,,, | Performed by: STUDENT IN AN ORGANIZED HEALTH CARE EDUCATION/TRAINING PROGRAM

## 2022-12-22 NOTE — PROGRESS NOTES
Subjective:      Patient ID: Anusha Andrew is a 32 y.o. female.    Chief Complaint: Post-op Evaluation of the Left Hand (Patient presents today for her post-op visit for her left hand. )      HPI: (Darrel)      Anusha Andrew is here for a postop visit. She is s/p LEFT carpal tunnel release and trigger release LEFT index and middle finger by Dr. Rojo on 12/13/22.     She is doing well with mild pain in left hand. She has not noticed much change in preop numbness and tingling. No triggering of fingers. She is not taking hydrocodone.       Past Medical History:   Diagnosis Date    Depression     Hyperlipidemia     Hypertension     Mild nonproliferative diabetic retinopathy of both eyes without macular edema associated with type 1 diabetes mellitus 3/30/2021    Type I (juvenile type) diabetes mellitus without mention of complication, uncontrolled 11/23/2011       Current Outpatient Medications:     blood sugar diagnostic Strp, To check BG 4  times daily, to use with insurance preferred meter, e 10.65 (Patient taking differently: To check BG 4  times daily, to use with insurance preferred meter, e 10.65), Disp: 400 each, Rfl: 3    ergocalciferol (VITAMIN D2) 50,000 unit Cap, Take 1 capsule (50,000 Units total) by mouth every 7 days., Disp: 5 capsule, Rfl: 3    FLUoxetine 10 MG capsule, Take 1 capsule (10 mg total) by mouth once daily., Disp: 30 capsule, Rfl: 2    FLUoxetine 20 MG capsule, Take 1 capsule (20 mg total) by mouth 2 (two) times daily., Disp: 60 capsule, Rfl: 10    hydrocortisone (ANUSOL-HC) 2.5 % rectal cream, Place rectally 2 (two) times daily., Disp: 28 g, Rfl: 2    insulin glargine 100 units/mL SubQ pen, Inject 22 Units into the skin every evening., Disp: , Rfl:     insulin syringe-needle U-100 0.5 mL 31 gauge x 5/16 Syrg, use with admelog, Disp: 100 each, Rfl: 0    lancets Misc, To check BG 4 times daily, to use with insurance preferred meter, e10.65, Disp: 400 each, Rfl: 3    norethindrone  "(MICRONOR) 0.35 mg tablet, Take 1 tablet by mouth once daily., Disp: , Rfl:     norethindrone (ORTHO MICRONOR) 0.35 mg tablet, Take 1 tablet (0.35 mg total) by mouth once daily., Disp: 28 tablet, Rfl: 11    ONETOUCH DELICA PLUS LANCET 33 gauge Misc, USE 1 UNIT TO CHECK GLUCOSE 4 TIMES DAILY, Disp: , Rfl:     ONETOUCH VERIO FLEX METER Misc, USE AS DIRECTED TO CHECK BLOOD GLUCOSE 4 TIMES DAILY, Disp: , Rfl:     pantoprazole (PROTONIX) 40 MG tablet, Take 1 tablet by mouth once daily., Disp: , Rfl:     pen needle, diabetic 32 gauge x 5/32" Ndle, Use as directed, Disp: 100 each, Rfl: 0    polyethylene glycol (GLYCOLAX) 17 gram/dose powder, Take 17 g by mouth once daily., Disp: 30 each, Rfl: 3    insulin aspart U-100 (NOVOLOG) 100 unit/mL (3 mL) InPn pen, Inject 10 Units into the skin 3 (three) times daily with meals., Disp: 9 mL, Rfl: 0  Review of patient's allergies indicates:   Allergen Reactions    No known allergies        Ht 5' 8" (1.727 m)   Wt 74.8 kg (164 lb 12.8 oz)   BMI 25.06 kg/m²     Review of Systems   Constitutional: Negative for chills, fever, night sweats and weight gain.   Gastrointestinal:  Negative for bowel incontinence, nausea and vomiting.   Genitourinary:  Negative for bladder incontinence.   Neurological:  Negative for disturbances in coordination and loss of balance.       Objective:    Ortho Exam     On exam of LEFT hand:    Significant for carpal tunnel incision.   Wound margins are well approximated and healing nicely.     Incisions left index and middle finger are clean and dry with sutures intact.     No redness, warmth, drainage, or other signs of infection.     Limited ROM wrist and fingers. She cannot make a full fist.     UE is NVI distally with good capillary refill.         Assessment:     Imaging:  None        1. Carpal tunnel syndrome of left wrist    2. Trigger middle finger of left hand    3. Trigger finger, left index finger    4. S/P carpal tunnel release    5. S/P trigger " finger release          Plan:         Orders Placed This Encounter    Ambulatory referral/consult to Physical/Occupational Therapy       She is doing reasonably well s/p above surgery. Treatment options reviewed with patient and following plan made:     - Regular wound care explained with soap and water.  - Sutures removed without complication.   - Wean out of brace and start using hand for light activities.   - Avoid pressure to the palm of the hand.  - OT for left hand. Orders to [facility].   - Goes back to work on 12/29 as . Does not do heavy lifting. Will let me know if any issues.   - I explained numbness and tingling symptoms will continue to resolve with time, the patient understands.  - She asks about scheduled right carpal tunnel release. Would wait until she sees Dr. Rojo in follow up to make sure left hand is better. Message to staff to call her to get tentative date.     Follow up in about 4 weeks (around 1/19/2023) for postop visit with Dr. Rojo.

## 2022-12-22 NOTE — PATIENT INSTRUCTIONS
It was nice to meet you today!     You can wash hand with soap and water.     Can work on moving your fingers and wrist. No heavy lifting with left hand.     I sent occupational therapy orders to [facility]. They should call you to set up, but if not you can call [phone].     I will have Dr. Rojo's staff call you in regards to scheduling the right side.     Go back to work as scheduled- let us know if there are any issues.     Dr. Rojo will see you back in 4-6 weeks, but please stay in touch and call me if you need anything. You can also send me a message in [facility].     Maureen   [phone]

## 2022-12-22 NOTE — PROGRESS NOTES
The patient location is: car  The chief complaint leading to consultation is: SMV thrombus    Visit type: audiovisual    Face to Face time with patient: 15 min  30 minutes of total time spent on the encounter, which includes face to face time and non-face to face time preparing to see the patient (eg, review of tests), Obtaining and/or reviewing separately obtained history, Documenting clinical information in the electronic or other health record, Independently interpreting results (not separately reported) and communicating results to the patient/family/caregiver, or Care coordination (not separately reported).         Each patient to whom he or she provides medical services by telemedicine is:  (1) informed of the relationship between the physician and patient and the respective role of any other health care provider with respect to management of the patient; and (2) notified that he or she may decline to receive medical services by telemedicine and may withdraw from such care at any time.    Notes:   HEMATOLOGY ONCOLOGY FELLOW CLINIC    Hematology History:  Initial Consult:  Ms. Anusha Andrew is a 31 year old female with hypertension, type I diabetes mellitus, hyperlipidemia, and history of recent gastric sleeve who presents for follow up for superior mesenteric vein thrombus.     Ms. Andrew underwent gastric sleeve on 7/11/22, then presented to the ED on 8/11/22 with abdominal pain and nausea. She was found to have SMV thrombosis with reactive mesenteric edema and venous congestion. She was admitted and started on heparin gtt. Hematology was consulted and recommended transition to apixaban at that time. She denied any personal or family history of blood clots. No travel. She was taking oral estrogen/progesterone contraceptive at that time. She is now on progesterone only contraception.     Workup for hypercoagulable state has been negative so far, with negative antiphospholipid antibody, lupus anticoagulant, and  beta 2 glycoprotein. Negative JAK2, negative PNH clones, no prothrombin gene mutation, negative Factor V Leiden    She is tolerating apixaban well with no significant bleeding. Abdominal pain has resolved since hospitalization.      Interval History:    Since last visit, Ms. Andrew stopped her apixaban and had carpal tunnel surgery. Her abdominal pain has not recurred and she is feeling well. She is recovering well from her surgery. She had labs done to complete hypercoagulable state workup but results are pending.     Past Medical History:   Diagnosis Date    Depression     Hyperlipidemia     Hypertension     Mild nonproliferative diabetic retinopathy of both eyes without macular edema associated with type 1 diabetes mellitus 3/30/2021    Type I (juvenile type) diabetes mellitus without mention of complication, uncontrolled 11/23/2011       Family History   Problem Relation Age of Onset    Diabetes Brother     No Known Problems Mother     No Known Problems Father     Hydrocephalus Daughter     No Known Problems Brother     No Known Problems Brother     No Known Problems Maternal Aunt     No Known Problems Maternal Uncle     No Known Problems Paternal Aunt     No Known Problems Paternal Uncle     No Known Problems Maternal Grandmother     No Known Problems Maternal Grandfather     No Known Problems Paternal Grandmother     No Known Problems Paternal Grandfather     Amblyopia Neg Hx     Blindness Neg Hx     Cataracts Neg Hx     Glaucoma Neg Hx     Hypertension Neg Hx     Macular degeneration Neg Hx     Retinal detachment Neg Hx     Strabismus Neg Hx     Stroke Neg Hx     Thyroid disease Neg Hx     Breast cancer Neg Hx     Colon cancer Neg Hx     Ovarian cancer Neg Hx     Cancer Neg Hx        Social History     Socioeconomic History    Marital status: Single   Tobacco Use    Smoking status: Never    Smokeless tobacco: Never   Substance and Sexual Activity    Alcohol use: Yes     Comment: occasionally    Drug use: No     Sexual activity: Yes     Partners: Male     Birth control/protection: Injection     Social Determinants of Health     Financial Resource Strain: Low Risk     Difficulty of Paying Living Expenses: Not hard at all   Food Insecurity: No Food Insecurity    Worried About Running Out of Food in the Last Year: Never true    Ran Out of Food in the Last Year: Never true   Transportation Needs: No Transportation Needs    Lack of Transportation (Medical): No    Lack of Transportation (Non-Medical): No   Physical Activity: Sufficiently Active    Days of Exercise per Week: 5 days    Minutes of Exercise per Session: 60 min   Stress: Stress Concern Present    Feeling of Stress : Rather much   Social Connections: Unknown    Frequency of Communication with Friends and Family: More than three times a week    Frequency of Social Gatherings with Friends and Family: Once a week    Active Member of Clubs or Organizations: No    Attends Club or Organization Meetings: Never    Marital Status: Never    Housing Stability: Low Risk     Unable to Pay for Housing in the Last Year: No    Number of Places Lived in the Last Year: 1    Unstable Housing in the Last Year: No         MEDICATIONS:     Current Outpatient Medications on File Prior to Visit   Medication Sig Dispense Refill    blood sugar diagnostic Strp To check BG 4  times daily, to use with insurance preferred meter, e 10.65 (Patient taking differently: To check BG 4  times daily, to use with insurance preferred meter, e 10.65) 400 each 3    ergocalciferol (VITAMIN D2) 50,000 unit Cap Take 1 capsule (50,000 Units total) by mouth every 7 days. 5 capsule 3    FLUoxetine 10 MG capsule Take 1 capsule (10 mg total) by mouth once daily. 30 capsule 2    FLUoxetine 20 MG capsule Take 1 capsule (20 mg total) by mouth 2 (two) times daily. 60 capsule 10    HYDROcodone-acetaminophen (NORCO) 5-325 mg per tablet Take 1 tablet by mouth every 4 (four) hours as needed for Pain. 20 tablet 0     "hydrocortisone (ANUSOL-HC) 2.5 % rectal cream Place rectally 2 (two) times daily. 28 g 2    insulin aspart U-100 (NOVOLOG) 100 unit/mL (3 mL) InPn pen Inject 10 Units into the skin 3 (three) times daily with meals. 9 mL 0    insulin glargine 100 units/mL SubQ pen Inject 22 Units into the skin every evening.      insulin syringe-needle U-100 0.5 mL 31 gauge x 5/16 Syrg use with admelog 100 each 0    lancets Misc To check BG 4 times daily, to use with insurance preferred meter, e10.65 400 each 3    norethindrone (MICRONOR) 0.35 mg tablet Take 1 tablet by mouth once daily.      norethindrone (ORTHO MICRONOR) 0.35 mg tablet Take 1 tablet (0.35 mg total) by mouth once daily. 28 tablet 11    ONETOUCH DELICA PLUS LANCET 33 gauge Misc USE 1 UNIT TO CHECK GLUCOSE 4 TIMES DAILY      ONETOUCH VERIO FLEX METER Misc USE AS DIRECTED TO CHECK BLOOD GLUCOSE 4 TIMES DAILY      pantoprazole (PROTONIX) 40 MG tablet Take 1 tablet by mouth once daily.      pen needle, diabetic 32 gauge x 5/32" Ndle Use as directed 100 each 0    polyethylene glycol (GLYCOLAX) 17 gram/dose powder Take 17 g by mouth once daily. 30 each 3     No current facility-administered medications on file prior to visit.       ALLERGIES:   Review of patient's allergies indicates:   Allergen Reactions    No known allergies         ROS:       Review of Systems   Constitutional:  Negative for fatigue, fever and unexpected weight change.   HENT:   Negative for lump/mass and mouth sores.    Eyes:  Negative for eye problems and icterus.   Respiratory:  Negative for cough and shortness of breath.    Cardiovascular:  Negative for chest pain and palpitations.   Gastrointestinal:  Negative for abdominal pain, blood in stool, nausea and vomiting.   Genitourinary:  Negative for dysuria and frequency.    Musculoskeletal:  Negative for back pain and neck pain.   Skin:  Negative for itching and rash.   Neurological:  Negative for headaches and light-headedness.   Hematological:  " Negative for adenopathy. Does not bruise/bleed easily.   Psychiatric/Behavioral:  Negative for confusion and depression.      PHYSICAL EXAM:  There were no vitals filed for this visit.  Virtual Visit    LAB:   Results for orders placed or performed in visit on 12/21/22   CBC Auto Differential   Result Value Ref Range    WBC 6.70 3.90 - 12.70 K/uL    RBC 4.72 4.00 - 5.40 M/uL    Hemoglobin 12.9 12.0 - 16.0 g/dL    Hematocrit 39.9 37.0 - 48.5 %    MCV 85 82 - 98 fL    MCH 27.3 27.0 - 31.0 pg    MCHC 32.3 32.0 - 36.0 g/dL    RDW 14.0 11.5 - 14.5 %    Platelets 343 150 - 450 K/uL    MPV 9.5 9.2 - 12.9 fL    Immature Granulocytes 0.0 0.0 - 0.5 %    Gran # (ANC) 3.7 1.8 - 7.7 K/uL    Immature Grans (Abs) 0.00 0.00 - 0.04 K/uL    Lymph # 2.5 1.0 - 4.8 K/uL    Mono # 0.4 0.3 - 1.0 K/uL    Eos # 0.1 0.0 - 0.5 K/uL    Baso # 0.02 0.00 - 0.20 K/uL    nRBC 0 0 /100 WBC    Gran % 54.7 38.0 - 73.0 %    Lymph % 36.9 18.0 - 48.0 %    Mono % 6.3 4.0 - 15.0 %    Eosinophil % 1.8 0.0 - 8.0 %    Basophil % 0.3 0.0 - 1.9 %    Differential Method Automated    Comprehensive Metabolic Panel   Result Value Ref Range    Sodium 138 136 - 145 mmol/L    Potassium 3.7 3.5 - 5.1 mmol/L    Chloride 107 95 - 110 mmol/L    CO2 20 (L) 23 - 29 mmol/L    Glucose 81 70 - 110 mg/dL    BUN 11 6 - 20 mg/dL    Creatinine 0.7 0.5 - 1.4 mg/dL    Calcium 9.1 8.7 - 10.5 mg/dL    Total Protein 7.5 6.0 - 8.4 g/dL    Albumin 3.7 3.5 - 5.2 g/dL    Total Bilirubin 0.3 0.1 - 1.0 mg/dL    Alkaline Phosphatase 78 55 - 135 U/L    AST 31 10 - 40 U/L    ALT 47 (H) 10 - 44 U/L    Anion Gap 11 8 - 16 mmol/L    eGFR >60.0 >60 mL/min/1.73 m^2     *Note: Due to a large number of results and/or encounters for the requested time period, some results have not been displayed. A complete set of results can be found in Results Review.       PROBLEMS ASSESSED THIS VISIT:    1. Superior mesenteric vein thrombosis          ASSESSMENT AND PLAN    Ms. Andrew is a 31 year old female who  presents for follow up of SMV thrombus which was provoked in the setting of bariatric surgery and OCPs. She has had negative hypercoagulable workup so far and is tolerating apixaban well.     - She completed 3 months of anticoagulation with apixaban on 11/16/22  - her SMV thrombus was provoked in the setting of gastric sleeve and OCP use, however checked hypercoagulable workup due to location. This has been negative so far.   - Completed hypercoagulable workup with Protein C, Protein S, and antithrombin III. If these are normal, no further anticoagulation or workup is necessary. Will message Ms. Andrew with these results.   - no follow up with hematology necessary unless the above workup is concerning.   - discussed self-monitoring for symptoms of DVT and instructed her to present to the ED or call the clinic if she has any symptoms concerning for another clot    Discussed with Dr. Dean Rodarte DO  PGY-V  Hematology/Oncology Fellow

## 2022-12-25 ENCOUNTER — PATIENT MESSAGE (OUTPATIENT)
Dept: INTERNAL MEDICINE | Facility: CLINIC | Age: 32
End: 2022-12-25

## 2022-12-26 ENCOUNTER — PATIENT MESSAGE (OUTPATIENT)
Dept: INTERNAL MEDICINE | Facility: CLINIC | Age: 32
End: 2022-12-26

## 2022-12-27 ENCOUNTER — PATIENT MESSAGE (OUTPATIENT)
Dept: HEMATOLOGY/ONCOLOGY | Facility: CLINIC | Age: 32
End: 2022-12-27

## 2022-12-27 ENCOUNTER — PATIENT MESSAGE (OUTPATIENT)
Dept: INTERNAL MEDICINE | Facility: CLINIC | Age: 32
End: 2022-12-27

## 2022-12-27 RX ORDER — INSULIN ASPART 100 [IU]/ML
INJECTION, SOLUTION INTRAVENOUS; SUBCUTANEOUS
Qty: 15 ML | Refills: 5 | Status: SHIPPED | OUTPATIENT
Start: 2022-12-27 | End: 2023-01-17

## 2022-12-28 ENCOUNTER — CLINICAL SUPPORT (OUTPATIENT)
Dept: REHABILITATION | Facility: HOSPITAL | Age: 32
End: 2022-12-28
Payer: MEDICAID

## 2022-12-28 DIAGNOSIS — Z98.890 S/P CARPAL TUNNEL RELEASE: ICD-10-CM

## 2022-12-28 DIAGNOSIS — M25.642 DECREASED RANGE OF MOTION OF FINGER OF LEFT HAND: ICD-10-CM

## 2022-12-28 DIAGNOSIS — Z98.890 S/P TRIGGER FINGER RELEASE: ICD-10-CM

## 2022-12-28 DIAGNOSIS — M25.632 DECREASED RANGE OF MOTION OF LEFT WRIST: ICD-10-CM

## 2022-12-28 DIAGNOSIS — U07.1 COVID-19 VIRUS DETECTED: ICD-10-CM

## 2022-12-28 PROCEDURE — 97530 THERAPEUTIC ACTIVITIES: CPT

## 2022-12-28 PROCEDURE — 97165 OT EVAL LOW COMPLEX 30 MIN: CPT

## 2022-12-28 NOTE — PLAN OF CARE
[facility]  Occupational Therapy Initial Evaluation    Date: 2022  Name: Anusha Andrew  Clinic Number: [id number]    Therapy Diagnosis:   Encounter Diagnoses   Name Primary?    S/P carpal tunnel release     S/P trigger finger release     Decreased range of motion of left wrist     Decreased range of motion of finger of left hand      Physician: Maureen Huang, LUTHER    Physician Orders:       She is s/p LEFT carpal tunnel release and trigger release LEFT index and middle finger by Dr. Rojo on 22.      OT for left hand. Eval and treat with all modalities. Good HEP.        Medical Diagnosis:   Z98.890 (ICD-10-CM) - S/P carpal tunnel release   Z98.890 (ICD-10-CM) - S/P trigger finger release     Surgical Procedure and Date: L CTR and index and middle finger trigger release 22,  Evaluation Date: 22  Insurance Authorization Period Expiration: 23  Plan of Care Expiration: 3/8/23  Date of Return to MD: 22  Visit # / Visits authorized:   FOTO: (date and score)    Precautions:  Standard and diabetes    Time In: 925 arrive late  Time Out: 10:02  Total Appointment Time (timed & untimed codes): 37 minutes      SUBJECTIVE     Date of Onset: over a year     History of Current Condition/Mechanism of Injury: Anusha reports: Pt reports she cant make a full fist yet and her fingers are not locking up. She reports the numbness/tingling has improved slowly since the surgery. She states the numbness is mainly at the ulnar palm, the scars, and the index finger and middle fingers. She reports no using heat or ice at all. The numbness is worse in the morning and mainly comes and goes.     Falls: 0    Involved Side: left  Dominant Side: Right  Imagin/3/22 No fracture or dislocation.  No erosive changes.  Joint spaces are intact.  The soft tissues are within normal limits.  Prior Therapy: no  Occupation:  lab tech  Working presently: employed  Duties: sticking  patients with needles, fine motor activities with handles drug/pregnancy tests.     Functional Limitations/Social History:    Previous functional status includes: Independent with all ADLs.     Current Functional Status   Home/Living environment: lives with their family      Limitation of Functional Status as follows:   ADLs/IADLs:     - Feeding: ind     - Bathing: ind    - Dressing/Grooming: easier with brace on, increased time with smaller jewelry and getting dressed     - Driving: ind     Leisure: exercising, uber    Pain:  Functional Pain Scale Rating 0-10: Current 3/10, worst 3/10, best 2/10   Location: left hand   Description: stiffness, numbness, random shock in wrist area   Aggravating Factors: overuse of hands, bending fingers, wrist motion  Easing Factors: tylenol, ibuprofen, brace wear, resting      Patient's Goals for Therapy: return to work normally, normal use of hand, improve finger motion, decrease the numbness    Medical History:   Past Medical History:   Diagnosis Date    Depression     Hyperlipidemia     Hypertension     Mild nonproliferative diabetic retinopathy of both eyes without macular edema associated with type 1 diabetes mellitus 3/30/2021    Type I (juvenile type) diabetes mellitus without mention of complication, uncontrolled 2011       Surgical History:    has a past surgical history that includes [place] tooth extraction; Abscess drainage; TONSILLECTOMY, ADENOIDECTOMY;  section (2012); Hysteroscopy (N/A, 2019); Dilation and curettage of uterus (2019); Carpal tunnel release (Left, 2022); and Trigger finger release (Left, 2022).    Medications:   has a current medication list which includes the following prescription(s): blood sugar diagnostic, ergocalciferol, fluoxetine, fluoxetine, hydrocortisone, insulin aspart u-100, insulin, insulin syringe-needle u-100, lancets, norethindrone, norethindrone, onetouch delica plus lancet, onetouch verio flex  meter, pantoprazole, pen needle, diabetic, and polyethylene glycol.    Allergies:   Review of patient's allergies indicates:   Allergen Reactions    No known allergies           OBJECTIVE     Observation/Appearance: incisions intact and dry, no sign of infections, min hypersensitivity at index finger and wrist incisions    Edema. Measured in centimeters.   12/28/2022 12/28/2022    Left Right   Wrist Crease 17.7    MCPs 15.9      Elbow and Wrist ROM. Measured in degrees.   12/28/2022 12/28/2022    Left Right   Supination/Pronation wfl    Wrist Ext/Flex 65/61 (pain with flex)    Wrist RD/UD 15/15      Hand ROM. Measured in degrees.   12/28/2022 12/28/2022    Left Right        Index: MP  73               PIP     -14/88               DIP -5/50                        Long:  MP 74               PIP -10/88               DIP 62                        Thumb         Rad ADD/ABD 47        Pal ADD/ABD WFL           Opposition Thumb to DPC (pain)       Strength (Dynamometer) and Pinch Strength (Pinch Gauge)  Measured in pounds.  DEFER   12/28/2022 12/28/2022    Left Right   Rung II     Nix Pinch     3pt Pinch     2pt Pinch       Sensation:    12/28/2022 12/28/2022    Left Right   [place] Caio     Normal 1.65-2.83     Diminished Light Touch 3.22-3.61 Intact for median nerve distribution    Diminished Protective 3.84-4.31     Loss of Protective 4.56-6.65     Untestable >6.65     2 Point Discrimination     Static     Dynamic       Limitation/Restriction for FOTO hand/wrist Survey    Therapist reviewed FOTO scores for Anusha Andrew on 12/28/2022.   FOTO documents entered into [employer] - see Media section.    Limitation Score: tba%         Treatment   Total Treatment time (time-based codes) separate from Evaluation: 10 minutes    Anusha received the treatments listed below:     Supervised modalities after being cleared for contradictions: Hot Pack - Patient received moist heat x 5 min to left hand to increase  blood flow, circulation, and tissue elasticity prior to therex.      Therapeutic activities to improve functional performance and  to develop endurance, ROM, and flexibility for 10 minutes, including:  -Pt provided with education, demonstration, and participation in HEP including:     - scar massage daily  Use heat 1-2x/day for 10 minutes   Use ice at end of day if swelling/pain increases for 10 minutes  Wear the brace at night if it helps with pain/numbness  Can massage scar with lotion/vitamin E/darrion butter    - wrist flex/ext/RD/UD  - TGEs, thumb opp with pinky slides, spreads, lifts  - prayer stretching 30-60 sec holds    Patient Education and Home Exercises      Education provided:   - see above    Written Home Exercises Provided: yes.  Exercises were reviewed and Anusha was able to demonstrate them prior to the end of the session.  Anusha demonstrated good  understanding of the education provided. See EMR under Patient Instructions for exercises provided during therapy sessions.     Pt was advised to perform these exercises free of pain, and to stop performing them if pain occurs.    Patient/Family Education: role of OT, goals for OT, scheduling/cancellations - pt verbalized understanding. Discussed insurance limitations with patient.    ASSESSMENT     Anusha Andrew is a 32 y.o. female referred to outpatient occupational therapy and presents with a medical diagnosis of L CTR and index finger/long finger trigger releases.  Patient presents with the following therapy deficits: Decreased ROM, Decreased  strength, Decreased pinch strength, Decreased muscle strength, Decreased functional hand use, Increased pain, Edema, Joint Stiffness, Scar Adhesions, and Diminished/Impaired Sensation and demonstrates limitations as described in the chart below. Following medical record review it is determined that pt will benefit from occupational therapy services in order to maximize pain free and/or functional use of  left hand. The following goals were discussed with the patient and patient is in agreement with them as to be addressed in the treatment plan. The patient's rehab potential is Excellent.     Anticipated barriers to occupational therapy: none at this time  Pt has no cultural, educational or language barriers to learning provided.    Profile and History Assessment of Occupational Performance Level of Clinical Decision Making Complexity Score   Occupational Profile:   Anusha Andrew is a 32 y.o. female who lives with their family and is currently employed Anusha Andrew has difficulty with  ADLs and IADLs as listed previously, which  [facility].      Comorbidities:    has a past medical history of Depression, Hyperlipidemia, Hypertension, Mild nonproliferative diabetic retinopathy of both eyes without macular edema associated with type 1 diabetes mellitus, and Type I (juvenile type) diabetes mellitus without mention of complication, uncontrolled.    Medical and Therapy History Review:   Brief               Performance Deficits    Physical:  Joint Mobility  Muscle Power/Strength  Muscle Endurance  Skin Integrity/Scar Formation  Edema   Strength  Pinch Strength  Tactile Functions  Pain    Cognitive:  No Deficits    Psychosocial:    Habits  Routines  Rituals     Clinical Decision Making:  low    Assessment Process:  Problem-Focused Assessments    Modification/Need for Assistance:  Not Necessary    Intervention Selection:  Limited Treatment Options       low  Based on PMHX, co morbidities , data from assessments and functional level of assistance required with task and clinical presentation directly impacting function.         Goals:   The following goals were discussed with the patient and patient is in agreement with them as to be addressed in the treatment plan.   Long Term Goals (LTGs); to be met by discharge.  LTG #1: Pt will report a pain level of 1 out of 10 with all functional  activities.   LTG #2: Pt will demo improved FOTO score by 20 points.   LTG #3: Pt will return to prior level of function for ADLs and household management.     Short Term Goals (STGs); to be met within 4 weeks (1/25/23).  STG #1: Pt will report 3 out of 10 pain level with in clinic activities.  STG #2: Pt will demo improved edema by at least 0.3 cm to improve motion and pain.  STG #3: Pt will demo improve wrist CR by at least 10 degrees to improve work duties.  STG #4: Pt will demonstrate independence with issued HEP and brace wear as needed..   STG #5: Pt will demo improved digit CR by at least 25 degrees needed to aid with independence of functional grasping activities.  STG 6: Assess /pinch when appropriate and create goals.      PLAN   Plan of Care Certification: 12/28/2022 to 3/8/23.     Outpatient Occupational Therapy 1 times weekly for 10 weeks to include the following interventions: Paraffin, Fluidotherapy, Manual therapy/joint mobilizations, Modalities for pain management, US 3 mhz, Therapeutic exercises/activities., Iontophoresis with 2.0 cc Dexamethasone, Strengthening, Orthotic Fabrication/Fit/Training, Edema Control, Scar Management, Wound Care, Electrical Modalities, Joint Protection, and Energy Conservation.      Mike Ty OT      I CERTIFY THE NEED FOR THESE SERVICES FURNISHED UNDER THIS PLAN OF TREATMENT AND WHILE UNDER MY CARE  Physician's comments:      Physician's Signature: ___________________________________________________

## 2022-12-28 NOTE — PATIENT INSTRUCTIONS
[facility], OCCUPATIONAL THERAPY  HOME EXERCISE PROGRAM    Use heat 1-2x/day for 10 minutes; first thing in the morning   Use ice at end of day if swelling/pain increases for 10 minutes    Wear the brace at night if it helps with pain/numbness    Can massage scar with lotion/vitamin E/darrion butter      .

## 2022-12-29 ENCOUNTER — PATIENT MESSAGE (OUTPATIENT)
Dept: ORTHOPEDICS | Facility: CLINIC | Age: 32
End: 2022-12-29

## 2023-01-03 ENCOUNTER — TELEPHONE (OUTPATIENT)
Dept: ORTHOPEDICS | Facility: CLINIC | Age: 33
End: 2023-01-03
Payer: MEDICAID

## 2023-01-03 NOTE — TELEPHONE ENCOUNTER
Spoke to pt she is now scheduled to come in on 1/13. Patient replied yes She can make it that day. Thank You
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
## 2023-01-07 PROBLEM — S39.012A STRAIN OF BACK: Status: ACTIVE | Noted: 2023-01-07

## 2023-01-11 ENCOUNTER — CLINICAL SUPPORT (OUTPATIENT)
Dept: REHABILITATION | Facility: HOSPITAL | Age: 33
End: 2023-01-11
Payer: MEDICAID

## 2023-01-11 ENCOUNTER — PATIENT MESSAGE (OUTPATIENT)
Dept: INTERNAL MEDICINE | Facility: CLINIC | Age: 33
End: 2023-01-11
Payer: MEDICAID

## 2023-01-11 DIAGNOSIS — M25.632 DECREASED RANGE OF MOTION OF LEFT WRIST: Primary | ICD-10-CM

## 2023-01-11 DIAGNOSIS — M25.642 DECREASED RANGE OF MOTION OF FINGER OF LEFT HAND: ICD-10-CM

## 2023-01-11 RX ORDER — INSULIN GLARGINE 100 [IU]/ML
INJECTION, SOLUTION SUBCUTANEOUS
Qty: 15 ML | Refills: 6 | Status: SHIPPED | OUTPATIENT
Start: 2023-01-11 | End: 2023-01-17

## 2023-01-11 NOTE — PROGRESS NOTES
"  Occupational Therapy Daily Treatment Note     Name: Anusha Andrew  Clinic Number: [id number]    Therapy Diagnosis:   Encounter Diagnoses   Name Primary?    Decreased range of motion of left wrist Yes    Decreased range of motion of finger of left hand      Physician: Maureen Huang PA-C  Visit Date: 1/11/2023    Physician Orders:             She is s/p LEFT carpal tunnel release and trigger release LEFT index and middle finger by Dr. Rojo on 12/13/22.      OT for left hand. Eval and treat with all modalities. Good HEP.         Medical Diagnosis:   Z98.890 (ICD-10-CM) - S/P carpal tunnel release   Z98.890 (ICD-10-CM) - S/P trigger finger release      Surgical Procedure and Date: L CTR and index and middle finger trigger release 12/13/22,  Evaluation Date: 12/28/22  Insurance Authorization Period Expiration: 12/22/23  Plan of Care Expiration: 3/8/23  Date of Return to MD: 2/8/22  Visit # / Visits authorized: 1 / 20  FOTO: (date and score)      Time In:10:00 am  Time Out: 10:50am  Total Billable Time: 50 minutes    Precautions:  Standard  4 weeks post-op as of  1/10/23    Subjective     Pt reports: "it's steadily improving"  she was compliant with home exercise program given last session.   Response to previous treatment: no adverse reaction  Functional change: 2nd visit - no significant changes     Pain: 3/10  Location: left palmar surface/CTR incision site     Objective       Observation/Appearance: incisions intact and dry, no sign of infections, min hypersensitivity at index finger and wrist incisions     Edema. Measured in centimeters.    12/28/2022 12/28/2022     Left Right   Wrist Crease 17.7     MCPs 15.9        Elbow and Wrist ROM. Measured in degrees.    12/28/2022 12/28/2022     Left Right   Supination/Pronation wfl     Wrist Ext/Flex 65/61 (pain with flex)     Wrist RD/UD 15/15        Hand ROM. Measured in degrees.    12/28/2022 12/28/2022     Left Right           Index: MP  73                PIP     " -14/88                DIP -5/50                             Long:  MP 74                PIP -10/88                DIP 62                             Thumb           Rad ADD/ABD 47         Pal ADD/ABD WFL            Opposition Thumb to DPC (pain)         Strength (Dynamometer) and Pinch Strength (Pinch Gauge)  Measured in pounds.  DEFER    12/28/2022 12/28/2022     Left Right   Rung II       Nix Pinch       3pt Pinch       2pt Pinch          Sensation:     12/28/2022 12/28/2022     Left Right   [place] Caio       Normal 1.65-2.83       Diminished Light Touch 3.22-3.61 Intact for median nerve distribution     Diminished Protective 3.84-4.31       Loss of Protective 4.56-6.65       Untestable >6.65       2 Point Discrimination       Static       Dynamic          Limitation/Restriction for FOTO hand/wrist Survey     Therapist reviewed FOTO scores for Anusha Andrew on 12/28/2022.   FOTO documents entered into [employer] - see Media section.     Limitation Score: tba%           Treatment       Anusha received the treatments listed below:      Supervised modalities after being cleared for contradictions:   -Paraffin x 10 min to left hand to increase blood flow, circulation, and tissue elasticity prior to therex.     Manual therapy for 10 minutes, including:  - Scar massage to volar wrist scar, dense scars at dense IF and LF scars at A1 pulley level     Therapeutic activities to improve functional performance and  to develop endurance, ROM, and flexibility for 30 minutes, including:  -Pt provided with education, demonstration, and participation in HEP including:   -PROM: Comp Finger Extension 30 sec x 4 each  - wrist flex/ext/RD/UD  - wrist 3 ways over wedge with 1 lb weight 3 x 10  - TGEs, thumb opp with pinky slides, spreads, lifts  - prayer stretching 30-60 sec holds  -Yellow Theraputty rolling x 3 min  - yellow putty: , 3 functional pinches: 20 reps each       Home Exercises and Education  Provided     Education provided:   - scar massage daily 4-6 x/day   - Use heat before exercises  - Use ice at end of day if swelling/pain increases for 10 minutes  - Progress towards goals   - wean out of brace - OK for heavier activity     Written Home Exercises Provided: Patient instructed to cont prior HEP.  Exercises were reviewed and Anusha was able to demonstrate them prior to the end of the session.  Anusha demonstrated good  understanding of the HEP provided.   .   See EMR under Patient Instructions for exercises provided prior visit.        Assessment     Pt tolerated session well. A1 pulley scars are dense. During scar massage, she reports A1 scar of the index finger is more sore than the other scars. She tolerated introduction of gentle strengthening well (wrist AROM with 1 lb weight). OT introduced yellow putty for HEP and she demonstrates good understanding of use.     Anusha is progressing well towards her goals and there are no updates to goals at this time. Pt prognosis is Good.     Pt will continue to benefit from skilled outpatient occupational therapy to address the deficits listed in the problem list on initial evaluation provide pt/family education and to maximize pt's level of independence in the home and community environment.     Anticipated barriers to occupational therapy: none     Pt's spiritual, cultural and educational needs considered and pt agreeable to plan of care and goals.    Goals:   The following goals were discussed with the patient and patient is in agreement with them as to be addressed in the treatment plan.   Long Term Goals (LTGs); to be met by discharge.  LTG #1: Pt will report a pain level of 1 out of 10 with all functional activities.             LTG #2: Pt will demo improved FOTO score by 20 points.   LTG #3: Pt will return to prior level of function for ADLs and household management.      Short Term Goals (STGs); to be met within 4 weeks (1/25/23).  STG #1: Pt  will report 3 out of 10 pain level with in clinic activities.  STG #2: Pt will demo improved edema by at least 0.3 cm to improve motion and pain.  STG #3: Pt will demo improve wrist CR by at least 10 degrees to improve work duties.  STG #4: Pt will demonstrate independence with issued HEP and brace wear as needed..   STG #5: Pt will demo improved digit CR by at least 25 degrees needed to aid with independence of functional grasping activities.  STG 6: Assess /pinch when appropriate and create goals.        PLAN   Plan of Care Certification: 12/28/2022 to 3/8/23.      Outpatient Occupational Therapy 1 times weekly for 10 weeks to include the following interventions: Paraffin, Fluidotherapy, Manual therapy/joint mobilizations, Modalities for pain management, US 3 mhz, Therapeutic exercises/activities., Iontophoresis with 2.0 cc Dexamethasone, Strengthening, Orthotic Fabrication/Fit/Training, Edema Control, Scar Management, Wound Care, Electrical Modalities, Joint Protection, and Energy Conservation.      Alexsandra Dan, OTR/L, CHT

## 2023-01-11 NOTE — PATIENT INSTRUCTIONS
[facility]  OCCUPATIONAL THERAPY  HOME EXERCISE PROGRAM     Complete the following strengthening program 1x/day.                     _      - Thumb, index, ring  - index and ring   - side of index and thumb       ABRAM Ramirez  Occupational Therapist

## 2023-01-17 ENCOUNTER — PATIENT MESSAGE (OUTPATIENT)
Dept: INTERNAL MEDICINE | Facility: CLINIC | Age: 33
End: 2023-01-17

## 2023-01-17 ENCOUNTER — TELEPHONE (OUTPATIENT)
Dept: INTERNAL MEDICINE | Facility: CLINIC | Age: 33
End: 2023-01-17

## 2023-01-17 ENCOUNTER — OFFICE VISIT (OUTPATIENT)
Dept: INTERNAL MEDICINE | Facility: CLINIC | Age: 33
End: 2023-01-17
Payer: MEDICAID

## 2023-01-17 DIAGNOSIS — E10.3293 MILD NONPROLIFERATIVE DIABETIC RETINOPATHY OF BOTH EYES WITHOUT MACULAR EDEMA ASSOCIATED WITH TYPE 1 DIABETES MELLITUS: ICD-10-CM
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
## 2023-01-17 DIAGNOSIS — Z86.39 H/O HYPOGLYCEMIA: ICD-10-CM

## 2023-01-17 DIAGNOSIS — E66.3 OVERWEIGHT (BMI 25.0-29.9): ICD-10-CM

## 2023-01-17 DIAGNOSIS — U07.1 COVID-19: ICD-10-CM

## 2023-01-17 DIAGNOSIS — E10.65 TYPE 1 DIABETES MELLITUS WITH HYPERGLYCEMIA: Primary | ICD-10-CM

## 2023-01-17 DIAGNOSIS — Z90.3 HISTORY OF SLEEVE GASTRECTOMY: ICD-10-CM

## 2023-01-17 DIAGNOSIS — F32.1 CURRENT MODERATE EPISODE OF MAJOR DEPRESSIVE DISORDER WITHOUT PRIOR EPISODE: Chronic | ICD-10-CM

## 2023-01-17 PROCEDURE — 1159F MED LIST DOCD IN RCRD: CPT | Mod: CPTII,95,, | Performed by: NURSE PRACTITIONER

## 2023-01-17 PROCEDURE — 1160F PR REVIEW ALL MEDS BY PRESCRIBER/CLIN PHARMACIST DOCUMENTED: ICD-10-PCS | Mod: CPTII,95,, | Performed by: NURSE PRACTITIONER

## 2023-01-17 PROCEDURE — 99214 PR OFFICE/OUTPT VISIT, EST, LEVL IV, 30-39 MIN: ICD-10-PCS | Mod: 95,,, | Performed by: NURSE PRACTITIONER

## 2023-01-17 PROCEDURE — 1159F PR MEDICATION LIST DOCUMENTED IN MEDICAL RECORD: ICD-10-PCS | Mod: CPTII,95,, | Performed by: NURSE PRACTITIONER

## 2023-01-17 PROCEDURE — 3051F HG A1C>EQUAL 7.0%<8.0%: CPT | Mod: CPTII,95,, | Performed by: NURSE PRACTITIONER

## 2023-01-17 PROCEDURE — 3051F PR MOST RECENT HEMOGLOBIN A1C LEVEL 7.0 - < 8.0%: ICD-10-PCS | Mod: CPTII,95,, | Performed by: NURSE PRACTITIONER

## 2023-01-17 PROCEDURE — 1160F RVW MEDS BY RX/DR IN RCRD: CPT | Mod: CPTII,95,, | Performed by: NURSE PRACTITIONER

## 2023-01-17 PROCEDURE — 99214 OFFICE O/P EST MOD 30 MIN: CPT | Mod: 95,,, | Performed by: NURSE PRACTITIONER

## 2023-01-17 RX ORDER — INSULIN ASPART 100 [IU]/ML
INJECTION, SOLUTION INTRAVENOUS; SUBCUTANEOUS
Qty: 15 ML | Refills: 6
Start: 2023-01-17 | End: 2023-04-26

## 2023-01-17 RX ORDER — GLUCAGON 3 MG/1
POWDER NASAL
Qty: 2 EACH | Refills: 2 | Status: SHIPPED | OUTPATIENT
Start: 2023-01-17

## 2023-01-17 RX ORDER — INSULIN GLARGINE 100 [IU]/ML
INJECTION, SOLUTION SUBCUTANEOUS
Qty: 15 ML | Refills: 6
Start: 2023-01-17 | End: 2023-04-11 | Stop reason: SDUPTHER

## 2023-01-17 NOTE — TELEPHONE ENCOUNTER
----- Message from NAA Zaragoza, JESSICAP sent at 1/17/2023  4:00 PM CST -----  Concerns with lows after weight loss- cutting back insulin gradually- will review dexcom again -this Friday  Natalia- please book pt in 3.5 months 3 labs prior

## 2023-01-17 NOTE — Clinical Note
Concerns with lows after weight loss- cutting back insulin gradually- will review dexcom again -this Friday Natalia- please book pt in 3.5 months 3 labs prior
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
## 2023-01-17 NOTE — PATIENT INSTRUCTIONS
Insulin/carb ratio 1 to 10   Correction factor 1 to 40   Target 120     Lantus 12 units in the am only  Novolog 1 to 10 insulin/carb ratio, isf/correction factor 30, target 120  200-120=80/30 2.7 units to help bring it down    [email or web address]  Eat fit taiwo  Myfitnesspal taiwo  [email or web address]     Goal less than  no higher than 180/200     A1c goal less than 7%    Follow up in 3-4 months w/Irielle  A1c lipid urine mac prior (1-7 days before appointment)

## 2023-01-18 ENCOUNTER — CLINICAL SUPPORT (OUTPATIENT)
Dept: REHABILITATION | Facility: HOSPITAL | Age: 33
End: 2023-01-18
Payer: MEDICAID

## 2023-01-18 DIAGNOSIS — M25.642 DECREASED RANGE OF MOTION OF FINGER OF LEFT HAND: ICD-10-CM

## 2023-01-18 DIAGNOSIS — M25.632 DECREASED RANGE OF MOTION OF LEFT WRIST: Primary | ICD-10-CM

## 2023-01-18 PROCEDURE — 97530 THERAPEUTIC ACTIVITIES: CPT

## 2023-01-18 NOTE — ED PROVIDER NOTES
"Encounter Date: 2017    SCRIBE #1 NOTE: I, Dayana Tito, am scribing for, and in the presence of,  Thelma Elena PA-C. I have scribed the following portions of the note - Other sections scribed: HPI and ROS.       History     Chief Complaint   Patient presents with    Abdominal Pain     "I have upper abd pain, I had a baby 4 weeks ago and my c section inscision is infected, they started me on antibiotics yesterday."     Review of patient's allergies indicates:   Allergen Reactions    No known allergies      HPI Comments: CC: Abdominal Pain    HPI: This 26 y.o. female with medical history of type I diabetes mellitus, hyperlipidemia and hypertension presents to the ED c/o acute upper abdominal pain radiating to the back since 4:00 am this morning. Pt reports that the pain is exacerbated when lying down and standing as she describes the symptoms as "sharp", intermittent and severe (8/10). She reports that each episode of pain last about 10x minutes and notes experiencing associated nausea. Pt adds that she has also been experiencing a decreased appetite for the past few days. Pt reports undergoing a  4x weeks ago and notes that the incision is presently infected (3x days) as she has been experiencing dark, malodorous drainage from the area. She reports that she was recently prescribed Bactrim for treatment and advised to keep the area dry. Pt states that her CBG levels have been elevated ("215's") since the infection began, reporting that she normally has a cell blood glucose level "in the 100's". She adds that she is compliant with her medications. Pt notes experiencing her last bowel movement, which was normal, last night. Pt denies fever, chills, emesis, constipation, headache, dizziness, weakness, ear pain, sore throat, rhinorrhea, itchy/watery eyes, rash, dysuria, urinary frequency and hematuria. No other associated symptoms. No treatment attempted PTA to the ED. No alleviating factors. " Patient seen today for preop     Requesting Lyrica correction--current Rx 2 tabs BID but only #60 sent. Corrected Rx pended            The history is provided by the patient. No  was used.     Past Medical History:   Diagnosis Date    Diabetes     Hyperlipidemia     Hypertension     Type I (juvenile type) diabetes mellitus without mention of complication, uncontrolled 2011     Past Surgical History:   Procedure Laterality Date    ABCESS DRAINAGE      boil went over GA     SECTION  2012    TONSILLECTOMY, ADENOIDECTOMY      TONSILLECTOMY, ADENOIDECTOMY      WISDOM TOOTH EXTRACTION       Family History   Problem Relation Age of Onset    Diabetes Brother     No Known Problems Mother     No Known Problems Father     No Known Problems Sister     No Known Problems Maternal Aunt     No Known Problems Maternal Uncle     No Known Problems Paternal Aunt     No Known Problems Paternal Uncle     No Known Problems Maternal Grandmother     No Known Problems Maternal Grandfather     No Known Problems Paternal Grandmother     No Known Problems Paternal Grandfather     Amblyopia Neg Hx     Blindness Neg Hx     Cataracts Neg Hx     Glaucoma Neg Hx     Hypertension Neg Hx     Macular degeneration Neg Hx     Retinal detachment Neg Hx     Strabismus Neg Hx     Stroke Neg Hx     Thyroid disease Neg Hx     Breast cancer Neg Hx     Colon cancer Neg Hx     Ovarian cancer Neg Hx      Social History   Substance Use Topics    Smoking status: Never Smoker    Smokeless tobacco: None    Alcohol use No     Review of Systems   Constitutional: Positive for appetite change (decreased). Negative for chills and fever.   HENT: Negative for congestion, ear pain, rhinorrhea and sore throat.    Eyes: Negative for pain.   Respiratory: Negative for shortness of breath.    Cardiovascular: Negative for chest pain.   Gastrointestinal: Positive for abdominal pain (upper; radiating to the back) and nausea. Negative for constipation and vomiting.   Genitourinary: Negative for dysuria, frequency and hematuria.    Musculoskeletal: Positive for back pain.   Skin: Negative for rash.        (+) dark, malodorous drainage from  incision   Neurological: Negative for dizziness, weakness and headaches.       Physical Exam   Initial Vitals   BP Pulse Resp Temp SpO2   17 0849 17 0849 17 0849 17 0849 17 0849   139/78 98 18 97.9 °F (36.6 °C) 99 %     Physical Exam    Constitutional: She appears well-developed and well-nourished. No distress.   HENT:   Head: Normocephalic.   Right Ear: Hearing, tympanic membrane, external ear and ear canal normal.   Left Ear: Hearing, tympanic membrane, external ear and ear canal normal.   Nose: Right sinus exhibits no maxillary sinus tenderness and no frontal sinus tenderness. Left sinus exhibits no maxillary sinus tenderness and no frontal sinus tenderness.   Mouth/Throat: Oropharynx is clear and moist. No oropharyngeal exudate.   Eyes: Conjunctivae are normal.   Neck: Neck supple.   Cardiovascular: Normal rate and regular rhythm. Exam reveals no gallop and no friction rub.    No murmur heard.  Pulmonary/Chest: Breath sounds normal. She has no decreased breath sounds. She has no wheezes. She has no rhonchi. She has no rales.   Abdominal: Soft. Normal appearance. She exhibits no distension. Bowel sounds are increased. There is tenderness in the right upper quadrant, right lower quadrant, epigastric area and suprapubic area. There is guarding. There is no rebound, no CVA tenderness, no tenderness at McBurney's point and negative Issa's sign.   Musculoskeletal:   Midline tenderness to palpation of thoracic spine with no paraspinal musculature tenderness.    Lymphadenopathy:     She has no cervical adenopathy.   Skin:   Healing  incision with light brown colored drainage. No surrounding erythema or edema.    Psychiatric: She has a normal mood and affect.         ED Course   Procedures  Labs Reviewed   CBC W/ AUTO DIFFERENTIAL - Abnormal; Notable for the  following:        Result Value    Hemoglobin 11.8 (*)     MCV 73 (*)     MCH 23.3 (*)     MCHC 31.9 (*)     RDW 16.4 (*)     Platelets 414 (*)     Gran # 7.9 (*)     Lymph # 0.9 (*)     Gran% 84.1 (*)     Lymph% 9.2 (*)     All other components within normal limits   COMPREHENSIVE METABOLIC PANEL - Abnormal; Notable for the following:     Sodium 135 (*)     CO2 22 (*)     Glucose 290 (*)     Total Bilirubin 1.3 (*)     Alkaline Phosphatase 250 (*)      (*)      (*)     All other components within normal limits   URINALYSIS - Abnormal; Notable for the following:     Glucose, UA 3+ (*)     Ketones, UA 1+ (*)     Urobilinogen, UA 4.0-6.0 (*)     All other components within normal limits   BETA - HYDROXYBUTYRATE, SERUM - Abnormal; Notable for the following:     Beta-Hydroxybutyrate 0.7 (*)     All other components within normal limits   URINALYSIS MICROSCOPIC - Abnormal; Notable for the following:     Bacteria, UA Moderate (*)     All other components within normal limits   POCT GLUCOSE - Abnormal; Notable for the following:     POCT Glucose 293 (*)     All other components within normal limits   POCT GLUCOSE - Abnormal; Notable for the following:     POCT Glucose 285 (*)     All other components within normal limits   CULTURE, URINE   LIPASE   POCT URINE PREGNANCY             Medical Decision Making:   Initial Assessment:   Patient is a 26-year-old female 4 weeks s/p  w presents for evaluation of acute onset of epigastric that radiates to right flank that started this morning approximately 4 hours PTA with associated nausea. Pt states she was seen by OBGYN yesterday and prescribed Bactrim for wound infection. Pt is afebrile in NAD. On exam, there is tenderness to palpation of epigastrium and RUQ with voluntary guarding. Mild soreness in RLQ and suprapubic regions likely due to pain at surgical site. Mild amount of brown drainage from incision site with surrounding erythema or edema. UA negative  for infection. Lipase normal-doubt pancreatitis. CT negative for intra abdominal abscess or bowel obstruction. CMP remarkable for transaminitis and RUQ ultrasound ordered and negative for cholecystitis. Pt states she has been in DKA approximately once every 2 years; Glucose 285, B hydroxybutyrate 0.7, anion gap WNL-I doubt DKA as source of pt's abdominal pain. I cannot exclude hepatitis as the source of patient's elevated LFTs and abdominal pain, however, US only shows liver at upper normal in size. Pt states she was taking percocet q4h as needed for pain 2 weeks after  but has not taken it in 2 weeks. I urged pt to follow up ASAP with GI for further evaluation and management of elevated LFTs and discussed complications. Pt was given IVF, dilaudid and zofran in ED. Upon re-evaluation pt states symptoms have improved. I feel that pt is stable for discharge at this time. Pt given Tramadol and Zofran for symptomatic treatment. PCP follow up in 2 days. Return to ER if symptoms worsen, if develop new symptoms or as needed.    I discussed this pt with Dr. Howard who agrees with assessment and plan.              Scribe Attestation:   Scribe #1: I performed the above scribed service and the documentation accurately describes the services I performed. I attest to the accuracy of the note.    Attending Attestation:     Physician Attestation Statement for NP/PA:   I discussed this assessment and plan of this patient with the NP/PA, but I did not personally examine the patient. The face to face encounter was performed by the NP/PA.    Other NP/PA Attestation Additions:      Medical Decision Makin y.o. female presents with abdominal pain, status post  4 weeks ago.  Labs reveal no leukocytosis.  Creatinine normal.  Liver function studies are elevated.  CT abdomen and pelvis ordered and shows no acute intra-abdominal abnormality.  Right upper quadrant ultrasound negative for acute findings as well.  No  gallstones.  Liver unremarkable.  Advised follow-up with primary care doctor and GI for further management. I have discussed this patient with mid-level provider and agree with physical exam, assessment and plan.        Physician Attestation for Scribe:  Physician Attestation Statement for Scribe #1: I, Thelma Elena PA-C, reviewed documentation, as scribed by Dayana Francis in my presence, and it is both accurate and complete.                 ED Course     Clinical Impression:   The primary encounter diagnosis was Epigastric abdominal pain. Diagnoses of RUQ abdominal pain and Elevated LFTs were also pertinent to this visit.    Disposition:   Disposition: Discharged  Condition: Stable       Thelma Elena PA-C  04/13/17 [id number]       Ava Ho MD  04/16/17 [id number]

## 2023-01-18 NOTE — PROGRESS NOTES
"  [facility]  Occupational Therapy Treatment Note    Date: 1/18/2023  Name: Anusha Andrew  Clinic Number: [id number]    Therapy Diagnosis:        Encounter Diagnoses   Name Primary?    S/P carpal tunnel release      S/P trigger finger release      Decreased range of motion of left wrist      Decreased range of motion of finger of left hand        Physician: Maureen Huang PA-C; Dr. Rojo      Physician Orders:             She is s/p LEFT carpal tunnel release and trigger release LEFT index and middle finger by Dr. Rojo on 12/13/22.      OT for left hand. Eval and treat with all modalities. Good HEP.         Medical Diagnosis:   Z98.890 (ICD-10-CM) - S/P carpal tunnel release   Z98.890 (ICD-10-CM) - S/P trigger finger release      Surgical Procedure and Date: L CTR and index and middle finger trigger release 12/13/22,    Evaluation Date: 12/28/22  Insurance Authorization Period Expiration: 12/22/23  Plan of Care Expiration: 3/8/23  Date of Return to MD: 2/8/22  Visit # / Visits authorized: 3/12  FOTO: n/a      Precautions:  Standard and diabetes     Time In: 130  Time Out: 215  Total Appointment Time (timed & untimed codes): 45 minutes    SUBJECTIVE     Pt reports: "trigger fingers are stiff in the morning, and hurts to massage on the side of the scar , especially the index.  I do use heat and ice at work thru out the day.  When The wrist gets tired I use coban"   She was compliant with home exercise program given last session.   Response to previous treatment:more motion   Functional change: fatigue with work activities; 7 days off/7 on 10-12 hr shifts as      Pain: 3/10  Location: left hand       OBJECTIVE     Objective Measures updated at progress report unless specified.    1/18/2023 Baseline /pinch as follows:    R) 38.4   L) 23.5   Key R) 14  L) 5    3pt R) 11  L) 4.5   2 pt R) 8   L) 5   Treatment     Anusha received the treatments listed below:    "   Supervised modalities after being cleared for contradictions: Paraffin bath - Patient received  paraffin bath x 10 min  to left hand to increase blood flow, circulation, and tissue elasticity prior to therex.     Therapeutic activities to improve functional performance and  to develop endurance, ROM, and flexibility for 35 minutes, including:  -Pt provided with education, demonstration, and participation in HEP including:   - scar massage daily  -Use MOIST heat 1-2x/day for 10 minutes   - performed ICE massage and encouraged nightly performance to trigger finger to decrease pain/inflammation   -Wear the brace at night if it helps with pain/numbness  - KT tape to P1 of index/middle for symptom management; provided samples for home use.      Therex included:   - wrist flex/ext/RD/UD x 10 reps each   - TGEs, thumb opp with pinky slides, spreads, lifts x 10 reps each   - prayer stretching 30-60 sec holds x 10 reps each   -- yellow putty: , 3 functional pinches: 20 reps each   - 4# clothespin with pom poms for pinch strengthening   - wrist 3 ways over wedge  weight 3 x 10      MEDICAID X 3    Patient Education and Home Exercises      Education provided:   - Cont HEP   -reviewed theraputty exercises and ice massage   - Progress towards goals     Written Home Exercises Provided: Patient instructed to cont prior HEP.  Exercises were reviewed and Anusha was able to demonstrate them prior to the end of the session.  Anusha demonstrated good  understanding of the HEP provided. See EMR under Patient Instructions for exercises provided during therapy sessions.       Assessment     Anusha is progressing well towards her goals and there are no updates to goals at this time. Pt prognosis is Good. ROM wfl's.  Moderate scar adhesions remain;  and pinch strength remain limited.  Will progress with light strengthening as tolerated.     Pt will continue to benefit from skilled outpatient occupational therapy to address  the deficits listed in the problem list on initial evaluation provide pt/family education and to maximize pt's level of independence in the home and community environment.     Pt's spiritual, cultural and educational needs considered and pt agreeable to plan of care and goals.    Anticipated barriers to occupational therapy: none     Goals:   The following goals were discussed with the patient and patient is in agreement with them as to be addressed in the treatment plan.   Long Term Goals (LTGs); to be met by discharge.  LTG #1: Pt will report a pain level of 1 out of 10 with all functional activities.             LTG #2: Pt will demo improved FOTO score by 20 points.   LTG #3: Pt will return to prior level of function for ADLs and household management.   LTG #4: Increase  strength 3-6 lbs. For work activities   LTG #5: Increase pinch strength 1-3 psi's for FM activities      Short Term Goals (STGs); to be met within 4 weeks (1/25/23).  STG #1: Pt will report 3 out of 10 pain level with in clinic activities.- met 1/18/23  STG #2: Pt will demo improved edema by at least 0.3 cm to improve motion and pain.  STG #3: Pt will demo improve wrist CR by at least 10 degrees to improve work duties.- progressing   STG #4: Pt will demonstrate independence with issued HEP and brace wear as needed.- met 1/18/23  STG #5: Pt will demo improved digit CR by at least 25 degrees needed to aid with independence of functional grasping activities.- progressing   STG 6: Assess /pinch when appropriate and create goals.- met 1/18/23        PLAN   Plan of Care Certification: 12/28/2022 to 3/8/23.            Adela Swanson, OT  , CHT

## 2023-01-25 ENCOUNTER — CLINICAL SUPPORT (OUTPATIENT)
Dept: REHABILITATION | Facility: HOSPITAL | Age: 33
End: 2023-01-25
Payer: MEDICAID

## 2023-01-25 DIAGNOSIS — M25.632 DECREASED RANGE OF MOTION OF LEFT WRIST: Primary | ICD-10-CM

## 2023-01-25 DIAGNOSIS — M25.642 DECREASED RANGE OF MOTION OF FINGER OF LEFT HAND: ICD-10-CM

## 2023-01-25 PROCEDURE — 97530 THERAPEUTIC ACTIVITIES: CPT

## 2023-01-25 NOTE — PROGRESS NOTES
"  [facility]  Occupational Therapy Treatment Note    Date: 1/25/2023  Name: Anusha Andrew  Clinic Number: [id number]    Therapy Diagnosis:        Encounter Diagnoses   Name Primary?    S/P carpal tunnel release      S/P trigger finger release      Decreased range of motion of left wrist      Decreased range of motion of finger of left hand        Physician: Maureen Huang PA-C; Dr. Rojo      Physician Orders:             She is s/p LEFT carpal tunnel release and trigger release LEFT index and middle finger by Dr. Rojo on 12/13/22.      OT for left hand. Eval and treat with all modalities. Good HEP.         Medical Diagnosis:   Z98.890 (ICD-10-CM) - S/P carpal tunnel release   Z98.890 (ICD-10-CM) - S/P trigger finger release      Surgical Procedure and Date: L CTR and index and middle finger trigger release 12/13/22,    Evaluation Date: 12/28/22  Insurance Authorization Period Expiration: 12/22/23  Plan of Care Expiration: 3/8/23  Date of Return to MD: 2/8/22  Visit # / Visits authorized: 4/12  FOTO: n/a      Precautions:  Standard and diabetes     Time In: 10:02am  Time Out: 10:57  Total Appointment Time (timed & untimed codes): 55 minutes    SUBJECTIVE     Pt reports: "the suture keeps coming up and causing pain.  I need to talk to the doctor bc we can't get it out."   She was compliant with home exercise program given last session.   Response to previous treatment:more motion   Functional change: fatigue with work activities; 7 days off/7 on 10-12 hr shifts as      Pain: 2/10  Location: left hand , but suture abscess is 5/10      OBJECTIVE     Objective Measures updated at progress report unless specified.    1/18/2023 Baseline /pinch as follows:    R) 38.4   L) 23.5   Key R) 14  L) 5    3pt R) 11  L) 4.5   2 pt R) 8   L) 5   Treatment     Anusha received the treatments listed below x 55 min:      Supervised modalities after being cleared for " contradictions: Paraffin bath - Patient received paraffin bath x 10 min  to left hand to increase blood flow, circulation, and tissue elasticity prior to therex.     Therapeutic activities to improve functional performance and  to develop endurance, ROM, and flexibility for 45 minutes, including:  -Pt provided with education, demonstration, and participation in HEP including:   - scar massage, scar massager, scar mobilization to encourage suture removal.  -Use MOIST heat 1-2x/day for 10 minutes   - performed ICE massage and encouraged nightly performance to trigger finger to decrease pain/inflammation   -Wear the brace at night if it helps with pain/numbness  - KT tape to P1 of index/middle for symptom management; provided samples for home use.      Therex included:   -PROM: Composite finger extension, isolated PIP extension 30 sec x 4  - wrist flex/ext/RD/UD x 10 reps each   - TGEs, thumb opp with pinky slides, spreads, lifts x 10 reps each   - prayer stretching 30-60 sec holds x 10 reps each   -- yellow putty: , 3 functional pinches: 20 reps each  --Yellow Theraputty rolling x 3 min   -Yellow theraputty palm presses with 30-50% weightbearing 10 sec holds x 10.  - 4# clothespin with pom poms for pinch strengthening   - wrist 3 ways over wedge  weight 3 x 10  -Octyi x 3 trials  -Pom Pom  with LF in hook position x 1 container      MEDICAID X 4    Patient Education and Home Exercises      Education provided:   - Cont HEP   -reviewed theraputty exercises and ice massage   - Progress towards goals     Written Home Exercises Provided: Patient instructed to cont prior HEP.  Exercises were reviewed and Anusha was able to demonstrate them prior to the end of the session.  Anusha demonstrated good  understanding of the HEP provided. See EMR under Patient Instructions for exercises provided during therapy sessions.       Assessment     Anusha is progressing well. Pt prognosis is Good. PIP extension lags  addressed today with adding passive extrinsic stretching.  Primary therapist to measure for improvements.    Pt will continue to benefit from skilled outpatient occupational therapy to address the deficits listed in the problem list on initial evaluation provide pt/family education and to maximize pt's level of independence in the home and community environment.     Pt's spiritual, cultural and educational needs considered and pt agreeable to plan of care and goals.    Anticipated barriers to occupational therapy: none     Goals:   The following goals were discussed with the patient and patient is in agreement with them as to be addressed in the treatment plan.   Long Term Goals (LTGs); to be met by discharge.  LTG #1: Pt will report a pain level of 1 out of 10 with all functional activities.             LTG #2: Pt will demo improved FOTO score by 20 points.   LTG #3: Pt will return to prior level of function for ADLs and household management.   LTG #4: Increase  strength 3-6 lbs. For work activities   LTG #5: Increase pinch strength 1-3 psi's for FM activities      Short Term Goals (STGs); to be met within 4 weeks (1/25/23).  STG #1: Pt will report 3 out of 10 pain level with in clinic activities.- met 1/18/23  STG #2: Pt will demo improved edema by at least 0.3 cm to improve motion and pain.  STG #3: Pt will demo improve wrist CR by at least 10 degrees to improve work duties.- progressing   STG #4: Pt will demonstrate independence with issued HEP and brace wear as needed.- met 1/18/23  STG #5: Pt will demo improved digit CR by at least 25 degrees needed to aid with independence of functional grasping activities.- progressing   STG 6: Assess /pinch when appropriate and create goals.- met 1/18/23        PLAN   Plan of Care Certification: 12/28/2022 to 3/8/23.            Alexsandra Dan, OT  , CHT

## 2023-01-26 ENCOUNTER — PATIENT MESSAGE (OUTPATIENT)
Dept: REHABILITATION | Facility: HOSPITAL | Age: 33
End: 2023-01-26
Payer: MEDICAID

## 2023-01-31 ENCOUNTER — PATIENT MESSAGE (OUTPATIENT)
Dept: INTERNAL MEDICINE | Facility: CLINIC | Age: 33
End: 2023-01-31
Payer: MEDICAID

## 2023-02-02 ENCOUNTER — CLINICAL SUPPORT (OUTPATIENT)
Dept: REHABILITATION | Facility: HOSPITAL | Age: 33
End: 2023-02-02
Payer: MEDICAID

## 2023-02-02 ENCOUNTER — CLINICAL SUPPORT (OUTPATIENT)
Dept: DIABETES | Facility: CLINIC | Age: 33
End: 2023-02-02
Payer: MEDICAID

## 2023-02-02 VITALS — BODY MASS INDEX: 28.52 KG/M2 | WEIGHT: 161 LBS

## 2023-02-02 DIAGNOSIS — M25.632 DECREASED RANGE OF MOTION OF LEFT WRIST: Primary | ICD-10-CM

## 2023-02-02 DIAGNOSIS — E10.65 TYPE 1 DIABETES MELLITUS WITH HYPERGLYCEMIA: ICD-10-CM

## 2023-02-02 DIAGNOSIS — E10.65 TYPE 1 DIABETES MELLITUS WITH HYPERGLYCEMIA: Primary | ICD-10-CM

## 2023-02-02 DIAGNOSIS — M25.642 DECREASED RANGE OF MOTION OF FINGER OF LEFT HAND: ICD-10-CM

## 2023-02-02 PROCEDURE — 99999 PR PBB SHADOW E&M-EST. PATIENT-LVL II: ICD-10-PCS | Mod: PBBFAC,,,

## 2023-02-02 PROCEDURE — G0108 DIAB MANAGE TRN  PER INDIV: HCPCS | Mod: PBBFAC | Performed by: REGISTERED NURSE

## 2023-02-02 PROCEDURE — 99212 OFFICE O/P EST SF 10 MIN: CPT | Mod: PBBFAC | Performed by: REGISTERED NURSE

## 2023-02-02 PROCEDURE — 99999 PR PBB SHADOW E&M-EST. PATIENT-LVL II: CPT | Mod: PBBFAC,,,

## 2023-02-02 PROCEDURE — 97530 THERAPEUTIC ACTIVITIES: CPT

## 2023-02-02 NOTE — PROGRESS NOTES
Diabetes Care Specialist Progress Note  Author: Eleanor Love RN  Date: 2/2/2023    Program Intake  Reason for Diabetes Program Visit:: Initial Diabetes Assessment  Current diabetes risk level:: moderate  In the last 12 months, have you:: none  Permission to speak with others about care:: yes    Lab Results   Component Value Date    HGBA1C 7.5 (H) 01/12/2023    HGBA1C 7.5 (H) 01/12/2023    HGBA1C 7.5 (H) 01/12/2023       Clinical    Patient Health Rating  Compared to other people your age, how would you rate your health?: Fair    Problem Review  Reviewed Problem List with Patient: yes  Active comorbidities affecting diabetes self-care.: yes  Comorbidities:  (pulmonary hypertension, gastric sleeve)  Reviewed health maintenance: yes    Clinical Assessment  Current Diabetes Treatment: Diet, Injectable, Exercise, Insulin  Have you ever experienced hypoglycemia (low blood sugar)?: yes  In the last month, how often have you experienced low blood sugar?: more than once a day (5-6 per day)  Are you able to tell when your blood sugar is low?: Yes  What symptoms do you experience?: Sweaty, Shaky  Have you ever been hospitalized because your blood sugar was too low?: yes (see comments)  How do you treat hypoglycemia (low blood sugar)?: 1/2 can soda/fruit juice (12 oz juices)  Have you ever experienced hyperglycemia (high blood sugar)?: yes  In the last month, how often have you experienced high blood sugar?: more than once a day  Are you able to tell when your blood sugar is high?: No (comment)  Have you ever been hospitalized because your blood sugar was high?: no    Medication Information  How do you obtain your medications?: Patient drives  How many days a week do you miss your medications?: 2 (usually takes only 1 dose of Novolog per day)  Do you sometimes have difficulty refilling your medications?: No  Medication adherence impacting ability to self-manage diabetes?: Yes    Labs  Do you have regular lab work to monitor  your medications?: Yes  Type of Regular Lab Work: A1c, CBC, BMP  Where do you get your labs drawn?: [facility]  Lab Compliance Barriers: No    Nutritional Status  Diet: Regular  Meal Plan 24 Hour Recall: Breakfast, Lunch, Dinner, Snack  Meal Plan 24 Hour Recall - Breakfast: nothing  Meal Plan 24 Hour Recall - Lunch: grilled chicken  Meal Plan 24 Hour Recall - Dinner: turkey sandwich  Meal Plan 24 Hour Recall - Snack: quest chips    drinks water and diet tea  Change in appetite?: No  Recent Changes in Weight: No Recent Weight Change  Current nutritional status an area of need that is impacting patient's ability to self-manage diabetes?: Yes    Additional Social History    Support  Who takes you to your medical appointments?: self  Does the current support meet the patient's needs?: Yes  Is Support an area impacting ability to self-manage diabetes?: No    Access to Mass Media & Technology  Does the patient have access to any of the following devices or technologies?: Smart phone  Media or technology needs impacting ability to self-manage diabetes?: No    Cognitive/Behavioral Health  Alert and Oriented: Yes  Difficulty Thinking: No  Requires Prompting: No  Requires assistance for routine expression?: No  Cognitive or behavioral barriers impacting ability to self-manage diabetes?: No    Culture/Rastafarian  Culture or Evangelical beliefs that may impact ability to access healthcare: No    Communication  Language preference: English  Hearing Problems: No  Communication needs impacting ability to self-manage diabetes?: No    Health Literacy  Preferred Learning Method: Face to Face, Demonstration, Reading Materials  How often do you need to have someone help you read instructions, pamphlets, or written material from your doctor or pharmacy?: Never  Health literacy needs impacting ability to self-manage diabetes?: No      Diabetes Self-Management Skills Assessment    Diabetes Disease Process/Treatment Options  Patient/caregiver  able to state what happens when someone has diabetes.: yes  Patient/caregiver knows what type of diabetes they have.: yes  Diabetes Type : Type I  Patient able to identify at least three risk factors for diabetes.: no  Diabetes Disease Process/Treatment Options: Skills Assessment Completed: Yes  Assessment indicates:: Knowledge deficit, Instruction Needed  Area of need?: Yes    Nutrition/Healthy Eating  Challenges to healthy eating:: portion control  Method of carbohydrate measurement:: no method  Patient can identify foods that impact blood sugar.: no (see comments)  Area of need?: Yes    Physical Activity/Exercise  Patient's daily activity level:: lightly active  Patient formally exercises outside of work.: yes  Exercise Type: using exercise equipment  Intensity: Low  Frequency: three times a week  Duration: 30 min  Patient can identify forms of physical activity.: yes  Stated forms of physical activity:: housework, dancing  Patient can identify reasons why exercise/physical activity is important in diabetes management.: yes  Identified reasons:: lowers blood glucose, blood pressure, and cholesterol  Physical Activity/Exercise Skills Assessment Completed: : Yes  Assessment indicates:: Adequate understanding  Area of need?: No    Medications  Patient is able to describe current diabetes management routine.: yes  Diabetes management routine:: diet, exercise, injectable medications, insulin  Patient is able to identify current diabetes medications, dosages, and appropriate timing of medications.: yes  Patient understands the purpose of the medications taken for diabetes.: yes  Patient reports problems or concerns with current medication regimen.: no  Medication Skills Assessment Completed:: Yes  Assessment indicates:: Adequate understanding  Area of need?: Yes    Home Blood Glucose Monitoring  Patient states that blood sugar is checked at home daily.: yes  Monitoring Method:: personal continuous glucose  monitor  Personal CGM type:: dex com  Patient is able to use personal CGM appropriately.: yes  CGM Report reviewed?: no  Home Blood Glucose Monitoring Skills Assessment Completed: : Yes  Assessment indicates:: Adequate understanding  Area of need?: No    Acute Complications  Patient is able to identify types of acute complications: No  Acute Complications Skills Assessment Completed: : Yes  Assessment indicates:: Knowledge deficit, Instruction Needed  Area of need?: Yes    Chronic Complications  Patient can identify major chronic complications of diabetes.: no  Patient can identify ways to prevent or delay diabetes complications.: no  Patient is aware that having diabetes increases risk of heart disease?: No  Patient is aware that heart disease is the leading cause of death and disability in people with diabetes?: No  Patient able to state risk factors for heart disease?: No  Patient is taking statin?: No  Has your doctor talked to you about Statin use?: No  Do you want more information on Statins?: No  Chronic Complications Skills Assessment Completed: : Yes  Assessment indicates:: Knowledge deficit, Instruction Needed  Area of need?: Yes    Psychosocial/Coping  Patient can identify ways of coping with chronic disease.: yes  Patient-stated ways of coping with chronic disease:: support from loved ones  Psychosocial/Coping Skills Assessment Completed: : Yes  Assessment indicates:: Adequate understanding  Area of need?: Yes      Diabetes Self Support Plan         Assessment Summary and Plan    Based on today's diabetes care assessment, the following areas of need were identified:      Social 2/2/2023   Support No   Access to Mass Media/Tech No   Cognitive/Behavioral Health No   Culture/Orthodoxy No   Communication No   Health Literacy No        Clinical 2/2/2023   Medication Adherence Yes   Lab Compliance No   Nutritional Status Yes        Diabetes Self-Management Skills 2/2/2023   Diabetes Disease Process/Treatment  Options YesProvided Diabetes management guide and provided instruction regarding SS and consume increased amount of carbohydrate foods and risk factors of diabetes.Instructed patient on what is Diabetes and the progression of uncontrolled diabetes. Discussed qualifying parameters of diabetes diagnosis. Reviewed/Instructed on disease process. Discussed current treatment options, especially dietary and lifestyle changes as well as possible medication additions and/or changes. Patient verbalizes understanding of received information/education.    Nutrition/Healthy Eating YesEmphasized importance of eliminating all SSB. Discussed SF drink options. Discussed carb vs non-carb foods and reviewed appropriate amounts of carbs to have at meals vs snacks. Recommended 30 - 45 gm carb at meals and 15 gm carb at snacks. Instructed on appropriate label reading and serving sizes of specific carb containing foods. Reviewed need to limit total/saturated fats. Discussed meal plans and snack ideas amenable to pt. Reviewed the plate method. Patient verbalizes understanding of received information/education.        Physical Activity/Exercise No   Medication YesDiscussed timing and mechanism of action of Novolog  insulin. Reviewed need for rotation of injection sites, appropriate insulin storage, timing of insulin, safe disposal of used sharps and insulin pen preparation and use, including performing a prime shot of 2 units prior to injection and keeping pen in skin for a count of 10 before removing. Discussed possible side effects and how to avoid these. Reviewed hypoglycemia and treatment with Rule of 15. Discussed general vs severe hyperglycemia and risk of DKA. Emphasized importance of taking Novolog insulin at same time each day. Emphasized need to take novolog >4 hours apart. Emphasized need to take Novolog injections 5-10 min prior to eating meals. Patient verbalizes understanding of received information/education.     Home Blood  Glucose Monitoring No   Acute Complications YesDiscussed hypoglycemia vs hyperglycemia symptoms and discussed appropriate treatments for each. Reviewed that pt is at high risk of hypo with current medication regimen. Discussed general vs severe hyperglycemia and risk of DKA.    Chronic Complications YesProvided Diabetes management guide and provided instruction regarding the disease progression if diabetes is uncontrolled. Reviewed ways to decrease risk of chronic complications by healthy eating and having regular activity. Maintaining optimal blood glucose control. Following up with Diabetic team which includes PCP, ENDO MD (if applicable), yearly eye exam, yearly foot exam and Diabetes care specialist .Patient verbalizes understanding of received information/education.     Psychosocial/Coping YesCovered the following coping strategies to help deal with chronic disease.   Provided current list of diabetes support group meeting locations and contact information, written online and community resources   Discussed role stress on blood sugar control and diabetes health   Discussed role sleep on health and diabetes control  Discussed feelings associated with chronic disease management  Consider talking with family or close friend  Consider talking with certified counselor, psychologist or psychiatrist  Recommended ways to reduce stress such as: reading a book or magazine, enjoy a cup of tea or coffee, take a walk, journal, draw or color, paint, play an instrument, singing, gardening, crafting, woodwork, prayer, meditation, yoga or willem chi, develop a sleep schedule           Today's interventions were provided through individual discussion, instruction, and written materials were provided.      Patient verbalized understanding of instruction and written materials.  Pt was able to return back demonstration of instructions today. Patient understood key points, needs reinforcement and further instruction.     Diabetes  Self-Management Care Plan:    Today's Diabetes Self-Management Care Plan was developed with Anusha's input. Anusha has agreed to work toward the following goal(s) to improve his/her overall diabetes control.      There are no recently modified care plans to display for this patient.      Follow Up Plan     Follow up in about 27 days (around 3/1/2023).    Today's care plan and follow up schedule was discussed with patient.  Anusha verbalized understanding of the care plan, goals, and agrees to follow up plan.        The patient was encouraged to communicate with his/her health care provider/physician and care team regarding his/her condition(s) and treatment.  I provided the patient with my contact information today and encouraged to contact me via phone or [facility] Patient Portal as needed.     Length of Visit   Total Time: 60 Minutes

## 2023-02-02 NOTE — PROGRESS NOTES
"  [facility]  Occupational Therapy Treatment Note    Date: 2/2/2023  Name: Anusha Andrew  Clinic Number: [id number]    Therapy Diagnosis:        Encounter Diagnoses   Name Primary?    S/P carpal tunnel release      S/P trigger finger release      Decreased range of motion of left wrist      Decreased range of motion of finger of left hand        Physician: Maureen Huang PA-C; Dr. Rojo      Physician Orders:             She is s/p LEFT carpal tunnel release and trigger release LEFT index and middle finger by Dr. Rojo on 12/13/22.      OT for left hand. Eval and treat with all modalities. Good HEP.         Medical Diagnosis:   Z98.890 (ICD-10-CM) - S/P carpal tunnel release   Z98.890 (ICD-10-CM) - S/P trigger finger release      Surgical Procedure and Date: L CTR and index and middle finger trigger release 12/13/22,    Evaluation Date: 12/28/22  Insurance Authorization Period Expiration: 12/22/23  Plan of Care Expiration: 3/8/23  Date of Return to MD: 2/8/22  Visit # / Visits authorized: 5/12  FOTO: n/a      Precautions:  Standard and diabetes     Time In: 11 am   Time Out: 1145 am   Total Appointment Time (timed & untimed codes): 45 minutes    SUBJECTIVE     Pt reports: "The suture came out. I have some pain in the thumb (thenar) region.    She was compliant with home exercise program given last session.   Response to previous treatment:more motion   Functional change: fatigue with work activities; 7 days off / 7 on 10-12 hr shifts as      Pain: 2/10  Location: left hand ,    OBJECTIVE   Re-assess on 2/8/23 prior to MD visit     Objective Measures updated at progress report unless specified.    1/18/2023 Baseline /pinch as follows:    R) 38.4   L) 23.5   Key R) 14  L) 5    3pt R) 11  L) 4.5   2 pt R) 8   L) 5     Treatment     Anusha received the treatments listed below x 55 min:      Supervised modalities after being cleared for contradictions: Paraffin bath " - Patient received paraffin bath x 10 min  to left hand to increase blood flow, circulation, and tissue elasticity prior to therex.     Therapeutic activities to improve functional performance and  to develop endurance, ROM, and flexibility for 35 minutes, including:  -Pt provided with education, demonstration, and participation in HEP including:   - scar massage, scar massager, scar mobilization to encourage suture removal.  - performed ICE massage and encouraged nightly performance to trigger finger to decrease pain/inflammation   -Wear the brace at night if it helps with pain/numbness  - KT tape to P1 of index/middle for symptom management; provided samples for home use.      Therex included:   -PROM: Composite finger extension, isolated PIP extension 10 sec x 4  - wrist flex/ext/RD/UD x 10 reps each   - TGEs, thumb opp with pinky slides, spreads, lifts x 10 reps each   - prayer stretching 10 sec holds x 10 reps each   -- yellow putty: , 3 functional pinches: 10 reps each  - 25 lbs. PHG for  strengthening x 10 reps   - 6#  blue clothespin with pom poms for pinch strengthening   - wrist 3 ways over wedge with 1 lb weight 3 x 10    MEDICAID X 3    Patient Education and Home Exercises      Education provided:   - Cont HEP   -reviewed theraputty exercises and ice massage   - Progress towards goals     Written Home Exercises Provided: Patient instructed to cont prior HEP.  Exercises were reviewed and Anusha was able to demonstrate them prior to the end of the session.  Anusha demonstrated good  understanding of the HEP provided. See EMR under Patient Instructions for exercises provided during therapy sessions.       Assessment     Anusha is progressing well. Pt prognosis is Good. Progressing well with decreased overall pain.  Will assess in few weeks and anticipate discharge     Pt will continue to benefit from skilled outpatient occupational therapy to address the deficits listed in the problem list on  initial evaluation provide pt/family education and to maximize pt's level of independence in the home and community environment.     Pt's spiritual, cultural and educational needs considered and pt agreeable to plan of care and goals.    Anticipated barriers to occupational therapy: none     Goals:   The following goals were discussed with the patient and patient is in agreement with them as to be addressed in the treatment plan.   Long Term Goals (LTGs); to be met by discharge.  LTG #1: Pt will report a pain level of 1 out of 10 with all functional activities.             LTG #2: Pt will demo improved FOTO score by 20 points.   LTG #3: Pt will return to prior level of function for ADLs and household management.   LTG #4: Increase  strength 3-6 lbs. For work activities   LTG #5: Increase pinch strength 1-3 psi's for FM activities      Short Term Goals (STGs); to be met within 4 weeks (1/25/23).  STG #1: Pt will report 3 out of 10 pain level with in clinic activities.- met 1/18/23  STG #2: Pt will demo improved edema by at least 0.3 cm to improve motion and pain.  STG #3: Pt will demo improve wrist CR by at least 10 degrees to improve work duties.- progressing   STG #4: Pt will demonstrate independence with issued HEP and brace wear as needed.- met 1/18/23  STG #5: Pt will demo improved digit CR by at least 25 degrees needed to aid with independence of functional grasping activities.- progressing   STG 6: Assess /pinch when appropriate and create goals.- met 1/18/23        PLAN   Plan of Care Certification: 12/28/2022 to 3/8/23.            Adela Swasnon, OT  , CHT

## 2023-02-08 ENCOUNTER — CLINICAL SUPPORT (OUTPATIENT)
Dept: REHABILITATION | Facility: HOSPITAL | Age: 33
End: 2023-02-08
Payer: MEDICAID

## 2023-02-08 ENCOUNTER — OFFICE VISIT (OUTPATIENT)
Dept: ORTHOPEDICS | Facility: CLINIC | Age: 33
End: 2023-02-08
Payer: MEDICAID

## 2023-02-08 VITALS — HEIGHT: 63 IN | WEIGHT: 161 LBS | BODY MASS INDEX: 28.53 KG/M2

## 2023-02-08 DIAGNOSIS — M25.632 DECREASED RANGE OF MOTION OF LEFT WRIST: Primary | ICD-10-CM

## 2023-02-08 DIAGNOSIS — G56.03 BILATERAL CARPAL TUNNEL SYNDROME: Primary | ICD-10-CM

## 2023-02-08 PROCEDURE — 1159F MED LIST DOCD IN RCRD: CPT | Mod: CPTII,,, | Performed by: ORTHOPAEDIC SURGERY

## 2023-02-08 PROCEDURE — 97530 THERAPEUTIC ACTIVITIES: CPT

## 2023-02-08 PROCEDURE — 99999 PR PBB SHADOW E&M-EST. PATIENT-LVL III: CPT | Mod: PBBFAC,,, | Performed by: ORTHOPAEDIC SURGERY

## 2023-02-08 PROCEDURE — 1159F PR MEDICATION LIST DOCUMENTED IN MEDICAL RECORD: ICD-10-PCS | Mod: CPTII,,, | Performed by: ORTHOPAEDIC SURGERY

## 2023-02-08 PROCEDURE — 3008F BODY MASS INDEX DOCD: CPT | Mod: CPTII,,, | Performed by: ORTHOPAEDIC SURGERY

## 2023-02-08 PROCEDURE — 99999 PR PBB SHADOW E&M-EST. PATIENT-LVL III: ICD-10-PCS | Mod: PBBFAC,,, | Performed by: ORTHOPAEDIC SURGERY

## 2023-02-08 PROCEDURE — 99024 PR POST-OP FOLLOW-UP VISIT: ICD-10-PCS | Mod: S$PBB,,, | Performed by: ORTHOPAEDIC SURGERY

## 2023-02-08 PROCEDURE — 3051F PR MOST RECENT HEMOGLOBIN A1C LEVEL 7.0 - < 8.0%: ICD-10-PCS | Mod: CPTII,,, | Performed by: ORTHOPAEDIC SURGERY

## 2023-02-08 PROCEDURE — 3008F PR BODY MASS INDEX (BMI) DOCUMENTED: ICD-10-PCS | Mod: CPTII,,, | Performed by: ORTHOPAEDIC SURGERY

## 2023-02-08 PROCEDURE — 99024 POSTOP FOLLOW-UP VISIT: CPT | Mod: S$PBB,,, | Performed by: ORTHOPAEDIC SURGERY

## 2023-02-08 PROCEDURE — 99214 OFFICE O/P EST MOD 30 MIN: CPT | Mod: S$PBB,24,, | Performed by: ORTHOPAEDIC SURGERY

## 2023-02-08 PROCEDURE — 3051F HG A1C>EQUAL 7.0%<8.0%: CPT | Mod: CPTII,,, | Performed by: ORTHOPAEDIC SURGERY

## 2023-02-08 PROCEDURE — 99214 PR OFFICE/OUTPT VISIT, EST, LEVL IV, 30-39 MIN: ICD-10-PCS | Mod: S$PBB,24,, | Performed by: ORTHOPAEDIC SURGERY

## 2023-02-08 PROCEDURE — 99213 OFFICE O/P EST LOW 20 MIN: CPT | Mod: PBBFAC,PN | Performed by: ORTHOPAEDIC SURGERY

## 2023-02-08 RX ORDER — CEFAZOLIN SODIUM 2 G/50ML
2 SOLUTION INTRAVENOUS
Status: CANCELLED | OUTPATIENT
Start: 2023-02-08

## 2023-02-08 NOTE — PROGRESS NOTES
[facility]  Occupational Therapy Treatment Note    Date: 2/8/2023  Name: Anusha Andrew  Clinic Number: [id number]    Therapy Diagnosis:        Encounter Diagnoses   Name Primary?    S/P carpal tunnel release      S/P trigger finger release      Decreased range of motion of left wrist      Decreased range of motion of finger of left hand        Physician: Maureen Haung PA-C; Dr. Rojo      Physician Orders:             She is s/p LEFT carpal tunnel release and trigger release LEFT index and middle finger by Dr. Rojo on 12/13/22.      OT for left hand. Eval and treat with all modalities. Good HEP.         Medical Diagnosis:   Z98.890 (ICD-10-CM) - S/P carpal tunnel release   Z98.890 (ICD-10-CM) - S/P trigger finger release      Surgical Procedure and Date: L CTR and index and middle finger trigger release 12/13/22,    Evaluation Date: 12/28/22  Insurance Authorization Period Expiration: 12/22/23  Plan of Care Expiration: 3/8/23  Date of Return to MD: 2/8/22  Visit # / Visits authorized: 6/12  FOTO: n/a      Precautions:  Standard and diabetes     Time In: 11 am   Time Out: 1145 am   Total Appointment Time (timed & untimed codes): 45 minutes    SUBJECTIVE     Pt reports: Mostly the CT is bothering me the most. Still sore and tender around the wrist and the middle finger    She was compliant with home exercise program given last session.   Response to previous treatment:more motion   Functional change: fatigue with work activities; 7 days off / 7 on 10-12 hr shifts as      Pain: 2/10  Location: left hand ,    OBJECTIVE   Re-assessed on 2/8/23 as follows:     Objective Measures updated at progress report unless specified.     /pinch as follows:    R) 38.4   L) 26.4 (+2.9)  Key R) 14  L) 10 (+5)    3pt R) 11  L) 6 (+1.5)  2 pt R) 8   L) 7 (+2)     Edema. Measured in centimeters.    12/28/2022 2/8/23     Left Left   Wrist Crease 17.7  =   MCPs 15.9  =      Elbow and  Wrist ROM. Measured in degrees.    12/28/2022 2/8/23     Left Left    Supination/Pronation wfl  =   Wrist Ext/Flex 65/61 (pain with flex) 70/60     Wrist RD/UD 15/15        Hand ROM. Measured in degrees.    12/28/2022 2/8/23     Left Left           Index: MP  73  85              PIP     -14/88  110              DIP -5/50  50                245           Long:  MP 74  82              PIP -10/88  105              DIP 62  73                257     Treatment     Anusha received the treatments listed below      Supervised modalities after being cleared for contradictions: Paraffin bath - Patient received paraffin bath x 10 min  to left hand to increase blood flow, circulation, and tissue elasticity prior to therex.     Therapeutic activities to improve functional performance and  to develop endurance, ROM, and flexibility for 35 minutes, including:  -Pt provided with education, demonstration, and participation in HEP including:   - scar massage, scar massager,  and cupping/suction cup for  scar mobilization   - -      Therex included:   -PROM: Composite finger extension, isolated PIP extension 10 sec x 4  - wrist flex/ext/RD/UD x 10 reps each   - TGEs, thumb opp with pinky slides, spreads, lifts x 10 reps each   - prayer stretching 10 sec holds x 10 reps each   -- upgrade to red putty: , 3 functional pinches: 10 reps each; provided red putty to upgrade HEP.   - 25 lbs. PHG for  strengthening x 10 reps   - 6#  blue clothespin with pom poms for pinch strengthening   - wrist 3 ways over wedge with 2 lb weight 3 x 10  - re-assessed per above     MEDICAID X 3    Patient Education and Home Exercises      Education provided:   - Cont HEP   -reviewed theraputty exercises and ice massage   - Progress towards goals     Written Home Exercises Provided: Patient instructed to cont prior HEP.  Exercises were reviewed and Anusha was able to demonstrate them prior to the end of the session.  Anusha  demonstrated good  understanding of the HEP provided. See EMR under Patient Instructions for exercises provided during therapy sessions.       Assessment     Anusha is progressing well. Pt prognosis is Good. Progressing well with decreased overall pain.   and pinch improving, ROM wfls.  Mild adhesions remain at wrist and MF A1 pulley region.     Pt may benefit  from continued skilled outpatient occupational therapy to address the deficits listed in the problem list on initial evaluation provide pt/family education and to maximize pt's level of independence in the home and community environment.     Pt's spiritual, cultural and educational needs considered and pt agreeable to plan of care and goals.    Anticipated barriers to occupational therapy: none     Goals:   The following goals were discussed with the patient and patient is in agreement with them as to be addressed in the treatment plan.   Long Term Goals (LTGs); to be met by discharge.  LTG #1: Pt will report a pain level of 1 out of 10 with all functional activities.             LTG #2: Pt will demo improved FOTO score by 20 points.   LTG #3: Pt will return to prior level of function for ADLs and household management.   LTG #4: Increase  strength 3-6 lbs. For work activities - progressing   LTG #5: Increase pinch strength 1-3 psi's for FM activities - met, continuing      Short Term Goals (STGs); to be met within 4 weeks (1/25/23).  STG #1: Pt will report 3 out of 10 pain level with in clinic activities.- met 1/18/23  STG #2: Pt will demo improved edema by at least 0.3 cm to improve motion and pain.- not met  STG #3: Pt will demo improve wrist CR by at least 10 degrees to improve work duties.- progressing   STG #4: Pt will demonstrate independence with issued HEP and brace wear as needed.- met 1/18/23  STG #5: Pt will demo improved digit CR by at least 25 degrees needed to aid with independence of functional grasping activities.- met 2/8/23  STG  6: Assess /pinch when appropriate and create goals.- met 1/18/23        PLAN   Plan of Care Certification: 12/28/2022 to 3/8/23.            Adela Swanson OT  , CHT

## 2023-02-08 NOTE — PROGRESS NOTES
CC:  Bilateral carpal tunnel syndrome with triggering of the right middle finger        HPI:  Anusha Andrew is a very pleasant 32 y.o. female status post recent left carpal tunnel release with trigger release left index and middle fingers   She is recovering well on the left hand minimal pain numbness has resolved no triggering  She is having similar symptoms in the right hand he would like to go ahead and set up surgery for right carpal tunnel release and trigger release of the right middle finger which persists         PAST MEDICAL HISTORY:   Past Medical History:   Diagnosis Date    Depression     Hyperlipidemia     Hypertension     Mild nonproliferative diabetic retinopathy of both eyes without macular edema associated with type 1 diabetes mellitus 3/30/2021    Type I (juvenile type) diabetes mellitus without mention of complication, uncontrolled 2011     PAST SURGICAL HISTORY:   Past Surgical History:   Procedure Laterality Date    ABCESS DRAINAGE      boil went over [place]    CARPAL TUNNEL RELEASE Left 2022    Procedure: RELEASE, CARPAL TUNNEL;  Surgeon: Sekou Rojo Jr., MD;  Location: [facility] OR;  Service: Orthopedics;  Laterality: Left;     SECTION  2012    DILATION AND CURETTAGE OF UTERUS  2019    Procedure: DILATION AND CURETTAGE, UTERUS;  Surgeon: Purnima Kidd MD;  Location: [facility] OR;  Service: OB/GYN;;    HYSTEROSCOPY N/A 2019    Procedure: HYSTEROSCOPY;  Surgeon: Purnima Kidd MD;  Location: [facility] OR;  Service: OB/GYN;  Laterality: N/A;    TONSILLECTOMY, ADENOIDECTOMY      TRIGGER FINGER RELEASE Left 2022    Procedure: RELEASE, TRIGGER FINGER;  Surgeon: Sekou Rojo Jr., MD;  Location: [facility] OR;  Service: Orthopedics;  Laterality: Left;    WISDOM TOOTH EXTRACTION       FAMILY HISTORY:   Family History   Problem Relation Age of Onset    Diabetes Brother     No Known Problems Mother     No Known Problems Father     Hydrocephalus Daughter     No Known Problems  Brother     No Known Problems Brother     No Known Problems Maternal Aunt     No Known Problems Maternal Uncle     No Known Problems Paternal Aunt     No Known Problems Paternal Uncle     No Known Problems Maternal Grandmother     No Known Problems Maternal Grandfather     No Known Problems Paternal Grandmother     No Known Problems Paternal Grandfather     Amblyopia Neg Hx     Blindness Neg Hx     Cataracts Neg Hx     Glaucoma Neg Hx     Hypertension Neg Hx     Macular degeneration Neg Hx     Retinal detachment Neg Hx     Strabismus Neg Hx     Stroke Neg Hx     Thyroid disease Neg Hx     Breast cancer Neg Hx     Colon cancer Neg Hx     Ovarian cancer Neg Hx     Cancer Neg Hx      SOCIAL HISTORY:   Social History     Socioeconomic History    Marital status: Single   Tobacco Use    Smoking status: Never    Smokeless tobacco: Never   Substance and Sexual Activity    Alcohol use: Yes     Comment: occasionally    Drug use: No    Sexual activity: Yes     Partners: Male     Birth control/protection: Injection     Social Determinants of Health     Financial Resource Strain: Low Risk     Difficulty of Paying Living Expenses: Not hard at all   Food Insecurity: No Food Insecurity    Worried About Running Out of Food in the Last Year: Never true    Ran Out of Food in the Last Year: Never true   Transportation Needs: No Transportation Needs    Lack of Transportation (Medical): No    Lack of Transportation (Non-Medical): No   Physical Activity: Sufficiently Active    Days of Exercise per Week: 5 days    Minutes of Exercise per Session: 60 min   Stress: Stress Concern Present    Feeling of Stress : Rather much   Social Connections: Unknown    Frequency of Communication with Friends and Family: More than three times a week    Frequency of Social Gatherings with Friends and Family: Once a week    Active Member of Clubs or Organizations: No    Attends Club or Organization Meetings: Never    Marital Status: Never    Housing  Stability: Low Risk     Unable to Pay for Housing in the Last Year: No    Number of Places Lived in the Last Year: 1    Unstable Housing in the Last Year: No       MEDICATIONS:   Current Outpatient Medications:     blood sugar diagnostic Strp, To check BG 4  times daily, to use with insurance preferred meter, e 10.65 (Patient taking differently: To check BG 4  times daily, to use with insurance preferred meter, e 10.65), Disp: 400 each, Rfl: 3    ergocalciferol (VITAMIN D2) 50,000 unit Cap, Take 1 capsule (50,000 Units total) by mouth every 7 days., Disp: 5 capsule, Rfl: 3    FLUoxetine 10 MG capsule, Take 1 capsule (10 mg total) by mouth once daily., Disp: 30 capsule, Rfl: 2    FLUoxetine 20 MG capsule, Take 1 capsule (20 mg total) by mouth 2 (two) times daily., Disp: 60 capsule, Rfl: 10    glucagon (BAQSIMI) 3 mg/actuation Spry, Give one puff via nostril. Hold device between fingers and thumb, do not push plunger yet, insert tip gently into one nostril until finger(s) touch the outside of the nose, then push plunger firmly all the way in . Dose is complete when the green line disappears., Disp: 2 each, Rfl: 2    hydrocortisone (ANUSOL-HC) 2.5 % rectal cream, Place rectally 2 (two) times daily., Disp: 28 g, Rfl: 2    ibuprofen (ADVIL,MOTRIN) 800 MG tablet, Take 1 tablet (800 mg total) by mouth every 6 (six) hours as needed for Pain., Disp: 20 tablet, Rfl: 0    insulin aspart U-100 (NOVOLOG) 100 unit/mL (3 mL) InPn pen, Inject as directed, 1 to 10 insulin/carb ratio, isf/correction factor 30, target 120, max daily 30 units., Disp: 15 mL, Rfl: 6    insulin glargine 100 units/mL SubQ pen, Inject 12 units in the morning only., Disp: 15 mL, Rfl: 6    insulin syringe-needle U-100 0.5 mL 31 gauge x 5/16 Syrg, use with admelog, Disp: 100 each, Rfl: 0    lancets Misc, To check BG 4 times daily, to use with insurance preferred meter, e10.65, Disp: 400 each, Rfl: 3    norethindrone (MICRONOR) 0.35 mg tablet, Take 1 tablet by  "mouth once daily., Disp: , Rfl:     norethindrone (ORTHO MICRONOR) 0.35 mg tablet, Take 1 tablet (0.35 mg total) by mouth once daily., Disp: 28 tablet, Rfl: 11    ONETOUCH DELICA PLUS LANCET 33 gauge Misc, USE 1 UNIT TO CHECK GLUCOSE 4 TIMES DAILY, Disp: , Rfl:     ONETOUCH VERIO FLEX METER Misc, USE AS DIRECTED TO CHECK BLOOD GLUCOSE 4 TIMES DAILY, Disp: , Rfl:     pantoprazole (PROTONIX) 40 MG tablet, Take 1 tablet by mouth once daily., Disp: , Rfl:     pen needle, diabetic 32 gauge x 5/32" Ndle, Use as directed, Disp: 100 each, Rfl: 0    polyethylene glycol (GLYCOLAX) 17 gram/dose powder, Take 17 g by mouth once daily., Disp: 30 each, Rfl: 3  ALLERGIES:   Review of patient's allergies indicates:   Allergen Reactions    No known allergies        Review of Systems:  Constitutional: no fever or chills  ENT: no nasal congestion or sore throat  Respiratory: no cough or shortness of breath  Cardiovascular: no chest pain or palpitations  Gastrointestinal: no nausea or vomiting, PUD, GERD, NSAID intolerance  Genitourinary: no hematuria or dysuria  Integument/Breast: no rash or pruritis  Hematologic/Lymphatic: no easy bruising or lymphadenopathy  Musculoskeletal: see HPI  Neurological: no seizures or tremors  Behavioral/Psych: no auditory or visual hallucinations      Physical Exam   Vitals:    02/08/23 1511   Weight: 73 kg (161 lb)   Height: 5' 3" (1.6 m)   PainSc:   1       Constitutional: Oriented to person, place, and time. Appears well-developed and well-nourished.   HENT:   Head: Normocephalic and atraumatic.   Nose: Nose normal.   Eyes: No scleral icterus.   Neck: Normal range of motion.   Cardiovascular: Normal rate and regular rhythm.    Pulses:       Radial pulses are 2+ on the right side, and 2+ on the left side.   Pulmonary/Chest: Effort normal and breath sounds normal.   Abdominal: Soft.   Neurological: Alert and oriented to person, place, and time.   Skin: Skin is warm.   Psychiatric: Normal mood and affect. " "    MUSCULOSKELETAL UPPER EXTREMITY:  Examination left hand the incisions well-healed swelling minimal scarring minimal   Range of motion fingers full  strength improved sensation intact  Examination right hand demonstrates a positive Tinel sign with mild triggering right middle finger            Diagnostic Studies:  Previous nerve test showed evidence of bilateral carpal tunnel syndrome        Assessment:  1. Status post left carpal tunnel release and trigger release left index and middle fingers doing well.      2. Right carpal tunnel syndrome with triggering of the right middle finger    Plan:  Left hand I have given her a squeeze ball to work on strengthening and recommended scar massage with Mederma     For the right hand she would like to go ahead and set up surgery for right carpal tunnel release combined with trigger release right middle finger as an outpatient surgery      The risks and benefits of surgery were discussed with the patient today and they understand.  The consent was signed in the office for surgery.      Sekou Rojo MD (Jay)  [facility]  Orthopedic Upper Extremity Surgery       "

## 2023-02-13 ENCOUNTER — PATIENT MESSAGE (OUTPATIENT)
Dept: SURGERY | Facility: HOSPITAL | Age: 33
End: 2023-02-13
Payer: MEDICAID

## 2023-02-24 ENCOUNTER — PATIENT MESSAGE (OUTPATIENT)
Dept: SURGERY | Facility: HOSPITAL | Age: 33
End: 2023-02-24
Payer: MEDICAID

## 2023-02-24 NOTE — TELEPHONE ENCOUNTER
She is s/p LEFT carpal tunnel release and trigger release LEFT index and middle finger by Dr. Rojo on 12/13/22.     She is scheduled for right carpal tunnel release and right middle trigger finger release on 3/28/23.     Spoke with patient. Will start with no more than 5 lab draws per shift starting 2/24 until her surgery on 3/28.     She will let me know if she needs additional paperwork filled out.

## 2023-02-24 NOTE — LETTER
02/24/2023                 [facility] ([facility])  [address] [place] [address] [place] [address]  Phone: [phone]  Fax: [phone]   02/24/2023    Patient: Anusha Andrew   YOB: 1990       To Whom it May Concern:    Anusha Andrew is scheduled for right hand surgery on 3/28/23. She is on light duty with no more than 5 lab draws per shift starting 2/24/23 until her surgery on 3/28/23.     If you have any questions or concerns, please don't hesitate to call.    Sincerely,     Maureen Huang PA-C

## 2023-03-01 ENCOUNTER — PATIENT MESSAGE (OUTPATIENT)
Dept: INTERNAL MEDICINE | Facility: CLINIC | Age: 33
End: 2023-03-01
Payer: MEDICAID

## 2023-03-01 DIAGNOSIS — E10.65 TYPE 1 DIABETES MELLITUS WITH HYPERGLYCEMIA: Primary | ICD-10-CM

## 2023-03-01 DIAGNOSIS — E10.65 TYPE 1 DIABETES MELLITUS WITH HYPERGLYCEMIA: ICD-10-CM

## 2023-03-01 RX ORDER — BLOOD-GLUCOSE SENSOR
EACH MISCELLANEOUS
Qty: 3 EACH | Refills: 11 | Status: SHIPPED | OUTPATIENT
Start: 2023-03-01 | End: 2023-03-01

## 2023-03-01 RX ORDER — BLOOD-GLUCOSE SENSOR
EACH MISCELLANEOUS
Qty: 3 EACH | Refills: 11 | Status: SHIPPED | OUTPATIENT
Start: 2023-03-01 | End: 2023-04-10 | Stop reason: SDUPTHER

## 2023-03-01 RX ORDER — BLOOD-GLUCOSE,RECEIVER,CONT
EACH MISCELLANEOUS
Qty: 1 EACH | Refills: 1 | Status: SHIPPED | OUTPATIENT
Start: 2023-03-01 | End: 2023-11-15

## 2023-03-09 ENCOUNTER — TELEPHONE (OUTPATIENT)
Dept: DIABETES | Facility: CLINIC | Age: 33
End: 2023-03-09
Payer: MEDICAID

## 2023-03-09 NOTE — TELEPHONE ENCOUNTER
----- Message from Katherine Wright sent at 3/9/2023  1:06 PM CST -----  Contact: Anusha @[phone]  Pt calling regarding an appt for Type I diabetic appt as a NP. Please give her a call back to further assist

## 2023-03-10 ENCOUNTER — TELEPHONE (OUTPATIENT)
Dept: PHARMACY | Facility: CLINIC | Age: 33
End: 2023-03-10
Payer: MEDICAID

## 2023-03-13 ENCOUNTER — PATIENT MESSAGE (OUTPATIENT)
Dept: OBSTETRICS AND GYNECOLOGY | Facility: CLINIC | Age: 33
End: 2023-03-13
Payer: MEDICAID

## 2023-03-22 ENCOUNTER — PATIENT MESSAGE (OUTPATIENT)
Dept: INTERNAL MEDICINE | Facility: CLINIC | Age: 33
End: 2023-03-22
Payer: MEDICAID

## 2023-03-24 DIAGNOSIS — E66.3 OVERWEIGHT (BMI 25.0-29.9): ICD-10-CM

## 2023-03-24 DIAGNOSIS — E10.65 TYPE 1 DIABETES MELLITUS WITH HYPERGLYCEMIA: Primary | ICD-10-CM

## 2023-03-24 RX ORDER — SEMAGLUTIDE 1.34 MG/ML
INJECTION, SOLUTION SUBCUTANEOUS
Qty: 1 EACH | Refills: 5 | Status: SHIPPED | OUTPATIENT
Start: 2023-03-24 | End: 2023-04-10

## 2023-03-25 ENCOUNTER — TELEPHONE (OUTPATIENT)
Dept: INTERNAL MEDICINE | Facility: CLINIC | Age: 33
End: 2023-03-25
Payer: MEDICAID

## 2023-03-27 ENCOUNTER — PATIENT MESSAGE (OUTPATIENT)
Dept: SURGERY | Facility: HOSPITAL | Age: 33
End: 2023-03-27
Payer: MEDICAID

## 2023-03-27 ENCOUNTER — ANESTHESIA EVENT (OUTPATIENT)
Dept: SURGERY | Facility: HOSPITAL | Age: 33
End: 2023-03-27
Payer: MEDICAID

## 2023-03-28 ENCOUNTER — ANESTHESIA (OUTPATIENT)
Dept: SURGERY | Facility: HOSPITAL | Age: 33
End: 2023-03-28
Payer: MEDICAID

## 2023-03-28 ENCOUNTER — HOSPITAL ENCOUNTER (OUTPATIENT)
Facility: HOSPITAL | Age: 33
Discharge: HOME | End: 2023-03-28
Attending: ORTHOPAEDIC SURGERY | Admitting: ORTHOPAEDIC SURGERY
Payer: MEDICAID

## 2023-03-28 VITALS
HEIGHT: 63 IN | BODY MASS INDEX: 29.41 KG/M2 | OXYGEN SATURATION: 99 % | WEIGHT: 166 LBS | DIASTOLIC BLOOD PRESSURE: 61 MMHG | HEART RATE: 79 BPM | RESPIRATION RATE: 18 BRPM | SYSTOLIC BLOOD PRESSURE: 105 MMHG | TEMPERATURE: 98 F

## 2023-03-28 DIAGNOSIS — G56.03 BILATERAL CARPAL TUNNEL SYNDROME: ICD-10-CM

## 2023-03-28 LAB
B-HCG UR QL: NEGATIVE
CTP QC/QA: YES
POCT GLUCOSE: 185 MG/DL (ref 70–110)

## 2023-03-28 PROCEDURE — D9220A PRA ANESTHESIA: ICD-10-PCS | Mod: ANES,,, | Performed by: STUDENT IN AN ORGANIZED HEALTH CARE EDUCATION/TRAINING PROGRAM

## 2023-03-28 PROCEDURE — 36000707: Performed by: ORTHOPAEDIC SURGERY

## 2023-03-28 PROCEDURE — 25000003 PHARM REV CODE 250: Performed by: ORTHOPAEDIC SURGERY

## 2023-03-28 PROCEDURE — 37000009 HC ANESTHESIA EA ADD 15 MINS: Performed by: ORTHOPAEDIC SURGERY

## 2023-03-28 PROCEDURE — 63600175 PHARM REV CODE 636 W HCPCS: Performed by: STUDENT IN AN ORGANIZED HEALTH CARE EDUCATION/TRAINING PROGRAM

## 2023-03-28 PROCEDURE — D9220A PRA ANESTHESIA: ICD-10-PCS | Mod: CRNA,,, | Performed by: NURSE ANESTHETIST, CERTIFIED REGISTERED

## 2023-03-28 PROCEDURE — 64721 PR REVISE MEDIAN N/CARPAL TUNNEL SURG: ICD-10-PCS | Mod: 51,RT,, | Performed by: ORTHOPAEDIC SURGERY

## 2023-03-28 PROCEDURE — D9220A PRA ANESTHESIA: Mod: ANES,,, | Performed by: STUDENT IN AN ORGANIZED HEALTH CARE EDUCATION/TRAINING PROGRAM

## 2023-03-28 PROCEDURE — 37000008 HC ANESTHESIA 1ST 15 MINUTES: Performed by: ORTHOPAEDIC SURGERY

## 2023-03-28 PROCEDURE — 25000003 PHARM REV CODE 250: Performed by: NURSE ANESTHETIST, CERTIFIED REGISTERED

## 2023-03-28 PROCEDURE — 36000706: Performed by: ORTHOPAEDIC SURGERY

## 2023-03-28 PROCEDURE — 01810 ANES PX NRV MUSC F/ARM WRST: CPT | Performed by: ORTHOPAEDIC SURGERY

## 2023-03-28 PROCEDURE — 71000015 HC POSTOP RECOV 1ST HR: Performed by: ORTHOPAEDIC SURGERY

## 2023-03-28 PROCEDURE — D9220A PRA ANESTHESIA: Mod: CRNA,,, | Performed by: NURSE ANESTHETIST, CERTIFIED REGISTERED

## 2023-03-28 PROCEDURE — 63600175 PHARM REV CODE 636 W HCPCS: Performed by: NURSE ANESTHETIST, CERTIFIED REGISTERED

## 2023-03-28 PROCEDURE — 71000016 HC POSTOP RECOV ADDL HR: Performed by: ORTHOPAEDIC SURGERY

## 2023-03-28 PROCEDURE — 26055 PR INCISE FINGER TENDON SHEATH: ICD-10-PCS | Mod: F7,,, | Performed by: ORTHOPAEDIC SURGERY

## 2023-03-28 PROCEDURE — 63600175 PHARM REV CODE 636 W HCPCS: Performed by: ORTHOPAEDIC SURGERY

## 2023-03-28 PROCEDURE — 26055 INCISE FINGER TENDON SHEATH: CPT | Mod: F7,,, | Performed by: ORTHOPAEDIC SURGERY

## 2023-03-28 PROCEDURE — 64721 CARPAL TUNNEL SURGERY: CPT | Mod: 51,RT,, | Performed by: ORTHOPAEDIC SURGERY

## 2023-03-28 PROCEDURE — 81025 URINE PREGNANCY TEST: CPT | Performed by: ORTHOPAEDIC SURGERY

## 2023-03-28 RX ORDER — PROPOFOL 10 MG/ML
VIAL (ML) INTRAVENOUS CONTINUOUS PRN
Status: DISCONTINUED | OUTPATIENT
Start: 2023-03-28 | End: 2023-03-28

## 2023-03-28 RX ORDER — MIDAZOLAM HYDROCHLORIDE 1 MG/ML
INJECTION INTRAMUSCULAR; INTRAVENOUS
Status: DISCONTINUED | OUTPATIENT
Start: 2023-03-28 | End: 2023-03-28

## 2023-03-28 RX ORDER — ACETAMINOPHEN 10 MG/ML
INJECTION, SOLUTION INTRAVENOUS
Status: DISCONTINUED | OUTPATIENT
Start: 2023-03-28 | End: 2023-03-28

## 2023-03-28 RX ORDER — LIDOCAINE HYDROCHLORIDE 10 MG/ML
INJECTION, SOLUTION EPIDURAL; INFILTRATION; INTRACAUDAL; PERINEURAL
Status: DISCONTINUED | OUTPATIENT
Start: 2023-03-28 | End: 2023-03-28 | Stop reason: HOSPADM

## 2023-03-28 RX ORDER — CEFAZOLIN SODIUM 2 G/50ML
2 SOLUTION INTRAVENOUS
Status: DISCONTINUED | OUTPATIENT
Start: 2023-03-28 | End: 2023-03-28 | Stop reason: HOSPADM

## 2023-03-28 RX ORDER — FENTANYL CITRATE 50 UG/ML
INJECTION, SOLUTION INTRAMUSCULAR; INTRAVENOUS
Status: DISCONTINUED | OUTPATIENT
Start: 2023-03-28 | End: 2023-03-28

## 2023-03-28 RX ORDER — BUPIVACAINE HYDROCHLORIDE 5 MG/ML
INJECTION, SOLUTION EPIDURAL; INTRACAUDAL
Status: DISCONTINUED | OUTPATIENT
Start: 2023-03-28 | End: 2023-03-28 | Stop reason: HOSPADM

## 2023-03-28 RX ORDER — ACETAMINOPHEN 325 MG/1
650 TABLET ORAL EVERY 4 HOURS PRN
Status: DISCONTINUED | OUTPATIENT
Start: 2023-03-28 | End: 2023-03-28 | Stop reason: HOSPADM

## 2023-03-28 RX ORDER — HYDROMORPHONE HYDROCHLORIDE 2 MG/ML
0.5 INJECTION, SOLUTION INTRAMUSCULAR; INTRAVENOUS; SUBCUTANEOUS EVERY 10 MIN PRN
Status: DISCONTINUED | OUTPATIENT
Start: 2023-03-28 | End: 2023-03-28 | Stop reason: HOSPADM

## 2023-03-28 RX ORDER — ONDANSETRON 8 MG/1
8 TABLET, ORALLY DISINTEGRATING ORAL EVERY 8 HOURS PRN
Status: DISCONTINUED | OUTPATIENT
Start: 2023-03-28 | End: 2023-03-28 | Stop reason: HOSPADM

## 2023-03-28 RX ORDER — SODIUM CHLORIDE, SODIUM LACTATE, POTASSIUM CHLORIDE, CALCIUM CHLORIDE 600; 310; 30; 20 MG/100ML; MG/100ML; MG/100ML; MG/100ML
INJECTION, SOLUTION INTRAVENOUS CONTINUOUS PRN
Status: DISCONTINUED | OUTPATIENT
Start: 2023-03-28 | End: 2023-03-28

## 2023-03-28 RX ORDER — OXYCODONE HYDROCHLORIDE 5 MG/1
10 TABLET ORAL EVERY 4 HOURS PRN
Status: DISCONTINUED | OUTPATIENT
Start: 2023-03-28 | End: 2023-03-28 | Stop reason: HOSPADM

## 2023-03-28 RX ORDER — FENTANYL CITRATE 50 UG/ML
25 INJECTION, SOLUTION INTRAMUSCULAR; INTRAVENOUS EVERY 5 MIN PRN
Status: DISCONTINUED | OUTPATIENT
Start: 2023-03-28 | End: 2023-03-28 | Stop reason: HOSPADM

## 2023-03-28 RX ORDER — KETOROLAC TROMETHAMINE 30 MG/ML
15 INJECTION, SOLUTION INTRAMUSCULAR; INTRAVENOUS ONCE
Status: CANCELLED | OUTPATIENT
Start: 2023-03-28 | End: 2023-03-31

## 2023-03-28 RX ORDER — PHENYLEPHRINE HYDROCHLORIDE 10 MG/ML
INJECTION INTRAVENOUS
Status: DISCONTINUED | OUTPATIENT
Start: 2023-03-28 | End: 2023-03-28

## 2023-03-28 RX ORDER — LIDOCAINE HYDROCHLORIDE 20 MG/ML
INJECTION INTRAVENOUS
Status: DISCONTINUED | OUTPATIENT
Start: 2023-03-28 | End: 2023-03-28

## 2023-03-28 RX ORDER — PROPOFOL 10 MG/ML
VIAL (ML) INTRAVENOUS
Status: DISCONTINUED | OUTPATIENT
Start: 2023-03-28 | End: 2023-03-28

## 2023-03-28 RX ORDER — PROCHLORPERAZINE EDISYLATE 5 MG/ML
5 INJECTION INTRAMUSCULAR; INTRAVENOUS EVERY 30 MIN PRN
Status: DISCONTINUED | OUTPATIENT
Start: 2023-03-28 | End: 2023-03-28 | Stop reason: HOSPADM

## 2023-03-28 RX ORDER — KETOROLAC TROMETHAMINE 30 MG/ML
INJECTION, SOLUTION INTRAMUSCULAR; INTRAVENOUS
Status: DISCONTINUED | OUTPATIENT
Start: 2023-03-28 | End: 2023-03-28

## 2023-03-28 RX ORDER — HYDROCODONE BITARTRATE AND ACETAMINOPHEN 5; 325 MG/1; MG/1
1 TABLET ORAL EVERY 4 HOURS PRN
Qty: 12 TABLET | Refills: 0 | Status: SHIPPED | OUTPATIENT
Start: 2023-03-28 | End: 2023-07-07

## 2023-03-28 RX ADMIN — LIDOCAINE HYDROCHLORIDE 50 MG: 20 INJECTION, SOLUTION INTRAVENOUS at 07:03

## 2023-03-28 RX ADMIN — KETOROLAC TROMETHAMINE 30 MG: 30 INJECTION, SOLUTION INTRAMUSCULAR; INTRAVENOUS at 07:03

## 2023-03-28 RX ADMIN — PROPOFOL 40 MG: 10 INJECTION, EMULSION INTRAVENOUS at 07:03

## 2023-03-28 RX ADMIN — PROPOFOL 150 MCG/KG/MIN: 10 INJECTION, EMULSION INTRAVENOUS at 07:03

## 2023-03-28 RX ADMIN — ACETAMINOPHEN 1000 MG: 10 INJECTION, SOLUTION INTRAVENOUS at 07:03

## 2023-03-28 RX ADMIN — PROCHLORPERAZINE EDISYLATE 5 MG: 5 INJECTION INTRAMUSCULAR; INTRAVENOUS at 08:03

## 2023-03-28 RX ADMIN — SODIUM CHLORIDE, SODIUM LACTATE, POTASSIUM CHLORIDE, AND CALCIUM CHLORIDE: .6; .31; .03; .02 INJECTION, SOLUTION INTRAVENOUS at 07:03

## 2023-03-28 RX ADMIN — FENTANYL CITRATE 50 MCG: 0.05 INJECTION, SOLUTION INTRAMUSCULAR; INTRAVENOUS at 07:03

## 2023-03-28 RX ADMIN — MIDAZOLAM HYDROCHLORIDE 2 MG: 1 INJECTION, SOLUTION INTRAMUSCULAR; INTRAVENOUS at 07:03

## 2023-03-28 RX ADMIN — PHENYLEPHRINE HYDROCHLORIDE 100 MCG: 10 INJECTION INTRAVENOUS at 07:03

## 2023-03-28 RX ADMIN — CEFAZOLIN SODIUM 2 G: 2 SOLUTION INTRAVENOUS at 07:03

## 2023-03-28 NOTE — OP NOTE
Certification of Assistant at Surgery       Surgery Date: 3/28/2023     Participating Surgeons:  Surgeon(s) and Role:     * Sekou Rojo Jr., MD - Primary    Procedures:  Procedure(s) (LRB):  RELEASE, CARPAL TUNNEL (Right)  RELEASE, TRIGGER FINGER; MIDDLE FINGER (Right)    Assistant Surgeon's Certification of Necessity:  I understand that section 1842 (b) (6) (d) of the Social Security Act generally prohibits Medicare Part B reasonable charge payment for the services of assistants at surgery in [facility] hospitals when qualified residents are available to furnish such services. I certify that the services for which payment is claimed were medically necessary, and that no qualified resident was available to perform the services. I further understand that these services are subject to post-payment review by the Medicare carrier.      Sejal Gallardo PA-C    03/28/2023  7:53 AM

## 2023-03-28 NOTE — TRANSFER OF CARE
"Anesthesia Transfer of Care Note    Patient: Anusha Andrew    Procedure(s) Performed: Procedure(s) (LRB):  RELEASE, CARPAL TUNNEL (Right)  RELEASE, TRIGGER FINGER; MIDDLE FINGER (Right)    Patient location: OPS    Anesthesia Type: general    Transport from OR: Transported from OR on room air with adequate spontaneous ventilation    Post pain: adequate analgesia    Post assessment: no apparent anesthetic complications    Post vital signs: stable    Level of consciousness: awake and oriented    Nausea/Vomiting: no nausea/vomiting    Complications: none    Transfer of care protocol was followed      Last vitals:   Visit Vitals  /67 (BP Location: Right arm, Patient Position: Lying)   Pulse 96   Temp 37.1 °C (98.8 °F) (Skin)   Resp 15   Ht 5' 3" (1.6 m)   Wt 75.3 kg (166 lb)   SpO2 99%   Breastfeeding No   BMI 29.41 kg/m²     "

## 2023-03-28 NOTE — OP NOTE
[facility] - Surgery (Hospital)  Operative Note      Date of Procedure: 3/28/2023     Procedure: Procedure(s) (LRB):  RELEASE, CARPAL TUNNEL (Right)  RELEASE, TRIGGER FINGER; MIDDLE FINGER (Right)     Surgeon(s) and Role:     * Sekou Rojo Jr., MD - Primary    Assisting Surgeon: None    Pre-Operative Diagnosis: Bilateral carpal tunnel syndrome [G56.03]    Post-Operative Diagnosis: Post-Op Diagnosis Codes:     * Bilateral carpal tunnel syndrome [G56.03]    Anesthesia: Local MAC    Operative Findings (including complications, if any):  Right carpal tunnel syndrome with triggering of the right middle finger    Description of Technical Procedures:     Preop diagnosis:  1. Right carpal tunnel syndrome.      2. Triggering of the right middle finger.      Postop diagnosis: Same.      Operative procedure:  1. Right carpal tunnel release.      2. Trigger release right middle finger.      Surgeon: Darrel.      First assistant:  Sami.      EBL: Minimal.      Anesthesia:  Mac.      Complications:  None.      Operative procedure in detail as follows:    After operative consent was obtained the patient brought the operating room placed supine operating table.  Anesthesia by IV sedation followed by injection of xylocaine Marcaine combination into the right hand and wrist.  Tourniquet applied right arm right upper extremity prepped and draped out normal sterile fashion.  Esmarch used to exsanguinated the limb tourniquet inflated 225 mmHg.      Carpal tunnel was approached 1st with a 2.5 cm incision thenar crease right palm with a 15 blade.  Palmar fascia divided deep retractors placed.  Transverse carpal ligament identified carefully divided with the [place] blade.  The median nerve protected with the [place] elevator and division of the ligament continued proximally distally under direct visualization.  After complete release the contents of the canal were inspected and noted to be intact including the recurrent branch.  The wound  irrigated and closed with interrupted 4-0 Monocryl on the subcutaneous layer Dermabond on the skin.      A 2nd incision made at the base of the right middle finger with a 15 blade careful dissection used to expose the A1 pulley which was released longitudinally with the [place] blade and scissors.  Range of motion then checked noted to be full without triggering.  The wound irrigated hemostasis achieved with Bovie and the skin closed with interrupted 5 O nylon horizontal mattress suture on the skin.  Sterile dressings applied to both incisions followed by light wrap and a splint the tourniquet then deflated and the patient brought to the recovery room in stable condition all sponge needle counts reported as correct no complications    Significant Surgical Tasks Conducted by the Assistant(s), if Applicable: retraction    Estimated Blood Loss (EBL): * No values recorded between 3/28/2023  7:24 AM and 3/28/2023  7:52 AM *           Implants: * No implants in log *    Specimens:   Specimen (24h ago, onward)      None                    Condition: Good    Disposition: PACU - hemodynamically stable.    Attestation: I was present and scrubbed for the entire procedure.    Discharge Note    OUTCOME: Patient tolerated treatment/procedure well without complication and is now ready for discharge.    DISPOSITION: Home or Self Care    FINAL DIAGNOSIS:  Trigger finger, right middle finger    FOLLOWUP: In clinic    DISCHARGE INSTRUCTIONS:    Discharge Procedure Orders   Diet general     Call MD for:  temperature >100.4     Call MD for:  persistent nausea and vomiting     Call MD for:  severe uncontrolled pain     Keep surgical extremity elevated     Remove dressing in 72 hours

## 2023-03-28 NOTE — ANESTHESIA POSTPROCEDURE EVALUATION
Anesthesia Post Evaluation    Patient: Anusha Andrew    Procedure(s) Performed: Procedure(s) (LRB):  RELEASE, CARPAL TUNNEL (Right)  RELEASE, TRIGGER FINGER; MIDDLE FINGER (Right)    Final Anesthesia Type: general      Patient location during evaluation: PACU  Patient participation: Yes- Able to Participate  Level of consciousness: awake  Post-procedure vital signs: reviewed and stable  Pain management: adequate  Airway patency: patent    PONV status at discharge: No PONV  Anesthetic complications: no      Cardiovascular status: blood pressure returned to baseline  Respiratory status: unassisted  Hydration status: euvolemic  Follow-up not needed.          Vitals Value Taken Time   /61 03/28/23 0900   Temp 36.7 °C (98 °F) 03/28/23 0900   Pulse 79 03/28/23 0900   Resp 18 03/28/23 0900   SpO2 99 % 03/28/23 0900         No case tracking events are documented in the log.      Pain/Sylvain Score: Sylvain Score: 10 (3/28/2023  9:17 AM)
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
## 2023-03-28 NOTE — H&P
CC:  Bilateral carpal tunnel syndrome with triggering of the right middle finger           HPI:  Anusha Andrew is a very pleasant 32 y.o. female status post recent left carpal tunnel release with trigger release left index and middle fingers   She is recovering well on the left hand minimal pain numbness has resolved no triggering  She is having similar symptoms in the right hand he would like to go ahead and set up surgery for right carpal tunnel release and trigger release of the right middle finger which persists            PAST MEDICAL HISTORY:        Past Medical History:   Diagnosis Date    Depression      Hyperlipidemia      Hypertension      Mild nonproliferative diabetic retinopathy of both eyes without macular edema associated with type 1 diabetes mellitus 3/30/2021    Type I (juvenile type) diabetes mellitus without mention of complication, uncontrolled 2011      PAST SURGICAL HISTORY:         Past Surgical History:   Procedure Laterality Date    ABCESS DRAINAGE         boil went over [place]    CARPAL TUNNEL RELEASE Left 2022     Procedure: RELEASE, CARPAL TUNNEL;  Surgeon: Sekou Rojo Jr., MD;  Location: [facility] OR;  Service: Orthopedics;  Laterality: Left;     SECTION   2012    DILATION AND CURETTAGE OF UTERUS   2019     Procedure: DILATION AND CURETTAGE, UTERUS;  Surgeon: Purnima Kidd MD;  Location: [facility] OR;  Service: OB/GYN;;    HYSTEROSCOPY N/A 2019     Procedure: HYSTEROSCOPY;  Surgeon: Purnima Kidd MD;  Location: [facility] OR;  Service: OB/GYN;  Laterality: N/A;    TONSILLECTOMY, ADENOIDECTOMY        TRIGGER FINGER RELEASE Left 2022     Procedure: RELEASE, TRIGGER FINGER;  Surgeon: Sekou Rojo Jr., MD;  Location: [facility] OR;  Service: Orthopedics;  Laterality: Left;    WISDOM TOOTH EXTRACTION          FAMILY HISTORY:         Family History   Problem Relation Age of Onset    Diabetes Brother      No Known Problems Mother      No Known Problems Father       Hydrocephalus Daughter      No Known Problems Brother      No Known Problems Brother      No Known Problems Maternal Aunt      No Known Problems Maternal Uncle      No Known Problems Paternal Aunt      No Known Problems Paternal Uncle      No Known Problems Maternal Grandmother      No Known Problems Maternal Grandfather      No Known Problems Paternal Grandmother      No Known Problems Paternal Grandfather      Amblyopia Neg Hx      Blindness Neg Hx      Cataracts Neg Hx      Glaucoma Neg Hx      Hypertension Neg Hx      Macular degeneration Neg Hx      Retinal detachment Neg Hx      Strabismus Neg Hx      Stroke Neg Hx      Thyroid disease Neg Hx      Breast cancer Neg Hx      Colon cancer Neg Hx      Ovarian cancer Neg Hx      Cancer Neg Hx        SOCIAL HISTORY:   Social History            Socioeconomic History    Marital status: Single   Tobacco Use    Smoking status: Never    Smokeless tobacco: Never   Substance and Sexual Activity    Alcohol use: Yes       Comment: occasionally    Drug use: No    Sexual activity: Yes       Partners: Male       Birth control/protection: Injection      Social Determinants of Health          Financial Resource Strain: Low Risk     Difficulty of Paying Living Expenses: Not hard at all   Food Insecurity: No Food Insecurity    Worried About Running Out of Food in the Last Year: Never true    Ran Out of Food in the Last Year: Never true   Transportation Needs: No Transportation Needs    Lack of Transportation (Medical): No    Lack of Transportation (Non-Medical): No   Physical Activity: Sufficiently Active    Days of Exercise per Week: 5 days    Minutes of Exercise per Session: 60 min   Stress: Stress Concern Present    Feeling of Stress : Rather much   Social Connections: Unknown    Frequency of Communication with Friends and Family: More than three times a week    Frequency of Social Gatherings with Friends and Family: Once a week    Active Member of Clubs or Organizations: No     Attends Club or Organization Meetings: Never    Marital Status: Never    Housing Stability: Low Risk     Unable to Pay for Housing in the Last Year: No    Number of Places Lived in the Last Year: 1    Unstable Housing in the Last Year: No         MEDICATIONS:   Current Outpatient Medications:     blood sugar diagnostic Strp, To check BG 4  times daily, to use with insurance preferred meter, e 10.65 (Patient taking differently: To check BG 4  times daily, to use with insurance preferred meter, e 10.65), Disp: 400 each, Rfl: 3    ergocalciferol (VITAMIN D2) 50,000 unit Cap, Take 1 capsule (50,000 Units total) by mouth every 7 days., Disp: 5 capsule, Rfl: 3    FLUoxetine 10 MG capsule, Take 1 capsule (10 mg total) by mouth once daily., Disp: 30 capsule, Rfl: 2    FLUoxetine 20 MG capsule, Take 1 capsule (20 mg total) by mouth 2 (two) times daily., Disp: 60 capsule, Rfl: 10    glucagon (BAQSIMI) 3 mg/actuation Spry, Give one puff via nostril. Hold device between fingers and thumb, do not push plunger yet, insert tip gently into one nostril until finger(s) touch the outside of the nose, then push plunger firmly all the way in . Dose is complete when the green line disappears., Disp: 2 each, Rfl: 2    hydrocortisone (ANUSOL-HC) 2.5 % rectal cream, Place rectally 2 (two) times daily., Disp: 28 g, Rfl: 2    ibuprofen (ADVIL,MOTRIN) 800 MG tablet, Take 1 tablet (800 mg total) by mouth every 6 (six) hours as needed for Pain., Disp: 20 tablet, Rfl: 0    insulin aspart U-100 (NOVOLOG) 100 unit/mL (3 mL) InPn pen, Inject as directed, 1 to 10 insulin/carb ratio, isf/correction factor 30, target 120, max daily 30 units., Disp: 15 mL, Rfl: 6    insulin glargine 100 units/mL SubQ pen, Inject 12 units in the morning only., Disp: 15 mL, Rfl: 6    insulin syringe-needle U-100 0.5 mL 31 gauge x 5/16 Syrg, use with admelog, Disp: 100 each, Rfl: 0    lancets Misc, To check BG 4 times daily, to use with insurance preferred meter,  "e10.65, Disp: 400 each, Rfl: 3    norethindrone (MICRONOR) 0.35 mg tablet, Take 1 tablet by mouth once daily., Disp: , Rfl:     norethindrone (ORTHO MICRONOR) 0.35 mg tablet, Take 1 tablet (0.35 mg total) by mouth once daily., Disp: 28 tablet, Rfl: 11    ONETOUCH DELICA PLUS LANCET 33 gauge Misc, USE 1 UNIT TO CHECK GLUCOSE 4 TIMES DAILY, Disp: , Rfl:     ONETOUCH VERIO FLEX METER Misc, USE AS DIRECTED TO CHECK BLOOD GLUCOSE 4 TIMES DAILY, Disp: , Rfl:     pantoprazole (PROTONIX) 40 MG tablet, Take 1 tablet by mouth once daily., Disp: , Rfl:     pen needle, diabetic 32 gauge x 5/32" Ndle, Use as directed, Disp: 100 each, Rfl: 0    polyethylene glycol (GLYCOLAX) 17 gram/dose powder, Take 17 g by mouth once daily., Disp: 30 each, Rfl: 3  ALLERGIES:        Review of patient's allergies indicates:   Allergen Reactions    No known allergies           Review of Systems:  Constitutional: no fever or chills  ENT: no nasal congestion or sore throat  Respiratory: no cough or shortness of breath  Cardiovascular: no chest pain or palpitations  Gastrointestinal: no nausea or vomiting, PUD, GERD, NSAID intolerance  Genitourinary: no hematuria or dysuria  Integument/Breast: no rash or pruritis  Hematologic/Lymphatic: no easy bruising or lymphadenopathy  Musculoskeletal: see HPI  Neurological: no seizures or tremors  Behavioral/Psych: no auditory or visual hallucinations        Physical Exam       Vitals:     02/08/23 1511   Weight: 73 kg (161 lb)   Height: 5' 3" (1.6 m)   PainSc:   1         Constitutional: Oriented to person, place, and time. Appears well-developed and well-nourished.   HENT:   Head: Normocephalic and atraumatic.   Nose: Nose normal.   Eyes: No scleral icterus.   Neck: Normal range of motion.   Cardiovascular: Normal rate and regular rhythm.    Pulses:       Radial pulses are 2+ on the right side, and 2+ on the left side.   Pulmonary/Chest: Effort normal and breath sounds normal.   Abdominal: Soft. " "  Neurological: Alert and oriented to person, place, and time.   Skin: Skin is warm.   Psychiatric: Normal mood and affect.      MUSCULOSKELETAL UPPER EXTREMITY:  Examination left hand the incisions well-healed swelling minimal scarring minimal   Range of motion fingers full  strength improved sensation intact  Examination right hand demonstrates a positive Tinel sign with mild triggering right middle finger                 Diagnostic Studies:  Previous nerve test showed evidence of bilateral carpal tunnel syndrome           Assessment:  1. Status post left carpal tunnel release and trigger release left index and middle fingers doing well.      2. Right carpal tunnel syndrome with triggering of the right middle finger     Plan:  Left hand I have given her a squeeze ball to work on strengthening and recommended scar massage with Mederma     For the right hand she would like to go ahead and set up surgery for right carpal tunnel release combined with trigger release right middle finger as an outpatient surgery        The risks and benefits of surgery were discussed with the patient today and they understand.  The consent was signed in the office for surgery.        Sekou Rojo MD (Jay)  [facility]  Orthopedic Upper Extremity Surgery  "

## 2023-04-05 ENCOUNTER — PATIENT MESSAGE (OUTPATIENT)
Dept: OBSTETRICS AND GYNECOLOGY | Facility: CLINIC | Age: 33
End: 2023-04-05
Payer: MEDICAID

## 2023-04-05 ENCOUNTER — PATIENT MESSAGE (OUTPATIENT)
Dept: INTERNAL MEDICINE | Facility: CLINIC | Age: 33
End: 2023-04-05
Payer: MEDICAID

## 2023-04-05 NOTE — TELEPHONE ENCOUNTER
Can you set up a virtual to discuss. She had a blood clot after her gastric bypass and she has IDDM. So I want to check in with her on all that to make sure she is good for surgery

## 2023-04-10 ENCOUNTER — OFFICE VISIT (OUTPATIENT)
Dept: INTERNAL MEDICINE | Facility: CLINIC | Age: 33
End: 2023-04-10
Payer: MEDICAID

## 2023-04-10 ENCOUNTER — PATIENT MESSAGE (OUTPATIENT)
Dept: INTERNAL MEDICINE | Facility: CLINIC | Age: 33
End: 2023-04-10

## 2023-04-10 DIAGNOSIS — E10.3293 MILD NONPROLIFERATIVE DIABETIC RETINOPATHY OF BOTH EYES WITHOUT MACULAR EDEMA ASSOCIATED WITH TYPE 1 DIABETES MELLITUS: ICD-10-CM

## 2023-04-10 DIAGNOSIS — Z86.39 H/O HYPOGLYCEMIA: ICD-10-CM

## 2023-04-10 DIAGNOSIS — E66.3 OVERWEIGHT (BMI 25.0-29.9): ICD-10-CM

## 2023-04-10 DIAGNOSIS — Z90.3 HISTORY OF SLEEVE GASTRECTOMY: ICD-10-CM

## 2023-04-10 DIAGNOSIS — E10.65 TYPE 1 DIABETES MELLITUS WITH HYPERGLYCEMIA: Primary | ICD-10-CM

## 2023-04-10 PROCEDURE — 3066F PR DOCUMENTATION OF TREATMENT FOR NEPHROPATHY: ICD-10-PCS | Mod: CPTII,95,, | Performed by: NURSE PRACTITIONER

## 2023-04-10 PROCEDURE — 1159F PR MEDICATION LIST DOCUMENTED IN MEDICAL RECORD: ICD-10-PCS | Mod: CPTII,95,, | Performed by: NURSE PRACTITIONER

## 2023-04-10 PROCEDURE — 1160F PR REVIEW ALL MEDS BY PRESCRIBER/CLIN PHARMACIST DOCUMENTED: ICD-10-PCS | Mod: CPTII,95,, | Performed by: NURSE PRACTITIONER

## 2023-04-10 PROCEDURE — 1160F RVW MEDS BY RX/DR IN RCRD: CPT | Mod: CPTII,95,, | Performed by: NURSE PRACTITIONER

## 2023-04-10 PROCEDURE — 99214 OFFICE O/P EST MOD 30 MIN: CPT | Mod: 95,,, | Performed by: NURSE PRACTITIONER

## 2023-04-10 PROCEDURE — 99214 PR OFFICE/OUTPT VISIT, EST, LEVL IV, 30-39 MIN: ICD-10-PCS | Mod: 95,,, | Performed by: NURSE PRACTITIONER

## 2023-04-10 PROCEDURE — 3051F HG A1C>EQUAL 7.0%<8.0%: CPT | Mod: CPTII,95,, | Performed by: NURSE PRACTITIONER

## 2023-04-10 PROCEDURE — 3061F PR NEG MICROALBUMINURIA RESULT DOCUMENTED/REVIEW: ICD-10-PCS | Mod: CPTII,95,, | Performed by: NURSE PRACTITIONER

## 2023-04-10 PROCEDURE — 3066F NEPHROPATHY DOC TX: CPT | Mod: CPTII,95,, | Performed by: NURSE PRACTITIONER

## 2023-04-10 PROCEDURE — 1159F MED LIST DOCD IN RCRD: CPT | Mod: CPTII,95,, | Performed by: NURSE PRACTITIONER

## 2023-04-10 PROCEDURE — 3061F NEG MICROALBUMINURIA REV: CPT | Mod: CPTII,95,, | Performed by: NURSE PRACTITIONER

## 2023-04-10 PROCEDURE — 3051F PR MOST RECENT HEMOGLOBIN A1C LEVEL 7.0 - < 8.0%: ICD-10-PCS | Mod: CPTII,95,, | Performed by: NURSE PRACTITIONER

## 2023-04-10 RX ORDER — BLOOD-GLUCOSE SENSOR
EACH MISCELLANEOUS
Qty: 3 EACH | Refills: 11 | Status: SHIPPED | OUTPATIENT
Start: 2023-04-10 | End: 2023-11-15

## 2023-04-10 RX ORDER — SEMAGLUTIDE 1.34 MG/ML
INJECTION, SOLUTION SUBCUTANEOUS
Qty: 1 EACH | Refills: 5
Start: 2023-04-10 | End: 2023-05-10 | Stop reason: SDUPTHER

## 2023-04-10 NOTE — Clinical Note
A1c stable Off label ozempic, doing better with taking it at night weekly, used a lot w/ type 1s in bariatric dept over the past 2 years jolie rudolph appt 3.5-4 months, a1c prior -thanks
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
## 2023-04-11 ENCOUNTER — TELEPHONE (OUTPATIENT)
Dept: INTERNAL MEDICINE | Facility: CLINIC | Age: 33
End: 2023-04-11
Payer: MEDICAID

## 2023-04-11 ENCOUNTER — PATIENT MESSAGE (OUTPATIENT)
Dept: RESEARCH | Facility: HOSPITAL | Age: 33
End: 2023-04-11
Payer: MEDICAID

## 2023-04-11 RX ORDER — INSULIN GLARGINE 100 [IU]/ML
INJECTION, SOLUTION SUBCUTANEOUS
Qty: 15 ML | Refills: 6 | Status: SHIPPED | OUTPATIENT
Start: 2023-04-11 | End: 2023-06-19 | Stop reason: SDUPTHER

## 2023-04-11 NOTE — PROGRESS NOTES
Subjective:      Patient ID: Anusha Andrew is a 32 y.o. female.    Chief Complaint: Post-op Evaluation of the Right Hand      HPI: Anusha Andrew is here for a postop visit. She is 15 days s/p right carpal tunnel release and right middle trigger finger release. She is doing well. Pt reports pain has been controlled with 800 mg Ibuprofen.  Numbness and tingling is improved but not resolved. Post surgical complaints include: stiffness.     Past Medical History:   Diagnosis Date    Depression     Hyperlipidemia     Hypertension     Mild nonproliferative diabetic retinopathy of both eyes without macular edema associated with type 1 diabetes mellitus 3/30/2021    Type I (juvenile type) diabetes mellitus without mention of complication, uncontrolled 11/23/2011       Current Outpatient Medications:     blood sugar diagnostic Strp, To check BG 4  times daily, to use with insurance preferred meter, e 10.65 (Patient taking differently: To check BG 4  times daily, to use with insurance preferred meter, e 10.65), Disp: 400 each, Rfl: 3    blood-glucose meter,continuous (DEXCOM G7 ) Misc, CHANGE EVERY 10 DAYS, Disp: 1 each, Rfl: 1    blood-glucose sensor (DEXCOM G7 SENSOR) [place], Change every 10 days e 10.65, Disp: 3 each, Rfl: 11    ergocalciferol (VITAMIN D2) 50,000 unit Cap, Take 1 capsule (50,000 Units total) by mouth every 7 days., Disp: 5 capsule, Rfl: 3    FLUoxetine 10 MG capsule, Take 1 capsule (10 mg total) by mouth once daily., Disp: 30 capsule, Rfl: 2    FLUoxetine 20 MG capsule, Take 1 capsule (20 mg total) by mouth 2 (two) times daily., Disp: 60 capsule, Rfl: 10    glucagon (BAQSIMI) 3 mg/actuation Spry, Give one puff via nostril. Hold device between fingers and thumb, do not push plunger yet, insert tip gently into one nostril until finger(s) touch the outside of the nose, then push plunger firmly all the way in . Dose is complete when the green line disappears., Disp: 2 each, Rfl: 2     "HYDROcodone-acetaminophen (NORCO) 5-325 mg per tablet, Take 1 tablet by mouth every 4 (four) hours as needed for Pain., Disp: 12 tablet, Rfl: 0    hydrocortisone (ANUSOL-HC) 2.5 % rectal cream, Place rectally 2 (two) times daily., Disp: 28 g, Rfl: 2    insulin aspart U-100 (NOVOLOG) 100 unit/mL (3 mL) InPn pen, Inject as directed, 1 to 10 insulin/carb ratio, isf/correction factor 30, target 120, max daily 30 units., Disp: 15 mL, Rfl: 6    insulin glargine 100 units/mL SubQ pen, Inject 12 units in the morning only., Disp: 15 mL, Rfl: 6    insulin syringe-needle U-100 0.5 mL 31 gauge x 5/16 Syrg, use with admelog, Disp: 100 each, Rfl: 0    lancets Misc, To check BG 4 times daily, to use with insurance preferred meter, e10.65, Disp: 400 each, Rfl: 3    norethindrone (MICRONOR) 0.35 mg tablet, Take 1 tablet by mouth once daily., Disp: , Rfl:     norethindrone (ORTHO MICRONOR) 0.35 mg tablet, Take 1 tablet (0.35 mg total) by mouth once daily., Disp: 28 tablet, Rfl: 11    ONETOUCH DELICA PLUS LANCET 33 gauge Misc, USE 1 UNIT TO CHECK GLUCOSE 4 TIMES DAILY, Disp: , Rfl:     ONETOUCH VERIO FLEX METER Misc, USE AS DIRECTED TO CHECK BLOOD GLUCOSE 4 TIMES DAILY, Disp: , Rfl:     pantoprazole (PROTONIX) 40 MG tablet, Take 1 tablet by mouth once daily., Disp: , Rfl:     pen needle, diabetic 32 gauge x 5/32" Ndle, Use as directed, Disp: 100 each, Rfl: 0    polyethylene glycol (GLYCOLAX) 17 gram/dose powder, Take 17 g by mouth once daily., Disp: 30 each, Rfl: 3    semaglutide (OZEMPIC) 0.25 mg or 0.5 mg(2 mg/1.5 mL) pen injector, Inject 0.5 mg weekly, Disp: 1 each, Rfl: 5    ibuprofen (ADVIL,MOTRIN) 800 MG tablet, Take 1 tablet (800 mg total) by mouth every 6 (six) hours as needed for Pain., Disp: 90 tablet, Rfl: 0  Review of patient's allergies indicates:   Allergen Reactions    No known allergies        Ht 5' 3" (1.6 m)   Wt 75.3 kg (166 lb)   BMI 29.41 kg/m²     Review of Systems   Constitutional: Negative for chills and " fever.   Cardiovascular:  Negative for chest pain and palpitations.   Respiratory:  Negative for shortness of breath and wheezing.    Skin:  Negative for poor wound healing and rash.   Musculoskeletal:  Positive for stiffness.   Gastrointestinal:  Negative for nausea and vomiting.   Genitourinary:  Negative for dysuria and hematuria.   Neurological:  Negative for seizures and tremors.   Psychiatric/Behavioral:  Negative for altered mental status.    Allergic/Immunologic: Negative for environmental allergies and persistent infections.       Objective:    Ortho Exam   Right hand:  Significant for carpal tunnel incision and middle trigger finger incision with sutures in place. Wound margins are well approximated and healing nicely. No redness, warmth, drainage, or other signs of infection. mild volar wrist swelling. ROM wrist and fingers full.   strength decreased.  Sensation intact. Pulses present.    GEN: Well developed, well nourished female. AAOX3. No acute distress.   Breathing unlabored.  Mood and affect appropriate.         Assessment:     Imaging:  None        1. S/P carpal tunnel release    2. S/P trigger finger release          Plan:       Regular wound care explained with soap and water.  Wean out of brace and start using hand for light activities.   Avoid pressure to the palm of the hand.  I explained numbness and tingling symptoms will continue to resolve with time, the patient understands.  HEP printout provided via [employer]  Pain control with 800mg ibuprofen     Orders Placed This Encounter    Ambulatory referral/consult to Physical/Occupational Therapy    ibuprofen (ADVIL,MOTRIN) 800 MG tablet     Follow up in about 4 weeks (around 5/10/2023).

## 2023-04-11 NOTE — TELEPHONE ENCOUNTER
----- Message from NAA Zaragoza, JESSICAP sent at 4/11/2023 12:13 PM CDT -----  A1c stable  Off label ozempic, doing better with taking it at night weekly, used a lot w/ type 1s in bariatric dept over the past 2 years jolie rudolph appt 3.5-4 months, a1c prior -thanks

## 2023-04-12 ENCOUNTER — OFFICE VISIT (OUTPATIENT)
Dept: OPTOMETRY | Facility: CLINIC | Age: 33
End: 2023-04-12
Payer: MEDICAID

## 2023-04-12 ENCOUNTER — OFFICE VISIT (OUTPATIENT)
Dept: ORTHOPEDICS | Facility: CLINIC | Age: 33
End: 2023-04-12
Payer: MEDICAID

## 2023-04-12 ENCOUNTER — TELEPHONE (OUTPATIENT)
Dept: ORTHOPEDICS | Facility: CLINIC | Age: 33
End: 2023-04-12

## 2023-04-12 ENCOUNTER — PATIENT MESSAGE (OUTPATIENT)
Dept: ORTHOPEDICS | Facility: CLINIC | Age: 33
End: 2023-04-12

## 2023-04-12 VITALS — BODY MASS INDEX: 29.41 KG/M2 | WEIGHT: 166 LBS | HEIGHT: 63 IN

## 2023-04-12 DIAGNOSIS — Z98.890 S/P TRIGGER FINGER RELEASE: ICD-10-CM

## 2023-04-12 DIAGNOSIS — H52.13 MYOPIA, BILATERAL: ICD-10-CM

## 2023-04-12 DIAGNOSIS — Z98.890 S/P CARPAL TUNNEL RELEASE: Primary | ICD-10-CM

## 2023-04-12 DIAGNOSIS — E10.3293 MILD NONPROLIFERATIVE DIABETIC RETINOPATHY OF BOTH EYES WITHOUT MACULAR EDEMA ASSOCIATED WITH TYPE 1 DIABETES MELLITUS: Primary | ICD-10-CM

## 2023-04-12 PROCEDURE — 2022F DILAT RTA XM EVC RTNOPTHY: CPT | Mod: CPTII,,, | Performed by: OPTOMETRIST

## 2023-04-12 PROCEDURE — 3066F PR DOCUMENTATION OF TREATMENT FOR NEPHROPATHY: ICD-10-PCS | Mod: CPTII,,, | Performed by: ORTHOPAEDIC SURGERY

## 2023-04-12 PROCEDURE — 99024 PR POST-OP FOLLOW-UP VISIT: ICD-10-PCS | Mod: ,,, | Performed by: ORTHOPAEDIC SURGERY

## 2023-04-12 PROCEDURE — 1159F MED LIST DOCD IN RCRD: CPT | Mod: CPTII,,, | Performed by: OPTOMETRIST

## 2023-04-12 PROCEDURE — 3051F PR MOST RECENT HEMOGLOBIN A1C LEVEL 7.0 - < 8.0%: ICD-10-PCS | Mod: CPTII,,, | Performed by: OPTOMETRIST

## 2023-04-12 PROCEDURE — 3066F PR DOCUMENTATION OF TREATMENT FOR NEPHROPATHY: ICD-10-PCS | Mod: CPTII,,, | Performed by: OPTOMETRIST

## 2023-04-12 PROCEDURE — 3061F NEG MICROALBUMINURIA REV: CPT | Mod: CPTII,,, | Performed by: OPTOMETRIST

## 2023-04-12 PROCEDURE — 1159F PR MEDICATION LIST DOCUMENTED IN MEDICAL RECORD: ICD-10-PCS | Mod: CPTII,,, | Performed by: ORTHOPAEDIC SURGERY

## 2023-04-12 PROCEDURE — 3051F PR MOST RECENT HEMOGLOBIN A1C LEVEL 7.0 - < 8.0%: ICD-10-PCS | Mod: CPTII,,, | Performed by: ORTHOPAEDIC SURGERY

## 2023-04-12 PROCEDURE — 92015 PR REFRACTION: ICD-10-PCS | Mod: S$PBB,,, | Performed by: OPTOMETRIST

## 2023-04-12 PROCEDURE — 99999 PR PBB SHADOW E&M-EST. PATIENT-LVL II: ICD-10-PCS | Mod: PBBFAC,,, | Performed by: OPTOMETRIST

## 2023-04-12 PROCEDURE — 3061F PR NEG MICROALBUMINURIA RESULT DOCUMENTED/REVIEW: ICD-10-PCS | Mod: CPTII,,, | Performed by: ORTHOPAEDIC SURGERY

## 2023-04-12 PROCEDURE — 99024 POSTOP FOLLOW-UP VISIT: CPT | Mod: ,,, | Performed by: ORTHOPAEDIC SURGERY

## 2023-04-12 PROCEDURE — 92014 PR EYE EXAM, EST PATIENT,COMPREHESV: ICD-10-PCS | Mod: S$PBB,,, | Performed by: OPTOMETRIST

## 2023-04-12 PROCEDURE — 92310 CONTACT LENS FITTING OU: CPT | Mod: CSM,S$GLB,, | Performed by: OPTOMETRIST

## 2023-04-12 PROCEDURE — 99212 OFFICE O/P EST SF 10 MIN: CPT | Mod: PBBFAC,27 | Performed by: OPTOMETRIST

## 2023-04-12 PROCEDURE — 3066F NEPHROPATHY DOC TX: CPT | Mod: CPTII,,, | Performed by: OPTOMETRIST

## 2023-04-12 PROCEDURE — 99999 PR PBB SHADOW E&M-EST. PATIENT-LVL IV: ICD-10-PCS | Mod: PBBFAC,,, | Performed by: ORTHOPAEDIC SURGERY

## 2023-04-12 PROCEDURE — 3008F PR BODY MASS INDEX (BMI) DOCUMENTED: ICD-10-PCS | Mod: CPTII,,, | Performed by: ORTHOPAEDIC SURGERY

## 2023-04-12 PROCEDURE — 92015 DETERMINE REFRACTIVE STATE: CPT | Mod: S$PBB,,, | Performed by: OPTOMETRIST

## 2023-04-12 PROCEDURE — 3051F HG A1C>EQUAL 7.0%<8.0%: CPT | Mod: CPTII,,, | Performed by: OPTOMETRIST

## 2023-04-12 PROCEDURE — 3061F NEG MICROALBUMINURIA REV: CPT | Mod: CPTII,,, | Performed by: ORTHOPAEDIC SURGERY

## 2023-04-12 PROCEDURE — 3051F HG A1C>EQUAL 7.0%<8.0%: CPT | Mod: CPTII,,, | Performed by: ORTHOPAEDIC SURGERY

## 2023-04-12 PROCEDURE — 3061F PR NEG MICROALBUMINURIA RESULT DOCUMENTED/REVIEW: ICD-10-PCS | Mod: CPTII,,, | Performed by: OPTOMETRIST

## 2023-04-12 PROCEDURE — 1159F PR MEDICATION LIST DOCUMENTED IN MEDICAL RECORD: ICD-10-PCS | Mod: CPTII,,, | Performed by: OPTOMETRIST

## 2023-04-12 PROCEDURE — 2022F PR DILATED RETINAL EYE EXAM WITH INTERP/REVIEW: ICD-10-PCS | Mod: CPTII,,, | Performed by: OPTOMETRIST

## 2023-04-12 PROCEDURE — 99999 PR PBB SHADOW E&M-EST. PATIENT-LVL II: CPT | Mod: PBBFAC,,, | Performed by: OPTOMETRIST

## 2023-04-12 PROCEDURE — 3008F BODY MASS INDEX DOCD: CPT | Mod: CPTII,,, | Performed by: ORTHOPAEDIC SURGERY

## 2023-04-12 PROCEDURE — 99999 PR PBB SHADOW E&M-EST. PATIENT-LVL IV: CPT | Mod: PBBFAC,,, | Performed by: ORTHOPAEDIC SURGERY

## 2023-04-12 PROCEDURE — 99214 OFFICE O/P EST MOD 30 MIN: CPT | Mod: PBBFAC,PN | Performed by: ORTHOPAEDIC SURGERY

## 2023-04-12 PROCEDURE — 3066F NEPHROPATHY DOC TX: CPT | Mod: CPTII,,, | Performed by: ORTHOPAEDIC SURGERY

## 2023-04-12 PROCEDURE — 92310 PR CONTACT LENS FITTING (NO CHANGE): ICD-10-PCS | Mod: CSM,S$GLB,, | Performed by: OPTOMETRIST

## 2023-04-12 PROCEDURE — 1159F MED LIST DOCD IN RCRD: CPT | Mod: CPTII,,, | Performed by: ORTHOPAEDIC SURGERY

## 2023-04-12 PROCEDURE — 92014 COMPRE OPH EXAM EST PT 1/>: CPT | Mod: S$PBB,,, | Performed by: OPTOMETRIST

## 2023-04-12 RX ORDER — IBUPROFEN 800 MG/1
800 TABLET ORAL EVERY 6 HOURS PRN
Qty: 90 TABLET | Refills: 0 | Status: SHIPPED | OUTPATIENT
Start: 2023-04-12 | End: 2023-05-12

## 2023-04-12 NOTE — PROGRESS NOTES
HPI    Annual CLs and Diabetic DFE  DW/Dailies with good comfort and VA  sRx is older and a little blurry  Denies itch, tear, burn. No gtts  Denies Flashes, Floaters    Hemoglobin A1C       Date                     Value               Ref Range             Status                04/05/2023               7.1 (H)             4.0 - 5.6 %           Final                 01/12/2023               7.5 (H)             4.0 - 5.6 %           Final  Last edited by Raad Cuenca, OD on 4/12/2023 12:55 PM.            Assessment /Plan     For exam results, see Encounter Report.    Mild nonproliferative diabetic retinopathy of both eyes without macular edema associated with type 1 diabetes mellitus  -Retinopathy noted today, no ocular treatment necessary.  Continued blood sugar control with primary care physician and monitor at annual dilated fundus exam.    Myopia, bilateral  Eyeglass Final Rx       Eyeglass Final Rx         Sphere Cylinder [place] Dist [facility]    Right -3.00 +0.75 010 20/20    Left -3.00 +0.50 170 20/20      Type: SVL    Expiration Date: 4/12/2024                  Contact Lens Final Rx       Final Contact Lens Rx         Brand Base Curve Diameter Sphere Cylinder    Right Dailies Total 1 8.50 14.1 -2.50 Sphere    Left Dailies Total 1 8.50 14.1 -2.75 Sphere      Expiration Date: 4/12/2024    Replacement: Daily    Wearing Schedule: Daily Wear                      RTC 1 yr

## 2023-04-20 ENCOUNTER — PATIENT MESSAGE (OUTPATIENT)
Dept: OPTOMETRY | Facility: CLINIC | Age: 33
End: 2023-04-20
Payer: MEDICAID

## 2023-04-21 ENCOUNTER — CLINICAL SUPPORT (OUTPATIENT)
Dept: REHABILITATION | Facility: HOSPITAL | Age: 33
End: 2023-04-21
Payer: MEDICAID

## 2023-04-21 DIAGNOSIS — M79.641 HAND PAIN, RIGHT: ICD-10-CM

## 2023-04-21 DIAGNOSIS — R29.898 DECREASED PINCH STRENGTH: ICD-10-CM

## 2023-04-21 DIAGNOSIS — R29.898 DECREASED GRIP STRENGTH OF RIGHT HAND: ICD-10-CM

## 2023-04-21 PROCEDURE — 97165 OT EVAL LOW COMPLEX 30 MIN: CPT

## 2023-04-21 PROCEDURE — 97530 THERAPEUTIC ACTIVITIES: CPT

## 2023-04-21 NOTE — PLAN OF CARE
[facility]  Occupational Therapy Initial Evaluation     Date: 4/21/23  Name: Anusha Andrew  Clinic Number: [id number]     Therapy Diagnosis:   R hand pain   Decreased  strength , right   Decreased pinch strength right     Physician: Maureen Huang PA-C     Physician Orders: eval and treat      Medical Diagnosis:   Z98.890 (ICD-10-CM) - S/P carpal tunnel release   Z98.890 (ICD-10-CM) - S/P trigger finger release      Surgical Procedure and Date: R CTR  and MF trigger release 3/28/23   L CTR and index and middle finger trigger release 12/13/22    Evaluation Date: 4/21/23    Insurance Authorization Period Expiration: 12/22/23  Plan of Care Expiration: 3/8/23  Date of Return to MD: 5/10/23  Visit # / Visits authorized: 1/12   FOTO: ??     Precautions:  Standard and diabetes     Time In: 1015  Time Out: 11  Total Appointment Time (timed & untimed codes): 45 minutes     SUBJECTIVE      Date of Onset: over a year      History of Current Condition/Mechanism of Injury: Anusha reports: Patient had left CTR and trigger finger release in 12/22, now returns having R CTR and middle finger trigger release on 3/28/23; some residual numbness in middle finger reported      Falls: 0     Involved Side: right   Dominant Side: Right  Imaging:  see chart   Prior Therapy: 12/2022 for left CTR/trigger finger     Occupation:  lab tech  Working presently: employed  Duties: sticking patients with needles, fine motor activities with handles drug/pregnancy tests.      Functional Limitations/Social History:     Previous functional status includes: Independent with all ADLs.      Current Functional Status              Home/Living environment: lives with their family  Wears brace at night                             Limitation of Functional Status as follows:              ADLs/IADLs:                           - Feeding: limited use R hand for cutting                           - Bathing: ind                           - Dressing/Grooming: limited R hand for buttons, zippers, hair care                           - Driving: ind                 Leisure: exercising, uber     Pain:  Functional Pain Scale Rating 0-10: Current 3/10, worst 3/10, best 2/10   Location: right hand   Description: stiffness, numbness, random shock in wrist area   Aggravating Factors: overuse of hands, bending fingers, wrist motion  Easing Factors: tylenol, ibuprofen, brace wear, resting       Patient's Goals for Therapy: return to work normally, normal use of hand, improve finger motion, decrease the numbness     Medical History:        Past Medical History:   Diagnosis Date    Depression      Hyperlipidemia      Hypertension      Mild nonproliferative diabetic retinopathy of both eyes without macular edema associated with type 1 diabetes mellitus 3/30/2021    Type I (juvenile type) diabetes mellitus without mention of complication, uncontrolled 2011         Surgical History:    has a past surgical history that includes [place] tooth extraction; Abscess drainage; TONSILLECTOMY, ADENOIDECTOMY;  section (2012); Hysteroscopy (N/A, 2019); Dilation and curettage of uterus (2019); Carpal tunnel release (Left, 2022); and Trigger finger release (Left, 2022).     Medications:   has a current medication list which includes the following prescription(s): blood sugar diagnostic, ergocalciferol, fluoxetine, fluoxetine, hydrocortisone, insulin aspart u-100, insulin, insulin syringe-needle u-100, lancets, norethindrone, norethindrone, onetouch delica plus lancet, onetouch verio flex meter, pantoprazole, pen needle, diabetic, and polyethylene glycol.     Allergies:        Review of patient's allergies indicates:   Allergen Reactions    No known allergies          OBJECTIVE      Observation/Appearance: 2.5 volar wrist and 2.0 cm A1 pulley region scar adhesion, healing well, mild adhesions noted.      Edema. Measured in centimeters.     4/21/23     Right   Wrist Crease  16   MCPs  17.4      Elbow and Wrist ROM. Measured in degrees.    4/21/23     Right   Supination/Pronation  wnl/wnl    Wrist Ext/Flex  60 / 70    Wrist RD/UD  18 / 28       Hand ROM. Measured in degrees.    4/21/23     Right   Long:  MP  0/80              PIP  0/95              DIP  0/50              CR  225    Generalized finger stiffness            Strength (Dynamometer) and Pinch Strength (Pinch Gauge)  Measured in pounds.  DEFER          Left Right   Rung II  TBA TBA   Key Pinch NT NT   3pt Pinch TBA TBA   2pt Pinch          Sensation: Numbness in middle finger reported at night and in morning.      Limitation/Restriction for FOTO hand/wrist Survey     Therapist reviewed FOTO scores for Anusha WILLOUGHBY Fabianoks on 12/28/2022.   FOTO documents entered into [employer] - see Media section.     Limitation Score: tba%            Treatment   Total Treatment time (time-based codes) separate from Evaluation: 25 min      Anusha received the treatments listed below:      Therapeutic activities as follows:  [place] ONLY MEDICAID   Supervised modalities after being cleared for contradictions: Paraffin bath  to right  hand to increase blood flow, circulation, and tissue elasticity prior to therex.     Therapeutic activities to improve functional performance and  to develop endurance, ROM, and flexibility for 10 minutes, including:  -Pt provided with education, demonstration, and participation in HEP including:    - scar massage daily  Use Moist heat 1-2x/day for 10 minutes   Ice massage to middle finger at night for pain/swelling/inflammation   Wear the brace at night if it helps with pain/numbness  - wrist flex/ext/RD/UD x 10 reps  - TGEs,Blocking FDP, FDS, hook, wave, roll and press x 10 reps each   - KT tape to P1 for support/stability      Patient Education and Home Exercises       Education provided:   - see above     Written Home Exercises Provided: yes.  Exercises were reviewed and Anusha larson  able to demonstrate them prior to the end of the session.  Anusha demonstrated good  understanding of the education provided. See EMR under Patient Instructions for exercises provided during therapy sessions.      Pt was advised to perform these exercises free of pain, and to stop performing them if pain occurs.     Patient/Family Education: role of OT, goals for OT, scheduling/cancellations - pt verbalized understanding. Discussed insurance limitations with patient.     ASSESSMENT      Anusha Andrew is a 32 y.o. female referred to outpatient occupational therapy and presents with a medical diagnosis of R CTR and long finger trigger release.  Patient presents with the following therapy deficits: Decreased ROM, Decreased  strength, Decreased pinch strength, Decreased muscle strength, Decreased functional hand use, Increased pain, Edema, Joint Stiffness, Scar Adhesions, and Diminished/Impaired Sensation and demonstrates limitations as described in the chart below. Following medical record review it is determined that pt will benefit from occupational therapy services in order to maximize pain free and/or functional use of righthand. The following goals were discussed with the patient and patient is in agreement with them as to be addressed in the treatment plan. The patient's rehab potential is Excellent.      Anticipated barriers to occupational therapy: none at this time  Pt has no cultural, educational or language barriers to learning provided.     Profile and History Assessment of Occupational Performance Level of Clinical Decision Making Complexity Score   Occupational Profile:   Anusha Andrew is a 32 y.o. female who lives with their family and is currently employed Anusha Andrew has difficulty with  ADLs and IADLs as listed previously, which  Affecting herdaily functional abilities.       Comorbidities:    has a past medical history of Depression, Hyperlipidemia, Hypertension, Mild  nonproliferative diabetic retinopathy of both eyes without macular edema associated with type 1 diabetes mellitus, and Type I (juvenile type) diabetes mellitus without mention of complication, uncontrolled.     Medical and Therapy History Review:   Brief                      Performance Deficits     Physical:  Joint Mobility  Muscle Power/Strength  Muscle Endurance  Skin Integrity/Scar Formation  Edema   Strength  Pinch Strength  Tactile Functions  Pain     Cognitive:  No Deficits     Psychosocial:    Habits  Routines  Rituals       Clinical Decision Making:  low     Assessment Process:  Problem-Focused Assessments     Modification/Need for Assistance:  Not Necessary     Intervention Selection:  Limited Treatment Options         low  Based on PMHX, co morbidities , data from assessments and functional level of assistance required with task and clinical presentation directly impacting function.           Goals:   The following goals were discussed with the patient and patient is in agreement with them as to be addressed in the treatment plan.   Long Term Goals (LTGs); to be met by discharge.  LTG #1: Pt will report a pain level of 1 out of 10 with all functional activities.             LTG #2: Pt will return to prior level of function for ADLs and household management.   LTG #3: Pt will demo improved digit CR by at least 25 degrees needed to aid with independence of functional grasping activities.  LTG #4: Increase  3-8 lbs. For driving and work activities   LTG #5: Increase pinch 1-3 psi's for writing , typing and FM activity     Short Term Goals (STGs); to be met within 4 weeks ).  STG #1: Pt will report 2 out of 10 pain level with in clinic activities.  STG #2: Pt will demo improved edema by at least 0.1- 0.4 cm to improve motion and pain.  STG #3: Pt will demo improve wrist CR by at least 10 degrees to improve work duties.  STG #4: Pt will demonstrate independence with issued HEP and brace wear as  needed..   STG #5: Assess  and 3 pt pinch when appropriate and create goals.        PLAN   Plan of Care Certification: 4/21/2023 to 7/21/2023     Outpatient Occupational Therapy 1 times weekly for 12 weeks to include the following interventions: Paraffin, Fluidotherapy, Manual therapy/joint mobilizations, Modalities for pain management, US 3 mhz, Therapeutic exercises/activities., Iontophoresis with 2.0 cc Dexamethasone, Strengthening, Orthotic Fabrication/Fit/Training, Edema Control, Scar Management, Wound Care, Electrical Modalities, Joint Protection, and Energy Conservation.        I CERTIFY THE NEED FOR THESE SERVICES FURNISHED UNDER THIS PLAN OF TREATMENT AND WHILE UNDER MY CARE  Physician's comments:        Physician's Signature: ___________________________________________________      ABRAM Sánchez, AIMEET

## 2023-04-21 NOTE — PATIENT INSTRUCTIONS
"[facility] - OCCUPATIONAL THERAPY  HOME EXERCISE PROGRAM   Soak hand in hot water or use hot wet compress for 5-10 min before exercises or in the morning to decrease stiffness     Ice massage to middle finger only at night to decrease pain and inflammation.     Complete the following massages for 5 minutes each, 1-2x/day.   Massage scar up and down, across and in circular motion with moderate pressure to both the wrist and finger scar.                     Complete the following exercises with 10 repetitions each, 3-5x/day.     For right middle finger only:   AROM: DIP Flexion / Extension  Pinch middle knuckle to prevent bending. Bend end knuckle until stretch is felt.   Hold 3 seconds. Relax. Straighten finger as far as possible.    AROM: PIP Flexion / Extension  Pinch bottom knuckle  to prevent bending. Actively bend middle knuckle until stretch is felt.   Hold 3 seconds. Relax. Straighten finger as far as possible.    AROM: Isolated PIP Flexion  Bend only middle joint of your finger, keeping other fingers straight with other hand.    For all fingers:     AROM: Isolated MCP Flexion / Extension ("Wave")   Bend only your large, bottom knuckles. Hold 3 seconds. Keep the tips of your fingers straight. Straighten fingers.    AROM: Isolated IPJ Flexion / Extension ("Hook")  Bend only your middle and end knuckles. Hold 3 seconds.   Straighten your fingers.       Roll and press     AROM: Composite Flexion / Extension ("Full Fist")    Roll fingers into a fist, then press fingers flat against your palm.  Hold 3 seconds. Straighten your fingers.     AROM: Composte Extension ("Finger Lifts")- for middle finger only     AROM: Wrist Flexion / Extension               Bend your wrist forward and back as far as possible.      AROM: Wrist Radial / Ulnar Deviation  Bend your wrist from side to side as far as possible.    AROM: Wrist Radial / Ulnar Deviation   Make a fist then bend your wrist toward your body, then " away.         Copyright © I. All rights reserved.     Therapist: ABRAM Sánchez CHT

## 2023-04-25 ENCOUNTER — TELEPHONE (OUTPATIENT)
Dept: DIABETES | Facility: CLINIC | Age: 33
End: 2023-04-25
Payer: MEDICAID

## 2023-04-25 NOTE — PROGRESS NOTES
CC:   Chief Complaint   Patient presents with    Diabetes Mellitus       HPI: Anusha Andrew is a 32 y.o. female presents for an initial visit today for the management of T1DM   She has previously followed with Braeden Ochoa NP --last seen in early April 2023--it was a virtual visit  This is her 1st visit with me today      She was diagnosed with Type 1 diabetes at 17 year old. Hospitalized at the time of diagnosis and started on insulin therapy     Family hx of diabetes: brother-- T1DM     Hospitalized for diabetes:   Scare with EMS-severe hypoglycemia  12/31/22 - hypoglycemia EMS, used baqsimi  12/28/22 covid19 -- hypoglycemia as well.   H/o DKA. 10/2018 DKA/admission    F/u with retinal specialist    No personal or FH of thyroid cancer or personal of pancreatic cancer or pancreatitis.     Gastric sleeve in July 2022  She lost about 50 lb    She reports since she had the gastric sleeve she is been suffering with issues of hypoglycemia  Recently her insulin to carbohydrate ratio was adjusted  Sounds like she will have some postprandial excursions followed by hypoglycemia  She has significant postprandial excursion in the morning following her coffee but she is currently not taking any prandial insulin for the coffee  It looks like sometimes she treats the postprandial excursion with the correction dosing which then can sometimes cause hypoglycemia  She is also experiencing hypoglycemia due to monitoring insulin for more carbohydrates than she actually eats---she will think that she is going to eat 40 g of carbs but then only eat 20 or 25 and then will sometimes experience hypoglycemia as she is already bolused the NovoLog insulin for the food  She may also have some delayed digestion---which could be contributing to her hypoglycemia  She has had a couple of scary events secondary to hypoglycemia  She has the tandem insulin pump at home but has never started it--she reports that she changed her mind and she is  not interested  In anything with to being she is interested in the Omnipod 5--would need to meet with diabetes education for a formal insulin pump evaluation  She is currently on Ozempic off-label use to help with weight loss--denies any side effects--feels like it may be helping to curb her appetite but has not noticed significant weight loss  She is currently on the Dexcom G7 getting the supplies from the pharmacy  She did not have any issues with the Dexcom G6 and was able to use her phone--she was getting the Dexcom G6 supplies from diabetes management and supply--she is aware that if we move forward with the Omnipod 5 I would recommend going back to the Dexcom G6 so that she can be in the automated mode  Her recent A1c has improved to 7.1%  She is compliant with her diet  She feels very confident in her carbohydrate counting  Her cholesterol was above goal on recent labs but she was not fasting at the time--historically her total has been greater than 200--she was on a statin at 1 point in time but is not currently on a statin      DIABETES COMPLICATIONS: retinopathy      Diabetes Management Status    ASA:  No    Statin: Not taking  ACE/ARB: Not taking    The ASCVD Risk score (Anoop SHEA, et al., 2019) failed to calculate for the following reasons:    The 2019 ASCVD risk score is only valid for ages 40 to 79      Screening or Prevention Patient's value Goal Complete/Controlled?   HgA1C Testing and Control   Lab Results   Component Value Date    HGBA1C 7.1 (H) 04/05/2023      Annually/Less than 8% Yes     Lipid profile : 04/05/2023 Annually Yes     LDL control Lab Results   Component Value Date    LDLCALC 181.4 (H) 04/05/2023    Annually/Less than 100 mg/dl  No     Nephropathy screening Lab Results   Component Value Date    LABMICR <5.0 04/05/2023     Lab Results   Component Value Date    PROTEINUA Negative 10/02/2022    Annually Yes     Blood pressure BP Readings from Last 1 Encounters:   04/26/23 110/60    Less  than 140/90 Yes     Dilated retinal exam : 04/12/2023 Annually Yes     Foot exam   : 04/26/2023 Annually No         CURRENT A1C:    Hemoglobin A1C   Date Value Ref Range Status   04/05/2023 7.1 (H) 4.0 - 5.6 % Final     Comment:     ADA Screening Guidelines:  5.7-6.4%  Consistent with prediabetes  >or=6.5%  Consistent with diabetes    High levels of fetal hemoglobin interfere with the HbA1C  assay. Heterozygous hemoglobin variants (HbS, HgC, etc)do  not significantly interfere with this assay.   However, presence of multiple variants may affect accuracy.     01/12/2023 7.5 (H) 4.0 - 5.6 % Final     Comment:     ADA Screening Guidelines:  5.7-6.4%  Consistent with prediabetes  >or=6.5%  Consistent with diabetes    High levels of fetal hemoglobin interfere with the HbA1C  assay. Heterozygous hemoglobin variants (HbS, HgC, etc)do  not significantly interfere with this assay.   However, presence of multiple variants may affect accuracy.     01/12/2023 7.5 (H) 4.0 - 5.6 % Final     Comment:     ADA Screening Guidelines:  5.7-6.4%  Consistent with prediabetes  >or=6.5%  Consistent with diabetes    High levels of fetal hemoglobin interfere with the HbA1C  assay. Heterozygous hemoglobin variants (HbS, HgC, etc)do  not significantly interfere with this assay.   However, presence of multiple variants may affect accuracy.     01/12/2023 7.5 (H) 4.0 - 5.6 % Final     Comment:     ADA Screening Guidelines:  5.7-6.4%  Consistent with prediabetes  >or=6.5%  Consistent with diabetes    High levels of fetal hemoglobin interfere with the HbA1C  assay. Heterozygous hemoglobin variants (HbS, HgC, etc)do  not significantly interfere with this assay.   However, presence of multiple variants may affect accuracy.         GOAL A1C: 6.5% without hypoglycemia     DM MEDICATIONS USED IN THE PAST:  humalog  levemir  novolog  lantus   Metformin   Trulicity   Ozempic   Dexcom     CURRENT DIABETES MEDICATIONS: Ozempic 0.5 mg weekly on Fridays    Lantus 12 units daily   Novolog ICR 1:8   ISF 1:30   Target 150   Insulin: pens.    Missed doses: rarely       BLOOD GLUCOSE MONITORING:    Sensor type: Dexcom G7   Average BG readin  Time in range: 48%   23% high   25% very high   3% low   1% very low   Estimated A1c 7.9%  Site change:   q10 days     2 weeks prior -- average blood sugar 183 in target range 48% of the time with an estimated A1c of 7.7%    Supplies: DMS - Dexcom G 6   Dexcom G7 from pharmacy       Sensor was downloaded in clinic today and reviewed with patient.   Please see attached document for download.       HYPOGLYCEMIA:  Yes--5% low, 1% very low 2 weeks ago---recent 3% low, 1% very low  Glucagon kit: Baqsimi   Medic alert bracelet: denies       MEALS: eating 3 meals per day   Feels confident with CHO counting   Uses applications she is out eating   BF: eggs -- coffee in the AM   Lunch: salad, grilled chicken, tuna   Dinner: pasta, beans, spaghetti, sandwich   Snack: rarely   Drinks: coffee in AM   Water   Diet tea        CURRENT EXERCISE:  No      Review of Systems  Review of Systems   Constitutional:  Negative for appetite change, fatigue and unexpected weight change.   HENT:  Negative for trouble swallowing.    Eyes:  Negative for visual disturbance.   Respiratory:  Negative for shortness of breath.    Cardiovascular:  Negative for chest pain.   Gastrointestinal:  Negative for nausea.   Endocrine: Negative for polydipsia, polyphagia and polyuria.   Genitourinary:         No Nocturia    Skin:  Negative for wound.   Neurological:  Negative for numbness.     Physical Exam   Physical Exam  Vitals and nursing note reviewed.   Constitutional:       Appearance: She is well-developed.      Comments: Overweight female    HENT:      Head: Normocephalic and atraumatic.      Right Ear: External ear normal.      Left Ear: External ear normal.      Nose: Nose normal.   Neck:      Thyroid: No thyromegaly.      Trachea: No tracheal deviation.    Cardiovascular:      Rate and Rhythm: Normal rate and regular rhythm.      Heart sounds: No murmur heard.  Pulmonary:      Effort: Pulmonary effort is normal. No respiratory distress.      Breath sounds: Normal breath sounds.   Abdominal:      Palpations: Abdomen is soft.      Tenderness: There is no abdominal tenderness.      Hernia: No hernia is present.   Musculoskeletal:      Cervical back: Normal range of motion and neck supple.   Skin:     General: Skin is warm and dry.      Capillary Refill: Capillary refill takes less than 2 seconds.      Findings: No rash.      Comments: Injection sites and dexcom sites are normal appearing. No lipo hypertropthy or atrophy     Neurological:      Mental Status: She is alert and oriented to person, place, and time.      Cranial Nerves: No cranial nerve deficit.   Psychiatric:         Behavior: Behavior normal.         Judgment: Judgment normal.       FOOT EXAMINATION: Appropriate footwear       Lab Results   Component Value Date    TSH 1.454 07/27/2022         Type 1 diabetes mellitus with hyperglycemia  Uncontrolled   + significant prandial excursions in the morning following coffee intake--discussed that she needs to cover her coffee with prandial insulin  Suggest changing her NovoLog to Fiasp --as Fiasp is ultra fast acting and she can doses immediately following food consumption--this may help reduce hypoglycemia  Our goal is to prevent hypoglycemia--and it sounds like some of her hypoglycemia is secondary to dosing for more carbohydrates than she eats  She is trying to adjust post gastric sleeve which was about 1 year ago  She is interested in the Omnipod 5  She is already carbohydrate counting  We discussed that if we changed the Omnipod 5 I would recommend going back to the Dexcom G6 in order to keep the pump in automated mode  She is willing to do so  She will meet with diabetes education to discuss      -- Medication Changes:  Continue Ozempic 0.5 mg weekly on  Fridays--off-label use for weight loss  Continue Lantus 12 units daily  Change NovoLog to Fiasp   Continue current prandial insulin settings ICR 1:8,    Target 150   Dose prandial insulin immediately following food consumption  Start covering for coffee intake in the morning with at least 2-3 units---may need up to 4 units  Stop treating postprandial excursions with correction scale    Will likely move forward with the Omnipod 5-automated mode-- will need to change to Dexcom G 6       Insulin pump settings moving forward   Basal: 0.5 units/hr   ICR 1:10  ISF: 1:50   Target 150-- will lower as she becomes comfortable -- hx of hypoglycemia   IOB 3.5 hours       -- Reviewed goals of therapy are to get the best control we can without hypoglycemia.  -- Reviewed patient's current insulin regimen. Clarified proper insulin dose and timing in relation to meals, etc. Insulin injection sites and proper rotation instructed.    -- Advised frequent self blood glucose monitoring.  Patient encouraged to document glucose results and bring them to every clinic visit.  Continue to use the Dexcom personal CGM---currently using the G7 and supplies are coming-from the pharmacy---if she moves forward with the Omnipod 5 she will change back to the Dexcom G6 and those supplies come from [facility]  -- Hypoglycemia precautions discussed. Instructed on precautions before driving.    -- Call for Bg repeatedly < 70 or > 200.   -- Close adherence to lifestyle changes recommended.   -- Periodic follow ups for eye evaluations, foot care and dental care suggested.  -- Refer to diabetes education-- insulin pump evaluation-- likely interested in the Omnipod 5      Patient has diabetes mellitus and manages diabetes with intensive insulin regimen and uses prandial and basal insulin daily  Patient requires a therapeutic CGM and is willing to use therapeutic CGM for the necessary frequent adjustments of insulin therapy.  I have completed an in-person  visit during the previous 6 months and will continue to have in-person visits every 6 months to assess adherence to their CGM regimen and diabetes treatment plan.  Due to COVID pandemic and need for remote monitoring this tool is medically necessary  Hx of marked hypoglycemia with EMS activation       Mild nonproliferative diabetic retinopathy of both eyes without macular edema associated with type 1 diabetes mellitus  Optimize BG readings.   See above.   Instructed her to ask her optometrist about Ozempic use  Continue to follow-up with optometry as directed    Overweight (BMI 25.0-29.9)  Body mass index is 29.19 kg/m².  Increases insulin resistance.   Discussed DM diet and exercise.       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        Follow up in about 3 months (around 7/26/2023).  See ye for insulin pump evaluation -- interested in Omnipod 5   Follow up with me in 3 months       Orders Placed This Encounter   Procedures    Lipid Panel     Standing Status:   Future     Standing Expiration Date:   10/26/2024    Hemoglobin A1C     Standing Status:   Future     Standing Expiration Date:   10/26/2024     Comprehensive Metabolic Panel     Standing Status:   Future     Standing Expiration Date:   10/26/2024    Ambulatory referral/consult to Diabetes Education     Standing Status:   Future     Standing Expiration Date:   10/26/2024     Referral Priority:   Routine     Referral Type:   Consultation     Referral Reason:   Specialty Services Required     Referred to Provider:   Daniella Maher RD, CDE     Requested Specialty:   Diabetes     Number of Visits Requested:   1       Recommendations were discussed with the patient in detail  The patient verbalized understanding and agrees with the plan outlined as above.     This note was partly generated with natue voice recognition software. I apologize for any possible typographical errors.

## 2023-04-26 ENCOUNTER — TELEPHONE (OUTPATIENT)
Dept: DIABETES | Facility: CLINIC | Age: 33
End: 2023-04-26
Payer: MEDICAID

## 2023-04-26 ENCOUNTER — TELEPHONE (OUTPATIENT)
Dept: INTERNAL MEDICINE | Facility: CLINIC | Age: 33
End: 2023-04-26
Payer: MEDICAID

## 2023-04-26 ENCOUNTER — OFFICE VISIT (OUTPATIENT)
Dept: DIABETES | Facility: CLINIC | Age: 33
End: 2023-04-26
Payer: MEDICAID

## 2023-04-26 VITALS
HEART RATE: 105 BPM | SYSTOLIC BLOOD PRESSURE: 110 MMHG | OXYGEN SATURATION: 99 % | BODY MASS INDEX: 29.2 KG/M2 | HEIGHT: 63 IN | WEIGHT: 164.81 LBS | DIASTOLIC BLOOD PRESSURE: 60 MMHG

## 2023-04-26 DIAGNOSIS — E10.65 TYPE 1 DIABETES MELLITUS WITH HYPERGLYCEMIA: Primary | ICD-10-CM

## 2023-04-26 DIAGNOSIS — E10.65 TYPE 1 DIABETES MELLITUS WITH HYPERGLYCEMIA: ICD-10-CM

## 2023-04-26 DIAGNOSIS — E10.3293 MILD NONPROLIFERATIVE DIABETIC RETINOPATHY OF BOTH EYES WITHOUT MACULAR EDEMA ASSOCIATED WITH TYPE 1 DIABETES MELLITUS: ICD-10-CM

## 2023-04-26 DIAGNOSIS — Z71.9 HEALTH EDUCATION/COUNSELING: ICD-10-CM

## 2023-04-26 DIAGNOSIS — E10.649 TYPE 1 DIABETES MELLITUS WITH HYPOGLYCEMIA UNAWARENESS: ICD-10-CM

## 2023-04-26 DIAGNOSIS — Z90.3 HISTORY OF SLEEVE GASTRECTOMY: ICD-10-CM

## 2023-04-26 DIAGNOSIS — E66.3 OVERWEIGHT (BMI 25.0-29.9): ICD-10-CM

## 2023-04-26 DIAGNOSIS — Z86.39 HISTORY OF DIABETES WITH KETOACIDOSIS: ICD-10-CM

## 2023-04-26 DIAGNOSIS — F32.1 CURRENT MODERATE EPISODE OF MAJOR DEPRESSIVE DISORDER WITHOUT PRIOR EPISODE: Chronic | ICD-10-CM

## 2023-04-26 PROBLEM — U07.1 COVID-19: Status: RESOLVED | Noted: 2022-12-28 | Resolved: 2023-04-26

## 2023-04-26 PROCEDURE — 3066F NEPHROPATHY DOC TX: CPT | Mod: CPTII,,, | Performed by: NURSE PRACTITIONER

## 2023-04-26 PROCEDURE — 3078F PR MOST RECENT DIASTOLIC BLOOD PRESSURE < 80 MM HG: ICD-10-PCS | Mod: CPTII,,, | Performed by: NURSE PRACTITIONER

## 2023-04-26 PROCEDURE — 99999 PR PBB SHADOW E&M-EST. PATIENT-LVL V: ICD-10-PCS | Mod: PBBFAC,,, | Performed by: NURSE PRACTITIONER

## 2023-04-26 PROCEDURE — 3051F PR MOST RECENT HEMOGLOBIN A1C LEVEL 7.0 - < 8.0%: ICD-10-PCS | Mod: CPTII,,, | Performed by: NURSE PRACTITIONER

## 2023-04-26 PROCEDURE — 3078F DIAST BP <80 MM HG: CPT | Mod: CPTII,,, | Performed by: NURSE PRACTITIONER

## 2023-04-26 PROCEDURE — 3008F BODY MASS INDEX DOCD: CPT | Mod: CPTII,,, | Performed by: NURSE PRACTITIONER

## 2023-04-26 PROCEDURE — 95251 PR GLUCOSE MONITOR, 72 HOUR, PHYS INTERP: ICD-10-PCS | Mod: ,,, | Performed by: NURSE PRACTITIONER

## 2023-04-26 PROCEDURE — 3051F HG A1C>EQUAL 7.0%<8.0%: CPT | Mod: CPTII,,, | Performed by: NURSE PRACTITIONER

## 2023-04-26 PROCEDURE — 3074F SYST BP LT 130 MM HG: CPT | Mod: CPTII,,, | Performed by: NURSE PRACTITIONER

## 2023-04-26 PROCEDURE — 3008F PR BODY MASS INDEX (BMI) DOCUMENTED: ICD-10-PCS | Mod: CPTII,,, | Performed by: NURSE PRACTITIONER

## 2023-04-26 PROCEDURE — 3061F PR NEG MICROALBUMINURIA RESULT DOCUMENTED/REVIEW: ICD-10-PCS | Mod: CPTII,,, | Performed by: NURSE PRACTITIONER

## 2023-04-26 PROCEDURE — 99215 PR OFFICE/OUTPT VISIT, EST, LEVL V, 40-54 MIN: ICD-10-PCS | Mod: S$PBB,,, | Performed by: NURSE PRACTITIONER

## 2023-04-26 PROCEDURE — 1159F PR MEDICATION LIST DOCUMENTED IN MEDICAL RECORD: ICD-10-PCS | Mod: CPTII,,, | Performed by: NURSE PRACTITIONER

## 2023-04-26 PROCEDURE — 1160F RVW MEDS BY RX/DR IN RCRD: CPT | Mod: CPTII,,, | Performed by: NURSE PRACTITIONER

## 2023-04-26 PROCEDURE — 99215 OFFICE O/P EST HI 40 MIN: CPT | Mod: PBBFAC,PN | Performed by: NURSE PRACTITIONER

## 2023-04-26 PROCEDURE — 95251 CONT GLUC MNTR ANALYSIS I&R: CPT | Mod: ,,, | Performed by: NURSE PRACTITIONER

## 2023-04-26 PROCEDURE — 3061F NEG MICROALBUMINURIA REV: CPT | Mod: CPTII,,, | Performed by: NURSE PRACTITIONER

## 2023-04-26 PROCEDURE — 1159F MED LIST DOCD IN RCRD: CPT | Mod: CPTII,,, | Performed by: NURSE PRACTITIONER

## 2023-04-26 PROCEDURE — 99215 OFFICE O/P EST HI 40 MIN: CPT | Mod: S$PBB,,, | Performed by: NURSE PRACTITIONER

## 2023-04-26 PROCEDURE — 99999 PR PBB SHADOW E&M-EST. PATIENT-LVL V: CPT | Mod: PBBFAC,,, | Performed by: NURSE PRACTITIONER

## 2023-04-26 PROCEDURE — 1160F PR REVIEW ALL MEDS BY PRESCRIBER/CLIN PHARMACIST DOCUMENTED: ICD-10-PCS | Mod: CPTII,,, | Performed by: NURSE PRACTITIONER

## 2023-04-26 PROCEDURE — 3066F PR DOCUMENTATION OF TREATMENT FOR NEPHROPATHY: ICD-10-PCS | Mod: CPTII,,, | Performed by: NURSE PRACTITIONER

## 2023-04-26 PROCEDURE — 3074F PR MOST RECENT SYSTOLIC BLOOD PRESSURE < 130 MM HG: ICD-10-PCS | Mod: CPTII,,, | Performed by: NURSE PRACTITIONER

## 2023-04-26 RX ORDER — INSULIN ASPART INJECTION 100 [IU]/ML
INJECTION, SOLUTION SUBCUTANEOUS
Qty: 5 PEN | Refills: 6 | Status: SHIPPED | OUTPATIENT
Start: 2023-04-26 | End: 2023-04-26 | Stop reason: SDUPTHER

## 2023-04-26 RX ORDER — INSULIN ASPART INJECTION 100 [IU]/ML
INJECTION, SOLUTION SUBCUTANEOUS
Qty: 5 PEN | Refills: 6 | Status: SHIPPED | OUTPATIENT
Start: 2023-04-26

## 2023-04-26 NOTE — TELEPHONE ENCOUNTER
----- Message from NAA Zaragoza FNP sent at 4/26/2023  2:37 PM CDT -----  Regarding: cancel virtual, f/u in [place]  Natalia -cancel virtual appt in august for patient     Jarek Yost,  I always felt that she should have gone to [facility] because of where she lives lol   I have the dexcom paperwork- I'll fill this out.  I can cancel the august appt.  Yes, omnipod 5 will be better fit if this is what she desires, I know after bariatric sx she thought things would be different with insulin, etc.  Hopefully the off label ozempic will help as well -so her tdd will be on the lower side.  Thanks so much,  Braeden     Tell her best regards- she always can message in between for anything:)      ----- Message -----  From: Ritika Grewal NP  Sent: 4/26/2023   2:17 PM CDT  To: NAA Zaragoza FNP    Hey!   Yes she wants to transition her because it is close to her house and work.   She wanted to tell you.... so I was going to let her message you. She felt bad   She has the tandem pump supplies at home but never started it.  She says that she does not want to do a tube system  She is interested in the Omnipod 5  I am having her meet with Daniella just to make sure since she had the tandem pump and then decided not to do it  What paperwork do have to fill out?    Thanks  Ritika   ----- Message -----  From: NAA Zaragoza FNP  Sent: 4/26/2023  12:39 PM CDT  To: Ritika Grewal NP    I'm glad she was finally seen in person and will like omnipod 5.  Is she staying in your clinic moving forward?  Last visit-virtual then aug- virtual  I have paperwork on her today to fill out.  ----- Message -----  From: Ritika Grewal NP  Sent: 4/26/2023  12:35 PM CDT  To: NAA Zaragoza FNP

## 2023-04-26 NOTE — TELEPHONE ENCOUNTER
----- Message from Kristy Dalton MA sent at 4/26/2023 11:04 AM CDT -----  Regarding: FW: MMD of insulin    ----- Message -----  From: Ronnie Lopez  Sent: 4/26/2023  10:42 AM CDT  To: Carmina Yost Staff  Subject: MMD of insulin                                   Good morning, for the fiasp what is the max daily dose ?

## 2023-04-26 NOTE — ASSESSMENT & PLAN NOTE
Uncontrolled   + significant prandial excursions in the morning following coffee intake--discussed that she needs to cover her coffee with prandial insulin  Suggest changing her NovoLog to Fiasp --as Fiasp is ultra fast acting and she can doses immediately following food consumption--this may help reduce hypoglycemia  Our goal is to prevent hypoglycemia--and it sounds like some of her hypoglycemia is secondary to dosing for more carbohydrates than she eats  She is trying to adjust post gastric sleeve which was about 1 year ago  She is interested in the Omnipod 5  She is already carbohydrate counting  We discussed that if we changed the Omnipod 5 I would recommend going back to the Dexcom G6 in order to keep the pump in automated mode  She is willing to do so  She will meet with diabetes education to discuss      -- Medication Changes:  Continue Ozempic 0.5 mg weekly on Fridays--off-label use for weight loss  Continue Lantus 12 units daily  Change NovoLog to Fiasp   Continue current prandial insulin settings ICR 1:8,    Target 150   Dose prandial insulin immediately following food consumption  Start covering for coffee intake in the morning with at least 2-3 units---may need up to 4 units  Stop treating postprandial excursions with correction scale    Will likely move forward with the Omnipod 5-automated mode-- will need to change to Dexcom G 6       Insulin pump settings moving forward   Basal: 0.5 units/hr   ICR 1:10  ISF: 1:50   Target 150-- will lower as she becomes comfortable -- hx of hypoglycemia   IOB 3.5 hours       -- Reviewed goals of therapy are to get the best control we can without hypoglycemia.  -- Reviewed patient's current insulin regimen. Clarified proper insulin dose and timing in relation to meals, etc. Insulin injection sites and proper rotation instructed.    -- Advised frequent self blood glucose monitoring.  Patient encouraged to document glucose results and bring them to every clinic  visit.  Continue to use the Dexcom personal CGM---currently using the G7 and supplies are coming-from the pharmacy---if she moves forward with the Omnipod 5 she will change back to the Dexcom G6 and those supplies come from [facility]  -- Hypoglycemia precautions discussed. Instructed on precautions before driving.    -- Call for Bg repeatedly < 70 or > 200.   -- Close adherence to lifestyle changes recommended.   -- Periodic follow ups for eye evaluations, foot care and dental care suggested.  -- Refer to diabetes education-- insulin pump evaluation-- likely interested in the Omnipod 5      Patient has diabetes mellitus and manages diabetes with intensive insulin regimen and uses prandial and basal insulin daily  Patient requires a therapeutic CGM and is willing to use therapeutic CGM for the necessary frequent adjustments of insulin therapy.  I have completed an in-person visit during the previous 6 months and will continue to have in-person visits every 6 months to assess adherence to their CGM regimen and diabetes treatment plan.  Due to COVID pandemic and need for remote monitoring this tool is medically necessary  Hx of marked hypoglycemia with EMS activation

## 2023-04-26 NOTE — ASSESSMENT & PLAN NOTE
May be delayed gastric emptying and causing hypoglycemia  May need to consider extended boluses once she starts her pump

## 2023-04-26 NOTE — ASSESSMENT & PLAN NOTE
Optimize BG readings.   See above.   Instructed her to ask her optometrist about Ozempic use  Continue to follow-up with optometry as directed

## 2023-05-03 ENCOUNTER — PATIENT MESSAGE (OUTPATIENT)
Dept: DIABETES | Facility: CLINIC | Age: 33
End: 2023-05-03
Payer: MEDICAID

## 2023-05-03 ENCOUNTER — TELEPHONE (OUTPATIENT)
Dept: PHARMACY | Facility: CLINIC | Age: 33
End: 2023-05-03
Payer: MEDICAID

## 2023-05-10 DIAGNOSIS — E66.3 OVERWEIGHT (BMI 25.0-29.9): ICD-10-CM

## 2023-05-10 DIAGNOSIS — E10.65 TYPE 1 DIABETES MELLITUS WITH HYPERGLYCEMIA: ICD-10-CM

## 2023-05-10 RX ORDER — SEMAGLUTIDE 0.68 MG/ML
INJECTION, SOLUTION SUBCUTANEOUS
Qty: 3 ML | Refills: 5 | Status: SHIPPED | OUTPATIENT
Start: 2023-05-10 | End: 2023-07-12 | Stop reason: SDUPTHER

## 2023-05-16 ENCOUNTER — PATIENT MESSAGE (OUTPATIENT)
Dept: OBSTETRICS AND GYNECOLOGY | Facility: CLINIC | Age: 33
End: 2023-05-16
Payer: MEDICAID

## 2023-05-16 ENCOUNTER — TELEPHONE (OUTPATIENT)
Dept: DIABETES | Facility: CLINIC | Age: 33
End: 2023-05-16
Payer: MEDICAID

## 2023-05-17 ENCOUNTER — OFFICE VISIT (OUTPATIENT)
Dept: OBSTETRICS AND GYNECOLOGY | Facility: CLINIC | Age: 33
End: 2023-05-17
Payer: MEDICAID

## 2023-05-17 VITALS
HEIGHT: 63 IN | WEIGHT: 164.81 LBS | SYSTOLIC BLOOD PRESSURE: 122 MMHG | BODY MASS INDEX: 29.2 KG/M2 | DIASTOLIC BLOOD PRESSURE: 84 MMHG

## 2023-05-17 DIAGNOSIS — N89.8 VAGINAL DISCHARGE: Primary | ICD-10-CM

## 2023-05-17 DIAGNOSIS — Z11.3 SCREENING EXAMINATION FOR STD (SEXUALLY TRANSMITTED DISEASE): ICD-10-CM

## 2023-05-17 DIAGNOSIS — R10.2 PELVIC PRESSURE IN FEMALE: ICD-10-CM

## 2023-05-17 PROCEDURE — 3008F BODY MASS INDEX DOCD: CPT | Mod: CPTII,,,

## 2023-05-17 PROCEDURE — 3051F HG A1C>EQUAL 7.0%<8.0%: CPT | Mod: CPTII,,,

## 2023-05-17 PROCEDURE — 3066F PR DOCUMENTATION OF TREATMENT FOR NEPHROPATHY: ICD-10-PCS | Mod: CPTII,,,

## 2023-05-17 PROCEDURE — 99999 PR PBB SHADOW E&M-EST. PATIENT-LVL IV: ICD-10-PCS | Mod: PBBFAC,,,

## 2023-05-17 PROCEDURE — 3061F PR NEG MICROALBUMINURIA RESULT DOCUMENTED/REVIEW: ICD-10-PCS | Mod: CPTII,,,

## 2023-05-17 PROCEDURE — 99999 PR PBB SHADOW E&M-EST. PATIENT-LVL IV: CPT | Mod: PBBFAC,,,

## 2023-05-17 PROCEDURE — 87591 N.GONORRHOEAE DNA AMP PROB: CPT

## 2023-05-17 PROCEDURE — 3074F SYST BP LT 130 MM HG: CPT | Mod: CPTII,,,

## 2023-05-17 PROCEDURE — 99214 OFFICE O/P EST MOD 30 MIN: CPT | Mod: PBBFAC,PN

## 2023-05-17 PROCEDURE — 3079F DIAST BP 80-89 MM HG: CPT | Mod: CPTII,,,

## 2023-05-17 PROCEDURE — 1159F PR MEDICATION LIST DOCUMENTED IN MEDICAL RECORD: ICD-10-PCS | Mod: CPTII,,,

## 2023-05-17 PROCEDURE — 3066F NEPHROPATHY DOC TX: CPT | Mod: CPTII,,,

## 2023-05-17 PROCEDURE — 99213 OFFICE O/P EST LOW 20 MIN: CPT | Mod: S$PBB,,,

## 2023-05-17 PROCEDURE — 87086 URINE CULTURE/COLONY COUNT: CPT

## 2023-05-17 PROCEDURE — 81514 NFCT DS BV&VAGINITIS DNA ALG: CPT

## 2023-05-17 PROCEDURE — 3051F PR MOST RECENT HEMOGLOBIN A1C LEVEL 7.0 - < 8.0%: ICD-10-PCS | Mod: CPTII,,,

## 2023-05-17 PROCEDURE — 1159F MED LIST DOCD IN RCRD: CPT | Mod: CPTII,,,

## 2023-05-17 PROCEDURE — 3079F PR MOST RECENT DIASTOLIC BLOOD PRESSURE 80-89 MM HG: ICD-10-PCS | Mod: CPTII,,,

## 2023-05-17 PROCEDURE — 3074F PR MOST RECENT SYSTOLIC BLOOD PRESSURE < 130 MM HG: ICD-10-PCS | Mod: CPTII,,,

## 2023-05-17 PROCEDURE — 3061F NEG MICROALBUMINURIA REV: CPT | Mod: CPTII,,,

## 2023-05-17 PROCEDURE — 3008F PR BODY MASS INDEX (BMI) DOCUMENTED: ICD-10-PCS | Mod: CPTII,,,

## 2023-05-17 PROCEDURE — 99213 PR OFFICE/OUTPT VISIT, EST, LEVL III, 20-29 MIN: ICD-10-PCS | Mod: S$PBB,,,

## 2023-05-17 RX ORDER — METRONIDAZOLE 500 MG/1
500 TABLET ORAL 2 TIMES DAILY
Qty: 14 TABLET | Refills: 0 | Status: SHIPPED | OUTPATIENT
Start: 2023-05-17 | End: 2023-05-24

## 2023-05-17 RX ORDER — FLUCONAZOLE 150 MG/1
150 TABLET ORAL ONCE
Qty: 1 TABLET | Refills: 0 | Status: SHIPPED | OUTPATIENT
Start: 2023-05-17 | End: 2023-05-18

## 2023-05-17 NOTE — PROGRESS NOTES
"  Chief Complaint: Vaginal Discharge     HPI:      Anusha Andrew is a 32 y.o.  who presents with complaint of vaginal discharge for 3 days.  She denies itching, reports odor.  She states the discharge is a thin yellow.  Additionally endorses urinary frequency, urgency, and dysuria.  Reports pelvic pressure but denies pain.  Denies fever, chills, n/v, or recent illness.She is currently sexually active with a single male partner.  She is using oral contraceptives (estrogen/progesterone) for contraception. She has experienced symptoms like this before. Patient does not douche. No LMP recorded. (Menstrual status: Birth Control).    ROS:   GENERAL: Denies weight gain or weight loss. Feeling well overall.   SKIN: Denies rash or lesions.   ABDOMEN: Denies abdominal pain, constipation, diarrhea, nausea, vomiting, change in appetite or rectal bleeding.   URINARY: + frequency, dysuria, - hematuria.  GYN: See HPI.    Physical Exam:      PHYSICAL EXAM:   Vitals:    23 1100   BP: 122/84   Weight: 74.7 kg (164 lb 12.7 oz)   Height: 5' 3" (1.6 m)   PainSc: 0-No pain     Body mass index is 29.19 kg/m².    General: No acute distress, alert and engaged  VULVA: normal appearing vulva with no masses, tenderness or lesions   VAGINA: normal appearing vagina with normal color. + thin yellow discharge, no lesions   CERVIX: normal appearing cervix without discharge or lesions   UTERUS: uterus is normal size, shape, consistency and nontender   ADNEXA: normal adnexa in size, nontender and no masses    Assessment/Plan:     Vaginal discharge  -     VAGINOSIS SCREEN BY DNA PROBE  -     metroNIDAZOLE (FLAGYL) 500 MG tablet; Take 1 tablet (500 mg total) by mouth 2 (two) times daily. Do not drink alcohol while taking this medication. for 7 days  Dispense: 14 tablet; Refill: 0  -     fluconazole (DIFLUCAN) 150 MG Tab; Take 1 tablet (150 mg total) by mouth once. Take 1 tablet (150 mg total) by mouth once daily. for 1 dose  Dispense: 1 " tablet; Refill: 0    Pelvic pressure in female  -     CULTURE, URINE  -     POCT URINE DIPSTICK WITHOUT MICROSCOPE    Screening examination for STD (sexually transmitted disease)  -     C. trachomatis/N. gonorrhoeae by AMP DNA [facility]; Cervicovaginal    Affirm and GCC to r/o infection as source.  Urine sent for culture to r/o UTI.  Will treat empirically for BV with Flagyl and diflucan in case she develops symptoms of yeast infection with antibiotics.  Will follow up results via the portal.    Patient was counseled today on vaginitis prevention including :  a. avoiding feminine products such as deoderant soaps, body wash, bubble bath, douches, scented toilet paper, deoderant tampons or pads, feminine wipes, chronic pad use, etc.  b. avoiding other vulvovaginal irritants such as long hot baths, humidity, tight, synthetic clothing, chlorine and sitting around in wet bathing suits  c. wearing cotton underwear, avoiding thong underwear and no underwear to bed  d. taking showers instead of baths and use a hair dryer on cool setting afterwards to dry  e. wearing cotton to exercise and shower immediately after exercise and change clothes  f. using polyurethane condoms without spermicide if sexually active and symptoms are triggered by intercourse    Use of MyChart and Patient Portal recommended to patient today.    FOLLOW UP: PRN lack of improvement.    Samira Chin (Maggie), PO  Obstetrics and Gynecology  [facility]

## 2023-05-18 ENCOUNTER — PATIENT MESSAGE (OUTPATIENT)
Dept: OBSTETRICS AND GYNECOLOGY | Facility: CLINIC | Age: 33
End: 2023-05-18
Payer: MEDICAID

## 2023-05-18 LAB
BACTERIA UR CULT: NO GROWTH
BACTERIAL VAGINOSIS DNA: POSITIVE
C TRACH DNA SPEC QL NAA+PROBE: NOT DETECTED
CANDIDA GLABRATA DNA: NEGATIVE
CANDIDA KRUSEI DNA: NEGATIVE
CANDIDA RRNA VAG QL PROBE: NEGATIVE
N GONORRHOEA DNA SPEC QL NAA+PROBE: NOT DETECTED
T VAGINALIS RRNA GENITAL QL PROBE: NEGATIVE

## 2023-06-05 ENCOUNTER — PATIENT MESSAGE (OUTPATIENT)
Dept: OBSTETRICS AND GYNECOLOGY | Facility: CLINIC | Age: 33
End: 2023-06-05
Payer: MEDICAID

## 2023-06-08 PROBLEM — J02.9 PHARYNGITIS: Status: ACTIVE | Noted: 2023-06-08

## 2023-06-08 PROBLEM — J06.9 VIRAL URI WITH COUGH: Status: ACTIVE | Noted: 2023-06-08

## 2023-06-08 NOTE — PROGRESS NOTES
Subjective:      Patient ID: Anusha Andrew is a 32 y.o. female.    Chief Complaint: Post-op Evaluation ([facility] )      HPI: Anusha Andrew is here for PO visit. She is 10 weeks s/p  right carpal tunnel release and right middle trigger finger release. Reports numbness and tingling has resolved.  But she continues to have stiffness in the middle finger.  She was unable to complete the full course of physical therapy.  She finds this hand is healing slower than her left hand after surgery.    Past Medical History:   Diagnosis Date    Depression     Hyperlipidemia     Hypertension     Mild nonproliferative diabetic retinopathy of both eyes without macular edema associated with type 1 diabetes mellitus 3/30/2021    Type I (juvenile type) diabetes mellitus without mention of complication, uncontrolled 11/23/2011       Current Outpatient Medications:     blood sugar diagnostic Strp, To check BG 4  times daily, to use with insurance preferred meter, e 10.65 (Patient taking differently: To check BG 4  times daily, to use with insurance preferred meter, e 10.65), Disp: 400 each, Rfl: 3    blood-glucose meter,continuous (DEXCOM G7 ) Misc, CHANGE EVERY 10 DAYS, Disp: 1 each, Rfl: 1    blood-glucose sensor (DEXCOM G7 SENSOR) Saida, Change every 10 days e 10.65, Disp: 3 each, Rfl: 11    ergocalciferol (VITAMIN D2) 50,000 unit Cap, Take 1 capsule (50,000 Units total) by mouth every 7 days., Disp: 5 capsule, Rfl: 3    FLUoxetine 10 MG capsule, Take 1 capsule (10 mg total) by mouth once daily. (Patient not taking: Reported on 5/17/2023), Disp: 30 capsule, Rfl: 2    FLUoxetine 20 MG capsule, Take 1 capsule (20 mg total) by mouth 2 (two) times daily., Disp: 60 capsule, Rfl: 10    glucagon (BAQSIMI) 3 mg/actuation Spry, Give one puff via nostril. Hold device between fingers and thumb, do not push plunger yet, insert tip gently into one nostril until finger(s) touch the outside of the nose, then push plunger firmly all the way  "in . Dose is complete when the green line disappears., Disp: 2 each, Rfl: 2    HYDROcodone-acetaminophen (NORCO) 5-325 mg per tablet, Take 1 tablet by mouth every 4 (four) hours as needed for Pain. (Patient not taking: Reported on 5/17/2023), Disp: 12 tablet, Rfl: 0    hydrocortisone (ANUSOL-HC) 2.5 % rectal cream, Place rectally 2 (two) times daily., Disp: 28 g, Rfl: 2    ibuprofen (ADVIL,MOTRIN) 400 MG tablet, Take 1 tablet (400 mg total) by mouth every 6 (six) hours as needed., Disp: 20 tablet, Rfl: 0    insulin aspart, niacinamide, (FIASP FLEXTOUCH U-100 INSULIN) 100 unit/mL (3 mL) InPn, use three times daily before meals. Inject ICR 1:8 + snacks + correction scale. Max TDD of 50 units, Disp: 5 pen, Rfl: 6    insulin glargine 100 units/mL SubQ pen, Inject 12 units in the morning only., Disp: 15 mL, Rfl: 6    insulin syringe-needle U-100 0.5 mL 31 gauge x 5/16 Syrg, use with admelog, Disp: 100 each, Rfl: 0    lancets Misc, To check BG 4 times daily, to use with insurance preferred meter, e10.65, Disp: 400 each, Rfl: 3    norethindrone (MICRONOR) 0.35 mg tablet, Take 1 tablet by mouth once daily., Disp: , Rfl:     norethindrone (ORTHO MICRONOR) 0.35 mg tablet, Take 1 tablet (0.35 mg total) by mouth once daily., Disp: 28 tablet, Rfl: 11    ONETOUCH DELICA PLUS LANCET 33 gauge Misc, USE 1 UNIT TO CHECK GLUCOSE 4 TIMES DAILY, Disp: , Rfl:     ONETOUCH VERIO FLEX METER Misc, USE AS DIRECTED TO CHECK BLOOD GLUCOSE 4 TIMES DAILY, Disp: , Rfl:     pantoprazole (PROTONIX) 40 MG tablet, Take 1 tablet by mouth once daily., Disp: , Rfl:     pen needle, diabetic 32 gauge x 5/32" Ndle, Use as directed, Disp: 100 each, Rfl: 0    polyethylene glycol (GLYCOLAX) 17 gram/dose powder, Take 17 g by mouth once daily., Disp: 30 each, Rfl: 3    promethazine-dextromethorphan (PROMETHAZINE-DM) 6.25-15 mg/5 mL Syrp, Take 5 mLs by mouth every 8 (eight) hours as needed (cough)., Disp: 180 mL, Rfl: 0    semaglutide (OZEMPIC) 0.25 mg or 0.5 mg " "(2 mg/3 mL) pen injector, Inject 0.5 mg under the skin weekly, Disp: 3 mL, Rfl: 5  Review of patient's allergies indicates:   Allergen Reactions    No known allergies        Ht 5' 3" (1.6 m)   Wt 70.8 kg (156 lb)   BMI 27.63 kg/m²     ROS      Objective:    Ortho Exam       GEN: Well developed, well nourished female. AAOX3. No acute distress.   Normocephalic, atraumatic.   EUNICE  Breathing unlabored.  Mood and affect appropriate.   Right UE: Skin healed scars of the carpal tunnel and middle finger A1 pulley. Swelling none, minimal hypertrophy scar noted. TTP over the scars and hypothenar area. ROM full. Sensation intact. Tinels negative. Pulses present. Cap refill brisk.  strength intact. Special tests:  None    Assessment:     Imaging: No new        1. S/P carpal tunnel release    2. S/P trigger finger release          Plan:       She is having a slightly longer recovery on the right hand  For the continued stiffness in the trigger finger, I recommend trying Voltaren gel before bed and warm water soaks in the morning.  Patient is also interested in returning to physical therapy which is reasonable  Activities as tolerated  Explained the nature of pillar pain    Orders Placed This Encounter    Ambulatory referral/consult to Physical/Occupational Therapy     Follow up in about 2 months (around 8/9/2023).            "

## 2023-06-09 ENCOUNTER — OFFICE VISIT (OUTPATIENT)
Dept: ORTHOPEDICS | Facility: CLINIC | Age: 33
End: 2023-06-09
Payer: MEDICAID

## 2023-06-09 VITALS — BODY MASS INDEX: 27.64 KG/M2 | HEIGHT: 63 IN | WEIGHT: 156 LBS

## 2023-06-09 DIAGNOSIS — Z98.890 S/P TRIGGER FINGER RELEASE: ICD-10-CM

## 2023-06-09 DIAGNOSIS — Z98.890 S/P CARPAL TUNNEL RELEASE: Primary | ICD-10-CM

## 2023-06-09 PROCEDURE — 3051F HG A1C>EQUAL 7.0%<8.0%: CPT | Mod: CPTII,,, | Performed by: ORTHOPAEDIC SURGERY

## 2023-06-09 PROCEDURE — 3008F BODY MASS INDEX DOCD: CPT | Mod: CPTII,,, | Performed by: ORTHOPAEDIC SURGERY

## 2023-06-09 PROCEDURE — 1159F MED LIST DOCD IN RCRD: CPT | Mod: CPTII,,, | Performed by: ORTHOPAEDIC SURGERY

## 2023-06-09 PROCEDURE — 99215 OFFICE O/P EST HI 40 MIN: CPT | Mod: PBBFAC,PN | Performed by: ORTHOPAEDIC SURGERY

## 2023-06-09 PROCEDURE — 3051F PR MOST RECENT HEMOGLOBIN A1C LEVEL 7.0 - < 8.0%: ICD-10-PCS | Mod: CPTII,,, | Performed by: ORTHOPAEDIC SURGERY

## 2023-06-09 PROCEDURE — 99024 POSTOP FOLLOW-UP VISIT: CPT | Mod: ,,, | Performed by: ORTHOPAEDIC SURGERY

## 2023-06-09 PROCEDURE — 3066F NEPHROPATHY DOC TX: CPT | Mod: CPTII,,, | Performed by: ORTHOPAEDIC SURGERY

## 2023-06-09 PROCEDURE — 99999 PR PBB SHADOW E&M-EST. PATIENT-LVL V: CPT | Mod: PBBFAC,,, | Performed by: ORTHOPAEDIC SURGERY

## 2023-06-09 PROCEDURE — 3061F PR NEG MICROALBUMINURIA RESULT DOCUMENTED/REVIEW: ICD-10-PCS | Mod: CPTII,,, | Performed by: ORTHOPAEDIC SURGERY

## 2023-06-09 PROCEDURE — 3061F NEG MICROALBUMINURIA REV: CPT | Mod: CPTII,,, | Performed by: ORTHOPAEDIC SURGERY

## 2023-06-09 PROCEDURE — 3066F PR DOCUMENTATION OF TREATMENT FOR NEPHROPATHY: ICD-10-PCS | Mod: CPTII,,, | Performed by: ORTHOPAEDIC SURGERY

## 2023-06-09 PROCEDURE — 99999 PR PBB SHADOW E&M-EST. PATIENT-LVL V: ICD-10-PCS | Mod: PBBFAC,,, | Performed by: ORTHOPAEDIC SURGERY

## 2023-06-09 PROCEDURE — 99024 PR POST-OP FOLLOW-UP VISIT: ICD-10-PCS | Mod: ,,, | Performed by: ORTHOPAEDIC SURGERY

## 2023-06-09 PROCEDURE — 3008F PR BODY MASS INDEX (BMI) DOCUMENTED: ICD-10-PCS | Mod: CPTII,,, | Performed by: ORTHOPAEDIC SURGERY

## 2023-06-09 PROCEDURE — 1159F PR MEDICATION LIST DOCUMENTED IN MEDICAL RECORD: ICD-10-PCS | Mod: CPTII,,, | Performed by: ORTHOPAEDIC SURGERY

## 2023-06-16 ENCOUNTER — PATIENT MESSAGE (OUTPATIENT)
Dept: PODIATRY | Facility: CLINIC | Age: 33
End: 2023-06-16
Payer: MEDICAID

## 2023-06-19 ENCOUNTER — TELEPHONE (OUTPATIENT)
Dept: DIABETES | Facility: CLINIC | Age: 33
End: 2023-06-19
Payer: MEDICAID

## 2023-06-19 ENCOUNTER — PATIENT MESSAGE (OUTPATIENT)
Dept: DIABETES | Facility: CLINIC | Age: 33
End: 2023-06-19
Payer: MEDICAID

## 2023-06-19 RX ORDER — INSULIN GLARGINE 100 [IU]/ML
12 INJECTION, SOLUTION SUBCUTANEOUS EVERY MORNING
Qty: 15 ML | Refills: 3 | Status: SHIPPED | OUTPATIENT
Start: 2023-06-19 | End: 2023-08-23

## 2023-06-19 NOTE — TELEPHONE ENCOUNTER
----- Message from Erika Alcantara LPN sent at 6/19/2023  9:28 AM CDT -----  Pt stated she is taking 12 units of lantus in morning

## 2023-06-20 ENCOUNTER — PATIENT MESSAGE (OUTPATIENT)
Dept: DIABETES | Facility: CLINIC | Age: 33
End: 2023-06-20
Payer: MEDICAID

## 2023-06-21 ENCOUNTER — OFFICE VISIT (OUTPATIENT)
Dept: OBSTETRICS AND GYNECOLOGY | Facility: CLINIC | Age: 33
End: 2023-06-21
Attending: OBSTETRICS & GYNECOLOGY
Payer: MEDICAID

## 2023-06-21 DIAGNOSIS — Z30.2 STERILIZATION: Primary | ICD-10-CM

## 2023-06-21 PROCEDURE — 3066F PR DOCUMENTATION OF TREATMENT FOR NEPHROPATHY: ICD-10-PCS | Mod: CPTII,95,, | Performed by: OBSTETRICS & GYNECOLOGY

## 2023-06-21 PROCEDURE — 3066F NEPHROPATHY DOC TX: CPT | Mod: CPTII,95,, | Performed by: OBSTETRICS & GYNECOLOGY

## 2023-06-21 PROCEDURE — 3061F PR NEG MICROALBUMINURIA RESULT DOCUMENTED/REVIEW: ICD-10-PCS | Mod: CPTII,95,, | Performed by: OBSTETRICS & GYNECOLOGY

## 2023-06-21 PROCEDURE — 3051F PR MOST RECENT HEMOGLOBIN A1C LEVEL 7.0 - < 8.0%: ICD-10-PCS | Mod: CPTII,95,, | Performed by: OBSTETRICS & GYNECOLOGY

## 2023-06-21 PROCEDURE — 3061F NEG MICROALBUMINURIA REV: CPT | Mod: CPTII,95,, | Performed by: OBSTETRICS & GYNECOLOGY

## 2023-06-21 PROCEDURE — 99213 OFFICE O/P EST LOW 20 MIN: CPT | Mod: 95,,, | Performed by: OBSTETRICS & GYNECOLOGY

## 2023-06-21 PROCEDURE — 99213 PR OFFICE/OUTPT VISIT, EST, LEVL III, 20-29 MIN: ICD-10-PCS | Mod: 95,,, | Performed by: OBSTETRICS & GYNECOLOGY

## 2023-06-21 PROCEDURE — 3051F HG A1C>EQUAL 7.0%<8.0%: CPT | Mod: CPTII,95,, | Performed by: OBSTETRICS & GYNECOLOGY

## 2023-06-21 NOTE — PROGRESS NOTES
The patient location is: [place]  The chief complaint leading to consultation is: consult tubal    Visit type: audio only, not able to hear with audiovisual    Face to Face time with patient:   10 minutes of total time spent on the encounter, which includes face to face time and non-face to face time preparing to see the patient (eg, review of tests), Obtaining and/or reviewing separately obtained history, Documenting clinical information in the electronic or other health record, Independently interpreting results (not separately reported) and communicating results to the patient/family/caregiver, or Care coordination (not separately reported).         Each patient to whom he or she provides medical services by telemedicine is:  (1) informed of the relationship between the physician and patient and the respective role of any other health care provider with respect to management of the patient; and (2) notified that he or she may decline to receive medical services by telemedicine and may withdraw from such care at any time.    Notes:   Here for virtual visit to discuss tubal ligation. 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 Her biggest concern is heavy periods after tubal. Discussed options and she is interested in an ablation because she had heavy periods prior to OCP. All questions answered will do Lovenox post op.

## 2023-06-26 ENCOUNTER — DOCUMENTATION ONLY (OUTPATIENT)
Dept: REHABILITATION | Facility: OTHER | Age: 33
End: 2023-06-26
Payer: MEDICAID

## 2023-06-27 ENCOUNTER — TELEPHONE (OUTPATIENT)
Dept: DIABETES | Facility: CLINIC | Age: 33
End: 2023-06-27
Payer: MEDICAID

## 2023-06-27 ENCOUNTER — NUTRITION (OUTPATIENT)
Dept: DIABETES | Facility: CLINIC | Age: 33
End: 2023-06-27
Payer: MEDICAID

## 2023-06-27 DIAGNOSIS — E10.65 TYPE 1 DIABETES MELLITUS WITH HYPERGLYCEMIA: Primary | ICD-10-CM

## 2023-06-27 DIAGNOSIS — E10.65 TYPE 1 DIABETES MELLITUS WITH HYPERGLYCEMIA: ICD-10-CM

## 2023-06-27 PROCEDURE — 99999 PR PBB SHADOW E&M-EST. PATIENT-LVL II: CPT | Mod: PBBFAC,,,

## 2023-06-27 PROCEDURE — 99999 PR PBB SHADOW E&M-EST. PATIENT-LVL II: ICD-10-PCS | Mod: PBBFAC,,,

## 2023-06-27 PROCEDURE — 95251 PR GLUCOSE MONITOR, 72 HOUR, PHYS INTERP: ICD-10-PCS | Mod: ,,, | Performed by: NURSE PRACTITIONER

## 2023-06-27 PROCEDURE — 95251 CONT GLUC MNTR ANALYSIS I&R: CPT | Mod: ,,, | Performed by: NURSE PRACTITIONER

## 2023-06-27 PROCEDURE — G0108 DIAB MANAGE TRN  PER INDIV: HCPCS | Mod: PBBFAC,PN

## 2023-06-27 PROCEDURE — 99212 OFFICE O/P EST SF 10 MIN: CPT | Mod: PBBFAC,PN

## 2023-06-27 RX ORDER — INSULIN PMP CART,AUT,G6/7,CNTR
1 EACH SUBCUTANEOUS
Qty: 2 EACH | Refills: 11 | Status: SHIPPED | OUTPATIENT
Start: 2023-06-27 | End: 2024-03-24 | Stop reason: SDUPTHER

## 2023-06-27 RX ORDER — INSULIN PMP CART,AUT,G6/7,CNTR
1 EACH SUBCUTANEOUS
Qty: 1 EACH | Refills: 0 | Status: SHIPPED | OUTPATIENT
Start: 2023-06-27 | End: 2023-11-15

## 2023-06-27 RX ORDER — INSULIN ASPART INJECTION 100 [IU]/ML
INJECTION, SOLUTION SUBCUTANEOUS
Qty: 30 ML | Refills: 6 | Status: SHIPPED | OUTPATIENT
Start: 2023-06-27 | End: 2023-11-15 | Stop reason: SDUPTHER

## 2023-06-27 NOTE — PROGRESS NOTES
Reviewed CGM data from diabetes education   Dose prandial insulin BEFORE MEALS not after   Consider cutting back on Lanuts to 10 units daily or splitting dose to 6 units daily and 4 units nightly ?  She is interested in moving forward with Omnipod 5   Will send orders to pharmacy   She will also need dexcom G6     Average   45% in range   22% high   29% very high   3% low and 1% very low

## 2023-06-27 NOTE — PROGRESS NOTES
Diabetes Care Specialist Progress Note  Author: Alesia Barrett RD  Date: 6/27/2023    Program Intake  Reason for Diabetes Program Visit:: Intervention  Type of Intervention:: Individual  Individual: Education  Education: Insulin Pump Evaluation  Current diabetes risk level:: moderate  In the last 12 months, have you:: used emergency room services  Was the ER or hospital admission related to diabetes?: No  Permission to speak with others about care:: yes    Lab Results   Component Value Date    HGBA1C 7.1 (H) 04/05/2023     Clinical    Problem Review  Reviewed Problem List with Patient: yes  Active comorbidities affecting diabetes self-care.: yes  Comorbidities:  (pulmonary hypertension, gastric sleeve)  Reviewed health maintenance: yes    Clinical Assessment  Current Diabetes Treatment: Diet, Injectable, Exercise, Insulin  Have you ever experienced hypoglycemia (low blood sugar)?: yes  In the last month, how often have you experienced low blood sugar?: more than once a day (5-6 per day)  What symptoms do you experience?: Sweaty, Shaky  Have you ever been hospitalized because your blood sugar was too low?: yes (see comments)  Have you ever experienced hyperglycemia (high blood sugar)?: yes  In the last month, how often have you experienced high blood sugar?: more than once a day  Are you able to tell when your blood sugar is high?: No (comment)  Have you ever been hospitalized because your blood sugar was high?: no    Labs  Do you have regular lab work to monitor your medications?: Yes  Type of Regular Lab Work: A1c, Cholesterol, Microalbumin, CBC, BMP  Lab Compliance Barriers: No    Nutritional Status  Diet: Regular  Meal Plan 24 Hour Recall: Breakfast, Lunch, Dinner, Snack  Meal Plan 24 Hour Recall - Breakfast: Egg sandwich, oatmeal, turkey sausage  Meal Plan 24 Hour Recall - Lunch: Salad with grilled chicken, grilled chicken wrap, or mac and cheese with protein  Meal Plan 24 Hour Recall - Dinner: Shrimp fried  rice  Meal Plan 24 Hour Recall - Snack: Crackers. Drinks water and diet tea  Current nutritional status an area of need that is impacting patient's ability to self-manage diabetes?: No    Additional Social History    Support  Does anyone support you with your diabetes care?: yes  Who supports you?: self, family member  Who takes you to your medical appointments?: self  Does the current support meet the patient's needs?: Yes  Is Support an area impacting ability to self-manage diabetes?: No    Access to Mass Media & Technology  Does the patient have access to any of the following devices or technologies?: Smart phone  Media or technology needs impacting ability to self-manage diabetes?: No    Cognitive/Behavioral Health  Alert and Oriented: Yes  Difficulty Thinking: No  Requires Prompting: No  Requires assistance for routine expression?: No  Cognitive or behavioral barriers impacting ability to self-manage diabetes?: No    Culture/Caodaism  Culture or Voodoo beliefs that may impact ability to access healthcare: No    Communication  Language preference: English  Hearing Problems: No  Vision Problems: Yes  Vision problem type:: Decreased Vision  Vision Assistance: Glasses  Communication needs impacting ability to self-manage diabetes?: No    Health Literacy  Preferred Learning Method: Face to Face, Demonstration, Reading Materials  Health literacy needs impacting ability to self-manage diabetes?: No      Diabetes Self-Management Skills Assessment    Diabetes Disease Process/Treatment Options  Patient/caregiver able to state what happens when someone has diabetes.: yes  Patient/caregiver knows what type of diabetes they have.: yes  Diabetes Type : Type I  Patient able to identify at least three risk factors for diabetes.: no  Diabetes Disease Process/Treatment Options: Skills Assessment Completed: No  Assessment indicates:: Knowledge deficit, Instruction Needed  Deferred due to:: Time  Area of need?:  Yes    Nutrition/Healthy Eating  Challenges to healthy eating:: portion control  Method of carbohydrate measurement:: no method  Patient can identify foods that impact blood sugar.: no (see comments)  Nutrition/Healthy Eating Skills Assessment Completed:: No  Deffered due to:: Time  Area of need?: Yes    Physical Activity/Exercise  Patient's daily activity level:: lightly active  Patient formally exercises outside of work.: yes  Exercise Type: using exercise equipment  Intensity: Low  Frequency: three times a week  Duration: 30 min  Patient can identify forms of physical activity.: yes  Stated forms of physical activity:: housework, dancing  Patient can identify reasons why exercise/physical activity is important in diabetes management.: yes  Identified reasons:: lowers blood glucose, blood pressure, and cholesterol  Assessment indicates:: Adequate understanding  Area of need?: No    Medications  Patient is able to describe current diabetes management routine.: yes  Diabetes management routine:: diet, exercise, injectable medications, insulin  Patient is able to identify current diabetes medications, dosages, and appropriate timing of medications.: yes  Patient understands the purpose of the medications taken for diabetes.: yes  Patient reports problems or concerns with current medication regimen.: no  Medication Skills Assessment Completed:: Yes  Assessment indicates:: Instruction Needed  Area of need?: Yes    Home Blood Glucose Monitoring  Patient states that blood sugar is checked at home daily.: yes  Monitoring Method:: personal continuous glucose monitor  Personal CGM type:: dex com  Patient is able to use personal CGM appropriately.: yes  Assessment indicates:: Adequate understanding  Area of need?: No    Acute Complications  Acute Complications Skills Assessment Completed: : No  Assessment indicates:: Knowledge deficit, Instruction Needed  Deffered due to:: Time  Area of need?: Yes    Chronic  Complications  Patient can identify major chronic complications of diabetes.: no  Patient can identify ways to prevent or delay diabetes complications.: no  Patient is aware that having diabetes increases risk of heart disease?: No  Patient is aware that heart disease is the leading cause of death and disability in people with diabetes?: No  Patient able to state risk factors for heart disease?: No  Patient is taking statin?: No  Has your doctor talked to you about Statin use?: No  Do you want more information on Statins?: No  Chronic Complications Skills Assessment Completed: : No  Assessment indicates:: Knowledge deficit, Instruction Needed  Deferred due to:: Time  Area of need?: Yes    Psychosocial/Coping  Patient can identify ways of coping with chronic disease.: yes  Patient-stated ways of coping with chronic disease:: support from loved ones  Assessment indicates:: Adequate understanding  Area of need?: Yes    Diabetes Self Support Plan    Assessment Summary and Plan    Based on today's diabetes care assessment, the following areas of need were identified:      Social 6/27/2023   Support No   Access to Mass Media/Tech No   Cognitive/Behavioral Health No   Culture/Restorationist No   Communication No   Health Literacy No        Clinical 6/27/2023   Medication Adherence -   Lab Compliance No   Nutritional Status No        Diabetes Self-Management Skills 6/27/2023   Diabetes Disease Process/Treatment Options Yes   Nutrition/Healthy Eating Yes   Physical Activity/Exercise No   Medication Yes, see care plan.   Home Blood Glucose Monitoring No   Acute Complications Yes   Chronic Complications Yes   Psychosocial/Coping Yes      Today's interventions were provided through individual discussion, instruction, and written materials were provided.      Patient verbalized understanding of instruction and written materials.  Pt was able to return back demonstration of instructions today. Patient understood key points, needs  reinforcement and further instruction.     Diabetes Self-Management Care Plan:    Today's Diabetes Self-Management Care Plan was developed with Anusha's input. Anusha has agreed to work toward the following goal(s) to improve his/her overall diabetes control.      Care Plan: Diabetes Management   Updates made since 5/28/2023 12:00 AM        Problem: Medications         Goal: Patient Agrees to take Diabetes Medications as prescribed.    Start Date: 6/27/2023   Expected End Date: 9/27/2023   This Visit's Progress: Deferred   Priority: High   Barriers: No Barriers Identified   Note:    DIABETES EDUCATOR NOTE - PUMP EVAL      Patient seen for initial assessment and education on Omnipod 5.  Patient is interested in an insulin pump and continuous glucose monitor.   Pt returned today for evaluation/education on self care measure of diabetes management, blood glucose testing, meal patterns/ability to carbohydrate count, and problem solving skills for managing hypo and hyperglycemia.  Covered basic details of pump therapy with patient. Insulin pump therapy in general pros and cons   Reviewed major insulin pump vendors: Medtronic, Tandem, and Omni Pod.  Reviewed terms ISF, CHO ratio, goal BG, bolus, basal, active insulin and insulin on board. Explained importance of practicing total bolus calculations prior to starting pump therapy as this info will be programmed into the pump.  Explained each pump allows for different max volumes of insulin in reservoir chamber.  Pt verbalized clearer understanding of pump and CGM therapy.     During today's visit the patient was introduces to/educated on the following content areas:     Current Diabetic Medications: Fiasp: 1-8 units after meals, Glargine: 12 units in AM.    Nutrition:   Carb counting skills: Pt states comfortable with carbohydrate counting.  Instructed on I:C ratio    Instructed pt on ISF of 1:50 starting @ goal 150.    Glucose monitoring:  Patient is testing BG using  finger sticks 2/2 Dexcom G7 not compatible with Omnipod 5.     EDUCATION PLAN:   Will try to go on line to look at each pump in more detail.  Information provided on Omnipod 5, which patient is most interested in.  Follow up for continued education on possible pump training .     Pt states currently bolusing after meals, educator notified NP, NP recommended bolsuing before meals.          Dexcom download uploaded into media manager.    Follow Up Plan     Follow up in about 4 weeks (around 7/25/2023) for Omnipod 5 start.    Today's care plan and follow up schedule was discussed with patient.  Anusha verbalized understanding of the care plan, goals, and agrees to follow up plan.        The patient was encouraged to communicate with his/her health care provider/physician and care team regarding his/her condition(s) and treatment.  I provided the patient with my contact information today and encouraged to contact me via phone or [facility] Patient Portal as needed.     Length of Visit   Total Time: 45 Minutes

## 2023-06-27 NOTE — TELEPHONE ENCOUNTER
Patient met with education today and would like to move forward with Omnipod 5   She will need to change her dexcom to Dexcom g6   She gets her dexcom from [facility] so we will need to send them orders for dexcom g6    I am sending over the Omnipod 5 to her pharmacy   With vial insulin - Fiasp   She will need to meet with education to star the pump and bring all supplies to visit     Please let her know that I looked at her dexcom and I would recommend dosing her meal time insulin BEFORE she eats. I know in the past she was doing it after because she didn't know how much she was going to eat-- but I think it is causing highs and lows. So I would recommend dosing before the meals and see if that improves.     Also I would cut back on the Lantus. It looks like she is going low overnight sometimes.   Maybe try 10 units instead of 12 units   She can even try splitting the Lantus -- such as 6 units daily and 4 units nightly     Let me know if there are issues and there are issues getting omnipod     I sent RX to [facility]- but I can send it wherever she wants, just let me know

## 2023-06-28 ENCOUNTER — PATIENT MESSAGE (OUTPATIENT)
Dept: OBSTETRICS AND GYNECOLOGY | Facility: CLINIC | Age: 33
End: 2023-06-28
Payer: MEDICAID

## 2023-06-28 ENCOUNTER — PATIENT MESSAGE (OUTPATIENT)
Dept: DIABETES | Facility: CLINIC | Age: 33
End: 2023-06-28
Payer: MEDICAID

## 2023-06-28 ENCOUNTER — TELEPHONE (OUTPATIENT)
Dept: DIABETES | Facility: CLINIC | Age: 33
End: 2023-06-28
Payer: MEDICAID

## 2023-06-28 DIAGNOSIS — N92.0 MENORRHAGIA WITH REGULAR CYCLE: Primary | ICD-10-CM

## 2023-06-28 DIAGNOSIS — Z30.2 STERILIZATION: ICD-10-CM

## 2023-06-28 NOTE — TELEPHONE ENCOUNTER
Called pt reviewed dexcom download, informed pt of medication updates, also stated to pt that I would send medication updated via portal, pt verbalized understanding

## 2023-06-29 ENCOUNTER — TELEPHONE (OUTPATIENT)
Dept: PHARMACY | Facility: CLINIC | Age: 33
End: 2023-06-29
Payer: MEDICAID

## 2023-07-03 ENCOUNTER — TELEPHONE (OUTPATIENT)
Dept: DIABETES | Facility: CLINIC | Age: 33
End: 2023-07-03
Payer: MEDICAID

## 2023-07-03 NOTE — TELEPHONE ENCOUNTER
----- Message from Ritika Grewal NP sent at 7/3/2023  1:11 PM CDT -----  Can we push her appointment back with me?   She is starting the omnipod on 7/28 but seeing me on 7/26  Lets push her back for me.   I would like to see her back 2 weeks after starting the omnipod 5. So I can download and review the omnipod.   Thank you

## 2023-07-06 NOTE — TELEPHONE ENCOUNTER
Requested Date:  August 9    Requested Time:  8 am    Case Length:60 minutes    Surgeon: Purnima Kidd      Assistant Surgeon: Juany Floyd   Visit Type: Outpatient    LOCATION: [facility]    PROCEDURE:Laparoscopic bilateral salpingectomy, endometrial ablation  Diagnosis:sterilization, menorrhagia  Anesthesia type: General   Comments/Special Equipment Needed:  Rep needed: No  Does the patient need a PreOp appointment with MD: Yes  U/S needed at pre-op: No  PCP clearance: Not Needed  When does she need her Post op appointment: 2 weeks in person  Do you need additional time blocked out from clinic? Yes  If so, what time do you want to be back in clinic? Out all day for OR  When can the patient go back to work? one week      Kimberlee or Marissa---   please schedule her >30 days from surgery to sign medicaid tubal consent for preop visit

## 2023-07-07 ENCOUNTER — OFFICE VISIT (OUTPATIENT)
Dept: OBSTETRICS AND GYNECOLOGY | Facility: CLINIC | Age: 33
End: 2023-07-07
Attending: OBSTETRICS & GYNECOLOGY
Payer: MEDICAID

## 2023-07-07 VITALS — BODY MASS INDEX: 27.68 KG/M2 | WEIGHT: 156.19 LBS | HEIGHT: 63 IN

## 2023-07-07 DIAGNOSIS — Z30.2 STERILIZATION: Primary | ICD-10-CM

## 2023-07-07 DIAGNOSIS — N92.0 MENORRHAGIA WITH REGULAR CYCLE: ICD-10-CM

## 2023-07-07 PROCEDURE — 99499 NO LOS: ICD-10-PCS | Mod: S$PBB,,, | Performed by: OBSTETRICS & GYNECOLOGY

## 2023-07-07 PROCEDURE — 99999 PR PBB SHADOW E&M-EST. PATIENT-LVL IV: ICD-10-PCS | Mod: PBBFAC,,, | Performed by: OBSTETRICS & GYNECOLOGY

## 2023-07-07 PROCEDURE — 99214 OFFICE O/P EST MOD 30 MIN: CPT | Mod: PBBFAC,PN | Performed by: OBSTETRICS & GYNECOLOGY

## 2023-07-07 PROCEDURE — 99999 PR PBB SHADOW E&M-EST. PATIENT-LVL IV: CPT | Mod: PBBFAC,,, | Performed by: OBSTETRICS & GYNECOLOGY

## 2023-07-07 PROCEDURE — 99499 UNLISTED E&M SERVICE: CPT | Mod: S$PBB,,, | Performed by: OBSTETRICS & GYNECOLOGY
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
## 2023-07-10 ENCOUNTER — PATIENT MESSAGE (OUTPATIENT)
Dept: DIABETES | Facility: CLINIC | Age: 33
End: 2023-07-10
Payer: MEDICAID

## 2023-07-11 ENCOUNTER — PATIENT MESSAGE (OUTPATIENT)
Dept: DIABETES | Facility: CLINIC | Age: 33
End: 2023-07-11
Payer: MEDICAID

## 2023-07-12 DIAGNOSIS — E10.65 TYPE 1 DIABETES MELLITUS WITH HYPERGLYCEMIA: ICD-10-CM

## 2023-07-12 DIAGNOSIS — E66.3 OVERWEIGHT (BMI 25.0-29.9): ICD-10-CM

## 2023-07-12 RX ORDER — SEMAGLUTIDE 0.68 MG/ML
INJECTION, SOLUTION SUBCUTANEOUS
Qty: 3 ML | Refills: 5 | Status: SHIPPED | OUTPATIENT
Start: 2023-07-12 | End: 2023-08-02 | Stop reason: SDUPTHER

## 2023-07-12 RX ORDER — ACETAMINOPHEN AND CODEINE PHOSPHATE 120; 12 MG/5ML; MG/5ML
1 SOLUTION ORAL DAILY
Qty: 28 TABLET | Refills: 11 | OUTPATIENT
Start: 2023-07-12 | End: 2024-07-11

## 2023-07-12 RX ORDER — SEMAGLUTIDE 0.68 MG/ML
INJECTION, SOLUTION SUBCUTANEOUS
Qty: 3 ML | Refills: 5 | Status: SHIPPED | OUTPATIENT
Start: 2023-07-12 | End: 2023-07-12 | Stop reason: SDUPTHER

## 2023-07-13 ENCOUNTER — PATIENT MESSAGE (OUTPATIENT)
Dept: SURGERY | Facility: HOSPITAL | Age: 33
End: 2023-07-13
Payer: MEDICAID

## 2023-07-13 RX ORDER — ACETAMINOPHEN AND CODEINE PHOSPHATE 120; 12 MG/5ML; MG/5ML
1 SOLUTION ORAL DAILY
Qty: 28 TABLET | Refills: 11 | Status: ON HOLD | OUTPATIENT
Start: 2023-07-13 | End: 2023-08-09 | Stop reason: HOSPADM

## 2023-07-14 ENCOUNTER — PATIENT MESSAGE (OUTPATIENT)
Dept: SURGERY | Facility: HOSPITAL | Age: 33
End: 2023-07-14
Payer: MEDICAID

## 2023-07-19 ENCOUNTER — PATIENT MESSAGE (OUTPATIENT)
Dept: DIABETES | Facility: CLINIC | Age: 33
End: 2023-07-19
Payer: MEDICAID

## 2023-07-24 ENCOUNTER — PATIENT MESSAGE (OUTPATIENT)
Dept: SURGERY | Facility: HOSPITAL | Age: 33
End: 2023-07-24
Payer: MEDICAID

## 2023-07-28 ENCOUNTER — TELEPHONE (OUTPATIENT)
Dept: OBSTETRICS AND GYNECOLOGY | Facility: CLINIC | Age: 33
End: 2023-07-28
Payer: MEDICAID

## 2023-07-28 DIAGNOSIS — Z30.2 STERILIZATION: Primary | ICD-10-CM

## 2023-07-28 DIAGNOSIS — N92.0 MENORRHAGIA WITH REGULAR CYCLE: ICD-10-CM

## 2023-07-31 ENCOUNTER — PATIENT MESSAGE (OUTPATIENT)
Dept: DIABETES | Facility: CLINIC | Age: 33
End: 2023-07-31
Payer: MEDICAID

## 2023-08-01 ENCOUNTER — PATIENT MESSAGE (OUTPATIENT)
Dept: DIABETES | Facility: CLINIC | Age: 33
End: 2023-08-01
Payer: MEDICAID

## 2023-08-02 ENCOUNTER — TELEPHONE (OUTPATIENT)
Dept: PHARMACY | Facility: CLINIC | Age: 33
End: 2023-08-02
Payer: MEDICAID

## 2023-08-02 PROBLEM — Z00.00 WELL ADULT EXAM: Chronic | Status: ACTIVE | Noted: 2023-08-02

## 2023-08-02 PROBLEM — L65.9 HAIR LOSS: Status: ACTIVE | Noted: 2023-08-02

## 2023-08-03 ENCOUNTER — HOSPITAL ENCOUNTER (OUTPATIENT)
Dept: PREADMISSION TESTING | Facility: OTHER | Age: 33
Discharge: HOME | End: 2023-08-03
Attending: OBSTETRICS & GYNECOLOGY
Payer: MEDICAID

## 2023-08-03 ENCOUNTER — ANESTHESIA EVENT (OUTPATIENT)
Dept: SURGERY | Facility: OTHER | Age: 33
End: 2023-08-03
Payer: MEDICAID

## 2023-08-03 VITALS
DIASTOLIC BLOOD PRESSURE: 72 MMHG | WEIGHT: 163 LBS | BODY MASS INDEX: 28.88 KG/M2 | TEMPERATURE: 98 F | HEART RATE: 85 BPM | OXYGEN SATURATION: 100 % | RESPIRATION RATE: 16 BRPM | HEIGHT: 63 IN | SYSTOLIC BLOOD PRESSURE: 120 MMHG

## 2023-08-03 RX ORDER — SODIUM CHLORIDE, SODIUM LACTATE, POTASSIUM CHLORIDE, CALCIUM CHLORIDE 600; 310; 30; 20 MG/100ML; MG/100ML; MG/100ML; MG/100ML
INJECTION, SOLUTION INTRAVENOUS CONTINUOUS
Status: CANCELLED | OUTPATIENT
Start: 2023-08-03

## 2023-08-03 RX ORDER — ACETAMINOPHEN 500 MG
1000 TABLET ORAL
Status: CANCELLED | OUTPATIENT
Start: 2023-08-03 | End: 2023-08-03

## 2023-08-03 NOTE — ANESTHESIA PREPROCEDURE EVALUATION
08/03/2023  Anusha Andrew is a 32 y.o., female.      Pre-op Assessment    I have reviewed the Patient Summary Reports.     I have reviewed the Nursing Notes. I have reviewed the NPO Status.   I have reviewed the Medications.     Review of Systems  Anesthesia Hx:  No problems with previous Anesthesia  Denies Family Hx of Anesthesia complications.   Denies Personal Hx of Anesthesia complications.   Social:  Non-Smoker    Hematology/Oncology:  Hematology Normal   Oncology Normal     EENT/Dental:EENT/Dental Normal   Cardiovascular:   Exercise tolerance: good Denies Hypertension.     Pulmonary:  Pulmonary Normal    Renal/:  Renal/ Normal     Musculoskeletal:  Musculoskeletal Normal    Neurological:   Peripheral Neuropathy    Endocrine:   Diabetes Lot of diabetes meds including pump   Dermatological:  Skin Normal    Psych:   depression          Physical Exam  General: Well nourished, Cooperative, Oriented and Alert    Airway:  Mouth Opening: Normal  TM Distance: Normal  Neck ROM: Normal ROM    Dental:  Intact, Retainer        Anesthesia Plan  Type of Anesthesia, risks & benefits discussed:    Anesthesia Type: Gen ETT  Intra-op Monitoring Plan: Standard ASA Monitors  Post Op Pain Control Plan: multimodal analgesia  Induction:  IV  Airway Plan: Video and Direct  Informed Consent: Informed consent signed with the Patient and all parties understand the risks and agree with anesthesia plan.  All questions answered.   ASA Score: 3  Day of Surgery Review of History & Physical: H&P Update referred to the surgeon/provider.  Anesthesia Plan Notes: Off Ozempic for at least 2 weeks  CBC BMP to be done by primary care before surgery    Ready For Surgery From Anesthesia Perspective.     .

## 2023-08-03 NOTE — DISCHARGE INSTRUCTIONS
Information to Prepare you for your Surgery    PRE-ADMIT TESTING -  [phone]    [address] [facility]          Your surgery has been scheduled at [facility]. We are pleased to have the opportunity to serve you. For Further Information please call [phone].    On the day of surgery please report to the Information Desk on the 1st floor.    CONTACT YOUR PHYSICIAN'S OFFICE THE DAY PRIOR TO YOUR SURGERY TO OBTAIN YOUR ARRIVAL TIME.     The evening before surgery do not eat anything after 9 p.m. ( this includes hard candy, chewing gum and mints).  You may only have WATER  from 9 p.m. until you leave your home.   DO NOT DRINK ANY LIQUIDS ON THE WAY TO THE HOSPITAL.         Patients may have 2 visitors pre and post procedure. Only 2 visitors will be allowed in the Surgical building with the patient. No one under the age of 12 will be allowed into the facility.    SPECIAL MEDICATION INSTRUCTIONS: TAKE medications checked off by the Anesthesiologist on your Medication List.    Angiogram Patients: Take medications as instructed by your physician, including aspirin.     Surgery Patients:    If you take ASPIRIN - Your PHYSICIAN/SURGEON will need to inform you IF/OR when you need to stop taking aspirin prior to your surgery.     The week prior to surgery do not ot take any medications containing IBUPROFEN or NSAIDS ( Advil, Motrin, Goodys, BC, Aleve, Naproxen etc) If you are not sure if you should take a medicine please call your surgeon's office.  Ok to take Tylenol    Do Not Wear any make-up (especially eye make-up) to surgery. Please remove any false eyelashes or eyelash extensions. If you arrive the day of surgery with makeup/eyelashes on you will be required to remove prior to surgery. (There is a risk of corneal abrasions if eye makeup/eyelash extensions are not removed)      Leave all valuables at home.   Do Not wear any jewelry or watches, including any  metal in body piercings. Jewelry must be removed prior to coming to the hospital.  There is a possibility that rings that are unable to be removed may be cut off if they are on the surgical extremity.    Please remove all hair extensions, wigs, clips and any other metal accessories/ ornaments from your hair.  These items may pose a flammable/fire risk in Surgery and must be removed.    Do not shave your surgical area at least 5 days prior to your surgery. The surgical prep will be performed at the hospital according to Infection Control regulations.    Contact Lens must be removed before surgery. Either do not wear the contact lens or bring a case and solution for storage.  Please bring a container for eyeglasses or dentures as required.  Bring any paperwork your physician has provided, such as consent forms,  history and physicals, doctor's orders, etc.   Bring comfortable clothes that are loose fitting to wear upon discharge. Take into consideration the type of surgery being performed.  Maintain your diet as advised per your physician the day prior to surgery.      Adequate rest the night before surgery is advised.   Park in the Parking lot behind the hospital or in the [facility] across the street from the parking lot. Parking is complimentary.  If you will be discharged the same day as your procedure, please arrange for a responsible adult to drive you home or to accompany you if traveling by taxi.   YOU WILL NOT BE PERMITTED TO DRIVE OR TO LEAVE THE HOSPITAL ALONE AFTER SURGERY.   If you are being discharged the same day, it is strongly recommended that you arrange for someone to remain with you for the first 24 hrs following your surgery.    The Surgeon will speak to your family/visitor after your surgery regarding the outcome of your surgery and post op care.  The Surgeon may speak to you after your surgery, but there is a possibility you may not remember the details.  Please check with your  family members regarding the conversation with the Surgeon.    We strongly recommend whoever is bringing you home be present for discharge instructions.  This will ensure a thorough understanding for your post op home care.    ALL CHILDREN MUST ALWAYS BE ACCOMPANIED BY AN ADULT.    Visitors-Refer to current Visitor policy handouts.    Thank you for your cooperation.  The Staff of [facility].            Bathing Instructions with Hibiclens    Shower the evening before and morning of your procedure with Chlorhexidine (Hibiclens)  do not use Chlorhexidine on your face or genitals. Do not get in your eyes.  Wash your face with water and your regular face wash/soap  Use your regular shampoo  Apply Chlorhexidine (Hibiclens) directly on your skin or on a wet washcloth and wash gently. When showering: Move away from the shower stream when applying Chlorhexidine (Hibiclens) to avoid rinsing off too soon.  Rinse thoroughly with warm water  Do not dilute Chlorhexidine (Hibiclens)   Dry off as usual, do not use any deodorant, powder, body lotions, perfume, after shave or cologne.

## 2023-08-07 NOTE — H&P
[facility])  History & Physical  Gynecology    SUBJECTIVE:     Chief Complaint/Reason for Admission: surgery    History of Present Illness:  32 you  desires permanent sterilization. She also has very heavy periods. She desires bilateral salpingectomy and endometrial ablation. Medicaid consent for sterilization signed 30 days prior. 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 Due to this and DM type 1 will treat preoperatively with Lovenox 40 mg SQ for DVY prophylaxis. All questions answered      No medications prior to admission.       Review of patient's allergies indicates:   Allergen Reactions    No known allergies        Past Medical History:   Diagnosis Date    Depression     Hyperlipidemia     Hypertension     Mild nonproliferative diabetic retinopathy of both eyes without macular edema associated with type 1 diabetes mellitus 3/30/2021    Type I (juvenile type) diabetes mellitus without mention of complication, uncontrolled 2011     Past Surgical History:   Procedure Laterality Date    ABCESS DRAINAGE      boil went over GA    CARPAL TUNNEL RELEASE Left 2022    Procedure: RELEASE, CARPAL TUNNEL;  Surgeon: Sekou Rojo Jr., MD;  Location: [facility] OR;  Service: Orthopedics;  Laterality: Left;    CARPAL TUNNEL RELEASE Right 3/28/2023    Procedure: RELEASE, CARPAL TUNNEL;  Surgeon: Sekou Rojo Jr., MD;  Location: [facility] OR;  Service: Orthopedics;  Laterality: Right;     SECTION  2012    DILATION AND CURETTAGE OF UTERUS  2019    Procedure: DILATION AND CURETTAGE, UTERUS;  Surgeon: Purnima Kidd MD;  Location: [facility];  Service: OB/GYN;;    HYSTEROSCOPY N/A 2019    Procedure: HYSTEROSCOPY;  Surgeon: Purnima Kidd MD;  Location: [facility];  Service: OB/GYN;  Laterality: N/A;    TONSILLECTOMY, ADENOIDECTOMY      TRIGGER FINGER RELEASE Left 2022    Procedure: RELEASE, TRIGGER FINGER;  Surgeon: Sekou Rojo Jr., MD;   Location: [facility] OR;  Service: Orthopedics;  Laterality: Left;    TRIGGER FINGER RELEASE Right 3/28/2023    Procedure: RELEASE, TRIGGER FINGER; MIDDLE FINGER;  Surgeon: Sekou Rojo Jr., MD;  Location: [facility] OR;  Service: Orthopedics;  Laterality: Right;    WISDOM TOOTH EXTRACTION       2022- Gastric sleeve with subsequent superior mesenteric vein thrombosis    Family History   Problem Relation Age of Onset    Diabetes Brother     No Known Problems Mother     No Known Problems Father     Hydrocephalus Daughter     No Known Problems Brother     No Known Problems Brother     No Known Problems Maternal Aunt     No Known Problems Maternal Uncle     No Known Problems Paternal Aunt     No Known Problems Paternal Uncle     No Known Problems Maternal Grandmother     No Known Problems Maternal Grandfather     No Known Problems Paternal Grandmother     No Known Problems Paternal Grandfather     Amblyopia Neg Hx     Blindness Neg Hx     Cataracts Neg Hx     Glaucoma Neg Hx     Hypertension Neg Hx     Macular degeneration Neg Hx     Retinal detachment Neg Hx     Strabismus Neg Hx     Stroke Neg Hx     Thyroid disease Neg Hx     Breast cancer Neg Hx     Colon cancer Neg Hx     Ovarian cancer Neg Hx     Cancer Neg Hx      Social History     Tobacco Use    Smoking status: Never    Smokeless tobacco: Never   Substance Use Topics    Alcohol use: Yes     Comment: occasionally    Drug use: No       Review of Systems:  Constitutional: no fever or chills  Respiratory: no cough or shortness of breath  Cardiovascular: no chest pain or palpitations  Genitourinary: no hematuria or dysuria     OBJECTIVE:     Vital Signs (Most Recent):       Physical Exam:  General: well developed, well nourished      Laboratory:  Lab Results   Component Value Date    WBC 5.86 08/04/2023    HGB 13.4 08/04/2023    HCT 42.2 08/04/2023    MCV 88 08/04/2023     08/04/2023       Ultrasound:  No results found for this or any previous  visit.          ASSESSMENT/PLAN:     There are no hospital problems to display for this patient.      To OR for bilateral salpingectomy, endometrial ablation  Risks and benefits explained in detail to patient, including but not limited to damage to internal organs, including but not limited to bowel, bladder, uterus, tubes, ovaries, nerves, arteries, veins, possible need for blood transfusion. Patient is aware of all risks and desires surgery. All questions answered. Consents signed.

## 2023-08-09 ENCOUNTER — ANESTHESIA (OUTPATIENT)
Dept: SURGERY | Facility: OTHER | Age: 33
End: 2023-08-09
Payer: MEDICAID

## 2023-08-09 ENCOUNTER — HOSPITAL ENCOUNTER (OUTPATIENT)
Facility: OTHER | Age: 33
Discharge: HOME | End: 2023-08-09
Attending: OBSTETRICS & GYNECOLOGY | Admitting: OBSTETRICS & GYNECOLOGY
Payer: MEDICAID

## 2023-08-09 DIAGNOSIS — N92.0 MENORRHAGIA WITH REGULAR CYCLE: ICD-10-CM

## 2023-08-09 DIAGNOSIS — Z30.2 STERILIZATION: Primary | ICD-10-CM

## 2023-08-09 LAB
ABO + RH BLD: NORMAL
B-HCG UR QL: NEGATIVE
BASOPHILS # BLD AUTO: 0.02 K/UL (ref 0–0.2)
BASOPHILS NFR BLD: 0.3 % (ref 0–1.9)
BLD GP AB SCN CELLS X3 SERPL QL: NORMAL
CTP QC/QA: YES
DIFFERENTIAL METHOD: ABNORMAL
EOSINOPHIL # BLD AUTO: 0 K/UL (ref 0–0.5)
EOSINOPHIL NFR BLD: 0.4 % (ref 0–8)
ERYTHROCYTE [DISTWIDTH] IN BLOOD BY AUTOMATED COUNT: 13.3 % (ref 11.5–14.5)
GLUCOSE SERPL-MCNC: 92 MG/DL (ref 70–110)
HCT VFR BLD AUTO: 40.3 % (ref 37–48.5)
HGB BLD-MCNC: 12.5 G/DL (ref 12–16)
IMM GRANULOCYTES # BLD AUTO: 0.02 K/UL (ref 0–0.04)
IMM GRANULOCYTES NFR BLD AUTO: 0.3 % (ref 0–0.5)
LYMPHOCYTES # BLD AUTO: 2.6 K/UL (ref 1–4.8)
LYMPHOCYTES NFR BLD: 36.1 % (ref 18–48)
MCH RBC QN AUTO: 27 PG (ref 27–31)
MCHC RBC AUTO-ENTMCNC: 31 G/DL (ref 32–36)
MCV RBC AUTO: 87 FL (ref 82–98)
MONOCYTES # BLD AUTO: 0.4 K/UL (ref 0.3–1)
MONOCYTES NFR BLD: 5.5 % (ref 4–15)
NEUTROPHILS # BLD AUTO: 4.1 K/UL (ref 1.8–7.7)
NEUTROPHILS NFR BLD: 57.4 % (ref 38–73)
NRBC BLD-RTO: 0 /100 WBC
PLATELET # BLD AUTO: 317 K/UL (ref 150–450)
PMV BLD AUTO: 9 FL (ref 9.2–12.9)
POCT GLUCOSE: 62 MG/DL (ref 70–110)
POCT GLUCOSE: 75 MG/DL (ref 70–110)
POCT GLUCOSE: 92 MG/DL (ref 70–110)
RBC # BLD AUTO: 4.63 M/UL (ref 4–5.4)
SPECIMEN OUTDATE: NORMAL
WBC # BLD AUTO: 7.11 K/UL (ref 3.9–12.7)

## 2023-08-09 PROCEDURE — 86900 BLOOD TYPING SEROLOGIC ABO: CPT | Performed by: OBSTETRICS & GYNECOLOGY

## 2023-08-09 PROCEDURE — 36000708 HC OR TIME LEV III 1ST 15 MIN: Performed by: OBSTETRICS & GYNECOLOGY

## 2023-08-09 PROCEDURE — 58563 HYSTEROSCOPY ABLATION: CPT | Mod: 51,,, | Performed by: OBSTETRICS & GYNECOLOGY

## 2023-08-09 PROCEDURE — 81025 URINE PREGNANCY TEST: CPT | Performed by: ANESTHESIOLOGY

## 2023-08-09 PROCEDURE — 88305 TISSUE EXAM BY PATHOLOGIST: CPT | Performed by: PATHOLOGY

## 2023-08-09 PROCEDURE — 71000016 HC POSTOP RECOV ADDL HR: Performed by: OBSTETRICS & GYNECOLOGY

## 2023-08-09 PROCEDURE — 88302 TISSUE EXAM BY PATHOLOGIST: CPT | Mod: 26,,, | Performed by: PATHOLOGY

## 2023-08-09 PROCEDURE — 88305 TISSUE EXAM BY PATHOLOGIST: CPT | Mod: 26,,, | Performed by: PATHOLOGY

## 2023-08-09 PROCEDURE — 58661 LAPAROSCOPY REMOVE ADNEXA: CPT | Mod: AS,50,, | Performed by: NURSE PRACTITIONER

## 2023-08-09 PROCEDURE — 25000003 PHARM REV CODE 250: Performed by: NURSE ANESTHETIST, CERTIFIED REGISTERED

## 2023-08-09 PROCEDURE — 82962 GLUCOSE BLOOD TEST: CPT | Performed by: OBSTETRICS & GYNECOLOGY

## 2023-08-09 PROCEDURE — S5010 5% DEXTROSE AND 0.45% SALINE: HCPCS | Performed by: NURSE ANESTHETIST, CERTIFIED REGISTERED

## 2023-08-09 PROCEDURE — 71000033 HC RECOVERY, INTIAL HOUR: Performed by: OBSTETRICS & GYNECOLOGY

## 2023-08-09 PROCEDURE — D9220A PRA ANESTHESIA: Mod: ANES,,, | Performed by: ANESTHESIOLOGY

## 2023-08-09 PROCEDURE — 88305 TISSUE EXAM BY PATHOLOGIST: ICD-10-PCS | Mod: 26,,, | Performed by: PATHOLOGY

## 2023-08-09 PROCEDURE — 58563 PR HYSTEROSCOPY,W/ENDOMETRIAL ABLATION: ICD-10-PCS | Mod: 51,,, | Performed by: OBSTETRICS & GYNECOLOGY

## 2023-08-09 PROCEDURE — 36415 COLL VENOUS BLD VENIPUNCTURE: CPT | Performed by: OBSTETRICS & GYNECOLOGY

## 2023-08-09 PROCEDURE — 58661 LAPAROSCOPY REMOVE ADNEXA: CPT | Mod: 50,,, | Performed by: OBSTETRICS & GYNECOLOGY

## 2023-08-09 PROCEDURE — 63600175 PHARM REV CODE 636 W HCPCS: Performed by: ANESTHESIOLOGY

## 2023-08-09 PROCEDURE — 63600175 PHARM REV CODE 636 W HCPCS: Performed by: NURSE ANESTHETIST, CERTIFIED REGISTERED

## 2023-08-09 PROCEDURE — 71000015 HC POSTOP RECOV 1ST HR: Performed by: OBSTETRICS & GYNECOLOGY

## 2023-08-09 PROCEDURE — 58661 PR LAP,RMV  ADNEXAL STRUCTURE: ICD-10-PCS | Mod: AS,50,, | Performed by: NURSE PRACTITIONER

## 2023-08-09 PROCEDURE — 27201423 OPTIME MED/SURG SUP & DEVICES STERILE SUPPLY: Performed by: OBSTETRICS & GYNECOLOGY

## 2023-08-09 PROCEDURE — 37000009 HC ANESTHESIA EA ADD 15 MINS: Performed by: OBSTETRICS & GYNECOLOGY

## 2023-08-09 PROCEDURE — D9220A PRA ANESTHESIA: Mod: CRNA,,, | Performed by: NURSE ANESTHETIST, CERTIFIED REGISTERED

## 2023-08-09 PROCEDURE — 71000039 HC RECOVERY, EACH ADD'L HOUR: Performed by: OBSTETRICS & GYNECOLOGY

## 2023-08-09 PROCEDURE — 88302 PR  SURG PATH,LEVEL II: ICD-10-PCS | Mod: 26,,, | Performed by: PATHOLOGY

## 2023-08-09 PROCEDURE — 85025 COMPLETE CBC W/AUTO DIFF WBC: CPT | Performed by: OBSTETRICS & GYNECOLOGY

## 2023-08-09 PROCEDURE — C1886 CATHETER, ABLATION: HCPCS | Performed by: OBSTETRICS & GYNECOLOGY

## 2023-08-09 PROCEDURE — D9220A PRA ANESTHESIA: ICD-10-PCS | Mod: ANES,,, | Performed by: ANESTHESIOLOGY

## 2023-08-09 PROCEDURE — 36000709 HC OR TIME LEV III EA ADD 15 MIN: Performed by: OBSTETRICS & GYNECOLOGY

## 2023-08-09 PROCEDURE — 88302 TISSUE EXAM BY PATHOLOGIST: CPT | Performed by: PATHOLOGY

## 2023-08-09 PROCEDURE — D9220A PRA ANESTHESIA: ICD-10-PCS | Mod: CRNA,,, | Performed by: NURSE ANESTHETIST, CERTIFIED REGISTERED

## 2023-08-09 PROCEDURE — 25000003 PHARM REV CODE 250: Performed by: ANESTHESIOLOGY

## 2023-08-09 PROCEDURE — 37000008 HC ANESTHESIA 1ST 15 MINUTES: Performed by: OBSTETRICS & GYNECOLOGY

## 2023-08-09 PROCEDURE — 63600175 PHARM REV CODE 636 W HCPCS: Performed by: OBSTETRICS & GYNECOLOGY

## 2023-08-09 PROCEDURE — 25000003 PHARM REV CODE 250: Performed by: OBSTETRICS & GYNECOLOGY

## 2023-08-09 PROCEDURE — 58661 PR LAP,RMV  ADNEXAL STRUCTURE: ICD-10-PCS | Mod: 50,,, | Performed by: OBSTETRICS & GYNECOLOGY

## 2023-08-09 RX ORDER — DEXAMETHASONE SODIUM PHOSPHATE 4 MG/ML
INJECTION, SOLUTION INTRA-ARTICULAR; INTRALESIONAL; INTRAMUSCULAR; INTRAVENOUS; SOFT TISSUE
Status: DISCONTINUED | OUTPATIENT
Start: 2023-08-09 | End: 2023-08-09

## 2023-08-09 RX ORDER — ACETAMINOPHEN 500 MG
1000 TABLET ORAL
Status: COMPLETED | OUTPATIENT
Start: 2023-08-09 | End: 2023-08-09

## 2023-08-09 RX ORDER — DIPHENHYDRAMINE HCL 25 MG
25 CAPSULE ORAL EVERY 4 HOURS PRN
Status: CANCELLED | OUTPATIENT
Start: 2023-08-09

## 2023-08-09 RX ORDER — ROCURONIUM BROMIDE 10 MG/ML
INJECTION, SOLUTION INTRAVENOUS
Status: DISCONTINUED | OUTPATIENT
Start: 2023-08-09 | End: 2023-08-09

## 2023-08-09 RX ORDER — OXYCODONE HYDROCHLORIDE 5 MG/1
5 TABLET ORAL
Status: DISCONTINUED | OUTPATIENT
Start: 2023-08-09 | End: 2023-08-09 | Stop reason: HOSPADM

## 2023-08-09 RX ORDER — OXYCODONE HYDROCHLORIDE 5 MG/1
10 TABLET ORAL EVERY 4 HOURS PRN
Status: CANCELLED | OUTPATIENT
Start: 2023-08-09

## 2023-08-09 RX ORDER — KETOROLAC TROMETHAMINE 30 MG/ML
INJECTION, SOLUTION INTRAMUSCULAR; INTRAVENOUS
Status: DISCONTINUED | OUTPATIENT
Start: 2023-08-09 | End: 2023-08-09

## 2023-08-09 RX ORDER — FENTANYL CITRATE 50 UG/ML
INJECTION, SOLUTION INTRAMUSCULAR; INTRAVENOUS
Status: DISCONTINUED | OUTPATIENT
Start: 2023-08-09 | End: 2023-08-09

## 2023-08-09 RX ORDER — HYDROMORPHONE HYDROCHLORIDE 2 MG/ML
0.4 INJECTION, SOLUTION INTRAMUSCULAR; INTRAVENOUS; SUBCUTANEOUS EVERY 5 MIN PRN
Status: DISCONTINUED | OUTPATIENT
Start: 2023-08-09 | End: 2023-08-09 | Stop reason: HOSPADM

## 2023-08-09 RX ORDER — ENOXAPARIN SODIUM 100 MG/ML
40 INJECTION SUBCUTANEOUS
Status: COMPLETED | OUTPATIENT
Start: 2023-08-09 | End: 2023-08-09

## 2023-08-09 RX ORDER — DEXTROSE MONOHYDRATE AND SODIUM CHLORIDE 5; .45 G/100ML; G/100ML
INJECTION, SOLUTION INTRAVENOUS CONTINUOUS
Status: CANCELLED | OUTPATIENT
Start: 2023-08-09

## 2023-08-09 RX ORDER — LIDOCAINE HYDROCHLORIDE 20 MG/ML
INJECTION INTRAVENOUS
Status: DISCONTINUED | OUTPATIENT
Start: 2023-08-09 | End: 2023-08-09

## 2023-08-09 RX ORDER — SODIUM CHLORIDE, SODIUM LACTATE, POTASSIUM CHLORIDE, CALCIUM CHLORIDE 600; 310; 30; 20 MG/100ML; MG/100ML; MG/100ML; MG/100ML
INJECTION, SOLUTION INTRAVENOUS CONTINUOUS
Status: DISCONTINUED | OUTPATIENT
Start: 2023-08-09 | End: 2023-08-09 | Stop reason: HOSPADM

## 2023-08-09 RX ORDER — SODIUM CHLORIDE 0.9 % (FLUSH) 0.9 %
3 SYRINGE (ML) INJECTION
Status: DISCONTINUED | OUTPATIENT
Start: 2023-08-09 | End: 2023-08-09 | Stop reason: HOSPADM

## 2023-08-09 RX ORDER — PROPOFOL 10 MG/ML
VIAL (ML) INTRAVENOUS
Status: DISCONTINUED | OUTPATIENT
Start: 2023-08-09 | End: 2023-08-09

## 2023-08-09 RX ORDER — ONDANSETRON 2 MG/ML
INJECTION INTRAMUSCULAR; INTRAVENOUS
Status: DISCONTINUED | OUTPATIENT
Start: 2023-08-09 | End: 2023-08-09

## 2023-08-09 RX ORDER — DEXTROSE MONOHYDRATE AND SODIUM CHLORIDE 5; .45 G/100ML; G/100ML
INJECTION, SOLUTION INTRAVENOUS CONTINUOUS PRN
Status: DISCONTINUED | OUTPATIENT
Start: 2023-08-09 | End: 2023-08-09

## 2023-08-09 RX ORDER — IBUPROFEN 800 MG/1
800 TABLET ORAL EVERY 8 HOURS PRN
Qty: 60 TABLET | Refills: 0 | Status: SHIPPED | OUTPATIENT
Start: 2023-08-09 | End: 2024-03-28

## 2023-08-09 RX ORDER — HYDROCODONE BITARTRATE AND ACETAMINOPHEN 5; 325 MG/1; MG/1
1 TABLET ORAL EVERY 6 HOURS PRN
Qty: 15 TABLET | Refills: 0 | Status: ON HOLD | OUTPATIENT
Start: 2023-08-09 | End: 2024-01-29 | Stop reason: HOSPADM

## 2023-08-09 RX ORDER — OXYCODONE HYDROCHLORIDE 5 MG/1
5 TABLET ORAL EVERY 4 HOURS PRN
Status: CANCELLED | OUTPATIENT
Start: 2023-08-09

## 2023-08-09 RX ORDER — AMOXICILLIN 250 MG
1 CAPSULE ORAL 2 TIMES DAILY
Status: CANCELLED | OUTPATIENT
Start: 2023-08-09

## 2023-08-09 RX ORDER — FAMOTIDINE 20 MG/1
20 TABLET, FILM COATED ORAL
Status: COMPLETED | OUTPATIENT
Start: 2023-08-09 | End: 2023-08-09

## 2023-08-09 RX ORDER — ONDANSETRON 8 MG/1
8 TABLET, ORALLY DISINTEGRATING ORAL EVERY 8 HOURS PRN
Status: DISCONTINUED | OUTPATIENT
Start: 2023-08-09 | End: 2023-08-09 | Stop reason: HOSPADM

## 2023-08-09 RX ORDER — PROCHLORPERAZINE EDISYLATE 5 MG/ML
5 INJECTION INTRAMUSCULAR; INTRAVENOUS EVERY 30 MIN PRN
Status: DISCONTINUED | OUTPATIENT
Start: 2023-08-09 | End: 2023-08-09 | Stop reason: HOSPADM

## 2023-08-09 RX ORDER — MEPERIDINE HYDROCHLORIDE 25 MG/ML
12.5 INJECTION INTRAMUSCULAR; INTRAVENOUS; SUBCUTANEOUS ONCE AS NEEDED
Status: DISCONTINUED | OUTPATIENT
Start: 2023-08-09 | End: 2023-08-09 | Stop reason: HOSPADM

## 2023-08-09 RX ORDER — PROCHLORPERAZINE EDISYLATE 5 MG/ML
5 INJECTION INTRAMUSCULAR; INTRAVENOUS EVERY 6 HOURS PRN
Status: DISCONTINUED | OUTPATIENT
Start: 2023-08-09 | End: 2023-08-09 | Stop reason: HOSPADM

## 2023-08-09 RX ORDER — IBUPROFEN 600 MG/1
600 TABLET ORAL EVERY 6 HOURS PRN
Status: CANCELLED | OUTPATIENT
Start: 2023-08-09

## 2023-08-09 RX ORDER — DEXTROSE, SODIUM CHLORIDE, SODIUM LACTATE, POTASSIUM CHLORIDE, AND CALCIUM CHLORIDE 5; .6; .31; .03; .02 G/100ML; G/100ML; G/100ML; G/100ML; G/100ML
INJECTION, SOLUTION INTRAVENOUS CONTINUOUS
Status: DISCONTINUED | OUTPATIENT
Start: 2023-08-09 | End: 2023-08-09 | Stop reason: HOSPADM

## 2023-08-09 RX ADMIN — OXYCODONE HYDROCHLORIDE 5 MG: 5 TABLET ORAL at 06:08

## 2023-08-09 RX ADMIN — ACETAMINOPHEN 1000 MG: 500 TABLET ORAL at 11:08

## 2023-08-09 RX ADMIN — KETOROLAC TROMETHAMINE 30 MG: 30 INJECTION, SOLUTION INTRAMUSCULAR; INTRAVENOUS at 05:08

## 2023-08-09 RX ADMIN — DEXTROSE MONOHYDRATE AND SODIUM CHLORIDE: 5; .45 INJECTION, SOLUTION INTRAVENOUS at 04:08

## 2023-08-09 RX ADMIN — HYDROMORPHONE HYDROCHLORIDE 0.4 MG: 2 INJECTION INTRAMUSCULAR; INTRAVENOUS; SUBCUTANEOUS at 06:08

## 2023-08-09 RX ADMIN — LIDOCAINE HYDROCHLORIDE 100 MG: 20 INJECTION, SOLUTION INTRAVENOUS at 05:08

## 2023-08-09 RX ADMIN — FENTANYL CITRATE 100 MCG: 0.05 INJECTION, SOLUTION INTRAMUSCULAR; INTRAVENOUS at 05:08

## 2023-08-09 RX ADMIN — DEXAMETHASONE SODIUM PHOSPHATE 4 MG: 4 INJECTION, SOLUTION INTRAMUSCULAR; INTRAVENOUS at 05:08

## 2023-08-09 RX ADMIN — ROCURONIUM BROMIDE 50 MG: 10 SOLUTION INTRAVENOUS at 05:08

## 2023-08-09 RX ADMIN — PROPOFOL 200 MG: 10 INJECTION, EMULSION INTRAVENOUS at 05:08

## 2023-08-09 RX ADMIN — SODIUM CHLORIDE, SODIUM LACTATE, POTASSIUM CHLORIDE, AND CALCIUM CHLORIDE: 600; 310; 30; 20 INJECTION, SOLUTION INTRAVENOUS at 02:08

## 2023-08-09 RX ADMIN — FAMOTIDINE 20 MG: 20 TABLET, FILM COATED ORAL at 11:08

## 2023-08-09 RX ADMIN — ONDANSETRON HYDROCHLORIDE 4 MG: 2 INJECTION INTRAMUSCULAR; INTRAVENOUS at 05:08

## 2023-08-09 RX ADMIN — SUGAMMADEX 200 MG: 100 INJECTION, SOLUTION INTRAVENOUS at 05:08

## 2023-08-09 RX ADMIN — FENTANYL CITRATE 50 MCG: 0.05 INJECTION, SOLUTION INTRAMUSCULAR; INTRAVENOUS at 05:08

## 2023-08-09 RX ADMIN — ENOXAPARIN SODIUM 40 MG: 40 INJECTION SUBCUTANEOUS at 11:08

## 2023-08-09 RX ADMIN — PROCHLORPERAZINE EDISYLATE 5 MG: 5 INJECTION, SOLUTION INTRAMUSCULAR; INTRAVENOUS at 07:08

## 2023-08-09 NOTE — OP NOTE
[facility] [place] [facility] ([facility]  Operative Note    SUMMARY     Date of Procedure: 8/9/2023     Procedure: Procedure(s) (LRB):  SALPINGECTOMY, LAPAROSCOPIC (Bilateral)  ABLATION, ENDOMETRIUM (N/A)  HYSTEROSCOPY  DILATION AND CURETTAGE, UTERUS       Surgeon(s) and Role:     * Purnima Kidd MD - Primary    Assisting Surgeon: Juany Floyd NP    Pre-Operative Diagnosis: Sterilization [Z30.2]  Menorrhagia with regular cycle [N92.0]    Post-Operative Diagnosis: Post-Op Diagnosis Codes:     * Sterilization [Z30.2]     * Menorrhagia with regular cycle [N92.0]    Anesthesia: General      Description of the Findings of the Procedure:   Patient was taken to the operating room where general anesthesia was performed. She was then prepped and draped in the normal sterile fashion. She was placed in the dorsal lithotomy position in Trevor stirrups. Her arms were tucked in the usual fashion. A us was placed to gravity.   Attention was then turned abdominally and a 5 mm skin incision was made with a knife. Towel clamps were used to elevate the abdomen. The veress needle was used to enter the abdomen without difficulty with a starting pressure of 3 mm Hg. The abdomen was then insufflated to 15 mmHg. A 5 mm bladeless trocar was then placed using the visiport without difficulty. The patient was then placed in trendelenburg position. A 5 mm bladeless trocar was placed in the LLQ in the usual fashion. A 5 mm  bladeless trocar was placed in the RLQ in the usual fashion.   The abdomen was the evaluated and the findings were normal uterus tubes and ovaries. No endometriosis. No fibroids. No adhesions  The Ligasure was used to ligate the left and right fallopian tube taking the entire tube on each side with no active bleeding. The tubes were removed. The pressure was brought down to 5 mmHG and no active bleeding was noted.   The trocars were then removed. The skin incisions were closed using 4-0 Monocryl.   Attention was then turned  vaginally. The Radha dilators were used to dilate to #7. The hysteroscope was introduced and normal endometrium and tubal ostia were visualized. The hysteroscope was removed. Sharp currettage was performed and scrapings sent to pathology. The total uterine length was 7.5 cm and cervical length 3.5 cm. The width was 3.7 cm. The Novasure device was tested then activated in the usual fashion. After the procedure was complete the device was removed. Hysteroscope was reintroduced and the endometrium was appropriately cauterized. The patient tolerated procedure well. Sponge lap and needle counts were correct x 2.        Complications: No    Estimated Blood Loss (EBL): * No values recorded between 8/9/2023  5:23 PM and 8/9/2023  6:05 PM *           Implants: * No implants in log *    Specimens:   Specimen (24h ago, onward)       Start     Ordered    08/09/23 1742  Specimen to Pathology, Surgery Gynecology and Obstetrics  Once        Comments: Pre-op Diagnosis: Sterilization [Z30.2]Menorrhagia with regular cycle [N92.0]Procedure(s):SALPINGECTOMY, LAPAROSCOPICABLATION, ENDOMETRIUMHYSTEROSCOPY Number of specimens: 3Name of specimens: 1. Right  tube 2. Left tube 3. EMC     References:    Click here for ordering Quick Tip   Question Answer Comment   Procedure Type: Gynecology and Obstetrics    Which provider would you like to cc? NESTOR MARCUS    Release to patient Immediate        08/09/23 1441                            Condition: Good    Disposition: PACU - hemodynamically stable.    Attestation: I performed the procedure.

## 2023-08-09 NOTE — DISCHARGE SUMMARY
[facility] ([place])  Discharge Summary  Gynecology      Admit Date: 8/9/2023    Discharge Date and Time: 8/9/2023    Attending Physician: Purnima Kidd MD     Discharge Provider: Purnima Kidd    Reason for Admission: Hysteroscopy D&C, Novasure ablation, Laparoscopic bilateral salpingectomy    Procedures Performed: Procedure(s) (LRB):  SALPINGECTOMY, LAPAROSCOPIC (Bilateral)  ABLATION, ENDOMETRIUM (N/A)  HYSTEROSCOPY  DILATION AND CURETTAGE, UTERUS    Hospital Course (synopsis of major diagnoses, care, treatment, and services provided during the course of the hospital stay): Patient was admitted for surgery. Following surgery she was transferred to recovery and underwent routine postoperative care. She tolerated po, ambulated without difficulty, and pain was well controlled. She was discharged to home in stable condition.      Consults: none    Significant Diagnostic Studies: Labs: CBC   Lab Results   Component Value Date    WBC 7.11 08/09/2023    HGB 12.5 08/09/2023    HCT 40.3 08/09/2023    MCV 87 08/09/2023     08/09/2023       Final Diagnoses:   Principal Problem: Menorrhagia with regular cycle   Secondary Diagnoses:   Active Hospital Problems    Diagnosis  POA    *Menorrhagia with regular cycle [N92.0]  Yes    Sterilization [Z30.2]  Not Applicable      Resolved Hospital Problems   No resolved problems to display.       Discharged Condition: stable    Disposition: Home    Follow Up/Patient Instructions: 2 weeks    Medications:  Reconciled Home Medications:   Current Discharge Medication List        START taking these medications    Details   HYDROcodone-acetaminophen (NORCO) 5-325 mg per tablet Take 1 tablet by mouth every 6 (six) hours as needed for Pain.  Qty: 15 tablet, Refills: 0    Comments: Quantity prescribed more than 7 day supply? No      ibuprofen (ADVIL,MOTRIN) 800 MG tablet Take 1 tablet (800 mg total) by mouth every 8 (eight) hours as needed for Pain.  Qty: 60 tablet, Refills: 0            CONTINUE these medications which have NOT CHANGED    Details   ergocalciferol (VITAMIN D2) 50,000 unit Cap Take 1 capsule (50,000 Units total) by mouth every 7 days.  Qty: 5 capsule, Refills: 3      FLUoxetine 20 MG capsule Take 1 capsule (20 mg total) by mouth 2 (two) times daily.  Qty: 60 capsule, Refills: 10      insulin aspart, niacinamide, (FIASP FLEXTOUCH U-100 INSULIN) 100 unit/mL (3 mL) InPn use three times daily before meals. Inject ICR 1:8 + snacks + correction scale. Max TDD of 50 units  Qty: 5 pen, Refills: 6    Associated Diagnoses: Type 1 diabetes mellitus with hyperglycemia      insulin glargine 100 units/mL SubQ pen Inject 12 Units into the skin every morning.  Qty: 15 mL, Refills: 3    Comments: Max TDD of 20 units      semaglutide (OZEMPIC) 0.25 mg or 0.5 mg (2 mg/3 mL) pen injector Inject 0.5 mg under the skin weekly  Qty: 3 mL, Refills: 5    Associated Diagnoses: Overweight (BMI 25.0-29.9); Type 1 diabetes mellitus with hyperglycemia      blood sugar diagnostic Strp To check BG 4  times daily, to use with insurance preferred meter, e 10.65  Qty: 400 each, Refills: 3    Associated Diagnoses: Type 1 diabetes mellitus with hyperglycemia      blood-glucose meter,continuous (DEXCOM G7 ) Misc CHANGE EVERY 10 DAYS  Qty: 1 each, Refills: 1    Associated Diagnoses: Type 1 diabetes mellitus with hyperglycemia      blood-glucose sensor (DEXCOM G7 SENSOR) Saida Change every 10 days e 10.65  Qty: 3 each, Refills: 11      glucagon (BAQSIMI) 3 mg/actuation Spry Give one puff via nostril. Hold device between fingers and thumb, do not push plunger yet, insert tip gently into one nostril until finger(s) touch the outside of the nose, then push plunger firmly all the way in . Dose is complete when the green line disappears.  Qty: 2 each, Refills: 2      hydrocortisone (ANUSOL-HC) 2.5 % rectal cream Place rectally 2 (two) times daily.  Qty: 28 g, Refills: 2      insulin aspart, niacinamide, (FIASP  "U-100 INSULIN) 100 unit/mL Soln To use continuously with insulin pump. Max TDD of 100 units  Qty: 30 mL, Refills: 6    Associated Diagnoses: Type 1 diabetes mellitus with hyperglycemia      insulin pump cart,auto,BT-cntr (OMNIPOD 5 G6 INTRO KIT, GEN 5,) Crtg Inject 1 Device into the skin Every 3 (three) days.  Qty: 1 each, Refills: 0    Associated Diagnoses: Type 1 diabetes mellitus with hyperglycemia      insulin pump cart,automated,BT (OMNIPOD 5 G6 PODS, GEN 5,) Crtg Inject 1 Device into the skin Every 3 (three) days.  Qty: 2 each, Refills: 11    Associated Diagnoses: Type 1 diabetes mellitus with hyperglycemia      insulin syringe-needle U-100 0.5 mL 31 gauge x 5/16 Syrg use with admelog  Qty: 100 each, Refills: 0      !! lancets Misc To check BG 4 times daily, to use with insurance preferred meter, e10.65  Qty: 400 each, Refills: 3    Associated Diagnoses: Type 1 diabetes mellitus with hyperglycemia      !! ONETOUCH DELICA PLUS LANCET 33 gauge Misc USE 1 UNIT TO CHECK GLUCOSE 4 TIMES DAILY      ONETOUCH VERIO FLEX METER Misc USE AS DIRECTED TO CHECK BLOOD GLUCOSE 4 TIMES DAILY      pen needle, diabetic 32 gauge x 5/32" Ndle Use as directed  Qty: 100 each, Refills: 0      phentermine (ADIPEX-P) 37.5 mg tablet Take 1 tablet (37.5 mg total) by mouth once daily.  Qty: 30 tablet, Refills: 0      polyethylene glycol (GLYCOLAX) 17 gram/dose powder Take 17 g by mouth once daily.  Qty: 30 each, Refills: 3       !! - Potential duplicate medications found. Please discuss with provider.        STOP taking these medications       norethindrone (ORTHO MICRONOR) 0.35 mg tablet Comments:   Reason for Stopping:             No discharge procedures on file.      "

## 2023-08-09 NOTE — ANESTHESIA PROCEDURE NOTES
Intubation    Date/Time: 8/9/2023 5:13 PM    Performed by: Adele Issa CRNA  Authorized by: Hua Murillo MD    Intubation:     Induction:  Intravenous    Intubated:  Postinduction    Mask Ventilation:  Easy mask    Attempts:  1    Attempted By:  CRNA    Method of Intubation:  Video laryngoscopy    Blade:  Morales 3    Laryngeal View Grade: Grade I - full view of cords      Difficult Airway Encountered?: No      Complications:  None    Airway Device:  Oral endotracheal tube    Airway Device Size:  7.0    Style/Cuff Inflation:  Cuffed (inflated to minimal occlusive pressure)    Tube secured:  21    Secured at:  The lips    Placement Verified By:  Capnometry    Complicating Factors:  None    Findings Post-Intubation:  BS equal bilateral and atraumatic/condition of teeth unchanged

## 2023-08-09 NOTE — TRANSFER OF CARE
"Anesthesia Transfer of Care Note    Patient: Anusha Andrew    Procedure(s) Performed: Procedure(s) (LRB):  SALPINGECTOMY, LAPAROSCOPIC (Bilateral)  ABLATION, ENDOMETRIUM (N/A)  HYSTEROSCOPY  DILATION AND CURETTAGE, UTERUS    Patient location: PACU    Anesthesia Type: general    Transport from OR: Transported from OR on 2-3 L/min O2 by NC with adequate spontaneous ventilation    Post pain: adequate analgesia    Post assessment: no apparent anesthetic complications    Post vital signs: stable    Level of consciousness: awake    Nausea/Vomiting: no nausea/vomiting    Complications: none    Transfer of care protocol was followed      Last vitals:   Visit Vitals  /82 (BP Location: Right arm, Patient Position: Lying)   Pulse 97   Temp 37.1 °C (98.8 °F) (Skin)   Resp 16   Ht 5' 3" (1.6 m)   Wt 73.9 kg (163 lb)   SpO2 100%   Breastfeeding No   BMI 28.87 kg/m²     "

## 2023-08-10 VITALS
HEART RATE: 87 BPM | BODY MASS INDEX: 28.88 KG/M2 | SYSTOLIC BLOOD PRESSURE: 118 MMHG | RESPIRATION RATE: 16 BRPM | WEIGHT: 163 LBS | HEIGHT: 63 IN | DIASTOLIC BLOOD PRESSURE: 82 MMHG | TEMPERATURE: 98 F | OXYGEN SATURATION: 98 %

## 2023-08-10 LAB — POCT GLUCOSE: 148 MG/DL (ref 70–110)

## 2023-08-10 NOTE — ANESTHESIA POSTPROCEDURE EVALUATION
Anesthesia Post Evaluation    Patient: Anusha Andrew    Procedure(s) Performed: Procedure(s) (LRB):  SALPINGECTOMY, LAPAROSCOPIC (Bilateral)  ABLATION, ENDOMETRIUM (N/A)  HYSTEROSCOPY  DILATION AND CURETTAGE, UTERUS    Final Anesthesia Type: general      Patient location during evaluation: PACU  Patient participation: Yes- Able to Participate  Level of consciousness: awake and alert  Post-procedure vital signs: reviewed and stable  Pain management: adequate  Airway patency: patent    PONV status at discharge: No PONV  Anesthetic complications: no      Cardiovascular status: blood pressure returned to baseline and stable  Respiratory status: room air  Hydration status: euvolemic  Follow-up not needed.          Vitals Value Taken Time   /82 08/09/23 1950   Temp 36.6 °C (97.9 °F) 08/09/23 1920   Pulse 87 08/09/23 1950   Resp 16 08/09/23 1950   SpO2 98 % 08/09/23 1950         Event Time   Out of Recovery 19:12:00         Pain/Sylvain Score: Pain Rating Prior to Med Admin: 7 (8/9/2023  6:30 PM)  Pain Rating Post Med Admin: 5 (8/9/2023  7:06 PM)  Sylvain Score: 10 (8/9/2023  7:50 PM)

## 2023-08-10 NOTE — PLAN OF CARE
Anusha Marioks has met all discharge criteria from Phase II. Vital Signs are stable, ambulating  without difficulty. Discharge instructions given, patient verbalized understanding. Discharged from facility via wheelchair in stable condition.

## 2023-08-11 ENCOUNTER — PATIENT MESSAGE (OUTPATIENT)
Dept: OBSTETRICS AND GYNECOLOGY | Facility: CLINIC | Age: 33
End: 2023-08-11
Payer: MEDICAID

## 2023-08-14 ENCOUNTER — TELEPHONE (OUTPATIENT)
Dept: PODIATRY | Facility: CLINIC | Age: 33
End: 2023-08-14
Payer: MEDICAID

## 2023-08-14 ENCOUNTER — OFFICE VISIT (OUTPATIENT)
Dept: DIABETES | Facility: CLINIC | Age: 33
End: 2023-08-14
Payer: MEDICAID

## 2023-08-14 VITALS
HEIGHT: 63 IN | BODY MASS INDEX: 28.97 KG/M2 | SYSTOLIC BLOOD PRESSURE: 119 MMHG | OXYGEN SATURATION: 97 % | HEART RATE: 82 BPM | WEIGHT: 163.5 LBS | DIASTOLIC BLOOD PRESSURE: 71 MMHG

## 2023-08-14 DIAGNOSIS — F32.1 CURRENT MODERATE EPISODE OF MAJOR DEPRESSIVE DISORDER WITHOUT PRIOR EPISODE: Chronic | ICD-10-CM

## 2023-08-14 DIAGNOSIS — E10.3293 MILD NONPROLIFERATIVE DIABETIC RETINOPATHY OF BOTH EYES WITHOUT MACULAR EDEMA ASSOCIATED WITH TYPE 1 DIABETES MELLITUS: ICD-10-CM

## 2023-08-14 DIAGNOSIS — E66.3 OVERWEIGHT (BMI 25.0-29.9): ICD-10-CM

## 2023-08-14 DIAGNOSIS — Z86.39 HISTORY OF DIABETES WITH KETOACIDOSIS: ICD-10-CM

## 2023-08-14 DIAGNOSIS — Z71.9 HEALTH EDUCATION/COUNSELING: ICD-10-CM

## 2023-08-14 DIAGNOSIS — E10.65 TYPE 1 DIABETES MELLITUS WITH HYPERGLYCEMIA: Primary | ICD-10-CM

## 2023-08-14 DIAGNOSIS — Z90.3 HISTORY OF SLEEVE GASTRECTOMY: ICD-10-CM

## 2023-08-14 DIAGNOSIS — E10.649 TYPE 1 DIABETES MELLITUS WITH HYPOGLYCEMIA UNAWARENESS: ICD-10-CM

## 2023-08-14 PROCEDURE — 3066F NEPHROPATHY DOC TX: CPT | Mod: CPTII,,, | Performed by: NURSE PRACTITIONER

## 2023-08-14 PROCEDURE — 1159F MED LIST DOCD IN RCRD: CPT | Mod: CPTII,,, | Performed by: NURSE PRACTITIONER

## 2023-08-14 PROCEDURE — 3008F PR BODY MASS INDEX (BMI) DOCUMENTED: ICD-10-PCS | Mod: CPTII,,, | Performed by: NURSE PRACTITIONER

## 2023-08-14 PROCEDURE — 99999 PR PBB SHADOW E&M-EST. PATIENT-LVL V: ICD-10-PCS | Mod: PBBFAC,,, | Performed by: NURSE PRACTITIONER

## 2023-08-14 PROCEDURE — 3008F BODY MASS INDEX DOCD: CPT | Mod: CPTII,,, | Performed by: NURSE PRACTITIONER

## 2023-08-14 PROCEDURE — 3061F NEG MICROALBUMINURIA REV: CPT | Mod: CPTII,,, | Performed by: NURSE PRACTITIONER

## 2023-08-14 PROCEDURE — 3051F PR MOST RECENT HEMOGLOBIN A1C LEVEL 7.0 - < 8.0%: ICD-10-PCS | Mod: CPTII,,, | Performed by: NURSE PRACTITIONER

## 2023-08-14 PROCEDURE — 3061F PR NEG MICROALBUMINURIA RESULT DOCUMENTED/REVIEW: ICD-10-PCS | Mod: CPTII,,, | Performed by: NURSE PRACTITIONER

## 2023-08-14 PROCEDURE — 99214 OFFICE O/P EST MOD 30 MIN: CPT | Mod: S$PBB,,, | Performed by: NURSE PRACTITIONER

## 2023-08-14 PROCEDURE — 95251 CONT GLUC MNTR ANALYSIS I&R: CPT | Mod: ,,, | Performed by: NURSE PRACTITIONER

## 2023-08-14 PROCEDURE — 3066F PR DOCUMENTATION OF TREATMENT FOR NEPHROPATHY: ICD-10-PCS | Mod: CPTII,,, | Performed by: NURSE PRACTITIONER

## 2023-08-14 PROCEDURE — 3078F PR MOST RECENT DIASTOLIC BLOOD PRESSURE < 80 MM HG: ICD-10-PCS | Mod: CPTII,,, | Performed by: NURSE PRACTITIONER

## 2023-08-14 PROCEDURE — 3078F DIAST BP <80 MM HG: CPT | Mod: CPTII,,, | Performed by: NURSE PRACTITIONER

## 2023-08-14 PROCEDURE — 99214 PR OFFICE/OUTPT VISIT, EST, LEVL IV, 30-39 MIN: ICD-10-PCS | Mod: S$PBB,,, | Performed by: NURSE PRACTITIONER

## 2023-08-14 PROCEDURE — 3051F HG A1C>EQUAL 7.0%<8.0%: CPT | Mod: CPTII,,, | Performed by: NURSE PRACTITIONER

## 2023-08-14 PROCEDURE — 99215 OFFICE O/P EST HI 40 MIN: CPT | Mod: PBBFAC,PN | Performed by: NURSE PRACTITIONER

## 2023-08-14 PROCEDURE — 3074F PR MOST RECENT SYSTOLIC BLOOD PRESSURE < 130 MM HG: ICD-10-PCS | Mod: CPTII,,, | Performed by: NURSE PRACTITIONER

## 2023-08-14 PROCEDURE — 95251 PR GLUCOSE MONITOR, 72 HOUR, PHYS INTERP: ICD-10-PCS | Mod: ,,, | Performed by: NURSE PRACTITIONER

## 2023-08-14 PROCEDURE — 1159F PR MEDICATION LIST DOCUMENTED IN MEDICAL RECORD: ICD-10-PCS | Mod: CPTII,,, | Performed by: NURSE PRACTITIONER

## 2023-08-14 PROCEDURE — 3074F SYST BP LT 130 MM HG: CPT | Mod: CPTII,,, | Performed by: NURSE PRACTITIONER

## 2023-08-14 PROCEDURE — 1160F RVW MEDS BY RX/DR IN RCRD: CPT | Mod: CPTII,,, | Performed by: NURSE PRACTITIONER

## 2023-08-14 PROCEDURE — 99999 PR PBB SHADOW E&M-EST. PATIENT-LVL V: CPT | Mod: PBBFAC,,, | Performed by: NURSE PRACTITIONER

## 2023-08-14 PROCEDURE — 1160F PR REVIEW ALL MEDS BY PRESCRIBER/CLIN PHARMACIST DOCUMENTED: ICD-10-PCS | Mod: CPTII,,, | Performed by: NURSE PRACTITIONER

## 2023-08-14 RX ORDER — INSULIN DEGLUDEC 100 U/ML
INJECTION, SOLUTION SUBCUTANEOUS DAILY
COMMUNITY
End: 2023-08-17 | Stop reason: SDUPTHER

## 2023-08-14 RX ORDER — SEMAGLUTIDE 0.68 MG/ML
INJECTION, SOLUTION SUBCUTANEOUS
Qty: 3 ML | Refills: 6 | Status: SHIPPED | OUTPATIENT
Start: 2023-08-14 | End: 2024-04-02

## 2023-08-14 NOTE — TELEPHONE ENCOUNTER
----- Message from Erika Alcantara LPN sent at 8/14/2023  9:56 AM CDT -----  Hey can you reach out to pt to schedule a visit, new pt, I tried scheduling but was unable to

## 2023-08-14 NOTE — PROGRESS NOTES
CC:   Chief Complaint   Patient presents with    Diabetes Mellitus       HPI: Anusha Andrew is a 32 y.o. female presents for a follow up visit today for the management of T1DM       She was diagnosed with Type 1 diabetes at 17 year old. Hospitalized at the time of diagnosis and started on insulin therapy     Family hx of diabetes: brother-- T1DM     Hospitalized for diabetes:   Scare with EMS-severe hypoglycemia  12/31/22 - hypoglycemia EMS, used baqsimi  12/28/22 covid19 -- hypoglycemia as well.   H/o DKA. 10/2018 DKA/admission    F/u with retinal specialist    No personal or FH of thyroid cancer or personal of pancreatic cancer or pancreatitis.     Gastric sleeve in July 2022  She lost about 50 lb    She reports since she had the gastric sleeve she is been suffering with issues of hypoglycemia      Our last visit was in April 2023  At that visit we continued her ozempic   Continued her Lants   Changed her Novolog to Fiasp   Discussed Omnipod 5   She reports that she has the supplies for Omnipod 5 but has not started it yet -- she is meeting with education   Recent A1c is 7%   See attached dexcom download   She reports that her ozempic was denied by her insurance   She is having an issues with getting her dexcom G6 supplies -- [employer] and [employer]                  DIABETES COMPLICATIONS: retinopathy      Diabetes Management Status    ASA:  No    Statin: Not taking  ACE/ARB: Not taking    The ASCVD Risk score (Anoop SHEA, et al., 2019) failed to calculate for the following reasons:    The 2019 ASCVD risk score is only valid for ages 40 to 79      Screening or Prevention Patient's value Goal Complete/Controlled?   HgA1C Testing and Control   Lab Results   Component Value Date    HGBA1C 7.0 (H) 08/04/2023      Annually/Less than 8% Yes     Lipid profile : 08/04/2023 Annually Yes     LDL control Lab Results   Component Value Date    LDLCALC 130.0 08/04/2023    Annually/Less than 100 mg/dl  No     Nephropathy screening  Lab Results   Component Value Date    LABMICR <5.0 04/05/2023     Lab Results   Component Value Date    PROTEINUA Negative 08/04/2023    Annually Yes     Blood pressure BP Readings from Last 1 Encounters:   08/14/23 119/71    Less than 140/90 Yes     Dilated retinal exam : 04/12/2023 Annually Yes     Foot exam   : 04/26/2023 Annually No         CURRENT A1C:    Hemoglobin A1C   Date Value Ref Range Status   08/04/2023 7.0 (H) 4.0 - 5.6 % Final     Comment:     ADA Screening Guidelines:  5.7-6.4%  Consistent with prediabetes  >or=6.5%  Consistent with diabetes    High levels of fetal hemoglobin interfere with the HbA1C  assay. Heterozygous hemoglobin variants (HbS, HgC, etc)do  not significantly interfere with this assay.   However, presence of multiple variants may affect accuracy.     04/05/2023 7.1 (H) 4.0 - 5.6 % Final     Comment:     ADA Screening Guidelines:  5.7-6.4%  Consistent with prediabetes  >or=6.5%  Consistent with diabetes    High levels of fetal hemoglobin interfere with the HbA1C  assay. Heterozygous hemoglobin variants (HbS, HgC, etc)do  not significantly interfere with this assay.   However, presence of multiple variants may affect accuracy.     01/12/2023 7.5 (H) 4.0 - 5.6 % Final     Comment:     ADA Screening Guidelines:  5.7-6.4%  Consistent with prediabetes  >or=6.5%  Consistent with diabetes    High levels of fetal hemoglobin interfere with the HbA1C  assay. Heterozygous hemoglobin variants (HbS, HgC, etc)do  not significantly interfere with this assay.   However, presence of multiple variants may affect accuracy.     01/12/2023 7.5 (H) 4.0 - 5.6 % Final     Comment:     ADA Screening Guidelines:  5.7-6.4%  Consistent with prediabetes  >or=6.5%  Consistent with diabetes    High levels of fetal hemoglobin interfere with the HbA1C  assay. Heterozygous hemoglobin variants (HbS, HgC, etc)do  not significantly interfere with this assay.   However, presence of multiple variants may affect  accuracy.     2023 7.5 (H) 4.0 - 5.6 % Final     Comment:     ADA Screening Guidelines:  5.7-6.4%  Consistent with prediabetes  >or=6.5%  Consistent with diabetes    High levels of fetal hemoglobin interfere with the HbA1C  assay. Heterozygous hemoglobin variants (HbS, HgC, etc)do  not significantly interfere with this assay.   However, presence of multiple variants may affect accuracy.         GOAL A1C: 6.5% without hypoglycemia       DM MEDICATIONS USED IN THE PAST:  humalog  levemir  novolog  lantus   Metformin   Trulicity   Ozempic   Dexcom   Fiasp   Tresiba       CURRENT DIABETES MEDICATIONS:   Off the ozempic due to insurance coverage   Tresiba ( using a friends Tresiba) -- 6 units daily and 4 units nightly   Fiasp  ICR 1:8   ISF 1:30   Target 150   Insulin: pens.    Missed doses: rarely       BLOOD GLUCOSE MONITORING:    Sensor type: Dexcom G7   Average BG readin  Time in range: 58%   17% high   17% very high   5% low   3% very low   Estimated A1c N/A   Site change:   q10 days     2 weeks prior -- average blood sugar 202 in target range 43% of the time with an estimated A1c of 8.1 %%    Supplies: [place]- Dexcom G 6   Dexcom G7 from pharmacy       Sensor was downloaded in clinic today and reviewed with patient.   Please see attached document for download.       HYPOGLYCEMIA:  Yes--5% low, 3% very low 2 weeks ago---recent 4% low, 1% very low  Glucagon kit: Baqsimi   Medic alert bracelet: denies       MEALS: eating 3 meals per day   Feels confident with CHO counting   Uses applications she is out eating   BF: eggs -- coffee in the AM   Lunch: salad, grilled chicken, tuna   Dinner: pasta, beans, spaghetti, sandwich   Snack: rarely   Drinks: coffee in AM   Water   Diet tea        CURRENT EXERCISE:  No      Review of Systems  Review of Systems   Constitutional:  Negative for appetite change, fatigue and unexpected weight change.   HENT:  Negative for trouble swallowing.    Eyes:  Negative for visual  disturbance.   Respiratory:  Negative for shortness of breath.    Cardiovascular:  Negative for chest pain.   Gastrointestinal:  Negative for nausea.   Endocrine: Negative for polydipsia, polyphagia and polyuria.   Genitourinary:         No Nocturia    Skin:  Negative for wound.   Neurological:  Negative for numbness.       Physical Exam   Physical Exam  Vitals and nursing note reviewed.   Constitutional:       Appearance: She is well-developed.      Comments: Overweight female    HENT:      Head: Normocephalic and atraumatic.      Right Ear: External ear normal.      Left Ear: External ear normal.      Nose: Nose normal.   Neck:      Thyroid: No thyromegaly.      Trachea: No tracheal deviation.   Cardiovascular:      Rate and Rhythm: Normal rate and regular rhythm.      Heart sounds: No murmur heard.  Pulmonary:      Effort: Pulmonary effort is normal. No respiratory distress.      Breath sounds: Normal breath sounds.   Abdominal:      Palpations: Abdomen is soft.      Tenderness: There is no abdominal tenderness.      Hernia: No hernia is present.   Musculoskeletal:      Cervical back: Normal range of motion and neck supple.   Skin:     General: Skin is warm and dry.      Capillary Refill: Capillary refill takes less than 2 seconds.      Findings: No rash.      Comments: Injection sites and dexcom sites are normal appearing. No lipo hypertropthy or atrophy     Neurological:      Mental Status: She is alert and oriented to person, place, and time.      Cranial Nerves: No cranial nerve deficit.   Psychiatric:         Behavior: Behavior normal.         Judgment: Judgment normal.         FOOT EXAMINATION: Appropriate footwear       Lab Results   Component Value Date    TSH 1.135 08/04/2023         Type 1 diabetes mellitus with hyperglycemia  Uncontrolled   Glycemic control should improve once she starts the Omnipod 5   She is meeting soon with diabetes education to start the omnipod 5         -- Medication Changes:     We will try to resubmit for the  Ozempic 0.5 mg weekly on Fridays--off-label use for insulin      Continue Tresiba- discussed that she can take 12 units at once no need to split Tresiba  Continue  Fiasp   Continue current prandial insulin settings ICR 1:8,    Target 150   Dose prandial insulin immediately following food consumption    Move forward with Omnipod 5-automated mode-- will need to use G6 and she is aware of that. We call [employer] ([employer]) to see what is going on with her dexcom G6 supplies-- it was an insurance issue - she will need to call her insurance       Insulin pump settings moving forward   Basal: 0.5 units/hr   ICR 1:10  ISF: 1:50   Target 150-- will lower as she becomes comfortable -- hx of hypoglycemia   IOB 3.5 hours       -- Reviewed goals of therapy are to get the best control we can without hypoglycemia.  -- Reviewed patient's current insulin regimen. Clarified proper insulin dose and timing in relation to meals, etc. Insulin injection sites and proper rotation instructed.    -- Advised frequent self blood glucose monitoring.  Patient encouraged to document glucose results and bring them to every clinic visit.  Needs to use Dexcom G6 with Omnipod 5 -- supplies from from [facility] - we spoke with Evaristo from [facility] during the visit and it is an issue with her insurance-- she will need to call her insurance as it is saying it is inactive   -- Hypoglycemia precautions discussed. Instructed on precautions before driving.    -- Call for Bg repeatedly < 70 or > 200.   -- Close adherence to lifestyle changes recommended.   -- Periodic follow ups for eye evaluations, foot care and dental care suggested.  -- Refer to diabetes education-- Omnipod 5 start   See education 1 week later for follow up     Patient has diabetes mellitus and manages diabetes with intensive insulin regimen and uses prandial and basal insulin daily  Patient requires a therapeutic CGM and is willing to use therapeutic CGM for the  necessary frequent adjustments of insulin therapy.  I have completed an in-person visit during the previous 6 months and will continue to have in-person visits every 6 months to assess adherence to their CGM regimen and diabetes treatment plan.  Due to COVID pandemic and need for remote monitoring this tool is medically necessary  Hx of marked hypoglycemia with EMS activation       Mild nonproliferative diabetic retinopathy of both eyes without macular edema associated with type 1 diabetes mellitus  Optimize BG readings.   See above.   Continue to follow-up with optometry as directed    Overweight (BMI 25.0-29.9)  Body mass index is 28.96 kg/m².  Increases insulin resistance.   Discussed DM diet and exercise.       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          Follow up in about 3 months (around 11/14/2023).  Follow up with me in 3 months with labs prior   Referral to podiatry for nail care       Orders Placed This Encounter   Procedures    Hemoglobin A1C     Standing Status:   Future     Standing Expiration Date:   2/14/2025    Ambulatory referral/consult to Podiatry     Standing Status:   Future     Standing Expiration Date:   2/14/2025     Referral Priority:   Routine     Referral Type:   Consultation     Referral Reason:   Specialty Services Required     Referred to Provider:   Cecilia Culp DPM      Requested Specialty:   Podiatry     Number of Visits Requested:   1       Recommendations were discussed with the patient in detail  The patient verbalized understanding and agrees with the plan outlined as above.     This note was partly generated with [employer] voice recognition software. I apologize for any possible typographical errors.

## 2023-08-16 LAB
FINAL PATHOLOGIC DIAGNOSIS: NORMAL
Lab: NORMAL

## 2023-08-17 RX ORDER — INSULIN DEGLUDEC 100 U/ML
12 INJECTION, SOLUTION SUBCUTANEOUS DAILY
Qty: 3 EACH | Refills: 11 | Status: SHIPPED | OUTPATIENT
Start: 2023-08-17 | End: 2023-12-18 | Stop reason: SDUPTHER

## 2023-08-17 NOTE — ASSESSMENT & PLAN NOTE
Uncontrolled   Glycemic control should improve once she starts the Omnipod 5   She is meeting soon with diabetes education to start the omnipod 5         -- Medication Changes:    We will try to resubmit for the  Ozempic 0.5 mg weekly on Fridays--off-label use for insulin      Continue Tresiba- discussed that she can take 12 units at once no need to split Tresiba  Continue  Fiasp   Continue current prandial insulin settings ICR 1:8,    Target 150   Dose prandial insulin immediately following food consumption    Move forward with Omnipod 5-automated mode-- will need to use G6 and she is aware of that. We call [employer] ([employer]) to see what is going on with her dexcom G6 supplies-- it was an insurance issue - she will need to call her insurance       Insulin pump settings moving forward   Basal: 0.5 units/hr   ICR 1:10  ISF: 1:50   Target 150-- will lower as she becomes comfortable -- hx of hypoglycemia   IOB 3.5 hours       -- Reviewed goals of therapy are to get the best control we can without hypoglycemia.  -- Reviewed patient's current insulin regimen. Clarified proper insulin dose and timing in relation to meals, etc. Insulin injection sites and proper rotation instructed.    -- Advised frequent self blood glucose monitoring.  Patient encouraged to document glucose results and bring them to every clinic visit.  Needs to use Dexcom G6 with Omnipod 5 -- supplies from from [employer] - we spoke with Evaristo from [facility] during the visit and it is an issue with her insurance-- she will need to call her insurance as it is saying it is inactive   -- Hypoglycemia precautions discussed. Instructed on precautions before driving.    -- Call for Bg repeatedly < 70 or > 200.   -- Close adherence to lifestyle changes recommended.   -- Periodic follow ups for eye evaluations, foot care and dental care suggested.  -- Refer to diabetes education-- Omnipod 5 start   See education 1 week later for follow up     Patient has diabetes  mellitus and manages diabetes with intensive insulin regimen and uses prandial and basal insulin daily  Patient requires a therapeutic CGM and is willing to use therapeutic CGM for the necessary frequent adjustments of insulin therapy.  I have completed an in-person visit during the previous 6 months and will continue to have in-person visits every 6 months to assess adherence to their CGM regimen and diabetes treatment plan.  Due to COVID pandemic and need for remote monitoring this tool is medically necessary  Hx of marked hypoglycemia with EMS activation

## 2023-08-17 NOTE — ASSESSMENT & PLAN NOTE
Body mass index is 28.96 kg/m².  Increases insulin resistance.   Discussed DM diet and exercise.

## 2023-08-18 ENCOUNTER — TELEPHONE (OUTPATIENT)
Dept: DIABETES | Facility: CLINIC | Age: 33
End: 2023-08-18
Payer: MEDICAID

## 2023-08-18 ENCOUNTER — PATIENT MESSAGE (OUTPATIENT)
Dept: DIABETES | Facility: CLINIC | Age: 33
End: 2023-08-18
Payer: MEDICAID

## 2023-08-18 NOTE — TELEPHONE ENCOUNTER
----- Message from Erika Alcantara LPN sent at 8/18/2023  2:22 PM CDT -----  Pt ozempic was denied a month ago and the pa I completed yesterday was still denied due to her being a type 1 diabetic, I know last time we told her to reach out to her insurance to see if they will change decision

## 2023-08-21 ENCOUNTER — TELEPHONE (OUTPATIENT)
Dept: PHARMACY | Facility: CLINIC | Age: 33
End: 2023-08-21
Payer: MEDICAID

## 2023-08-23 ENCOUNTER — OFFICE VISIT (OUTPATIENT)
Dept: OBSTETRICS AND GYNECOLOGY | Facility: CLINIC | Age: 33
End: 2023-08-23
Attending: OBSTETRICS & GYNECOLOGY
Payer: MEDICAID

## 2023-08-23 VITALS
WEIGHT: 160.25 LBS | DIASTOLIC BLOOD PRESSURE: 72 MMHG | BODY MASS INDEX: 28.39 KG/M2 | HEIGHT: 63 IN | SYSTOLIC BLOOD PRESSURE: 110 MMHG

## 2023-08-23 DIAGNOSIS — Z30.2 STERILIZATION: Primary | ICD-10-CM

## 2023-08-23 DIAGNOSIS — N92.0 MENORRHAGIA WITH REGULAR CYCLE: ICD-10-CM

## 2023-08-23 PROCEDURE — 3074F SYST BP LT 130 MM HG: CPT | Mod: CPTII,,, | Performed by: OBSTETRICS & GYNECOLOGY

## 2023-08-23 PROCEDURE — 99999 PR PBB SHADOW E&M-EST. PATIENT-LVL III: CPT | Mod: PBBFAC,,, | Performed by: OBSTETRICS & GYNECOLOGY

## 2023-08-23 PROCEDURE — 3074F PR MOST RECENT SYSTOLIC BLOOD PRESSURE < 130 MM HG: ICD-10-PCS | Mod: CPTII,,, | Performed by: OBSTETRICS & GYNECOLOGY

## 2023-08-23 PROCEDURE — 3008F PR BODY MASS INDEX (BMI) DOCUMENTED: ICD-10-PCS | Mod: CPTII,,, | Performed by: OBSTETRICS & GYNECOLOGY

## 2023-08-23 PROCEDURE — 3061F NEG MICROALBUMINURIA REV: CPT | Mod: CPTII,,, | Performed by: OBSTETRICS & GYNECOLOGY

## 2023-08-23 PROCEDURE — 3051F PR MOST RECENT HEMOGLOBIN A1C LEVEL 7.0 - < 8.0%: ICD-10-PCS | Mod: CPTII,,, | Performed by: OBSTETRICS & GYNECOLOGY

## 2023-08-23 PROCEDURE — 3051F HG A1C>EQUAL 7.0%<8.0%: CPT | Mod: CPTII,,, | Performed by: OBSTETRICS & GYNECOLOGY

## 2023-08-23 PROCEDURE — 99999 PR PBB SHADOW E&M-EST. PATIENT-LVL III: ICD-10-PCS | Mod: PBBFAC,,, | Performed by: OBSTETRICS & GYNECOLOGY

## 2023-08-23 PROCEDURE — 1159F PR MEDICATION LIST DOCUMENTED IN MEDICAL RECORD: ICD-10-PCS | Mod: CPTII,,, | Performed by: OBSTETRICS & GYNECOLOGY

## 2023-08-23 PROCEDURE — 99024 PR POST-OP FOLLOW-UP VISIT: ICD-10-PCS | Mod: ,,, | Performed by: OBSTETRICS & GYNECOLOGY

## 2023-08-23 PROCEDURE — 3061F PR NEG MICROALBUMINURIA RESULT DOCUMENTED/REVIEW: ICD-10-PCS | Mod: CPTII,,, | Performed by: OBSTETRICS & GYNECOLOGY

## 2023-08-23 PROCEDURE — 99024 POSTOP FOLLOW-UP VISIT: CPT | Mod: ,,, | Performed by: OBSTETRICS & GYNECOLOGY

## 2023-08-23 PROCEDURE — 99213 OFFICE O/P EST LOW 20 MIN: CPT | Mod: PBBFAC | Performed by: OBSTETRICS & GYNECOLOGY

## 2023-08-23 PROCEDURE — 3066F NEPHROPATHY DOC TX: CPT | Mod: CPTII,,, | Performed by: OBSTETRICS & GYNECOLOGY

## 2023-08-23 PROCEDURE — 1159F MED LIST DOCD IN RCRD: CPT | Mod: CPTII,,, | Performed by: OBSTETRICS & GYNECOLOGY

## 2023-08-23 PROCEDURE — 3008F BODY MASS INDEX DOCD: CPT | Mod: CPTII,,, | Performed by: OBSTETRICS & GYNECOLOGY

## 2023-08-23 PROCEDURE — 3078F PR MOST RECENT DIASTOLIC BLOOD PRESSURE < 80 MM HG: ICD-10-PCS | Mod: CPTII,,, | Performed by: OBSTETRICS & GYNECOLOGY

## 2023-08-23 PROCEDURE — 3066F PR DOCUMENTATION OF TREATMENT FOR NEPHROPATHY: ICD-10-PCS | Mod: CPTII,,, | Performed by: OBSTETRICS & GYNECOLOGY

## 2023-08-23 PROCEDURE — 3078F DIAST BP <80 MM HG: CPT | Mod: CPTII,,, | Performed by: OBSTETRICS & GYNECOLOGY

## 2023-08-23 NOTE — PROGRESS NOTES
"Subjective:       Anusha Andrew is a 32 y.o. female who presents to the clinic 2 weeks status post laparoscopic tubal ligation and endometrial ablation for requested sterilization. Eating a regular diet without difficulty. Bowel movements are normal. The patient is not having any pain.      Objective:      /72 (BP Location: Left arm, Patient Position: Sitting, BP Method: Medium (Manual))   Ht 5' 3" (1.6 m)   Wt 72.7 kg (160 lb 4.4 oz)   LMP  (LMP Unknown)   BMI 28.39 kg/m²   General:  alert and cooperative   Abdomen: soft, bowel sounds active, non-tender   Incision:       healing well, no drainage, no erythema, no hernia, no seroma, no swelling, no dehiscence, incision well approximated         Assessment:      Doing well postoperatively.  Operative findings again reviewed. Pathology report discussed.      Plan:      1. Continue any current medications.  2. Wound care discussed.  3. Activity restrictions: none  4. Anticipated return to work: now.  5. Follow up: . annually  "

## 2023-08-30 ENCOUNTER — PATIENT MESSAGE (OUTPATIENT)
Dept: DIABETES | Facility: CLINIC | Age: 33
End: 2023-08-30
Payer: MEDICAID

## 2023-09-18 ENCOUNTER — PATIENT MESSAGE (OUTPATIENT)
Dept: PEDIATRICS | Facility: CLINIC | Age: 33
End: 2023-09-18
Payer: MEDICAID

## 2023-09-22 ENCOUNTER — NUTRITION (OUTPATIENT)
Dept: DIABETES | Facility: CLINIC | Age: 33
End: 2023-09-22
Payer: MEDICAID

## 2023-09-22 DIAGNOSIS — E10.65 TYPE 1 DIABETES MELLITUS WITH HYPERGLYCEMIA: Primary | ICD-10-CM

## 2023-09-22 DIAGNOSIS — E10.649 TYPE 1 DIABETES MELLITUS WITH HYPOGLYCEMIA UNAWARENESS: ICD-10-CM

## 2023-09-22 PROCEDURE — 99999 PR PBB SHADOW E&M-EST. PATIENT-LVL I: CPT | Mod: PBBFAC,,, | Performed by: DIETITIAN, REGISTERED

## 2023-09-22 PROCEDURE — 99999PBSHW PR PBB SHADOW TECHNICAL ONLY FILED TO HB: ICD-10-PCS | Mod: PBBFAC,,,

## 2023-09-22 PROCEDURE — 99999 PR PBB SHADOW E&M-EST. PATIENT-LVL I: ICD-10-PCS | Mod: PBBFAC,,, | Performed by: DIETITIAN, REGISTERED

## 2023-09-22 PROCEDURE — 99999PBSHW PR PBB SHADOW TECHNICAL ONLY FILED TO HB: Mod: PBBFAC,,,

## 2023-09-22 PROCEDURE — 99211 OFF/OP EST MAY X REQ PHY/QHP: CPT | Mod: PBBFAC,PN | Performed by: DIETITIAN, REGISTERED

## 2023-09-22 PROCEDURE — G0108 DIAB MANAGE TRN  PER INDIV: HCPCS | Mod: PBBFAC,PN | Performed by: DIETITIAN, REGISTERED

## 2023-09-23 ENCOUNTER — IMMUNIZATION (OUTPATIENT)
Dept: PRIMARY CARE CLINIC | Facility: CLINIC | Age: 33
End: 2023-09-23
Payer: MEDICAID

## 2023-09-23 PROCEDURE — 99999PBSHW FLU VACCINE (QUAD) GREATER THAN OR EQUAL TO 3YO PRESERVATIVE FREE IM: Mod: PBBFAC,,,

## 2023-09-23 PROCEDURE — 99999PBSHW FLU VACCINE (QUAD) GREATER THAN OR EQUAL TO 3YO PRESERVATIVE FREE IM: ICD-10-PCS | Mod: PBBFAC,,,

## 2023-09-23 PROCEDURE — 90686 IIV4 VACC NO PRSV 0.5 ML IM: CPT | Mod: PBBFAC,PN

## 2023-09-24 VITALS — WEIGHT: 160 LBS | HEIGHT: 63 IN | BODY MASS INDEX: 28.35 KG/M2

## 2023-09-24 NOTE — PROGRESS NOTES
"Diabetes Care Specialist Follow-up Note  Author: Daniella Maher RD, CDE  Date: 9/24/2023    Program Intake  Reason for Diabetes Program Visit:: Intervention  Type of Intervention:: Individual  Individual: Device Training  Device Training: Insulin Pump Start  Current diabetes risk level:: moderate  In the last 12 months, have you:: used emergency room services  Was the ER or hospital admission related to diabetes?: No  Permission to speak with others about care:: yes    Lab Results   Component Value Date    HGBA1C 7.0 (H) 08/04/2023     A1c Pre Diabetes Care Specialist Intervention:  7.5%    Clinical    Weight: 72.6 kg (160 lb)   Height: 5' 3" (160 cm)   Body mass index is 28.34 kg/m².    Patient Health Rating  Compared to other people your age, how would you rate your health?: Fair    Problem Review  Reviewed Problem List with Patient: yes  Active comorbidities affecting diabetes self-care.: yes  Reviewed health maintenance: yes    Clinical Assessment  Current Diabetes Treatment: Diet, Insulin, Injectable, Exercise  Have you ever experienced hypoglycemia (low blood sugar)?: yes  In the last month, how often have you experienced low blood sugar?: more than once a day  Are you able to tell when your blood sugar is low?: Yes  What symptoms do you experience?: Sweaty, Shaky  Have you ever been hospitalized because your blood sugar was too low?: yes (see comments)  How do you treat hypoglycemia (low blood sugar)?: 1/2 can soda/fruit juice  Have you ever experienced hyperglycemia (high blood sugar)?: yes  In the last month, how often have you experienced high blood sugar?: more than once a day  Are you able to tell when your blood sugar is high?: No (comment)  Have you ever been hospitalized because your blood sugar was high?: no    Medication Information  Medication adherence impacting ability to self-manage diabetes?: No         Nutritional Status  Diet: Regular  Meal Plan 24 Hour Recall: Breakfast, Lunch, Dinner, " Snack  Meal Plan 24 Hour Recall - Breakfast: Egg sandwich, but sometimes will have oatmeal, turkey sausage  Meal Plan 24 Hour Recall - Lunch: usually something like a salad with grilled chicken, grilled chicken wrap, or mac and cheese with protein  Meal Plan 24 Hour Recall - Dinner: grilled chicken with a salad  Meal Plan 24 Hour Recall - Snack: Crackers. Drinks water and diet tea  Change in appetite?: No  Current nutritional status an area of need that is impacting patient's ability to self-manage diabetes?: No    Physical activity/Exercise:   Light activity 3 times a week    SMBG: using the dexcom G6  Dexcom report downloaded, reviewed and saved in /patient's chart    Additional Social History    Support  Is Support an area impacting ability to self-manage diabetes?: No    Access to Mass Media & Technology  Media or technology needs impacting ability to self-manage diabetes?: No    Cognitive/Behavioral Health  Cognitive or behavioral barriers impacting ability to self-manage diabetes?: No    Culture/Yazidism  Culture or Evangelical beliefs that may impact ability to access healthcare: No    Communication  Communication needs impacting ability to self-manage diabetes?: No    Health Literacy  Health literacy needs impacting ability to self-manage diabetes?: No      Diabetes Self-Management Skills Assessment     Diabetes Disease Process/Treatment Options  Patient/caregiver able to state what happens when someone has diabetes.: yes  Patient/caregiver knows what type of diabetes they have.: yes  Diabetes Type : Type I  Patient/caregiver able to identify at least three signs and symptoms of diabetes.: yes  Identified signs and symptoms:: fatigue, frequent urination, increased thirst  Diabetes Disease Process/Treatment Options: Skills Assessment Completed: Yes  Assessment indicates:: Adequate understanding  Area of need?: No    Nutrition/Healthy Eating  Method of carbohydrate measurement:: carb  counting/reading labels  Patient can identify foods that impact blood sugar.: yes  Patient-identified foods:: fruit/fruit juice, milk, soda, starchy vegetables (corn, peas, beans), yogurt, sweets, starches (bread, pasta, rice, cereal)  Nutrition/Healthy Eating Skills Assessment Completed:: Yes  Assessment indicates:: Adequate understanding  Area of need?: No    Physical Activity/Exercise  Patient's daily activity level:: lightly active  Patient formally exercises outside of work.: yes  Exercise Type: using exercise equipment  Intensity: Low  Frequency: three times a week  Duration: 30 min  Patient can identify forms of physical activity.: yes  Stated forms of physical activity:: housework, dancing  Patient can identify reasons why exercise/physical activity is important in diabetes management.: yes  Identified reasons:: lowers blood glucose, blood pressure, and cholesterol  Physical Activity/Exercise Skills Assessment Completed: : Yes  Assessment indicates:: Adequate understanding  Area of need?: No    Medications  Patient is able to describe current diabetes management routine.: yes  Diabetes management routine:: diet, exercise, injectable medications, insulin  Patient is able to identify current diabetes medications, dosages, and appropriate timing of medications.: yes  Patient understands the purpose of the medications taken for diabetes.: yes  Patient reports problems or concerns with current medication regimen.: no  Medication Skills Assessment Completed:: Yes  Assessment indicates:: Instruction Needed  Area of need?: Yes    Home Blood Glucose Monitoring  Patient states that blood sugar is checked at home daily.: yes  Monitoring Method:: personal continuous glucose monitor  Personal CGM type:: dexcom  Patient is able to use personal CGM appropriately.: yes  CGM Report reviewed?: yes  Home Blood Glucose Monitoring Skills Assessment Completed: : Yes  Assessment indicates:: Adequate understanding  Area of need?:  No    Acute Complications  Acute Complications Skills Assessment Completed: : No  Deffered due to:: Time  Area of need?: Yes    Chronic Complications  Chronic Complications Skills Assessment Completed: : No  Assessment indicates:: Knowledge deficit, Instruction Needed  Deferred due to:: Time  Area of need?: Yes    Psychosocial/Coping  Psychosocial/Coping Skills Assessment Completed: : No  Assessment indicates:: Adequate understanding  Deffered due to:: Time  Area of need?: No      During today's follow-up visit,  the following areas required further assessment and content was provided/reviewed.    Based on today's diabetes care assessment, the following areas of need were identified:          9/22/2023    12:01 AM   Social   Support No   Access to Mass Media/Tech No   Cognitive/Behavioral Health No   Culture/Temple No   Communication No   Health Literacy No            9/22/2023    12:01 AM   Clinical   Medication Adherence No   Nutritional Status No            9/22/2023    12:01 AM   Diabetes Self-Management Skills   Diabetes Disease Process/Treatment Options No   Nutrition/Healthy Eating No   Physical Activity/Exercise No, reviewed activity mode in the omnipod 5   Medication Yes  Insulin Pump Education  OMNI POD 5 INSULIN PUMP START  Explained SmartAdjust Technology - Every 5 minutes, the system automatically increases, decreases, or pauses insulin delivery based on your customized target glucose--helping to protect against highs and lows, day and night.  Automated Mode vs Manual Mode vs Limited Automated Mode  Downloading the [place] and "[employer]ect ([facility])  Pod and Dexcom need to be in line of site of each other  Inputting Dexcom Transmitter Code into [place] or Handheld Device  Dexcom communicates with the pod directly and the Dexcom G6 [place]a  Activity Feature  Changing the target and the length of time insulin is on board will adjust automated mode  Changing ICR/Basal Rates/ISF will only change setting in manual  mode  SmartBolus Calculator - incorporates CGM data and trend data  Setup podder central account and linked [employer] account  Patient educated on insulin pump therapy, what a basal rate and bolus dose are, when to change pod, demonstrated how to prepare the syringe and pod for use with the pump, discussed ease of usage, basal and bolus, carbohydrate to insulin ratio, correction factors, approved areas to insert set, carbohydrate counting, error messages, explained the basal rates - patient will receive a little bit of insulin throughout 24 hours, bolus dose are delivered delivered by the inputting grams of carbohydrates, explained that patient will be counting carbohydrates and checking blood glucose before each meal, discussed when to change the pod, demonstrated how to fill the pod with insulin, how to apply pod and insert the needle, explained and demonstrated how to safely retract the needle and remove the pod, discussed ease of usage, carbohydrate counting, demonstrated applying device, inserting needle, administering bolus insulin, retracting needle, and removing/disposing of pod. Patient states understanding and performed return demonstration. Patient started first pod in office. Patient provided with written literature and CDE contact information      Pump training was provided per Omni Pod protocol.  Patient is new to insulin pump therapy.      All settings obtained from Ritika Grewal's last office visit note.   Basal:  12AM: 0.5 units/hr     Max basal rate: 5 u/hr     Bolus:  CHO ratio: 1:10  ISF: 1:50  Glucose target : 150 mg/dL  Correct  over: 150mg/dl  Active insulin time: 3.5 hours  Max bolus: 30 units     Low reservoir insulin alarm: 10 units  Change pod alarm: every 3 days      Details of pump therapy were covered included following controller features and programming, pod activation, pod site selection and rotation, automatic pod priming and insertion, setting & editing basal rates in manual mode,  giving bolus and other features in the set-up menu.  The following regarding the Omnipod 5 was covered:  During your first Pod wear, since no recent history is available, the Omnipod 5 System uses only your active Basal Program from Manual Mode as a starting point to adjust your insulin. After 48 hours of history is collected, which usually happens with the first Pod wear, [employer] technology stops adjusting insulin against your active Basal Program and starts using the Adaptive Basal Rate for your automated insulin delivery with your next Pod change. With each Pod change, insulin delivery information is sent and saved in the Omnipod 5 [place] so that the next Pod that is started is updated with the new Adaptive Basal Rate. With each new Pod activation, the system adapts insulin delivery based on physiological needs and total daily insulin (TDI) delivered. After 2-3 Pod changes, adaptation generally stabilizes and automated insulin delivery is based on this adaptation.  Patient demonstrated ability to program controller, activate and insert pod using aseptic technique.  Patient demonstrated ability to program Dexcom transmitter into controller and start automated limited mode.    Instructed pt on use of basic pump features ie...give a bolus, pause insulin, switch from manual to automated mode.  Reviewed features available in manual mode verses automated mode.   Reviewed when and how to use activity function in automated mode.  Reviewed site selection of pods, rotation of sites and hard stop to change pod every 72 hrs.   Instructed that insulin vial is good out of refrigeration for up to 28-30 days.   Reviewed treatment of hypoglycemia, hyperglycemia; sick day care, DKA, and troubleshooting of pump.  Advised to carry back-up supplies with her and discussed steps to take in case of connection loss.   Omni Pod 24-hour support line provided.     Home Blood Glucose Monitoring No, instructed on how to enter the transmitter  number into the omnipod 5 deceiver   Acute Complications Yes, briefly discussed, will go into more depth at follow-up visit.   Chronic Complications Yes, briefly discussed, will go into more depth at follow-up visit.   Psychosocial/Coping No        Today's interventions were provided through individual discussion, instruction, and written materials were provided.    Patient verbalized understanding of instruction and written materials.  Pt was able to return back demonstration of instructions today. Patient understood key points, needs reinforcement and further instruction.     Diabetes Self-Management Care Plan Review and Evaluation of Progress:    During today's follow-up Tommy Diabetes Self-Management Care Plan progress was reviewed and progress was evaluated including his/her input. Anusha has agreed to continue his/her journey to improve/maintain overall diabetes control by continuing to set health goals. See care plan progress below.      Care Plan: Diabetes Management   Updates made since 8/25/2023 12:00 AM        Problem: Disease Process Resolved 9/22/2023        Goal: Pt agrees to take steps toward understanding diabetes disease process & treatment options by attending education classes,staying abreast on latest literature,maintaining appts with doctors & taking medications prescribed for diabetes if applicable. Completed 9/22/2023   Start Date: 4/9/2021   Expected End Date: 5/9/2021   This Visit's Progress: Met   Priority: High        Problem: Healthy Eating Resolved 9/22/2023        Goal: Will measure food and read food labels. Will count carbohydrates to equal 30-45 gm per meal. Will abstain from drinking Sugar Beverages and utilize Diet and/or artificial sweetener. Completed 9/22/2023   Start Date: 4/9/2021   Expected End Date: 5/9/2021   This Visit's Progress: Met   Priority: High        Problem: Healthy Eating Resolved 9/22/2023        Goal: Eat 3 meals daily with 30-45g/2-3 servings of  Carbohydrate per meal. Completed 9/22/2023   Start Date: 2/2/2023   Expected End Date: 3/2/2023   This Visit's Progress: Met   Priority: High   Barriers: Knowledge deficit   Note:    Instructed pt on the food groups, how to read labels and count carbs. Pt was given sample menus and meal plans as examples Discussed with pt the importance of eating 3 balanced and portioned meals per day.Discussed with pt the importance of eating 3 balanced meals with all the food groups and taking her meal time insulin 3 times a day with meals and spaced apart every 4-6 hours. Pt had the gastric sleeve and has been eating mostly protein. Pt reports that since December she has been having episodes of extreme high and low blood sugars. Pt reports that she is having bs's in the 50's 5-6 times a day. She is also having lows in the evening around bedtime which are in the 40's and 50's. Pt skips 1-2 doses of Novolo per day. Pt has been treating hypoglycemia with 12 oz juice because she does not find that the 4 oz takes care of the issue. Discussed in length the symptoms, signs and treatment of hypoglycemia and the need to eat something after treatment to prevent another low which the pt was not doing. Discussed with pt about adding carbs to her meals to give her some carbs at her meals. Pt will work on meal planning and eating her carbs and putting balance in her meals and consistency in taking Novolog.  Pt was also advised to eat a 15 gram carb snack before and after exercising since she has a good workout.       Problem: Medications         Goal: Patient Agrees to use omnipod 5 to bolus with each meal three times a day forthe next 3 months    Start Date: 9/22/2023   Expected End Date: 12/22/2023   This Visit's Progress: No change   Recent Progress: Deferred   Priority: High   Barriers: No Barriers Identified   Note:    DIABETES EDUCATOR NOTE - PUMP EVAL    Patient seen for initial assessment and education on Omnipod 5.  Patient is  interested in an insulin pump and continuous glucose monitor.   Pt returned today for evaluation/education on self care measure of diabetes management, blood glucose testing, meal patterns/ability to carbohydrate count, and problem solving skills for managing hypo and hyperglycemia.  Covered basic details of pump therapy with patient. Insulin pump therapy in general pros and cons   Reviewed major insulin pump vendors: Medtronic, Tandem, and Omni Pod.  Reviewed terms ISF, CHO ratio, goal BG, bolus, basal, active insulin and insulin on board. Explained importance of practicing total bolus calculations prior to starting pump therapy as this info will be programmed into the pump.  Explained each pump allows for different max volumes of insulin in reservoir chamber.  Pt verbalized clearer understanding of pump and CGM therapy.     During today's visit the patient was introduces to/educated on the following content areas:     Current Diabetic Medications: Fiasp: 1-8 units after meals, Glargine: 12 units in AM.    Nutrition:   Carb counting skills: Pt states comfortable with carbohydrate counting.  Instructed on I:C ratio    Instructed pt on ISF of 1:50 starting @ goal 150.    Glucose monitoring:  Patient is testing BG using finger sticks 2/2 Dexcom G7 not compatible with Omnipod 5.     EDUCATION PLAN:   Will try to go on line to look at each pump in more detail.  Information provided on Omnipod 5, which patient is most interested in.  Follow up for continued education on possible pump training .     Pt states currently bolusing after meals, educator notified NP, NP recommended bolsuing before meals.          Task: Reviewed with patient all current diabetes medications and provided basic review of the purpose, dosage, frequency, side effects, and storage of both oral and injectable diabetes medications. Completed 9/24/2023        Task: Explained Smart [employer] Completed 9/24/2023        Task: patient demonstrated  ability to program controller, activate and insert pod using aseptic technique Completed 9/24/2023        Task: Patient demonstrated ability to program Dexcom transmitter into controller and start automated limited mode. Completed 9/24/2023        Task: Reviewed and reconciled current medication list today. Completed 9/24/2023        Task: Instructed pt on use of basic pump features ie...give a bolus, pause insulin, switch from manual to automated mode. Completed 9/24/2023        Task: Reviewed features available in manual mode verses automated mode Completed 9/24/2023        Task: Reviewed when and how to use activity function in automated mode Completed 9/24/2023        Task: Reviewed site selection of pods, rotation of sites and hard stop to change pod every 72 hrs Completed 9/24/2023        Task: Instructed that insulin vial is good out of refrigeration for up to 28-30 days Completed 9/24/2023        Task: Reviewed treatment of hypoglycemia, hyperglycemia; sick day care, DKA, and troubleshooting of pump Completed 9/24/2023        Task: Advised to carry back-up supplies with her and discussed steps to take in case of connection loss Completed 9/24/2023        Task: Details of pump therapy were covered including controller/taiwo features and programming and pod activation Completed 9/24/2023        Task: Reviewed pod site selection and rotation and automatic pod priming and insertion Completed 9/24/2023        Task: Reviewed setting & editing basal rates in manual mode, giving bolus and other features in the set-up menu. Completed 9/24/2023        Task: Patient provided with [employer] and [employer] usernames and passwords, also documented in chart note and blue sticky note in Epic Completed 9/24/2023        Task: Omni Pod 24-hour support line provided Completed 9/24/2023        Task: Omnipod data was downloaded and reviewed with patient and provider, any necessary adjustments were made           Follow Up Plan     Follow  up in about 1 week (around 9/29/2023) for Personal CGM Upload, Insulin Pump Upload.    Today's care plan and follow up schedule was discussed with patient.  Anusha verbalized understanding of the care plan, goals, and agrees to follow up plan.        The patient was encouraged to communicate with his/her health care provider/physician and care team regarding his/her condition(s) and treatment.  I provided the patient with my contact information today and encouraged to contact me via phone or [facility] Patient Portal as needed.     Length of Visit   Total Time: 90 Minutes

## 2023-09-28 ENCOUNTER — TELEPHONE (OUTPATIENT)
Dept: DIABETES | Facility: CLINIC | Age: 33
End: 2023-09-28
Payer: MEDICAID

## 2023-10-04 ENCOUNTER — PATIENT MESSAGE (OUTPATIENT)
Dept: DIABETES | Facility: CLINIC | Age: 33
End: 2023-10-04
Payer: MEDICAID

## 2023-10-04 ENCOUNTER — TELEPHONE (OUTPATIENT)
Dept: INTERNAL MEDICINE | Facility: CLINIC | Age: 33
End: 2023-10-04
Payer: MEDICAID

## 2023-10-04 NOTE — TELEPHONE ENCOUNTER
----- Message from Shannon Montano sent at 10/4/2023 10:47 AM CDT -----  Contact: [facility] [phone]  Pharmacy is calling to clarify an RX.  RX name:  semaglutide (OZEMPIC) 0.25 mg or 0.5 mg (2 mg/3 mL) pen injector  What do they need to clarify:  needs dx code, one on script is not working. Type 1 diabetes with hyperglycemia is not working and overweight will not work.  Comments:

## 2023-10-17 ENCOUNTER — PATIENT MESSAGE (OUTPATIENT)
Dept: PODIATRY | Facility: CLINIC | Age: 33
End: 2023-10-17
Payer: MEDICAID

## 2023-10-18 ENCOUNTER — PATIENT MESSAGE (OUTPATIENT)
Dept: CARDIOLOGY | Facility: CLINIC | Age: 33
End: 2023-10-18
Payer: MEDICAID

## 2023-10-23 ENCOUNTER — NUTRITION (OUTPATIENT)
Dept: DIABETES | Facility: CLINIC | Age: 33
End: 2023-10-23
Payer: MEDICAID

## 2023-10-23 DIAGNOSIS — E10.65 TYPE 1 DIABETES MELLITUS WITH HYPERGLYCEMIA: Primary | ICD-10-CM

## 2023-10-23 PROCEDURE — G0108 DIAB MANAGE TRN  PER INDIV: HCPCS | Mod: PBBFAC,PN | Performed by: DIETITIAN, REGISTERED

## 2023-10-23 PROCEDURE — 99211 OFF/OP EST MAY X REQ PHY/QHP: CPT | Mod: PBBFAC,PN | Performed by: DIETITIAN, REGISTERED

## 2023-10-23 PROCEDURE — 99999PBSHW PR PBB SHADOW TECHNICAL ONLY FILED TO HB: ICD-10-PCS | Mod: PBBFAC,,,

## 2023-10-23 PROCEDURE — 99999 PR PBB SHADOW E&M-EST. PATIENT-LVL I: CPT | Mod: PBBFAC,,, | Performed by: DIETITIAN, REGISTERED

## 2023-10-23 PROCEDURE — 99999PBSHW PR PBB SHADOW TECHNICAL ONLY FILED TO HB: Mod: PBBFAC,,,

## 2023-10-23 PROCEDURE — 99999 PR PBB SHADOW E&M-EST. PATIENT-LVL I: ICD-10-PCS | Mod: PBBFAC,,, | Performed by: DIETITIAN, REGISTERED

## 2023-10-23 NOTE — PROGRESS NOTES
Diabetes Care Specialist Follow-up Note  Author: Daniella Maher RD, CDE  Date: 10/23/2023    Program Intake  Reason for Diabetes Program Visit:: Intervention  Type of Intervention:: Individual  Individual: Education  Education: Pattern Management  Current diabetes risk level:: moderate  Permission to speak with others about care:: yes    Lab Results   Component Value Date    HGBA1C 7.0 (H) 08/04/2023     A1c Pre Diabetes Care Specialist Intervention:  7.5%    Clinical          There is no height or weight on file to calculate BMI.    Medication Information  How do you obtain your medications?: Patient drives  How many days a week do you miss your medications?: 2  Do you sometimes have difficulty refilling your medications?: No  Medication adherence impacting ability to self-manage diabetes?: Yes  Patient reports taking breaks from the omnipod - like if going somewhere and doesn't want to have the pod on or just needing to take a break from the pump  Also reports occasionally dosing insulin after a meal with a pen not the pump cause is unsure if able to make another bolus with the pump after one was already made  When taking a break from the pump not using backup plan                          Labs  Lab Compliance Barriers: No    Nutritional Status  Diet: Regular  Meal Plan 24 Hour Recall: Breakfast, Lunch, Dinner, Snack  Meal Plan 24 Hour Recall - Breakfast: Egg sandwich, but sometimes will have oatmeal, turkey sausage  Meal Plan 24 Hour Recall - Lunch: usually something like a salad with grilled chicken, grilled chicken wrap, or mac and cheese with protein  Meal Plan 24 Hour Recall - Dinner: pasta, beans, spaghetti, sandwich  Meal Plan 24 Hour Recall - Snack: Crackers. Drinks water and diet tea  Current nutritional status an area of need that is impacting patient's ability to self-manage diabetes?: No    Physical activity/Exercise:   Yes, 3 times a week    SMBG: using the dexcom G6  Report reviewed with patient and  provider, adjustments made to settings        Additional Social History    Support  Is Support an area impacting ability to self-manage diabetes?: No    Access to Mass Media & Technology  Media or technology needs impacting ability to self-manage diabetes?: No    Cognitive/Behavioral Health  Cognitive or behavioral barriers impacting ability to self-manage diabetes?: No    Culture/Church  Culture or Synagogue beliefs that may impact ability to access healthcare: No    Communication  Communication needs impacting ability to self-manage diabetes?: No    Health Literacy  Health literacy needs impacting ability to self-manage diabetes?: No      Diabetes Self-Management Skills Assessment     Diabetes Disease Process/Treatment Options  Patient/caregiver able to state what happens when someone has diabetes.: yes  Patient/caregiver knows what type of diabetes they have.: yes  Diabetes Type : Type I  Patient/caregiver able to identify at least three signs and symptoms of diabetes.: yes  Identified signs and symptoms:: fatigue, frequent urination, increased thirst  Diabetes Disease Process/Treatment Options: Skills Assessment Completed: No  Assessment indicates:: Adequate understanding  Area of need?: No    Nutrition/Healthy Eating  Nutrition/Healthy Eating Skills Assessment Completed:: No  Assessment indicates:: Adequate understanding  Deffered due to:: Other (comment) (previously completed, there has been no change and patient has adequate understanding)  Area of need?: No    Physical Activity/Exercise  Physical Activity/Exercise Skills Assessment Completed: : No  Assessment indicates:: Adequate understanding  Deffered due to:: Other (comment) (previously completed, there has been no change and patient has adequate understanding)  Area of need?: No    Medications  Patient is able to describe current diabetes management routine.: yes  Diabetes management routine:: diet, exercise, injectable medications, insulin  Patient is  able to identify current diabetes medications, dosages, and appropriate timing of medications.: yes  Patient understands the purpose of the medications taken for diabetes.: yes  Patient reports problems or concerns with current medication regimen.: no  Medication Skills Assessment Completed:: Yes  Assessment indicates:: Instruction Needed  Area of need?: Yes    Home Blood Glucose Monitoring  Home Blood Glucose Monitoring Skills Assessment Completed: : No  Assessment indicates:: Adequate understanding  Deferred due to:: Other (comment) (previously completed, there has been no change and patient has adequate understanding)  Area of need?: No    Acute Complications  Patient is able to identify types of acute complications: Yes  Patient Identified:: Hypoglycemia, Hyperglycemia, Diabetic Ketoacidosis  Patient is able to state the basic meaning of hypoglycemia?: Yes  Able to state the blood sugar range for hypoglycemia?: yes  Patient stated range:: <70  Patient can identify general symptoms of hypoglycemia: yes  Patient identified:: other (see comments), shakiness  Able to state proper treatment of hypoglycemia?: yes  Patient identified:: 1/2 can soda/fruit juice  Patient is able to state the basic meaning of hyperglycemia?: Yes  Able to state the blood sugar range for hyperglycemia?: yes  Patient stated range:: >250  Patient able to state proper treatment of hyperglycemia?: yes  Patient identified:: take medication as recommended, monitor blood sugar  Acute Complications Skills Assessment Completed: : No  Assessment indicates:: Instruction Needed  Area of need?: Yes    Chronic Complications  Patient can identify major chronic complications of diabetes.: yes  Stated chronic complications:: neuropathy/nerve damage, retinopathy  Patient can identify ways to prevent or delay diabetes complications.: no  Patient is aware that having diabetes increases risk of heart disease?: Yes  Patient is aware that heart disease is the  leading cause of death and disability in people with diabetes?: No  Patient able to state risk factors for heart disease?: Yes  Patient stated risk factors for heart disease:: High cholesterol  Patient is taking statin?: No  Has your doctor talked to you about Statin use?: No  Do you want more information on Statins?: No  Chronic Complications Skills Assessment Completed: : Yes  Assessment indicates:: Instruction Needed  Area of need?: Yes    Psychosocial/Coping  Psychosocial/Coping Skills Assessment Completed: : No  Assessment indicates:: Adequate understanding  Deffered due to:: Other (comment) (previously completed, there has been no change and patient has adequate understanding)  Area of need?: No      During today's follow-up visit,  the following areas required further assessment and content was provided/reviewed.    Based on today's diabetes care assessment, the following areas of need were identified:          10/23/2023    12:01 AM   Social   Support No   Access to Mass Media/Tech No   Cognitive/Behavioral Health No   Culture/Episcopal No   Communication No   Health Literacy No            10/23/2023    12:01 AM   Clinical   Medication Adherence Yes   Lab Compliance No   Nutritional Status No            10/23/2023    12:01 AM   Diabetes Self-Management Skills   Diabetes Disease Process/Treatment Options No   Nutrition/Healthy Eating No   Physical Activity/Exercise No   Medication Yes, reviewed backup plan with patient. Discussed administering all boluses through the pump even after meal boluses for a snack. Discussed trusting the system to reduce BS rather than trying to beat the system  Adjusted the Basal rate to better match automated insulin delivery  Increased from 0.5/hr to 0.7/hr  Tightened up ICR from 1:10 to 1:9  Tightened up ISF from 1:50 to 1:45  Tightened up Target/correct above from 150 to 140   Home Blood Glucose Monitoring No  Comparison of previous CGM data to current    Average Glucose 208  slightly increased from 207  Standard Deviation 80 decreased from 89  GMI 8.3 from NA    Time in Range  25% of time patient was in Very High Range decreased from 31%  32% of time patient was in High Range increased from 26%  41% of time patient was in Range increased from 39%  1% of time patient was in Low Range decreased from 3%  <1% of time patient was in Very Low Range decreased from 1%     Acute Complications Yes, Reviewed blood glucose goals, prevention, detection, signs and symptoms, and treatment of hypoglycemia and hyperglycemia, and when to contact the clinic.   Chronic Complications Yes, Discussed importance of A1c less than 7 to reduce risk of micro and macro complications, including nephropathy, neuropathy, retinopathy, heart attack and stroke. Reviewed the importance of controlled Blood Pressure and Cholesterol Lab Values in preventing disease.   Health maintenance reviewed   Psychosocial/Coping No        Today's interventions were provided through individual discussion, instruction, and written materials were provided.    Patient verbalized understanding of instruction and written materials.  Pt was able to return back demonstration of instructions today. Patient understood key points, needs reinforcement and further instruction.     Diabetes Self-Management Care Plan Review and Evaluation of Progress:    During today's follow-up Anusha's Diabetes Self-Management Care Plan progress was reviewed and progress was evaluated including his/her input. Anusha has agreed to continue his/her journey to improve/maintain overall diabetes control by continuing to set health goals. See care plan progress below.      Care Plan: Diabetes Management   Updates made since 9/23/2023 12:00 AM        Problem: Disease Process Resolved 9/22/2023        Goal: Pt agrees to take steps toward understanding diabetes disease process & treatment options by attending education classes,staying abreast on latest  literature,maintaining appts with doctors & taking medications prescribed for diabetes if applicable. Completed 9/22/2023   Start Date: 4/9/2021   Expected End Date: 5/9/2021   This Visit's Progress: Met   Priority: High        Problem: Healthy Eating Resolved 9/22/2023        Goal: Will measure food and read food labels. Will count carbohydrates to equal 30-45 gm per meal. Will abstain from drinking Sugar Beverages and utilize Diet and/or artificial sweetener. Completed 9/22/2023   Start Date: 4/9/2021   Expected End Date: 5/9/2021   This Visit's Progress: Met   Priority: High        Problem: Healthy Eating Resolved 9/22/2023        Goal: Eat 3 meals daily with 30-45g/2-3 servings of Carbohydrate per meal. Completed 9/22/2023   Start Date: 2/2/2023   Expected End Date: 3/2/2023   This Visit's Progress: Met   Priority: High   Barriers: Knowledge deficit   Note:    Instructed pt on the food groups, how to read labels and count carbs. Pt was given sample menus and meal plans as examples Discussed with pt the importance of eating 3 balanced and portioned meals per day.Discussed with pt the importance of eating 3 balanced meals with all the food groups and taking her meal time insulin 3 times a day with meals and spaced apart every 4-6 hours. Pt had the gastric sleeve and has been eating mostly protein. Pt reports that since December she has been having episodes of extreme high and low blood sugars. Pt reports that she is having bs's in the 50's 5-6 times a day. She is also having lows in the evening around bedtime which are in the 40's and 50's. Pt skips 1-2 doses of Novolo per day. Pt has been treating hypoglycemia with 12 oz juice because she does not find that the 4 oz takes care of the issue. Discussed in length the symptoms, signs and treatment of hypoglycemia and the need to eat something after treatment to prevent another low which the pt was not doing. Discussed with pt about adding carbs to her meals to give  her some carbs at her meals. Pt will work on meal planning and eating her carbs and putting balance in her meals and consistency in taking Novolog.  Pt was also advised to eat a 15 gram carb snack before and after exercising since she has a good workout.       Problem: Medications         Goal: Patient Agrees to use omnipod 5 to bolus with each meal three times a day for the next 3 months    Start Date: 10/23/2023   Expected End Date: 1/23/2024   This Visit's Progress: Not met   Recent Progress: No change   Priority: High   Barriers: No Barriers Identified   Note:    DIABETES EDUCATOR NOTE - PUMP EVAL    Patient seen for initial assessment and education on Omnipod 5.  Patient is interested in an insulin pump and continuous glucose monitor.   Pt returned today for evaluation/education on self care measure of diabetes management, blood glucose testing, meal patterns/ability to carbohydrate count, and problem solving skills for managing hypo and hyperglycemia.  Covered basic details of pump therapy with patient. Insulin pump therapy in general pros and cons   Reviewed major insulin pump vendors: Medtronic, Tandem, and Omni Pod.  Reviewed terms ISF, CHO ratio, goal BG, bolus, basal, active insulin and insulin on board. Explained importance of practicing total bolus calculations prior to starting pump therapy as this info will be programmed into the pump.  Explained each pump allows for different max volumes of insulin in reservoir chamber.  Pt verbalized clearer understanding of pump and CGM therapy.     During today's visit the patient was introduces to/educated on the following content areas:     Current Diabetic Medications: Fiasp: 1-8 units after meals, Glargine: 12 units in AM.    Nutrition:   Carb counting skills: Pt states comfortable with carbohydrate counting.  Instructed on I:C ratio    Instructed pt on ISF of 1:50 starting @ goal 150.    Glucose monitoring:  Patient is testing BG using finger sticks 2/2  Dexcom G7 not compatible with Omnipod 5.     EDUCATION PLAN:   Will try to go on line to look at each pump in more detail.  Information provided on Omnipod 5, which patient is most interested in.  Follow up for continued education on possible pump training .     Pt states currently bolusing after meals, educator notified NP, NP recommended bolsuing before meals.          Task: Reviewed with patient all current diabetes medications and provided basic review of the purpose, dosage, frequency, side effects, and storage of both oral and injectable diabetes medications. Completed 9/24/2023        Task: Explained Smart Adjust Technology Completed 9/24/2023        Task: patient demonstrated ability to program controller, activate and insert pod using aseptic technique Completed 9/24/2023        Task: Patient demonstrated ability to program Dexcom transmitter into controller and start automated limited mode. Completed 9/24/2023        Task: Reviewed and reconciled current medication list today. Completed 9/24/2023        Task: Instructed pt on use of basic pump features ie...give a bolus, pause insulin, switch from manual to automated mode. Completed 9/24/2023        Task: Reviewed features available in manual mode verses automated mode Completed 9/24/2023        Task: Reviewed when and how to use activity function in automated mode Completed 9/24/2023        Task: Reviewed site selection of pods, rotation of sites and hard stop to change pod every 72 hrs Completed 9/24/2023        Task: Instructed that insulin vial is good out of refrigeration for up to 28-30 days Completed 9/24/2023        Task: Reviewed treatment of hypoglycemia, hyperglycemia; sick day care, DKA, and troubleshooting of pump Completed 9/24/2023        Task: Advised to carry back-up supplies with her and discussed steps to take in case of connection loss Completed 9/24/2023        Task: Details of pump therapy were covered including controller/taiwo  features and programming and pod activation Completed 9/24/2023        Task: Reviewed pod site selection and rotation and automatic pod priming and insertion Completed 9/24/2023        Task: Reviewed setting & editing basal rates in manual mode, giving bolus and other features in the set-up menu. Completed 9/24/2023        Task: Patient provided with [employer] and [employer] usernames and passwords, also documented in chart note and blue sticky note in Epic Completed 9/24/2023        Task: Omni Pod 24-hour support line provided Completed 9/24/2023        Task: Omnipod data was downloaded and reviewed with patient and provider, any necessary adjustments were made Completed 10/23/2023          Follow Up Plan     Follow up in about 4 weeks (around 11/20/2023) for Insulin Pump Upload, Personal CGM Upload.    Today's care plan and follow up schedule was discussed with patient.  Anusha verbalized understanding of the care plan, goals, and agrees to follow up plan.        The patient was encouraged to communicate with his/her health care provider/physician and care team regarding his/her condition(s) and treatment.  I provided the patient with my contact information today and encouraged to contact me via phone or [facility] Patient Portal as needed.     Length of Visit   Total Time: 45 Minutes

## 2023-10-24 ENCOUNTER — PATIENT OUTREACH (OUTPATIENT)
Dept: DIABETES | Facility: CLINIC | Age: 33
End: 2023-10-24
Payer: MEDICAID

## 2023-10-24 ENCOUNTER — PATIENT MESSAGE (OUTPATIENT)
Dept: DIABETES | Facility: CLINIC | Age: 33
End: 2023-10-24
Payer: MEDICAID

## 2023-10-24 PROCEDURE — 95251 CONT GLUC MNTR ANALYSIS I&R: CPT | Mod: ,,, | Performed by: NURSE PRACTITIONER

## 2023-10-24 PROCEDURE — 95251 PR GLUCOSE MONITOR, 72 HOUR, PHYS INTERP: ICD-10-PCS | Mod: ,,, | Performed by: NURSE PRACTITIONER

## 2023-10-24 NOTE — PROGRESS NOTES
Reviewed pump download and Dexcom download from diabetes education  See attached changes that were made                  Continuous Glucose Sensor Report of Personal Device    Anusha Andrew is a 32 y.o.female with type 1 diabetes     Interpretation of data from Dexcom G6--reviewed from October 10, 2023 through October 23, 2023    Reason for review: Currently on anti-hyperglycemic therapy and failing to achieve glycemic goals.    Lab Results   Component Value Date    HGBA1C 7.0 (H) 08/04/2023         Current diabetes medications and doses: Omnipod 5 and dexcom CGM     ________________________________________________________________    SUMMARY of KEY FINDINGS:      Average glucose: 208  Above 180 mg/dL: 32%   (Above 250 mg/dL: 25%)  Within  mg/dL: 41%  Below 70 mg/dL: 1%  (Below 54 mg/dL: <1%)      CGM data reviewed and notable for the following trends:  Blood sugar readings are above goal      Will make the following changes based on review of data:  Insulin pump changes made as above  Adjusted basal rate for when she is out of automated mode  Lower target to 140---trying to work our way to 120  Tightened sensitivity factor to 45 and tighten carbohydrate ratio to 1:9      Review back up pump plan     Discourage from dosing insulin outside of the pump when wearing the pump the pump     Ritika Grewal NP

## 2023-11-12 ENCOUNTER — PATIENT MESSAGE (OUTPATIENT)
Dept: DIABETES | Facility: CLINIC | Age: 33
End: 2023-11-12
Payer: MEDICAID

## 2023-11-15 ENCOUNTER — OFFICE VISIT (OUTPATIENT)
Dept: DIABETES | Facility: CLINIC | Age: 33
End: 2023-11-15
Payer: MEDICAID

## 2023-11-15 VITALS
BODY MASS INDEX: 27.46 KG/M2 | DIASTOLIC BLOOD PRESSURE: 70 MMHG | WEIGHT: 155 LBS | HEART RATE: 92 BPM | SYSTOLIC BLOOD PRESSURE: 112 MMHG | OXYGEN SATURATION: 98 % | HEIGHT: 63 IN

## 2023-11-15 DIAGNOSIS — Z91.148 NONCOMPLIANCE W/MEDICATION TREATMENT DUE TO INTERMIT USE OF MEDICATION: ICD-10-CM

## 2023-11-15 DIAGNOSIS — Z90.3 HISTORY OF SLEEVE GASTRECTOMY: ICD-10-CM

## 2023-11-15 DIAGNOSIS — E10.3293 MILD NONPROLIFERATIVE DIABETIC RETINOPATHY OF BOTH EYES WITHOUT MACULAR EDEMA ASSOCIATED WITH TYPE 1 DIABETES MELLITUS: ICD-10-CM

## 2023-11-15 DIAGNOSIS — E66.3 OVERWEIGHT (BMI 25.0-29.9): ICD-10-CM

## 2023-11-15 DIAGNOSIS — E16.2 HYPOGLYCEMIA: ICD-10-CM

## 2023-11-15 DIAGNOSIS — Z91.199 NONCOMPLIANCE WITH DIABETES TREATMENT: ICD-10-CM

## 2023-11-15 DIAGNOSIS — E10.649 TYPE 1 DIABETES MELLITUS WITH HYPOGLYCEMIA UNAWARENESS: ICD-10-CM

## 2023-11-15 DIAGNOSIS — Z86.39 HISTORY OF DIABETES WITH KETOACIDOSIS: ICD-10-CM

## 2023-11-15 DIAGNOSIS — E10.65 TYPE 1 DIABETES MELLITUS WITH HYPERGLYCEMIA: Primary | ICD-10-CM

## 2023-11-15 DIAGNOSIS — Z71.9 HEALTH EDUCATION/COUNSELING: ICD-10-CM

## 2023-11-15 PROCEDURE — 99999 PR PBB SHADOW E&M-EST. PATIENT-LVL IV: CPT | Mod: PBBFAC,,, | Performed by: NURSE PRACTITIONER

## 2023-11-15 PROCEDURE — 99999 PR PBB SHADOW E&M-EST. PATIENT-LVL IV: ICD-10-PCS | Mod: PBBFAC,,, | Performed by: NURSE PRACTITIONER

## 2023-11-15 PROCEDURE — 3061F NEG MICROALBUMINURIA REV: CPT | Mod: CPTII,,, | Performed by: NURSE PRACTITIONER

## 2023-11-15 PROCEDURE — 95251 CONT GLUC MNTR ANALYSIS I&R: CPT | Mod: ,,, | Performed by: NURSE PRACTITIONER

## 2023-11-15 PROCEDURE — 95251 PR GLUCOSE MONITOR, 72 HOUR, PHYS INTERP: ICD-10-PCS | Mod: ,,, | Performed by: NURSE PRACTITIONER

## 2023-11-15 PROCEDURE — 1160F RVW MEDS BY RX/DR IN RCRD: CPT | Mod: CPTII,,, | Performed by: NURSE PRACTITIONER

## 2023-11-15 PROCEDURE — 99999PBSHW POCT GLUCOSE, HAND-HELD DEVICE: ICD-10-PCS | Mod: PBBFAC,,,

## 2023-11-15 PROCEDURE — 3078F PR MOST RECENT DIASTOLIC BLOOD PRESSURE < 80 MM HG: ICD-10-PCS | Mod: CPTII,,, | Performed by: NURSE PRACTITIONER

## 2023-11-15 PROCEDURE — 99215 OFFICE O/P EST HI 40 MIN: CPT | Mod: S$PBB,,, | Performed by: NURSE PRACTITIONER

## 2023-11-15 PROCEDURE — 3051F PR MOST RECENT HEMOGLOBIN A1C LEVEL 7.0 - < 8.0%: ICD-10-PCS | Mod: CPTII,,, | Performed by: NURSE PRACTITIONER

## 2023-11-15 PROCEDURE — 3074F SYST BP LT 130 MM HG: CPT | Mod: CPTII,,, | Performed by: NURSE PRACTITIONER

## 2023-11-15 PROCEDURE — 3066F PR DOCUMENTATION OF TREATMENT FOR NEPHROPATHY: ICD-10-PCS | Mod: CPTII,,, | Performed by: NURSE PRACTITIONER

## 2023-11-15 PROCEDURE — 99999PBSHW POCT GLUCOSE, HAND-HELD DEVICE: Mod: PBBFAC,,,

## 2023-11-15 PROCEDURE — 3061F PR NEG MICROALBUMINURIA RESULT DOCUMENTED/REVIEW: ICD-10-PCS | Mod: CPTII,,, | Performed by: NURSE PRACTITIONER

## 2023-11-15 PROCEDURE — 3008F BODY MASS INDEX DOCD: CPT | Mod: CPTII,,, | Performed by: NURSE PRACTITIONER

## 2023-11-15 PROCEDURE — 3074F PR MOST RECENT SYSTOLIC BLOOD PRESSURE < 130 MM HG: ICD-10-PCS | Mod: CPTII,,, | Performed by: NURSE PRACTITIONER

## 2023-11-15 PROCEDURE — 3008F PR BODY MASS INDEX (BMI) DOCUMENTED: ICD-10-PCS | Mod: CPTII,,, | Performed by: NURSE PRACTITIONER

## 2023-11-15 PROCEDURE — 99214 OFFICE O/P EST MOD 30 MIN: CPT | Mod: PBBFAC,PN | Performed by: NURSE PRACTITIONER

## 2023-11-15 PROCEDURE — 1159F PR MEDICATION LIST DOCUMENTED IN MEDICAL RECORD: ICD-10-PCS | Mod: CPTII,,, | Performed by: NURSE PRACTITIONER

## 2023-11-15 PROCEDURE — 3066F NEPHROPATHY DOC TX: CPT | Mod: CPTII,,, | Performed by: NURSE PRACTITIONER

## 2023-11-15 PROCEDURE — 3051F HG A1C>EQUAL 7.0%<8.0%: CPT | Mod: CPTII,,, | Performed by: NURSE PRACTITIONER

## 2023-11-15 PROCEDURE — 82962 GLUCOSE BLOOD TEST: CPT | Mod: PBBFAC,PN | Performed by: NURSE PRACTITIONER

## 2023-11-15 PROCEDURE — 99215 PR OFFICE/OUTPT VISIT, EST, LEVL V, 40-54 MIN: ICD-10-PCS | Mod: S$PBB,,, | Performed by: NURSE PRACTITIONER

## 2023-11-15 PROCEDURE — 3078F DIAST BP <80 MM HG: CPT | Mod: CPTII,,, | Performed by: NURSE PRACTITIONER

## 2023-11-15 PROCEDURE — 1160F PR REVIEW ALL MEDS BY PRESCRIBER/CLIN PHARMACIST DOCUMENTED: ICD-10-PCS | Mod: CPTII,,, | Performed by: NURSE PRACTITIONER

## 2023-11-15 PROCEDURE — 1159F MED LIST DOCD IN RCRD: CPT | Mod: CPTII,,, | Performed by: NURSE PRACTITIONER

## 2023-11-15 RX ORDER — BLOOD-GLUCOSE TRANSMITTER
EACH MISCELLANEOUS
Qty: 1 EACH | Refills: 4 | Status: SHIPPED | OUTPATIENT
Start: 2023-11-15

## 2023-11-15 RX ORDER — INSULIN ASPART INJECTION 100 [IU]/ML
INJECTION, SOLUTION SUBCUTANEOUS
Qty: 30 ML | Refills: 6 | Status: SHIPPED | OUTPATIENT
Start: 2023-11-15

## 2023-11-15 RX ORDER — BLOOD-GLUCOSE SENSOR
EACH MISCELLANEOUS
Qty: 3 EACH | Refills: 11 | Status: SHIPPED | OUTPATIENT
Start: 2023-11-15

## 2023-11-15 NOTE — PROGRESS NOTES
CC:   Chief Complaint   Patient presents with    Diabetes Mellitus       HPI: Anusha Andrew is a 32 y.o. female presents for a follow up visit today for the management of T1DM       She was diagnosed with Type 1 diabetes at 17 year old. Hospitalized at the time of diagnosis and started on insulin therapy     Family hx of diabetes: brother-- T1DM     Hospitalized for diabetes:   Scare with EMS-severe hypoglycemia  12/31/22 - hypoglycemia EMS, used baqsimi  12/28/22 covid19 -- hypoglycemia as well.   H/o DKA. 10/2018 DKA/admission    F/u with retinal specialist    No personal or FH of thyroid cancer or personal of pancreatic cancer or pancreatitis.     Gastric sleeve in July 2022  She lost about 50 lb    She reports since she had the gastric sleeve she is been suffering with issues of hypoglycemia      Our last visit was in August of 2023  At that visit we discussed trying to resubmit the Ozempic  We discussed moving forward with the Omnipod 5 in automated mode  We continued the Dexcom G6  Blood sugar readings are above goal  Based on her Omnipod download over the last 2 weeks she is in manual mode 29% of the time.  There are also days when she is not wearing the pump--at all  See attached downloads                        DIABETES COMPLICATIONS: retinopathy      Diabetes Management Status    ASA:  No    Statin: Not taking  ACE/ARB: Not taking    The ASCVD Risk score (Anoop SHEA, et al., 2019) failed to calculate for the following reasons:    The 2019 ASCVD risk score is only valid for ages 40 to 79      Screening or Prevention Patient's value Goal Complete/Controlled?   HgA1C Testing and Control   Lab Results   Component Value Date    HGBA1C 7.7 (H) 11/08/2023      Annually/Less than 8% Yes     Lipid profile : 08/04/2023 Annually Yes     LDL control Lab Results   Component Value Date    LDLCALC 130.0 08/04/2023    Annually/Less than 100 mg/dl  No     Nephropathy screening Lab Results   Component Value Date     LABMICR <5.0 04/05/2023     Lab Results   Component Value Date    PROTEINUA Negative 08/04/2023    Annually Yes     Blood pressure BP Readings from Last 1 Encounters:   11/15/23 130/68    Less than 140/90 Yes     Dilated retinal exam : 04/12/2023 Annually Yes     Foot exam   : 04/26/2023 Annually No         CURRENT A1C:    Hemoglobin A1C   Date Value Ref Range Status   11/08/2023 7.7 (H) 4.0 - 5.6 % Final     Comment:     ADA Screening Guidelines:  5.7-6.4%  Consistent with prediabetes  >or=6.5%  Consistent with diabetes    High levels of fetal hemoglobin interfere with the HbA1C  assay. Heterozygous hemoglobin variants (HbS, HgC, etc)do  not significantly interfere with this assay.   However, presence of multiple variants may affect accuracy.     08/04/2023 7.0 (H) 4.0 - 5.6 % Final     Comment:     ADA Screening Guidelines:  5.7-6.4%  Consistent with prediabetes  >or=6.5%  Consistent with diabetes    High levels of fetal hemoglobin interfere with the HbA1C  assay. Heterozygous hemoglobin variants (HbS, HgC, etc)do  not significantly interfere with this assay.   However, presence of multiple variants may affect accuracy.     04/05/2023 7.1 (H) 4.0 - 5.6 % Final     Comment:     ADA Screening Guidelines:  5.7-6.4%  Consistent with prediabetes  >or=6.5%  Consistent with diabetes    High levels of fetal hemoglobin interfere with the HbA1C  assay. Heterozygous hemoglobin variants (HbS, HgC, etc)do  not significantly interfere with this assay.   However, presence of multiple variants may affect accuracy.         GOAL A1C: 6.5% without hypoglycemia       DM MEDICATIONS USED IN THE PAST:  humalog  levemir  novolog  lantus   Metformin   Trulicity   Ozempic   Dexcom   Fiasp   Tresiba   Omnipod 5       CURRENT DIABETES MEDICATIONS:   Off the ozempic due to insurance coverage     Insulin Pump: Omnipod 5 with Fiasp   Pump settings:  Basal: 0.7 units/hr   ICR: 1:9  Target: 140  ISF:1:45  IOB: 3.5 hours     Temp basals:  ?    Pump site change: q 3 days   Cartridge change: q 3 days  Insulin TDD: 23.1  units    Basal 56 %   Bolus 44 %   CHO intake: 62.6 grams per day   Using the bolus wizard: yes   Overridin%    In automated mode only 71% of the time  Of that she is in limited auto 30% of the time  29% manual mode      Back up Lantus/Levemir: back up Tresiba     Patient's pump was downloaded in clinic today and reviewed with patient.   Please see attached documents for pump download.         BLOOD GLUCOSE MONITORING:    Sensor type: Dexcom G6   Average BG readin  Time in range: 39%   26% high   32% very high   2% low   1% very low   Estimated A1c N/A   Site change:   q10 days     2 weeks prior -- average blood sugar 198  in target range 47% of the time with an estimated A1c of 8.1 %%    Supplies: [employer]         Results for orders placed or performed in visit on 11/15/23   POCT Glucose, Hand-Held Device   Result Value Ref Range    POC Glucose 114 (A) 70 - 110 MG/DL   POCT Glucose, Hand-Held Device   Result Value Ref Range    POC Glucose 50 (A) 70 - 110 MG/DL   POCT Glucose, Hand-Held Device   Result Value Ref Range    POC Glucose 26 (A) 70 - 110 MG/DL     *Note: Due to a large number of results and/or encounters for the requested time period, some results have not been displayed. A complete set of results can be found in Results Review.         Sensor was downloaded in clinic today and reviewed with patient.   Please see attached document for download.       HYPOGLYCEMIA:  Yes--5% low, 3% very low 2 weeks ago---recent 4% low, 1% very low  Glucagon kit: [facility]i   Medic alert bracelet: denies       MEALS: eating 3 meals per day   Feels confident with CHO counting   Uses applications she is out eating   BF: eggs -- coffee in the AM   Lunch: salad, grilled chicken, tuna   Dinner: pasta, beans, spaghetti, sandwich   Snack: rarely   Drinks: coffee in AM   Water   Diet tea        CURRENT EXERCISE:  No      Review of  Systems  Review of Systems   Constitutional:  Negative for appetite change, fatigue and unexpected weight change.   HENT:  Negative for trouble swallowing.    Eyes:  Negative for visual disturbance.   Respiratory:  Negative for shortness of breath.    Cardiovascular:  Negative for chest pain.   Gastrointestinal:  Negative for nausea.   Endocrine: Negative for polydipsia, polyphagia and polyuria.   Genitourinary:         No Nocturia    Skin:  Negative for wound.   Neurological:  Negative for numbness.       Physical Exam   Physical Exam  Vitals and nursing note reviewed.   Constitutional:       Appearance: She is well-developed.      Comments: Overweight female    HENT:      Head: Normocephalic and atraumatic.      Right Ear: External ear normal.      Left Ear: External ear normal.      Nose: Nose normal.   Neck:      Thyroid: No thyromegaly.      Trachea: No tracheal deviation.   Cardiovascular:      Rate and Rhythm: Normal rate and regular rhythm.      Heart sounds: No murmur heard.  Pulmonary:      Effort: Pulmonary effort is normal. No respiratory distress.      Breath sounds: Normal breath sounds.   Abdominal:      Palpations: Abdomen is soft.      Tenderness: There is no abdominal tenderness.      Hernia: No hernia is present.   Musculoskeletal:      Cervical back: Normal range of motion and neck supple.   Skin:     General: Skin is warm and dry.      Capillary Refill: Capillary refill takes less than 2 seconds.      Findings: No rash.      Comments: Injection sites and dexcom sites are normal appearing. No lipo hypertropthy or atrophy     Neurological:      Mental Status: She is alert and oriented to person, place, and time.      Cranial Nerves: No cranial nerve deficit.   Psychiatric:         Behavior: Behavior normal.         Judgment: Judgment normal.         FOOT EXAMINATION: Appropriate footwear       Lab Results   Component Value Date    TSH 1.135 08/04/2023         Type 1 diabetes mellitus with  hyperglycemia  Uncontrolled   Blood sugar readings above goal an A1c has increased  She is having a lot of highs and lows  Based on her download it looks like she is treating postprandial excursions instead of giving her insulin before the meal  We had a long discussion that she needs to be wearing her continuous glucose monitor at all times especially after her hypoglycemic event today in my office  We also discussed that she needs to wearing her Omnipod 5 and she needs to make sure that she is staying in automated mode  We discussed ways to troubleshoot the pump        -- Medication Changes:    Continue Ozempic 0.5 mg weekly---as of now her insurance is covering it    Get back omnipod 5 and Dexcom G6 and use them consistently in auto mode   Omnipod 5   Insulin pump settings  Basal: 0.7 units/hr   ICR 1:9  ISF: 1:45   Target 140-- will lower as she becomes comfortable -- hx of hypoglycemia   IOB 3.5 hours       -- Reviewed goals of therapy are to get the best control we can without hypoglycemia.  -- Reviewed patient's current insulin regimen. Clarified proper insulin dose and timing in relation to meals, etc. Insulin injection sites and proper rotation instructed.    -- Advised frequent self blood glucose monitoring.  Patient encouraged to document glucose results and bring them to every clinic visit.  Needs to use Dexcom G6 with Omnipod 5 -- Rx sent to pharmacy today for dexcom G6 supplies.   -- Hypoglycemia precautions discussed. Instructed on precautions before driving.    -- Call for Bg repeatedly < 70 or > 200.   -- Close adherence to lifestyle changes recommended.   -- Periodic follow ups for eye evaluations, foot care and dental care suggested.        Patient has diabetes mellitus and manages diabetes with intensive insulin regimen and uses prandial and basal insulin daily  Patient requires a therapeutic CGM and is willing to use therapeutic CGM for the necessary frequent adjustments of insulin therapy.  I  have completed an in-person visit during the previous 6 months and will continue to have in-person visits every 6 months to assess adherence to their CGM regimen and diabetes treatment plan.  Due to COVID pandemic and need for remote monitoring this tool is medically necessary  Hx of marked hypoglycemia with EMS activation       Mild nonproliferative diabetic retinopathy of both eyes without macular edema associated with type 1 diabetes mellitus  Optimize BG readings.   See above.   Continue to follow-up with optometry as directed    Overweight (BMI 25.0-29.9)  Body mass index is 27.46 kg/m².  Increases insulin resistance.   Discussed DM diet and exercise.       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 her blood sugar was 26---she was conscious in talking so we started with oral replacement initially--she drank about a half of a juice into glucose tablets and then was having a hard time--so I went ahead and gave her 1 mg of glucagon IM to the left thigh.  She never fully lost consciousness , but was close and was diaphoretic and weak.  Following the IM glucagon administration I called 911 for assistance and transport to the emergency room.  5 minutes after I am glucagon administration we rechecked her blood sugar and it was 50.  We treated with another juice and 2 more glucose tablets.  We waited another 5-8 minutes and then repeated her blood sugar and it was 114.  At this time she was much more  alert and starting to feel better.  We called to cancel EMS as I felt more comfortable transporting her via wheelchair to the emergency room for monitoring--but EMS was already on seen  We ended up cancelling the transport in I transported her via wheelchair to the emergency room  I gave hand off to Franklin ZUNIGA in the ER  Patient reported after event that she had taken her mealtime insulin around 7 or 8:00 a.m. this morning and only drink a Tea from [employer]---but she had taken 8 units          I spent a total of 60 minutes on the day of the visit.This includes face to face time and non-face to face time preparing to see the patient (eg, review of tests), obtaining and/or reviewing separately obtained history, documenting clinical information in the electronic or other health record, independently interpreting results and communicating results to the patient/family/caregiver, or care coordinator.          Pump backup plan    If the insulin pump is non functional and discontinued for anticipated more than 20 hours, please give daily injections of:   Long acting insulin Tresiba 14 units daily -- can split to 7 units BID   Short acting insulin Fiasp- 1:9 for meals according to carb ratios and sensitivity factor in the pump.     With using correction scale:    150-200: +1 unit  201-250: +2 units  251-300: +3 units  301-350: +4 units  >350: +5 units        When the insulin pump is restarted, do not restart basal rates until at least 22 hours after the last long acting insulin injection. You can set a 0% temporary basal setting that will last until this time and use your pump to bolus for meals and correction.     For any technical insulin pump issues, please contact the insulin pump company; the toll free number is printed on the label on the back of the insulin pump.       If your sugar is running high for a few hours and does not respond to two correction doses from the insulin pump, it may mean that you have a bad  pump site and the site should be changed ,p          Follow up in about 4 weeks (around 12/13/2023).  Close follow-up for pump download and to assess compliance      Orders Placed This Encounter   Procedures    POCT Glucose, Hand-Held Device    POCT Glucose, Hand-Held Device    POCT Glucose, Hand-Held Device       Recommendations were discussed with the patient in detail  The patient verbalized understanding and agrees with the plan outlined as above.     This note was partly generated with [employer] voice recognition software. I apologize for any possible typographical errors.

## 2023-11-16 ENCOUNTER — PATIENT MESSAGE (OUTPATIENT)
Dept: DIABETES | Facility: CLINIC | Age: 33
End: 2023-11-16
Payer: MEDICAID

## 2023-11-16 ENCOUNTER — TELEPHONE (OUTPATIENT)
Dept: DIABETES | Facility: CLINIC | Age: 33
End: 2023-11-16
Payer: MEDICAID

## 2023-11-16 LAB
GLUCOSE SERPL-MCNC: 114 MG/DL (ref 70–110)
GLUCOSE SERPL-MCNC: 26 MG/DL (ref 70–110)
GLUCOSE SERPL-MCNC: 50 MG/DL (ref 70–110)

## 2023-11-16 NOTE — ASSESSMENT & PLAN NOTE
Body mass index is 27.46 kg/m².  Increases insulin resistance.   Discussed DM diet and exercise.

## 2023-11-16 NOTE — ASSESSMENT & PLAN NOTE
Uncontrolled   Blood sugar readings above goal an A1c has increased  She is having a lot of highs and lows  Based on her download it looks like she is treating postprandial excursions instead of giving her insulin before the meal  We had a long discussion that she needs to be wearing her continuous glucose monitor at all times especially after her hypoglycemic event today in my office  We also discussed that she needs to wearing her Omnipod 5 and she needs to make sure that she is staying in automated mode  We discussed ways to troubleshoot the pump        -- Medication Changes:    Continue Ozempic 0.5 mg weekly---as of now her insurance is covering it    Get back omnipod 5 and Dexcom G6 and use them consistently in auto mode   Omnipod 5   Insulin pump settings  Basal: 0.7 units/hr   ICR 1:9  ISF: 1:45   Target 140-- will lower as she becomes comfortable -- hx of hypoglycemia   IOB 3.5 hours       -- Reviewed goals of therapy are to get the best control we can without hypoglycemia.  -- Reviewed patient's current insulin regimen. Clarified proper insulin dose and timing in relation to meals, etc. Insulin injection sites and proper rotation instructed.    -- Advised frequent self blood glucose monitoring.  Patient encouraged to document glucose results and bring them to every clinic visit.  Needs to use Dexcom G6 with Omnipod 5 -- Rx sent to pharmacy today for dexcom G6 supplies.   -- Hypoglycemia precautions discussed. Instructed on precautions before driving.    -- Call for Bg repeatedly < 70 or > 200.   -- Close adherence to lifestyle changes recommended.   -- Periodic follow ups for eye evaluations, foot care and dental care suggested.        Patient has diabetes mellitus and manages diabetes with intensive insulin regimen and uses prandial and basal insulin daily  Patient requires a therapeutic CGM and is willing to use therapeutic CGM for the necessary frequent adjustments of insulin therapy.  I have completed  an in-person visit during the previous 6 months and will continue to have in-person visits every 6 months to assess adherence to their CGM regimen and diabetes treatment plan.  Due to COVID pandemic and need for remote monitoring this tool is medically necessary  Hx of marked hypoglycemia with EMS activation

## 2023-12-11 RX ORDER — FLUOXETINE HYDROCHLORIDE 20 MG/1
20 CAPSULE ORAL 2 TIMES DAILY
Qty: 180 CAPSULE | Refills: 2 | Status: ON HOLD | OUTPATIENT
Start: 2023-12-11 | End: 2024-01-28

## 2023-12-11 NOTE — TELEPHONE ENCOUNTER
Refill Decision Note   Anusha Andrew  is requesting a refill authorization.  Brief Assessment and Rationale for Refill:  Approve     Medication Therapy Plan:       Medication Reconciliation Completed: No   Comments:     No Care Gaps recommended.     Note composed:12:57 PM 12/11/2023

## 2023-12-18 ENCOUNTER — OFFICE VISIT (OUTPATIENT)
Dept: DIABETES | Facility: CLINIC | Age: 33
End: 2023-12-18
Payer: MEDICAID

## 2023-12-18 VITALS
WEIGHT: 154 LBS | HEIGHT: 63 IN | HEART RATE: 100 BPM | OXYGEN SATURATION: 100 % | DIASTOLIC BLOOD PRESSURE: 70 MMHG | SYSTOLIC BLOOD PRESSURE: 98 MMHG | BODY MASS INDEX: 27.29 KG/M2

## 2023-12-18 DIAGNOSIS — Z86.39 HISTORY OF DIABETES WITH KETOACIDOSIS: ICD-10-CM

## 2023-12-18 DIAGNOSIS — E55.9 VITAMIN D DEFICIENCY: ICD-10-CM

## 2023-12-18 DIAGNOSIS — E10.3293 MILD NONPROLIFERATIVE DIABETIC RETINOPATHY OF BOTH EYES WITHOUT MACULAR EDEMA ASSOCIATED WITH TYPE 1 DIABETES MELLITUS: ICD-10-CM

## 2023-12-18 DIAGNOSIS — E10.65 TYPE 1 DIABETES MELLITUS WITH HYPERGLYCEMIA: Primary | ICD-10-CM

## 2023-12-18 DIAGNOSIS — Z90.3 HISTORY OF SLEEVE GASTRECTOMY: ICD-10-CM

## 2023-12-18 DIAGNOSIS — E10.649 TYPE 1 DIABETES MELLITUS WITH HYPOGLYCEMIA UNAWARENESS: ICD-10-CM

## 2023-12-18 DIAGNOSIS — Z71.9 HEALTH EDUCATION/COUNSELING: ICD-10-CM

## 2023-12-18 DIAGNOSIS — E66.3 OVERWEIGHT (BMI 25.0-29.9): ICD-10-CM

## 2023-12-18 PROCEDURE — 99999 PR PBB SHADOW E&M-EST. PATIENT-LVL V: CPT | Mod: PBBFAC,,, | Performed by: NURSE PRACTITIONER

## 2023-12-18 PROCEDURE — 99214 OFFICE O/P EST MOD 30 MIN: CPT | Mod: S$PBB,,, | Performed by: NURSE PRACTITIONER

## 2023-12-18 PROCEDURE — 3051F HG A1C>EQUAL 7.0%<8.0%: CPT | Mod: CPTII,,, | Performed by: NURSE PRACTITIONER

## 2023-12-18 PROCEDURE — 3078F DIAST BP <80 MM HG: CPT | Mod: CPTII,,, | Performed by: NURSE PRACTITIONER

## 2023-12-18 PROCEDURE — 3074F PR MOST RECENT SYSTOLIC BLOOD PRESSURE < 130 MM HG: ICD-10-PCS | Mod: CPTII,,, | Performed by: NURSE PRACTITIONER

## 2023-12-18 PROCEDURE — 3051F PR MOST RECENT HEMOGLOBIN A1C LEVEL 7.0 - < 8.0%: ICD-10-PCS | Mod: CPTII,,, | Performed by: NURSE PRACTITIONER

## 2023-12-18 PROCEDURE — 3008F PR BODY MASS INDEX (BMI) DOCUMENTED: ICD-10-PCS | Mod: CPTII,,, | Performed by: NURSE PRACTITIONER

## 2023-12-18 PROCEDURE — 3078F PR MOST RECENT DIASTOLIC BLOOD PRESSURE < 80 MM HG: ICD-10-PCS | Mod: CPTII,,, | Performed by: NURSE PRACTITIONER

## 2023-12-18 PROCEDURE — 99214 PR OFFICE/OUTPT VISIT, EST, LEVL IV, 30-39 MIN: ICD-10-PCS | Mod: S$PBB,,, | Performed by: NURSE PRACTITIONER

## 2023-12-18 PROCEDURE — 3066F PR DOCUMENTATION OF TREATMENT FOR NEPHROPATHY: ICD-10-PCS | Mod: CPTII,,, | Performed by: NURSE PRACTITIONER

## 2023-12-18 PROCEDURE — 95251 CONT GLUC MNTR ANALYSIS I&R: CPT | Mod: ,,, | Performed by: NURSE PRACTITIONER

## 2023-12-18 PROCEDURE — 95251 PR GLUCOSE MONITOR, 72 HOUR, PHYS INTERP: ICD-10-PCS | Mod: ,,, | Performed by: NURSE PRACTITIONER

## 2023-12-18 PROCEDURE — 3066F NEPHROPATHY DOC TX: CPT | Mod: CPTII,,, | Performed by: NURSE PRACTITIONER

## 2023-12-18 PROCEDURE — 3061F NEG MICROALBUMINURIA REV: CPT | Mod: CPTII,,, | Performed by: NURSE PRACTITIONER

## 2023-12-18 PROCEDURE — 3061F PR NEG MICROALBUMINURIA RESULT DOCUMENTED/REVIEW: ICD-10-PCS | Mod: CPTII,,, | Performed by: NURSE PRACTITIONER

## 2023-12-18 PROCEDURE — 99999 PR PBB SHADOW E&M-EST. PATIENT-LVL V: ICD-10-PCS | Mod: PBBFAC,,, | Performed by: NURSE PRACTITIONER

## 2023-12-18 PROCEDURE — 1160F RVW MEDS BY RX/DR IN RCRD: CPT | Mod: CPTII,,, | Performed by: NURSE PRACTITIONER

## 2023-12-18 PROCEDURE — 3074F SYST BP LT 130 MM HG: CPT | Mod: CPTII,,, | Performed by: NURSE PRACTITIONER

## 2023-12-18 PROCEDURE — 1160F PR REVIEW ALL MEDS BY PRESCRIBER/CLIN PHARMACIST DOCUMENTED: ICD-10-PCS | Mod: CPTII,,, | Performed by: NURSE PRACTITIONER

## 2023-12-18 PROCEDURE — 3008F BODY MASS INDEX DOCD: CPT | Mod: CPTII,,, | Performed by: NURSE PRACTITIONER

## 2023-12-18 PROCEDURE — 99215 OFFICE O/P EST HI 40 MIN: CPT | Mod: PBBFAC,PN | Performed by: NURSE PRACTITIONER

## 2023-12-18 PROCEDURE — 1159F MED LIST DOCD IN RCRD: CPT | Mod: CPTII,,, | Performed by: NURSE PRACTITIONER

## 2023-12-18 PROCEDURE — 1159F PR MEDICATION LIST DOCUMENTED IN MEDICAL RECORD: ICD-10-PCS | Mod: CPTII,,, | Performed by: NURSE PRACTITIONER

## 2023-12-18 RX ORDER — INSULIN DEGLUDEC 100 U/ML
14 INJECTION, SOLUTION SUBCUTANEOUS DAILY
Qty: 3 ML | Refills: 11 | Status: ON HOLD | OUTPATIENT
Start: 2023-12-18 | End: 2024-01-29 | Stop reason: HOSPADM

## 2023-12-18 RX ORDER — PEN NEEDLE, DIABETIC 32GX 5/32"
NEEDLE, DISPOSABLE MISCELLANEOUS
Qty: 100 EACH | Refills: 11 | Status: SHIPPED | OUTPATIENT
Start: 2023-12-18

## 2023-12-18 NOTE — ASSESSMENT & PLAN NOTE
Uncontrolled   Blood sugar readings above goal and A1c has increased  + post prandial excursions   She feels confident with CHO counting       -- Medication Changes:    Continue Ozempic 0.5 mg weekly---as of now her insurance is covering it    Continue omnipod 5 and Dexcom G6 and use them consistently in auto mode   Fiasp insulin   Omnipod 5   Insulin pump settings  Basal: 0.7 units/hr   ICR   MN-12PM- 1:9  12PM- [place]- 1:8-- tightened   ISF: 1:45   Target 130-- will lower as she becomes comfortable -- hx of hypoglycemia -- lowered today   IOB 3 hours -- tightened       -- Reviewed goals of therapy are to get the best control we can without hypoglycemia.  -- Reviewed patient's current insulin regimen. Clarified proper insulin dose and timing in relation to meals, etc. Insulin injection sites and proper rotation instructed.    -- Advised frequent self blood glucose monitoring.  Patient encouraged to document glucose results and bring them to every clinic visit.  Needs to use Dexcom G6 with Omnipod 5 -- supplies from pharmacy    -- Hypoglycemia precautions discussed. Instructed on precautions before driving.    -- Call for Bg repeatedly < 70 or > 200.   -- Close adherence to lifestyle changes recommended.   -- Periodic follow ups for eye evaluations, foot care and dental care suggested.        Patient has diabetes mellitus and manages diabetes with intensive insulin regimen and uses prandial and basal insulin daily  Patient requires a therapeutic CGM and is willing to use therapeutic CGM for the necessary frequent adjustments of insulin therapy.  I have completed an in-person visit during the previous 6 months and will continue to have in-person visits every 6 months to assess adherence to their CGM regimen and diabetes treatment plan.  Due to COVID pandemic and need for remote monitoring this tool is medically necessary  Hx of marked hypoglycemia with EMS activation

## 2023-12-18 NOTE — ASSESSMENT & PLAN NOTE
Body mass index is 27.28 kg/m².  Increases insulin resistance.   Discussed DM diet and exercise.

## 2023-12-18 NOTE — PATIENT INSTRUCTIONS
Pump backup plan    If the insulin pump is non functional and discontinued for anticipated more than 20 hours, please give daily injections of:   Long acting insulin Tresiba 14 units daily -- can split to 7 units BID   Short acting insulin Fiasp- 1:9-1:8 for meals according to carb ratios and sensitivity factor in the pump.     With using correction scale:    150-200: +1 unit  201-250: +2 units  251-300: +3 units  301-350: +4 units  >350: +5 units        When the insulin pump is restarted, do not restart basal rates until at least 22 hours after the last long acting insulin injection. You can set a 0% temporary basal setting that will last until this time and use your pump to bolus for meals and correction.     For any technical insulin pump issues, please contact the insulin pump company; the toll free number is printed on the label on the back of the insulin pump.       If your sugar is running high for a few hours and does not respond to two correction doses from the insulin pump, it may mean that you have a bad pump site and the site should be changed ,p

## 2023-12-18 NOTE — PROGRESS NOTES
CC:   Chief Complaint   Patient presents with    Diabetes Mellitus       HPI: Anusha Andrew is a 33 y.o. female presents for a follow up visit today for the management of T1DM       She was diagnosed with Type 1 diabetes at 17 year old. Hospitalized at the time of diagnosis and started on insulin therapy     Family hx of diabetes: brother-- T1DM     Hospitalized for diabetes:   Scare with EMS-severe hypoglycemia  12/31/22 - hypoglycemia EMS, used baqsimi  12/28/22 covid19 -- hypoglycemia as well.   H/o DKA. 10/2018 DKA/admission    F/u with retinal specialist    No personal or FH of thyroid cancer or personal of pancreatic cancer or pancreatitis.     Gastric sleeve in July 2022  She lost about 50 lb    She reports since she had the gastric sleeve she is been suffering with issues of hypoglycemia      Our last visit was in November 2023  At that visit we went to the ER for marked hypoglycemia   She has been doing well.   See attached download   Bolusing with meals but having post prandial excursions   Feels confident with CHO counting.   Last A1c was 7.7%                   DIABETES COMPLICATIONS: retinopathy      Diabetes Management Status    ASA:  No    Statin: Not taking  ACE/ARB: Not taking    The ASCVD Risk score (Anoop SHEA, et al., 2019) failed to calculate for the following reasons:    The 2019 ASCVD risk score is only valid for ages 40 to 79      Screening or Prevention Patient's value Goal Complete/Controlled?   HgA1C Testing and Control   Lab Results   Component Value Date    HGBA1C 7.7 (H) 11/08/2023      Annually/Less than 8% Yes     Lipid profile : 08/04/2023 Annually Yes     LDL control Lab Results   Component Value Date    LDLCALC 130.0 08/04/2023    Annually/Less than 100 mg/dl  No     Nephropathy screening Lab Results   Component Value Date    LABMICR <5.0 04/05/2023     Lab Results   Component Value Date    PROTEINUA Negative 08/04/2023    Annually Yes     Blood pressure BP Readings from Last 1  Encounters:   12/18/23 98/70    Less than 140/90 Yes     Dilated retinal exam : 04/12/2023 Annually Yes     Foot exam   : 04/26/2023 Annually No         CURRENT A1C:    Hemoglobin A1C   Date Value Ref Range Status   11/08/2023 7.7 (H) 4.0 - 5.6 % Final     Comment:     ADA Screening Guidelines:  5.7-6.4%  Consistent with prediabetes  >or=6.5%  Consistent with diabetes    High levels of fetal hemoglobin interfere with the HbA1C  assay. Heterozygous hemoglobin variants (HbS, HgC, etc)do  not significantly interfere with this assay.   However, presence of multiple variants may affect accuracy.     08/04/2023 7.0 (H) 4.0 - 5.6 % Final     Comment:     ADA Screening Guidelines:  5.7-6.4%  Consistent with prediabetes  >or=6.5%  Consistent with diabetes    High levels of fetal hemoglobin interfere with the HbA1C  assay. Heterozygous hemoglobin variants (HbS, HgC, etc)do  not significantly interfere with this assay.   However, presence of multiple variants may affect accuracy.     04/05/2023 7.1 (H) 4.0 - 5.6 % Final     Comment:     ADA Screening Guidelines:  5.7-6.4%  Consistent with prediabetes  >or=6.5%  Consistent with diabetes    High levels of fetal hemoglobin interfere with the HbA1C  assay. Heterozygous hemoglobin variants (HbS, HgC, etc)do  not significantly interfere with this assay.   However, presence of multiple variants may affect accuracy.         GOAL A1C: 6.5% without hypoglycemia       DM MEDICATIONS USED IN THE PAST:  humalog  levemir  novolog  lantus   Metformin   Trulicity   Ozempic   Dexcom   Fiasp   Tresiba   Omnipod 5       CURRENT DIABETES MEDICATIONS:   Off the ozempic due to insurance coverage     Insulin Pump: Omnipod 5 with Fiasp   Pump settings:  Basal: 0.7 units/hr   ICR: 1:9  Target: 140  ISF:1:45  IOB: 3.5 hours     Temp basals: ?    Pump site change: q 3 days   Cartridge change: q 3 days  Insulin TDD:  26.7 units    Basal 54 %   Bolus 46 %   CHO intake: 97.8 grams per day   Using the bolus  wizard: yes   Overridin%    In automated mode only 98% of the time  2% manual mode      Back up Lantus/Levemir: back up Tresiba     Patient's pump was downloaded in clinic today and reviewed with patient.   Please see attached documents for pump download.         BLOOD GLUCOSE MONITORING:    Sensor type: Dexcom G6   Average BG readin  Time in range: 55%   26% high   16% very high   2% low   <1% very low   Estimated A1c 7.6%   Site change:   q10 days     2 weeks prior -- average blood sugar 206  in target range 38% of the time with an estimated A1c of 8.2%    Supplies: [employer]       Sensor was downloaded in clinic today and reviewed with patient.   Please see attached document for download.       HYPOGLYCEMIA:  Yes--2% low, <1% very low 2 weeks ago---recent 3% low, 2% very low  Glucagon kit: Baqsimi   Medic alert bracelet: denies       MEALS: eating 3 meals per day   Feels confident with CHO counting   Uses applications she is out eating   BF: eggs -- coffee in the AM   Lunch: salad, grilled chicken, tuna   Dinner: pasta, beans, spaghetti, sandwich   Snack: rarely   Drinks: coffee in AM   Water   Diet tea        CURRENT EXERCISE:  No      Review of Systems  Review of Systems   Constitutional:  Positive for fatigue. Negative for appetite change and unexpected weight change.   HENT:  Negative for trouble swallowing.    Eyes:  Negative for visual disturbance.   Respiratory:  Negative for shortness of breath.    Cardiovascular:  Negative for chest pain.   Gastrointestinal:  Negative for nausea.   Endocrine: Negative for polydipsia, polyphagia and polyuria.   Genitourinary:         No Nocturia    Skin:  Negative for wound.   Neurological:  Negative for numbness.       Physical Exam   Physical Exam  Vitals and nursing note reviewed.   Constitutional:       Appearance: She is well-developed.      Comments: Overweight female    HENT:      Head: Normocephalic and atraumatic.      Right Ear: External ear normal.       Left Ear: External ear normal.      Nose: Nose normal.   Neck:      Thyroid: No thyromegaly.      Trachea: No tracheal deviation.   Cardiovascular:      Rate and Rhythm: Normal rate and regular rhythm.      Heart sounds: No murmur heard.  Pulmonary:      Effort: Pulmonary effort is normal. No respiratory distress.      Breath sounds: Normal breath sounds.   Abdominal:      Palpations: Abdomen is soft.      Tenderness: There is no abdominal tenderness.      Hernia: No hernia is present.   Musculoskeletal:      Cervical back: Normal range of motion and neck supple.   Skin:     General: Skin is warm and dry.      Capillary Refill: Capillary refill takes less than 2 seconds.      Findings: No rash.      Comments: Injection sites and dexcom sites are normal appearing. No lipo hypertropthy or atrophy     Neurological:      Mental Status: She is alert and oriented to person, place, and time.      Cranial Nerves: No cranial nerve deficit.   Psychiatric:         Behavior: Behavior normal.         Judgment: Judgment normal.         FOOT EXAMINATION: Appropriate footwear       Lab Results   Component Value Date    TSH 1.135 08/04/2023         Type 1 diabetes mellitus with hyperglycemia  Uncontrolled   Blood sugar readings above goal and A1c has increased  + post prandial excursions   She feels confident with CHO counting       -- Medication Changes:    Continue Ozempic 0.5 mg weekly---as of now her insurance is covering it    Continue omnipod 5 and Dexcom G6 and use them consistently in auto mode   Fiasp insulin   Omnipod 5   Insulin pump settings  Basal: 0.7 units/hr   ICR   MN-12PM- 1:9  12PM- MN- 1:8-- tightened   ISF: 1:45   Target 130-- will lower as she becomes comfortable -- hx of hypoglycemia -- lowered today   IOB 3 hours -- tightened       -- Reviewed goals of therapy are to get the best control we can without hypoglycemia.  -- Reviewed patient's current insulin regimen. Clarified proper insulin dose and timing in  relation to meals, etc. Insulin injection sites and proper rotation instructed.    -- Advised frequent self blood glucose monitoring.  Patient encouraged to document glucose results and bring them to every clinic visit.  Needs to use Dexcom G6 with Omnipod 5 -- supplies from pharmacy    -- Hypoglycemia precautions discussed. Instructed on precautions before driving.    -- Call for Bg repeatedly < 70 or > 200.   -- Close adherence to lifestyle changes recommended.   -- Periodic follow ups for eye evaluations, foot care and dental care suggested.        Patient has diabetes mellitus and manages diabetes with intensive insulin regimen and uses prandial and basal insulin daily  Patient requires a therapeutic CGM and is willing to use therapeutic CGM for the necessary frequent adjustments of insulin therapy.  I have completed an in-person visit during the previous 6 months and will continue to have in-person visits every 6 months to assess adherence to their CGM regimen and diabetes treatment plan.  Due to COVID pandemic and need for remote monitoring this tool is medically necessary  Hx of marked hypoglycemia with EMS activation       Mild nonproliferative diabetic retinopathy of both eyes without macular edema associated with type 1 diabetes mellitus  Optimize BG readings.   See above.   Continue to follow-up with optometry as directed    Overweight (BMI 25.0-29.9)  Body mass index is 27.28 kg/m².  Increases insulin resistance.   Discussed DM diet and exercise.       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        Pump backup plan    If the insulin pump is non functional and discontinued for anticipated more than 20 hours, please give daily injections of:   Long acting insulin Tresiba 14 units daily -- can split to 7 units BID   Short acting insulin Fiasp- 1:9-1:8 for meals according to carb ratios and sensitivity factor in the pump.     With using correction scale:    150-200: +1 unit  201-250: +2 units  251-300: +3 units  301-350: +4 units  >350: +5 units        When the insulin pump is restarted, do not restart basal rates until at least 22 hours after the last long acting insulin injection. You can set a 0% temporary basal setting that will last until this time and use your pump to bolus for meals and correction.     For any technical insulin pump issues, please contact the insulin pump company; the toll free number is printed on the label on the back of the insulin pump.       If your sugar is running high for a few hours and does not respond to two correction doses from the insulin pump, it may mean that you have a bad pump site and the site should be changed ,p          Follow up in about 3 months (around 3/18/2024).  Follow up with me in 3 months with labs prior         Orders Placed This Encounter   Procedures    Hemoglobin A1C     Standing Status:   Future     Standing Expiration Date:   6/18/2025    Comprehensive Metabolic Panel     Standing Status:   Future     Standing Expiration Date:   6/18/2025    Lipid Panel     Standing Status:   Future     Standing Expiration Date:   6/18/2025    Microalbumin/Creatinine Ratio, Urine     Standing Status:   Future     Standing Expiration Date:   6/18/2025     Order Specific Question:   Specimen Source     Answer:   Urine    TSH      Standing Status:   Future     Standing Expiration Date:   6/18/2025    Fructosamine     Standing Status:   Future     Standing Expiration Date:   6/18/2025    Vitamin D     Standing Status:   Future     Standing Expiration Date:   6/18/2025    CBC Auto Differential     Standing Status:   Future     Standing Expiration Date:   6/18/2025       Recommendations were discussed with the patient in detail  The patient verbalized understanding and agrees with the plan outlined as above.     This note was partly generated with [employer] voice recognition software. I apologize for any possible typographical errors.

## 2023-12-22 ENCOUNTER — HOSPITAL ENCOUNTER (EMERGENCY)
Facility: HOSPITAL | Age: 33
Discharge: HOME | End: 2023-12-22
Attending: EMERGENCY MEDICINE
Payer: MEDICAID

## 2023-12-22 VITALS
OXYGEN SATURATION: 98 % | WEIGHT: 148 LBS | HEART RATE: 91 BPM | TEMPERATURE: 98 F | HEIGHT: 63 IN | RESPIRATION RATE: 17 BRPM | DIASTOLIC BLOOD PRESSURE: 69 MMHG | SYSTOLIC BLOOD PRESSURE: 115 MMHG | BODY MASS INDEX: 26.22 KG/M2

## 2023-12-22 DIAGNOSIS — E86.0 DEHYDRATION: ICD-10-CM

## 2023-12-22 DIAGNOSIS — R73.9 HYPERGLYCEMIA: ICD-10-CM

## 2023-12-22 DIAGNOSIS — R73.9 BLOOD GLUCOSE ELEVATED: ICD-10-CM

## 2023-12-22 DIAGNOSIS — R10.9 ABDOMINAL PAIN, UNSPECIFIED ABDOMINAL LOCATION: Primary | ICD-10-CM

## 2023-12-22 LAB
ALBUMIN SERPL BCP-MCNC: 3.4 G/DL (ref 3.5–5.2)
ALLENS TEST: ABNORMAL
ALLENS TEST: ABNORMAL
ALP SERPL-CCNC: 81 U/L (ref 55–135)
ALT SERPL W/O P-5'-P-CCNC: 17 U/L (ref 10–44)
ANION GAP SERPL CALC-SCNC: 8 MMOL/L (ref 8–16)
AST SERPL-CCNC: 15 U/L (ref 10–40)
B-HCG UR QL: NEGATIVE
B-OH-BUTYR BLD STRIP-SCNC: 0.6 MMOL/L (ref 0–0.5)
BACTERIA #/AREA URNS AUTO: NORMAL /HPF
BASOPHILS # BLD AUTO: 0.03 K/UL (ref 0–0.2)
BASOPHILS NFR BLD: 0.4 % (ref 0–1.9)
BILIRUB SERPL-MCNC: 0.5 MG/DL (ref 0.1–1)
BILIRUB UR QL STRIP: NEGATIVE
BUN SERPL-MCNC: 13 MG/DL (ref 6–20)
CALCIUM SERPL-MCNC: 8.8 MG/DL (ref 8.7–10.5)
CHLORIDE SERPL-SCNC: 105 MMOL/L (ref 95–110)
CLARITY UR REFRACT.AUTO: CLEAR
CO2 SERPL-SCNC: 22 MMOL/L (ref 23–29)
COLOR UR AUTO: YELLOW
CREAT SERPL-MCNC: 0.8 MG/DL (ref 0.5–1.4)
CTP QC/QA: YES
DELSYS: ABNORMAL
DELSYS: ABNORMAL
DIFFERENTIAL METHOD BLD: ABNORMAL
EOSINOPHIL # BLD AUTO: 0 K/UL (ref 0–0.5)
EOSINOPHIL NFR BLD: 0.1 % (ref 0–8)
ERYTHROCYTE [DISTWIDTH] IN BLOOD BY AUTOMATED COUNT: 13 % (ref 11.5–14.5)
EST. GFR  (NO RACE VARIABLE): >60 ML/MIN/1.73 M^2
GLUCOSE SERPL-MCNC: 207 MG/DL (ref 70–110)
GLUCOSE UR QL STRIP: ABNORMAL
HCO3 UR-SCNC: 19.8 MMOL/L (ref 24–28)
HCO3 UR-SCNC: 21.7 MMOL/L (ref 24–28)
HCT VFR BLD AUTO: 36 % (ref 37–48.5)
HGB BLD-MCNC: 11.7 G/DL (ref 12–16)
HGB UR QL STRIP: NEGATIVE
IMM GRANULOCYTES # BLD AUTO: 0.01 K/UL (ref 0–0.04)
IMM GRANULOCYTES NFR BLD AUTO: 0.1 % (ref 0–0.5)
KETONES UR QL STRIP: ABNORMAL
LEUKOCYTE ESTERASE UR QL STRIP: NEGATIVE
LIPASE SERPL-CCNC: 27 U/L (ref 4–60)
LYMPHOCYTES # BLD AUTO: 1.6 K/UL (ref 1–4.8)
LYMPHOCYTES NFR BLD: 21.5 % (ref 18–48)
MCH RBC QN AUTO: 28 PG (ref 27–31)
MCHC RBC AUTO-ENTMCNC: 32.5 G/DL (ref 32–36)
MCV RBC AUTO: 86 FL (ref 82–98)
MICROSCOPIC COMMENT: NORMAL
MODE: ABNORMAL
MODE: ABNORMAL
MONOCYTES # BLD AUTO: 0.4 K/UL (ref 0.3–1)
MONOCYTES NFR BLD: 5 % (ref 4–15)
NEUTROPHILS # BLD AUTO: 5.4 K/UL (ref 1.8–7.7)
NEUTROPHILS NFR BLD: 72.9 % (ref 38–73)
NITRITE UR QL STRIP: NEGATIVE
NRBC BLD-RTO: 0 /100 WBC
PCO2 BLDA: 37.9 MMHG (ref 35–45)
PCO2 BLDA: 72 MMHG (ref 35–45)
PH SMN: 7.05 [PH] (ref 7.35–7.45)
PH SMN: 7.37 [PH] (ref 7.35–7.45)
PH UR STRIP: 5 [PH] (ref 5–8)
PLATELET # BLD AUTO: 302 K/UL (ref 150–450)
PMV BLD AUTO: 9.7 FL (ref 9.2–12.9)
PO2 BLDA: 24 MMHG (ref 40–60)
PO2 BLDA: 44 MMHG (ref 40–60)
POC BE: -11 MMOL/L
POC BE: -4 MMOL/L
POC SATURATED O2: 41 % (ref 95–100)
POC SATURATED O2: 57 % (ref 95–100)
POC TCO2: 22 MMOL/L (ref 24–29)
POC TCO2: 23 MMOL/L (ref 24–29)
POCT GLUCOSE: 208 MG/DL (ref 70–110)
POCT GLUCOSE: 221 MG/DL (ref 70–110)
POTASSIUM SERPL-SCNC: 4 MMOL/L (ref 3.5–5.1)
PROT SERPL-MCNC: 6.5 G/DL (ref 6–8.4)
PROT UR QL STRIP: NEGATIVE
RBC # BLD AUTO: 4.18 M/UL (ref 4–5.4)
RBC #/AREA URNS AUTO: 1 /HPF (ref 0–4)
SAMPLE: ABNORMAL
SAMPLE: ABNORMAL
SITE: ABNORMAL
SITE: ABNORMAL
SODIUM SERPL-SCNC: 135 MMOL/L (ref 136–145)
SP GR UR STRIP: 1.02 (ref 1–1.03)
SQUAMOUS #/AREA URNS AUTO: 0 /HPF
URN SPEC COLLECT METH UR: ABNORMAL
WBC # BLD AUTO: 7.43 K/UL (ref 3.9–12.7)
WBC #/AREA URNS AUTO: 0 /HPF (ref 0–5)
YEAST UR QL AUTO: NORMAL

## 2023-12-22 PROCEDURE — 81025 URINE PREGNANCY TEST: CPT | Performed by: EMERGENCY MEDICINE

## 2023-12-22 PROCEDURE — 81001 URINALYSIS AUTO W/SCOPE: CPT | Performed by: EMERGENCY MEDICINE

## 2023-12-22 PROCEDURE — 99900035 HC TECH TIME PER 15 MIN (STAT)

## 2023-12-22 PROCEDURE — 82962 GLUCOSE BLOOD TEST: CPT

## 2023-12-22 PROCEDURE — 82010 KETONE BODYS QUAN: CPT | Performed by: EMERGENCY MEDICINE

## 2023-12-22 PROCEDURE — 93005 ELECTROCARDIOGRAM TRACING: CPT

## 2023-12-22 PROCEDURE — 96360 HYDRATION IV INFUSION INIT: CPT

## 2023-12-22 PROCEDURE — 25000003 PHARM REV CODE 250: Performed by: EMERGENCY MEDICINE

## 2023-12-22 PROCEDURE — 85025 COMPLETE CBC W/AUTO DIFF WBC: CPT | Performed by: EMERGENCY MEDICINE

## 2023-12-22 PROCEDURE — 80053 COMPREHEN METABOLIC PANEL: CPT | Performed by: EMERGENCY MEDICINE

## 2023-12-22 PROCEDURE — 93010 ELECTROCARDIOGRAM REPORT: CPT | Mod: ,,, | Performed by: INTERNAL MEDICINE

## 2023-12-22 PROCEDURE — 82803 BLOOD GASES ANY COMBINATION: CPT

## 2023-12-22 PROCEDURE — 99284 EMERGENCY DEPT VISIT MOD MDM: CPT

## 2023-12-22 PROCEDURE — 83690 ASSAY OF LIPASE: CPT | Performed by: EMERGENCY MEDICINE

## 2023-12-22 RX ORDER — ACETAMINOPHEN 500 MG
1000 TABLET ORAL
Status: COMPLETED | OUTPATIENT
Start: 2023-12-22 | End: 2023-12-22

## 2023-12-22 RX ORDER — SODIUM CHLORIDE 9 MG/ML
1000 INJECTION, SOLUTION INTRAVENOUS
Status: COMPLETED | OUTPATIENT
Start: 2023-12-22 | End: 2023-12-22

## 2023-12-22 RX ADMIN — SODIUM CHLORIDE 1000 ML: 9 INJECTION, SOLUTION INTRAVENOUS at 11:12

## 2023-12-22 RX ADMIN — ACETAMINOPHEN 1000 MG: 500 TABLET ORAL at 10:12

## 2023-12-22 NOTE — DISCHARGE INSTRUCTIONS
Take zofran if needed for nausea  Drink plenty of fluids to stay hydrated  Follow up with your doctor for referral to gastroenterology  Return to ED for fevers, vomiting, worsening abdominal pain or any other concerns

## 2023-12-22 NOTE — ED PROVIDER NOTES
Encounter Date: 2023       History     Chief Complaint   Patient presents with    Abdominal Pain    Headache    Belepharitis     Since this am     HPI  32 y/o F with medical history of HTN, HLD, DM1, retinopathy as well as prior gastric sleeve presents with multiple complaints including abdominal pain, headache and puffy eyes. Pt states she came to ED for her abdominal pain and would not be here for her headache and puffy eyes. States she has had intermittent abdominal pain for the past 3-4 days. Describes pain as 'weird feeling' in upper abdomen. Pain is intermittent and worse after eating. States she will be hungry and after eating a small amount notes pain and nausea. Decreased PO intake/appetite. No emesis. No fevers. Normal BM    Review of patient's allergies indicates:   Allergen Reactions    No known allergies      Past Medical History:   Diagnosis Date    Depression     Hyperlipidemia     Hypertension     Mild nonproliferative diabetic retinopathy of both eyes without macular edema associated with type 1 diabetes mellitus 3/30/2021    Type I (juvenile type) diabetes mellitus without mention of complication, uncontrolled 2011     Past Surgical History:   Procedure Laterality Date    ABCESS DRAINAGE      boil went over GA    CARPAL TUNNEL RELEASE Left 2022    Procedure: RELEASE, CARPAL TUNNEL;  Surgeon: Sekou Rojo Jr., MD;  Location: [facility] OR;  Service: Orthopedics;  Laterality: Left;    CARPAL TUNNEL RELEASE Right 3/28/2023    Procedure: RELEASE, CARPAL TUNNEL;  Surgeon: Sekou Rojo Jr., MD;  Location: [facility] OR;  Service: Orthopedics;  Laterality: Right;     SECTION  2012    DILATION AND CURETTAGE OF UTERUS  2019    Procedure: DILATION AND CURETTAGE, UTERUS;  Surgeon: Purnima Kidd MD;  Location: [facility] OR;  Service: OB/GYN;;    DILATION AND CURETTAGE OF UTERUS N/A 2023    Procedure: DILATION AND CURETTAGE, UTERUS;  Surgeon: Purnima Kidd MD;  Location: [facility]  OR;  Service: OB/GYN;  Laterality: N/A;    ENDOMETRIAL ABLATION N/A 8/9/2023    Procedure: ABLATION, ENDOMETRIUM;  Surgeon: Purnima Kidd MD;  Location: [facility] OR;  Service: OB/GYN;  Laterality: N/A;    HYSTEROSCOPY N/A 2/20/2019    Procedure: HYSTEROSCOPY;  Surgeon: Purnima Kidd MD;  Location: [facility] OR;  Service: OB/GYN;  Laterality: N/A;    HYSTEROSCOPY N/A 8/9/2023    Procedure: HYSTEROSCOPY;  Surgeon: Purnima Kidd MD;  Location: [facility] OR;  Service: OB/GYN;  Laterality: N/A;    LAPAROSCOPIC SALPINGECTOMY Bilateral 8/9/2023    Procedure: SALPINGECTOMY, LAPAROSCOPIC;  Surgeon: Purnima Kidd MD;  Location: [facility] OR;  Service: OB/GYN;  Laterality: Bilateral;    TONSILLECTOMY, ADENOIDECTOMY      TRIGGER FINGER RELEASE Left 12/13/2022    Procedure: RELEASE, TRIGGER FINGER;  Surgeon: Sekou Rojo Jr., MD;  Location: [facility] OR;  Service: Orthopedics;  Laterality: Left;    TRIGGER FINGER RELEASE Right 3/28/2023    Procedure: RELEASE, TRIGGER FINGER; MIDDLE FINGER;  Surgeon: Sekou Rojo Jr., MD;  Location: [facility] OR;  Service: Orthopedics;  Laterality: Right;    WISDOM TOOTH EXTRACTION       Family History   Problem Relation Age of Onset    Diabetes Brother     No Known Problems Mother     No Known Problems Father     Hydrocephalus Daughter     No Known Problems Brother     No Known Problems Brother     No Known Problems Maternal Aunt     No Known Problems Maternal Uncle     No Known Problems Paternal Aunt     No Known Problems Paternal Uncle     No Known Problems Maternal Grandmother     No Known Problems Maternal Grandfather     No Known Problems Paternal Grandmother     No Known Problems Paternal Grandfather     Amblyopia Neg Hx     Blindness Neg Hx     Cataracts Neg Hx     Glaucoma Neg Hx     Hypertension Neg Hx     Macular degeneration Neg Hx     Retinal detachment Neg Hx     Strabismus Neg Hx     Stroke Neg Hx     Thyroid disease Neg Hx     Breast cancer Neg Hx     Colon cancer Neg Hx     Ovarian cancer Neg  Hx     Cancer Neg Hx      Social History     Tobacco Use    Smoking status: Never    Smokeless tobacco: Never   Substance Use Topics    Alcohol use: Yes     Comment: occasionally    Drug use: No     Review of Systems   Constitutional:  Negative for fever.   Eyes:  Negative for visual disturbance.   Respiratory:  Negative for shortness of breath.    Cardiovascular:  Negative for chest pain and leg swelling.   Gastrointestinal:  Positive for abdominal distention, abdominal pain and nausea.   Genitourinary:  Negative for dysuria.   Neurological:  Negative for weakness.       Physical Exam     Initial Vitals [12/22/23 0848]   BP Pulse Resp Temp SpO2   110/64 (!) 113 18 98.4 °F (36.9 °C) 97 %      MAP       --         Physical Exam    Nursing note and vitals reviewed.  Constitutional: She appears well-developed and well-nourished. She is not diaphoretic. No distress.   HENT:   Head: Normocephalic and atraumatic.   Eyes: Conjunctivae are normal.   Mild bilateral upper and lower lid puffiness, no periorbital edema   Neck: Neck supple.   Pulmonary/Chest: No respiratory distress.   Abdominal: Abdomen is soft. Bowel sounds are normal. She exhibits no distension.   Minimal ttp in epigastrium   Musculoskeletal:         General: No edema.      Cervical back: Neck supple.     Neurological: She is alert and oriented to person, place, and time. GCS score is 15. GCS eye subscore is 4. GCS verbal subscore is 5. GCS motor subscore is 6.   Skin: Skin is warm and dry.         ED Course   Procedures  Labs Reviewed   CBC W/ AUTO DIFFERENTIAL - Abnormal; Notable for the following components:       Result Value    Hemoglobin 11.7 (*)     Hematocrit 36.0 (*)     All other components within normal limits   COMPREHENSIVE METABOLIC PANEL - Abnormal; Notable for the following components:    Sodium 135 (*)     CO2 22 (*)     Glucose 207 (*)     Albumin 3.4 (*)     All other components within normal limits    Narrative:     ADD ON LIPASE PER  JOCELYNE MCKNIGHT NREMT-P PER AYO MARTIN MD   ORDER# [id number] @  12/22/2023  09:50    BETA - HYDROXYBUTYRATE, SERUM - Abnormal; Notable for the following components:    Beta-Hydroxybutyrate 0.6 (*)     All other components within normal limits    Narrative:     ADD ON LIPASE PER JOCELYNE MCKNIGHT NREMT-P PER AYO MARTIN MD   ORDER# [id number] @  12/22/2023  09:50    URINALYSIS, REFLEX TO URINE CULTURE - Abnormal; Notable for the following components:    Glucose, UA 3+ (*)     Ketones, UA 3+ (*)     All other components within normal limits    Narrative:     Specimen Source->Urine   POCT GLUCOSE - Abnormal; Notable for the following components:    POCT Glucose 221 (*)     All other components within normal limits   POCT GLUCOSE - Abnormal; Notable for the following components:    POCT Glucose 208 (*)     All other components within normal limits   ISTAT PROCEDURE - Abnormal; Notable for the following components:    POC PH 7.046 (*)     POC PCO2 72.0 (*)     POC HCO3 19.8 (*)     POC BE -11 (*)     POC TCO2 22 (*)     All other components within normal limits   ISTAT PROCEDURE - Abnormal; Notable for the following components:    POC PO2 24 (*)     POC HCO3 21.7 (*)     POC BE -4 (*)     POC TCO2 23 (*)     All other components within normal limits   POCT URINE PREGNANCY - Normal   LIPASE   LIPASE    Narrative:     ADD ON LIPASE PER JOCELYNE MCKNIGHT NREMT-P PER AYO MARTIN MD   ORDER# [id number] @  12/22/2023  09:50    URINALYSIS MICROSCOPIC    Narrative:     Specimen Source->Urine     EKG Readings: (Independently Interpreted)   Sinus rhythm, rate 89, normal intervals, no KOURTNEY     ECG Results              EKG 12-lead (Final result)  Result time 12/22/23 10:02:15      Final result by Interface, Lab In [facility] (12/22/23 10:02:15)                   Narrative:    Test Reason : R73.9,    Vent. Rate : 089 BPM     Atrial Rate : 089 BPM     P-R Int : 124 ms          QRS Dur : 070 ms      QT Int : 346 ms        P-R-T Axes : 055 048 049 degrees     QTc Int : 420 ms    Normal sinus rhythm  Normal ECG  When compared with ECG of 02-OCT-2022 18:52,  The axis Shifted left  QT has shortened  Confirmed by Anuj SANDOVAL MD ([id number]) on 12/22/2023 10:02:06 AM    Referred By: RUEL   SELF           Confirmed By:Anuj SANDOVAL MD                                  Imaging Results    None          Medications   acetaminophen tablet 1,000 mg (1,000 mg Oral Given 12/22/23 1017)   0.9%  NaCl infusion (0 mLs Intravenous Stopped 12/22/23 1235)     Medical Decision Making  34 y/o F with medical history of HTN, HLD, DM1, retinopathy as well as prior gastric sleeve presents with multiple complaints including abdominal pain, headache and puffy eyes. Ddx: DKA, hyperglycemia, electrolyte abnormality,possible marginal ulcer after gastric sleeve. Have considered but do not suspect pSBO/SBOP as cause of abdominal pain. Headache possible DKA/dehydration. No clinical suspicion of meningtitis/encephalitis  Routine blood work including vbg and beta hydroxybutyrate  IVF hydration  Tylenol for headache    Initial vbg with acidosis - suspect poorly drawn specimen will therefore repeat  Slightly elevated beta hydroxybutyrate. IVF hydration and insulin ordered.    1:20 PM  Repat vbg with normal pH. Pt feeling better. Requesting to go home. Discharged with follow up with PCP. Routine return precautions  Repeat vbg with normal     Amount and/or Complexity of Data Reviewed  Labs: ordered. Decision-making details documented in ED Course.  ECG/medicine tests: independent interpretation performed. Decision-making details documented in ED Course.    Risk  OTC drugs.  Prescription drug management.                                Clinical Impression:  Final diagnoses:  [R73.9] Hyperglycemia  [R10.9] Abdominal pain, unspecified abdominal location (Primary)  [E86.0] Dehydration  [R73.9] Blood glucose elevated          ED Disposition Condition    Discharge Stable          ED  Prescriptions    None       Follow-up Information       Follow up With Specialties Details Why Contact Info    Magaly Muhammad NP Hospitalist, Emergency Medicine Schedule an appointment as soon as possible for a visit in 1 week  [address]  [address]  [facility] [address] [phone]               Shannon Diamond MD  01/03/24 [id number]

## 2023-12-22 NOTE — ED NOTES
After triaged patient states her cbg was 350 this am she is type 1.  Rechecking in triage before rooming in intake

## 2023-12-22 NOTE — ED TRIAGE NOTES
"Pt presents to ED via personal transport w/ c/o intermittent abdominal pain x4 days. States "it does not feel diabetes related." Hx gastric sleeve in '22. Takes Ozempic for DM1 -- states it doesn't feel Ozempic related. Endorsing lack of appetite + nausea w/ PO intake. States she "feels bloated."    Patient identifiers for Anusha Andrew 33 y.o. female checked and correct.  Chief Complaint   Patient presents with    Abdominal Pain    Headache    Belepharitis     Since this am     Past Medical History:   Diagnosis Date    Depression     Hyperlipidemia     Hypertension     Mild nonproliferative diabetic retinopathy of both eyes without macular edema associated with type 1 diabetes mellitus 3/30/2021    Type I (juvenile type) diabetes mellitus without mention of complication, uncontrolled 11/23/2011     LOC: Patient is awake, alert, and aware of environment with an appropriate affect. Patient is oriented x 3 and speaking appropriately.  APPEARANCE: Patient resting comfortably and in no acute distress. Patient is clean and well groomed, patient's clothing is properly fastened.  HEENT: + swollen eyelids  SKIN: The skin is warm and dry. Patient has normal skin turgor and moist mucus membranes. Skin is intact; no bruising or breakdown noted.  MUSKULOSKELETAL: Patient is moving all extremities well, no obvious deformities noted. Pulses intact.   RESPIRATORY: Airway is open and patent. Respirations are spontaneous and non-labored with normal effort and rate, BBS=clear  CARDIAC: No peripheral edema noted.   ABDOMEN: + intermittent abdominal pain, + nausea  NEUROLOGICAL: PERRL. Facial expression is symmetrical. Hand grasps are equal bilaterally. Normal sensation in all extremities when touched with finger.  "

## 2023-12-22 NOTE — CARE UPDATE
12/22/23 0940   Critical Value Communication   Critical Test #1 (S)  pH   Critical Test #1 Result (S)  7.046   Critical Test #2 (S)  pCO2   Critical Test #2 Result (S)  72.0     Questionable VBG result, MS aware, Will redraw sample

## 2024-01-09 NOTE — PLAN OF CARE
Patient oob and dressed, vss, no pain, nausea resolved will discharged safely in  with no issues  
ESTHER Martinez NP

## 2024-01-23 ENCOUNTER — PATIENT MESSAGE (OUTPATIENT)
Dept: OBSTETRICS AND GYNECOLOGY | Facility: CLINIC | Age: 34
End: 2024-01-23
Payer: MEDICAID

## 2024-01-26 ENCOUNTER — TELEPHONE (OUTPATIENT)
Dept: INTERNAL MEDICINE | Facility: CLINIC | Age: 34
End: 2024-01-26
Payer: MEDICAID

## 2024-01-26 ENCOUNTER — PATIENT MESSAGE (OUTPATIENT)
Dept: DIABETES | Facility: CLINIC | Age: 34
End: 2024-01-26
Payer: MEDICAID

## 2024-01-26 NOTE — TELEPHONE ENCOUNTER
----- Message from Lisa Pak sent at 1/26/2024 10:13 AM CST -----  Contact: [facility]   Pharmacy is calling to clarify an RX.  RX name:  semaglutide (OZEMPIC) 0.25 mg or 0.5 mg (2 mg/3 mL) pen injector  What do they need to clarify:  diagnosis code for Medicaid billing  Comments:

## 2024-01-28 PROBLEM — E10.10 DIABETIC KETOACIDOSIS WITHOUT COMA ASSOCIATED WITH TYPE 1 DIABETES MELLITUS: Status: ACTIVE | Noted: 2024-01-28

## 2024-01-29 ENCOUNTER — TELEPHONE (OUTPATIENT)
Dept: DIABETES | Facility: CLINIC | Age: 34
End: 2024-01-29
Payer: MEDICAID

## 2024-01-29 ENCOUNTER — PATIENT MESSAGE (OUTPATIENT)
Dept: DIABETES | Facility: CLINIC | Age: 34
End: 2024-01-29
Payer: MEDICAID

## 2024-01-29 DIAGNOSIS — E10.649 TYPE 1 DIABETES MELLITUS WITH HYPOGLYCEMIA UNAWARENESS: Primary | ICD-10-CM

## 2024-01-29 NOTE — TELEPHONE ENCOUNTER
Can we schedule her to see ye when she gets out the hospital   Needs to review pump optimization and needs to review hypoglycemia management on the pump

## 2024-02-05 ENCOUNTER — PATIENT MESSAGE (OUTPATIENT)
Dept: DIABETES | Facility: CLINIC | Age: 34
End: 2024-02-05
Payer: MEDICAID

## 2024-02-07 ENCOUNTER — PATIENT MESSAGE (OUTPATIENT)
Dept: OBSTETRICS AND GYNECOLOGY | Facility: CLINIC | Age: 34
End: 2024-02-07
Payer: MEDICAID

## 2024-02-23 ENCOUNTER — PATIENT MESSAGE (OUTPATIENT)
Dept: DIABETES | Facility: CLINIC | Age: 34
End: 2024-02-23
Payer: MEDICAID

## 2024-03-05 NOTE — PROGRESS NOTES
Hourly rounds complete this shift, Patient given pain medication r/t patient states he fell at  home, patient has back rash  patient refused Tele box bed in low, locked position, call light and bedside table within reach,  all needs met. Report to day shift nurse.     Ordering Provider:      Patient here to receive  Depo provera 150 mg  injection to the LEFT  ventrogluteal.     Last Depo injection 08/2020    Next injection due 01/22-02/05        Tolerated well, no reaction noted. Instructed to wait 15 minutes after administration for monitoring.          Pre pain scale: none     Post pain scale: none

## 2024-03-13 ENCOUNTER — PATIENT MESSAGE (OUTPATIENT)
Dept: DIABETES | Facility: CLINIC | Age: 34
End: 2024-03-13
Payer: MEDICAID

## 2024-03-13 ENCOUNTER — TELEPHONE (OUTPATIENT)
Dept: DIABETES | Facility: CLINIC | Age: 34
End: 2024-03-13
Payer: MEDICAID

## 2024-03-13 NOTE — TELEPHONE ENCOUNTER
----- Message from Erika Alcantara LPN sent at 3/13/2024 11:26 AM CDT -----  Blood sugar is now 197, just wanted to inform you

## 2024-03-18 ENCOUNTER — TELEPHONE (OUTPATIENT)
Dept: DIABETES | Facility: CLINIC | Age: 34
End: 2024-03-18
Payer: MEDICAID

## 2024-03-24 DIAGNOSIS — E10.65 TYPE 1 DIABETES MELLITUS WITH HYPERGLYCEMIA: ICD-10-CM

## 2024-03-25 RX ORDER — INSULIN PMP CART,AUT,G6/7,CNTR
1 EACH SUBCUTANEOUS
Qty: 10 EACH | Refills: 11 | Status: SHIPPED | OUTPATIENT
Start: 2024-03-25 | End: 2024-05-01 | Stop reason: SDUPTHER

## 2024-03-27 ENCOUNTER — PATIENT MESSAGE (OUTPATIENT)
Dept: DIABETES | Facility: CLINIC | Age: 34
End: 2024-03-27

## 2024-03-27 ENCOUNTER — OFFICE VISIT (OUTPATIENT)
Dept: DIABETES | Facility: CLINIC | Age: 34
End: 2024-03-27
Payer: MEDICAID

## 2024-03-27 VITALS
SYSTOLIC BLOOD PRESSURE: 100 MMHG | HEIGHT: 63 IN | DIASTOLIC BLOOD PRESSURE: 80 MMHG | BODY MASS INDEX: 27.64 KG/M2 | WEIGHT: 156 LBS

## 2024-03-27 DIAGNOSIS — E10.3293 MILD NONPROLIFERATIVE DIABETIC RETINOPATHY OF BOTH EYES WITHOUT MACULAR EDEMA ASSOCIATED WITH TYPE 1 DIABETES MELLITUS: ICD-10-CM

## 2024-03-27 DIAGNOSIS — E55.9 VITAMIN D DEFICIENCY: ICD-10-CM

## 2024-03-27 DIAGNOSIS — Z46.81 INSULIN PUMP FITTING OR ADJUSTMENT: ICD-10-CM

## 2024-03-27 DIAGNOSIS — E66.3 OVERWEIGHT (BMI 25.0-29.9): ICD-10-CM

## 2024-03-27 DIAGNOSIS — E10.65 TYPE 1 DIABETES MELLITUS WITH HYPERGLYCEMIA: Primary | ICD-10-CM

## 2024-03-27 DIAGNOSIS — E10.649 TYPE 1 DIABETES MELLITUS WITH HYPOGLYCEMIA UNAWARENESS: ICD-10-CM

## 2024-03-27 DIAGNOSIS — Z86.39 HISTORY OF DIABETES WITH KETOACIDOSIS: ICD-10-CM

## 2024-03-27 DIAGNOSIS — Z96.41 INSULIN PUMP IN PLACE: ICD-10-CM

## 2024-03-27 DIAGNOSIS — Z90.3 HISTORY OF SLEEVE GASTRECTOMY: ICD-10-CM

## 2024-03-27 DIAGNOSIS — Z71.9 HEALTH EDUCATION/COUNSELING: ICD-10-CM

## 2024-03-27 PROCEDURE — 1160F RVW MEDS BY RX/DR IN RCRD: CPT | Mod: CPTII,95,, | Performed by: NURSE PRACTITIONER

## 2024-03-27 PROCEDURE — 1159F MED LIST DOCD IN RCRD: CPT | Mod: CPTII,95,, | Performed by: NURSE PRACTITIONER

## 2024-03-27 PROCEDURE — 95251 CONT GLUC MNTR ANALYSIS I&R: CPT | Mod: NDTC,,, | Performed by: NURSE PRACTITIONER

## 2024-03-27 PROCEDURE — 3051F HG A1C>EQUAL 7.0%<8.0%: CPT | Mod: CPTII,95,, | Performed by: NURSE PRACTITIONER

## 2024-03-27 PROCEDURE — 3008F BODY MASS INDEX DOCD: CPT | Mod: CPTII,95,, | Performed by: NURSE PRACTITIONER

## 2024-03-27 PROCEDURE — 3074F SYST BP LT 130 MM HG: CPT | Mod: CPTII,95,, | Performed by: NURSE PRACTITIONER

## 2024-03-27 PROCEDURE — 99214 OFFICE O/P EST MOD 30 MIN: CPT | Mod: 95,,, | Performed by: NURSE PRACTITIONER

## 2024-03-27 PROCEDURE — 3079F DIAST BP 80-89 MM HG: CPT | Mod: CPTII,95,, | Performed by: NURSE PRACTITIONER

## 2024-03-27 PROCEDURE — 3061F NEG MICROALBUMINURIA REV: CPT | Mod: CPTII,95,, | Performed by: NURSE PRACTITIONER

## 2024-03-27 PROCEDURE — 3066F NEPHROPATHY DOC TX: CPT | Mod: CPTII,95,, | Performed by: NURSE PRACTITIONER

## 2024-03-27 NOTE — ASSESSMENT & PLAN NOTE
Body mass index is 27.63 kg/m².  Increases insulin resistance.   Discussed DM diet and exercise.

## 2024-03-27 NOTE — ASSESSMENT & PLAN NOTE
Uncontrolled   Blood sugar readings above goal  + post prandial excursions   She feels confident with CHO counting   Eating more and constantly hungry off ozempic   Needing more insulin       -- Medication Changes:    Unfortunately her insurance will not cover the Ozempic due to her being type 1    Continue omnipod 5 and Dexcom G6 and use them consistently in auto mode   Fiasp insulin   Omnipod 5   Insulin pump settings  Basal: 0.9 units/hr     ICR   MN-12PM- 1:8- tightened   12PM- MN- 1:7.5-- tightened   ISF: 1:45   Target 120-lowered the target     IOB 3 hours --      -- Reviewed goals of therapy are to get the best control we can without hypoglycemia.  -- Reviewed patient's current insulin regimen. Clarified proper insulin dose and timing in relation to meals, etc. Insulin injection sites and proper rotation instructed.    -- Advised frequent self blood glucose monitoring.  Patient encouraged to document glucose results and bring them to every clinic visit.  Needs to use Dexcom G6 with Omnipod 5 -- supplies from pharmacy    -- Hypoglycemia precautions discussed. Instructed on precautions before driving.    -- Call for Bg repeatedly < 70 or > 200.   -- Close adherence to lifestyle changes recommended.   -- Periodic follow ups for eye evaluations, foot care and dental care suggested.  -- she deferred following up with education       Patient has diabetes mellitus and manages diabetes with intensive insulin regimen and uses prandial and basal insulin daily  Patient requires a therapeutic CGM and is willing to use therapeutic CGM for the necessary frequent adjustments of insulin therapy.  I have completed an in-person visit during the previous 6 months and will continue to have in-person visits every 6 months to assess adherence to their CGM regimen and diabetes treatment plan.  Due to COVID pandemic and need for remote monitoring this tool is medically necessary  Hx of marked hypoglycemia with EMS activation

## 2024-03-27 NOTE — PROGRESS NOTES
The patient location is: [place]- in class   The chief complaint leading to consultation is: Diabetes management - follow up     Visit type: audiovisual    Face to Face time with patient: 18 minutes   30 minutes of total time spent on the encounter, which includes face to face time and non-face to face time preparing to see the patient (eg, review of tests), Obtaining and/or reviewing separately obtained history, Documenting clinical information in the electronic or other health record, Independently interpreting results (not separately reported) and communicating results to the patient/family/caregiver, or Care coordination (not separately reported).         Each patient to whom he or she provides medical services by telemedicine is:  (1) informed of the relationship between the physician and patient and the respective role of any other health care provider with respect to management of the patient; and (2) notified that he or she may decline to receive medical services by telemedicine and may withdraw from such care at any time.    Notes:       CC:   Chief Complaint   Patient presents with    Diabetes Mellitus       HPI: Anusha Andrew is a 33 y.o. female presents for a follow up visit today for the management of T1DM       She was diagnosed with Type 1 diabetes at 17 year old. Hospitalized at the time of diagnosis and started on insulin therapy     Family hx of diabetes: brother-- T1DM     Hospitalized for diabetes:   Scare with EMS-severe hypoglycemia  12/31/22 - hypoglycemia EMS, used baqsimi  12/28/22 covid19 -- hypoglycemia as well.   H/o DKA. 10/2018 DKA/admission    F/u with retinal specialist    No personal or FH of thyroid cancer or personal of pancreatic cancer or pancreatitis.     Gastric sleeve in July 2022  She lost about 50 lb    She reports since she had the gastric sleeve she is been suffering with issues of hypoglycemia      Our last visit was in December of 2023  At that visit we continued her  Ozempic while her insurance was covering it.  Since then her insurance stopped covering the Ozempic  We continued her Omnipod 5 and Dexcom G6 and discussed remaining in auto mode  Continue V us insulin  We tightened her carbohydrate ratio from mid day to midnight  And tightened her insulin on board 3 hours  Unfortunately her insurance will no longer cover the Ozempic and she finds off the Ozempic she is eating more, gaining weight, constantly feeling hungry---she feels she needs to take more insulin and needs setting adjustments because her sugars are running high after meals  For the most part she feels comfortable with her carbohydrate counting and reports that she is entering in accurate amounts of carbohydrates  See attached downloads which show that she is having prandial excursions  The visit today was a virtual and she was in class while we were having the visit  Recent A1c of 7.4%                                    DIABETES COMPLICATIONS: retinopathy      Diabetes Management Status    ASA:  No    Statin: Not taking  ACE/ARB: Not taking    The ASCVD Risk score (Anoop SHEA, et al., 2019) failed to calculate for the following reasons:    The 2019 ASCVD risk score is only valid for ages 40 to 79      Screening or Prevention Patient's value Goal Complete/Controlled?   HgA1C Testing and Control   Lab Results   Component Value Date    HGBA1C 7.4 (H) 03/12/2024      Annually/Less than 8% Yes     Lipid profile : 08/04/2023 Annually Yes     LDL control Lab Results   Component Value Date    LDLCALC 130.0 08/04/2023    Annually/Less than 100 mg/dl  No     Nephropathy screening Lab Results   Component Value Date    LABMICR 6.0 03/12/2024     Lab Results   Component Value Date    PROTEINUA 1+ (A) 01/29/2024    Annually Yes     Blood pressure BP Readings from Last 1 Encounters:   03/27/24 100/80    Less than 140/90 Yes     Dilated retinal exam : 04/12/2023 Annually Yes     Foot exam   : 04/26/2023 Annually No          CURRENT A1C:    Hemoglobin A1C   Date Value Ref Range Status   03/12/2024 7.4 (H) 4.0 - 5.6 % Final     Comment:     ADA Screening Guidelines:  5.7-6.4%  Consistent with prediabetes  >or=6.5%  Consistent with diabetes    High levels of fetal hemoglobin interfere with the HbA1C  assay. Heterozygous hemoglobin variants (HbS, HgC, etc)do  not significantly interfere with this assay.   However, presence of multiple variants may affect accuracy.     11/08/2023 7.7 (H) 4.0 - 5.6 % Final     Comment:     ADA Screening Guidelines:  5.7-6.4%  Consistent with prediabetes  >or=6.5%  Consistent with diabetes    High levels of fetal hemoglobin interfere with the HbA1C  assay. Heterozygous hemoglobin variants (HbS, HgC, etc)do  not significantly interfere with this assay.   However, presence of multiple variants may affect accuracy.     08/04/2023 7.0 (H) 4.0 - 5.6 % Final     Comment:     ADA Screening Guidelines:  5.7-6.4%  Consistent with prediabetes  >or=6.5%  Consistent with diabetes    High levels of fetal hemoglobin interfere with the HbA1C  assay. Heterozygous hemoglobin variants (HbS, HgC, etc)do  not significantly interfere with this assay.   However, presence of multiple variants may affect accuracy.         GOAL A1C: 6.5% without hypoglycemia       DM MEDICATIONS USED IN THE PAST:  humalog  levemir  novolog  lantus   Metformin   Trulicity   Ozempic   Dexcom   Fiasp   Tresiba   Omnipod 5       CURRENT DIABETES MEDICATIONS:   Off the ozempic     Insulin Pump: Omnipod 5 with Fiasp     Insulin pump settings  Basal: 0.7 units/hr   ICR   MN-12PM- 1:9  12PM- MN- 1:8-- tightened   ISF: 1:45   Target 130-- will lower as she becomes comfortable -- hx of hypoglycemia -- lowered today   IOB 3 hours -- tightened        Temp basals: ?    Pump site change: q 3 days   Cartridge change: q 3 days  Insulin TDD:  38.4  units    Basal 57 %   Bolus 43 %   CHO intake: 125.1 grams per day   Using the bolus wizard: yes   Overriding:  0%    In automated mode 100% of the time      Back up Lantus/Levemir: back up Tresiba     Patient's pump was downloaded in clinic today and reviewed with patient.   Please see attached documents for pump download.         BLOOD GLUCOSE MONITORING:    Sensor type: Dexcom G6   Average BG readin  Time in range: 45%   20% high   33% very high   1% low   <1% very low   Estimated A1c N/A  Site change:   q10 days     2 weeks prior -- average blood sugar 199  in target range 44% of the time with an estimated A1c of 8.1%    Supplies: ?       Sensor was downloaded in clinic today and reviewed with patient.   Please see attached document for download.       HYPOGLYCEMIA: 1% low and <1% very low   Glucagon kit: Baqsimi   Medic alert bracelet: denies       MEALS: eating 3 meals per day   Feels confident with CHO counting   Uses applications she is out eating   BF: eggs -- coffee in the AM   Lunch: salad, grilled chicken, tuna   Dinner: pasta, beans, spaghetti, sandwich   Snack: rarely   Drinks: coffee in AM   Water   Diet tea        CURRENT EXERCISE:  No      Review of Systems  Review of Systems   Constitutional:  Negative for appetite change, fatigue and unexpected weight change.   HENT:  Negative for trouble swallowing.    Eyes:  Negative for visual disturbance.   Respiratory:  Negative for shortness of breath.    Cardiovascular:  Negative for chest pain.   Gastrointestinal:  Negative for nausea.   Endocrine: Negative for polydipsia, polyphagia and polyuria.   Genitourinary:         No Nocturia    Skin:  Negative for wound.   Neurological:  Negative for numbness.       Physical Exam   Physical Exam  Vitals and nursing note reviewed.   Constitutional:       General: She is not in acute distress.     Appearance: Normal appearance. She is not ill-appearing.   HENT:      Head: Normocephalic and atraumatic.      Nose: Nose normal.   Pulmonary:      Effort: Pulmonary effort is normal.   Neurological:      General: No focal  deficit present.      Mental Status: She is alert and oriented to person, place, and time.   Psychiatric:         Mood and Affect: Mood normal.         Behavior: Behavior normal.         Thought Content: Thought content normal.         Judgment: Judgment normal.         FOOT EXAMINATION: deferred due to virtual visit     Lab Results   Component Value Date    TSH 2.153 03/12/2024         Type 1 diabetes mellitus with hyperglycemia  Uncontrolled   Blood sugar readings above goal  + post prandial excursions   She feels confident with CHO counting   Eating more and constantly hungry off ozempic   Needing more insulin       -- Medication Changes:    Unfortunately her insurance will not cover the Ozempic due to her being type 1    Continue omnipod 5 and Dexcom G6 and use them consistently in auto mode   Fiasp insulin   Omnipod 5   Insulin pump settings  Basal: 0.9 units/hr     ICR   MN-12PM- 1:8- tightened   12PM- MN- 1:7.5-- tightened   ISF: 1:45   Target 120-lowered the target     IOB 3 hours --      -- Reviewed goals of therapy are to get the best control we can without hypoglycemia.  -- Reviewed patient's current insulin regimen. Clarified proper insulin dose and timing in relation to meals, etc. Insulin injection sites and proper rotation instructed.    -- Advised frequent self blood glucose monitoring.  Patient encouraged to document glucose results and bring them to every clinic visit.  Needs to use Dexcom G6 with Omnipod 5 -- supplies from pharmacy    -- Hypoglycemia precautions discussed. Instructed on precautions before driving.    -- Call for Bg repeatedly < 70 or > 200.   -- Close adherence to lifestyle changes recommended.   -- Periodic follow ups for eye evaluations, foot care and dental care suggested.  -- she deferred following up with education       Patient has diabetes mellitus and manages diabetes with intensive insulin regimen and uses prandial and basal insulin daily  Patient requires a therapeutic  CGM and is willing to use therapeutic CGM for the necessary frequent adjustments of insulin therapy.  I have completed an in-person visit during the previous 6 months and will continue to have in-person visits every 6 months to assess adherence to their CGM regimen and diabetes treatment plan.  Due to COVID pandemic and need for remote monitoring this tool is medically necessary  Hx of marked hypoglycemia with EMS activation       Mild nonproliferative diabetic retinopathy of both eyes without macular edema associated with type 1 diabetes mellitus  Optimize BG readings.   See above.   Continue to follow-up with optometry as directed    History of diabetes with ketoacidosis  Of note     Overweight (BMI 25.0-29.9)  Body mass index is 27.63 kg/m².  Increases insulin resistance.   Discussed DM diet and exercise.       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  Has Baqsimi   Continue to use dexcom -- discussed the importance of consistently using the dexcom     Insulin pump fitting or adjustment  See above     Insulin pump in place  See above     Vitamin D deficiency  Was not taking her vit supp   Back on the ergo weekly now         I spent a total of 30 minutes on the day of the visit.This includes face to face time and non-face to face time preparing to see the patient (eg, review of tests), obtaining and/or reviewing separately obtained history, documenting clinical information in the electronic or other health record, independently interpreting results and communicating results to the patient/family/caregiver, or care coordinator.          Pump backup plan    If the insulin pump is non functional and discontinued for anticipated more than 20 hours, please give daily injections of:   Long acting insulin Tresiba 14 units daily -- can split to 7 units BID   Short acting insulin Fiasp-  1:9-1:8 for meals according to carb ratios and sensitivity factor in the pump.     With using correction scale:    150-200: +1 unit  201-250: +2 units  251-300: +3 units  301-350: +4 units  >350: +5 units        When the insulin pump is restarted, do not restart basal rates until at least 22 hours after the last long acting insulin injection. You can set a 0% temporary basal setting that will last until this time and use your pump to bolus for meals and correction.     For any technical insulin pump issues, please contact the insulin pump company; the toll free number is printed on the label on the back of the insulin pump.       If your sugar is running high for a few hours and does not respond to two correction doses from the insulin pump, it may mean that you have a bad pump site and the site should be changed ,p          Follow up in about 3 months (around 6/27/2024).  Follow up with me in 3 months with labs prior         Orders Placed This Encounter   Procedures    Hemoglobin A1C     Standing Status:   Future     Standing Expiration Date:   9/27/2025       Recommendations were discussed with the patient in detail  The patient verbalized understanding and agrees with the plan outlined as above.     This note was partly generated with [employer] voice recognition software. I apologize for any possible typographical errors.

## 2024-03-28 PROBLEM — J10.1 INFLUENZA B: Status: ACTIVE | Noted: 2024-03-28

## 2024-04-12 ENCOUNTER — HOSPITAL ENCOUNTER (INPATIENT)
Facility: HOSPITAL | Age: 34
LOS: 4 days | Discharge: HOME | DRG: 871 | End: 2024-04-16
Attending: STUDENT IN AN ORGANIZED HEALTH CARE EDUCATION/TRAINING PROGRAM | Admitting: HOSPITALIST
Payer: MEDICAID

## 2024-04-12 DIAGNOSIS — R65.21 SEPTIC SHOCK: ICD-10-CM

## 2024-04-12 DIAGNOSIS — E11.65 HYPERGLYCEMIA DUE TO DIABETES MELLITUS: ICD-10-CM

## 2024-04-12 DIAGNOSIS — E10.10 DIABETIC KETOACIDOSIS WITHOUT COMA ASSOCIATED WITH TYPE 1 DIABETES MELLITUS: ICD-10-CM

## 2024-04-12 DIAGNOSIS — R06.02 SHORTNESS OF BREATH: ICD-10-CM

## 2024-04-12 DIAGNOSIS — E11.10 DKA (DIABETIC KETOACIDOSIS): ICD-10-CM

## 2024-04-12 DIAGNOSIS — G03.9 MENINGITIS: ICD-10-CM

## 2024-04-12 DIAGNOSIS — E10.649 TYPE 1 DIABETES MELLITUS WITH HYPOGLYCEMIA UNAWARENESS: ICD-10-CM

## 2024-04-12 DIAGNOSIS — A41.9 SEPTIC SHOCK: ICD-10-CM

## 2024-04-12 DIAGNOSIS — R57.9 SHOCK: ICD-10-CM

## 2024-04-12 DIAGNOSIS — R51.9 NONINTRACTABLE HEADACHE, UNSPECIFIED CHRONICITY PATTERN, UNSPECIFIED HEADACHE TYPE: Primary | ICD-10-CM

## 2024-04-12 LAB
ALBUMIN SERPL BCP-MCNC: 4 G/DL (ref 3.5–5.2)
ALP SERPL-CCNC: 109 U/L (ref 55–135)
ALT SERPL W/O P-5'-P-CCNC: 27 U/L (ref 10–44)
ANION GAP SERPL CALC-SCNC: 14 MMOL/L (ref 8–16)
ANION GAP SERPL CALC-SCNC: 17 MMOL/L (ref 8–16)
AST SERPL-CCNC: 24 U/L (ref 10–40)
B-HCG UR QL: NEGATIVE
B-OH-BUTYR BLD STRIP-SCNC: 3.1 MMOL/L (ref 0–0.5)
BASOPHILS # BLD AUTO: 0.03 K/UL (ref 0–0.2)
BASOPHILS NFR BLD: 0.3 % (ref 0–1.9)
BILIRUB SERPL-MCNC: 0.7 MG/DL (ref 0.1–1)
BILIRUB UR QL STRIP: NEGATIVE
BUN SERPL-MCNC: 13 MG/DL (ref 6–20)
BUN SERPL-MCNC: 15 MG/DL (ref 6–20)
CALCIUM SERPL-MCNC: 8.1 MG/DL (ref 8.7–10.5)
CALCIUM SERPL-MCNC: 9.4 MG/DL (ref 8.7–10.5)
CHLORIDE SERPL-SCNC: 109 MMOL/L (ref 95–110)
CHLORIDE SERPL-SCNC: 99 MMOL/L (ref 95–110)
CLARITY UR: CLEAR
CO2 SERPL-SCNC: 14 MMOL/L (ref 23–29)
CO2 SERPL-SCNC: 19 MMOL/L (ref 23–29)
COLOR UR: YELLOW
CREAT SERPL-MCNC: 0.9 MG/DL (ref 0.5–1.4)
CREAT SERPL-MCNC: 1 MG/DL (ref 0.5–1.4)
CTP QC/QA: YES
DIFFERENTIAL METHOD BLD: ABNORMAL
EOSINOPHIL # BLD AUTO: 0.1 K/UL (ref 0–0.5)
EOSINOPHIL NFR BLD: 0.5 % (ref 0–8)
ERYTHROCYTE [DISTWIDTH] IN BLOOD BY AUTOMATED COUNT: 13.3 % (ref 11.5–14.5)
EST. GFR  (NO RACE VARIABLE): >60 ML/MIN/1.73 M^2
EST. GFR  (NO RACE VARIABLE): >60 ML/MIN/1.73 M^2
FIO2: 21 %
GLUCOSE SERPL-MCNC: 297 MG/DL (ref 70–110)
GLUCOSE SERPL-MCNC: 481 MG/DL (ref 70–110)
GLUCOSE UR QL STRIP: ABNORMAL
HCT VFR BLD AUTO: 36.1 % (ref 37–48.5)
HGB BLD-MCNC: 12.1 G/DL (ref 12–16)
HGB UR QL STRIP: NEGATIVE
IMM GRANULOCYTES # BLD AUTO: 0.02 K/UL (ref 0–0.04)
IMM GRANULOCYTES NFR BLD AUTO: 0.2 % (ref 0–0.5)
INFLUENZA A, MOLECULAR: NEGATIVE
INFLUENZA B, MOLECULAR: NEGATIVE
KETONES UR QL STRIP: ABNORMAL
LACTATE SERPL-SCNC: 2.2 MMOL/L (ref 0.5–2.2)
LEUKOCYTE ESTERASE UR QL STRIP: NEGATIVE
LYMPHOCYTES # BLD AUTO: 1.8 K/UL (ref 1–4.8)
LYMPHOCYTES NFR BLD: 17.7 % (ref 18–48)
MAGNESIUM SERPL-MCNC: 1.9 MG/DL (ref 1.6–2.6)
MCH RBC QN AUTO: 28.9 PG (ref 27–31)
MCHC RBC AUTO-ENTMCNC: 33.5 G/DL (ref 32–36)
MCV RBC AUTO: 86 FL (ref 82–98)
MONOCYTES # BLD AUTO: 0.5 K/UL (ref 0.3–1)
MONOCYTES NFR BLD: 4.6 % (ref 4–15)
NEUTROPHILS # BLD AUTO: 7.7 K/UL (ref 1.8–7.7)
NEUTROPHILS NFR BLD: 76.7 % (ref 38–73)
NITRITE UR QL STRIP: NEGATIVE
NRBC BLD-RTO: 0 /100 WBC
PCO2 BLDA: 45.5 MMHG (ref 35–45)
PH SMN: 7.3 [PH] (ref 7.35–7.45)
PH UR STRIP: 6 [PH] (ref 5–8)
PLATELET # BLD AUTO: 449 K/UL (ref 150–450)
PMV BLD AUTO: 11.6 FL (ref 9.2–12.9)
PO2 BLDA: 30.9 MMHG (ref 40–60)
POC BASE DEFICIT: -4.3 MMOL/L (ref -2–2)
POC HCO3: 22.2 MMOL/L (ref 24–28)
POC PERFORMED BY: ABNORMAL
POC SATURATED O2: 54 % (ref 95–100)
POCT GLUCOSE: 233 MG/DL (ref 70–110)
POCT GLUCOSE: 285 MG/DL (ref 70–110)
POCT GLUCOSE: 291 MG/DL (ref 70–110)
POCT GLUCOSE: 345 MG/DL (ref 70–110)
POCT GLUCOSE: 455 MG/DL (ref 70–110)
POTASSIUM SERPL-SCNC: 3.9 MMOL/L (ref 3.5–5.1)
POTASSIUM SERPL-SCNC: 4.6 MMOL/L (ref 3.5–5.1)
PROCALCITONIN SERPL IA-MCNC: 1.87 NG/ML
PROT SERPL-MCNC: 8 G/DL (ref 6–8.4)
PROT UR QL STRIP: NEGATIVE
RBC # BLD AUTO: 4.18 M/UL (ref 4–5.4)
SARS-COV-2 RDRP RESP QL NAA+PROBE: NEGATIVE
SODIUM SERPL-SCNC: 135 MMOL/L (ref 136–145)
SODIUM SERPL-SCNC: 137 MMOL/L (ref 136–145)
SP GR UR STRIP: 1.01 (ref 1–1.03)
SPECIMEN SOURCE: ABNORMAL
SPECIMEN SOURCE: NORMAL
URN SPEC COLLECT METH UR: ABNORMAL
UROBILINOGEN UR STRIP-ACNC: NEGATIVE EU/DL
WBC # BLD AUTO: 10.09 K/UL (ref 3.9–12.7)

## 2024-04-12 PROCEDURE — 12000002 HC ACUTE/MED SURGE SEMI-PRIVATE ROOM

## 2024-04-12 PROCEDURE — 93005 ELECTROCARDIOGRAM TRACING: CPT

## 2024-04-12 PROCEDURE — 96375 TX/PRO/DX INJ NEW DRUG ADDON: CPT

## 2024-04-12 PROCEDURE — 63600175 PHARM REV CODE 636 W HCPCS: Performed by: NURSE PRACTITIONER

## 2024-04-12 PROCEDURE — 99900035 HC TECH TIME PER 15 MIN (STAT)

## 2024-04-12 PROCEDURE — 80048 BASIC METABOLIC PNL TOTAL CA: CPT | Mod: XB | Performed by: NURSE PRACTITIONER

## 2024-04-12 PROCEDURE — 93010 ELECTROCARDIOGRAM REPORT: CPT | Mod: ,,, | Performed by: INTERNAL MEDICINE

## 2024-04-12 PROCEDURE — 83735 ASSAY OF MAGNESIUM: CPT | Performed by: NURSE PRACTITIONER

## 2024-04-12 PROCEDURE — 80053 COMPREHEN METABOLIC PANEL: CPT | Performed by: NURSE PRACTITIONER

## 2024-04-12 PROCEDURE — 82010 KETONE BODYS QUAN: CPT | Performed by: NURSE PRACTITIONER

## 2024-04-12 PROCEDURE — 82803 BLOOD GASES ANY COMBINATION: CPT

## 2024-04-12 PROCEDURE — 84145 PROCALCITONIN (PCT): CPT | Performed by: NURSE PRACTITIONER

## 2024-04-12 PROCEDURE — 85025 COMPLETE CBC W/AUTO DIFF WBC: CPT | Performed by: NURSE PRACTITIONER

## 2024-04-12 PROCEDURE — 96361 HYDRATE IV INFUSION ADD-ON: CPT

## 2024-04-12 PROCEDURE — 87502 INFLUENZA DNA AMP PROBE: CPT | Performed by: NURSE PRACTITIONER

## 2024-04-12 PROCEDURE — 25000003 PHARM REV CODE 250: Performed by: NURSE PRACTITIONER

## 2024-04-12 PROCEDURE — 83605 ASSAY OF LACTIC ACID: CPT | Mod: 91 | Performed by: NURSE PRACTITIONER

## 2024-04-12 PROCEDURE — 81025 URINE PREGNANCY TEST: CPT | Performed by: NURSE PRACTITIONER

## 2024-04-12 PROCEDURE — 99291 CRITICAL CARE FIRST HOUR: CPT

## 2024-04-12 PROCEDURE — 87040 BLOOD CULTURE FOR BACTERIA: CPT | Performed by: NURSE PRACTITIONER

## 2024-04-12 PROCEDURE — 96374 THER/PROPH/DIAG INJ IV PUSH: CPT

## 2024-04-12 PROCEDURE — 81003 URINALYSIS AUTO W/O SCOPE: CPT | Performed by: NURSE PRACTITIONER

## 2024-04-12 PROCEDURE — U0002 COVID-19 LAB TEST NON-CDC: HCPCS | Performed by: NURSE PRACTITIONER

## 2024-04-12 PROCEDURE — 96360 HYDRATION IV INFUSION INIT: CPT | Mod: 59

## 2024-04-12 PROCEDURE — 96372 THER/PROPH/DIAG INJ SC/IM: CPT | Performed by: NURSE PRACTITIONER

## 2024-04-12 PROCEDURE — 82962 GLUCOSE BLOOD TEST: CPT

## 2024-04-12 RX ORDER — GLUCAGON 1 MG
1 KIT INJECTION
Status: DISCONTINUED | OUTPATIENT
Start: 2024-04-12 | End: 2024-04-13

## 2024-04-12 RX ORDER — SODIUM CHLORIDE 0.9 % (FLUSH) 0.9 %
10 SYRINGE (ML) INJECTION
Status: DISCONTINUED | OUTPATIENT
Start: 2024-04-12 | End: 2024-04-16 | Stop reason: HOSPADM

## 2024-04-12 RX ORDER — KETOROLAC TROMETHAMINE 30 MG/ML
30 INJECTION, SOLUTION INTRAMUSCULAR; INTRAVENOUS ONCE
Status: COMPLETED | OUTPATIENT
Start: 2024-04-12 | End: 2024-04-12

## 2024-04-12 RX ORDER — KETOROLAC TROMETHAMINE 30 MG/ML
30 INJECTION, SOLUTION INTRAMUSCULAR; INTRAVENOUS
Status: COMPLETED | OUTPATIENT
Start: 2024-04-12 | End: 2024-04-12

## 2024-04-12 RX ORDER — DEXTROSE MONOHYDRATE AND SODIUM CHLORIDE 5; .45 G/100ML; G/100ML
125 INJECTION, SOLUTION INTRAVENOUS CONTINUOUS PRN
Status: DISCONTINUED | OUTPATIENT
Start: 2024-04-12 | End: 2024-04-13

## 2024-04-12 RX ORDER — ONDANSETRON HYDROCHLORIDE 2 MG/ML
4 INJECTION, SOLUTION INTRAVENOUS EVERY 6 HOURS PRN
Status: DISCONTINUED | OUTPATIENT
Start: 2024-04-12 | End: 2024-04-16 | Stop reason: HOSPADM

## 2024-04-12 RX ORDER — BUTALBITAL, ACETAMINOPHEN AND CAFFEINE 50; 325; 40 MG/1; MG/1; MG/1
1 TABLET ORAL EVERY 4 HOURS PRN
Status: DISCONTINUED | OUTPATIENT
Start: 2024-04-12 | End: 2024-04-14

## 2024-04-12 RX ORDER — CYCLOBENZAPRINE HCL 5 MG
5 TABLET ORAL ONCE
Status: COMPLETED | OUTPATIENT
Start: 2024-04-12 | End: 2024-04-12

## 2024-04-12 RX ORDER — IBUPROFEN 200 MG
24 TABLET ORAL
Status: DISCONTINUED | OUTPATIENT
Start: 2024-04-12 | End: 2024-04-13

## 2024-04-12 RX ORDER — POLYETHYLENE GLYCOL 3350 17 G/17G
17 POWDER, FOR SOLUTION ORAL DAILY PRN
Status: DISCONTINUED | OUTPATIENT
Start: 2024-04-12 | End: 2024-04-16 | Stop reason: HOSPADM

## 2024-04-12 RX ORDER — INSULIN ASPART 100 [IU]/ML
0-5 INJECTION, SOLUTION INTRAVENOUS; SUBCUTANEOUS
Status: DISCONTINUED | OUTPATIENT
Start: 2024-04-12 | End: 2024-04-13

## 2024-04-12 RX ORDER — FLUOXETINE HYDROCHLORIDE 20 MG/1
20 CAPSULE ORAL 2 TIMES DAILY
Status: DISCONTINUED | OUTPATIENT
Start: 2024-04-12 | End: 2024-04-16 | Stop reason: HOSPADM

## 2024-04-12 RX ORDER — NOREPINEPHRINE BITARTRATE/D5W 4MG/250ML
0-.2 PLASTIC BAG, INJECTION (ML) INTRAVENOUS CONTINUOUS
Status: DISPENSED | OUTPATIENT
Start: 2024-04-12 | End: 2024-04-13

## 2024-04-12 RX ORDER — DIPHENHYDRAMINE HYDROCHLORIDE 50 MG/ML
25 INJECTION INTRAMUSCULAR; INTRAVENOUS
Status: COMPLETED | OUTPATIENT
Start: 2024-04-12 | End: 2024-04-12

## 2024-04-12 RX ORDER — IBUPROFEN 200 MG
16 TABLET ORAL
Status: DISCONTINUED | OUTPATIENT
Start: 2024-04-12 | End: 2024-04-13

## 2024-04-12 RX ORDER — INSULIN ASPART 100 [IU]/ML
10 INJECTION, SOLUTION INTRAVENOUS; SUBCUTANEOUS
Status: DISCONTINUED | OUTPATIENT
Start: 2024-04-12 | End: 2024-04-13

## 2024-04-12 RX ORDER — SODIUM CHLORIDE 9 MG/ML
125 INJECTION, SOLUTION INTRAVENOUS CONTINUOUS
Status: DISCONTINUED | OUTPATIENT
Start: 2024-04-12 | End: 2024-04-13

## 2024-04-12 RX ORDER — BUTALBITAL, ACETAMINOPHEN AND CAFFEINE 50; 325; 40 MG/1; MG/1; MG/1
1 TABLET ORAL ONCE
Status: COMPLETED | OUTPATIENT
Start: 2024-04-12 | End: 2024-04-12

## 2024-04-12 RX ORDER — PROCHLORPERAZINE EDISYLATE 5 MG/ML
10 INJECTION INTRAMUSCULAR; INTRAVENOUS
Status: COMPLETED | OUTPATIENT
Start: 2024-04-12 | End: 2024-04-12

## 2024-04-12 RX ORDER — ACETAMINOPHEN 325 MG/1
650 TABLET ORAL EVERY 4 HOURS PRN
Status: DISCONTINUED | OUTPATIENT
Start: 2024-04-12 | End: 2024-04-13

## 2024-04-12 RX ADMIN — ONDANSETRON 4 MG: 2 INJECTION INTRAMUSCULAR; INTRAVENOUS at 06:04

## 2024-04-12 RX ADMIN — SODIUM CHLORIDE, POTASSIUM CHLORIDE, SODIUM LACTATE AND CALCIUM CHLORIDE 1000 ML: 600; 310; 30; 20 INJECTION, SOLUTION INTRAVENOUS at 08:04

## 2024-04-12 RX ADMIN — INSULIN HUMAN 7 UNITS: 100 INJECTION, SOLUTION PARENTERAL at 05:04

## 2024-04-12 RX ADMIN — PROCHLORPERAZINE EDISYLATE 10 MG: 5 INJECTION INTRAMUSCULAR; INTRAVENOUS at 02:04

## 2024-04-12 RX ADMIN — DIPHENHYDRAMINE HYDROCHLORIDE 25 MG: 50 INJECTION INTRAMUSCULAR; INTRAVENOUS at 02:04

## 2024-04-12 RX ADMIN — SODIUM CHLORIDE 1000 ML: 9 INJECTION, SOLUTION INTRAVENOUS at 03:04

## 2024-04-12 RX ADMIN — INSULIN DETEMIR 5 UNITS: 100 INJECTION, SOLUTION SUBCUTANEOUS at 08:04

## 2024-04-12 RX ADMIN — SODIUM CHLORIDE 125 ML/HR: 9 INJECTION, SOLUTION INTRAVENOUS at 09:04

## 2024-04-12 RX ADMIN — BUTALBITAL, ACETAMINOPHEN, AND CAFFEINE 1 TABLET: 50; 325; 40 TABLET ORAL at 08:04

## 2024-04-12 RX ADMIN — INSULIN ASPART 3 UNITS: 100 INJECTION, SOLUTION INTRAVENOUS; SUBCUTANEOUS at 08:04

## 2024-04-12 RX ADMIN — FLUOXETINE HYDROCHLORIDE 20 MG: 20 CAPSULE ORAL at 08:04

## 2024-04-12 RX ADMIN — CYCLOBENZAPRINE HYDROCHLORIDE 5 MG: 5 TABLET, FILM COATED ORAL at 08:04

## 2024-04-12 RX ADMIN — KETOROLAC TROMETHAMINE 30 MG: 30 INJECTION, SOLUTION INTRAMUSCULAR; INTRAVENOUS at 08:04

## 2024-04-12 RX ADMIN — NOREPINEPHRINE BITARTRATE 0.02 MCG/KG/MIN: 4 INJECTION, SOLUTION INTRAVENOUS at 09:04

## 2024-04-12 RX ADMIN — SODIUM CHLORIDE 1000 ML: 9 INJECTION, SOLUTION INTRAVENOUS at 04:04

## 2024-04-12 RX ADMIN — KETOROLAC TROMETHAMINE 30 MG: 30 INJECTION, SOLUTION INTRAMUSCULAR; INTRAVENOUS at 02:04

## 2024-04-12 NOTE — HPI
"Anusha Andrew is a 33 yr old female w/ PMH HLD, depression, HTN, and DMT1. She presents to the ER with reports of headache. Pt states she was seen at [facility] and given shot of Imitrex. Pt states the headache has not improved. Denies fever, rash, or neck stiffness. Nausea and vomiting reported. Pt states "when I get headaches like this, I usually respond well to Fioricet". Pt reported some photosensitivity however states it has improved. She states the headache did improve from 9/10 to 6/10 after initial ED treatment. She describes it as aching, pressure starting in her neck and spreading up over her head. No thunderclap sensation or associated vision changes. She reports a couple of weeks ago she had flu symptoms but that those have mostly resolved. Per chart review she was diagnosed with Flu B and treated with Tamiflu on 3/28. In the ED patient also noted to be in DKA with AG 17, , beta 3.1, and metabolic acidosis on VBG.  "

## 2024-04-12 NOTE — ED TRIAGE NOTES
Generalized headache for past several days with N/V. Seen at [facility] yesterday for same complaint - states given IM Imitrex which relieved pain for short time but returned. States no Rx meds given at discharge. Reports mild sinus congestion with recent flu diagnosis about 2 weeks ago. No fever reported over past few days. Presents awake, alert. No distress noted.

## 2024-04-12 NOTE — ED NOTES
Pt presents to ED with C/O generalized HA with onset with initial nausea that progressed to vomiting today. Pt states she has taken otc medications ibuprofen, tylenol and goody powder. Pt states she was seen per UC yesterday and was prescribed Fiorcet with no relief. Pt states the pain is 10/10 at this time.

## 2024-04-12 NOTE — PHARMACY MED REC
"  [facility]           Pharmacy  Admission Medication History     The home medication history was taken by Shabnam Kemp.      Medication history obtained from Medications listed below were obtained from: Patient/family    Based on information gathered for medication list, you may go to "Admission" then "Reconcile Home Medications" tabs to review and/or act upon those items.     The home medication list has been updated by the Pharmacy department.   Please read ALL comments highlighted in yellow.   Please address this information as you see fit.    Feel free to contact us if you have any questions or require assistance.      Current Facility-Administered Medications on File Prior to Encounter   Medication Dose Route Frequency Provider Last Rate Last Admin    [COMPLETED] SUMAtriptan succinate injection 6 mg  6 mg Subcutaneous ED 1 Time Estela Zambrano NP   6 mg at 04/11/24 1828     Current Outpatient Medications on File Prior to Encounter   Medication Sig Dispense Refill    ergocalciferol (VITAMIN D2) 50,000 unit Cap Take 1 capsule (50,000 Units total) by mouth every 7 days. 5 capsule 3    FLUoxetine 20 MG capsule Take 1 capsule (20 mg total) by mouth 2 (two) times daily. 180 capsule 2    fluticasone propionate (FLONASE) 50 mcg/actuation nasal spray 1 spray (50 mcg total) by Each Nostril route 2 (two) times daily as needed. 16 g 0    glucagon (BAQSIMI) 3 mg/actuation Spry Give one puff via nostril. Hold device between fingers and thumb, do not push plunger yet, insert tip gently into one nostril until finger(s) touch the outside of the nose, then push plunger firmly all the way in . Dose is complete when the green line disappears. 2 each 2    ibuprofen (ADVIL,MOTRIN) 800 MG tablet Take 1 tablet (800 mg total) by mouth every 6 (six) hours as needed for Pain. 20 tablet 0    insulin pump cart,automated,BT (OMNIPOD 5 G6 PODS, GEN 5,) Crtg Inject 1 Device into the skin Every 3 (three) days. 10 " "each 11    semaglutide (OZEMPIC) 0.25 mg or 0.5 mg (2 mg/3 mL) pen injector Inject 0.25 mg into the skin every 7 days. ICD 10: E11.9 1.5 mL 0    BD EBEN 2ND GEN PEN NEEDLE 32 gauge x 5/32" Ndle To use with insulin injections 4 times per day 100 each 11    blood sugar diagnostic Strp To check BG 4  times daily, to use with insurance preferred meter, e 10.65 (Patient taking differently: To check BG 4  times daily, to use with insurance preferred meter, e 10.65) 400 each 3    blood-glucose sensor (DEXCOM G6 SENSOR) Saida 1 sensor every 10 days 3 each 11    blood-glucose transmitter (DEXCOM G6 TRANSMITTER) Saida 1 transmitter every 3 months 1 each 4    hydrocortisone (ANUSOL-HC) 2.5 % rectal cream Place rectally 2 (two) times daily. 28 g 2    insulin syringe-needle U-100 0.5 mL 31 gauge x 5/16 Syrg use with admelog 100 each 0    polyethylene glycol (GLYCOLAX) 17 gram/dose powder Take 17 g by mouth once daily. 30 each 3       Please address this information as you see fit.  Feel free to contact us if you have any questions or require assistance.    Shabnam Kemp  [phone]                .          "

## 2024-04-12 NOTE — ED NOTES
Dr. Maura duarte at bedside at this time. Pending pt labs prior to calling report to ICU due to possible down grade.

## 2024-04-12 NOTE — LETTER
April 16, 2024    Anusha Andrew  [address]   [facility] [address]              [facility] [place]  [address]  [place] [address]  [phone] April 16, 2024     Patient: Anusha Andrew   YOB: 1990       To Whom It May Concern:    Anusha Andrew was admitted to the hospital on 4/12/2024  1:46 PM and discharged on 04/16/2024. She may return to school with no restrictions on 04/18/2024. If you have any questions or concerns, please don't hesitate to call (Dr. Tara Simeon) office at [phone] .      Sincerely,    Charlee Ruiz, RN  Department of Hospital Medicine

## 2024-04-12 NOTE — ED PROVIDER NOTES
"Encounter Date: 2024       History     Chief Complaint   Patient presents with    Headache     Headache x 1 week. N/V, photosensitive, phonosensitive. Taken ibuprofen, tylenol. Fiorcet, goody powder without relief.     33 yr old female presents to the ER with reports of headache. Pt states she was seen at [facility] and given shot of Imitrex. Pt states the headache has not improved. Denies fever, rash or neck stiffness. Nausea and vomiting reported. Pt states "when I get headaches like this, I usually respond well to Fioricet". Denies injury. PMH of hyperlipidemia, depression, HTN, DM type 1. Pt reported some photosensitivity however states it has improved.     The history is provided by the patient. No  was used.     Review of patient's allergies indicates:   Allergen Reactions    No known allergies      Past Medical History:   Diagnosis Date    Depression     Hyperlipidemia     Hypertension     Mild nonproliferative diabetic retinopathy of both eyes without macular edema associated with type 1 diabetes mellitus 3/30/2021    Type I (juvenile type) diabetes mellitus without mention of complication, uncontrolled 2011     Past Surgical History:   Procedure Laterality Date    ABCESS DRAINAGE      boil went over [place]    CARPAL TUNNEL RELEASE Left 2022    Procedure: RELEASE, CARPAL TUNNEL;  Surgeon: Sekou Rojo Jr., MD;  Location: [facility] OR;  Service: Orthopedics;  Laterality: Left;    CARPAL TUNNEL RELEASE Right 3/28/2023    Procedure: RELEASE, CARPAL TUNNEL;  Surgeon: Sekou Rojo Jr., MD;  Location: [facility] OR;  Service: Orthopedics;  Laterality: Right;     SECTION  2012    DILATION AND CURETTAGE OF UTERUS  2019    Procedure: DILATION AND CURETTAGE, UTERUS;  Surgeon: Purnima Kidd MD;  Location: [facility] OR;  Service: OB/GYN;;    DILATION AND CURETTAGE OF UTERUS N/A 2023    Procedure: DILATION AND CURETTAGE, UTERUS;  Surgeon: Purnima Kidd MD;  Location: [facility]" OR;  Service: OB/GYN;  Laterality: N/A;    ENDOMETRIAL ABLATION N/A 8/9/2023    Procedure: ABLATION, ENDOMETRIUM;  Surgeon: Purnima Kidd MD;  Location: [facility] OR;  Service: OB/GYN;  Laterality: N/A;    HYSTEROSCOPY N/A 2/20/2019    Procedure: HYSTEROSCOPY;  Surgeon: Purnima Kidd MD;  Location: [facility] OR;  Service: OB/GYN;  Laterality: N/A;    HYSTEROSCOPY N/A 8/9/2023    Procedure: HYSTEROSCOPY;  Surgeon: Purnima Kidd MD;  Location: [facility] OR;  Service: OB/GYN;  Laterality: N/A;    LAPAROSCOPIC SALPINGECTOMY Bilateral 8/9/2023    Procedure: SALPINGECTOMY, LAPAROSCOPIC;  Surgeon: Purnima Kidd MD;  Location: [facility] OR;  Service: OB/GYN;  Laterality: Bilateral;    TONSILLECTOMY, ADENOIDECTOMY      TRIGGER FINGER RELEASE Left 12/13/2022    Procedure: RELEASE, TRIGGER FINGER;  Surgeon: Sekou Rojo Jr., MD;  Location: [facility] OR;  Service: Orthopedics;  Laterality: Left;    TRIGGER FINGER RELEASE Right 3/28/2023    Procedure: RELEASE, TRIGGER FINGER; MIDDLE FINGER;  Surgeon: Sekou Rojo Jr., MD;  Location: [facility] OR;  Service: Orthopedics;  Laterality: Right;    WISDOM TOOTH EXTRACTION       Family History   Problem Relation Age of Onset    Diabetes Brother     No Known Problems Mother     No Known Problems Father     Hydrocephalus Daughter     No Known Problems Brother     No Known Problems Brother     No Known Problems Maternal Aunt     No Known Problems Maternal Uncle     No Known Problems Paternal Aunt     No Known Problems Paternal Uncle     No Known Problems Maternal Grandmother     No Known Problems Maternal Grandfather     No Known Problems Paternal Grandmother     No Known Problems Paternal Grandfather     Amblyopia Neg Hx     Blindness Neg Hx     Cataracts Neg Hx     Glaucoma Neg Hx     Hypertension Neg Hx     Macular degeneration Neg Hx     Retinal detachment Neg Hx     Strabismus Neg Hx     Stroke Neg Hx     Thyroid disease Neg Hx     Breast cancer Neg Hx     Colon cancer Neg Hx     Ovarian cancer Neg  Hx     Cancer Neg Hx      Social History     Tobacco Use    Smoking status: Never    Smokeless tobacco: Never   Substance Use Topics    Alcohol use: Yes     Comment: occasionally    Drug use: No     Review of Systems   Neurological:  Positive for headaches.   All other systems reviewed and are negative.      Physical Exam     Initial Vitals [04/12/24 1232]   BP Pulse Resp Temp SpO2   127/71 109 18 98.3 °F (36.8 °C) 100 %      MAP       --         Physical Exam    Constitutional: She appears well-developed and well-nourished. She appears ill.   Uncomfortable in appearance.    HENT:   Head: Normocephalic.   Right Ear: Hearing and tympanic membrane normal.   Left Ear: Hearing and tympanic membrane normal.   Nose: Nose normal.   Mouth/Throat: Oropharynx is clear and moist.   Eyes: Lids are normal. Pupils are equal, round, and reactive to light.   Neck:   Normal range of motion.  Cardiovascular:  Normal rate.           Pulmonary/Chest: Breath sounds normal. No respiratory distress. She has no wheezes. She has no rhonchi.   Abdominal: Abdomen is soft. There is no abdominal tenderness.   Musculoskeletal:         General: Normal range of motion.      Cervical back: Normal range of motion. No edema, erythema or rigidity. No spinous process tenderness or muscular tenderness. Normal range of motion.     Neurological: She is alert and oriented to person, place, and time.   Skin: Skin is warm and dry. No rash noted.   Psychiatric: She has a normal mood and affect. Her behavior is normal. Judgment and thought content normal.         ED Course   Critical Care    Date/Time: 4/12/2024 4:10 PM    Performed by: Pam Grant NP  Authorized by: Gerda Zurita DO  Direct patient critical care time: 35 minutes  Total critical care time (exclusive of procedural time) : 35 minutes  Critical care time was exclusive of separately billable procedures and treating other patients.  Critical care was necessary to treat or prevent imminent  or life-threatening deterioration of the following conditions: endocrine crisis and metabolic crisis.  Critical care was time spent personally by me on the following activities: blood draw for specimens, evaluation of patient's response to treatment, examination of patient, obtaining history from patient or surrogate, ordering and performing treatments and interventions, ordering and review of laboratory studies, ordering and review of radiographic studies, pulse oximetry, re-evaluation of patient's condition and review of old charts.        Labs Reviewed   CBC W/ AUTO DIFFERENTIAL - Abnormal; Notable for the following components:       Result Value    Hematocrit 36.1 (*)     Gran % 76.7 (*)     Lymph % 17.7 (*)     All other components within normal limits   COMPREHENSIVE METABOLIC PANEL - Abnormal; Notable for the following components:    Sodium 135 (*)     CO2 19 (*)     Glucose 481 (*)     Anion Gap 17 (*)     All other components within normal limits    Narrative:     Glucose critical result(s) called and verbal readback obtained from   SOTERO Welch by RE3 04/12/2024 16:05   BETA - HYDROXYBUTYRATE, SERUM - Abnormal; Notable for the following components:    Beta-Hydroxybutyrate 3.1 (*)     All other components within normal limits   URINALYSIS, REFLEX TO URINE CULTURE - Abnormal; Notable for the following components:    Glucose, UA 1+ (*)     Ketones, UA 2+ (*)     All other components within normal limits    Narrative:     Specimen Source->Urine   POCT GLUCOSE - Abnormal; Notable for the following components:    POCT Glucose 455 (*)     All other components within normal limits   INFLUENZA A & B BY MOLECULAR   SARS-COV-2 RNA AMPLIFICATION, QUAL   POCT URINE PREGNANCY   POCT GLUCOSE MONITORING CONTINUOUS   POCT GLUCOSE MONITORING CONTINUOUS          Imaging Results              CT Head Without Contrast (Final result)  Result time 04/12/24 15:50:34      Final result by Grzegorz Sanchez DO (04/12/24  15:50:34)                   Impression:      No acute intracranial abnormality.      Electronically signed by: Grzegorz Sanchez  Date:    04/12/2024  Time:    15:50               Narrative:    EXAMINATION:  CT HEAD WITHOUT CONTRAST    CLINICAL HISTORY:  Headache, new or worsening, neuro deficit (Age 19-49y);    TECHNIQUE:  Low dose axial CT images obtained throughout the head without intravenous contrast. Sagittal and coronal reconstructions were performed.    COMPARISON:  CT head from 01/29/2024.    FINDINGS:  Ventricles and sulci are normal in size for age without evidence of hydrocephalus. No extra-axial blood or fluid collections.  The brain parenchyma is normal. No parenchymal mass, hemorrhage, edema or major vascular distribution infarct.    No calvarial fracture.  The scalp is unremarkable.  Bilateral paranasal sinuses and mastoid air cells are clear.                                       Medications   sodium chloride 0.9% bolus 1,000 mL 1,000 mL (1,000 mLs Intravenous New Bag 4/12/24 1533)   insulin regular injection 7 Units 0.07 mL (has no administration in time range)   sodium chloride 0.9% bolus 1,000 mL 1,000 mL (has no administration in time range)   prochlorperazine injection Soln 10 mg (10 mg Intramuscular Given 4/12/24 1452)   ketorolac injection 30 mg (30 mg Intramuscular Given 4/12/24 1452)   diphenhydrAMINE injection 25 mg (25 mg Intramuscular Given 4/12/24 1452)     Medical Decision Making  Differential Diagnosis includes, but is not limited to:  subarachnoid hemorrhage/ruptured aneurysm, intracranial lesion/mass, meningitis/encephalitis, epidural hematoma, subdural hematoma, pseudotumor cerebri, venous sinus thrombosis, hypertensive encephalopathy, head trauma/contusion, concussion, sinus headache, dehydration, anxiety, medication non-compliance, primary headache (tension/cluster/migraine).     Amount and/or Complexity of Data Reviewed  Labs: ordered. Decision-making details documented in ED  Course.  Radiology: ordered.    Risk  OTC drugs.  Prescription drug management.               ED Course as of 04/12/24 1622 Fri Apr 12, 2024   1336 CBC auto differential(!) [DT]   1345 POCT urine pregnancy [DT]   1411 COVID-19 Rapid Screening [DT]   1411 Influenza A & B by Molecular [DT]   1539 Beta - Hydroxybutyrate, Serum(!) [DT]   1607 Comprehensive metabolic panel(!!) [DT]   1607 POCT Venous Blood Gas(!!) [DT]   1607 Pt will be followed per Dr Zurita. We will be treating with Insulin IV and an additional bolus of fluids.  [DT]   1617 On the line with [facility] for admission-Dr Aleman [DT]   1618 INDINGS:  Ventricles and sulci are normal in size for age without evidence of hydrocephalus. No extra-axial blood or fluid collections.  The brain parenchyma is normal. No parenchymal mass, hemorrhage, edema or major vascular distribution infarct.     No calvarial fracture.  The scalp is unremarkable.  Bilateral paranasal sinuses and mastoid air cells are clear.     Impression:     No acute intracranial abnormality.         [DT]   1619 Spoke with Dr Aleman from [facility] for admission for DKA. Pt states she is having some relief of headache with meds given.  [DT]   1619 NO acute findings noted on CT head interpreted per radiology.  [DT]      ED Course User Index  [DT] Pam Grant, AARON                           Clinical Impression:  Final diagnoses:  [R51.9] Nonintractable headache, unspecified chronicity pattern, unspecified headache type (Primary)  [E11.65] Hyperglycemia due to diabetes mellitus  [E10.10] Diabetic ketoacidosis without coma associated with type 1 diabetes mellitus          ED Disposition Condition    Observation Stable                Pam Grant, AARON  04/12/24 1611       Pam Grant NP  04/12/24 1622

## 2024-04-12 NOTE — FIRST PROVIDER EVALUATION
Emergency Department TeleTriage Encounter Note      CHIEF COMPLAINT    Chief Complaint   Patient presents with    Headache     Headache x 1 week. N/V, photosensitive, phonosensitive. Taken ibuprofen, tylenol. Fiorcet, goody powder without relief.       VITAL SIGNS   Initial Vitals [04/12/24 1232]   BP Pulse Resp Temp SpO2   127/71 109 18 98.3 °F (36.8 °C) 100 %      MAP       --            ALLERGIES    Review of patient's allergies indicates:   Allergen Reactions    No known allergies        PROVIDER TRIAGE NOTE  33-year-old female presents to the ER with complaints of diffuse frontal headache, photosensitivity nausea episodes of vomiting, generalized weakness, lightheadedness symptoms for the past 1 week.  She states she has a history of migraines but states her headache has not improved with current prescription medication.  She went to ER last night but left due to the over crowding.  She states ongoing symptoms here for further evaluation.  She denies any fever.  No cough cold or congestion.  No neck pain or stiffness    AAOx3, respirations even and non- labored, stable vitals, normal coloration of skin, sitting upright in triage chair, appears in no acute distress.          ORDERS  Labs Reviewed - No data to display    ED Orders (720h ago, onward)      None              Virtual Visit Note: The provider triage portion of this emergency department evaluation and documentation was performed via [employer], a HIPAA-compliant telemedicine application, in concert with a tele-presenter in the room. A face to face patient evaluation with one of my colleagues will occur once the patient is placed in an emergency department room.      DISCLAIMER: This note was prepared with [employer] voice recognition transcription software. Garbled syntax, mangled pronouns, and other bizarre constructions may be attributed to that software system.     [Spirometry] : Spirometry

## 2024-04-13 PROBLEM — G03.9 MENINGITIS: Status: ACTIVE | Noted: 2024-04-13

## 2024-04-13 PROBLEM — R65.21 SEPTIC SHOCK: Status: ACTIVE | Noted: 2024-04-13

## 2024-04-13 PROBLEM — A41.9 SEPTIC SHOCK: Status: ACTIVE | Noted: 2024-04-13

## 2024-04-13 LAB
ANION GAP SERPL CALC-SCNC: 10 MMOL/L (ref 8–16)
ANION GAP SERPL CALC-SCNC: 10 MMOL/L (ref 8–16)
ANION GAP SERPL CALC-SCNC: 12 MMOL/L (ref 8–16)
ANION GAP SERPL CALC-SCNC: 16 MMOL/L (ref 8–16)
ANION GAP SERPL CALC-SCNC: 19 MMOL/L (ref 8–16)
ASCENDING AORTA: 2.36 CM
AV INDEX (PROSTH): 1
AV MEAN GRADIENT: 5 MMHG
AV PEAK GRADIENT: 10 MMHG
AV VALVE AREA BY VELOCITY RATIO: 2.5 CM²
AV VALVE AREA: 2.73 CM²
AV VELOCITY RATIO: 0.91
BASOPHILS # BLD AUTO: 0.03 K/UL (ref 0–0.2)
BASOPHILS NFR BLD: 0.2 % (ref 0–1.9)
BSA FOR ECHO PROCEDURE: 1.87 M2
BUN SERPL-MCNC: 16 MG/DL (ref 6–20)
BUN SERPL-MCNC: 18 MG/DL (ref 6–20)
BUN SERPL-MCNC: 20 MG/DL (ref 6–20)
BUN SERPL-MCNC: 22 MG/DL (ref 6–20)
BUN SERPL-MCNC: 23 MG/DL (ref 6–20)
CALCIUM SERPL-MCNC: 7.6 MG/DL (ref 8.7–10.5)
CALCIUM SERPL-MCNC: 7.8 MG/DL (ref 8.7–10.5)
CALCIUM SERPL-MCNC: 7.9 MG/DL (ref 8.7–10.5)
CALCIUM SERPL-MCNC: 7.9 MG/DL (ref 8.7–10.5)
CALCIUM SERPL-MCNC: 8 MG/DL (ref 8.7–10.5)
CHLORIDE SERPL-SCNC: 104 MMOL/L (ref 95–110)
CHLORIDE SERPL-SCNC: 107 MMOL/L (ref 95–110)
CHLORIDE SERPL-SCNC: 108 MMOL/L (ref 95–110)
CHLORIDE SERPL-SCNC: 109 MMOL/L (ref 95–110)
CHLORIDE SERPL-SCNC: 109 MMOL/L (ref 95–110)
CLARITY CSF: CLEAR
CO2 SERPL-SCNC: 14 MMOL/L (ref 23–29)
CO2 SERPL-SCNC: 16 MMOL/L (ref 23–29)
CO2 SERPL-SCNC: 17 MMOL/L (ref 23–29)
CO2 SERPL-SCNC: 9 MMOL/L (ref 23–29)
CO2 SERPL-SCNC: 9 MMOL/L (ref 23–29)
COLOR CSF: COLORLESS
CREAT SERPL-MCNC: 0.8 MG/DL (ref 0.5–1.4)
CREAT SERPL-MCNC: 1 MG/DL (ref 0.5–1.4)
CREAT SERPL-MCNC: 1 MG/DL (ref 0.5–1.4)
CREAT SERPL-MCNC: 1.4 MG/DL (ref 0.5–1.4)
CREAT SERPL-MCNC: 1.4 MG/DL (ref 0.5–1.4)
CSF TUBE NUMBER: 1
CSF TUBE NUMBER: 1
CV ECHO LV RWT: 0.42 CM
DIFFERENTIAL METHOD BLD: ABNORMAL
DOP CALC AO PEAK VEL: 1.56 M/S
DOP CALC AO VTI: 22.9 CM
DOP CALC LVOT AREA: 2.7 CM2
DOP CALC LVOT DIAMETER: 1.87 CM
DOP CALC LVOT PEAK VEL: 1.42 M/S
DOP CALC LVOT STROKE VOLUME: 62.59 CM3
DOP CALC MV VTI: 37.2 CM
DOP CALCLVOT PEAK VEL VTI: 22.8 CM
E WAVE DECELERATION TIME: 92.31 MSEC
E/A RATIO: 1.02
E/E' RATIO: 10.48 M/S
ECHO LV POSTERIOR WALL: 0.86 CM (ref 0.6–1.1)
EOSINOPHIL # BLD AUTO: 0 K/UL (ref 0–0.5)
EOSINOPHIL NFR BLD: 0 % (ref 0–8)
ERYTHROCYTE [DISTWIDTH] IN BLOOD BY AUTOMATED COUNT: 13.3 % (ref 11.5–14.5)
EST. GFR  (NO RACE VARIABLE): 52 ML/MIN/1.73 M^2
EST. GFR  (NO RACE VARIABLE): 52 ML/MIN/1.73 M^2
EST. GFR  (NO RACE VARIABLE): >60 ML/MIN/1.73 M^2
FRACTIONAL SHORTENING: 54 % (ref 28–44)
GLUCOSE CSF-MCNC: 260 MG/DL (ref 40–70)
GLUCOSE SERPL-MCNC: 100 MG/DL (ref 70–110)
GLUCOSE SERPL-MCNC: 194 MG/DL (ref 70–110)
GLUCOSE SERPL-MCNC: 253 MG/DL (ref 70–110)
GLUCOSE SERPL-MCNC: 343 MG/DL (ref 70–110)
GLUCOSE SERPL-MCNC: 524 MG/DL (ref 70–110)
HCT VFR BLD AUTO: 37.4 % (ref 37–48.5)
HGB BLD-MCNC: 11.9 G/DL (ref 12–16)
IMM GRANULOCYTES # BLD AUTO: 0.07 K/UL (ref 0–0.04)
IMM GRANULOCYTES NFR BLD AUTO: 0.4 % (ref 0–0.5)
INTERVENTRICULAR SEPTUM: 0.68 CM (ref 0.6–1.1)
IVC DIAMETER: 1.77 CM
LA MAJOR: 5.16 CM
LA MINOR: 4.26 CM
LA WIDTH: 3 CM
LACTATE SERPL-SCNC: 0.5 MMOL/L (ref 0.5–2.2)
LEFT ATRIUM SIZE: 3.13 CM
LEFT ATRIUM VOLUME INDEX MOD: 18.7 ML/M2
LEFT ATRIUM VOLUME INDEX: 20.5 ML/M2
LEFT ATRIUM VOLUME MOD: 34.1 CM3
LEFT ATRIUM VOLUME: 37.25 CM3
LEFT INTERNAL DIMENSION IN SYSTOLE: 1.9 CM (ref 2.1–4)
LEFT VENTRICLE DIASTOLIC VOLUME INDEX: 41.21 ML/M2
LEFT VENTRICLE DIASTOLIC VOLUME: 75 ML
LEFT VENTRICLE MASS INDEX: 51 G/M2
LEFT VENTRICLE SYSTOLIC VOLUME INDEX: 6.2 ML/M2
LEFT VENTRICLE SYSTOLIC VOLUME: 11.22 ML
LEFT VENTRICULAR INTERNAL DIMENSION IN DIASTOLE: 4.12 CM (ref 3.5–6)
LEFT VENTRICULAR MASS: 93.27 G
LV LATERAL E/E' RATIO: 10.08 M/S
LV SEPTAL E/E' RATIO: 10.92 M/S
LVOT MG: 4.06 MMHG
LVOT MV: 0.96 CM/S
LYMPHOCYTES # BLD AUTO: 1.4 K/UL (ref 1–4.8)
LYMPHOCYTES NFR BLD: 8.9 % (ref 18–48)
MAGNESIUM SERPL-MCNC: 1.8 MG/DL (ref 1.6–2.6)
MCH RBC QN AUTO: 28.3 PG (ref 27–31)
MCHC RBC AUTO-ENTMCNC: 31.8 G/DL (ref 32–36)
MCV RBC AUTO: 89 FL (ref 82–98)
MONOCYTES # BLD AUTO: 1.3 K/UL (ref 0.3–1)
MONOCYTES NFR BLD: 8.2 % (ref 4–15)
MV MEAN GRADIENT: 6 MMHG
MV PEAK A VEL: 1.28 M/S
MV PEAK E VEL: 1.31 M/S
MV PEAK GRADIENT: 9 MMHG
MV STENOSIS PRESSURE HALF TIME: 26.77 MS
MV VALVE AREA BY CONTINUITY EQUATION: 1.68 CM2
MV VALVE AREA P 1/2 METHOD: 8.22 CM2
NEUTROPHILS # BLD AUTO: 12.9 K/UL (ref 1.8–7.7)
NEUTROPHILS NFR BLD: 82.3 % (ref 38–73)
NRBC BLD-RTO: 0 /100 WBC
OHS LV EJECTION FRACTION SIMPSONS BIPLANE MOD: 69 %
PHOSPHATE SERPL-MCNC: 4.1 MG/DL (ref 2.7–4.5)
PISA TR MAX VEL: 2.92 M/S
PLATELET # BLD AUTO: 442 K/UL (ref 150–450)
PMV BLD AUTO: 9.4 FL (ref 9.2–12.9)
POCT GLUCOSE: 101 MG/DL (ref 70–110)
POCT GLUCOSE: 103 MG/DL (ref 70–110)
POCT GLUCOSE: 110 MG/DL (ref 70–110)
POCT GLUCOSE: 144 MG/DL (ref 70–110)
POCT GLUCOSE: 146 MG/DL (ref 70–110)
POCT GLUCOSE: 147 MG/DL (ref 70–110)
POCT GLUCOSE: 212 MG/DL (ref 70–110)
POCT GLUCOSE: 214 MG/DL (ref 70–110)
POCT GLUCOSE: 244 MG/DL (ref 70–110)
POCT GLUCOSE: 250 MG/DL (ref 70–110)
POCT GLUCOSE: 254 MG/DL (ref 70–110)
POCT GLUCOSE: 275 MG/DL (ref 70–110)
POCT GLUCOSE: 287 MG/DL (ref 70–110)
POCT GLUCOSE: 378 MG/DL (ref 70–110)
POCT GLUCOSE: 482 MG/DL (ref 70–110)
POCT GLUCOSE: 93 MG/DL (ref 70–110)
POCT GLUCOSE: >500 MG/DL (ref 70–110)
POTASSIUM SERPL-SCNC: 3.5 MMOL/L (ref 3.5–5.1)
POTASSIUM SERPL-SCNC: 3.7 MMOL/L (ref 3.5–5.1)
POTASSIUM SERPL-SCNC: 3.8 MMOL/L (ref 3.5–5.1)
POTASSIUM SERPL-SCNC: 4.5 MMOL/L (ref 3.5–5.1)
POTASSIUM SERPL-SCNC: 5.1 MMOL/L (ref 3.5–5.1)
PROCALCITONIN SERPL IA-MCNC: 3.86 NG/ML
PROT CSF-MCNC: 17 MG/DL (ref 15–40)
PULM VEIN S/D RATIO: 1.12
PV MV: 1.07 M/S
PV PEAK D VEL: 0.59 M/S
PV PEAK GRADIENT: 7 MMHG
PV PEAK S VEL: 0.66 M/S
PV PEAK VELOCITY: 1.33 M/S
RA MAJOR: 4.15 CM
RA PRESSURE ESTIMATED: 0 MMHG
RA WIDTH: 2.5 CM
RBC # BLD AUTO: 4.2 M/UL (ref 4–5.4)
RBC # CSF: 0 /CU MM
RIGHT VENTRICULAR END-DIASTOLIC DIMENSION: 2.72 CM
RV TB RVSP: 3 MMHG
RV TISSUE DOPPLER FREE WALL SYSTOLIC VELOCITY 1 (APICAL 4 CHAMBER VIEW): 17.38 CM/S
SINUS: 2.61 CM
SODIUM SERPL-SCNC: 132 MMOL/L (ref 136–145)
SODIUM SERPL-SCNC: 133 MMOL/L (ref 136–145)
SODIUM SERPL-SCNC: 134 MMOL/L (ref 136–145)
SODIUM SERPL-SCNC: 135 MMOL/L (ref 136–145)
SODIUM SERPL-SCNC: 135 MMOL/L (ref 136–145)
SPECIMEN VOL CSF: 13 ML
STJ: 2.07 CM
TDI LATERAL: 0.13 M/S
TDI SEPTAL: 0.12 M/S
TDI: 0.13 M/S
TR MAX PG: 34 MMHG
TRICUSPID ANNULAR PLANE SYSTOLIC EXCURSION: 2.73 CM
TV REST PULMONARY ARTERY PRESSURE: 34 MMHG
WBC # BLD AUTO: 15.7 K/UL (ref 3.9–12.7)
WBC # CSF: 0 /CU MM (ref 0–5)
Z-SCORE OF LEFT VENTRICULAR DIMENSION IN END DIASTOLE: -2.01
Z-SCORE OF LEFT VENTRICULAR DIMENSION IN END SYSTOLE: -3.9

## 2024-04-13 PROCEDURE — 25500020 PHARM REV CODE 255: Performed by: HOSPITALIST

## 2024-04-13 PROCEDURE — 85025 COMPLETE CBC W/AUTO DIFF WBC: CPT | Performed by: NURSE PRACTITIONER

## 2024-04-13 PROCEDURE — 87070 CULTURE OTHR SPECIMN AEROBIC: CPT | Mod: 59 | Performed by: STUDENT IN AN ORGANIZED HEALTH CARE EDUCATION/TRAINING PROGRAM

## 2024-04-13 PROCEDURE — P9047 ALBUMIN (HUMAN), 25%, 50ML: HCPCS | Mod: JZ,JG | Performed by: CLINIC/CENTER

## 2024-04-13 PROCEDURE — 25000003 PHARM REV CODE 250: Performed by: NURSE PRACTITIONER

## 2024-04-13 PROCEDURE — 27000207 HC ISOLATION

## 2024-04-13 PROCEDURE — 83735 ASSAY OF MAGNESIUM: CPT | Performed by: NURSE PRACTITIONER

## 2024-04-13 PROCEDURE — 87205 SMEAR GRAM STAIN: CPT | Performed by: STUDENT IN AN ORGANIZED HEALTH CARE EDUCATION/TRAINING PROGRAM

## 2024-04-13 PROCEDURE — 94761 N-INVAS EAR/PLS OXIMETRY MLT: CPT

## 2024-04-13 PROCEDURE — 82945 GLUCOSE OTHER FLUID: CPT | Performed by: STUDENT IN AN ORGANIZED HEALTH CARE EDUCATION/TRAINING PROGRAM

## 2024-04-13 PROCEDURE — 87040 BLOOD CULTURE FOR BACTERIA: CPT | Performed by: STUDENT IN AN ORGANIZED HEALTH CARE EDUCATION/TRAINING PROGRAM

## 2024-04-13 PROCEDURE — 25000003 PHARM REV CODE 250

## 2024-04-13 PROCEDURE — 87483 CNS DNA AMP PROBE TYPE 12-25: CPT | Performed by: STUDENT IN AN ORGANIZED HEALTH CARE EDUCATION/TRAINING PROGRAM

## 2024-04-13 PROCEDURE — 62272 THER SPI PNXR DRG CSF: CPT

## 2024-04-13 PROCEDURE — 25000003 PHARM REV CODE 250: Performed by: HOSPITALIST

## 2024-04-13 PROCEDURE — 80048 BASIC METABOLIC PNL TOTAL CA: CPT | Mod: 91 | Performed by: STUDENT IN AN ORGANIZED HEALTH CARE EDUCATION/TRAINING PROGRAM

## 2024-04-13 PROCEDURE — 89051 BODY FLUID CELL COUNT: CPT | Performed by: STUDENT IN AN ORGANIZED HEALTH CARE EDUCATION/TRAINING PROGRAM

## 2024-04-13 PROCEDURE — 99000 SPECIMEN HANDLING OFFICE-LAB: CPT | Performed by: STUDENT IN AN ORGANIZED HEALTH CARE EDUCATION/TRAINING PROGRAM

## 2024-04-13 PROCEDURE — 63600175 PHARM REV CODE 636 W HCPCS: Performed by: HOSPITALIST

## 2024-04-13 PROCEDURE — 36415 COLL VENOUS BLD VENIPUNCTURE: CPT

## 2024-04-13 PROCEDURE — 20000000 HC ICU ROOM

## 2024-04-13 PROCEDURE — 36415 COLL VENOUS BLD VENIPUNCTURE: CPT | Mod: XB | Performed by: STUDENT IN AN ORGANIZED HEALTH CARE EDUCATION/TRAINING PROGRAM

## 2024-04-13 PROCEDURE — 63600175 PHARM REV CODE 636 W HCPCS: Performed by: NURSE PRACTITIONER

## 2024-04-13 PROCEDURE — 63600175 PHARM REV CODE 636 W HCPCS: Performed by: STUDENT IN AN ORGANIZED HEALTH CARE EDUCATION/TRAINING PROGRAM

## 2024-04-13 PROCEDURE — 009U3ZX DRAINAGE OF SPINAL CANAL, PERCUTANEOUS APPROACH, DIAGNOSTIC: ICD-10-PCS | Performed by: STUDENT IN AN ORGANIZED HEALTH CARE EDUCATION/TRAINING PROGRAM

## 2024-04-13 PROCEDURE — 84145 PROCALCITONIN (PCT): CPT | Performed by: HOSPITALIST

## 2024-04-13 PROCEDURE — 87070 CULTURE OTHR SPECIMN AEROBIC: CPT | Performed by: NURSE PRACTITIONER

## 2024-04-13 PROCEDURE — 84100 ASSAY OF PHOSPHORUS: CPT | Performed by: NURSE PRACTITIONER

## 2024-04-13 PROCEDURE — 87529 HSV DNA AMP PROBE: CPT | Performed by: STUDENT IN AN ORGANIZED HEALTH CARE EDUCATION/TRAINING PROGRAM

## 2024-04-13 PROCEDURE — 82962 GLUCOSE BLOOD TEST: CPT

## 2024-04-13 PROCEDURE — 80048 BASIC METABOLIC PNL TOTAL CA: CPT | Mod: 91

## 2024-04-13 PROCEDURE — 25000003 PHARM REV CODE 250: Performed by: STUDENT IN AN ORGANIZED HEALTH CARE EDUCATION/TRAINING PROGRAM

## 2024-04-13 PROCEDURE — 84157 ASSAY OF PROTEIN OTHER: CPT | Performed by: STUDENT IN AN ORGANIZED HEALTH CARE EDUCATION/TRAINING PROGRAM

## 2024-04-13 PROCEDURE — 63600175 PHARM REV CODE 636 W HCPCS: Mod: JZ,JG | Performed by: CLINIC/CENTER

## 2024-04-13 RX ORDER — ALBUMIN HUMAN 250 G/1000ML
12.5 SOLUTION INTRAVENOUS ONCE
Status: COMPLETED | OUTPATIENT
Start: 2024-04-13 | End: 2024-04-13

## 2024-04-13 RX ORDER — ENOXAPARIN SODIUM 100 MG/ML
40 INJECTION SUBCUTANEOUS EVERY 24 HOURS
Status: DISCONTINUED | OUTPATIENT
Start: 2024-04-13 | End: 2024-04-13

## 2024-04-13 RX ORDER — SODIUM CHLORIDE, SODIUM LACTATE, POTASSIUM CHLORIDE, CALCIUM CHLORIDE 600; 310; 30; 20 MG/100ML; MG/100ML; MG/100ML; MG/100ML
INJECTION, SOLUTION INTRAVENOUS CONTINUOUS
Status: DISCONTINUED | OUTPATIENT
Start: 2024-04-13 | End: 2024-04-14

## 2024-04-13 RX ORDER — ENOXAPARIN SODIUM 100 MG/ML
40 INJECTION SUBCUTANEOUS EVERY 24 HOURS
Status: DISCONTINUED | OUTPATIENT
Start: 2024-04-13 | End: 2024-04-16 | Stop reason: HOSPADM

## 2024-04-13 RX ORDER — SODIUM CHLORIDE 9 MG/ML
1000 INJECTION, SOLUTION INTRAVENOUS CONTINUOUS
Status: DISCONTINUED | OUTPATIENT
Start: 2024-04-13 | End: 2024-04-13

## 2024-04-13 RX ORDER — DEXTROSE MONOHYDRATE 100 MG/ML
INJECTION, SOLUTION INTRAVENOUS
Status: DISCONTINUED | OUTPATIENT
Start: 2024-04-13 | End: 2024-04-16 | Stop reason: HOSPADM

## 2024-04-13 RX ORDER — METRONIDAZOLE 500 MG/100ML
500 INJECTION, SOLUTION INTRAVENOUS
Status: DISCONTINUED | OUTPATIENT
Start: 2024-04-13 | End: 2024-04-13

## 2024-04-13 RX ORDER — MUPIROCIN 20 MG/G
OINTMENT TOPICAL 2 TIMES DAILY
Status: DISCONTINUED | OUTPATIENT
Start: 2024-04-13 | End: 2024-04-16 | Stop reason: HOSPADM

## 2024-04-13 RX ORDER — METRONIDAZOLE 500 MG/1
500 TABLET ORAL EVERY 8 HOURS
Status: DISCONTINUED | OUTPATIENT
Start: 2024-04-13 | End: 2024-04-14

## 2024-04-13 RX ORDER — PROCHLORPERAZINE EDISYLATE 5 MG/ML
5 INJECTION INTRAMUSCULAR; INTRAVENOUS EVERY 4 HOURS PRN
Status: DISCONTINUED | OUTPATIENT
Start: 2024-04-13 | End: 2024-04-16 | Stop reason: HOSPADM

## 2024-04-13 RX ORDER — DEXTROSE MONOHYDRATE, SODIUM CHLORIDE, AND POTASSIUM CHLORIDE 50; 2.98; 4.5 G/1000ML; G/1000ML; G/1000ML
INJECTION, SOLUTION INTRAVENOUS CONTINUOUS
Status: DISCONTINUED | OUTPATIENT
Start: 2024-04-13 | End: 2024-04-13

## 2024-04-13 RX ORDER — DEXTROSE, SODIUM CHLORIDE, SODIUM LACTATE, POTASSIUM CHLORIDE, AND CALCIUM CHLORIDE 5; .6; .31; .03; .02 G/100ML; G/100ML; G/100ML; G/100ML; G/100ML
INJECTION, SOLUTION INTRAVENOUS CONTINUOUS
Status: DISCONTINUED | OUTPATIENT
Start: 2024-04-13 | End: 2024-04-14

## 2024-04-13 RX ORDER — ENOXAPARIN SODIUM 100 MG/ML
40 INJECTION SUBCUTANEOUS EVERY 12 HOURS
Status: DISCONTINUED | OUTPATIENT
Start: 2024-04-13 | End: 2024-04-13

## 2024-04-13 RX ORDER — MAGNESIUM SULFATE HEPTAHYDRATE 40 MG/ML
2 INJECTION, SOLUTION INTRAVENOUS ONCE
Status: COMPLETED | OUTPATIENT
Start: 2024-04-13 | End: 2024-04-13

## 2024-04-13 RX ORDER — SODIUM CHLORIDE 9 MG/ML
INJECTION, SOLUTION INTRAVENOUS
Status: DISCONTINUED | OUTPATIENT
Start: 2024-04-13 | End: 2024-04-16 | Stop reason: HOSPADM

## 2024-04-13 RX ORDER — SODIUM CHLORIDE 0.9 % (FLUSH) 0.9 %
10 SYRINGE (ML) INJECTION
Status: DISCONTINUED | OUTPATIENT
Start: 2024-04-13 | End: 2024-04-16 | Stop reason: HOSPADM

## 2024-04-13 RX ORDER — DEXTROSE MONOHYDRATE AND SODIUM CHLORIDE 5; .45 G/100ML; G/100ML
INJECTION, SOLUTION INTRAVENOUS CONTINUOUS PRN
Status: DISCONTINUED | OUTPATIENT
Start: 2024-04-13 | End: 2024-04-13

## 2024-04-13 RX ADMIN — BUTALBITAL, ACETAMINOPHEN, AND CAFFEINE 1 TABLET: 50; 325; 40 TABLET ORAL at 03:04

## 2024-04-13 RX ADMIN — SODIUM CHLORIDE, POTASSIUM CHLORIDE, SODIUM LACTATE AND CALCIUM CHLORIDE: 600; 310; 30; 20 INJECTION, SOLUTION INTRAVENOUS at 08:04

## 2024-04-13 RX ADMIN — SODIUM CHLORIDE 0.1 UNITS/KG/HR: 9 INJECTION, SOLUTION INTRAVENOUS at 08:04

## 2024-04-13 RX ADMIN — ONDANSETRON 4 MG: 2 INJECTION INTRAMUSCULAR; INTRAVENOUS at 03:04

## 2024-04-13 RX ADMIN — PROCHLORPERAZINE EDISYLATE 5 MG: 5 INJECTION INTRAMUSCULAR; INTRAVENOUS at 08:04

## 2024-04-13 RX ADMIN — SODIUM CHLORIDE 125 ML/HR: 9 INJECTION, SOLUTION INTRAVENOUS at 04:04

## 2024-04-13 RX ADMIN — BUTALBITAL, ACETAMINOPHEN, AND CAFFEINE 1 TABLET: 50; 325; 40 TABLET ORAL at 07:04

## 2024-04-13 RX ADMIN — NOREPINEPHRINE BITARTRATE 0.08 MCG/KG/MIN: 4 INJECTION, SOLUTION INTRAVENOUS at 03:04

## 2024-04-13 RX ADMIN — FLUOXETINE HYDROCHLORIDE 20 MG: 20 CAPSULE ORAL at 09:04

## 2024-04-13 RX ADMIN — CEFTRIAXONE SODIUM 2 G: 2 INJECTION, POWDER, FOR SOLUTION INTRAMUSCULAR; INTRAVENOUS at 11:04

## 2024-04-13 RX ADMIN — SODIUM CHLORIDE, POTASSIUM CHLORIDE, SODIUM LACTATE AND CALCIUM CHLORIDE 1000 ML: 600; 310; 30; 20 INJECTION, SOLUTION INTRAVENOUS at 10:04

## 2024-04-13 RX ADMIN — SODIUM CHLORIDE 5 ML/HR: 9 INJECTION, SOLUTION INTRAVENOUS at 09:04

## 2024-04-13 RX ADMIN — MUPIROCIN: 20 OINTMENT TOPICAL at 08:04

## 2024-04-13 RX ADMIN — SODIUM CHLORIDE, SODIUM LACTATE, POTASSIUM CHLORIDE, CALCIUM CHLORIDE AND DEXTROSE MONOHYDRATE: 5; 600; 310; 30; 20 INJECTION, SOLUTION INTRAVENOUS at 02:04

## 2024-04-13 RX ADMIN — PIPERACILLIN AND TAZOBACTAM 4.5 G: 4; .5 INJECTION, POWDER, LYOPHILIZED, FOR SOLUTION INTRAVENOUS; PARENTERAL at 09:04

## 2024-04-13 RX ADMIN — ENOXAPARIN SODIUM 40 MG: 40 INJECTION SUBCUTANEOUS at 04:04

## 2024-04-13 RX ADMIN — PIPERACILLIN AND TAZOBACTAM 4.5 G: 4; .5 INJECTION, POWDER, LYOPHILIZED, FOR SOLUTION INTRAVENOUS; PARENTERAL at 12:04

## 2024-04-13 RX ADMIN — METRONIDAZOLE 500 MG: 500 TABLET ORAL at 01:04

## 2024-04-13 RX ADMIN — MAGNESIUM SULFATE IN WATER 2 G: 40 INJECTION, SOLUTION INTRAVENOUS at 01:04

## 2024-04-13 RX ADMIN — IOHEXOL 75 ML: 350 INJECTION, SOLUTION INTRAVENOUS at 04:04

## 2024-04-13 RX ADMIN — INSULIN DETEMIR 7 UNITS: 100 INJECTION, SOLUTION SUBCUTANEOUS at 08:04

## 2024-04-13 RX ADMIN — METRONIDAZOLE 500 MG: 500 TABLET ORAL at 09:04

## 2024-04-13 RX ADMIN — SODIUM CHLORIDE 0.2 UNITS/KG/HR: 9 INJECTION, SOLUTION INTRAVENOUS at 01:04

## 2024-04-13 RX ADMIN — MUPIROCIN: 20 OINTMENT TOPICAL at 09:04

## 2024-04-13 RX ADMIN — BUTALBITAL, ACETAMINOPHEN, AND CAFFEINE 1 TABLET: 50; 325; 40 TABLET ORAL at 08:04

## 2024-04-13 RX ADMIN — ALBUMIN (HUMAN) 12.5 G: 12.5 SOLUTION INTRAVENOUS at 04:04

## 2024-04-13 RX ADMIN — SODIUM CHLORIDE, SODIUM LACTATE, POTASSIUM CHLORIDE, CALCIUM CHLORIDE AND DEXTROSE MONOHYDRATE: 5; 600; 310; 30; 20 INJECTION, SOLUTION INTRAVENOUS at 09:04

## 2024-04-13 RX ADMIN — VANCOMYCIN HYDROCHLORIDE 1500 MG: 1.5 INJECTION, POWDER, LYOPHILIZED, FOR SOLUTION INTRAVENOUS at 12:04

## 2024-04-13 RX ADMIN — NOREPINEPHRINE BITARTRATE 0.18 MCG/KG/MIN: 4 INJECTION, SOLUTION INTRAVENOUS at 08:04

## 2024-04-13 NOTE — ASSESSMENT & PLAN NOTE
33 year old female with PMH of DM1 (on insulin pump and dexcom) and gastric sleeve who presented for headache. Diagnosed with DKA. Now requiring pressors with no clear source on infection    -await pending csf studies  -await pending cultures  -agree with CT C/A/P if csf results not concerning for infection  -continue vanco and ctx for now but would switch ctx to cefepime / flagyl if csf studies are negative

## 2024-04-13 NOTE — NURSING
Discussed insulin drip orders with Dr. Mac. Per MD, patient cleared for full liquids. Maintain drip at 0.01 units/kg/hr until next BMP.

## 2024-04-13 NOTE — HPI
33 year old female with PMH of DM1 (on insulin pump and dexcom) and gastric sleeve who presented for headache. She was seen a few days earlier for HA in the ED and given imitrex, then represented for worsening HA. In the ED she was found to be in DKA initially given fluids and started on insulin gtt and transitioned off insuling gtt despite bicarb remaining 14 so gap re-opened and BG increased. Also overnight became hypotensive requiring levophed.      4/13: Patient complaining of HA and neck stiffness with shock without any clear etiology - changed abx from zosyn to vanc/rocephin for meningitis coverage. LP was done and cell counts are pending

## 2024-04-13 NOTE — NURSING
POCT blood sugar 101. Insulin drip currently at 0.02 units/kg/hr. Notified Dr. Mac---MD stated to decrease insulin drip to 0.01 units/kg/hr and increase D5LR drip to 150 ml/hr.

## 2024-04-13 NOTE — ASSESSMENT & PLAN NOTE
Ongoing x5-6 days  Described as aching, pressure starting in her neck and spreading up over her head  Initially 9/10, down to 6/10 after benadryl, Toradol, and compazine  States she had a dose of sumatriptan 4/11 w/o improvement  Also states Fioricet usually works but not this time   Given that sx sound like a mixture between a tension H/A and migraine w/ some improvement w/ toradol, will repeat toradol with

## 2024-04-13 NOTE — ASSESSMENT & PLAN NOTE
Unclear source of infection  Leukocytosis and procalcitonin elevated on admission    Currently on vasopressors   No fevers    Plan:   Continue Levophed , wean as tolerated  Follow-up CSF, blood and urine cultures   ID consulted  Zosyn switched to Rocephin and Flagyl.  Vancomycin

## 2024-04-13 NOTE — ASSESSMENT & PLAN NOTE
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

## 2024-04-13 NOTE — SUBJECTIVE & OBJECTIVE
Past Medical History:   Diagnosis Date    Depression     Hyperlipidemia     Hypertension     Mild nonproliferative diabetic retinopathy of both eyes without macular edema associated with type 1 diabetes mellitus 3/30/2021    Type I (juvenile type) diabetes mellitus without mention of complication, uncontrolled 2011       Past Surgical History:   Procedure Laterality Date    ABCESS DRAINAGE      boil went over GA    CARPAL TUNNEL RELEASE Left 2022    Procedure: RELEASE, CARPAL TUNNEL;  Surgeon: Sekou Rojo Jr., MD;  Location: [facility] OR;  Service: Orthopedics;  Laterality: Left;    CARPAL TUNNEL RELEASE Right 3/28/2023    Procedure: RELEASE, CARPAL TUNNEL;  Surgeon: Sekou Rojo Jr., MD;  Location: [facility] OR;  Service: Orthopedics;  Laterality: Right;     SECTION  2012    DILATION AND CURETTAGE OF UTERUS  2019    Procedure: DILATION AND CURETTAGE, UTERUS;  Surgeon: Purnima Kidd MD;  Location: [facility];  Service: OB/GYN;;    DILATION AND CURETTAGE OF UTERUS N/A 2023    Procedure: DILATION AND CURETTAGE, UTERUS;  Surgeon: Purnima Kidd MD;  Location: [facility] OR;  Service: OB/GYN;  Laterality: N/A;    ENDOMETRIAL ABLATION N/A 2023    Procedure: ABLATION, ENDOMETRIUM;  Surgeon: Purnima Kidd MD;  Location: [facility] OR;  Service: OB/GYN;  Laterality: N/A;    HYSTEROSCOPY N/A 2019    Procedure: HYSTEROSCOPY;  Surgeon: Purnima Kidd MD;  Location: [facility];  Service: OB/GYN;  Laterality: N/A;    HYSTEROSCOPY N/A 2023    Procedure: HYSTEROSCOPY;  Surgeon: Purnima Kidd MD;  Location: [facility] OR;  Service: OB/GYN;  Laterality: N/A;    LAPAROSCOPIC SALPINGECTOMY Bilateral 2023    Procedure: SALPINGECTOMY, LAPAROSCOPIC;  Surgeon: Purnima Kidd MD;  Location: [facility];  Service: OB/GYN;  Laterality: Bilateral;    TONSILLECTOMY, ADENOIDECTOMY      TRIGGER FINGER RELEASE Left 2022    Procedure: RELEASE, TRIGGER FINGER;  Surgeon: Sekou Rojo Jr., MD;  Location: [facility]  OR;  Service: Orthopedics;  Laterality: Left;    TRIGGER FINGER RELEASE Right 3/28/2023    Procedure: RELEASE, TRIGGER FINGER; MIDDLE FINGER;  Surgeon: Sekou Rojo Jr., MD;  Location: [facility] OR;  Service: Orthopedics;  Laterality: Right;    WISDOM TOOTH EXTRACTION         Review of patient's allergies indicates:   Allergen Reactions    No known allergies        Medications:  Current Facility-Administered Medications   Medication Dose Route Frequency Provider Last Rate Last Admin    0.9%  NaCl infusion   Intravenous PRN Ed Aleman MD 5 mL/hr at 04/13/24 1200 Rate Verify at 04/13/24 1200    butalbital-acetaminophen-caffeine -40 mg per tablet 1 tablet  1 tablet Oral Q4H PRN Maura Lynn NP   1 tablet at 04/13/24 0827    cefTRIAXone (ROCEPHIN) 2 g in dextrose 5 % in water (D5W) 100 mL IVPB (MB+)  2 g Intravenous Q12H Ritika Mac MD   Stopped at 04/13/24 1151    dextrose 10 % infusion   Intravenous PRN Ritika Mac MD        dextrose 10 % infusion   Intravenous PRN Ritika Mac MD        dextrose 10% bolus 125 mL 125 mL  12.5 g Intravenous PRN Maura Lynn, NP        dextrose 10% bolus 125 mL 125 mL  12.5 g Intravenous PRN Maura Lynn, NP        dextrose 10% bolus 125 mL 125 mL  12.5 g Intravenous PRN Ritika Mac MD        dextrose 10% bolus 250 mL 250 mL  25 g Intravenous PRN Maura Lynn, NP        dextrose 10% bolus 250 mL 250 mL  25 g Intravenous PRN Maura Lynn NP        dextrose 10% bolus 250 mL 250 mL  25 g Intravenous PRN Ritika Mac MD        dextrose 5 % in lactated ringers infusion   Intravenous Continuous Ritika Mac MD   Held at 04/13/24 1215    enoxaparin injection 40 mg  40 mg Subcutaneous Q24H (prophylaxis, 1700) Ritika Mac MD        FLUoxetine capsule 20 mg  20 mg Oral BID Maura Lynn NP   20 mg at 04/13/24 0900    insulin detemir U-100 (Levemir) pen 8 Units  8 Units Subcutaneous BID Ritika Mac MD        insulin regular 1 Units/mL in  "sodium chloride 0.9% 100 mL infusion  0-0.2 Units/kg/hr Intravenous Continuous Ritika Mac MD 15.8 mL/hr at 04/13/24 1200 0.2 Units/kg/hr at 04/13/24 1200    lactated ringers infusion   Intravenous Continuous Ritika Mac  mL/hr at 04/13/24 1210 Rate Change at 04/13/24 1210    magnesium sulfate 2g in water 50mL IVPB (premix)  2 g Intravenous Once Ritika Mac MD        metroNIDAZOLE tablet 500 mg  500 mg Oral Q8H Meera Ruiz MD        mupirocin 2 % ointment   Nasal BID Ed Aleman MD   Given at 04/13/24 0827    NORepinephrine 4 mg in dextrose 5% 250 mL infusion (premix)  0-0.2 mcg/kg/min Intravenous Continuous Maura Lynn NP 33.9 mL/hr at 04/13/24 1200 0.12 mcg/kg/min at 04/13/24 1200    ondansetron injection 4 mg  4 mg Intravenous Q6H PRN Maura Lynn NP   4 mg at 04/13/24 0351    polyethylene glycol packet 17 g  17 g Oral Daily PRN Maura Lynn NP        prochlorperazine injection Soln 5 mg  5 mg Intravenous Q4H PRN Ed Aleman MD   5 mg at 04/13/24 0849    sodium chloride 0.9% flush 10 mL  10 mL Intravenous PRN Maura Lynn NP        sodium chloride 0.9% flush 10 mL  10 mL Intravenous PRN Ritika Mac MD        vancomycin - pharmacy to dose   Intravenous pharmacy to manage frequency Ritika Mac MD        vancomycin 1,500 mg in dextrose 5 % (D5W) 250 mL IVPB (Vial-Mate)  20 mg/kg (Dosing Weight) Intravenous Once Ritika Mac .7 mL/hr at 04/13/24 1212 1,500 mg at 04/13/24 1212     Antibiotics (From admission, onward)      Start     Stop Route Frequency Ordered    04/13/24 1400  metroNIDAZOLE tablet 500 mg         -- Oral Every 8 hours 04/13/24 1115    04/13/24 1145  cefTRIAXone (ROCEPHIN) 2 g in dextrose 5 % in water (D5W) 100 mL IVPB (MB+)         -- IV Every 12 hours (non-standard times) 04/13/24 1042    04/13/24 1142  vancomycin - pharmacy to dose  (vancomycin IVPB (PEDS and ADULTS))        Placed in "And" Linked Group    -- IV pharmacy to " manage frequency 04/13/24 1042    04/13/24 1130  vancomycin 1,500 mg in dextrose 5 % (D5W) 250 mL IVPB (Vial-Mate)         -- IV Once 04/13/24 1107    04/13/24 0900  mupirocin 2 % ointment         04/18/24 0859 Nasl 2 times daily 04/13/24 0246          Antifungals (From admission, onward)      None          Antivirals (From admission, onward)      None             Immunization History   Administered Date(s) Administered    COVID-19, MRNA, LN-S, PF (Pfizer) (Purple Cap) 04/19/2021, 05/10/2021    DTaP 02/05/1993, 04/05/1993, 06/01/1993, 08/18/1994    HIB 02/05/1993, 04/05/1993, 06/07/1993, 08/18/1994    HPV Quadrivalent 11/12/2009, 01/05/2010, 08/16/2010    Hepatitis B, Pediatric/Adolescent 08/16/2010    IPV 04/05/1993, 08/18/1994, 08/16/2010    Influenza 11/12/2009, 10/05/2018    Influenza - Quadrivalent - PF *Preferred* (6 months and older) 10/19/2016, 10/19/2017, 10/04/2018, 10/06/2020, 10/29/2022, 09/23/2023    Influenza A (H1N1) 2009 Monovalent - IM - PF 11/12/2009    MMR 08/18/1994, 01/05/2010    Meningococcal Conjugate (MCV4P) 01/05/2010    Tdap 02/01/2005    Varicella 02/01/2005, 01/05/2010       Family History       Problem Relation (Age of Onset)    Diabetes Brother    Hydrocephalus Daughter    No Known Problems Mother, Father, Brother, Brother, Maternal Aunt, Maternal Uncle, Paternal Aunt, Paternal Uncle, Maternal Grandmother, Maternal Grandfather, Paternal Grandmother, Paternal Grandfather          Social History     Socioeconomic History    Marital status: Single   Tobacco Use    Smoking status: Never    Smokeless tobacco: Never   Substance and Sexual Activity    Alcohol use: Yes     Comment: occasionally    Drug use: No    Sexual activity: Yes     Partners: Male     Birth control/protection: Injection     Social Determinants of Health     Financial Resource Strain: Low Risk  (1/29/2024)    Overall Financial Resource Strain (CARDIA)     Difficulty of Paying Living Expenses: Not hard at all   Food  Insecurity: No Food Insecurity (1/29/2024)    Hunger Vital Sign     Worried About Running Out of Food in the Last Year: Never true     Ran Out of Food in the Last Year: Never true   Transportation Needs: No Transportation Needs (1/29/2024)    PRAPARE - Transportation     Lack of Transportation (Medical): No     Lack of Transportation (Non-Medical): No   Physical Activity: Sufficiently Active (1/25/2024)    Exercise Vital Sign     Days of Exercise per Week: 5 days     Minutes of Exercise per Session: 60 min   Stress: No Stress Concern Present (1/29/2024)    [place] [employer] - Occupational Stress Questionnaire     Feeling of Stress : Only a little   Social Connections: Unknown (1/25/2024)    Social Connection and Isolation Panel [NHANES]     Frequency of Communication with Friends and Family: More than three times a week     Frequency of Social Gatherings with Friends and Family: Once a week     Active Member of Clubs or Organizations: Yes     Attends Club or Organization Meetings: 1 to 4 times per year     Marital Status: Never    Housing Stability: Low Risk  (1/29/2024)    Housing Stability Vital Sign     Unable to Pay for Housing in the Last Year: No     Number of Places Lived in the Last Year: 1     Unstable Housing in the Last Year: No     Review of Systems   Constitutional:  Positive for fatigue. Negative for chills and fever.   HENT:  Negative for congestion, sinus pressure and sore throat.    Eyes:  Positive for photophobia. Negative for visual disturbance.   Respiratory:  Negative for shortness of breath.    Cardiovascular:  Negative for chest pain.   Gastrointestinal:  Positive for nausea and vomiting. Negative for abdominal pain.   Genitourinary:  Negative for dysuria.   Musculoskeletal:  Positive for neck pain (tension). Negative for neck stiffness.   Skin:  Negative for color change.   Neurological:  Negative for dizziness, syncope and light-headedness.    Psychiatric/Behavioral:  Negative for agitation and confusion. The patient is not nervous/anxious.      Objective:     Vital Signs (Most Recent):  Temp: 98.6 °F (37 °C) (04/13/24 1110)  Pulse: (!) 113 (04/13/24 1315)  Resp: (!) 22 (04/13/24 1315)  BP: (!) 96/53 (04/13/24 1315)  SpO2: 100 % (04/13/24 1315) Vital Signs (24h Range):  Temp:  [97.6 °F (36.4 °C)-98.8 °F (37.1 °C)] 98.6 °F (37 °C)  Pulse:  [100-133] 113  Resp:  [6-42] 22  SpO2:  [97 %-100 %] 100 %  BP: ()/(39-60) 96/53     Weight: 79 kg (174 lb 2.6 oz)  Body mass index is 30.86 kg/m².    Estimated Creatinine Clearance: 57.9 mL/min (based on SCr of 1.4 mg/dL).     Physical Exam  Constitutional:       General: She is not in acute distress.  HENT:      Head: Normocephalic and atraumatic.      Mouth/Throat:      Mouth: Mucous membranes are dry.   Eyes:      Extraocular Movements: Extraocular movements intact.      Pupils: Pupils are equal, round, and reactive to light.   Cardiovascular:      Rate and Rhythm: Regular rhythm. Tachycardia present.      Pulses: Normal pulses.      Heart sounds: Normal heart sounds.   Pulmonary:      Effort: Pulmonary effort is normal. No respiratory distress.      Breath sounds: Normal breath sounds. No wheezing, rhonchi or rales.   Abdominal:      General: Bowel sounds are normal. There is no distension.      Palpations: Abdomen is soft.      Tenderness: There is no abdominal tenderness. There is no guarding or rebound.   Musculoskeletal:      Cervical back: No rigidity.      Right lower leg: No edema.      Left lower leg: No edema.   Skin:     General: Skin is warm.   Neurological:      Mental Status: She is alert and oriented to person, place, and time.   Psychiatric:         Mood and Affect: Mood normal.         Behavior: Behavior normal.         Thought Content: Thought content normal.         Judgment: Judgment normal.          Significant Labs: All pertinent labs within the past 24 hours have been  reviewed.    Significant Imaging: I have reviewed all pertinent imaging results/findings within the past 24 hours.

## 2024-04-13 NOTE — NURSING
Dr. Mac notified of critical glucose (524) and CO2 (9) on most recent labs. MD stated to hold scheduled and sliding scale insulin aspart at this time. Okay to give insulin detemir. MD to place orders for insulin drip. Patient to remain NPO ex for ice chips.

## 2024-04-13 NOTE — ASSESSMENT & PLAN NOTE
Unclear source of infection  Leukocytosis and procalcitonin elevated on admission    Currently on vasopressors   No fevers    Plan:   Continue Levophed   Follow-up blood and urine cultures   ID consulted  Zosyn switched to Rocephin and Flagyl.  Vancomycin discontinued

## 2024-04-13 NOTE — SUBJECTIVE & OBJECTIVE
Past Medical History:   Diagnosis Date    Depression     Hyperlipidemia     Hypertension     Mild nonproliferative diabetic retinopathy of both eyes without macular edema associated with type 1 diabetes mellitus 3/30/2021    Type I (juvenile type) diabetes mellitus without mention of complication, uncontrolled 2011       Past Surgical History:   Procedure Laterality Date    ABCESS DRAINAGE      boil went over [place]    CARPAL TUNNEL RELEASE Left 2022    Procedure: RELEASE, CARPAL TUNNEL;  Surgeon: Sekou Rojo Jr., MD;  Location: [facility] OR;  Service: Orthopedics;  Laterality: Left;    CARPAL TUNNEL RELEASE Right 3/28/2023    Procedure: RELEASE, CARPAL TUNNEL;  Surgeon: Sekou Rojo Jr., MD;  Location: [facility] OR;  Service: Orthopedics;  Laterality: Right;     SECTION  2012    DILATION AND CURETTAGE OF UTERUS  2019    Procedure: DILATION AND CURETTAGE, UTERUS;  Surgeon: Purnima Kidd MD;  Location: [facility];  Service: OB/GYN;;    DILATION AND CURETTAGE OF UTERUS N/A 2023    Procedure: DILATION AND CURETTAGE, UTERUS;  Surgeon: Purnima Kidd MD;  Location: [facility] OR;  Service: OB/GYN;  Laterality: N/A;    ENDOMETRIAL ABLATION N/A 2023    Procedure: ABLATION, ENDOMETRIUM;  Surgeon: Purnima Kidd MD;  Location: [facility] OR;  Service: OB/GYN;  Laterality: N/A;    HYSTEROSCOPY N/A 2019    Procedure: HYSTEROSCOPY;  Surgeon: Purnima Kidd MD;  Location: [facility];  Service: OB/GYN;  Laterality: N/A;    HYSTEROSCOPY N/A 2023    Procedure: HYSTEROSCOPY;  Surgeon: Purnima Kidd MD;  Location: [facility] OR;  Service: OB/GYN;  Laterality: N/A;    LAPAROSCOPIC SALPINGECTOMY Bilateral 2023    Procedure: SALPINGECTOMY, LAPAROSCOPIC;  Surgeon: Purnima Kidd MD;  Location: [facility];  Service: OB/GYN;  Laterality: Bilateral;    TONSILLECTOMY, ADENOIDECTOMY      TRIGGER FINGER RELEASE Left 2022    Procedure: RELEASE, TRIGGER FINGER;  Surgeon: Sekou Rojo Jr., MD;  Location: [facility]  "OR;  Service: Orthopedics;  Laterality: Left;    TRIGGER FINGER RELEASE Right 3/28/2023    Procedure: RELEASE, TRIGGER FINGER; MIDDLE FINGER;  Surgeon: Sekou Rojo Jr., MD;  Location: [facility] OR;  Service: Orthopedics;  Laterality: Right;    WISDOM TOOTH EXTRACTION         Review of patient's allergies indicates:   Allergen Reactions    No known allergies        No current facility-administered medications on file prior to encounter.     Current Outpatient Medications on File Prior to Encounter   Medication Sig    ergocalciferol (VITAMIN D2) 50,000 unit Cap Take 1 capsule (50,000 Units total) by mouth every 7 days.    FLUoxetine 20 MG capsule Take 1 capsule (20 mg total) by mouth 2 (two) times daily.    fluticasone propionate (FLONASE) 50 mcg/actuation nasal spray 1 spray (50 mcg total) by Each Nostril route 2 (two) times daily as needed.    glucagon (BAQSIMI) 3 mg/actuation Spry Give one puff via nostril. Hold device between fingers and thumb, do not push plunger yet, insert tip gently into one nostril until finger(s) touch the outside of the nose, then push plunger firmly all the way in . Dose is complete when the green line disappears.    ibuprofen (ADVIL,MOTRIN) 800 MG tablet Take 1 tablet (800 mg total) by mouth every 6 (six) hours as needed for Pain.    insulin pump cart,automated,BT (OMNIPOD 5 G6 PODS, GEN 5,) Crtg Inject 1 Device into the skin Every 3 (three) days.    semaglutide (OZEMPIC) 0.25 mg or 0.5 mg (2 mg/3 mL) pen injector Inject 0.25 mg into the skin every 7 days. ICD 10: E11.9    BD EBEN 2ND GEN PEN NEEDLE 32 gauge x 5/32" Ndle To use with insulin injections 4 times per day    blood sugar diagnostic Strp To check BG 4  times daily, to use with insurance preferred meter, e 10.65 (Patient taking differently: To check BG 4  times daily, to use with insurance preferred meter, e 10.65)    blood-glucose sensor (DEXCOM G6 SENSOR) Saida 1 sensor every 10 days    blood-glucose transmitter (DEXCOM G6 " TRANSMITTER) Saida 1 transmitter every 3 months    hydrocortisone (ANUSOL-HC) 2.5 % rectal cream Place rectally 2 (two) times daily.    insulin aspart, niacinamide, (FIASP FLEXTOUCH U-100 INSULIN) 100 unit/mL (3 mL) InPn use three times daily before meals. Inject ICR 1:8 + snacks + correction scale. Max TDD of 50 units (Patient not taking: Reported on 4/12/2024)    insulin aspart, niacinamide, (FIASP U-100 INSULIN) 100 unit/mL Soln To use continuously with insulin pump. Max TDD of 100 units (Patient not taking: Reported on 4/12/2024)    insulin syringe-needle U-100 0.5 mL 31 gauge x 5/16 Syrg use with admelog    polyethylene glycol (GLYCOLAX) 17 gram/dose powder Take 17 g by mouth once daily.     Family History       Problem Relation (Age of Onset)    Diabetes Brother    Hydrocephalus Daughter    No Known Problems Mother, Father, Brother, Brother, Maternal Aunt, Maternal Uncle, Paternal Aunt, Paternal Uncle, Maternal Grandmother, Maternal Grandfather, Paternal Grandmother, Paternal Grandfather          Tobacco Use    Smoking status: Never    Smokeless tobacco: Never   Substance and Sexual Activity    Alcohol use: Yes     Comment: occasionally    Drug use: No    Sexual activity: Yes     Partners: Male     Birth control/protection: Injection     Review of Systems   Constitutional:  Positive for fatigue. Negative for chills and fever.   HENT:  Negative for congestion, sinus pressure and sore throat.    Eyes:  Positive for photophobia. Negative for visual disturbance.   Respiratory:  Negative for shortness of breath.    Cardiovascular:  Negative for chest pain.   Gastrointestinal:  Positive for nausea and vomiting. Negative for abdominal pain.   Genitourinary:  Negative for dysuria.   Musculoskeletal:  Positive for neck pain (tension). Negative for neck stiffness.   Skin:  Negative for color change.   Neurological:  Negative for dizziness, syncope and light-headedness.   Psychiatric/Behavioral:  Negative for agitation  and confusion. The patient is not nervous/anxious.      Objective:     Vital Signs (Most Recent):  Temp: 98.5 °F (36.9 °C) (04/12/24 1642)  Pulse: (!) 115 (04/12/24 1642)  Resp: 20 (04/12/24 1642)  BP: (!) 98/53 (04/12/24 1642)  SpO2: 98 % (04/12/24 1642) Vital Signs (24h Range):  Temp:  [98.3 °F (36.8 °C)-98.5 °F (36.9 °C)] 98.5 °F (36.9 °C)  Pulse:  [109-115] 115  Resp:  [18-20] 20  SpO2:  [98 %-100 %] 98 %  BP: ()/(53-71) 98/53     Weight: 75.3 kg (166 lb)  Body mass index is 29.41 kg/m².     Physical Exam  Constitutional:       General: She is not in acute distress.     Appearance: She is ill-appearing.   HENT:      Head: Normocephalic and atraumatic.      Mouth/Throat:      Mouth: Mucous membranes are dry.   Eyes:      Extraocular Movements: Extraocular movements intact.      Pupils: Pupils are equal, round, and reactive to light.   Cardiovascular:      Rate and Rhythm: Regular rhythm. Tachycardia present.      Pulses: Normal pulses.      Heart sounds: Normal heart sounds.   Pulmonary:      Effort: Pulmonary effort is normal. No respiratory distress.      Breath sounds: Normal breath sounds. No wheezing, rhonchi or rales.   Abdominal:      General: Bowel sounds are normal. There is no distension.      Palpations: Abdomen is soft.      Tenderness: There is no abdominal tenderness. There is no guarding or rebound.   Musculoskeletal:      Cervical back: No rigidity.      Right lower leg: No edema.      Left lower leg: No edema.   Skin:     General: Skin is warm.   Neurological:      Mental Status: She is alert and oriented to person, place, and time.   Psychiatric:         Mood and Affect: Mood normal.         Behavior: Behavior normal.         Thought Content: Thought content normal.         Judgment: Judgment normal.              CRANIAL NERVES     CN III, IV, VI   Pupils are equal, round, and reactive to light.       Significant Labs: All pertinent labs within the past 24 hours have been  reviewed.    Significant Imaging: I have reviewed all pertinent imaging results/findings within the past 24 hours.

## 2024-04-13 NOTE — ASSESSMENT & PLAN NOTE
, beta 3.1, AG 17  VBG: pH 7.297, PCO2 45.5, HCO3 22.2  Likely exacerbated by sepsis, stress from nursing school, dehydration (previous trigger), and poor sleep    Plan:  Continue insulin drip  Trend anion gap  Continue IVF
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
## 2024-04-13 NOTE — ASSESSMENT & PLAN NOTE
Patient reports she will run out of insulin tonight, will d/c insulin pump and treat with long-acting, prandial, and SSI   Patient has supplies at home to refill pump after discharge

## 2024-04-13 NOTE — ASSESSMENT & PLAN NOTE
Ongoing x5-6 days  Described as aching, pressure starting in her neck and spreading up over her head  Initially 9/10, down to 6/10 after benadryl, Toradol, and compazine  States she had a dose of sumatriptan 4/11 w/o improvement  Also states Fioricet usually works but not this time  No meningeal signs on exam    Plan:  P.r.n. analgesics   Nephrology consulted

## 2024-04-13 NOTE — PROCEDURES
"Anusha Andrew is a 33 y.o. female patient.    Temp: 98.6 °F (37 °C) (04/13/24 1110)  Pulse: (!) 119 (04/13/24 1300)  Resp: (!) 39 (04/13/24 1300)  BP: (!) 97/54 (04/13/24 1300)  SpO2: 100 % (04/13/24 1300)  Weight: 79 kg (174 lb 2.6 oz) (04/13/24 1155)  Height: 5' 2.99" (160 cm) (04/13/24 1155)       Lumbar Puncture    Date/Time: 4/13/2024 1:05 PM  Location procedure was performed: [facility] PULMON[facility]    Performed by: Meera Ruiz MD  Authorized by: Meera Ruiz MD  Assisting provider: Ritika Mac MD  Consent Done: Yes  Indications: evaluation for infection  Anesthesia: local infiltration    Anesthesia:  Local Anesthetic: lidocaine 1% without epinephrine  Preparation: Patient was prepped and draped in the usual sterile fashion.  Lumbar space: L4-L5 interspace  Patient's position: sitting  Number of attempts: 1  Fluid appearance: clear  Tubes of fluid: 4  Total volume: 12 ml  Post-procedure: site cleaned and adhesive bandage applied  Complications: No  Specimens: No  Implants: No  Patient tolerance: Patient tolerated the procedure well with no immediate complications      Procedure performed by Ivan Ruiz MD  Supervised by Dr. Ritika Mac and Dr. Reina Sanchez    4/13/2024    "

## 2024-04-13 NOTE — CARE UPDATE
2020: Patient's BP low w/ c/o dizziness with standing. She already received 2L NS, will order 1L LR.    2135: Notified of patient's continued hypotension. Unclear etiology. Sepsis w/u initiated. Levo ordered.    2350: Procal elevated. Repeat lactic ordered. Zosyn started.

## 2024-04-13 NOTE — ED NOTES
Informed Maura Lynn NP of pt hypotension of 85/48 at this time. Informed her that pt states her bp is usually low but not that low. Pt states light headed with standing.

## 2024-04-13 NOTE — SUBJECTIVE & OBJECTIVE
Interval History:   Patient reports headache is improved but still somewhat persistent  Reports photophobia   No fever chills  Patient has no other complaints    Review of Systems   Constitutional:  Negative for chills and fever.   Respiratory:  Negative for cough and shortness of breath.    Cardiovascular:  Negative for chest pain.   Gastrointestinal:  Negative for abdominal pain, constipation, diarrhea, nausea and vomiting.   Genitourinary:  Negative for difficulty urinating and dysuria.   Musculoskeletal:  Negative for neck stiffness.   Neurological:  Positive for headaches. Negative for dizziness and weakness.   Psychiatric/Behavioral:  Negative for agitation and confusion.      Objective:     Vital Signs (Most Recent):  Temp: 98.6 °F (37 °C) (04/13/24 1110)  Pulse: (!) 112 (04/13/24 1330)  Resp: 20 (04/13/24 1330)  BP: (!) 95/51 (04/13/24 1330)  SpO2: 100 % (04/13/24 1330) Vital Signs (24h Range):  Temp:  [97.6 °F (36.4 °C)-98.8 °F (37.1 °C)] 98.6 °F (37 °C)  Pulse:  [100-133] 112  Resp:  [6-42] 20  SpO2:  [97 %-100 %] 100 %  BP: ()/(39-60) 95/51     Weight: 79 kg (174 lb 2.6 oz)  Body mass index is 30.86 kg/m².    Intake/Output Summary (Last 24 hours) at 4/13/2024 1419  Last data filed at 4/13/2024 1300  Gross per 24 hour   Intake 6607.33 ml   Output 380 ml   Net 6227.33 ml         Physical Exam  Constitutional:       General: She is not in acute distress.     Appearance: She is not ill-appearing or toxic-appearing.   HENT:      Mouth/Throat:      Mouth: Mucous membranes are moist.      Pharynx: Oropharynx is clear.   Eyes:      Extraocular Movements: Extraocular movements intact.      Conjunctiva/sclera: Conjunctivae normal.   Cardiovascular:      Rate and Rhythm: Tachycardia present.      Heart sounds: Normal heart sounds.   Pulmonary:      Effort: Pulmonary effort is normal. No respiratory distress.      Breath sounds: No wheezing or rhonchi.   Abdominal:      General: There is no distension.       Palpations: Abdomen is soft.      Tenderness: There is no abdominal tenderness. There is no guarding.   Musculoskeletal:         General: Normal range of motion.      Cervical back: Normal range of motion. No rigidity.      Right lower leg: No edema.      Left lower leg: No edema.   Skin:     General: Skin is warm and dry.   Neurological:      General: No focal deficit present.      Mental Status: She is alert and oriented to person, place, and time.   Psychiatric:         Mood and Affect: Mood normal.         Behavior: Behavior normal.             Significant Labs: All pertinent labs within the past 24 hours have been reviewed.    Significant Imaging: I have reviewed all pertinent imaging results/findings within the past 24 hours.

## 2024-04-13 NOTE — CONSULTS
[facility] - Intensive Care  Infectious Disease  Consult Note    Patient Name: Anusha Andrew  MRN: [id number]  Admission Date: 4/12/2024  Hospital Length of Stay: 1 days  Attending Physician: Ed Aleman MD  Primary Care Provider: Magaly Muhammad NP     Isolation Status: Droplet    Patient information was obtained from patient and past medical records.      Inpatient consult to Infectious Diseases  Consult performed by: Curly Ochoa MD  Consult ordered by: Ed Aleman MD  Reason for consult: sepsis        Assessment/Plan:     ID  Septic shock  33 year old female with PMH of DM1 (on insulin pump and dexcom) and gastric sleeve who presented for headache. Diagnosed with DKA. Now requiring pressors with no clear source on infection    -await pending csf studies  -await pending cultures  -agree with CT C/A/P if csf results not concerning for infection  -continue vanco and ctx for now but would switch ctx to cefepime / flagyl if csf studies are negative          Thank you for your consult. I will follow-up with patient. Please contact us if you have any additional questions.    Curly Ochoa MD  Infectious Disease  [facility] - Intensive Care    Subjective:     Principal Problem: DKA (diabetic ketoacidosis)    HPI: 33 year old female with PMH of DM1 (on insulin pump and dexcom) and gastric sleeve who presented for headache. She was seen a few days earlier for HA in the ED and given imitrex, then represented for worsening HA. In the ED she was found to be in DKA initially given fluids and started on insulin gtt and transitioned off insuling gtt despite bicarb remaining 14 so gap re-opened and BG increased. Also overnight became hypotensive requiring levophed.      4/13: Patient complaining of HA and neck stiffness with shock without any clear etiology - changed abx from zosyn to vanc/rocephin for meningitis coverage. LP was done and cell counts are pending    Past Medical History:   Diagnosis Date    Depression      Hyperlipidemia     Hypertension     Mild nonproliferative diabetic retinopathy of both eyes without macular edema associated with type 1 diabetes mellitus 3/30/2021    Type I (juvenile type) diabetes mellitus without mention of complication, uncontrolled 2011       Past Surgical History:   Procedure Laterality Date    ABCESS DRAINAGE      boil went over GA    CARPAL TUNNEL RELEASE Left 2022    Procedure: RELEASE, CARPAL TUNNEL;  Surgeon: Sekou Rojo Jr., MD;  Location: [facility] OR;  Service: Orthopedics;  Laterality: Left;    CARPAL TUNNEL RELEASE Right 3/28/2023    Procedure: RELEASE, CARPAL TUNNEL;  Surgeon: Sekou Rojo Jr., MD;  Location: [facility] OR;  Service: Orthopedics;  Laterality: Right;     SECTION  2012    DILATION AND CURETTAGE OF UTERUS  2019    Procedure: DILATION AND CURETTAGE, UTERUS;  Surgeon: Purnima Kidd MD;  [facility];  Service: OB/GYN;;    DILATION AND CURETTAGE OF UTERUS N/A 2023    Procedure: DILATION AND CURETTAGE, UTERUS;  Surgeon: Purnima Kidd MD;  Location: [facility];  Service: OB/GYN;  Laterality: N/A;    ENDOMETRIAL ABLATION N/A 2023    Procedure: ABLATION, ENDOMETRIUM;  Surgeon: Purnima Kidd MD;  Location: [facility] OR;  Service: OB/GYN;  Laterality: N/A;    HYSTEROSCOPY N/A 2019    Procedure: HYSTEROSCOPY;  Surgeon: Purnima Kidd MD;  Location: [facility];  Service: OB/GYN;  Laterality: N/A;    HYSTEROSCOPY N/A 2023    Procedure: HYSTEROSCOPY;  Surgeon: Purnima Kidd MD;  Location: [facility];  Service: OB/GYN;  Laterality: N/A;    LAPAROSCOPIC SALPINGECTOMY Bilateral 2023    Procedure: SALPINGECTOMY, LAPAROSCOPIC;  Surgeon: Purnima Kidd MD;  Location: [facility] OR;  Service: OB/GYN;  Laterality: Bilateral;    TONSILLECTOMY, ADENOIDECTOMY      TRIGGER FINGER RELEASE Left 2022    Procedure: RELEASE, TRIGGER FINGER;  Surgeon: Sekou Rojo Jr., MD;  Location: [facility] OR;  Service: Orthopedics;  Laterality: Left;    TRIGGER  FINGER RELEASE Right 3/28/2023    Procedure: RELEASE, TRIGGER FINGER; MIDDLE FINGER;  Surgeon: Sekou Rojo Jr., MD;  Location: [facility];  Service: Orthopedics;  Laterality: Right;    WISDOM TOOTH EXTRACTION         Review of patient's allergies indicates:   Allergen Reactions    No known allergies        Medications:  Current Facility-Administered Medications   Medication Dose Route Frequency Provider Last Rate Last Admin    0.9%  NaCl infusion   Intravenous PRN Ed Aleman MD 5 mL/hr at 04/13/24 1200 Rate Verify at 04/13/24 1200    butalbital-acetaminophen-caffeine -40 mg per tablet 1 tablet  1 tablet Oral Q4H PRN Maura Lynn NP   1 tablet at 04/13/24 0827    cefTRIAXone (ROCEPHIN) 2 g in dextrose 5 % in water (D5W) 100 mL IVPB (MB+)  2 g Intravenous Q12H Ritika Mac MD   Stopped at 04/13/24 1151    dextrose 10 % infusion   Intravenous PRN Ritika Mac MD        dextrose 10 % infusion   Intravenous PRN Ritika Mac MD        dextrose 10% bolus 125 mL 125 mL  12.5 g Intravenous PRN Maura Lynn, NP        dextrose 10% bolus 125 mL 125 mL  12.5 g Intravenous PRN Maura Lynn, NP        dextrose 10% bolus 125 mL 125 mL  12.5 g Intravenous PRN Ritika Mac MD        dextrose 10% bolus 250 mL 250 mL  25 g Intravenous PRN Maura Lynn, NP        dextrose 10% bolus 250 mL 250 mL  25 g Intravenous PRN Maura Lynn, NP        dextrose 10% bolus 250 mL 250 mL  25 g Intravenous PRN Ritika Mac MD        dextrose 5 % in lactated ringers infusion   Intravenous Continuous Ritika Mac MD   Held at 04/13/24 1215    enoxaparin injection 40 mg  40 mg Subcutaneous Q24H (prophylaxis, 1700) Ritika Mac MD        FLUoxetine capsule 20 mg  20 mg Oral BID Maura Lynn NP   20 mg at 04/13/24 0900    insulin detemir U-100 (Levemir) pen 8 Units  8 Units Subcutaneous BID Ritika Mac MD        insulin regular 1 Units/mL in sodium chloride 0.9% 100 mL infusion  0-0.2 Units/kg/hr  "Intravenous Continuous Ritika Mac MD 15.8 mL/hr at 04/13/24 1200 0.2 Units/kg/hr at 04/13/24 1200    lactated ringers infusion   Intravenous Continuous Ritika Mac  mL/hr at 04/13/24 1210 Rate Change at 04/13/24 1210    magnesium sulfate 2g in water 50mL IVPB (premix)  2 g Intravenous Once Ritika Mac MD        metroNIDAZOLE tablet 500 mg  500 mg Oral Q8H Meera Ruiz MD        mupirocin 2 % ointment   Nasal BID Ed Aleman MD   Given at 04/13/24 0827    NORepinephrine 4 mg in dextrose 5% 250 mL infusion (premix)  0-0.2 mcg/kg/min Intravenous Continuous Maura Lynn NP 33.9 mL/hr at 04/13/24 1200 0.12 mcg/kg/min at 04/13/24 1200    ondansetron injection 4 mg  4 mg Intravenous Q6H PRN Maura Lynn NP   4 mg at 04/13/24 0351    polyethylene glycol packet 17 g  17 g Oral Daily PRN Maura Lynn NP        prochlorperazine injection Soln 5 mg  5 mg Intravenous Q4H PRN Ed Aleman MD   5 mg at 04/13/24 0849    sodium chloride 0.9% flush 10 mL  10 mL Intravenous PRN Maura Lynn NP        sodium chloride 0.9% flush 10 mL  10 mL Intravenous PRN Ritika Mac MD        vancomycin - pharmacy to dose   Intravenous pharmacy to manage frequency Ritika Mac MD        vancomycin 1,500 mg in dextrose 5 % (D5W) 250 mL IVPB (Vial-Mate)  20 mg/kg (Dosing Weight) Intravenous Once Ritika Mac .7 mL/hr at 04/13/24 1212 1,500 mg at 04/13/24 1212     Antibiotics (From admission, onward)      Start     Stop Route Frequency Ordered    04/13/24 1400  metroNIDAZOLE tablet 500 mg         -- Oral Every 8 hours 04/13/24 1115    04/13/24 1145  cefTRIAXone (ROCEPHIN) 2 g in dextrose 5 % in water (D5W) 100 mL IVPB (MB+)         -- IV Every 12 hours (non-standard times) 04/13/24 1042    04/13/24 1142  vancomycin - pharmacy to dose  (vancomycin IVPB (PEDS and ADULTS))        Placed in "And" Linked Group    -- IV pharmacy to manage frequency 04/13/24 1042    04/13/24 1130  vancomycin " 1,500 mg in dextrose 5 % (D5W) 250 mL IVPB (Vial-Mate)         -- IV Once 04/13/24 1107    04/13/24 0900  mupirocin 2 % ointment         04/18/24 0859 Nasl 2 times daily 04/13/24 0246          Antifungals (From admission, onward)      None          Antivirals (From admission, onward)      None             Immunization History   Administered Date(s) Administered    COVID-19, MRNA, LN-S, PF (Pfizer) (Purple Cap) 04/19/2021, 05/10/2021    DTaP 02/05/1993, 04/05/1993, 06/01/1993, 08/18/1994    HIB 02/05/1993, 04/05/1993, 06/07/1993, 08/18/1994    HPV Quadrivalent 11/12/2009, 01/05/2010, 08/16/2010    Hepatitis B, Pediatric/Adolescent 08/16/2010    IPV 04/05/1993, 08/18/1994, 08/16/2010    Influenza 11/12/2009, 10/05/2018    Influenza - Quadrivalent - PF *Preferred* (6 months and older) 10/19/2016, 10/19/2017, 10/04/2018, 10/06/2020, 10/29/2022, 09/23/2023    Influenza A (H1N1) 2009 Monovalent - IM - PF 11/12/2009    MMR 08/18/1994, 01/05/2010    Meningococcal Conjugate (MCV4P) 01/05/2010    Tdap 02/01/2005    Varicella 02/01/2005, 01/05/2010       Family History       Problem Relation (Age of Onset)    Diabetes Brother    Hydrocephalus Daughter    No Known Problems Mother, Father, Brother, Brother, Maternal Aunt, Maternal Uncle, Paternal Aunt, Paternal Uncle, Maternal Grandmother, Maternal Grandfather, Paternal Grandmother, Paternal Grandfather          Social History     Socioeconomic History    Marital status: Single   Tobacco Use    Smoking status: Never    Smokeless tobacco: Never   Substance and Sexual Activity    Alcohol use: Yes     Comment: occasionally    Drug use: No    Sexual activity: Yes     Partners: Male     Birth control/protection: Injection     Social Determinants of Health     Financial Resource Strain: Low Risk  (1/29/2024)    Overall Financial Resource Strain (CARDIA)     Difficulty of Paying Living Expenses: Not hard at all   Food Insecurity: No Food Insecurity (1/29/2024)    Hunger Vital Sign      Worried About Running Out of Food in the Last Year: Never true     Ran Out of Food in the Last Year: Never true   Transportation Needs: No Transportation Needs (1/29/2024)    PRAPARE - Transportation     Lack of Transportation (Medical): No     Lack of Transportation (Non-Medical): No   Physical Activity: Sufficiently Active (1/25/2024)    Exercise Vital Sign     Days of Exercise per Week: 5 days     Minutes of Exercise per Session: 60 min   Stress: No Stress Concern Present (1/29/2024)    [employer] - Occupational Stress Questionnaire     Feeling of Stress : Only a little   Social Connections: Unknown (1/25/2024)    Social Connection and Isolation Panel [NHANES]     Frequency of Communication with Friends and Family: More than three times a week     Frequency of Social Gatherings with Friends and Family: Once a week     Active Member of Clubs or Organizations: Yes     Attends Club or Organization Meetings: 1 to 4 times per year     Marital Status: Never    Housing Stability: Low Risk  (1/29/2024)    Housing Stability Vital Sign     Unable to Pay for Housing in the Last Year: No     Number of Places Lived in the Last Year: 1     Unstable Housing in the Last Year: No     Review of Systems   Constitutional:  Positive for fatigue. Negative for chills and fever.   HENT:  Negative for congestion, sinus pressure and sore throat.    Eyes:  Positive for photophobia. Negative for visual disturbance.   Respiratory:  Negative for shortness of breath.    Cardiovascular:  Negative for chest pain.   Gastrointestinal:  Positive for nausea and vomiting. Negative for abdominal pain.   Genitourinary:  Negative for dysuria.   Musculoskeletal:  Positive for neck pain (tension). Negative for neck stiffness.   Skin:  Negative for color change.   Neurological:  Negative for dizziness, syncope and light-headedness.   Psychiatric/Behavioral:  Negative for agitation and confusion. The patient is not  nervous/anxious.      Objective:     Vital Signs (Most Recent):  Temp: 98.6 °F (37 °C) (04/13/24 1110)  Pulse: (!) 113 (04/13/24 1315)  Resp: (!) 22 (04/13/24 1315)  BP: (!) 96/53 (04/13/24 1315)  SpO2: 100 % (04/13/24 1315) Vital Signs (24h Range):  Temp:  [97.6 °F (36.4 °C)-98.8 °F (37.1 °C)] 98.6 °F (37 °C)  Pulse:  [100-133] 113  Resp:  [6-42] 22  SpO2:  [97 %-100 %] 100 %  BP: ()/(39-60) 96/53     Weight: 79 kg (174 lb 2.6 oz)  Body mass index is 30.86 kg/m².    Estimated Creatinine Clearance: 57.9 mL/min (based on SCr of 1.4 mg/dL).     Physical Exam  Constitutional:       General: She is not in acute distress.  HENT:      Head: Normocephalic and atraumatic.      Mouth/Throat:      Mouth: Mucous membranes are dry.   Eyes:      Extraocular Movements: Extraocular movements intact.      Pupils: Pupils are equal, round, and reactive to light.   Cardiovascular:      Rate and Rhythm: Regular rhythm. Tachycardia present.      Pulses: Normal pulses.      Heart sounds: Normal heart sounds.   Pulmonary:      Effort: Pulmonary effort is normal. No respiratory distress.      Breath sounds: Normal breath sounds. No wheezing, rhonchi or rales.   Abdominal:      General: Bowel sounds are normal. There is no distension.      Palpations: Abdomen is soft.      Tenderness: There is no abdominal tenderness. There is no guarding or rebound.   Musculoskeletal:      Cervical back: No rigidity.      Right lower leg: No edema.      Left lower leg: No edema.   Skin:     General: Skin is warm.   Neurological:      Mental Status: She is alert and oriented to person, place, and time.   Psychiatric:         Mood and Affect: Mood normal.         Behavior: Behavior normal.         Thought Content: Thought content normal.         Judgment: Judgment normal.          Significant Labs: All pertinent labs within the past 24 hours have been reviewed.    Significant Imaging: I have reviewed all pertinent imaging results/findings within the  past 24 hours.

## 2024-04-13 NOTE — EICU
Intervention Initiated From:  Bedside    Chucho intervened regarding:  Time-Out    Comments:   eLert received for diagnostic LP procedure. Dr Ruiz w/ Dr Mac at bedside to perform procedure. Time out performed. 12 ml of CSF removed and sent to lab. Pt trudi well w/ no adverse events noted.

## 2024-04-13 NOTE — CONSULTS
"NEUROLOGY FLOOR CONSULT    Reason for consult:  headache in the setting of DKA and possible sepsis    CC:  "persistent headache x 1 week"    HPI:   Anusha Andrew is a 33 y.o. w/ Hx of T1DM and gastric sleeve who presented for persistent  headache x 1 week. Patient reports that she has been taking fioricet up to 3x per day with no relief. She reports tension type headaches at baseline that she is able to resolve with OTC medicaiton. Patient was found to be in DKA and possible sepsis which is currently being worked up. LP was performed to rule out meningitis. Patient denies nuchal rigidity and postural component. She does reports some photophobia and nasuea. She states that her headache has improved from a 10/10 since presentation to a 6-7/10. Patient has received toradol/prochlorperazine/benadryl headache cocktail and fioricet with minimal relief. CTH with no acute intracranial abnormality.       ROS: As per HPI    Histories:     Allergies:  No known allergies    Current Medications:    Current Facility-Administered Medications   Medication Dose Route Frequency Provider Last Rate Last Admin    0.9%  NaCl infusion   Intravenous PRN Ed Aleman MD   Stopped at 04/13/24 1212    butalbital-acetaminophen-caffeine -40 mg per tablet 1 tablet  1 tablet Oral Q4H PRN Maura Lynn NP   1 tablet at 04/13/24 0827    cefTRIAXone (ROCEPHIN) 2 g in dextrose 5 % in water (D5W) 100 mL IVPB (MB+)  2 g Intravenous Q12H Ritika Mac MD   Stopped at 04/13/24 1151    dextrose 10 % infusion   Intravenous PRN Ritika Mac MD        dextrose 10 % infusion   Intravenous PRN Ritika Mac MD        dextrose 10% bolus 125 mL 125 mL  12.5 g Intravenous PRN Maura Lynn NP        dextrose 10% bolus 125 mL 125 mL  12.5 g Intravenous PRN Maura Lynn NP        dextrose 10% bolus 125 mL 125 mL  12.5 g Intravenous PRN Ritika Mac MD        dextrose 10% bolus 250 mL 250 mL  25 g Intravenous PRN Maura Lynn NP     " Home Care Services    Need for Home Care Service was communicated by John Concepcion on 3/17/21. Choice was offered and patient chose [facility]. Writer has attempted to speak with Joel Triplett regarding RN, PT and OT services however patient has not been available. The patient will benefit from home care services based on recent hospitalization. Risk of readmission is : medium 32% (based on Longitudinal Plan of Care score)    Patient meets homebound criteria because weakness which limits endurance and increases risk for falls. Patient has a new Durable Medical Equipment need for wheeled walker from [facility] and this equipment has been delivered to patient's hospital room. Written information  provided to patient along with [facility] contact information. Liaison will continue to follow until discharge. Anticipated dc date is 3/19/21. Doron Claudio, MSN, RN   Home Care Service Liaison  C:  [address] [facility]. [email or web address]    dextrose 10% bolus 250 mL 250 mL  25 g Intravenous PRN Maura Lynn NP        dextrose 10% bolus 250 mL 250 mL  25 g Intravenous PRN Ritika Mac MD        dextrose 5 % in lactated ringers infusion   Intravenous Continuous Ritika Mac  mL/hr at 04/13/24 1435 New Bag at 04/13/24 1435    enoxaparin injection 40 mg  40 mg Subcutaneous Q24H (prophylaxis, 1700) Ritika Mac MD        FLUoxetine capsule 20 mg  20 mg Oral BID Maura Lynn NP   20 mg at 04/13/24 0900    insulin detemir U-100 (Levemir) pen 8 Units  8 Units Subcutaneous BID Ritika Mac MD        insulin regular 1 Units/mL in sodium chloride 0.9% 100 mL infusion  0-0.2 Units/kg/hr Intravenous Continuous Ritika Mac MD 1.58 mL/hr at 04/13/24 1500 0.02 Units/kg/hr at 04/13/24 1500    lactated ringers infusion   Intravenous Continuous Ritika Mac MD   Stopped at 04/13/24 1435    magnesium sulfate 2g in water 50mL IVPB (premix)  2 g Intravenous Once Ritika Mac MD 25 mL/hr at 04/13/24 1349 2 g at 04/13/24 1349    metroNIDAZOLE tablet 500 mg  500 mg Oral Q8H Meera Ruiz MD   500 mg at 04/13/24 1348    mupirocin 2 % ointment   Nasal BID Ed Aleman MD   Given at 04/13/24 0827    NORepinephrine 4 mg in dextrose 5% 250 mL infusion (premix)  0-0.2 mcg/kg/min Intravenous Continuous Maura Lynn NP 33.9 mL/hr at 04/13/24 1300 0.12 mcg/kg/min at 04/13/24 1300    ondansetron injection 4 mg  4 mg Intravenous Q6H PRN aMura Lynn NP   4 mg at 04/13/24 0351    polyethylene glycol packet 17 g  17 g Oral Daily PRN Maura Lynn NP        prochlorperazine injection Soln 5 mg  5 mg Intravenous Q4H PRN Ed Aleman MD   5 mg at 04/13/24 0849    sodium chloride 0.9% flush 10 mL  10 mL Intravenous PRN Maura Lynn NP        sodium chloride 0.9% flush 10 mL  10 mL Intravenous PRN Ritika Mac MD        vancomycin - pharmacy to dose   Intravenous pharmacy to manage frequency Ritika Mac MD           Past  Medical/Surgical/Family History:  Medical:   Past Medical History:   Diagnosis Date    Depression     Hyperlipidemia     Hypertension     Mild nonproliferative diabetic retinopathy of both eyes without macular edema associated with type 1 diabetes mellitus 3/30/2021    Type I (juvenile type) diabetes mellitus without mention of complication, uncontrolled 2011      Surgeries:   Past Surgical History:   Procedure Laterality Date    ABCESS DRAINAGE      boil went over GA    CARPAL TUNNEL RELEASE Left 2022    Procedure: RELEASE, CARPAL TUNNEL;  Surgeon: Sekou Rojo Jr., MD;  Location: [facility] OR;  Service: Orthopedics;  Laterality: Left;    CARPAL TUNNEL RELEASE Right 3/28/2023    Procedure: RELEASE, CARPAL TUNNEL;  Surgeon: Sekou Rojo Jr., MD;  Location: [facility] OR;  Service: Orthopedics;  Laterality: Right;     SECTION  2012    DILATION AND CURETTAGE OF UTERUS  2019    Procedure: DILATION AND CURETTAGE, UTERUS;  Surgeon: Purnima Kidd MD;  Location: [facility] OR;  Service: OB/GYN;;    DILATION AND CURETTAGE OF UTERUS N/A 2023    Procedure: DILATION AND CURETTAGE, UTERUS;  Surgeon: Purnima Kidd MD;  Location: [facility] OR;  Service: OB/GYN;  Laterality: N/A;    ENDOMETRIAL ABLATION N/A 2023    Procedure: ABLATION, ENDOMETRIUM;  Surgeon: Purnima Kidd MD;  Location: [facility] OR;  Service: OB/GYN;  Laterality: N/A;    HYSTEROSCOPY N/A 2019    Procedure: HYSTEROSCOPY;  Surgeon: Purnima Kidd MD;  Location: [facility] OR;  Service: OB/GYN;  Laterality: N/A;    HYSTEROSCOPY N/A 2023    Procedure: HYSTEROSCOPY;  Surgeon: Purnima Kidd MD;  Location: [facility] OR;  Service: OB/GYN;  Laterality: N/A;    LAPAROSCOPIC SALPINGECTOMY Bilateral 2023    Procedure: SALPINGECTOMY, LAPAROSCOPIC;  Surgeon: Purnima Kidd MD;  Location: [facility] OR;  Service: OB/GYN;  Laterality: Bilateral;    TONSILLECTOMY, ADENOIDECTOMY      TRIGGER FINGER RELEASE Left 2022    Procedure: RELEASE, TRIGGER  FINGER;  Surgeon: Sekou Rojo Jr., MD;  Location: [facility] OR;  Service: Orthopedics;  Laterality: Left;    TRIGGER FINGER RELEASE Right 3/28/2023    Procedure: RELEASE, TRIGGER FINGER; MIDDLE FINGER;  Surgeon: Sekou Rojo Jr., MD;  Location: [facility] OR;  Service: Orthopedics;  Laterality: Right;    WISDOM TOOTH EXTRACTION        Family:   Family History   Problem Relation Name Age of Onset    Diabetes Brother      No Known Problems Mother      No Known Problems Father      Hydrocephalus Daughter      No Known Problems Brother      No Known Problems Brother      No Known Problems Maternal Aunt      No Known Problems Maternal Uncle      No Known Problems Paternal Aunt      No Known Problems Paternal Uncle      No Known Problems Maternal Grandmother      No Known Problems Maternal Grandfather      No Known Problems Paternal Grandmother      No Known Problems Paternal Grandfather      Amblyopia Neg Hx      Blindness Neg Hx      Cataracts Neg Hx      Glaucoma Neg Hx      Hypertension Neg Hx      Macular degeneration Neg Hx      Retinal detachment Neg Hx      Strabismus Neg Hx      Stroke Neg Hx      Thyroid disease Neg Hx      Breast cancer Neg Hx      Colon cancer Neg Hx      Ovarian cancer Neg Hx      Cancer Neg Hx         Social History:  Social History     Tobacco Use    Smoking status: Never    Smokeless tobacco: Never   Substance Use Topics    Alcohol use: Yes     Comment: occasionally    Drug use: No       Current Evaluation:     Vital Signs:   Vitals:    04/13/24 1445   BP: (!) 90/51   Pulse: (!) 111   Resp: (!) 22   Temp:         Neurological Examination   Orientation  Alert, awake, oriented to self, place, time, and situation.  Memory  Recent and remote memory intact.  Language  No dysarthria, No aphasia.   Cranial Nerves  PERRL, VF intact, EOMI, V1-V3 intact, symmetric facial expression, hearing grossly intact, SCM & TPZ 5/5, tongue midline, symmetric palate elevation.  Motor  Normal Bulk  Normal Tone  5/5  strength in 4 extremities  Sensory  Normal to light touch throughout  DTR   +2 symmetric  Cerebellar/Gait  Normal finger to nose and heel to shin.   Gait deferred    LABORATORY STUDIES:  I have personally reviewed patient's labs for this admission.    NEURORADIOLOGY/NEURODIAGNOSTIC STUDIES:  I have personally reviewed the images and neurodiagnostic studies performed.       Assessment:  DELFINA Andrew is a 33 y.o. w/ Hx of T1DM and gastric sleeve who presented for persistent  headache x 1 week. She has tension type headaches at baseline which are able to be aborted with OTC medications. Patient was found to be in DKA and possible sepsis which is currently being worked up. She has received minimal relief with fioricet and toradol/compazine/benadryl migraine cocktail. She has had improvement with her headache since admission with treatment of her DKA and septic shock. CTH with no acute intracranial abnormality.     Her headache is likely a secondary headache due to on going medical issues as mentioned and should improve as these are treated. Low suspicion for meningitis, but will follow CSF results. Will defer management to ID.     Plan:  # Secondary headache due to ongoing DKA and septic shock  - can alternate IV toradol prn and tylenol prn if kidney/hepatic function remains appropriate; would avoid fioricet due to possible rebound headaches  - headache will improve with treatment of underlying medical problems including DKA and possible infection; it is unlikely headache medications will resolve this secondary headache unless underlying issues are resolved  - low suspicion for meningitis, but will follow CSF results; will defer management/workup to ID who is following    Case to be discussed and staffed with Dr. Disla.    Will continue to follow.  Please call for any questions regarding patient's neurologic care or for any neurologic demise.     Hugo Stewart MD  [facility], PGY-IV

## 2024-04-13 NOTE — NURSING
eICU notified of patient's levophed requirement increasing from 0.02mcg at 0130 without improvements in BP. Patient remains Sinus tach in 110-120s, see flowsheet for vitals.    Patient currently at 0.06mcg; eICU states to continue to titrate up on levophed. Will call back w/ new orders.

## 2024-04-13 NOTE — PROGRESS NOTES
I saw the patient with the resident/fellow and agree with the resident/fellow's findings and plan. In addition to the resident/fellow's documentation, I add the following:     Ms. Andrew is a 33 year old female with PMH of DM1 (on insulin pump and dexcom) and gastric sleeve who presented for headache. She was seen a few days earlier for HA in the ED and given imitrex, then represented for worsening HA. In the ED she was found to be in DKA initially given fluids and started on insulin gtt and transitioned off insuling gtt despite bicarb remaining 14 so gap re-opened and BG increased. Also overnight became hypotensive requiring levophed.     4/13: Patient complaining of HA and neck stiffness with shock without any clear etiology - changed abx from zosyn to vanc/rocephin for meningitis coverage. Will plan for LP, although suspicion for bacterial meningitis relatively low considering afebrile, so will hold off on steroids for now given DKA. If LP neg will plan for CT C/A/P with. POCUS with nl appearing EF and no evidence of obstructive shock - formal TTE pending. Patient initially started on pressors overnight now with increasing requirements up to 0.18 of levophed. Back on DKA protocol but will utilize LR instead of NS to avoid NAGMA. Would not transition back off until bicarb >18. Replace Mg and keep K>4.    ICU Checklist:    Feeding: Ok to eat as tolerated   Analgesia: PRN tylenol and fioricet (stop PRN tylenol to avoid overdose- currently could get up to 6g daily)  Sedation: None  Thromboprophylaxis: Lovenox (hold pending LP)  HOB: Elevated 30 deg   Ulcer ppx: None  Glucose: As per DKA protocol + long acting  SAT/SBT: Not indicated  Bowel: Monitor  Indwelling lines: PIV x 3, may need TLC should her pressor requirements continue to uptrend  De-escalation of abx: Change zosyn to rocephin + vanc, consider adding flagyl for possible intraabdominal source  Fluid balance: +4.6L   PT/OT: Consult  GOC/Family discussion: LUCY    Dispo: ICU    Reina Casiano MD  [facility] Attending  Cell: [phone]  04/13/2024 10:42 AM    Critical Care time was spent validating the history and physical exam, reviewing the lab and imaging results, and discussing the care of the patient with the bedside nurse and the patient and/or surrogates. This critical care time did not overlap with that of any other provider or involve time for any procedures.  This patient has a high probability of sudden clinically significant deterioration which requires the highest level of physician preparedness to intervene urgently. I managed/supervised life or organ supporting interventions that required frequent physician assessments. I devoted my full attention in the ICU to the direct care of this patient for this period of time. Organ systems which are failing and require intensive, critical care support are: Neuro, cards, GI, renal   Critical Care time: 106 minutes

## 2024-04-13 NOTE — PROGRESS NOTES
Pharmacokinetic Initial Assessment: IV Vancomycin    Assessment/Plan:    Initiate intravenous vancomycin with loading dose of 1500 mg once with subsequent doses when random concentrations are less than 20 mcg/mL  Desired empiric serum trough concentration is 15 to 20 mcg/mL  Draw vancomycin random level on 4/14/24 at 1100.  Pharmacy will continue to follow and monitor vancomycin.      Please contact pharmacy at extension [phone] with any questions regarding this assessment.     Thank you for the consult,   Delon Sánchez, NathenD, [facility]  04/13/2024   11:07 AM         Patient brief summary:  Anusha Andrew is a 33 y.o. female initiated on antimicrobial therapy with IV Vancomycin for treatment of suspected meningitis    Drug Allergies:   Review of patient's allergies indicates:   Allergen Reactions    No known allergies        Actual Body Weight:   79 kg    Renal Function:   Estimated Creatinine Clearance: 57.9 mL/min (based on SCr of 1.4 mg/dL).,     Dialysis Method (if applicable):  N/A    CBC (last 72 hours):  Recent Labs   Lab Result Units 04/12/24  1325 04/13/24  0452   WBC K/uL 10.09 15.70*   Hemoglobin g/dL 12.1 11.9*   Hematocrit % 36.1* 37.4   Platelets K/uL 449 442   Gran % % 76.7* 82.3*   Lymph % % 17.7* 8.9*   Mono % % 4.6 8.2   Eosinophil % % 0.5 0.0   Basophil % % 0.3 0.2   Differential Method  Automated Automated       Metabolic Panel (last 72 hours):  Recent Labs   Lab Result Units 04/12/24  1511 04/12/24  1525 04/12/24  1817 04/12/24  2356 04/13/24  0452 04/13/24  0739   Sodium mmol/L  --  135* 137 135*  --  132*   Potassium mmol/L  --  4.6 3.9 4.5  --  5.1   Chloride mmol/L  --  99 109 109  --  104   CO2 mmol/L  --  19* 14* 14*  --  9*   Glucose mg/dL  --  481* 297* 253*  --  524*   Glucose, UA  1+*  --   --   --   --   --    BUN mg/dL  --  13 15 16  --  22*   Creatinine mg/dL  --  1.0 0.9 0.8  --  1.4   Albumin g/dL  --  4.0  --   --   --   --    Total Bilirubin mg/dL  --  0.7  --   --   --   --   "  Alkaline Phosphatase U/L  --  109  --   --   --   --    AST U/L  --  24  --   --   --   --    ALT U/L  --  27  --   --   --   --    Magnesium mg/dL  --  1.9  --   --  1.8  --    Phosphorus mg/dL  --   --   --   --  4.1  --        Drug levels (last 3 results):  No results for input(s): "VANCOMYCINRA", "VANCORANDOM", "VANCOMYCINPE", "VANCOPEAK", "VANCOMYCINTR", "VANCOTROUGH" in the last 72 hours.    Microbiologic Results:  Microbiology Results (last 7 days)       Procedure Component Value Units Date/Time    Blood culture [[id number]]     Order Status: Sent Specimen: Blood     Blood culture [[id number]]     Order Status: Canceled Specimen: Blood     Nasal culture [[id number]] Collected: 04/13/24 0826    Order Status: Sent Specimen: Nasopharyngeal from Nose Updated: 04/13/24 0945    Blood culture [[id number]] Collected: 04/12/24 2147    Order Status: Completed Specimen: Blood from Peripheral, Antecubital, Left Updated: 04/13/24 0915     Blood Culture, Routine No Growth to date    Blood culture [[id number]] Collected: 04/12/24 2146    Order Status: Completed Specimen: Blood from Peripheral, Antecubital, Right Updated: 04/13/24 0915     Blood Culture, Routine No Growth to date    Influenza A & B by Molecular [[id number]] Collected: 04/12/24 1332    Order Status: Completed Specimen: Nasopharyngeal Swab Updated: 04/12/24 1408     Influenza A, Molecular Negative     Influenza B, Molecular Negative     Flu A & B Source NP           "

## 2024-04-13 NOTE — PLAN OF CARE
Problem: Adult Inpatient Plan of Care  Goal: Plan of Care Review  Outcome: Ongoing, Progressing  Goal: Patient-Specific Goal (Individualized)  Outcome: Ongoing, Progressing     Problem: Diabetic Ketoacidosis  Goal: Fluid and Electrolyte Balance with Absence of Ketosis  Outcome: Ongoing, Progressing

## 2024-04-13 NOTE — H&P
"[facility] Medicine  History & Physical    Patient Name: Anusha Andrew  MRN: [id number]  Patient Class: OP- Observation  Admission Date: 4/12/2024  Attending Physician: Ed Aleman MD   Primary Care Provider: Magaly Muhammad NP         Patient information was obtained from patient, past medical records, and ER records.     Subjective:     Principal Problem:DKA (diabetic ketoacidosis)    Chief Complaint:   Chief Complaint   Patient presents with    Headache     Headache x 1 week. N/V, photosensitive, phonosensitive. Taken ibuprofen, tylenol. Fiorcet, goody powder without relief.        HPI:   Anusha Andrew is a 33 yr old female w/ PMH HLD, depression, HTN, and DMT1. She presents to the ER with reports of headache. Pt states she was seen at [facility] and given shot of Imitrex. Pt states the headache has not improved. Denies fever, rash, or neck stiffness. Nausea and vomiting reported. Pt states "when I get headaches like this, I usually respond well to Fioricet". Pt reported some photosensitivity however states it has improved. She states the headache did improve from 9/10 to 6/10 after initial ED treatment. She describes it as aching, pressure starting in her neck and spreading up over her head. No thunderclap sensation or associated vision changes. She reports a couple of weeks ago she had flu symptoms but that those have mostly resolved. Per chart review she was diagnosed with Flu B and treated with Tamiflu on 3/28. In the ED patient also noted to be in DKA with AG 17, , beta 3.1, and metabolic acidosis on VBG.    Past Medical History:   Diagnosis Date    Depression     Hyperlipidemia     Hypertension     Mild nonproliferative diabetic retinopathy of both eyes without macular edema associated with type 1 diabetes mellitus 3/30/2021    Type I (juvenile type) diabetes mellitus without mention of complication, uncontrolled 11/23/2011       Past Surgical History:   Procedure " Laterality Date    ABCESS DRAINAGE      boil went over GA    CARPAL TUNNEL RELEASE Left 2022    Procedure: RELEASE, CARPAL TUNNEL;  Surgeon: Sekou Rojo Jr., MD;  Location: [facility] OR;  Service: Orthopedics;  Laterality: Left;    CARPAL TUNNEL RELEASE Right 3/28/2023    Procedure: RELEASE, CARPAL TUNNEL;  Surgeon: Sekou Rojo Jr., MD;  Location: [facility] OR;  Service: Orthopedics;  Laterality: Right;     SECTION  2012    DILATION AND CURETTAGE OF UTERUS  2019    Procedure: DILATION AND CURETTAGE, UTERUS;  Surgeon: Purnima Kidd MD;  Location: [facility] OR;  Service: OB/GYN;;    DILATION AND CURETTAGE OF UTERUS N/A 2023    Procedure: DILATION AND CURETTAGE, UTERUS;  Surgeon: Purnima Kidd MD;  Location: [facility] OR;  Service: OB/GYN;  Laterality: N/A;    ENDOMETRIAL ABLATION N/A 2023    Procedure: ABLATION, ENDOMETRIUM;  Surgeon: Purnima Kidd MD;  Location: [facility] OR;  Service: OB/GYN;  Laterality: N/A;    HYSTEROSCOPY N/A 2019    Procedure: HYSTEROSCOPY;  Surgeon: Purnima Kidd MD;  Location: [facility] OR;  Service: OB/GYN;  Laterality: N/A;    HYSTEROSCOPY N/A 2023    Procedure: HYSTEROSCOPY;  Surgeon: Purnima Kidd MD;  Location: [facility] OR;  Service: OB/GYN;  Laterality: N/A;    LAPAROSCOPIC SALPINGECTOMY Bilateral 2023    Procedure: SALPINGECTOMY, LAPAROSCOPIC;  Surgeon: Purnima Kidd MD;  Location: [facility] OR;  Service: OB/GYN;  Laterality: Bilateral;    TONSILLECTOMY, ADENOIDECTOMY      TRIGGER FINGER RELEASE Left 2022    Procedure: RELEASE, TRIGGER FINGER;  Surgeon: Sekou Rojo Jr., MD;  Location: [facility] OR;  Service: Orthopedics;  Laterality: Left;    TRIGGER FINGER RELEASE Right 3/28/2023    Procedure: RELEASE, TRIGGER FINGER; MIDDLE FINGER;  Surgeon: Sekou Rojo Jr., MD;  Location: [facility] OR;  Service: Orthopedics;  Laterality: Right;    WISDOM TOOTH EXTRACTION         Review of patient's allergies indicates:   Allergen Reactions    No known allergies   "      No current facility-administered medications on file prior to encounter.     Current Outpatient Medications on File Prior to Encounter   Medication Sig    ergocalciferol (VITAMIN D2) 50,000 unit Cap Take 1 capsule (50,000 Units total) by mouth every 7 days.    FLUoxetine 20 MG capsule Take 1 capsule (20 mg total) by mouth 2 (two) times daily.    fluticasone propionate (FLONASE) 50 mcg/actuation nasal spray 1 spray (50 mcg total) by Each Nostril route 2 (two) times daily as needed.    glucagon (BAQSIMI) 3 mg/actuation Spry Give one puff via nostril. Hold device between fingers and thumb, do not push plunger yet, insert tip gently into one nostril until finger(s) touch the outside of the nose, then push plunger firmly all the way in . Dose is complete when the green line disappears.    ibuprofen (ADVIL,MOTRIN) 800 MG tablet Take 1 tablet (800 mg total) by mouth every 6 (six) hours as needed for Pain.    insulin pump cart,automated,BT (OMNIPOD 5 G6 PODS, GEN 5,) Crtg Inject 1 Device into the skin Every 3 (three) days.    semaglutide (OZEMPIC) 0.25 mg or 0.5 mg (2 mg/3 mL) pen injector Inject 0.25 mg into the skin every 7 days. ICD 10: E11.9    BD EBEN 2ND GEN PEN NEEDLE 32 gauge x 5/32" Ndle To use with insulin injections 4 times per day    blood sugar diagnostic Strp To check BG 4  times daily, to use with insurance preferred meter, e 10.65 (Patient taking differently: To check BG 4  times daily, to use with insurance preferred meter, e 10.65)    blood-glucose sensor (DEXCOM G6 SENSOR) Saida 1 sensor every 10 days    blood-glucose transmitter (DEXCOM G6 TRANSMITTER) Saida 1 transmitter every 3 months    hydrocortisone (ANUSOL-HC) 2.5 % rectal cream Place rectally 2 (two) times daily.    insulin aspart, niacinamide, (FIASP FLEXTOUCH U-100 INSULIN) 100 unit/mL (3 mL) InPn use three times daily before meals. Inject ICR 1:8 + snacks + correction scale. Max TDD of 50 units (Patient not taking: Reported on 4/12/2024)    " insulin aspart, niacinamide, (FIASP U-100 INSULIN) 100 unit/mL Soln To use continuously with insulin pump. Max TDD of 100 units (Patient not taking: Reported on 4/12/2024)    insulin syringe-needle U-100 0.5 mL 31 gauge x 5/16 Syrg use with admelog    polyethylene glycol (GLYCOLAX) 17 gram/dose powder Take 17 g by mouth once daily.     Family History       Problem Relation (Age of Onset)    Diabetes Brother    Hydrocephalus Daughter    No Known Problems Mother, Father, Brother, Brother, Maternal Aunt, Maternal Uncle, Paternal Aunt, Paternal Uncle, Maternal Grandmother, Maternal Grandfather, Paternal Grandmother, Paternal Grandfather          Tobacco Use    Smoking status: Never    Smokeless tobacco: Never   Substance and Sexual Activity    Alcohol use: Yes     Comment: occasionally    Drug use: No    Sexual activity: Yes     Partners: Male     Birth control/protection: Injection     Review of Systems   Constitutional:  Positive for fatigue. Negative for chills and fever.   HENT:  Negative for congestion, sinus pressure and sore throat.    Eyes:  Positive for photophobia. Negative for visual disturbance.   Respiratory:  Negative for shortness of breath.    Cardiovascular:  Negative for chest pain.   Gastrointestinal:  Positive for nausea and vomiting. Negative for abdominal pain.   Genitourinary:  Negative for dysuria.   Musculoskeletal:  Positive for neck pain (tension). Negative for neck stiffness.   Skin:  Negative for color change.   Neurological:  Negative for dizziness, syncope and light-headedness.   Psychiatric/Behavioral:  Negative for agitation and confusion. The patient is not nervous/anxious.      Objective:     Vital Signs (Most Recent):  Temp: 98.5 °F (36.9 °C) (04/12/24 1642)  Pulse: (!) 115 (04/12/24 1642)  Resp: 20 (04/12/24 1642)  BP: (!) 98/53 (04/12/24 1642)  SpO2: 98 % (04/12/24 1642) Vital Signs (24h Range):  Temp:  [98.3 °F (36.8 °C)-98.5 °F (36.9 °C)] 98.5 °F (36.9 °C)  Pulse:  [109-115]  115  Resp:  [18-20] 20  SpO2:  [98 %-100 %] 98 %  BP: ()/(53-71) 98/53     Weight: 75.3 kg (166 lb)  Body mass index is 29.41 kg/m².     Physical Exam  Constitutional:       General: She is not in acute distress.     Appearance: She is ill-appearing.   HENT:      Head: Normocephalic and atraumatic.      Mouth/Throat:      Mouth: Mucous membranes are dry.   Eyes:      Extraocular Movements: Extraocular movements intact.      Pupils: Pupils are equal, round, and reactive to light.   Cardiovascular:      Rate and Rhythm: Regular rhythm. Tachycardia present.      Pulses: Normal pulses.      Heart sounds: Normal heart sounds.   Pulmonary:      Effort: Pulmonary effort is normal. No respiratory distress.      Breath sounds: Normal breath sounds. No wheezing, rhonchi or rales.   Abdominal:      General: Bowel sounds are normal. There is no distension.      Palpations: Abdomen is soft.      Tenderness: There is no abdominal tenderness. There is no guarding or rebound.   Musculoskeletal:      Cervical back: No rigidity.      Right lower leg: No edema.      Left lower leg: No edema.   Skin:     General: Skin is warm.   Neurological:      Mental Status: She is alert and oriented to person, place, and time.   Psychiatric:         Mood and Affect: Mood normal.         Behavior: Behavior normal.         Thought Content: Thought content normal.         Judgment: Judgment normal.              CRANIAL NERVES     CN III, IV, VI   Pupils are equal, round, and reactive to light.       Significant Labs: All pertinent labs within the past 24 hours have been reviewed.    Significant Imaging: I have reviewed all pertinent imaging results/findings within the past 24 hours.  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, dehydration (previous trigger), and poor sleep  Patient given 7U IV insulin and 2L  NS with improvement in BG but still in DKA per BMP  Repeat BMP in process, if patient continues to be in DKA, will start insulin gtt  Otherwise, will start long-acting, prandial, and SSI for BG control  Continue IVF        Intractable headache  Ongoing x5-6 days  Described as aching, pressure starting in her neck and spreading up over her head  Initially 9/10, down to 6/10 after benadryl, Toradol, and compazine  States she had a dose of sumatriptan 4/11 w/o improvement  Also states Fioricet usually works but not this time   Given that sx sound like a mixture between a tension H/A and migraine w/ some improvement w/ toradol, will repeat toradol with       Insulin pump in place  Patient reports she will run out of insulin tonight, will d/c insulin pump and treat with long-acting, prandial, and SSI   Patient has supplies at home to refill pump after discharge      Depression  Patient has persistent depression which is moderate and is currently controlled. Will Continue anti-depressant medications. We will not consult psychiatry at this time. Patient does not display psychosis at this time. Continue to monitor closely and adjust plan of care as needed.            VTE Risk Mitigation (From admission, onward)           Ordered     IP VTE LOW RISK PATIENT  Once         04/12/24 1745     Place sequential compression device  Until discontinued         04/12/24 1745                         On 04/12/2024, patient should be placed in hospital observation services under my care in collaboration with Ed Aleman MD.           Maura Lynn NP  Department of Hospital Medicine  [facility] - Emergency Dept

## 2024-04-13 NOTE — EICU
e-ICU admit note    Reason for ICU admission:    DKA    Sepsis    HPI:    33 diabetic female c/o headache    Received Imitrex at [facility] with minimal result    Diagnosed with Flu B and treated with Tamiflu on 3/28.    ED workup revealed DKA    w/ PMH HLD, depression, HTN, and DMT1. She pre       Pmx:    DM  HTN  HLD  depression    In ED:    Home meds:    Fluoxetine  Insulin pump  Aspart insulin tid    Significant labs:    Glucose 481  AG 17  B-hydroxy 3    Significant imaging:    CT head:  Ventricles and sulci are normal in size for age without evidence of hydrocephalus. No extra-axial blood or fluid collections. The brain parenchyma is normal. No parenchymal mass, hemorrhage, edema or major vascular distribution infarct.       ECG:    Pt viewed at3:05 am    Assessment/Plan:    DKA  Protocol for fluids, lytes, insulin    Hypotension  Although no clear signs of infection given the 2 L she received and continued hypotension Norepi stated and Zosyn empirically    DVT px  enoxaparin

## 2024-04-13 NOTE — ED NOTES
Pt moved to er 14 for levo drip with central monitoring.  Report to salvador ZUNIGA.  Ns at 125 started via pump.

## 2024-04-13 NOTE — ED NOTES
Attempted to call report to inpatient unit. Line continues to ring without answer. Will try again in a few minutes

## 2024-04-13 NOTE — ASSESSMENT & PLAN NOTE
Patient has persistent depression which is moderate and is currently controlled. Will Continue anti-depressant medications. We will not consult psychiatry at this time. Patient does not display psychosis at this time. Continue to monitor closely and adjust plan of care as needed.

## 2024-04-13 NOTE — PROGRESS NOTES
"[facility] Medicine  Progress Note    Patient Name: Anusha Andrew  MRN: [id number]  Patient Class: IP- Inpatient   Admission Date: 4/12/2024  Length of Stay: 1 days  Attending Physician: Ed Aleman MD  Primary Care Provider: Magaly Muhammad NP        Subjective:     Principal Problem:DKA (diabetic ketoacidosis)        HPI:  Anusha Andrew is a 33 yr old female w/ PMH HLD, depression, HTN, and DMT1. She presents to the ER with reports of headache. Pt states she was seen at [facility] and given shot of Imitrex. Pt states the headache has not improved. Denies fever, rash, or neck stiffness. Nausea and vomiting reported. Pt states "when I get headaches like this, I usually respond well to Fioricet". Pt reported some photosensitivity however states it has improved. She states the headache did improve from 9/10 to 6/10 after initial ED treatment. She describes it as aching, pressure starting in her neck and spreading up over her head. No thunderclap sensation or associated vision changes. She reports a couple of weeks ago she had flu symptoms but that those have mostly resolved. Per chart review she was diagnosed with Flu B and treated with Tamiflu on 3/28. In the ED patient also noted to be in DKA with AG 17, , beta 3.1, and metabolic acidosis on VBG.    Overview/Hospital Course:  No notes on file    Interval History:   Patient reports headache is improved but still somewhat persistent  Reports photophobia   No fever chills  Patient has no other complaints    Review of Systems   Constitutional:  Negative for chills and fever.   Respiratory:  Negative for cough and shortness of breath.    Cardiovascular:  Negative for chest pain.   Gastrointestinal:  Negative for abdominal pain, constipation, diarrhea, nausea and vomiting.   Genitourinary:  Negative for difficulty urinating and dysuria.   Musculoskeletal:  Negative for neck stiffness.   Neurological:  Positive for headaches. Negative " for dizziness and weakness.   Psychiatric/Behavioral:  Negative for agitation and confusion.      Objective:     Vital Signs (Most Recent):  Temp: 98.6 °F (37 °C) (04/13/24 1110)  Pulse: (!) 112 (04/13/24 1330)  Resp: 20 (04/13/24 1330)  BP: (!) 95/51 (04/13/24 1330)  SpO2: 100 % (04/13/24 1330) Vital Signs (24h Range):  Temp:  [97.6 °F (36.4 °C)-98.8 °F (37.1 °C)] 98.6 °F (37 °C)  Pulse:  [100-133] 112  Resp:  [6-42] 20  SpO2:  [97 %-100 %] 100 %  BP: ()/(39-60) 95/51     Weight: 79 kg (174 lb 2.6 oz)  Body mass index is 30.86 kg/m².    Intake/Output Summary (Last 24 hours) at 4/13/2024 1419  Last data filed at 4/13/2024 1300  Gross per 24 hour   Intake 6607.33 ml   Output 380 ml   Net 6227.33 ml         Physical Exam  Constitutional:       General: She is not in acute distress.     Appearance: She is not ill-appearing or toxic-appearing.   HENT:      Mouth/Throat:      Mouth: Mucous membranes are moist.      Pharynx: Oropharynx is clear.   Eyes:      Extraocular Movements: Extraocular movements intact.      Conjunctiva/sclera: Conjunctivae normal.   Cardiovascular:      Rate and Rhythm: Tachycardia present.      Heart sounds: Normal heart sounds.   Pulmonary:      Effort: Pulmonary effort is normal. No respiratory distress.      Breath sounds: No wheezing or rhonchi.   Abdominal:      General: There is no distension.      Palpations: Abdomen is soft.      Tenderness: There is no abdominal tenderness. There is no guarding.   Musculoskeletal:         General: Normal range of motion.      Cervical back: Normal range of motion. No rigidity.      Right lower leg: No edema.      Left lower leg: No edema.   Skin:     General: Skin is warm and dry.   Neurological:      General: No focal deficit present.      Mental Status: She is alert and oriented to person, place, and time.   Psychiatric:         Mood and Affect: Mood normal.         Behavior: Behavior normal.             Significant Labs: All pertinent labs  within the past 24 hours have been reviewed.    Significant Imaging: I have reviewed all pertinent imaging results/findings within the past 24 hours.    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  Vancomycin     Nonintractable headache  Ongoing x5-6 days  Described as aching, pressure starting in her neck and spreading up over her head  Initially 9/10, down to 6/10 after benadryl, Toradol, and compazine  States she had a dose of sumatriptan 4/11 w/o improvement  Also states Fioricet usually works but not this time  No meningeal signs on exam    Plan:  P.r.n. analgesics   Nephrology consulted      Insulin pump in place  Patient reports she will run out of insulin tonight, will d/c insulin pump and treat with long-acting, prandial, and SSI   Patient has supplies at home to refill pump after discharge      Depression  Patient has persistent depression which is moderate and is currently controlled. Will Continue anti-depressant medications. We will not consult psychiatry at this time. Patient does not display psychosis at this time. Continue to monitor closely and adjust plan of care as needed.          Hyperglycemia due to diabetes mellitus          VTE Risk Mitigation (From admission, onward)           Ordered     enoxaparin injection 40 mg  Every 24 hours         04/13/24 1328     IP VTE HIGH RISK PATIENT  Once         04/13/24 1327     Place sequential compression device  Until discontinued          04/12/24 1745                    Discharge Planning   RYAN:      Code Status: Full Code   Is the patient medically ready for discharge?:     Reason for patient still in hospital (select all that apply): Patient trending condition, Laboratory test, and Treatment               Critical care time spent on the evaluation and treatment of severe organ dysfunction, review of pertinent labs and imaging studies, discussions with consulting providers and discussions with patient/family: 30 minutes.      Ed Aleman MD  Department of Hospital Medicine   [place] - Intensive Care

## 2024-04-13 NOTE — PLAN OF CARE
Adult Inpatient Plan of Care  Goal: Plan of Care Review  Outcome: Ongoing, Progressing     Patient is awake, alert and oriented--- drowsy at times. PRN medications given for pain and nausea. Remains afebrile. Sinus tachycardia noted throughout the shift. Levophed drip titrated to maintain MAP >65. Insulin drip continued per order with D5LR infusing at 150ml/hr. Hourly POCT glucose monitoring and Q4hr BMPs. Lumbar puncture completed today. Patient able to tolerate ambulating to bedside commode without difficulty. Repositioned independently with minimal assistance. Safety precautions in place. Plan of care reviewed with patient and family.

## 2024-04-14 PROBLEM — R91.1 PULMONARY NODULE: Status: ACTIVE | Noted: 2024-04-14

## 2024-04-14 PROBLEM — R18.8 PELVIC ASCITES: Status: ACTIVE | Noted: 2024-04-14

## 2024-04-14 LAB
ANION GAP SERPL CALC-SCNC: 10 MMOL/L (ref 8–16)
ANION GAP SERPL CALC-SCNC: 7 MMOL/L (ref 8–16)
ANION GAP SERPL CALC-SCNC: 9 MMOL/L (ref 8–16)
BASOPHILS # BLD AUTO: 0.03 K/UL (ref 0–0.2)
BASOPHILS NFR BLD: 0.2 % (ref 0–1.9)
BUN SERPL-MCNC: 14 MG/DL (ref 6–20)
BUN SERPL-MCNC: 15 MG/DL (ref 6–20)
BUN SERPL-MCNC: 16 MG/DL (ref 6–20)
BUN SERPL-MCNC: 16 MG/DL (ref 6–20)
BUN SERPL-MCNC: 17 MG/DL (ref 6–20)
C GATTII+NEOFOR DNA CSF QL NAA+NON-PROBE: NOT DETECTED
CALCIUM SERPL-MCNC: 7.9 MG/DL (ref 8.7–10.5)
CALCIUM SERPL-MCNC: 8 MG/DL (ref 8.7–10.5)
CALCIUM SERPL-MCNC: 8.2 MG/DL (ref 8.7–10.5)
CHLORIDE SERPL-SCNC: 108 MMOL/L (ref 95–110)
CHLORIDE SERPL-SCNC: 109 MMOL/L (ref 95–110)
CHLORIDE SERPL-SCNC: 110 MMOL/L (ref 95–110)
CMV DNA CSF QL NAA+NON-PROBE: NOT DETECTED
CO2 SERPL-SCNC: 18 MMOL/L (ref 23–29)
CO2 SERPL-SCNC: 19 MMOL/L (ref 23–29)
CREAT SERPL-MCNC: 0.9 MG/DL (ref 0.5–1.4)
CREAT SERPL-MCNC: 0.9 MG/DL (ref 0.5–1.4)
CREAT SERPL-MCNC: 1 MG/DL (ref 0.5–1.4)
DIFFERENTIAL METHOD BLD: ABNORMAL
E COLI K1 DNA CSF QL NAA+NON-PROBE: NOT DETECTED
EOSINOPHIL # BLD AUTO: 0 K/UL (ref 0–0.5)
EOSINOPHIL NFR BLD: 0.1 % (ref 0–8)
ERYTHROCYTE [DISTWIDTH] IN BLOOD BY AUTOMATED COUNT: 13.2 % (ref 11.5–14.5)
EST. GFR  (NO RACE VARIABLE): >60 ML/MIN/1.73 M^2
EV RNA CSF QL NAA+NON-PROBE: NOT DETECTED
GLUCOSE SERPL-MCNC: 113 MG/DL (ref 70–110)
GLUCOSE SERPL-MCNC: 57 MG/DL (ref 70–110)
GLUCOSE SERPL-MCNC: 57 MG/DL (ref 70–110)
GLUCOSE SERPL-MCNC: 90 MG/DL (ref 70–110)
GLUCOSE SERPL-MCNC: 95 MG/DL (ref 70–110)
GP B STREP DNA CSF QL NAA+NON-PROBE: NOT DETECTED
HAEM INFLU DNA CSF QL NAA+NON-PROBE: NOT DETECTED
HAEM INFLU DNA CSF QL NAA+NON-PROBE: NOT DETECTED
HCT VFR BLD AUTO: 33.2 % (ref 37–48.5)
HGB BLD-MCNC: 10.7 G/DL (ref 12–16)
HHV6 DNA CSF QL NAA+NON-PROBE: NOT DETECTED
HSV1 DNA CSF QL NAA+NON-PROBE: NOT DETECTED
HSV2 DNA CSF QL NAA+NON-PROBE: NOT DETECTED
IMM GRANULOCYTES # BLD AUTO: 0.06 K/UL (ref 0–0.04)
IMM GRANULOCYTES NFR BLD AUTO: 0.4 % (ref 0–0.5)
LIPASE SERPL-CCNC: 19 U/L (ref 4–60)
LYMPHOCYTES # BLD AUTO: 3.6 K/UL (ref 1–4.8)
LYMPHOCYTES NFR BLD: 22.3 % (ref 18–48)
MAGNESIUM SERPL-MCNC: 2 MG/DL (ref 1.6–2.6)
MCH RBC QN AUTO: 28.4 PG (ref 27–31)
MCHC RBC AUTO-ENTMCNC: 32.2 G/DL (ref 32–36)
MCV RBC AUTO: 88 FL (ref 82–98)
MONOCYTES # BLD AUTO: 1.3 K/UL (ref 0.3–1)
MONOCYTES NFR BLD: 8.3 % (ref 4–15)
N MEN DNA CSF QL NAA+NON-PROBE: NOT DETECTED
NEUTROPHILS # BLD AUTO: 10.9 K/UL (ref 1.8–7.7)
NEUTROPHILS NFR BLD: 68.7 % (ref 38–73)
NRBC BLD-RTO: 0 /100 WBC
PARECHOVIRUS A RNA CSF QL NAA+NON-PROBE: NOT DETECTED
PHOSPHATE SERPL-MCNC: 2.8 MG/DL (ref 2.7–4.5)
PLATELET # BLD AUTO: 397 K/UL (ref 150–450)
PMV BLD AUTO: 9.4 FL (ref 9.2–12.9)
POCT GLUCOSE: 103 MG/DL (ref 70–110)
POCT GLUCOSE: 111 MG/DL (ref 70–110)
POCT GLUCOSE: 133 MG/DL (ref 70–110)
POCT GLUCOSE: 172 MG/DL (ref 70–110)
POCT GLUCOSE: 56 MG/DL (ref 70–110)
POCT GLUCOSE: 69 MG/DL (ref 70–110)
POCT GLUCOSE: 90 MG/DL (ref 70–110)
POTASSIUM SERPL-SCNC: 3.5 MMOL/L (ref 3.5–5.1)
POTASSIUM SERPL-SCNC: 3.9 MMOL/L (ref 3.5–5.1)
POTASSIUM SERPL-SCNC: 3.9 MMOL/L (ref 3.5–5.1)
POTASSIUM SERPL-SCNC: 4.1 MMOL/L (ref 3.5–5.1)
POTASSIUM SERPL-SCNC: 4.3 MMOL/L (ref 3.5–5.1)
RBC # BLD AUTO: 3.77 M/UL (ref 4–5.4)
S PNEUM DNA CSF QL NAA+NON-PROBE: NOT DETECTED
SODIUM SERPL-SCNC: 136 MMOL/L (ref 136–145)
SODIUM SERPL-SCNC: 137 MMOL/L (ref 136–145)
SODIUM SERPL-SCNC: 137 MMOL/L (ref 136–145)
VANCOMYCIN SERPL-MCNC: 4.2 UG/ML
VZV DNA CSF QL NAA+NON-PROBE: NOT DETECTED
WBC # BLD AUTO: 15.89 K/UL (ref 3.9–12.7)

## 2024-04-14 PROCEDURE — 83735 ASSAY OF MAGNESIUM: CPT | Performed by: NURSE PRACTITIONER

## 2024-04-14 PROCEDURE — 36415 COLL VENOUS BLD VENIPUNCTURE: CPT | Performed by: STUDENT IN AN ORGANIZED HEALTH CARE EDUCATION/TRAINING PROGRAM

## 2024-04-14 PROCEDURE — 25000003 PHARM REV CODE 250

## 2024-04-14 PROCEDURE — 25000003 PHARM REV CODE 250: Performed by: STUDENT IN AN ORGANIZED HEALTH CARE EDUCATION/TRAINING PROGRAM

## 2024-04-14 PROCEDURE — 85025 COMPLETE CBC W/AUTO DIFF WBC: CPT | Performed by: NURSE PRACTITIONER

## 2024-04-14 PROCEDURE — 80202 ASSAY OF VANCOMYCIN: CPT | Performed by: HOSPITALIST

## 2024-04-14 PROCEDURE — 94761 N-INVAS EAR/PLS OXIMETRY MLT: CPT

## 2024-04-14 PROCEDURE — 25000003 PHARM REV CODE 250: Performed by: NURSE PRACTITIONER

## 2024-04-14 PROCEDURE — 83690 ASSAY OF LIPASE: CPT | Performed by: STUDENT IN AN ORGANIZED HEALTH CARE EDUCATION/TRAINING PROGRAM

## 2024-04-14 PROCEDURE — 63600175 PHARM REV CODE 636 W HCPCS: Performed by: STUDENT IN AN ORGANIZED HEALTH CARE EDUCATION/TRAINING PROGRAM

## 2024-04-14 PROCEDURE — 11000001 HC ACUTE MED/SURG PRIVATE ROOM

## 2024-04-14 PROCEDURE — 80048 BASIC METABOLIC PNL TOTAL CA: CPT | Mod: 91 | Performed by: STUDENT IN AN ORGANIZED HEALTH CARE EDUCATION/TRAINING PROGRAM

## 2024-04-14 PROCEDURE — 84100 ASSAY OF PHOSPHORUS: CPT | Performed by: NURSE PRACTITIONER

## 2024-04-14 PROCEDURE — 80048 BASIC METABOLIC PNL TOTAL CA: CPT | Performed by: HOSPITALIST

## 2024-04-14 PROCEDURE — 99232 SBSQ HOSP IP/OBS MODERATE 35: CPT | Mod: ,,, | Performed by: INTERNAL MEDICINE

## 2024-04-14 PROCEDURE — 36415 COLL VENOUS BLD VENIPUNCTURE: CPT | Performed by: HOSPITALIST

## 2024-04-14 RX ORDER — ACETAMINOPHEN 500 MG
1000 TABLET ORAL EVERY 8 HOURS PRN
Status: DISCONTINUED | OUTPATIENT
Start: 2024-04-14 | End: 2024-04-16 | Stop reason: HOSPADM

## 2024-04-14 RX ORDER — NOREPINEPHRINE BITARTRATE/D5W 4MG/250ML
PLASTIC BAG, INJECTION (ML) INTRAVENOUS
Status: COMPLETED
Start: 2024-04-14 | End: 2024-04-14

## 2024-04-14 RX ORDER — INSULIN ASPART 100 [IU]/ML
5 INJECTION, SOLUTION INTRAVENOUS; SUBCUTANEOUS
Status: DISCONTINUED | OUTPATIENT
Start: 2024-04-14 | End: 2024-04-16 | Stop reason: HOSPADM

## 2024-04-14 RX ORDER — INSULIN ASPART 100 [IU]/ML
0-10 INJECTION, SOLUTION INTRAVENOUS; SUBCUTANEOUS EVERY 4 HOURS PRN
Status: DISCONTINUED | OUTPATIENT
Start: 2024-04-14 | End: 2024-04-15

## 2024-04-14 RX ORDER — GLUCAGON 1 MG
1 KIT INJECTION
Status: DISCONTINUED | OUTPATIENT
Start: 2024-04-14 | End: 2024-04-16 | Stop reason: HOSPADM

## 2024-04-14 RX ORDER — NOREPINEPHRINE BITARTRATE/D5W 4MG/250ML
0-.2 PLASTIC BAG, INJECTION (ML) INTRAVENOUS CONTINUOUS
Status: DISCONTINUED | OUTPATIENT
Start: 2024-04-14 | End: 2024-04-14

## 2024-04-14 RX ORDER — INSULIN ASPART 100 [IU]/ML
3 INJECTION, SOLUTION INTRAVENOUS; SUBCUTANEOUS ONCE
Status: COMPLETED | OUTPATIENT
Start: 2024-04-14 | End: 2024-04-14

## 2024-04-14 RX ADMIN — MUPIROCIN: 20 OINTMENT TOPICAL at 08:04

## 2024-04-14 RX ADMIN — FLUOXETINE HYDROCHLORIDE 20 MG: 20 CAPSULE ORAL at 09:04

## 2024-04-14 RX ADMIN — MUPIROCIN: 20 OINTMENT TOPICAL at 09:04

## 2024-04-14 RX ADMIN — ACETAMINOPHEN 1000 MG: 500 TABLET ORAL at 08:04

## 2024-04-14 RX ADMIN — INSULIN ASPART 3 UNITS: 100 INJECTION, SOLUTION INTRAVENOUS; SUBCUTANEOUS at 08:04

## 2024-04-14 RX ADMIN — METRONIDAZOLE 500 MG: 500 TABLET ORAL at 05:04

## 2024-04-14 RX ADMIN — INSULIN ASPART 5 UNITS: 100 INJECTION, SOLUTION INTRAVENOUS; SUBCUTANEOUS at 05:04

## 2024-04-14 RX ADMIN — NOREPINEPHRINE BITARTRATE 0.1 MCG/KG/MIN: 4 INJECTION, SOLUTION INTRAVENOUS at 02:04

## 2024-04-14 RX ADMIN — Medication 0.1 MCG/KG/MIN: at 02:04

## 2024-04-14 RX ADMIN — FLUOXETINE HYDROCHLORIDE 20 MG: 20 CAPSULE ORAL at 08:04

## 2024-04-14 RX ADMIN — ACETAMINOPHEN 1000 MG: 500 TABLET ORAL at 05:04

## 2024-04-14 RX ADMIN — INSULIN ASPART 5 UNITS: 100 INJECTION, SOLUTION INTRAVENOUS; SUBCUTANEOUS at 12:04

## 2024-04-14 RX ADMIN — CEFTRIAXONE SODIUM 2 G: 2 INJECTION, POWDER, FOR SOLUTION INTRAMUSCULAR; INTRAVENOUS at 11:04

## 2024-04-14 RX ADMIN — ENOXAPARIN SODIUM 40 MG: 40 INJECTION SUBCUTANEOUS at 05:04

## 2024-04-14 NOTE — PLAN OF CARE
Problem: Adult Inpatient Plan of Care  Goal: Plan of Care Review  Outcome: Ongoing, Progressing  Goal: Patient-Specific Goal (Individualized)  Outcome: Ongoing, Progressing  Goal: Optimal Comfort and Wellbeing  Outcome: Ongoing, Progressing     Problem: Diabetic Ketoacidosis  Goal: Fluid and Electrolyte Balance with Absence of Ketosis  Outcome: Ongoing, Progressing     Problem: Diabetes Comorbidity  Goal: Blood Glucose Level Within Targeted Range  Outcome: Ongoing, Progressing     Problem: Glycemic Control Impaired (Sepsis/Septic Shock)  Goal: Blood Glucose Level Within Desired Range  Outcome: Ongoing, Progressing

## 2024-04-14 NOTE — SUBJECTIVE & OBJECTIVE
Interval History: No acuter events. Weaning off pressors    Review of Systems   Constitutional:  Negative for chills and fever.   Respiratory:  Negative for cough and shortness of breath.    Cardiovascular:  Negative for chest pain.   Gastrointestinal:  Negative for abdominal pain, constipation, diarrhea, nausea and vomiting.   Genitourinary:  Negative for difficulty urinating and dysuria.   Musculoskeletal:  Negative for neck stiffness.   Neurological:  Positive for headaches. Negative for dizziness and weakness.   Psychiatric/Behavioral:  Negative for agitation and confusion.      Objective:     Vital Signs (Most Recent):  Temp: 98.5 °F (36.9 °C) (04/14/24 0830)  Pulse: 104 (04/14/24 1030)  Resp: (!) 28 (04/14/24 1030)  BP: 99/63 (04/14/24 1030)  SpO2: 100 % (04/14/24 1030) Vital Signs (24h Range):  Temp:  [97.7 °F (36.5 °C)-98.6 °F (37 °C)] 98.5 °F (36.9 °C)  Pulse:  [] 104  Resp:  [5-39] 28  SpO2:  [97 %-100 %] 100 %  BP: ()/(45-65) 99/63     Weight: 79 kg (174 lb 2.6 oz)  Body mass index is 30.86 kg/m².    Estimated Creatinine Clearance: 90.1 mL/min (based on SCr of 0.9 mg/dL).     Physical Exam  Constitutional:       General: She is not in acute distress.     Appearance: She is not ill-appearing or toxic-appearing.   HENT:      Mouth/Throat:      Mouth: Mucous membranes are moist.      Pharynx: Oropharynx is clear.   Eyes:      Extraocular Movements: Extraocular movements intact.      Conjunctiva/sclera: Conjunctivae normal.   Cardiovascular:      Rate and Rhythm: Normal rate.      Heart sounds: Normal heart sounds.   Pulmonary:      Effort: Pulmonary effort is normal. No respiratory distress.      Breath sounds: No wheezing or rhonchi.   Abdominal:      General: There is no distension.      Palpations: Abdomen is soft.      Tenderness: There is no abdominal tenderness. There is no guarding.   Musculoskeletal:         General: Normal range of motion.      Cervical back: Normal range of motion. No  rigidity.      Right lower leg: No edema.      Left lower leg: No edema.   Skin:     General: Skin is warm and dry.   Neurological:      General: No focal deficit present.      Mental Status: She is alert and oriented to person, place, and time.   Psychiatric:         Mood and Affect: Mood normal.         Behavior: Behavior normal.          Significant Labs: All pertinent labs within the past 24 hours have been reviewed.    Significant Imaging: I have reviewed all pertinent imaging results/findings within the past 24 hours.

## 2024-04-14 NOTE — EICU
Pt is on Insulni qtt for reopened AG, already on lantus  8 Units bid, dose given at 21.00. AB cloxed x 2, bicarb now 19, BG 90.  Stop Insulin qtt and IVF. Start on SSI with q4h checks till am.  D/w NADIA

## 2024-04-14 NOTE — PROGRESS NOTES
"Consult Note  [facility]    Attending: Dr. Sanchez  Fellow: Dr. Mac  Admit Date: 4/12/2024  Today's Date: 04/14/2024  Reason for Consult:  DKA and Hypotension requiring Levophed    SUBJECTIVE:     Overnight, no acute events. Transitioned off insulin drip. Norepi weaning down. States she is feeling better this morning, headache improved. Denies SOB.     OBJECTIVE:     Vital Signs Trends/Hx Reviewed  Vitals:    04/14/24 0615 04/14/24 0630 04/14/24 0645 04/14/24 0700   BP: (!) 101/56 (!) 97/50 (!) 99/51 (!) 93/55   BP Location:       Patient Position:       Pulse: 99 101 101 105   Resp: 13 20 17 16   Temp:       TempSrc:       SpO2: 98% 98% 98% 99%   Weight:       Height:           Physical Exam:  General: NAD, cooperative   HEENT: PERRL, EOMI, oral and nasal mucosa moist.   Neck: Full range of motion without tenderness   Cardiac: RRR, no murmurs   Respiratory: Normal inspection. Symmetric chest rise. Auscultation clear bilaterally. No increased work of breathing noted.   Abdomen: Soft, NT/ND. +BS.  Extremities: No edema.   Neuro: Grossly intact to brief exam. Oriented x3 with appropriate mood/affect to situation.       Laboratory:  No results for input(s): "PH", "PCO2", "PO2", "HCO3", "POCSATURATED", "BE" in the last 24 hours.    Recent Labs   Lab 04/14/24  0348   WBC 15.89*   RBC 3.77*   HGB 10.7*   HCT 33.2*      MCV 88   MCH 28.4   MCHC 32.2     Recent Labs   Lab 04/14/24  0348     137   K 3.9  3.9     109   CO2 18*  18*   BUN 16  16   CREATININE 1.0  1.0   CALCIUM 8.2*  8.2*   MG 2.0       Microbiology Data:   Microbiology Results (last 7 days)       Procedure Component Value Units Date/Time    Blood culture [[id number]] Collected: 04/12/24 2147    Order Status: Completed Specimen: Blood from Peripheral, Antecubital, Left Updated: 04/14/24 0613     Blood Culture, Routine No Growth to date      No Growth to date    Blood culture [[id number]] Collected: " 04/12/24 2146    Order Status: Completed Specimen: Blood from Peripheral, Antecubital, Right Updated: 04/14/24 0613     Blood Culture, Routine No Growth to date      No Growth to date    CSF culture [[id number]] Collected: 04/13/24 1255    Order Status: Completed Specimen: CSF (Spinal Fluid) from CSF Tap, Tube 1 Updated: 04/14/24 0535     CSF CULTURE No Growth to date     Gram Stain Result Cytospin indicates:      No WBC's      No organisms seen    Blood culture [[id number]] Collected: 04/13/24 1110    Order Status: Completed Specimen: Blood Updated: 04/14/24 0345     Blood Culture, Routine No Growth to date    Nasal culture [[id number]] Collected: 04/13/24 0826    Order Status: Sent Specimen: Nasopharyngeal from Nose Updated: 04/13/24 2029    Blood culture [[id number]]     Order Status: Canceled Specimen: Blood     Influenza A & B by Molecular [[id number]] Collected: 04/12/24 1332    Order Status: Completed Specimen: Nasopharyngeal Swab Updated: 04/12/24 1408     Influenza A, Molecular Negative     Influenza B, Molecular Negative     Flu A & B Source NP             Chest Imaging:   No new imaging.         ASSESSMENT       34 yo F with h/o T1DM on insulin pump at home, tension headaches, h/o gastric sleeve, prior mesenteric vein thrombosis (2022) who presented to the ED with headaches found to have DKA and undifferentiated shock and admitted to the ICU. Septic workup unremarkable, LP bland, vasopressor requirements decreasing. Transitioned off insulin drip overnight, feeling improved today.     RECOMMENDATIONS     CNS/Neuro:  Headache, improved   - persistent headache for 1 week, not initially responsive to Sumatriptan, Fiorcet  - patient given Toradol in ED  - PRN Fiorcet  - CT head Negative for acute bleed, shift, mass effect, edema  - Initiated on Vancomycin, Ceftriaxone, Flagyl for broad spectrum coverage for possible meningitis  - Lumbar puncture performed 4/13 out of concern for meningitis/encephalitis - bland  with elevated glucose, no WBC, no RBC, meningitis/encephalitis panel pending   - Pt with 12cc removed during LP, improvement in headache, possible component of pseudotumor cerebri (?)    Cardiovascular:  Distributive Shock, septic (?)  Hypotension, improving   - Likely 2/2 infection of unknown source   - BP of 98/53 downtrended to 80s/40s, received significant amount of fluids without improvement and required norepi   - no evidence of obstruction/bowing on POCUS  - TTE 4/13 - hyperdynamic systolic fx, EF>70%, normal LV diastolic function, normal RV size, mild tricuspid regurg, PA pressure 34 mmHg  - Lactate WNL  - on broad spectrum Abx  - Wean norepi as tolerated     Respiratory:  No acute issues  Chest xray with no evidence of PNA, infiltrates, consolidation, effusions, pneumothorax  On Room air    GI/Metabolic:  GI Ppx: None   Diet: Diabetic diet when able to tolerate PO    Nausea  - Compazine PRN    Renal:  See DKA in Endocrine section  Cr WNL  UOP: Net pos 7 L    Heme:  DVT Ppx: Lovenox    Endo:   Diabetic Ketoacidosis, resolved   T1DM  - Initial glucose of 481 on admit, HCO3 19  - AG of 17  - Beta Hydroxybutyrate of 3.1  - UA with 2+ ketones  - Insulin Detemir 8 units BID  - Pt on insulin pump at home, off now but going to ask someone to bring her another   - S/p significant fluid resuscitation   - last A1C 7.4 as of 3/12/24  - has Dexcom and Omnipod - insulin pump held for now, can transition back onto it after off vasopressors and ready for discharge   - Used to do carb corrections with short acting - start 5 units with meals and sliding scale and continue to monitor   - Most recent pump settings:   Omnipod 5   Insulin pump settings  Basal: 0.9 units/hr   ICR:   MN-12PM- 1:8- tightened   12PM- MN- 1:7.5-- tightened   ISF: 1:45   Target 120-lowered the target     ID:  Sepsis, improving   - Hypotension not responsive to fluids, requiring pressor with tachycardia  - Afebrile  - Flu & covid negative  - BC in  process - negative   - UA bland  - CT without focus of infection   - LP bland   - Continue broad spectrum abx for 48 hour blood culture rule out     F: Diabetic  A: Tylenol   S: NA  T: Lovenox  H: Elevated at 30 degrees  U: NA  G: Insulin long acting, BG goal 140-180  S: NA  B: Miralax PRN  I: PIV  D: Cont Ceftriaxone, Vancomycin, Flagyl     Code Status: Full    Thank you for allowing us to participate in the care of this patient. We will continue to follow. Please call with questions.      Ritika Mac MD  [facility]   04/14/2024 8:08 AM

## 2024-04-14 NOTE — PROGRESS NOTES
[facility]  Infectious Disease  Progress Note    Patient Name: Anusha Andrew  MRN: [id number]  Admission Date: 4/12/2024  Length of Stay: 2 days  Attending Physician: Ed Aleman MD  Primary Care Provider: Magaly Muhammad NP    Isolation Status: No active isolations  Assessment/Plan:      ID  Septic shock  33 year old female with PMH of DM1 (on insulin pump and dexcom) and gastric sleeve who presented for headache. Diagnosed with DKA. Now requiring pressors with no clear source on infection    -csf cell count not concerning for infection  -await pending cultures  -CT C/A/P without obvious infection  -would stop all antibiotics and monitor            Thank you for your consult. I will follow-up with patient. Please contact us if you have any additional questions.    Curly Ochoa MD  Infectious Disease  [facility]    Subjective:     Principal Problem:DKA (diabetic ketoacidosis)    HPI: 33 year old female with PMH of DM1 (on insulin pump and dexcom) and gastric sleeve who presented for headache. She was seen a few days earlier for HA in the ED and given imitrex, then represented for worsening HA. In the ED she was found to be in DKA initially given fluids and started on insulin gtt and transitioned off insuling gtt despite bicarb remaining 14 so gap re-opened and BG increased. Also overnight became hypotensive requiring levophed.      4/13: Patient complaining of HA and neck stiffness with shock without any clear etiology - changed abx from zosyn to vanc/rocephin for meningitis coverage. LP was done and cell counts are pending  Interval History: No acuter events. Weaning off pressors    Review of Systems   Constitutional:  Negative for chills and fever.   Respiratory:  Negative for cough and shortness of breath.    Cardiovascular:  Negative for chest pain.   Gastrointestinal:  Negative for abdominal pain, constipation, diarrhea, nausea and vomiting.   Genitourinary:  Negative for  difficulty urinating and dysuria.   Musculoskeletal:  Negative for neck stiffness.   Neurological:  Positive for headaches. Negative for dizziness and weakness.   Psychiatric/Behavioral:  Negative for agitation and confusion.      Objective:     Vital Signs (Most Recent):  Temp: 98.5 °F (36.9 °C) (04/14/24 0830)  Pulse: 104 (04/14/24 1030)  Resp: (!) 28 (04/14/24 1030)  BP: 99/63 (04/14/24 1030)  SpO2: 100 % (04/14/24 1030) Vital Signs (24h Range):  Temp:  [97.7 °F (36.5 °C)-98.6 °F (37 °C)] 98.5 °F (36.9 °C)  Pulse:  [] 104  Resp:  [5-39] 28  SpO2:  [97 %-100 %] 100 %  BP: ()/(45-65) 99/63     Weight: 79 kg (174 lb 2.6 oz)  Body mass index is 30.86 kg/m².    Estimated Creatinine Clearance: 90.1 mL/min (based on SCr of 0.9 mg/dL).     Physical Exam  Constitutional:       General: She is not in acute distress.     Appearance: She is not ill-appearing or toxic-appearing.   HENT:      Mouth/Throat:      Mouth: Mucous membranes are moist.      Pharynx: Oropharynx is clear.   Eyes:      Extraocular Movements: Extraocular movements intact.      Conjunctiva/sclera: Conjunctivae normal.   Cardiovascular:      Rate and Rhythm: Normal rate.      Heart sounds: Normal heart sounds.   Pulmonary:      Effort: Pulmonary effort is normal. No respiratory distress.      Breath sounds: No wheezing or rhonchi.   Abdominal:      General: There is no distension.      Palpations: Abdomen is soft.      Tenderness: There is no abdominal tenderness. There is no guarding.   Musculoskeletal:         General: Normal range of motion.      Cervical back: Normal range of motion. No rigidity.      Right lower leg: No edema.      Left lower leg: No edema.   Skin:     General: Skin is warm and dry.   Neurological:      General: No focal deficit present.      Mental Status: She is alert and oriented to person, place, and time.   Psychiatric:         Mood and Affect: Mood normal.         Behavior: Behavior normal.          Significant Labs:  All pertinent labs within the past 24 hours have been reviewed.    Significant Imaging: I have reviewed all pertinent imaging results/findings within the past 24 hours.

## 2024-04-14 NOTE — ASSESSMENT & PLAN NOTE
33 year old female with PMH of DM1 (on insulin pump and dexcom) and gastric sleeve who presented for headache. Diagnosed with DKA. Now requiring pressors with no clear source on infection    -csf cell count not concerning for infection  -await pending cultures  -CT C/A/P without obvious infection  -would stop all antibiotics and monitor

## 2024-04-14 NOTE — ASSESSMENT & PLAN NOTE
, beta 3.1, AG 17  VBG: pH 7.297, PCO2 45.5, HCO3 22.2  Likely exacerbated by sepsis, stress from nursing school, dehydration (previous trigger), and poor sleep    Anion gap has closed.  Serum bicarb has improved    Plan:  Currently off insulin drip.  Continue sliding scale insulin

## 2024-04-14 NOTE — ASSESSMENT & PLAN NOTE
4 mm Left upper lobe nodule incidentally seen on CT scan    Plan:   Recommend repeat outpatient CT scan in a year

## 2024-04-14 NOTE — SUBJECTIVE & OBJECTIVE
Interval History:   No acute events overnight   Patient reports headache is much improved  Tolerating p.o. diet well   No fever chills nausea or vomiting   No abdominal pain constipation or diarrhea       Review of Systems   Constitutional:  Negative for chills and fever.   Respiratory:  Negative for cough and shortness of breath.    Cardiovascular:  Negative for chest pain.   Gastrointestinal:  Negative for abdominal pain, constipation, diarrhea, nausea and vomiting.   Genitourinary:  Negative for difficulty urinating and dysuria.   Musculoskeletal:  Negative for neck stiffness.   Neurological:  Positive for headaches. Negative for dizziness and weakness.   Psychiatric/Behavioral:  Negative for agitation and confusion.      Objective:     Vital Signs (Most Recent):  Temp: 98.5 °F (36.9 °C) (04/14/24 0830)  Pulse: 101 (04/14/24 1015)  Resp: (!) 35 (04/14/24 1015)  BP: 95/60 (04/14/24 1015)  SpO2: 100 % (04/14/24 1015) Vital Signs (24h Range):  Temp:  [97.7 °F (36.5 °C)-98.6 °F (37 °C)] 98.5 °F (36.9 °C)  Pulse:  [] 101  Resp:  [5-40] 35  SpO2:  [97 %-100 %] 100 %  BP: ()/(45-65) 95/60     Weight: 79 kg (174 lb 2.6 oz)  Body mass index is 30.86 kg/m².    Intake/Output Summary (Last 24 hours) at 4/14/2024 1029  Last data filed at 4/14/2024 1000  Gross per 24 hour   Intake 4235.26 ml   Output 1000 ml   Net 3235.26 ml         Physical Exam  Constitutional:       General: She is not in acute distress.     Appearance: She is not ill-appearing or toxic-appearing.   HENT:      Mouth/Throat:      Mouth: Mucous membranes are moist.      Pharynx: Oropharynx is clear.   Eyes:      Extraocular Movements: Extraocular movements intact.      Conjunctiva/sclera: Conjunctivae normal.   Cardiovascular:      Rate and Rhythm: Normal rate.      Heart sounds: Normal heart sounds.   Pulmonary:      Effort: Pulmonary effort is normal. No respiratory distress.      Breath sounds: No wheezing or rhonchi.   Abdominal:      General:  There is no distension.      Palpations: Abdomen is soft.      Tenderness: There is no abdominal tenderness. There is no guarding.   Musculoskeletal:         General: Normal range of motion.      Cervical back: Normal range of motion. No rigidity.      Right lower leg: No edema.      Left lower leg: No edema.   Skin:     General: Skin is warm and dry.   Neurological:      General: No focal deficit present.      Mental Status: She is alert and oriented to person, place, and time.   Psychiatric:         Mood and Affect: Mood normal.         Behavior: Behavior normal.             Significant Labs: All pertinent labs within the past 24 hours have been reviewed.    Significant Imaging: I have reviewed all pertinent imaging results/findings within the past 24 hours.

## 2024-04-14 NOTE — ASSESSMENT & PLAN NOTE
Ongoing x5-6 days  Described as aching, pressure starting in her neck and spreading up over her head  Initially 9/10, down to 6/10 after benadryl, Toradol, and compazine  States she had a dose of sumatriptan 4/11 w/o improvement  Also states Fioricet usually works but not this time  No meningeal signs on exam    Symptoms have improved  CSF studies on April 13th are not consistent with meningitis    Plan:  P.r.n. analgesics   Neurology consulted

## 2024-04-14 NOTE — CONSULTS
Nutrition-Related Diabetes Education      Learners: Susan Andrew    Current HbA1c:   Hemoglobin A1C   Date Value Ref Range Status   03/12/2024 7.4 (H) 4.0 - 5.6 % Final     Comment:     ADA Screening Guidelines:  5.7-6.4%  Consistent with prediabetes  >or=6.5%  Consistent with diabetes    High levels of fetal hemoglobin interfere with the HbA1C  assay. Heterozygous hemoglobin variants (HbS, HgC, etc)do  not significantly interfere with this assay.   However, presence of multiple variants may affect accuracy.     11/08/2023 7.7 (H) 4.0 - 5.6 % Final     Comment:     ADA Screening Guidelines:  5.7-6.4%  Consistent with prediabetes  >or=6.5%  Consistent with diabetes    High levels of fetal hemoglobin interfere with the HbA1C  assay. Heterozygous hemoglobin variants (HbS, HgC, etc)do  not significantly interfere with this assay.   However, presence of multiple variants may affect accuracy.     08/04/2023 7.0 (H) 4.0 - 5.6 % Final     Comment:     ADA Screening Guidelines:  5.7-6.4%  Consistent with prediabetes  >or=6.5%  Consistent with diabetes    High levels of fetal hemoglobin interfere with the HbA1C  assay. Heterozygous hemoglobin variants (HbS, HgC, etc)do  not significantly interfere with this assay.   However, presence of multiple variants may affect accuracy.       Home diabetes medication(s):   Diabetes Medications               glucagon (BAQSIMI) 3 mg/actuation Spry Give one puff via nostril. Hold device between fingers and thumb, do not push plunger yet, insert tip gently into one nostril until finger(s) touch the outside of the nose, then push plunger firmly all the way in . Dose is complete when the green line disappears.    insulin aspart, niacinamide, (FIASP FLEXTOUCH U-100 INSULIN) 100 unit/mL (3 mL) InPn use three times daily before meals. Inject ICR 1:8 + snacks + correction scale. Max TDD of 50 units    insulin aspart, niacinamide, (FIASP U-100 INSULIN) 100 unit/mL Soln To use continuously with  insulin pump. Max TDD of 100 units    semaglutide (OZEMPIC) 0.25 mg or 0.5 mg (2 mg/3 mL) pen injector Inject 0.25 mg into the skin every 7 days. ICD 10: E11.9              Nutrition Education with handouts: Diabetes and Diet - Diet and Health - Diabetic Meal Planning     Comments: RD consult for education on diabetic diet. RD providing remote coverage. Educations attached to patient chart for in person follow up with onsite RD.    Please consult as needed.  Thank you!

## 2024-04-14 NOTE — ASSESSMENT & PLAN NOTE
Likely related to aggressive IV fluid resuscitation  Urine pregnancy test negative  No  pelvic pain or abdominal pain on exam

## 2024-04-14 NOTE — NURSING
Report given to NADIA Palacio. Pt transferred to room 429. Cardiac monitor applied for transfer. Chart, insulin pens, mupirocin ointment and personal belongings sent with pt. Pt oriented to room and call light placed within reach. Receiving nurse notified of pt arrival.

## 2024-04-14 NOTE — PROGRESS NOTES
I saw the patient with the resident/fellow and agree with the resident/fellow's findings and plan. In addition to the resident/fellow's documentation, I add the following:     Ms. Andrew is a 33 year old female with PMH of DM1 (on insulin pump and dexcom) and gastric sleeve who presented for headache. She was seen a few days earlier for HA in the ED and given imitrex, then represented for worsening HA. In the ED she was found to be in DKA initially given fluids and started on insulin gtt and transitioned off insuling gtt despite bicarb remaining 14 so gap re-opened and BG increased. Also became hypotensive requiring levophed as high as 0.12.    4/14: Pressors weaning down - and turned off this morning. No clear etiology of hypotension and not truly shock - no evidence of end organ dysfunction. Perhaps she was just very volume down - patient reports that she has low BP at baseline. We have ruled out meningitis and CTA C/A/P all negative - would opt to stop abx and monitor but can discuss with ID prior. Check cortisol and TSH to workup hypotension. As patient is asymptomatic and chronically has low BP change MAP goal to 60. Continue subQ insulin - transition back to pump when able.     ICU Checklist:    Feeding: Ok to eat as tolerated   Analgesia: PRN tylenol   Sedation: None  Thromboprophylaxis: Lovenox   HOB: Elevated 30 deg   Ulcer ppx: None  Glucose: Slightly below goal - very labile   SAT/SBT: Not indicated  Bowel: Monitor  Indwelling lines: PIV x 3  De-escalation of abx: Would opt to stop abx, will discuss with ID  Fluid balance: +7.8  PT/OT: Consult  GOC/Family discussion: FC   Dispo: Likely step down this afternoon if she stays off levophed    Reina Casiano MD  [facility] Attending  Cell: [phone]  04/14/2024 10:42 AM    Critical Care time was spent validating the history and physical exam, reviewing the lab and imaging results, and discussing the care of the patient with the bedside nurse and the patient  and/or surrogates. This critical care time did not overlap with that of any other provider or involve time for any procedures.  This patient has a high probability of sudden clinically significant deterioration which requires the highest level of physician preparedness to intervene urgently. I managed/supervised life or organ supporting interventions that required frequent physician assessments. I devoted my full attention in the ICU to the direct care of this patient for this period of time. Organ systems which are failing and require intensive, critical care support are: Neuro, cards, GI, renal   Critical Care time: 50 minutes

## 2024-04-14 NOTE — ASSESSMENT & PLAN NOTE
CSF studies not consistent with meningitis, however patient received antibiotics prior to obtaining CSF    Plan:  Empiric IV antibiotics  ID and neurology consult  Follow-up CSF cultures

## 2024-04-14 NOTE — PROGRESS NOTES
"[facility] Medicine  Progress Note    Patient Name: Anusha Andrew  MRN: [id number]  Patient Class: IP- Inpatient   Admission Date: 4/12/2024  Length of Stay: 2 days  Attending Physician: Ed Aleman MD  Primary Care Provider: Magaly Muhammad NP        Subjective:     Principal Problem:DKA (diabetic ketoacidosis)        HPI:  Anusha Andrew is a 33 yr old female w/ PMH HLD, depression, HTN, and DMT1. She presents to the ER with reports of headache. Pt states she was seen at [facility] and given shot of Imitrex. Pt states the headache has not improved. Denies fever, rash, or neck stiffness. Nausea and vomiting reported. Pt states "when I get headaches like this, I usually respond well to Fioricet". Pt reported some photosensitivity however states it has improved. She states the headache did improve from 9/10 to 6/10 after initial ED treatment. She describes it as aching, pressure starting in her neck and spreading up over her head. No thunderclap sensation or associated vision changes. She reports a couple of weeks ago she had flu symptoms but that those have mostly resolved. Per chart review she was diagnosed with Flu B and treated with Tamiflu on 3/28. In the ED patient also noted to be in DKA with AG 17, , beta 3.1, and metabolic acidosis on VBG.    Overview/Hospital Course:  No notes on file    Interval History:   No acute events overnight   Patient reports headache is much improved  Tolerating p.o. diet well   No fever chills nausea or vomiting   No abdominal pain constipation or diarrhea       Review of Systems   Constitutional:  Negative for chills and fever.   Respiratory:  Negative for cough and shortness of breath.    Cardiovascular:  Negative for chest pain.   Gastrointestinal:  Negative for abdominal pain, constipation, diarrhea, nausea and vomiting.   Genitourinary:  Negative for difficulty urinating and dysuria.   Musculoskeletal:  Negative for neck stiffness. "   Neurological:  Positive for headaches. Negative for dizziness and weakness.   Psychiatric/Behavioral:  Negative for agitation and confusion.      Objective:     Vital Signs (Most Recent):  Temp: 98.5 °F (36.9 °C) (04/14/24 0830)  Pulse: 101 (04/14/24 1015)  Resp: (!) 35 (04/14/24 1015)  BP: 95/60 (04/14/24 1015)  SpO2: 100 % (04/14/24 1015) Vital Signs (24h Range):  Temp:  [97.7 °F (36.5 °C)-98.6 °F (37 °C)] 98.5 °F (36.9 °C)  Pulse:  [] 101  Resp:  [5-40] 35  SpO2:  [97 %-100 %] 100 %  BP: ()/(45-65) 95/60     Weight: 79 kg (174 lb 2.6 oz)  Body mass index is 30.86 kg/m².    Intake/Output Summary (Last 24 hours) at 4/14/2024 1029  Last data filed at 4/14/2024 1000  Gross per 24 hour   Intake 4235.26 ml   Output 1000 ml   Net 3235.26 ml         Physical Exam  Constitutional:       General: She is not in acute distress.     Appearance: She is not ill-appearing or toxic-appearing.   HENT:      Mouth/Throat:      Mouth: Mucous membranes are moist.      Pharynx: Oropharynx is clear.   Eyes:      Extraocular Movements: Extraocular movements intact.      Conjunctiva/sclera: Conjunctivae normal.   Cardiovascular:      Rate and Rhythm: Normal rate.      Heart sounds: Normal heart sounds.   Pulmonary:      Effort: Pulmonary effort is normal. No respiratory distress.      Breath sounds: No wheezing or rhonchi.   Abdominal:      General: There is no distension.      Palpations: Abdomen is soft.      Tenderness: There is no abdominal tenderness. There is no guarding.   Musculoskeletal:         General: Normal range of motion.      Cervical back: Normal range of motion. No rigidity.      Right lower leg: No edema.      Left lower leg: No edema.   Skin:     General: Skin is warm and dry.   Neurological:      General: No focal deficit present.      Mental Status: She is alert and oriented to person, place, and time.   Psychiatric:         Mood and Affect: Mood normal.         Behavior: Behavior normal.              Significant Labs: All pertinent labs within the past 24 hours have been reviewed.    Significant Imaging: I have reviewed all pertinent imaging results/findings within the past 24 hours.    Assessment/Plan:      * DKA (diabetic ketoacidosis)  , beta 3.1, AG 17  VBG: pH 7.297, PCO2 45.5, HCO3 22.2  Likely exacerbated by sepsis, stress from nursing school, dehydration (previous trigger), and poor sleep    Anion gap has closed.  Serum bicarb has improved    Plan:  Currently off insulin drip.  Continue sliding scale insulin      Pulmonary nodule  4 mm Left upper lobe nodule incidentally seen on CT scan    Plan:   Recommend repeat outpatient CT scan in a year      Pelvic ascites    Likely related to aggressive IV fluid resuscitation  Urine pregnancy test negative  No  pelvic pain or abdominal pain on exam    Meningitis  CSF studies not consistent with meningitis, however patient received antibiotics prior to obtaining CSF    Plan:  Empiric IV antibiotics  ID and neurology consult  Follow-up CSF cultures    Septic shock  Unclear source of infection  Leukocytosis and procalcitonin elevated on admission    Currently on vasopressors   No fevers    Plan:   Continue Levophed , wean as tolerated  Follow-up CSF, blood and urine cultures   ID consulted  Zosyn switched to Rocephin and Flagyl.  Vancomycin     Nonintractable headache  Ongoing x5-6 days  Described as aching, pressure starting in her neck and spreading up over her head  Initially 9/10, down to 6/10 after benadryl, Toradol, and compazine  States she had a dose of sumatriptan 4/11 w/o improvement  Also states Fioricet usually works but not this time  No meningeal signs on exam    Symptoms have improved  CSF studies on April 13th are not consistent with meningitis    Plan:  P.r.n. analgesics   Neurology consulted      Insulin pump in place  Patient reports she will run out of insulin tonight, will d/c insulin pump and treat with long-acting, prandial, and SSI    Patient has supplies at home to refill pump after discharge      Depression  Patient has persistent depression which is moderate and is currently controlled. Will Continue anti-depressant medications. We will not consult psychiatry at this time. Patient does not display psychosis at this time. Continue to monitor closely and adjust plan of care as needed.          Hyperglycemia due to diabetes mellitus  See DKA        VTE Risk Mitigation (From admission, onward)           Ordered     enoxaparin injection 40 mg  Every 24 hours         04/13/24 1328     IP VTE HIGH RISK PATIENT  Once         04/13/24 1327     Place sequential compression device  Until discontinued         04/12/24 1745                    Discharge Planning   RYAN:      Code Status: Full Code   Is the patient medically ready for discharge?:     Reason for patient still in hospital (select all that apply): Patient trending condition, Laboratory test, and Treatment               Critical care time spent on the evaluation and treatment of severe organ dysfunction, review of pertinent labs and imaging studies, discussions with consulting providers and discussions with patient/family: 30 minutes.      Ed Aleman MD  Department of Hospital Medicine   [place] - Intensive Care

## 2024-04-15 LAB
ANION GAP SERPL CALC-SCNC: 6 MMOL/L (ref 8–16)
BASOPHILS # BLD AUTO: 0.02 K/UL (ref 0–0.2)
BASOPHILS NFR BLD: 0.3 % (ref 0–1.9)
BUN SERPL-MCNC: 11 MG/DL (ref 6–20)
CALCIUM SERPL-MCNC: 7.9 MG/DL (ref 8.7–10.5)
CHLORIDE SERPL-SCNC: 110 MMOL/L (ref 95–110)
CO2 SERPL-SCNC: 22 MMOL/L (ref 23–29)
CREAT SERPL-MCNC: 0.7 MG/DL (ref 0.5–1.4)
DIFFERENTIAL METHOD BLD: ABNORMAL
EOSINOPHIL # BLD AUTO: 0 K/UL (ref 0–0.5)
EOSINOPHIL NFR BLD: 0.7 % (ref 0–8)
ERYTHROCYTE [DISTWIDTH] IN BLOOD BY AUTOMATED COUNT: 13.3 % (ref 11.5–14.5)
EST. GFR  (NO RACE VARIABLE): >60 ML/MIN/1.73 M^2
GLUCOSE SERPL-MCNC: 122 MG/DL (ref 70–110)
HCT VFR BLD AUTO: 29.4 % (ref 37–48.5)
HGB BLD-MCNC: 9.6 G/DL (ref 12–16)
IMM GRANULOCYTES # BLD AUTO: 0.02 K/UL (ref 0–0.04)
IMM GRANULOCYTES NFR BLD AUTO: 0.3 % (ref 0–0.5)
LYMPHOCYTES # BLD AUTO: 2 K/UL (ref 1–4.8)
LYMPHOCYTES NFR BLD: 33.6 % (ref 18–48)
MAGNESIUM SERPL-MCNC: 1.9 MG/DL (ref 1.6–2.6)
MCH RBC QN AUTO: 27.9 PG (ref 27–31)
MCHC RBC AUTO-ENTMCNC: 32.7 G/DL (ref 32–36)
MCV RBC AUTO: 86 FL (ref 82–98)
MONOCYTES # BLD AUTO: 0.4 K/UL (ref 0.3–1)
MONOCYTES NFR BLD: 7.3 % (ref 4–15)
NEUTROPHILS # BLD AUTO: 3.5 K/UL (ref 1.8–7.7)
NEUTROPHILS NFR BLD: 57.8 % (ref 38–73)
NRBC BLD-RTO: 0 /100 WBC
PHOSPHATE SERPL-MCNC: 3.3 MG/DL (ref 2.7–4.5)
PLATELET # BLD AUTO: 306 K/UL (ref 150–450)
PMV BLD AUTO: 9.3 FL (ref 9.2–12.9)
POCT GLUCOSE: 119 MG/DL (ref 70–110)
POCT GLUCOSE: 126 MG/DL (ref 70–110)
POCT GLUCOSE: 132 MG/DL (ref 70–110)
POCT GLUCOSE: 155 MG/DL (ref 70–110)
POCT GLUCOSE: 213 MG/DL (ref 70–110)
POTASSIUM SERPL-SCNC: 4.2 MMOL/L (ref 3.5–5.1)
RBC # BLD AUTO: 3.44 M/UL (ref 4–5.4)
SODIUM SERPL-SCNC: 138 MMOL/L (ref 136–145)
WBC # BLD AUTO: 6.04 K/UL (ref 3.9–12.7)

## 2024-04-15 PROCEDURE — 11000001 HC ACUTE MED/SURG PRIVATE ROOM

## 2024-04-15 PROCEDURE — 99232 SBSQ HOSP IP/OBS MODERATE 35: CPT | Mod: ,,, | Performed by: INTERNAL MEDICINE

## 2024-04-15 PROCEDURE — 85025 COMPLETE CBC W/AUTO DIFF WBC: CPT | Performed by: NURSE PRACTITIONER

## 2024-04-15 PROCEDURE — 36415 COLL VENOUS BLD VENIPUNCTURE: CPT | Performed by: HOSPITALIST

## 2024-04-15 PROCEDURE — 83735 ASSAY OF MAGNESIUM: CPT | Performed by: NURSE PRACTITIONER

## 2024-04-15 PROCEDURE — 63600175 PHARM REV CODE 636 W HCPCS: Performed by: HOSPITALIST

## 2024-04-15 PROCEDURE — 94761 N-INVAS EAR/PLS OXIMETRY MLT: CPT

## 2024-04-15 PROCEDURE — 84100 ASSAY OF PHOSPHORUS: CPT | Performed by: NURSE PRACTITIONER

## 2024-04-15 PROCEDURE — 25000003 PHARM REV CODE 250: Performed by: NURSE PRACTITIONER

## 2024-04-15 PROCEDURE — 25000003 PHARM REV CODE 250: Performed by: STUDENT IN AN ORGANIZED HEALTH CARE EDUCATION/TRAINING PROGRAM

## 2024-04-15 PROCEDURE — 80048 BASIC METABOLIC PNL TOTAL CA: CPT | Performed by: HOSPITALIST

## 2024-04-15 PROCEDURE — 63600175 PHARM REV CODE 636 W HCPCS: Performed by: STUDENT IN AN ORGANIZED HEALTH CARE EDUCATION/TRAINING PROGRAM

## 2024-04-15 RX ORDER — INSULIN ASPART 100 [IU]/ML
0-10 INJECTION, SOLUTION INTRAVENOUS; SUBCUTANEOUS
Status: DISCONTINUED | OUTPATIENT
Start: 2024-04-15 | End: 2024-04-16 | Stop reason: HOSPADM

## 2024-04-15 RX ORDER — KETOROLAC TROMETHAMINE 30 MG/ML
30 INJECTION, SOLUTION INTRAMUSCULAR; INTRAVENOUS EVERY 6 HOURS PRN
Status: DISCONTINUED | OUTPATIENT
Start: 2024-04-15 | End: 2024-04-16 | Stop reason: HOSPADM

## 2024-04-15 RX ORDER — KETOROLAC TROMETHAMINE 30 MG/ML
30 INJECTION, SOLUTION INTRAMUSCULAR; INTRAVENOUS ONCE
Status: COMPLETED | OUTPATIENT
Start: 2024-04-15 | End: 2024-04-15

## 2024-04-15 RX ADMIN — ENOXAPARIN SODIUM 40 MG: 40 INJECTION SUBCUTANEOUS at 05:04

## 2024-04-15 RX ADMIN — INSULIN ASPART 2 UNITS: 100 INJECTION, SOLUTION INTRAVENOUS; SUBCUTANEOUS at 09:04

## 2024-04-15 RX ADMIN — INSULIN ASPART 2 UNITS: 100 INJECTION, SOLUTION INTRAVENOUS; SUBCUTANEOUS at 12:04

## 2024-04-15 RX ADMIN — FLUOXETINE HYDROCHLORIDE 20 MG: 20 CAPSULE ORAL at 08:04

## 2024-04-15 RX ADMIN — KETOROLAC TROMETHAMINE 30 MG: 30 INJECTION, SOLUTION INTRAMUSCULAR; INTRAVENOUS at 09:04

## 2024-04-15 RX ADMIN — ACETAMINOPHEN 1000 MG: 500 TABLET ORAL at 11:04

## 2024-04-15 RX ADMIN — MUPIROCIN: 20 OINTMENT TOPICAL at 09:04

## 2024-04-15 RX ADMIN — FLUOXETINE HYDROCHLORIDE 20 MG: 20 CAPSULE ORAL at 09:04

## 2024-04-15 RX ADMIN — INSULIN ASPART 5 UNITS: 100 INJECTION, SOLUTION INTRAVENOUS; SUBCUTANEOUS at 12:04

## 2024-04-15 RX ADMIN — KETOROLAC TROMETHAMINE 30 MG: 30 INJECTION, SOLUTION INTRAMUSCULAR; INTRAVENOUS at 02:04

## 2024-04-15 RX ADMIN — INSULIN ASPART 5 UNITS: 100 INJECTION, SOLUTION INTRAVENOUS; SUBCUTANEOUS at 05:04

## 2024-04-15 RX ADMIN — ACETAMINOPHEN 1000 MG: 500 TABLET ORAL at 01:04

## 2024-04-15 RX ADMIN — INSULIN ASPART 5 UNITS: 100 INJECTION, SOLUTION INTRAVENOUS; SUBCUTANEOUS at 08:04

## 2024-04-15 RX ADMIN — MUPIROCIN: 20 OINTMENT TOPICAL at 08:04

## 2024-04-15 NOTE — ASSESSMENT & PLAN NOTE
Likely related to aggressive IV fluid resuscitation  Urine pregnancy test negative  Echocardiogram with EF 70  No  pelvic pain or abdominal pain on exam

## 2024-04-15 NOTE — PLAN OF CARE
Problem: Adult Inpatient Plan of Care  Goal: Plan of Care Review  Outcome: Ongoing, Progressing  Goal: Optimal Comfort and Wellbeing  Outcome: Ongoing, Progressing     Problem: Adjustment to Illness (Sepsis/Septic Shock)  Goal: Optimal Coping  Outcome: Ongoing, Progressing
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
## 2024-04-15 NOTE — ASSESSMENT & PLAN NOTE
CSF studies not consistent with meningitis, however patient received antibiotics prior to obtaining CSF  Overall low suspicion for meningitis    Plan:  Empiric IV antibiotics on hold   ID and neurology consult  Follow-up CSF cultures

## 2024-04-15 NOTE — SUBJECTIVE & OBJECTIVE
Interval History:   No acute events overnight   Patient reports headache is persistent 7/10, involving area around her eyes and her forehead, reports photophobia  Tolerating p.o. diet well   No fever chills nausea or vomiting   No abdominal pain constipation or diarrhea       Review of Systems   Constitutional:  Negative for chills and fever.   Respiratory:  Negative for cough and shortness of breath.    Cardiovascular:  Negative for chest pain.   Gastrointestinal:  Negative for abdominal pain, constipation, diarrhea, nausea and vomiting.   Genitourinary:  Negative for difficulty urinating and dysuria.   Musculoskeletal:  Negative for neck stiffness.   Neurological:  Positive for headaches. Negative for dizziness and weakness.   Psychiatric/Behavioral:  Negative for agitation and confusion.      Objective:     Vital Signs (Most Recent):  Temp: 98.6 °F (37 °C) (04/15/24 1144)  Pulse: 86 (04/15/24 1218)  Resp: 20 (04/15/24 1144)  BP: 125/83 (04/15/24 1144)  SpO2: 99 % (04/15/24 1144) Vital Signs (24h Range):  Temp:  [97.9 °F (36.6 °C)-98.6 °F (37 °C)] 98.6 °F (37 °C)  Pulse:  [] 86  Resp:  [18-24] 20  SpO2:  [96 %-100 %] 99 %  BP: ()/(51-83) 125/83     Weight: 79 kg (174 lb 2.6 oz)  Body mass index is 30.86 kg/m².  No intake or output data in the 24 hours ending 04/15/24 1343        Physical Exam  Constitutional:       General: She is not in acute distress.     Appearance: She is not ill-appearing or toxic-appearing.   HENT:      Mouth/Throat:      Mouth: Mucous membranes are moist.      Pharynx: Oropharynx is clear.   Eyes:      Extraocular Movements: Extraocular movements intact.      Conjunctiva/sclera: Conjunctivae normal.   Cardiovascular:      Rate and Rhythm: Normal rate.      Heart sounds: Normal heart sounds.   Pulmonary:      Effort: Pulmonary effort is normal. No respiratory distress.      Breath sounds: No wheezing or rhonchi.   Abdominal:      General: There is no distension.      Palpations:  Abdomen is soft.      Tenderness: There is no abdominal tenderness. There is no guarding.   Musculoskeletal:         General: Normal range of motion.      Cervical back: Normal range of motion. No rigidity.      Right lower leg: No edema.      Left lower leg: No edema.   Skin:     General: Skin is warm and dry.   Neurological:      General: No focal deficit present.      Mental Status: She is alert and oriented to person, place, and time.   Psychiatric:         Mood and Affect: Mood normal.         Behavior: Behavior normal.             Significant Labs: All pertinent labs within the past 24 hours have been reviewed.    Significant Imaging: I have reviewed all pertinent imaging results/findings within the past 24 hours.

## 2024-04-15 NOTE — PLAN OF CARE
SW met with pt at bedside to discuss dc planning. Pt confirmed information on chart. Pt resides in home alone. Pt is independent in ADLs. Pt has no dme/hh services at home. Upon dc pts mother will provide transport home. SW will continue to follow pt throughout her transitions of care and assist with any dc needs.     SW requested pcp follow up.     Future Appointments   Date Time Provider Department Center   5/6/2024  2:40 PM Raad Cuenca OD [facility] [address]   5/28/2024  3:00 PM Patria Wu MD [facility]   6/21/2024  7:00 AM LAB, SBP SBP [facility]   6/28/2024  8:00 AM Ritika Grewal NP [facility]        04/15/24 1002   Discharge Assessment   Assessment Type Discharge Planning Assessment   Confirmed/corrected address, phone number and insurance Yes   Confirmed Demographics Correct on Facesheet   Source of Information patient   Communicated RYAN with patient/caregiver Yes   Reason For Admission DKA (diabetic ketoacidosis)   People in Home alone   Do you expect to return to your current living situation? Yes   Do you have help at home or someone to help you manage your care at home? Yes   Who are your caregiver(s) and their phone number(s)? Kwabena Lee (Significant other)  [phone]   Prior to hospitilization cognitive status: Alert/Oriented   Current cognitive status: Alert/Oriented   Equipment Currently Used at Home none   Readmission within 30 days? No   Patient currently being followed by outpatient case management? No   Do you currently have service(s) that help you manage your care at home? No   Do you take prescription medications? Yes   Do you have prescription coverage? Yes   Coverage MEDICAID - [facility] ([place])   Do you have any problems affording any of your prescribed medications? No   Is the patient taking medications as prescribed? yes   Who is going to help you get home at discharge? Mother   How do you get to doctors  appointments? car, drives self;family or friend will provide   Are you on dialysis? No   Do you take coumadin? No   Discharge Plan A Home   DME Needed Upon Discharge  none   Discharge Plan discussed with: Patient   Transition of Care Barriers None   Alcohol Use   Q1: How often do you have a drink containing alcohol? Monthly or l   Q2: How many drinks containing alcohol do you have on a typical day when you are drinking? 1 or 2   Q3: How often do you have six or more drinks on one occasion? Less than mo

## 2024-04-15 NOTE — PROGRESS NOTES
"[facility] Medicine  Progress Note    Patient Name: Anusha Andrew  MRN: [id number]  Patient Class: IP- Inpatient   Admission Date: 4/12/2024  Length of Stay: 3 days  Attending Physician: Ed Aleman MD  Primary Care Provider: Magaly Muhammad NP        Subjective:     Principal Problem:DKA (diabetic ketoacidosis)        HPI:  Anusha Andrew is a 33 yr old female w/ PMH HLD, depression, HTN, and DMT1. She presents to the ER with reports of headache. Pt states she was seen at [facility] and given shot of Imitrex. Pt states the headache has not improved. Denies fever, rash, or neck stiffness. Nausea and vomiting reported. Pt states "when I get headaches like this, I usually respond well to Fioricet". Pt reported some photosensitivity however states it has improved. She states the headache did improve from 9/10 to 6/10 after initial ED treatment. She describes it as aching, pressure starting in her neck and spreading up over her head. No thunderclap sensation or associated vision changes. She reports a couple of weeks ago she had flu symptoms but that those have mostly resolved. Per chart review she was diagnosed with Flu B and treated with Tamiflu on 3/28. In the ED patient also noted to be in DKA with AG 17, , beta 3.1, and metabolic acidosis on VBG.    Overview/Hospital Course:  No notes on file    Interval History:   No acute events overnight   Patient reports headache is persistent 7/10, involving area around her eyes and her forehead, reports photophobia  Tolerating p.o. diet well   No fever chills nausea or vomiting   No abdominal pain constipation or diarrhea       Review of Systems   Constitutional:  Negative for chills and fever.   Respiratory:  Negative for cough and shortness of breath.    Cardiovascular:  Negative for chest pain.   Gastrointestinal:  Negative for abdominal pain, constipation, diarrhea, nausea and vomiting.   Genitourinary:  Negative for difficulty urinating " and dysuria.   Musculoskeletal:  Negative for neck stiffness.   Neurological:  Positive for headaches. Negative for dizziness and weakness.   Psychiatric/Behavioral:  Negative for agitation and confusion.      Objective:     Vital Signs (Most Recent):  Temp: 98.6 °F (37 °C) (04/15/24 1144)  Pulse: 86 (04/15/24 1218)  Resp: 20 (04/15/24 1144)  BP: 125/83 (04/15/24 1144)  SpO2: 99 % (04/15/24 1144) Vital Signs (24h Range):  Temp:  [97.9 °F (36.6 °C)-98.6 °F (37 °C)] 98.6 °F (37 °C)  Pulse:  [] 86  Resp:  [18-24] 20  SpO2:  [96 %-100 %] 99 %  BP: ()/(51-83) 125/83     Weight: 79 kg (174 lb 2.6 oz)  Body mass index is 30.86 kg/m².  No intake or output data in the 24 hours ending 04/15/24 1343        Physical Exam  Constitutional:       General: She is not in acute distress.     Appearance: She is not ill-appearing or toxic-appearing.   HENT:      Mouth/Throat:      Mouth: Mucous membranes are moist.      Pharynx: Oropharynx is clear.   Eyes:      Extraocular Movements: Extraocular movements intact.      Conjunctiva/sclera: Conjunctivae normal.   Cardiovascular:      Rate and Rhythm: Normal rate.      Heart sounds: Normal heart sounds.   Pulmonary:      Effort: Pulmonary effort is normal. No respiratory distress.      Breath sounds: No wheezing or rhonchi.   Abdominal:      General: There is no distension.      Palpations: Abdomen is soft.      Tenderness: There is no abdominal tenderness. There is no guarding.   Musculoskeletal:         General: Normal range of motion.      Cervical back: Normal range of motion. No rigidity.      Right lower leg: No edema.      Left lower leg: No edema.   Skin:     General: Skin is warm and dry.   Neurological:      General: No focal deficit present.      Mental Status: She is alert and oriented to person, place, and time.   Psychiatric:         Mood and Affect: Mood normal.         Behavior: Behavior normal.             Significant Labs: All pertinent labs within the past  24 hours have been reviewed.    Significant Imaging: I have reviewed all pertinent imaging results/findings within the past 24 hours.    Assessment/Plan:      * DKA (diabetic ketoacidosis)  , beta 3.1, AG 17  VBG: pH 7.297, PCO2 45.5, HCO3 22.2  Likely exacerbated by sepsis, stress from nursing school, dehydration (previous trigger), and poor sleep    Anion gap has closed.  Serum bicarb has improved    Plan:  Currently off insulin drip.  Continue sliding scale insulin      Pulmonary nodule  4 mm Left upper lobe nodule incidentally seen on CT scan    Plan:   Discussed results with patient. Recommend repeat outpatient CT scan in a year      Pelvic ascites  Likely related to aggressive IV fluid resuscitation  Urine pregnancy test negative  Echocardiogram with EF 70  No  pelvic pain or abdominal pain on exam    Meningitis  CSF studies not consistent with meningitis, however patient received antibiotics prior to obtaining CSF  Overall low suspicion for meningitis    Plan:  Empiric IV antibiotics on hold   ID and neurology consult  Follow-up CSF cultures    Septic shock  Unclear source of infection  Leukocytosis and procalcitonin elevated on admission  However, may not be true septic shock , may have been severe volume depletion in the setting of DKA    Currently on vasopressors   No fevers    Plan:   Patient has been weaned off Levophed   Follow-up CSF, blood and urine cultures   ID consulted  Zosyn was de-escalated to Rocephin, Flagyl and vancomycin. Holding empiric IV antibiotics and monitoring    Nonintractable headache  Ongoing x5-6 days  Described as aching, pressure starting in her neck and spreading up over her head  Initially 9/10, down to 6/10 after benadryl, Toradol, and compazine  States she had a dose of sumatriptan 4/11 w/o improvement  Also states Fioricet usually works but not this time  No meningeal signs on exam    Symptoms have improved  CSF studies on April 13th are not consistent with  meningitis    Plan:  P.r.n. analgesics :  P.r.n. Tylenol and IV Toradol  Neurology consulted      Insulin pump in place  Patient reports she will run out of insulin tonight, will d/c insulin pump and treat with long-acting, prandial, and SSI   Patient has supplies at home to refill pump after discharge      Depression  Patient has persistent depression which is moderate and is currently controlled. Will Continue anti-depressant medications. We will not consult psychiatry at this time. Patient does not display psychosis at this time. Continue to monitor closely and adjust plan of care as needed.          Hyperglycemia due to diabetes mellitus  See DKA        VTE Risk Mitigation (From admission, onward)           Ordered     enoxaparin injection 40 mg  Every 24 hours         04/13/24 1328     IP VTE HIGH RISK PATIENT  Once         04/13/24 1327     Place sequential compression device  Until discontinued         04/12/24 1745                    Discharge Planning   RYAN:      Code Status: Full Code   Is the patient medically ready for discharge?:     Reason for patient still in hospital (select all that apply): Patient trending condition, Laboratory test, and Treatment  Discharge Plan A: Home                  Ed Aleman MD  Department of Hospital Medicine   [facility]

## 2024-04-15 NOTE — ASSESSMENT & PLAN NOTE
Unclear source of infection  Leukocytosis and procalcitonin elevated on admission  However, may not be true septic shock , may have been severe volume depletion in the setting of DKA    Currently on vasopressors   No fevers    Plan:   Patient has been weaned off Levophed   Follow-up CSF, blood and urine cultures   ID consulted  Zosyn was de-escalated to Rocephin, Flagyl and vancomycin. Holding empiric IV antibiotics and monitoring

## 2024-04-15 NOTE — PLAN OF CARE
Problem: Adult Inpatient Plan of Care  Goal: Plan of Care Review  Outcome: Ongoing, Progressing     VIRTUAL NURSE:  Labs, notes, orders, and careplan reviewed.     Pt resides at [facility]

## 2024-04-15 NOTE — CONSULTS
Patient in with DKA.   Verbal/written education (Diabetes Management Guide) done with patient.  Education included; A1C goal of <6.0% per her Endocrinologist recommendation, current A1C is 7.4% as of 3/12/2024, patient has a Dexcom for continuous glucose monitoring and staes am BG ~<150s and afternoon BGs are in 200s,  hyper/hypoglycemia signs & symptoms/treatment, carbohydrate counting diet which includes how to read a nutrition label, phone apps to see how many carbs are in something, etc., portion size, counting 60 grams of carbs per meal and 15 grams of carbs for snacks (1-3 per day if wanted), including physical activity 5 days/week,  Medication education included Ozempic and insulin.  Patient has an Omnipod with a basal/bolus algorithm programmed and utilizes Dexcom for BG readings every 5 minutes to dose boluses.  Patient does go to school daily and finds lunch time hard to comply with lower carbs.  States she will try to start bringing lunch and snacks to school more often.    Phone number provided to patient if she has any questions post discharge.

## 2024-04-15 NOTE — PLAN OF CARE
Problem: Adult Inpatient Plan of Care  Goal: Plan of Care Review  Outcome: Ongoing, Progressing  Goal: Patient-Specific Goal (Individualized)  Outcome: Ongoing, Progressing  Goal: Absence of Hospital-Acquired Illness or Injury  Outcome: Ongoing, Progressing  Goal: Optimal Comfort and Wellbeing  Outcome: Ongoing, Progressing     Problem: Diabetic Ketoacidosis  Goal: Fluid and Electrolyte Balance with Absence of Ketosis  Outcome: Ongoing, Progressing     Problem: Diabetes Comorbidity  Goal: Blood Glucose Level Within Targeted Range  Outcome: Ongoing, Progressing     Problem: Infection  Goal: Absence of Infection Signs and Symptoms  Outcome: Ongoing, Progressing     Problem: Nutrition Impaired (Sepsis/Septic Shock)  Goal: Optimal Nutrition Intake  Outcome: Ongoing, Progressing

## 2024-04-15 NOTE — ASSESSMENT & PLAN NOTE
Ongoing x5-6 days  Described as aching, pressure starting in her neck and spreading up over her head  Initially 9/10, down to 6/10 after benadryl, Toradol, and compazine  States she had a dose of sumatriptan 4/11 w/o improvement  Also states Fioricet usually works but not this time  No meningeal signs on exam    Symptoms have improved  CSF studies on April 13th are not consistent with meningitis    Plan:  P.r.n. analgesics :  P.r.n. Tylenol and IV Toradol  Neurology consulted

## 2024-04-15 NOTE — ASSESSMENT & PLAN NOTE
4 mm Left upper lobe nodule incidentally seen on CT scan    Plan:   Discussed results with patient. Recommend repeat outpatient CT scan in a year

## 2024-04-16 ENCOUNTER — TELEPHONE (OUTPATIENT)
Dept: DIABETES | Facility: CLINIC | Age: 34
End: 2024-04-16
Payer: MEDICAID

## 2024-04-16 ENCOUNTER — PATIENT MESSAGE (OUTPATIENT)
Dept: DIABETES | Facility: CLINIC | Age: 34
End: 2024-04-16
Payer: MEDICAID

## 2024-04-16 VITALS
DIASTOLIC BLOOD PRESSURE: 89 MMHG | RESPIRATION RATE: 18 BRPM | OXYGEN SATURATION: 99 % | WEIGHT: 174.19 LBS | HEART RATE: 77 BPM | SYSTOLIC BLOOD PRESSURE: 136 MMHG | BODY MASS INDEX: 30.86 KG/M2 | HEIGHT: 63 IN | TEMPERATURE: 98 F

## 2024-04-16 PROBLEM — A41.9 SEPTIC SHOCK: Status: RESOLVED | Noted: 2024-04-13 | Resolved: 2024-04-16

## 2024-04-16 PROBLEM — R65.21 SEPTIC SHOCK: Status: RESOLVED | Noted: 2024-04-13 | Resolved: 2024-04-16

## 2024-04-16 PROBLEM — E11.10 DKA (DIABETIC KETOACIDOSIS): Status: RESOLVED | Noted: 2018-01-07 | Resolved: 2024-04-16

## 2024-04-16 LAB
ANION GAP SERPL CALC-SCNC: 7 MMOL/L (ref 8–16)
BACTERIA NPH AEROBE CULT: NORMAL
BASOPHILS # BLD AUTO: 0.01 K/UL (ref 0–0.2)
BASOPHILS NFR BLD: 0.2 % (ref 0–1.9)
BUN SERPL-MCNC: 8 MG/DL (ref 6–20)
CALCIUM SERPL-MCNC: 8.3 MG/DL (ref 8.7–10.5)
CHLORIDE SERPL-SCNC: 107 MMOL/L (ref 95–110)
CO2 SERPL-SCNC: 25 MMOL/L (ref 23–29)
CREAT SERPL-MCNC: 0.7 MG/DL (ref 0.5–1.4)
DIFFERENTIAL METHOD BLD: ABNORMAL
EOSINOPHIL # BLD AUTO: 0.1 K/UL (ref 0–0.5)
EOSINOPHIL NFR BLD: 1.4 % (ref 0–8)
ERYTHROCYTE [DISTWIDTH] IN BLOOD BY AUTOMATED COUNT: 13.2 % (ref 11.5–14.5)
EST. GFR  (NO RACE VARIABLE): >60 ML/MIN/1.73 M^2
GLUCOSE SERPL-MCNC: 184 MG/DL (ref 70–110)
HCT VFR BLD AUTO: 30.3 % (ref 37–48.5)
HGB BLD-MCNC: 10 G/DL (ref 12–16)
HSV1, PCR, CSF: NEGATIVE
HSV2, PCR, CSF: NEGATIVE
IMM GRANULOCYTES # BLD AUTO: 0.01 K/UL (ref 0–0.04)
IMM GRANULOCYTES NFR BLD AUTO: 0.2 % (ref 0–0.5)
LYMPHOCYTES # BLD AUTO: 2.4 K/UL (ref 1–4.8)
LYMPHOCYTES NFR BLD: 47.1 % (ref 18–48)
MAGNESIUM SERPL-MCNC: 1.8 MG/DL (ref 1.6–2.6)
MCH RBC QN AUTO: 28.3 PG (ref 27–31)
MCHC RBC AUTO-ENTMCNC: 33 G/DL (ref 32–36)
MCV RBC AUTO: 86 FL (ref 82–98)
MONOCYTES # BLD AUTO: 0.4 K/UL (ref 0.3–1)
MONOCYTES NFR BLD: 7 % (ref 4–15)
NEUTROPHILS # BLD AUTO: 2.3 K/UL (ref 1.8–7.7)
NEUTROPHILS NFR BLD: 44.1 % (ref 38–73)
NRBC BLD-RTO: 0 /100 WBC
OHS QRS DURATION: 72 MS
OHS QTC CALCULATION: 469 MS
PHOSPHATE SERPL-MCNC: 3.1 MG/DL (ref 2.7–4.5)
PLATELET # BLD AUTO: 272 K/UL (ref 150–450)
PMV BLD AUTO: 9.7 FL (ref 9.2–12.9)
POCT GLUCOSE: 153 MG/DL (ref 70–110)
POCT GLUCOSE: 228 MG/DL (ref 70–110)
POTASSIUM SERPL-SCNC: 4.3 MMOL/L (ref 3.5–5.1)
RBC # BLD AUTO: 3.53 M/UL (ref 4–5.4)
SODIUM SERPL-SCNC: 139 MMOL/L (ref 136–145)
WBC # BLD AUTO: 5.16 K/UL (ref 3.9–12.7)

## 2024-04-16 PROCEDURE — 94761 N-INVAS EAR/PLS OXIMETRY MLT: CPT

## 2024-04-16 PROCEDURE — 80048 BASIC METABOLIC PNL TOTAL CA: CPT | Performed by: HOSPITALIST

## 2024-04-16 PROCEDURE — 83735 ASSAY OF MAGNESIUM: CPT | Performed by: NURSE PRACTITIONER

## 2024-04-16 PROCEDURE — 36415 COLL VENOUS BLD VENIPUNCTURE: CPT | Performed by: HOSPITALIST

## 2024-04-16 PROCEDURE — 84100 ASSAY OF PHOSPHORUS: CPT | Performed by: NURSE PRACTITIONER

## 2024-04-16 PROCEDURE — 85025 COMPLETE CBC W/AUTO DIFF WBC: CPT | Performed by: NURSE PRACTITIONER

## 2024-04-16 PROCEDURE — 63600175 PHARM REV CODE 636 W HCPCS: Performed by: HOSPITALIST

## 2024-04-16 PROCEDURE — 25000003 PHARM REV CODE 250: Performed by: STUDENT IN AN ORGANIZED HEALTH CARE EDUCATION/TRAINING PROGRAM

## 2024-04-16 PROCEDURE — 25000003 PHARM REV CODE 250: Performed by: NURSE PRACTITIONER

## 2024-04-16 RX ORDER — SUMATRIPTAN SUCCINATE 25 MG/1
50 TABLET ORAL ONCE
Status: COMPLETED | OUTPATIENT
Start: 2024-04-16 | End: 2024-04-16

## 2024-04-16 RX ORDER — SUMATRIPTAN 50 MG/1
50 TABLET, FILM COATED ORAL 2 TIMES DAILY PRN
Qty: 10 TABLET | Refills: 0 | Status: SHIPPED | OUTPATIENT
Start: 2024-04-16

## 2024-04-16 RX ADMIN — KETOROLAC TROMETHAMINE 30 MG: 30 INJECTION, SOLUTION INTRAMUSCULAR; INTRAVENOUS at 08:04

## 2024-04-16 RX ADMIN — INSULIN ASPART 5 UNITS: 100 INJECTION, SOLUTION INTRAVENOUS; SUBCUTANEOUS at 06:04

## 2024-04-16 RX ADMIN — SUMATRIPTAN SUCCINATE 50 MG: 25 TABLET ORAL at 01:04

## 2024-04-16 RX ADMIN — INSULIN ASPART 2 UNITS: 100 INJECTION, SOLUTION INTRAVENOUS; SUBCUTANEOUS at 12:04

## 2024-04-16 RX ADMIN — FLUOXETINE HYDROCHLORIDE 20 MG: 20 CAPSULE ORAL at 08:04

## 2024-04-16 RX ADMIN — INSULIN ASPART 4 UNITS: 100 INJECTION, SOLUTION INTRAVENOUS; SUBCUTANEOUS at 06:04

## 2024-04-16 RX ADMIN — INSULIN ASPART 5 UNITS: 100 INJECTION, SOLUTION INTRAVENOUS; SUBCUTANEOUS at 12:04

## 2024-04-16 NOTE — PROGRESS NOTES
Discharge orders noted. Additional clinical references attached. Patient's discharge instructions given by bedside RN. Virtual nurse cued into room and reviewed discharge instructions. Education provided on new medication, diagnosis, and follow-up appointments. Teach back method used. Patient verbalized understanding. All questions answered. Awaiting family for . Bedside nurse updated on patient status and to request transportation when ready.   04/16/24 1895   AVS Confirmation   Discharge instructions and AVS given to and reviewed with patient and/or significant other. Yes

## 2024-04-16 NOTE — HOSPITAL COURSE
"33-year-old female with PMH of hyperlipidemia, depression, hypertension, type 1 diabetes mellitus who presented with intractable headache. Found to be in DKA which resolved with IV insulin.  Patient transitioned to basal/bolus insulin while in the hospital.  At the time of discharge patient would like to use her own insulin pump.  Patient was seen by infectious disease and neurology for headaches.  Initial concern for meningitis but CSF analysis not consistent with it. CT head with no acute abnormalities. CT chest, abdomen, pelvis showed no signs of infection. All antibiotics were discontinued and patient monitored off antibiotics.  Headache improved with sumatriptan. Patient agreeable for DC with outpatient follow up. Afebrile with stable VS. Outpatient Neurology, endocrinology follow-up ordered.  Suspect migraine/tension headaches.  She also takes considerable amount of caffeine in energy drinks.  Abrupt withdrawal of high doses of caffeine could contribute to intractable headaches. Advised to gradually wean off energy drinks if intended and avoid abrupt cessation.     /89 (BP Location: Left arm, Patient Position: Sitting)   Pulse 77   Temp 98.2 °F (36.8 °C) (Oral)   Resp 18   Ht 5' 2.99" (1.6 m)   Wt 79 kg (174 lb 2.6 oz)   LMP 04/01/2024   SpO2 99%   Breastfeeding No   BMI 30.86 kg/m²     Physical Exam  Constitutional:       General: She is not in acute distress.  Cardiovascular:      Rate and Rhythm: Regular rhythm. Normal rate  Pulmonary:      Effort: Pulmonary effort is normal. No respiratory distress.   Abdominal:      General: Bowel sounds are normal. There is no distension.      Palpations: Abdomen is soft.      Tenderness: There is no abdominal tenderness.  Musculoskeletal:      Cervical back: No rigidity.      Right lower leg: No edema.      Left lower leg: No edema.   Skin:     General: Skin is warm.   Neurological:      Mental Status: She is alert and oriented to person, place, and time. "   Psychiatric:         Mood and Affect: Mood normal.

## 2024-04-16 NOTE — PLAN OF CARE
Pt will dc with no needs noted. Pts mother will provide transport home. SW will continue to follow pt throughout her transitions of care and assist with any dc needs.     SW requested PCP follow up.  SW requested Endo follow up.    SW requested Neuro follow up.     Cleared from CM . Bedside Nurse and VN notified.    Future Appointments   Date Time Provider Department Center   5/6/2024  2:40 PM Raad Cuenca OD [facility]   5/28/2024  3:00 PM Patria Wu MD [facility] GASTRO [facility]   6/21/2024  7:00 AM LAB, [facility] . [facility]   6/28/2024  8:00 AM Ritika Grewal NP [facility] DIMGNT [facility]        04/16/24 0935   Final Note   Assessment Type Final Discharge Note   Anticipated Discharge Disposition Home   Hospital Resources/Appts/Education Provided Appointments scheduled and added to AVS   Post-Acute Status   Discharge Delays None known at this time

## 2024-04-16 NOTE — PROGRESS NOTES
Followed up with pt on diabetic diet education. Noted pt was educated pt diabetic educator this admit. Answered pt questions. Encouraged diet compliance and encouraged her to keep snacks handy in case her sugar drops. Pt to d/c today.

## 2024-04-16 NOTE — DISCHARGE SUMMARY
"[facility] Medicine  Discharge Summary      Patient Name: Anusha Andrew  MRN: [id number]  THAIS: [id number]  Patient Class: IP- Inpatient  Admission Date: 4/12/2024  Hospital Length of Stay: 4 days  Discharge Date and Time:  04/16/2024 4:14 PM  Attending Physician: No att. providers found   Discharging Provider: Tara Simeon MD  Primary Care Provider: Magaly Muhammad NP    Primary Care Team: Networked reference to record PCT     HPI:   Anusha Andrew is a 33 yr old female w/ PMH HLD, depression, HTN, and DMT1. She presents to the ER with reports of headache. Pt states she was seen at [facility] and given shot of Imitrex. Pt states the headache has not improved. Denies fever, rash, or neck stiffness. Nausea and vomiting reported. Pt states "when I get headaches like this, I usually respond well to Fioricet". Pt reported some photosensitivity however states it has improved. She states the headache did improve from 9/10 to 6/10 after initial ED treatment. She describes it as aching, pressure starting in her neck and spreading up over her head. No thunderclap sensation or associated vision changes. She reports a couple of weeks ago she had flu symptoms but that those have mostly resolved. Per chart review she was diagnosed with Flu B and treated with Tamiflu on 3/28. In the ED patient also noted to be in DKA with AG 17, , beta 3.1, and metabolic acidosis on VBG.    * No surgery found *      Hospital Course:   33-year-old female with PMH of hyperlipidemia, depression, hypertension, type 1 diabetes mellitus who presented with intractable headache. Found to be in DKA which resolved with IV insulin.  Patient transitioned to basal/bolus insulin while in the hospital.  At the time of discharge patient would like to use her own insulin pump.  Patient was seen by infectious disease and neurology for headaches.  Initial concern for meningitis but CSF analysis not consistent with it. CT head with no " "acute abnormalities. CT chest, abdomen, pelvis showed no signs of infection. All antibiotics were discontinued and patient monitored off antibiotics.  Headache improved with sumatriptan. Patient agreeable for DC with outpatient follow up. Afebrile with stable VS. Outpatient Neurology, endocrinology follow-up ordered.  Suspect migraine/tension headaches.  She also takes considerable amount of caffeine in energy drinks.  Abrupt withdrawal of high doses of caffeine could contribute to intractable headaches. Advised to gradually wean off energy drinks if intended and avoid abrupt cessation.     /89 (BP Location: Left arm, Patient Position: Sitting)   Pulse 77   Temp 98.2 °F (36.8 °C) (Oral)   Resp 18   Ht 5' 2.99" (1.6 m)   Wt 79 kg (174 lb 2.6 oz)   LMP 04/01/2024   SpO2 99%   Breastfeeding No   BMI 30.86 kg/m²     Physical Exam  Constitutional:       General: She is not in acute distress.  Cardiovascular:      Rate and Rhythm: Regular rhythm. Normal rate  Pulmonary:      Effort: Pulmonary effort is normal. No respiratory distress.   Abdominal:      General: Bowel sounds are normal. There is no distension.      Palpations: Abdomen is soft.      Tenderness: There is no abdominal tenderness.  Musculoskeletal:      Cervical back: No rigidity.      Right lower leg: No edema.      Left lower leg: No edema.   Skin:     General: Skin is warm.   Neurological:      Mental Status: She is alert and oriented to person, place, and time.   Psychiatric:         Mood and Affect: Mood normal.           Goals of Care Treatment Preferences:  Code Status: Full Code      Consults:   Consults (From admission, onward)          Status Ordering Provider     Inpatient consult to Diabetes educator  Once        Provider:  (Not yet assigned)    Completed ANDREA BUSBY     Inpatient consult to Infectious Diseases  Once        Provider:  (Not yet assigned)    Completed ANDREA BUSBY     Inpatient consult to U Neurology  Once   "      Provider:  (Not yet assigned)    Completed ANDREA BUSBY     Inpatient consult to Registered Dietitian/Nutritionist  Once        Provider:  (Not yet assigned)    Completed PERCY YEH              Final Active Diagnoses:    Diagnosis Date Noted POA    Pelvic ascites [R18.8] 04/14/2024 Yes    Pulmonary nodule [R91.1] 04/14/2024 Yes    Meningitis [G03.9] 04/13/2024 Yes    Nonintractable headache [R51.9] 04/12/2024 Yes    Insulin pump in place [Z96.41] 03/27/2024 Not Applicable    Depression [F32.A] 04/10/2018 Yes     Chronic    Hyperglycemia due to diabetes mellitus [E11.65] 09/14/2016 Yes      Problems Resolved During this Admission:    Diagnosis Date Noted Date Resolved POA    PRINCIPAL PROBLEM:  DKA (diabetic ketoacidosis) [E11.10] 01/07/2018 04/16/2024 Yes    Septic shock [A41.9, R65.21] 04/13/2024 04/16/2024 Yes       Discharged Condition: stable    Disposition: Home or Self Care    Follow Up:   Follow-up Information       [facility] Follow up.    Why: Please review [facility] for future follow up appointments.             [facility] Follow up.    Why: [facility] will contact patient of future follow up appointments.                         Patient Instructions:      Ambulatory referral/consult to Endocrinology   Standing Status: Future   Referral Priority: Routine Referral Type: Consultation   Requested Specialty: Endocrinology   Number of Visits Requested: 1     Ambulatory referral/consult to Neurology   Standing Status: Future   Referral Priority: Routine Referral Type: Consultation   Referral Reason: Specialty Services Required   Requested Specialty: Neurology   Number of Visits Requested: 1       Significant Diagnostic Studies: Labs: BMP:   Recent Labs   Lab 04/15/24  0254 04/16/24  0347   * 184*    139   K 4.2 4.3    107   CO2 22* 25   BUN 11 8   CREATININE 0.7 0.7   CALCIUM 7.9* 8.3*   MG 1.9 1.8    and CBC   Recent Labs   Lab 04/15/24  0254 04/16/24  0347   WBC  "6.04 5.16   HGB 9.6* 10.0*   HCT 29.4* 30.3*    272       Pending Diagnostic Studies:       Procedure Component Value Units Date/Time    Freeze and Hold,  [[id number]] Collected: 04/13/24 1326    Order Status: Sent Lab Status: No result     Specimen: CSF (Spinal Fluid) from Cerebrospinal Fluid     Herpes Simplex (HSV) by PCR, CSF [[id number]] Collected: 04/13/24 1259    Order Status: Sent Lab Status: In process Updated: 04/13/24 5065    Specimen: CSF (Spinal Fluid) from Cerebrospinal Fluid            Medications:  Reconciled Home Medications:      Medication List        START taking these medications      sumatriptan 50 MG tablet  Commonly known as: IMITREX  Take 1 tablet (50 mg total) by mouth 2 (two) times daily as needed (for intractable headaches).            CONTINUE taking these medications      BAQSIMI 3 mg/actuation Spry  Generic drug: glucagon  Give one puff via nostril. Hold device between fingers and thumb, do not push plunger yet, insert tip gently into one nostril until finger(s) touch the outside of the nose, then push plunger firmly all the way in . Dose is complete when the green line disappears.     BD INSULIN SYRINGE ULTRA-FINE 0.5 mL 31 gauge x 5/16" Syrg  Generic drug: insulin syringe-needle U-100  use with admelog     BD EBEN 2ND GEN PEN NEEDLE 32 gauge x 5/32" Ndle  Generic drug: pen needle, diabetic  To use with insulin injections 4 times per day     blood sugar diagnostic Strp  To check BG 4  times daily, to use with insurance preferred meter, e 10.65     DEXCOM G6 SENSOR Saida  Generic drug: blood-glucose sensor  1 sensor every 10 days     DEXCOM G6 TRANSMITTER Saida  Generic drug: blood-glucose transmitter  1 transmitter every 3 months     FLUoxetine 20 MG capsule  Take 1 capsule (20 mg total) by mouth 2 (two) times daily.     fluticasone propionate 50 mcg/actuation nasal spray  Commonly known as: FLONASE  1 spray (50 mcg total) by Each Nostril route 2 (two) times daily as needed.   "   hydrocortisone 2.5 % rectal cream  Commonly known as: ANUSOL-HC  Place rectally 2 (two) times daily.     OMNIPOD 5 G6 PODS (GEN 5) Crtg  Generic drug: insulin pump cart,automated,BT  Inject 1 Device into the skin Every 3 (three) days.     OZEMPIC 0.25 mg or 0.5 mg (2 mg/3 mL) pen injector  Generic drug: semaglutide  Inject 0.25 mg into the skin every 7 days. ICD 10: E11.9     polyethylene glycol 17 gram/dose powder  Commonly known as: GLYCOLAX  Take 17 g by mouth once daily.     VITAMIN D2 1,250 mcg (50,000 unit) capsule  Generic drug: ergocalciferol  Take 1 capsule (50,000 Units total) by mouth every 7 days.            STOP taking these medications      ibuprofen 800 MG tablet  Commonly known as: JAGUAR GÓMEZ            ASK your doctor about these medications      * FIASP FLEXTOUCH U-100 INSULIN 100 unit/mL (3 mL) Inpn  Generic drug: insulin aspart (niacinamide)  use three times daily before meals. Inject ICR 1:8 + snacks + correction scale. Max TDD of 50 units     * FIASP U-100 INSULIN 100 unit/mL Soln  Generic drug: insulin aspart (niacinamide)  To use continuously with insulin pump. Max TDD of 100 units           * This list has 2 medication(s) that are the same as other medications prescribed for you. Read the directions carefully, and ask your doctor or other care provider to review them with you.                  Indwelling Lines/Drains at time of discharge:   Lines/Drains/Airways       None                   Time spent on the discharge of patient: 38 minutes         Tara Simeon MD  Department of [facility] Medicine  [facility]

## 2024-04-18 LAB
BACTERIA BLD CULT: NORMAL
BACTERIA CSF CULT: NO GROWTH
GRAM STN SPEC: NORMAL

## 2024-04-23 ENCOUNTER — PATIENT MESSAGE (OUTPATIENT)
Dept: DIABETES | Facility: CLINIC | Age: 34
End: 2024-04-23
Payer: MEDICAID

## 2024-04-29 PROBLEM — E10.10 DIABETIC KETOACIDOSIS WITHOUT COMA ASSOCIATED WITH TYPE 1 DIABETES MELLITUS: Status: RESOLVED | Noted: 2024-01-28 | Resolved: 2024-04-29

## 2024-04-30 ENCOUNTER — TELEPHONE (OUTPATIENT)
Dept: DIABETES | Facility: CLINIC | Age: 34
End: 2024-04-30
Payer: MEDICAID

## 2024-04-30 ENCOUNTER — PATIENT MESSAGE (OUTPATIENT)
Dept: DIABETES | Facility: CLINIC | Age: 34
End: 2024-04-30
Payer: MEDICAID

## 2024-04-30 NOTE — PROGRESS NOTES
The patient location is: [place]- in class   The chief complaint leading to consultation is: Diabetes management - follow up     Visit type: audiovisual    Face to Face time with patient: 18 minutes   30 minutes of total time spent on the encounter, which includes face to face time and non-face to face time preparing to see the patient (eg, review of tests), Obtaining and/or reviewing separately obtained history, Documenting clinical information in the electronic or other health record, Independently interpreting results (not separately reported) and communicating results to the patient/family/caregiver, or Care coordination (not separately reported).         Each patient to whom he or she provides medical services by telemedicine is:  (1) informed of the relationship between the physician and patient and the respective role of any other health care provider with respect to management of the patient; and (2) notified that he or she may decline to receive medical services by telemedicine and may withdraw from such care at any time.    Notes:       CC:   Chief Complaint   Patient presents with    Diabetes Mellitus       HPI: Anusha Andrew is a 33 y.o. female presents for a follow up visit today for the management of T1DM       She was diagnosed with Type 1 diabetes at 17 year old. Hospitalized at the time of diagnosis and started on insulin therapy     Family hx of diabetes: brother-- T1DM     Hospitalized for diabetes:   Scare with EMS-severe hypoglycemia  12/31/22 - hypoglycemia EMS, used baqsimi  12/28/22 covid19 -- hypoglycemia as well.   H/o DKA. 10/2018 DKA/admission  DKA- 4/12    F/u with retinal specialist    No personal or FH of thyroid cancer or personal of pancreatic cancer or pancreatitis.     Gastric sleeve in July 2022  She lost about 50 lb    She reports since she had the gastric sleeve she is been suffering with issues of hypoglycemia      Our last visit was at the end of March- virtual visit   Events  "since last visit   March 28th - she had the flu -ER-   Er again 4/11- for HA   ER again 4/12-for HA -- but dx with DKA -- meningitis - admitted to [facility]   HA has resolved     She does not have the dexcom on now ----needs to  the supplies  She had an episode of marked hypoglycemia on 4/22- when this happened she did not have her pump -- she was on pens --- reports that she was on the dexcom and it was reading "low" ---she was doing MDI at the time but only taking NovoLog injections not taking any Tresiba--reports that she took 8 units before the episode happened but her pump calculation was telling her to give to were 3 units she is stating but she was not on the pump    She restarted ozempic about 2 weeks ago -- restarted per her PCP.  She reports that it is being covered by her insurance.                          DIABETES COMPLICATIONS: retinopathy      Diabetes Management Status    ASA:  No    Statin: Not taking  ACE/ARB: Not taking    The ASCVD Risk score (Anoop SHEA, et al., 2019) failed to calculate for the following reasons:    The 2019 ASCVD risk score is only valid for ages 40 to 79      Screening or Prevention Patient's value Goal Complete/Controlled?   HgA1C Testing and Control   Lab Results   Component Value Date    HGBA1C 7.4 (H) 03/12/2024      Annually/Less than 8% Yes     Lipid profile : 08/04/2023 Annually Yes     LDL control Lab Results   Component Value Date    LDLCALC 130.0 08/04/2023    Annually/Less than 100 mg/dl  No     Nephropathy screening Lab Results   Component Value Date    LABMICR 6.0 03/12/2024     Lab Results   Component Value Date    PROTEINUA Negative 04/12/2024    Annually Yes     Blood pressure BP Readings from Last 1 Encounters:   04/16/24 136/89    Less than 140/90 Yes     Dilated retinal exam : 04/12/2023 Annually Yes     Foot exam   : 04/26/2023 Annually No         CURRENT A1C:    Hemoglobin A1C   Date Value Ref Range Status   03/12/2024 7.4 (H) 4.0 - 5.6 % Final     " Comment:     ADA Screening Guidelines:  5.7-6.4%  Consistent with prediabetes  >or=6.5%  Consistent with diabetes    High levels of fetal hemoglobin interfere with the HbA1C  assay. Heterozygous hemoglobin variants (HbS, HgC, etc)do  not significantly interfere with this assay.   However, presence of multiple variants may affect accuracy.     2023 7.7 (H) 4.0 - 5.6 % Final     Comment:     ADA Screening Guidelines:  5.7-6.4%  Consistent with prediabetes  >or=6.5%  Consistent with diabetes    High levels of fetal hemoglobin interfere with the HbA1C  assay. Heterozygous hemoglobin variants (HbS, HgC, etc)do  not significantly interfere with this assay.   However, presence of multiple variants may affect accuracy.     2023 7.0 (H) 4.0 - 5.6 % Final     Comment:     ADA Screening Guidelines:  5.7-6.4%  Consistent with prediabetes  >or=6.5%  Consistent with diabetes    High levels of fetal hemoglobin interfere with the HbA1C  assay. Heterozygous hemoglobin variants (HbS, HgC, etc)do  not significantly interfere with this assay.   However, presence of multiple variants may affect accuracy.         GOAL A1C: 6.5% without hypoglycemia       DM MEDICATIONS USED IN THE PAST:  humalog  levemir  novolog  lantus   Metformin   Trulicity   Ozempic   Dexcom   Fiasp   Tresiba   Omnipod 5         CURRENT DIABETES MEDICATIONS:   Ozempic 0.25 mg weekly -- restarted her PCP     Insulin Pump: Omnipod 5 with Fiasp     Insulin pump settings  Basal: 0.7 units/hr      ICR   MN-12PM- 1:8- tightened   12PM- MN- 1:7.5-- tightened   ISF: 1:45   Target 120-lowered the target      IOB 3 hours --       Temp basals: ?    Pump site change: q 2-3 days   Cartridge change: q 2-3 days  Insulin TDD:  36.8 units    Basal 55 %   Bolus 45 %   CHO intake: 110.6 grams per day   Using the bolus wizard: yes   Overridin%    In automated mode 100% of the time      Back up Lantus/Levemir: back up Tresiba     Patient's pump was downloaded in clinic  today and reviewed with patient.   Please see attached documents for pump download.     Supplies-- Pharmacy       BLOOD GLUCOSE MONITORING:    Sensor type: Dexcom G6   Average BG readin  Time in range: 59%   23% high   16% very high   1% low   <1% very low   Estimated A1c 7.5%  Site change:   q10 days     2 weeks prior -- average blood sugar 203 in target range 48% of the time with an estimated A1c of 8.2%    Supplies: pharmacy       Sensor was downloaded in clinic today and reviewed with patient.   Please see attached document for download.       HYPOGLYCEMIA: 1% low and <1% very low   Glucagon kit: Baqsimi   Medic alert bracelet: denies         MEALS: eating 3 meals per day   Feels confident with CHO counting   Uses applications she is out eating   BF: eggs -- coffee in the AM   Lunch: salad, grilled chicken, tuna   Dinner: pasta, beans, spaghetti, sandwich   Snack: rarely   Drinks: coffee in AM   Water   Diet tea        CURRENT EXERCISE:  No      Review of Systems  Review of Systems   Constitutional:  Negative for appetite change, fatigue and unexpected weight change.   HENT:  Negative for trouble swallowing.    Eyes:  Negative for visual disturbance.   Respiratory:  Negative for shortness of breath.    Cardiovascular:  Negative for chest pain.   Gastrointestinal:  Negative for nausea.   Endocrine: Negative for polydipsia, polyphagia and polyuria.   Genitourinary:         No Nocturia    Skin:  Negative for wound.   Neurological:  Negative for numbness.       Physical Exam   Physical Exam  Vitals and nursing note reviewed.   Constitutional:       General: She is not in acute distress.     Appearance: Normal appearance. She is not ill-appearing.   HENT:      Head: Normocephalic and atraumatic.      Nose: Nose normal.   Pulmonary:      Effort: Pulmonary effort is normal.   Neurological:      General: No focal deficit present.      Mental Status: She is alert and oriented to person, place, and time.    Psychiatric:         Mood and Affect: Mood normal.         Behavior: Behavior normal.         Thought Content: Thought content normal.         Judgment: Judgment normal.         FOOT EXAMINATION: deferred due to virtual visit         Lab Results   Component Value Date    TSH 2.153 03/12/2024           Type 1 diabetes mellitus with hyperglycemia  Uncontrolled   Blood sugar readings above goal  + post prandial excursions   She feels confident with CHO counting   She is back on ozempic per her PCP   We had a long discussion that she needs to stay on both of her devices.  It is hard to manage when she has not consistently using her insulin pump and her Dexcom together.  She had an episode of marked hypoglycemia but she did not have her pump on and she was giving just NovoLog injections.  We reviewed how much insulin she is supposed to be doing if she is off of her pump and that she has on a 1:9 or 1:8 RATIO   But I would strongly recommend that she stay on her Dexcom G6 and her Omnipod 5 at all times  We discussed that she needs to stay on top of her refills and get her medications regularly so she does not have gaps        -- Medication Changes:    Okay to continue the Ozempic 0.25 mg weekly that her PCP ordered    Continue omnipod 5 and Dexcom G6 and use them consistently in auto mode   Fiasp insulin   Omnipod 5   Insulin pump settings---continue current pump settings  Basal: 0.7 units/hr     ICR   MN-12PM- 1:8-   12PM- MN- 1:7.5--  ISF: 1:45   Target 120    IOB 3 hours --      -- Reviewed goals of therapy are to get the best control we can without hypoglycemia.  -- Reviewed patient's current insulin regimen. Clarified proper insulin dose and timing in relation to meals, etc. Insulin injection sites and proper rotation instructed.    -- Advised frequent self blood glucose monitoring.  Patient encouraged to document glucose results and bring them to every clinic visit.  Needs to use Dexcom G6 with Omnipod 5 --  supplies from pharmacy  -- needs to use consistently   -- Hypoglycemia precautions discussed. Instructed on precautions before driving.    -- Call for Bg repeatedly < 70 or > 200.   -- Close adherence to lifestyle changes recommended.   -- Periodic follow ups for eye evaluations, foot care and dental care suggested.        Patient has diabetes mellitus and manages diabetes with intensive insulin regimen and uses prandial and basal insulin daily  Patient requires a therapeutic CGM and is willing to use therapeutic CGM for the necessary frequent adjustments of insulin therapy.  I have completed an in-person visit during the previous 6 months and will continue to have in-person visits every 6 months to assess adherence to their CGM regimen and diabetes treatment plan.  Due to COVID pandemic and need for remote monitoring this tool is medically necessary  Hx of marked hypoglycemia with EMS activation       Mild nonproliferative diabetic retinopathy of both eyes without macular edema associated with type 1 diabetes mellitus  Optimize BG readings.   See above.   Continue to follow-up with optometry as directed    History of diabetes with ketoacidosis  Of note     Overweight (BMI 25.0-29.9)  Body mass index is 27.99 kg/m².  Increases insulin resistance.   Discussed DM diet and exercise.       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          Pump backup plan    If the insulin pump is non functional and discontinued for anticipated more than 20 hours, please give daily injections of:   Long acting insulin Tresiba 14 units daily -- can split to 7 units BID   Short acting insulin Fiasp- 1:9-1:8 for meals according to carb ratios and sensitivity factor in the pump.     With using correction scale:    150-200: +1 unit  201-250: +2 units  251-300: +3 units  301-350: +4 units  >350: +5 units        When the insulin pump is restarted, do not restart basal rates until at least 22 hours after the last long acting insulin injection. You can set a 0% temporary basal setting that will last until this time and use your pump to bolus for meals and correction.     For any technical insulin pump issues, please contact the insulin pump company; the toll free number is printed on the label on the back of the insulin pump.       If your sugar is running high for a few hours and does not respond to two correction doses from the insulin pump, it may mean that you have a bad pump site and the site should be changed ,p          Follow up in about 8 weeks (around 6/26/2024).  Already scheduled to follow-up with labs prior.  Keep appointment      No orders of the defined types were placed in this encounter.      Recommendations were discussed with the patient in detail  The patient verbalized understanding and agrees with the plan outlined as above.     This note was partly generated with Yap voice recognition software. I apologize for any possible typographical errors.

## 2024-05-01 ENCOUNTER — PATIENT MESSAGE (OUTPATIENT)
Dept: DIABETES | Facility: CLINIC | Age: 34
End: 2024-05-01

## 2024-05-01 ENCOUNTER — OFFICE VISIT (OUTPATIENT)
Dept: DIABETES | Facility: CLINIC | Age: 34
End: 2024-05-01
Payer: MEDICAID

## 2024-05-01 VITALS — HEIGHT: 64 IN | BODY MASS INDEX: 27.49 KG/M2 | WEIGHT: 161 LBS

## 2024-05-01 DIAGNOSIS — Z86.39 HISTORY OF DIABETES WITH KETOACIDOSIS: ICD-10-CM

## 2024-05-01 DIAGNOSIS — Z90.3 HISTORY OF SLEEVE GASTRECTOMY: ICD-10-CM

## 2024-05-01 DIAGNOSIS — F32.1 CURRENT MODERATE EPISODE OF MAJOR DEPRESSIVE DISORDER WITHOUT PRIOR EPISODE: Chronic | ICD-10-CM

## 2024-05-01 DIAGNOSIS — E66.3 OVERWEIGHT (BMI 25.0-29.9): ICD-10-CM

## 2024-05-01 DIAGNOSIS — E10.65 TYPE 1 DIABETES MELLITUS WITH HYPERGLYCEMIA: Primary | ICD-10-CM

## 2024-05-01 DIAGNOSIS — Z71.9 HEALTH EDUCATION/COUNSELING: ICD-10-CM

## 2024-05-01 DIAGNOSIS — Z91.199 NONCOMPLIANCE WITH DIABETES TREATMENT: ICD-10-CM

## 2024-05-01 DIAGNOSIS — Z96.41 INSULIN PUMP IN PLACE: ICD-10-CM

## 2024-05-01 DIAGNOSIS — E10.649 TYPE 1 DIABETES MELLITUS WITH HYPOGLYCEMIA UNAWARENESS: ICD-10-CM

## 2024-05-01 DIAGNOSIS — E10.3293 MILD NONPROLIFERATIVE DIABETIC RETINOPATHY OF BOTH EYES WITHOUT MACULAR EDEMA ASSOCIATED WITH TYPE 1 DIABETES MELLITUS: ICD-10-CM

## 2024-05-01 PROCEDURE — 1111F DSCHRG MED/CURRENT MED MERGE: CPT | Mod: CPTII,95,, | Performed by: NURSE PRACTITIONER

## 2024-05-01 PROCEDURE — 99214 OFFICE O/P EST MOD 30 MIN: CPT | Mod: 95,,, | Performed by: NURSE PRACTITIONER

## 2024-05-01 PROCEDURE — 1160F RVW MEDS BY RX/DR IN RCRD: CPT | Mod: CPTII,95,, | Performed by: NURSE PRACTITIONER

## 2024-05-01 PROCEDURE — 3061F NEG MICROALBUMINURIA REV: CPT | Mod: CPTII,95,, | Performed by: NURSE PRACTITIONER

## 2024-05-01 PROCEDURE — 3051F HG A1C>EQUAL 7.0%<8.0%: CPT | Mod: CPTII,95,, | Performed by: NURSE PRACTITIONER

## 2024-05-01 PROCEDURE — 3066F NEPHROPATHY DOC TX: CPT | Mod: CPTII,95,, | Performed by: NURSE PRACTITIONER

## 2024-05-01 PROCEDURE — 95251 CONT GLUC MNTR ANALYSIS I&R: CPT | Mod: S$PBB,NDTC,, | Performed by: NURSE PRACTITIONER

## 2024-05-01 PROCEDURE — 3008F BODY MASS INDEX DOCD: CPT | Mod: CPTII,95,, | Performed by: NURSE PRACTITIONER

## 2024-05-01 PROCEDURE — 1159F MED LIST DOCD IN RCRD: CPT | Mod: CPTII,95,, | Performed by: NURSE PRACTITIONER

## 2024-05-01 RX ORDER — INSULIN DEGLUDEC 100 U/ML
7 INJECTION, SOLUTION SUBCUTANEOUS 2 TIMES DAILY
Qty: 15 ML | Refills: 11 | Status: SHIPPED | OUTPATIENT
Start: 2024-05-01

## 2024-05-01 RX ORDER — INSULIN ASPART INJECTION 100 [IU]/ML
INJECTION, SOLUTION SUBCUTANEOUS
Qty: 5 PEN | Refills: 6 | Status: SHIPPED | OUTPATIENT
Start: 2024-05-01

## 2024-05-01 RX ORDER — INSULIN PMP CART,AUT,G6/7,CNTR
1 EACH SUBCUTANEOUS
Qty: 15 EACH | Refills: 11 | Status: SHIPPED | OUTPATIENT
Start: 2024-05-01

## 2024-05-01 RX ORDER — BLOOD-GLUCOSE TRANSMITTER
EACH MISCELLANEOUS
Qty: 1 EACH | Refills: 4 | Status: SHIPPED | OUTPATIENT
Start: 2024-05-01

## 2024-05-01 RX ORDER — INSULIN ASPART INJECTION 100 [IU]/ML
INJECTION, SOLUTION SUBCUTANEOUS
Qty: 30 ML | Refills: 11 | Status: SHIPPED | OUTPATIENT
Start: 2024-05-01

## 2024-05-01 RX ORDER — BLOOD-GLUCOSE SENSOR
EACH MISCELLANEOUS
Qty: 3 EACH | Refills: 11 | Status: SHIPPED | OUTPATIENT
Start: 2024-05-01

## 2024-05-01 NOTE — ASSESSMENT & PLAN NOTE
Has Debi   Continue to use dexcom -- discussed the importance of consistently using the dexcom     Reviewed hypoglycemia management

## 2024-05-01 NOTE — Clinical Note
Can we make sure that she is able to  her Dexcom supplies.  She mentioned that she is having some difficulties

## 2024-05-01 NOTE — PATIENT INSTRUCTIONS
Pump backup plan    If the insulin pump is non functional and discontinued for anticipated more than 20 hours, please give daily injections of:   Long acting insulin Tresiba 14 units daily -- can split to 7 units BID   Short acting insulin Fiasp or novolog - 1:9-1:8 for meals according to carb ratios and sensitivity factor in the pump.     With using correction scale:    150-200: +1 unit  201-250: +2 units  251-300: +3 units  301-350: +4 units  >350: +5 units        When the insulin pump is restarted, do not restart basal rates until at least 22 hours after the last long acting insulin injection. You can set a 0% temporary basal setting that will last until this time and use your pump to bolus for meals and correction.     For any technical insulin pump issues, please contact the insulin pump company; the toll free number is printed on the label on the back of the insulin pump.       If your sugar is running high for a few hours and does not respond to two correction doses from the insulin pump, it may mean that you have a bad pump site and the site should be changed ,p

## 2024-05-01 NOTE — ASSESSMENT & PLAN NOTE
Body mass index is 27.99 kg/m².  Increases insulin resistance.   Discussed DM diet and exercise.

## 2024-05-01 NOTE — ASSESSMENT & PLAN NOTE
Uncontrolled   Blood sugar readings above goal  + post prandial excursions   She feels confident with CHO counting   She is back on ozempic per her PCP   We had a long discussion that she needs to stay on both of her devices.  It is hard to manage when she has not consistently using her insulin pump and her Dexcom together.  She had an episode of marked hypoglycemia but she did not have her pump on and she was giving just NovoLog injections.  We reviewed how much insulin she is supposed to be doing if she is off of her pump and that she has on a 1:9 or 1:8 RATIO   But I would strongly recommend that she stay on her Dexcom G6 and her Omnipod 5 at all times  We discussed that she needs to stay on top of her refills and get her medications regularly so she does not have gaps        -- Medication Changes:    Okay to continue the Ozempic 0.25 mg weekly that her PCP ordered    Continue omnipod 5 and Dexcom G6 and use them consistently in auto mode   Fiasp insulin   Omnipod 5   Insulin pump settings---continue current pump settings  Basal: 0.7 units/hr     ICR   MN-12PM- 1:8-   12PM- MN- 1:7.5--  ISF: 1:45   Target 120    IOB 3 hours --      -- Reviewed goals of therapy are to get the best control we can without hypoglycemia.  -- Reviewed patient's current insulin regimen. Clarified proper insulin dose and timing in relation to meals, etc. Insulin injection sites and proper rotation instructed.    -- Advised frequent self blood glucose monitoring.  Patient encouraged to document glucose results and bring them to every clinic visit.  Needs to use Dexcom G6 with Omnipod 5 -- supplies from pharmacy  -- needs to use consistently   -- Hypoglycemia precautions discussed. Instructed on precautions before driving.    -- Call for Bg repeatedly < 70 or > 200.   -- Close adherence to lifestyle changes recommended.   -- Periodic follow ups for eye evaluations, foot care and dental care suggested.        Patient has diabetes mellitus  and manages diabetes with intensive insulin regimen and uses prandial and basal insulin daily  Patient requires a therapeutic CGM and is willing to use therapeutic CGM for the necessary frequent adjustments of insulin therapy.  I have completed an in-person visit during the previous 6 months and will continue to have in-person visits every 6 months to assess adherence to their CGM regimen and diabetes treatment plan.  Due to COVID pandemic and need for remote monitoring this tool is medically necessary  Hx of marked hypoglycemia with EMS activation

## 2024-05-28 ENCOUNTER — PATIENT MESSAGE (OUTPATIENT)
Dept: DIABETES | Facility: CLINIC | Age: 34
End: 2024-05-28
Payer: MEDICAID

## 2024-05-30 ENCOUNTER — TELEPHONE (OUTPATIENT)
Dept: OBSTETRICS AND GYNECOLOGY | Facility: CLINIC | Age: 34
End: 2024-05-30
Payer: MEDICAID

## 2024-06-04 NOTE — PLAN OF CARE
Anticoagulation Summary  As of 6/4/2024      INR goal:  2.5-3.0   TTR:  54.6% (9 y)   INR used for dosing:  3.00 (6/4/2024)   Warfarin maintenance plan:  3 mg (3 mg x 1) every day   Weekly warfarin total:  21 mg   Plan last modified:  Emily Yi (2/2/2024)   Next INR check:  6/18/2024   Priority:  Routine   Target end date:  Indefinite    Indications    Permanent atrial fibrillation (HCC) [I48.21]  Long term current use of anticoagulant therapy [Z79.01]  Secondary hypercoagulable state (HCC) [D68.69]                 Anticoagulation Episode Summary       INR check location:  Home Draw    Preferred lab:      Send INR reminders to:      Comments:  [facility] [phone] -   goal range changed to 2.5-3.2 per raghu vaca 8/8/2019          Anticoagulation Care Providers       Provider Role Specialty Phone number    Hu Gamez M.D. Referring Internal Medicine Clinical Cardiac Electrophysiology [phone]    [facility] Responsible  [phone]          Anticoagulation Patient Findings  Patient Findings       Negatives:  Signs/symptoms of thrombosis, Signs/symptoms of bleeding, Laboratory test error suspected, Change in health, Change in alcohol use, Change in activity, Upcoming invasive procedure, Emergency department visit, Upcoming dental procedure, Missed doses, Extra doses, Change in medications, Change in diet/appetite, Hospital admission, Bruising, Other complaints            INR is therapeutic    Called and spoke to patient.    Pt is not on antiplatelet therapy.    Warfarin Plan: Continue regimen as listed above.    Next INR in 2 week(s).    Vandana Vallecillo, ClarisseD     Mood stable on Sertraline 100 mg daily - to continue   Pt discharged home 1/8/18. Pt has PCP apt scheduled for 1/18/18 and Endocrine apt for 1/16/18. MD Dunaway wanted pt seen sooner to follow her blood glucose levels, so CM scheduled an apt with [facility] for 1/12/18. Pt states she has to work that day and will call [facility] to schedule for !/11/18. Family transported pt home. No further CM needs for discharge.     01/09/18 0800   Final Note   Assessment Type Final Discharge Note   Discharge Disposition Home   What phone number can be called within the next 1-3 days to see how you are doing after discharge? [phone]   Hospital Follow Up  Appt(s) scheduled? Yes   Discharge plans and expectations educations in teach back method with documentation complete? Yes   Right Care Referral Info   Post Acute Recommendation No Care

## 2024-06-27 ENCOUNTER — TELEPHONE (OUTPATIENT)
Dept: DIABETES | Facility: CLINIC | Age: 34
End: 2024-06-27
Payer: MEDICAID

## 2024-06-27 ENCOUNTER — HOSPITAL ENCOUNTER (EMERGENCY)
Facility: HOSPITAL | Age: 34
Discharge: HOME | End: 2024-06-27
Attending: EMERGENCY MEDICINE
Payer: MEDICAID

## 2024-06-27 VITALS
OXYGEN SATURATION: 99 % | HEIGHT: 63 IN | BODY MASS INDEX: 26.58 KG/M2 | TEMPERATURE: 98 F | DIASTOLIC BLOOD PRESSURE: 82 MMHG | SYSTOLIC BLOOD PRESSURE: 110 MMHG | WEIGHT: 150 LBS | HEART RATE: 88 BPM | RESPIRATION RATE: 18 BRPM

## 2024-06-27 DIAGNOSIS — E11.65 HYPERGLYCEMIA DUE TO DIABETES MELLITUS: Primary | ICD-10-CM

## 2024-06-27 DIAGNOSIS — R00.2 PALPITATION: ICD-10-CM

## 2024-06-27 LAB
ALBUMIN SERPL BCP-MCNC: 4 G/DL (ref 3.5–5.2)
ALLENS TEST: ABNORMAL
ALLENS TEST: ABNORMAL
ALP SERPL-CCNC: 84 U/L (ref 55–135)
ALT SERPL W/O P-5'-P-CCNC: 25 U/L (ref 10–44)
ANION GAP SERPL CALC-SCNC: 13 MMOL/L (ref 8–16)
AST SERPL-CCNC: 26 U/L (ref 10–40)
B-HCG UR QL: NEGATIVE
B-OH-BUTYR BLD STRIP-SCNC: 1.3 MMOL/L (ref 0–0.5)
BACTERIA #/AREA URNS AUTO: NORMAL /HPF
BASOPHILS # BLD AUTO: 0.02 K/UL (ref 0–0.2)
BASOPHILS NFR BLD: 0.2 % (ref 0–1.9)
BILIRUB SERPL-MCNC: 0.7 MG/DL (ref 0.1–1)
BILIRUB UR QL STRIP: NEGATIVE
BUN SERPL-MCNC: 12 MG/DL (ref 6–20)
CALCIUM SERPL-MCNC: 9.4 MG/DL (ref 8.7–10.5)
CHLORIDE SERPL-SCNC: 105 MMOL/L (ref 95–110)
CLARITY UR REFRACT.AUTO: CLEAR
CO2 SERPL-SCNC: 20 MMOL/L (ref 23–29)
COLOR UR AUTO: YELLOW
CREAT SERPL-MCNC: 1 MG/DL (ref 0.5–1.4)
CTP QC/QA: YES
DIFFERENTIAL METHOD BLD: ABNORMAL
EOSINOPHIL # BLD AUTO: 0 K/UL (ref 0–0.5)
EOSINOPHIL NFR BLD: 0 % (ref 0–8)
ERYTHROCYTE [DISTWIDTH] IN BLOOD BY AUTOMATED COUNT: 13 % (ref 11.5–14.5)
EST. GFR  (NO RACE VARIABLE): >60 ML/MIN/1.73 M^2
GLUCOSE SERPL-MCNC: 148 MG/DL (ref 70–110)
GLUCOSE UR QL STRIP: ABNORMAL
HCO3 UR-SCNC: 19.5 MMOL/L (ref 24–28)
HCO3 UR-SCNC: 23.3 MMOL/L (ref 24–28)
HCT VFR BLD AUTO: 42.9 % (ref 37–48.5)
HGB BLD-MCNC: 13.4 G/DL (ref 12–16)
HGB UR QL STRIP: NEGATIVE
HIV 1+2 AB+HIV1 P24 AG SERPL QL IA: NORMAL
IMM GRANULOCYTES # BLD AUTO: 0.03 K/UL (ref 0–0.04)
IMM GRANULOCYTES NFR BLD AUTO: 0.3 % (ref 0–0.5)
KETONES UR QL STRIP: ABNORMAL
LEUKOCYTE ESTERASE UR QL STRIP: NEGATIVE
LIPASE SERPL-CCNC: 21 U/L (ref 4–60)
LYMPHOCYTES # BLD AUTO: 1.4 K/UL (ref 1–4.8)
LYMPHOCYTES NFR BLD: 13.7 % (ref 18–48)
MAGNESIUM SERPL-MCNC: 1.9 MG/DL (ref 1.6–2.6)
MCH RBC QN AUTO: 27.7 PG (ref 27–31)
MCHC RBC AUTO-ENTMCNC: 31.2 G/DL (ref 32–36)
MCV RBC AUTO: 89 FL (ref 82–98)
MICROSCOPIC COMMENT: NORMAL
MONOCYTES # BLD AUTO: 0.2 K/UL (ref 0.3–1)
MONOCYTES NFR BLD: 2.2 % (ref 4–15)
NEUTROPHILS # BLD AUTO: 8.3 K/UL (ref 1.8–7.7)
NEUTROPHILS NFR BLD: 83.6 % (ref 38–73)
NITRITE UR QL STRIP: NEGATIVE
NRBC BLD-RTO: 0 /100 WBC
OHS QRS DURATION: 70 MS
OHS QTC CALCULATION: 446 MS
PCO2 BLDA: 34.2 MMHG (ref 35–45)
PCO2 BLDA: 44.5 MMHG (ref 35–45)
PH SMN: 7.33 [PH] (ref 7.35–7.45)
PH SMN: 7.36 [PH] (ref 7.35–7.45)
PH UR STRIP: 6 [PH] (ref 5–8)
PLATELET # BLD AUTO: 367 K/UL (ref 150–450)
PMV BLD AUTO: 9.5 FL (ref 9.2–12.9)
PO2 BLDA: 20 MMHG (ref 40–60)
PO2 BLDA: 27 MMHG (ref 40–60)
POC BE: -3 MMOL/L
POC BE: -6 MMOL/L
POC SATURATED O2: 28 % (ref 95–100)
POC SATURATED O2: 48 % (ref 95–100)
POC TCO2: 20 MMOL/L (ref 24–29)
POC TCO2: 25 MMOL/L (ref 24–29)
POCT GLUCOSE: 233 MG/DL (ref 70–110)
POTASSIUM SERPL-SCNC: 3.5 MMOL/L (ref 3.5–5.1)
PROT SERPL-MCNC: 8.1 G/DL (ref 6–8.4)
PROT UR QL STRIP: ABNORMAL
RBC # BLD AUTO: 4.84 M/UL (ref 4–5.4)
RBC #/AREA URNS AUTO: 2 /HPF (ref 0–4)
SAMPLE: ABNORMAL
SAMPLE: ABNORMAL
SITE: ABNORMAL
SITE: ABNORMAL
SODIUM SERPL-SCNC: 138 MMOL/L (ref 136–145)
SP GR UR STRIP: 1.03 (ref 1–1.03)
SQUAMOUS #/AREA URNS AUTO: 1 /HPF
TROPONIN I SERPL DL<=0.01 NG/ML-MCNC: <0.006 NG/ML (ref 0–0.03)
TSH SERPL DL<=0.005 MIU/L-ACNC: 0.64 UIU/ML (ref 0.4–4)
URN SPEC COLLECT METH UR: ABNORMAL
WBC # BLD AUTO: 9.98 K/UL (ref 3.9–12.7)
WBC #/AREA URNS AUTO: 1 /HPF (ref 0–5)
YEAST UR QL AUTO: NORMAL

## 2024-06-27 PROCEDURE — 82962 GLUCOSE BLOOD TEST: CPT

## 2024-06-27 PROCEDURE — 93010 ELECTROCARDIOGRAM REPORT: CPT | Mod: ,,, | Performed by: INTERNAL MEDICINE

## 2024-06-27 PROCEDURE — 99900035 HC TECH TIME PER 15 MIN (STAT)

## 2024-06-27 PROCEDURE — 85025 COMPLETE CBC W/AUTO DIFF WBC: CPT

## 2024-06-27 PROCEDURE — 82803 BLOOD GASES ANY COMBINATION: CPT

## 2024-06-27 PROCEDURE — 81001 URINALYSIS AUTO W/SCOPE: CPT

## 2024-06-27 PROCEDURE — 96360 HYDRATION IV INFUSION INIT: CPT

## 2024-06-27 PROCEDURE — 96361 HYDRATE IV INFUSION ADD-ON: CPT

## 2024-06-27 PROCEDURE — 25000003 PHARM REV CODE 250

## 2024-06-27 PROCEDURE — 87389 HIV-1 AG W/HIV-1&-2 AB AG IA: CPT | Performed by: PHYSICIAN ASSISTANT

## 2024-06-27 PROCEDURE — 82010 KETONE BODYS QUAN: CPT

## 2024-06-27 PROCEDURE — 83690 ASSAY OF LIPASE: CPT

## 2024-06-27 PROCEDURE — 99284 EMERGENCY DEPT VISIT MOD MDM: CPT | Mod: 25

## 2024-06-27 PROCEDURE — 81025 URINE PREGNANCY TEST: CPT

## 2024-06-27 PROCEDURE — 83735 ASSAY OF MAGNESIUM: CPT

## 2024-06-27 PROCEDURE — 84443 ASSAY THYROID STIM HORMONE: CPT

## 2024-06-27 PROCEDURE — 93005 ELECTROCARDIOGRAM TRACING: CPT

## 2024-06-27 PROCEDURE — 80053 COMPREHEN METABOLIC PANEL: CPT

## 2024-06-27 PROCEDURE — 84484 ASSAY OF TROPONIN QUANT: CPT

## 2024-06-27 RX ADMIN — SODIUM CHLORIDE 1000 ML: 9 INJECTION, SOLUTION INTRAVENOUS at 09:06

## 2024-06-27 RX ADMIN — SODIUM CHLORIDE 1000 ML: 9 INJECTION, SOLUTION INTRAVENOUS at 11:06

## 2024-06-27 NOTE — ED TRIAGE NOTES
Pt reports palpitations and abdominal/epigastric pain starting around 0630. Report nausea, denies vomiting, diarrhea, or fever.

## 2024-06-27 NOTE — Clinical Note
"Anusha Juáreznathaly Andrew was seen and treated in our emergency department on 6/27/2024.  She may return to school on 06/28/2024.      If you have any questions or concerns, please don't hesitate to call.      Breanna Akhtar RN"

## 2024-06-27 NOTE — ED PROVIDER NOTES
Encounter Date: 2024       History     Chief Complaint   Patient presents with    Chest Pain     Onset this morning, 24, accompanied by SOB    Abdominal Pain     +nausea w/o vomiting. Hx of DM     33-year-old female history of hyperlipidemia, hypertension, diabetic retinopathy and type 1 diabetic presents emergency department due to palpitations onset this morning.  Patient states just feeling off making this has possibly due to dehydration.  She normally wears an Omni pump however reports it  last night.  She did inject herself with a an insulin pen this morning.  She reports at home blood glucose levels are normally in the 150s.  She was nauseous with minimal epigastric discomfort however reports this has not bother her.  No episodes of emesis.  No changes in bowel movements.  No fevers or chills.      Review of patient's allergies indicates:   Allergen Reactions    No known allergies      Past Medical History:   Diagnosis Date    Depression     Hyperlipidemia     Hypertension     Mild nonproliferative diabetic retinopathy of both eyes without macular edema associated with type 1 diabetes mellitus 3/30/2021    Type I (juvenile type) diabetes mellitus without mention of complication, uncontrolled 2011     Past Surgical History:   Procedure Laterality Date    ABCESS DRAINAGE      boil went over [place]    CARPAL TUNNEL RELEASE Left 2022    Procedure: RELEASE, CARPAL TUNNEL;  Surgeon: Sekou Rojo Jr., MD;  Location: [facility] OR;  Service: Orthopedics;  Laterality: Left;    CARPAL TUNNEL RELEASE Right 3/28/2023    Procedure: RELEASE, CARPAL TUNNEL;  Surgeon: Sekou Rojo Jr., MD;  Location: [facility] OR;  Service: Orthopedics;  Laterality: Right;     SECTION  2012    DILATION AND CURETTAGE OF UTERUS  2019    Procedure: DILATION AND CURETTAGE, UTERUS;  Surgeon: Purnima Kidd MD;  Location: [facility] OR;  Service: OB/GYN;;    DILATION AND CURETTAGE OF UTERUS N/A 2023     Procedure: DILATION AND CURETTAGE, UTERUS;  Surgeon: Purnima Kidd MD;  Location: [facility] OR;  Service: OB/GYN;  Laterality: N/A;    ENDOMETRIAL ABLATION N/A 8/9/2023    Procedure: ABLATION, ENDOMETRIUM;  Surgeon: Purnima Kidd MD;  Location: [facility] OR;  Service: OB/GYN;  Laterality: N/A;    HYSTEROSCOPY N/A 2/20/2019    Procedure: HYSTEROSCOPY;  Surgeon: Purnima Kidd MD;  Location: [facility] OR;  Service: OB/GYN;  Laterality: N/A;    HYSTEROSCOPY N/A 8/9/2023    Procedure: HYSTEROSCOPY;  Surgeon: Purnima Kidd MD;  Location: [facility] OR;  Service: OB/GYN;  Laterality: N/A;    LAPAROSCOPIC SALPINGECTOMY Bilateral 8/9/2023    Procedure: SALPINGECTOMY, LAPAROSCOPIC;  Surgeon: Purnima Kidd MD;  Location: [facility];  Service: OB/GYN;  Laterality: Bilateral;    TONSILLECTOMY, ADENOIDECTOMY      TRIGGER FINGER RELEASE Left 12/13/2022    Procedure: RELEASE, TRIGGER FINGER;  Surgeon: Sekou Rojo Jr., MD;  Location: [facility] OR;  Service: Orthopedics;  Laterality: Left;    TRIGGER FINGER RELEASE Right 3/28/2023    Procedure: RELEASE, TRIGGER FINGER; MIDDLE FINGER;  Surgeon: Sekou Rojo Jr., MD;  Location: [facility];  Service: Orthopedics;  Laterality: Right;    WISDOM TOOTH EXTRACTION       Family History   Problem Relation Name Age of Onset    Diabetes Brother      No Known Problems Mother      No Known Problems Father      Hydrocephalus Daughter      No Known Problems Brother      No Known Problems Brother      No Known Problems Maternal Aunt      No Known Problems Maternal Uncle      No Known Problems Paternal Aunt      No Known Problems Paternal Uncle      No Known Problems Maternal Grandmother      No Known Problems Maternal Grandfather      No Known Problems Paternal Grandmother      No Known Problems Paternal Grandfather      Amblyopia Neg Hx      Blindness Neg Hx      Cataracts Neg Hx      Glaucoma Neg Hx      Hypertension Neg Hx      Macular degeneration Neg Hx      Retinal detachment Neg Hx      Strabismus Neg Hx       Stroke Neg Hx      Thyroid disease Neg Hx      Breast cancer Neg Hx      Colon cancer Neg Hx      Ovarian cancer Neg Hx      Cancer Neg Hx       Social History     Tobacco Use    Smoking status: Never    Smokeless tobacco: Never   Substance Use Topics    Alcohol use: Yes     Comment: occasionally    Drug use: No     Review of Systems  See HPI  Physical Exam     Initial Vitals [06/27/24 0800]   BP Pulse Resp Temp SpO2   107/65 109 19 98.7 °F (37.1 °C) 98 %      MAP       --         Physical Exam    Vitals reviewed.  Constitutional: She appears well-developed and well-nourished.   HENT:   Head: Normocephalic and atraumatic.   Eyes: Conjunctivae and EOM are normal.   Neck:   Normal range of motion.  Cardiovascular:  Normal rate.           Pulmonary/Chest: No respiratory distress.   Abdominal: Abdomen is soft. She exhibits no distension. There is no abdominal tenderness. There is no rebound.   Musculoskeletal:         General: Normal range of motion.      Cervical back: Normal range of motion.     Neurological: She is alert and oriented to person, place, and time.   Skin: Skin is warm and dry.   Psychiatric: She has a normal mood and affect. Thought content normal.         ED Course   Procedures  Labs Reviewed   URINALYSIS, REFLEX TO URINE CULTURE - Abnormal; Notable for the following components:       Result Value    Protein, UA Trace (*)     Glucose, UA 4+ (*)     Ketones, UA 3+ (*)     All other components within normal limits    Narrative:     Specimen Source->Urine   CBC W/ AUTO DIFFERENTIAL - Abnormal; Notable for the following components:    MCHC 31.2 (*)     Gran # (ANC) 8.3 (*)     Mono # 0.2 (*)     Gran % 83.6 (*)     Lymph % 13.7 (*)     Mono % 2.2 (*)     All other components within normal limits   COMPREHENSIVE METABOLIC PANEL - Abnormal; Notable for the following components:    CO2 20 (*)     Glucose 148 (*)     All other components within normal limits    Narrative:     add on lipas per   /order#[id number] @ 06/27/2024  09:24    BETA - HYDROXYBUTYRATE, SERUM - Abnormal; Notable for the following components:    Beta-Hydroxybutyrate 1.3 (*)     All other components within normal limits   POCT GLUCOSE - Abnormal; Notable for the following components:    POCT Glucose 233 (*)     All other components within normal limits   ISTAT PROCEDURE - Abnormal; Notable for the following components:    POC PH 7.328 (*)     POC PO2 20 (*)     POC HCO3 23.3 (*)     POC BE -3 (*)     All other components within normal limits   ISTAT PROCEDURE - Abnormal; Notable for the following components:    POC PCO2 34.2 (*)     POC PO2 27 (*)     POC HCO3 19.5 (*)     POC BE -6 (*)     POC TCO2 20 (*)     All other components within normal limits   HIV 1 / 2 ANTIBODY    Narrative:     Release to patient->Immediate   MAGNESIUM    Narrative:     add on lipas per  /order#[id number] @ 06/27/2024  09:24    TSH    Narrative:     add on lipas per  /order#[id number] @ 06/27/2024  09:24    TROPONIN I    Narrative:     add on lipas per  /order#[id number] @ 06/27/2024  09:24    LIPASE   LIPASE    Narrative:     add on lipas per  /order#[id number] @ 06/27/2024  09:24    URINALYSIS MICROSCOPIC    Narrative:     Specimen Source->Urine   POCT URINE PREGNANCY        ECG Results              EKG 12-lead (Final result)        Collection Time Result Time QRS Duration OHS QTC Calculation    06/27/24 08:03:20 06/27/24 10:08:20 70 446                     Final result by Interface, Lab In [facility] (06/27/24 10:08:30)                   Narrative:    Test Reason : R07.9,    Vent. Rate : 100 BPM     Atrial Rate : 100 BPM     P-R Int : 132 ms          QRS Dur : 070 ms      QT Int : 346 ms       P-R-T Axes : 083 094 058 degrees     QTc Int : 446 ms    Normal sinus rhythm  Biatrial enlargement  Rightward axis  Pulmonary disease pattern  Abnormal ECG  When compared with ECG of 12-APR-2024 21:07,  ST no longer depressed in  Anterior leads  Confirmed by Anuj SANDOVAL MD ([id number]) on 6/27/2024 10:08:15 AM    Referred By: [email or web address]   SELF           Confirmed By:Anuj SANDOVAL MD                                  Imaging Results    None          Medications   sodium chloride 0.9% bolus 1,000 mL 1,000 mL (0 mLs Intravenous Stopped 6/27/24 1015)   sodium chloride 0.9% bolus 1,000 mL 1,000 mL (0 mLs Intravenous Stopped 6/27/24 1207)     Medical Decision Making  33-year-old female presents to the emergency department due to palpitations nausea and hypoglycemia  Patient  blood glucose elevated, no anion gap however mildly acidic given fluids with improvement on repeat VBG  Patient tolerating p.o. without difficulty, normal lipase  Patient palpitations appear to be due to her tachycardia however appears improved with fluids  Patient was discharged home strict return precautions discussed -she has an endocrinology appointment tomorrow.    Amount and/or Complexity of Data Reviewed  Labs: ordered. Decision-making details documented in ED Course.  ECG/medicine tests: ordered.     Details: Normal sinus 100 beats per minute, no T-wave inversions                ED Course as of 06/27/24 1531   Thu Jun 27, 2024   1232 ISTAT PROCEDURE(!!) [CR]   1232 POC PH: 7.363 [CR]      ED Course User Index  [CR] Mariajose Roach PA-C                           Clinical Impression:  Final diagnoses:  [R00.2] Palpitation  [E11.65] Hyperglycemia due to diabetes mellitus (Primary)          ED Disposition Condition    Discharge           ED Prescriptions    None       Follow-up Information    None          Mariajose Roach PA-C  06/27/24 1531

## 2024-06-28 ENCOUNTER — PATIENT MESSAGE (OUTPATIENT)
Dept: DIABETES | Facility: CLINIC | Age: 34
End: 2024-06-28

## 2024-07-01 ENCOUNTER — TELEPHONE (OUTPATIENT)
Dept: DIABETES | Facility: CLINIC | Age: 34
End: 2024-07-01
Payer: MEDICAID

## 2024-07-10 ENCOUNTER — PATIENT MESSAGE (OUTPATIENT)
Dept: OBSTETRICS AND GYNECOLOGY | Facility: CLINIC | Age: 34
End: 2024-07-10
Payer: MEDICAID

## 2024-07-10 NOTE — TELEPHONE ENCOUNTER
She wants to try OCPs to help regulate her cycles.  She sent 2 pictures of screen shots of her period tracking taiwo.  It looks like her cycles are about 26/27 days which I reassured her was normal.      Allergies and pharmacy UTD

## 2024-07-30 ENCOUNTER — PATIENT MESSAGE (OUTPATIENT)
Dept: DIABETES | Facility: CLINIC | Age: 34
End: 2024-07-30
Payer: MEDICAID

## 2024-08-28 ENCOUNTER — PATIENT MESSAGE (OUTPATIENT)
Dept: DIABETES | Facility: CLINIC | Age: 34
End: 2024-08-28
Payer: MEDICAID

## 2024-08-28 ENCOUNTER — OFFICE VISIT (OUTPATIENT)
Dept: URGENT CARE | Facility: CLINIC | Age: 34
End: 2024-08-28
Payer: MEDICAID

## 2024-08-28 VITALS
HEIGHT: 63 IN | SYSTOLIC BLOOD PRESSURE: 107 MMHG | BODY MASS INDEX: 26.58 KG/M2 | OXYGEN SATURATION: 98 % | TEMPERATURE: 98 F | HEART RATE: 76 BPM | DIASTOLIC BLOOD PRESSURE: 73 MMHG | RESPIRATION RATE: 20 BRPM | WEIGHT: 150 LBS

## 2024-08-28 DIAGNOSIS — J02.9 SORE THROAT: ICD-10-CM

## 2024-08-28 DIAGNOSIS — J06.9 VIRAL URI WITH COUGH: Primary | ICD-10-CM

## 2024-08-28 LAB
CTP QC/QA: YES
CTP QC/QA: YES
MOLECULAR STREP A: NEGATIVE
SARS-COV-2 AG RESP QL IA.RAPID: NEGATIVE

## 2024-08-28 PROCEDURE — 87811 SARS-COV-2 COVID19 W/OPTIC: CPT | Mod: QW,S$GLB,,

## 2024-08-28 PROCEDURE — 99213 OFFICE O/P EST LOW 20 MIN: CPT | Mod: S$GLB,,,

## 2024-08-28 PROCEDURE — 87651 STREP A DNA AMP PROBE: CPT | Mod: QW,S$GLB,,

## 2024-08-28 RX ORDER — BENZONATATE 200 MG/1
200 CAPSULE ORAL 3 TIMES DAILY PRN
Qty: 30 CAPSULE | Refills: 0 | Status: SHIPPED | OUTPATIENT
Start: 2024-08-28 | End: 2024-09-07

## 2024-08-28 RX ORDER — IPRATROPIUM BROMIDE 21 UG/1
2 SPRAY, METERED NASAL 2 TIMES DAILY
Qty: 30 ML | Refills: 0 | Status: SHIPPED | OUTPATIENT
Start: 2024-08-28

## 2024-08-28 NOTE — PATIENT INSTRUCTIONS
COVID and strep tests today were negative.  Ipratropium bromide nasal spray for congestion.  Tessalon perls as needed for cough.  Recommend over the counter oral antihistamine (zyrtec, allegra, claritin) + nasal decongestant (pseudoephedrine, phenylephrine) to help with congestion and sinus pressure.  If not allergic, take Tylenol (Acetaminophen) 650 mg to  1 g every 6 hours as needed for fever and/or Motrin (Ibuprofen) 600 to 800 mg every 6 hours as needed for fever as directed for control of pain and/or fever  Please drink plenty of fluids.  Please get plenty of rest.  Nasal irrigation with a saline spray or Netti Pot like device per their directions is also recommended.  If you smoke, please stop smoking.    To help ease a sore throat, you can:  Use a sore throat spray.  Suck on hard candy or throat lozenges.  Gargle with warm saltwater a few times each day. Mix of 1/4 teaspoon (1.25 grams) salt in 8 ounces (240 mL) of warm water.  Use a cool mist humidifier to help you breathe easier.      Discussed prescriptions and over-the-counter medicines to help with patient's symptoms:  An antihistamine (loratadine,zyrtec,allegra, xyzal) can help with itching, sneezing, or runny nose.  An antihistamine eye drop can help with itchy eyes.  A decongestant (pseudoephedrine,  Phenylephrine) can help with a stuffy nose. Take <10 days for congestion and rhinorrhea. Once symptoms improve, proceed with loratadine/zyrtec once a day. These ingredients can keep you up all night, decrease appetite, feel jittery, and raise blood pressure with long term use.  Medications that control cough are suppressants and expectorants. Suppressants are tessalon pearls and dextromethorphan. If you have a productive cough with sputum, you need an expectorant called guaifenesin. Dextromethorphan and Guaifenesin are active ingredients in many OTC cough/cold medications such as Dayquil/Nyquil, Mucinex, and Robitussin Mucus+Chest Congestion.             Common Cold Medicine Ingredients Cheat sheet  Acetaminophen (APAP) -pain reliever/fever reducer  Dextromethorphan - cough suppressant  Guaifenesin - expectorant/thins and loosens mucus  Phenylephrine - nasal decongestant  Diphenhydramine or Doxylamine succinate - antihistamine, helps you fall asleep  Promethazine or Brompheniramine - Prescription strength antihistamines    Please remember that you have received care at an urgent care today. Urgent cares are not emergency rooms and are not equipped to handle life threatening emergencies and cannot rule in or out certain medical conditions and you may be released before all of your medical problems are known or treated.     Please arrange follow up with your primary care physician or speciality clinic within 2-5 days if your signs and symptoms have not resolved or worsen.     Patient can call our Referral Hotline at [phone] to make an appointment.    Please return here or go to the Emergency Department for any concerns or worsening of condition.  Signs of infection. These include a fever of 100.4°F (38°C) or higher, chills, cough, more sputum or change in color of sputum.  You are having so much trouble breathing that you can only say one or two words at a time.  You need to sit upright at all times to be able to breathe and or cannot lie down.  You have trouble breathing when talking or sitting still.  You have a fever of 100.4°F (38°C) or higher or chills.  You have chest pain when you cough, have trouble breathing but can still talk in full sentences, or cough up blood.

## 2024-08-28 NOTE — LETTER
August 28, 2024      [facility] [address]  [place] [address]  Phone: [phone]  Fax: [phone]       Patient: Anusha Andrew   YOB: 1990  Date of Visit: 08/28/2024    To Whom It May Concern:    Sylvia Andrew  was at [facility] on 08/28/2024. The patient may return to work/school on 8/29/2024 with no restrictions. If you have any questions or concerns, or if I can be of further assistance, please do not hesitate to contact me.    Sincerely,      Katie Hinton PA-C

## 2024-08-28 NOTE — PROGRESS NOTES
"Subjective:      Patient ID: Anusha Andrew is a 33 y.o. female.    Vitals:  height is 5' 3" (1.6 m) and weight is 68 kg (150 lb). Her oral temperature is 98 °F (36.7 °C). Her blood pressure is 107/73 and her pulse is 76. Her respiration is 20 and oxygen saturation is 98%.     Chief Complaint: Sore Throat    Pt present with symptoms of- Runny Nose, Congestion, Cough, Sore Throat and headache X 4 days. Pt has been exposed to COVID at school.     Provider note starts below:  Patient presents to clinic for evaluation of rhinorrhea, nasal congestion, cough, sore throat, postnasal drip, and headache which onset 4 days ago. Patient states she may have been exposed to COVID at school. She has been taking tylenol cold and sinus at home with some improvement of symptoms. Denies any fever, chills, body aches, chest pain, shortness of breath, nausea, vomiting. No other complaints.     Sore Throat   This is a new problem. The current episode started in the past 7 days. The problem has been gradually worsening. There has been no fever. The pain is at a severity of 7/10. The pain is moderate. Associated symptoms include congestion, coughing and headaches. Pertinent negatives include no abdominal pain, ear discharge, ear pain, neck pain, shortness of breath, stridor, trouble swallowing or vomiting. Treatments tried: Mucinex and Tylenol Cold Med. The treatment provided mild relief.       Constitution: Negative for chills, fatigue and fever.   HENT:  Positive for congestion, postnasal drip and sore throat. Negative for ear pain, ear discharge, sinus pain, sinus pressure and trouble swallowing.         +rhinorrhea   Neck: Negative for neck pain and neck stiffness.   Cardiovascular:  Negative for chest pain and palpitations.   Eyes:  Negative for eye itching and eye pain.   Respiratory:  Positive for cough. Negative for chest tightness, sputum production, bloody sputum, shortness of breath, stridor and wheezing.    Gastrointestinal: "  Negative for abdominal pain, nausea and vomiting.   Musculoskeletal:  Negative for muscle cramps and muscle ache.   Skin:  Negative for pale and rash.   Allergic/Immunologic: Negative for itching and sneezing.   Neurological:  Positive for headaches. Negative for dizziness, light-headedness, passing out, disorientation and altered mental status.   Psychiatric/Behavioral:  Negative for altered mental status, disorientation and confusion.       Objective:     Physical Exam   Constitutional: She is oriented to person, place, and time.  Non-toxic appearance. She does not appear ill. No distress.   HENT:   Head: Normocephalic and atraumatic.   Ears:   Right Ear: Tympanic membrane, external ear and ear canal normal.   Left Ear: Tympanic membrane, external ear and ear canal normal.   Nose: Congestion present.   Mouth/Throat: Mucous membranes are moist. No oropharyngeal exudate or posterior oropharyngeal erythema. Oropharynx is clear.   Eyes: Conjunctivae are normal. Extraocular movement intact   Neck: Neck supple.   Cardiovascular: Normal rate, regular rhythm, normal heart sounds and normal pulses.   Pulmonary/Chest: Effort normal and breath sounds normal. No stridor. No respiratory distress. She has no wheezes. She has no rhonchi. She has no rales.   Abdominal: Normal appearance.   Musculoskeletal: Normal range of motion.         General: Normal range of motion.   Neurological: She is alert, oriented to person, place, and time and at baseline.   Skin: Skin is warm and dry.   Psychiatric: Her behavior is normal. Mood normal.   Nursing note and vitals reviewed.    Assessment:     Results for orders placed or performed in visit on 08/28/24   SARS Coronavirus 2 Antigen, POCT Manual Read   Result Value Ref Range    SARS Coronavirus 2 Antigen Negative Negative     Acceptable Yes    POCT Strep A, Molecular   Result Value Ref Range    Molecular Strep A, POC Negative Negative     Acceptable Yes       *Note: Due to a large number of results and/or encounters for the requested time period, some results have not been displayed. A complete set of results can be found in Results Review.       1. Viral URI with cough    2. Sore throat        Plan:     Viral URI with cough  -     ipratropium (ATROVENT) 21 mcg (0.03 %) nasal spray; Take 2 sprays by each nostril route 2 (two) times daily.  Dispense: 30 mL; Refill: 0  -     benzonatate (TESSALON) 200 MG capsule; Take 1 capsule (200 mg total) by mouth 3 (three) times daily as needed for Cough.  Dispense: 30 capsule; Refill: 0    Sore throat  -     SARS Coronavirus 2 Antigen, POCT Manual Read  -     POCT Strep A, Molecular            Patient Instructions   COVID and strep tests today were negative.  Ipratropium bromide nasal spray for congestion.  Tessalon perls as needed for cough.  Recommend over the counter oral antihistamine (zyrtec, allegra, claritin) + nasal decongestant (pseudoephedrine, phenylephrine) to help with congestion and sinus pressure.  If not allergic, take Tylenol (Acetaminophen) 650 mg to  1 g every 6 hours as needed for fever and/or Motrin (Ibuprofen) 600 to 800 mg every 6 hours as needed for fever as directed for control of pain and/or fever  Please drink plenty of fluids.  Please get plenty of rest.  Nasal irrigation with a saline spray or Netti Pot like device per their directions is also recommended.  If you smoke, please stop smoking.    To help ease a sore throat, you can:  Use a sore throat spray.  Suck on hard candy or throat lozenges.  Gargle with warm saltwater a few times each day. Mix of 1/4 teaspoon (1.25 grams) salt in 8 ounces (240 mL) of warm water.  Use a cool mist humidifier to help you breathe easier.      Discussed prescriptions and over-the-counter medicines to help with patient's symptoms:  An antihistamine (loratadine,zyrtec,allegra, xyzal) can help with itching, sneezing, or runny nose.  An antihistamine eye drop can help  with itchy eyes.  A decongestant (pseudoephedrine,  Phenylephrine) can help with a stuffy nose. Take <10 days for congestion and rhinorrhea. Once symptoms improve, proceed with loratadine/zyrtec once a day. These ingredients can keep you up all night, decrease appetite, feel jittery, and raise blood pressure with long term use.  Medications that control cough are suppressants and expectorants. Suppressants are tessalon pearls and dextromethorphan. If you have a productive cough with sputum, you need an expectorant called guaifenesin. Dextromethorphan and Guaifenesin are active ingredients in many OTC cough/cold medications such as Dayquil/Nyquil, Mucinex, and Robitussin Mucus+Chest Congestion.            Common Cold Medicine Ingredients Cheat sheet  Acetaminophen (APAP) -pain reliever/fever reducer  Dextromethorphan - cough suppressant  Guaifenesin - expectorant/thins and loosens mucus  Phenylephrine - nasal decongestant  Diphenhydramine or Doxylamine succinate - antihistamine, helps you fall asleep  Promethazine or Brompheniramine - Prescription strength antihistamines    Please remember that you have received care at an urgent care today. Urgent cares are not emergency rooms and are not equipped to handle life threatening emergencies and cannot rule in or out certain medical conditions and you may be released before all of your medical problems are known or treated.     Please arrange follow up with your primary care physician or speciality clinic within 2-5 days if your signs and symptoms have not resolved or worsen.     Patient can call our Referral Hotline at [phone] to make an appointment.    Please return here or go to the Emergency Department for any concerns or worsening of condition.  Signs of infection. These include a fever of 100.4°F (38°C) or higher, chills, cough, more sputum or change in color of sputum.  You are having so much trouble breathing that you can only say one or two words at a  time.  You need to sit upright at all times to be able to breathe and or cannot lie down.  You have trouble breathing when talking or sitting still.  You have a fever of 100.4°F (38°C) or higher or chills.  You have chest pain when you cough, have trouble breathing but can still talk in full sentences, or cough up blood.

## 2024-08-30 ENCOUNTER — TELEPHONE (OUTPATIENT)
Dept: DIABETES | Facility: CLINIC | Age: 34
End: 2024-08-30
Payer: MEDICAID

## 2024-09-04 ENCOUNTER — PATIENT MESSAGE (OUTPATIENT)
Dept: DIABETES | Facility: CLINIC | Age: 34
End: 2024-09-04
Payer: MEDICAID

## 2024-09-04 DIAGNOSIS — E10.65 TYPE 1 DIABETES MELLITUS WITH HYPERGLYCEMIA: ICD-10-CM

## 2024-09-05 RX ORDER — INSULIN ASPART INJECTION 100 [IU]/ML
INJECTION, SOLUTION SUBCUTANEOUS
Qty: 30 ML | Refills: 11 | Status: SHIPPED | OUTPATIENT
Start: 2024-09-05

## 2024-09-05 RX ORDER — INSULIN DEGLUDEC 100 U/ML
7 INJECTION, SOLUTION SUBCUTANEOUS 2 TIMES DAILY
Qty: 15 ML | Refills: 11 | Status: SHIPPED | OUTPATIENT
Start: 2024-09-05

## 2024-09-05 RX ORDER — INSULIN ASPART INJECTION 100 [IU]/ML
INJECTION, SOLUTION SUBCUTANEOUS
Qty: 15 ML | Refills: 11 | Status: SHIPPED | OUTPATIENT
Start: 2024-09-05

## 2024-09-12 ENCOUNTER — PATIENT MESSAGE (OUTPATIENT)
Dept: OBSTETRICS AND GYNECOLOGY | Facility: CLINIC | Age: 34
End: 2024-09-12
Payer: MEDICAID

## 2024-09-25 ENCOUNTER — PATIENT MESSAGE (OUTPATIENT)
Dept: DIABETES | Facility: CLINIC | Age: 34
End: 2024-09-25
Payer: MEDICAID

## 2024-10-15 ENCOUNTER — PATIENT MESSAGE (OUTPATIENT)
Dept: DIABETES | Facility: CLINIC | Age: 34
End: 2024-10-15
Payer: MEDICAID

## 2024-10-28 ENCOUNTER — PATIENT MESSAGE (OUTPATIENT)
Dept: PRIMARY CARE CLINIC | Facility: CLINIC | Age: 34
End: 2024-10-28
Payer: MEDICAID

## 2024-11-01 ENCOUNTER — PATIENT MESSAGE (OUTPATIENT)
Dept: DIABETES | Facility: CLINIC | Age: 34
End: 2024-11-01

## 2024-11-01 ENCOUNTER — OFFICE VISIT (OUTPATIENT)
Dept: DIABETES | Facility: CLINIC | Age: 34
End: 2024-11-01
Payer: MEDICAID

## 2024-11-01 VITALS
OXYGEN SATURATION: 98 % | DIASTOLIC BLOOD PRESSURE: 63 MMHG | SYSTOLIC BLOOD PRESSURE: 108 MMHG | HEIGHT: 63 IN | HEART RATE: 103 BPM | BODY MASS INDEX: 26.93 KG/M2 | WEIGHT: 152 LBS

## 2024-11-01 DIAGNOSIS — E10.3293 MILD NONPROLIFERATIVE DIABETIC RETINOPATHY OF BOTH EYES WITHOUT MACULAR EDEMA ASSOCIATED WITH TYPE 1 DIABETES MELLITUS: ICD-10-CM

## 2024-11-01 DIAGNOSIS — E55.9 VITAMIN D DEFICIENCY: ICD-10-CM

## 2024-11-01 DIAGNOSIS — E10.65 TYPE 1 DIABETES MELLITUS WITH HYPERGLYCEMIA: Primary | ICD-10-CM

## 2024-11-01 DIAGNOSIS — Z46.81 INSULIN PUMP FITTING OR ADJUSTMENT: ICD-10-CM

## 2024-11-01 DIAGNOSIS — F32.1 CURRENT MODERATE EPISODE OF MAJOR DEPRESSIVE DISORDER WITHOUT PRIOR EPISODE: Chronic | ICD-10-CM

## 2024-11-01 DIAGNOSIS — Z96.41 INSULIN PUMP IN PLACE: ICD-10-CM

## 2024-11-01 DIAGNOSIS — E10.649 TYPE 1 DIABETES MELLITUS WITH HYPOGLYCEMIA UNAWARENESS: ICD-10-CM

## 2024-11-01 DIAGNOSIS — Z86.39 HISTORY OF DIABETES WITH KETOACIDOSIS: ICD-10-CM

## 2024-11-01 DIAGNOSIS — Z90.3 HISTORY OF SLEEVE GASTRECTOMY: ICD-10-CM

## 2024-11-01 DIAGNOSIS — E66.3 OVERWEIGHT (BMI 25.0-29.9): ICD-10-CM

## 2024-11-01 DIAGNOSIS — Z71.9 HEALTH EDUCATION/COUNSELING: ICD-10-CM

## 2024-11-01 PROCEDURE — 99215 OFFICE O/P EST HI 40 MIN: CPT | Mod: PBBFAC,PN | Performed by: NURSE PRACTITIONER

## 2024-11-01 PROCEDURE — 99999 PR PBB SHADOW E&M-EST. PATIENT-LVL V: CPT | Mod: PBBFAC,,, | Performed by: NURSE PRACTITIONER

## 2024-11-01 RX ORDER — BLOOD-GLUCOSE SENSOR
EACH MISCELLANEOUS
Qty: 3 EACH | Refills: 11 | Status: SHIPPED | OUTPATIENT
Start: 2024-11-01

## 2024-11-01 RX ORDER — PEN NEEDLE, DIABETIC 32GX 5/32"
NEEDLE, DISPOSABLE MISCELLANEOUS
Qty: 100 EACH | Refills: 11 | Status: SHIPPED | OUTPATIENT
Start: 2024-11-01

## 2024-11-01 RX ORDER — INSULIN ASPART INJECTION 100 [IU]/ML
INJECTION, SOLUTION SUBCUTANEOUS
Qty: 15 ML | Refills: 11 | Status: SHIPPED | OUTPATIENT
Start: 2024-11-01

## 2024-11-01 RX ORDER — INSULIN DEGLUDEC 100 U/ML
7 INJECTION, SOLUTION SUBCUTANEOUS 2 TIMES DAILY
Qty: 15 ML | Refills: 11 | Status: SHIPPED | OUTPATIENT
Start: 2024-11-01

## 2024-11-01 RX ORDER — INSULIN ASPART INJECTION 100 [IU]/ML
INJECTION, SOLUTION SUBCUTANEOUS
Qty: 30 ML | Refills: 11 | Status: SHIPPED | OUTPATIENT
Start: 2024-11-01

## 2024-11-01 RX ORDER — INSULIN PMP CART,AUT,G6/7,CNTR
1 EACH SUBCUTANEOUS
Qty: 15 EACH | Refills: 11 | Status: SHIPPED | OUTPATIENT
Start: 2024-11-01

## 2024-11-01 RX ORDER — BLOOD-GLUCOSE TRANSMITTER
EACH MISCELLANEOUS
Qty: 1 EACH | Refills: 4 | Status: SHIPPED | OUTPATIENT
Start: 2024-11-01

## 2024-11-04 ENCOUNTER — OFFICE VISIT (OUTPATIENT)
Dept: OPTOMETRY | Facility: CLINIC | Age: 34
End: 2024-11-04
Payer: MEDICAID

## 2024-11-04 ENCOUNTER — PATIENT MESSAGE (OUTPATIENT)
Dept: DIABETES | Facility: CLINIC | Age: 34
End: 2024-11-04
Payer: MEDICAID

## 2024-11-04 ENCOUNTER — TELEPHONE (OUTPATIENT)
Dept: DIABETES | Facility: CLINIC | Age: 34
End: 2024-11-04
Payer: MEDICAID

## 2024-11-04 DIAGNOSIS — E10.3293 MILD NONPROLIFERATIVE DIABETIC RETINOPATHY OF BOTH EYES WITHOUT MACULAR EDEMA ASSOCIATED WITH TYPE 1 DIABETES MELLITUS: Primary | ICD-10-CM

## 2024-11-04 DIAGNOSIS — H52.13 MYOPIA, BILATERAL: ICD-10-CM

## 2024-11-04 PROCEDURE — 92014 COMPRE OPH EXAM EST PT 1/>: CPT | Mod: S$PBB,,, | Performed by: OPTOMETRIST

## 2024-11-04 PROCEDURE — 99213 OFFICE O/P EST LOW 20 MIN: CPT | Mod: PBBFAC | Performed by: OPTOMETRIST

## 2024-11-04 PROCEDURE — 3066F NEPHROPATHY DOC TX: CPT | Mod: CPTII,,, | Performed by: OPTOMETRIST

## 2024-11-04 PROCEDURE — 2022F DILAT RTA XM EVC RTNOPTHY: CPT | Mod: CPTII,,, | Performed by: OPTOMETRIST

## 2024-11-04 PROCEDURE — 3051F HG A1C>EQUAL 7.0%<8.0%: CPT | Mod: CPTII,,, | Performed by: OPTOMETRIST

## 2024-11-04 PROCEDURE — 1159F MED LIST DOCD IN RCRD: CPT | Mod: CPTII,,, | Performed by: OPTOMETRIST

## 2024-11-04 PROCEDURE — 92015 DETERMINE REFRACTIVE STATE: CPT | Mod: ,,, | Performed by: OPTOMETRIST

## 2024-11-04 PROCEDURE — 99999 PR PBB SHADOW E&M-EST. PATIENT-LVL III: CPT | Mod: PBBFAC,,, | Performed by: OPTOMETRIST

## 2024-11-04 PROCEDURE — 3061F NEG MICROALBUMINURIA REV: CPT | Mod: CPTII,,, | Performed by: OPTOMETRIST

## 2024-11-04 NOTE — PROGRESS NOTES
HPI    Annual Diabetic Eye Exam   Pt states vision is okay   Pt reports having swapped lenses for a while and was unable to see   Pt reports after she realized it she has them in correctly now and is   seeing better     Pt denies F/F     Pt denies Dry/ Itchy/ Burning  Gtt: No    Annual CL EXAM   Brand: DT1  Replacement: 1-2 days  Sleeps in lenses: Yes  Comfort problems: Yes, swapped lenses and was seeing poorly for a while.    Lenses are in correct now and seeing better.   Solution: NA    DM Dx'ed   BS: 159  Hemoglobin A1C       Date                     Value               Ref Range             Status                11/01/2024               7.7 (H)             4.0 - 5.6 %           Final                  06/23/2024               7.0 (H)             4.0 - 5.6 %           Final                  03/12/2024               7.4 (H)             4.0 - 5.6 %           Final                Last edited by Tracy Edwards on 11/4/2024  3:09 PM.            Assessment /Plan     For exam results, see Encounter Report.    Mild nonproliferative diabetic retinopathy of both eyes without macular edema associated with type 1 diabetes mellitus  -minimum Retinopathy noted today, monitor annual DFE    Myopia, bilateral  Eyeglass Final Rx       Eyeglass Final Rx         Sphere Cylinder [facility] Dist [facility]    Right -3.00 +0.75 010 20/20    Left -3.00 +0.50 170 20/20      Type: SVL    Expiration Date: 11/4/2025                  1 wk Trial -2.75 OU, PHREV    RTC 12 mo

## 2024-11-05 ENCOUNTER — TELEPHONE (OUTPATIENT)
Dept: DIABETES | Facility: CLINIC | Age: 34
End: 2024-11-05
Payer: MEDICAID

## 2024-11-05 NOTE — TELEPHONE ENCOUNTER
Called pt stated that once she is approved for beta bionics she will give us a call to schedule visit

## 2024-11-09 RX ORDER — SUMATRIPTAN 50 MG/1
50 TABLET, FILM COATED ORAL 2 TIMES DAILY PRN
Qty: 10 TABLET | Refills: 0 | Status: CANCELLED | OUTPATIENT
Start: 2024-11-09

## 2024-11-12 RX ORDER — SUMATRIPTAN 50 MG/1
50 TABLET, FILM COATED ORAL 2 TIMES DAILY PRN
Qty: 10 TABLET | Refills: 0 | Status: SHIPPED | OUTPATIENT
Start: 2024-11-12

## 2024-11-12 RX ORDER — SUMATRIPTAN 50 MG/1
50 TABLET, FILM COATED ORAL 2 TIMES DAILY PRN
Qty: 10 TABLET | Refills: 0 | Status: CANCELLED | OUTPATIENT
Start: 2024-11-09

## 2024-11-18 NOTE — PLAN OF CARE
Problem: Adult Inpatient Plan of Care  Goal: Plan of Care Review  Outcome: Ongoing, Progressing      [de-identified] : Patient called over the weekend - coughing up a lot of mucus - discolored with speck of brown blood in the mucus - he was treated with prednisone 40mg QD and restarted back on Doxycycline BID - he reports much improvement in coughing - he has to sleep up in chair at night right now.

## 2024-11-25 ENCOUNTER — PATIENT MESSAGE (OUTPATIENT)
Dept: DIABETES | Facility: CLINIC | Age: 34
End: 2024-11-25
Payer: MEDICAID

## 2024-12-09 ENCOUNTER — PATIENT MESSAGE (OUTPATIENT)
Dept: DIABETES | Facility: CLINIC | Age: 34
End: 2024-12-09
Payer: MEDICAID

## 2024-12-18 ENCOUNTER — PATIENT MESSAGE (OUTPATIENT)
Dept: DIABETES | Facility: CLINIC | Age: 34
End: 2024-12-18
Payer: MEDICAID

## 2024-12-27 ENCOUNTER — PATIENT MESSAGE (OUTPATIENT)
Dept: OPTOMETRY | Facility: CLINIC | Age: 34
End: 2024-12-27
Payer: MEDICAID

## 2025-01-25 ENCOUNTER — PATIENT MESSAGE (OUTPATIENT)
Dept: DIABETES | Facility: CLINIC | Age: 35
End: 2025-01-25
Payer: MEDICAID

## 2025-01-31 ENCOUNTER — TELEPHONE (OUTPATIENT)
Dept: DIABETES | Facility: CLINIC | Age: 35
End: 2025-01-31
Payer: MEDICAID

## 2025-02-03 ENCOUNTER — OFFICE VISIT (OUTPATIENT)
Dept: DIABETES | Facility: CLINIC | Age: 35
End: 2025-02-03
Payer: MEDICAID

## 2025-02-03 VITALS
OXYGEN SATURATION: 99 % | WEIGHT: 148 LBS | SYSTOLIC BLOOD PRESSURE: 110 MMHG | DIASTOLIC BLOOD PRESSURE: 66 MMHG | BODY MASS INDEX: 26.22 KG/M2 | HEART RATE: 93 BPM

## 2025-02-03 DIAGNOSIS — Z71.9 HEALTH EDUCATION/COUNSELING: ICD-10-CM

## 2025-02-03 DIAGNOSIS — Z86.39 HISTORY OF DIABETES WITH KETOACIDOSIS: ICD-10-CM

## 2025-02-03 DIAGNOSIS — Z96.41 INSULIN PUMP IN PLACE: ICD-10-CM

## 2025-02-03 DIAGNOSIS — E10.649 TYPE 1 DIABETES MELLITUS WITH HYPOGLYCEMIA UNAWARENESS: ICD-10-CM

## 2025-02-03 DIAGNOSIS — E66.3 OVERWEIGHT (BMI 25.0-29.9): ICD-10-CM

## 2025-02-03 DIAGNOSIS — E10.3293 MILD NONPROLIFERATIVE DIABETIC RETINOPATHY OF BOTH EYES WITHOUT MACULAR EDEMA ASSOCIATED WITH TYPE 1 DIABETES MELLITUS: ICD-10-CM

## 2025-02-03 DIAGNOSIS — E55.9 VITAMIN D DEFICIENCY: ICD-10-CM

## 2025-02-03 DIAGNOSIS — E10.65 TYPE 1 DIABETES MELLITUS WITH HYPERGLYCEMIA: Primary | ICD-10-CM

## 2025-02-03 DIAGNOSIS — Z46.81 INSULIN PUMP FITTING OR ADJUSTMENT: ICD-10-CM

## 2025-02-03 DIAGNOSIS — Z90.3 HISTORY OF SLEEVE GASTRECTOMY: ICD-10-CM

## 2025-02-03 PROCEDURE — 3074F SYST BP LT 130 MM HG: CPT | Mod: CPTII,,, | Performed by: NURSE PRACTITIONER

## 2025-02-03 PROCEDURE — 3051F HG A1C>EQUAL 7.0%<8.0%: CPT | Mod: CPTII,,, | Performed by: NURSE PRACTITIONER

## 2025-02-03 PROCEDURE — 95251 CONT GLUC MNTR ANALYSIS I&R: CPT | Mod: ,,, | Performed by: NURSE PRACTITIONER

## 2025-02-03 PROCEDURE — 1159F MED LIST DOCD IN RCRD: CPT | Mod: CPTII,,, | Performed by: NURSE PRACTITIONER

## 2025-02-03 PROCEDURE — 99999 PR PBB SHADOW E&M-EST. PATIENT-LVL IV: CPT | Mod: PBBFAC,,, | Performed by: NURSE PRACTITIONER

## 2025-02-03 PROCEDURE — 1160F RVW MEDS BY RX/DR IN RCRD: CPT | Mod: CPTII,,, | Performed by: NURSE PRACTITIONER

## 2025-02-03 PROCEDURE — 3078F DIAST BP <80 MM HG: CPT | Mod: CPTII,,, | Performed by: NURSE PRACTITIONER

## 2025-02-03 PROCEDURE — 99214 OFFICE O/P EST MOD 30 MIN: CPT | Mod: S$PBB,,, | Performed by: NURSE PRACTITIONER

## 2025-02-03 PROCEDURE — 99214 OFFICE O/P EST MOD 30 MIN: CPT | Mod: PBBFAC,PN | Performed by: NURSE PRACTITIONER

## 2025-02-03 PROCEDURE — 3008F BODY MASS INDEX DOCD: CPT | Mod: CPTII,,, | Performed by: NURSE PRACTITIONER

## 2025-02-03 RX ORDER — INSULIN PMP CART,AUT,G6/7,CNTR
1 EACH SUBCUTANEOUS
Qty: 15 EACH | Refills: 11 | Status: SHIPPED | OUTPATIENT
Start: 2025-02-03

## 2025-02-03 RX ORDER — INSULIN ASPART INJECTION 100 [IU]/ML
INJECTION, SOLUTION SUBCUTANEOUS
Qty: 30 ML | Refills: 11 | Status: SHIPPED | OUTPATIENT
Start: 2025-02-03

## 2025-02-03 RX ORDER — BLOOD-GLUCOSE SENSOR
EACH MISCELLANEOUS
Qty: 3 EACH | Refills: 11 | Status: SHIPPED | OUTPATIENT
Start: 2025-02-03

## 2025-02-03 RX ORDER — INSULIN ASPART INJECTION 100 [IU]/ML
INJECTION, SOLUTION SUBCUTANEOUS
Qty: 15 ML | Refills: 11 | Status: SHIPPED | OUTPATIENT
Start: 2025-02-03

## 2025-02-03 RX ORDER — BLOOD-GLUCOSE TRANSMITTER
EACH MISCELLANEOUS
Qty: 1 EACH | Refills: 4 | Status: SHIPPED | OUTPATIENT
Start: 2025-02-03

## 2025-02-03 RX ORDER — INSULIN DEGLUDEC 100 U/ML
7 INJECTION, SOLUTION SUBCUTANEOUS 2 TIMES DAILY
Qty: 15 ML | Refills: 11 | Status: SHIPPED | OUTPATIENT
Start: 2025-02-03

## 2025-02-03 RX ORDER — PEN NEEDLE, DIABETIC 32GX 5/32"
NEEDLE, DISPOSABLE MISCELLANEOUS
Qty: 100 EACH | Refills: 11 | Status: SHIPPED | OUTPATIENT
Start: 2025-02-03

## 2025-02-03 NOTE — PROGRESS NOTES
CC:   Chief Complaint   Patient presents with    Diabetes Mellitus       HPI: Anusha Andrew is a 34 y.o. female presents for a follow up visit today for the management of T1DM       She was diagnosed with Type 1 diabetes at 17 year old. Hospitalized at the time of diagnosis and started on insulin therapy     Family hx of diabetes: brother-- T1DM     Hospitalized for diabetes:   Scare with EMS-severe hypoglycemia  12/31/22 - hypoglycemia EMS, used baqsimi  12/28/22 covid19 -- hypoglycemia as well.   H/o DKA. 10/2018 DKA/admission  DKA- 4/12    F/u with retinal specialist    No personal or FH of thyroid cancer or personal of pancreatic cancer or pancreatitis.     Gastric sleeve in July 2022  She lost about 50 lb    She reports since she had the gastric sleeve she is been suffering with issues of hypoglycemia      Our last visit was in November of 2024  At that visit she expressed interest in wanting to change over to the beta biotic insulin pump---we submitted the orders with the patient never got in touch with the company for them to send out the device  We continued her Ozempic 1 mg weekly  She stayed on the Omnipod 5--we cut back her sensitivity factor--to prevent hypoglycemia  Discussed staying in automated mode  Continued her Dexcom G6  Her A1c did go from 7.7% 7.3%  See attached downloads    She was off her pump for a while-- was using MDI- sugars were high when she was off her pump - better back on pump now   She misplaced the   She did eventually get the taiwo downloaded on her phone and got back on her pump about 1 month ago  She put in her own settings---looked up the settings on my [facility] chart notes                              DIABETES COMPLICATIONS: retinopathy      Diabetes Management Status    ASA:  No    Statin: Not taking  ACE/ARB: Not taking    The ASCVD Risk score (Anoop SHEA, et al., 2019) failed to calculate for the following reasons:    The 2019 ASCVD risk score is only valid for ages  40 to 79      Screening or Prevention Patient's value Goal Complete/Controlled?   HgA1C Testing and Control   Lab Results   Component Value Date    HGBA1C 7.3 (H) 01/25/2025      Annually/Less than 8% Yes     Lipid profile : 06/23/2024 Annually Yes     LDL control Lab Results   Component Value Date    LDLCALC 118.8 06/23/2024    Annually/Less than 100 mg/dl  No     Nephropathy screening Lab Results   Component Value Date    LABMICR 6.0 11/01/2024     Lab Results   Component Value Date    PROTEINUA Trace (A) 06/27/2024    Annually Yes     Blood pressure BP Readings from Last 1 Encounters:   02/03/25 110/66    Less than 140/90 Yes     Dilated retinal exam : 11/04/2024 Annually Yes     Foot exam   : 04/26/2023 Annually No         CURRENT A1C:    Hemoglobin A1C   Date Value Ref Range Status   01/25/2025 7.3 (H) 4.0 - 5.6 % Final     Comment:     ADA Screening Guidelines:  5.7-6.4%  Consistent with prediabetes  >or=6.5%  Consistent with diabetes    High levels of fetal hemoglobin interfere with the HbA1C  assay. Heterozygous hemoglobin variants (HbS, HgC, etc)do  not significantly interfere with this assay.   However, presence of multiple variants may affect accuracy.     11/01/2024 7.7 (H) 4.0 - 5.6 % Final     Comment:     ADA Screening Guidelines:  5.7-6.4%  Consistent with prediabetes  >or=6.5%  Consistent with diabetes    High levels of fetal hemoglobin interfere with the HbA1C  assay. Heterozygous hemoglobin variants (HbS, HgC, etc)do  not significantly interfere with this assay.   However, presence of multiple variants may affect accuracy.     06/23/2024 7.0 (H) 4.0 - 5.6 % Final     Comment:     ADA Screening Guidelines:  5.7-6.4%  Consistent with prediabetes  >or=6.5%  Consistent with diabetes    High levels of fetal hemoglobin interfere with the HbA1C  assay. Heterozygous hemoglobin variants (HbS, HgC, etc)do  not significantly interfere with this assay.   However, presence of multiple variants may affect  accuracy.         GOAL A1C: 6.5% without hypoglycemia       DM MEDICATIONS USED IN THE PAST:  humalog  levemir  novolog  lantus   Metformin   Trulicity   Ozempic   Dexcom   Fiasp   Tresiba   Omnipod 5  Had a tandem pump but never did it         CURRENT DIABETES MEDICATIONS:   Ozempic 1 mg weekly -- restarted her PCP --sometimes taking the Ozempic every other week    Insulin Pump: Omnipod 5 with Fiasp     Insulin pump settings  Basal: 0.7 units/hr      ICR   MN-12PM- 1:8-   12PM- MN- 1:7.5--  ISF: 1:120--she inputted her own settings in the pump-- ISF should be 1:50  Target 120-130--she self adjusted her target to 130     IOB 3 hours --       Temp basals: ?    Pump site change: q 2-3 days   Cartridge change: q 2-3 days  Insulin TDD:  32.7 units    Basal 58 %   Bolus 42 %   CHO intake: 108.7 grams per day   Using the bolus wizard: yes   Overridin%    In automated mode 100% of the time  7% automated limited   0% in manual mode       Back up Lantus/Levemir: back up Tresiba     Patient's pump was downloaded in clinic today and reviewed with patient.   Please see attached documents for pump download.     Supplies-- Pharmacy       BLOOD GLUCOSE MONITORING:    Sensor type: Dexcom G6   Average BG readin  Time in range:62%   24% high   14% very high   0% low   0% very low   Estimated A1c 7.5%  Site change:   q10 days     2 weeks prior -- average blood sugar 185 in target range 55% of the time with an estimated A1c 7.7%    Supplies: pharmacy       Sensor was downloaded in clinic today and reviewed with patient.   Please see attached document for download.       HYPOGLYCEMIA: 0% low and 0 % very low   0% low   <1% very low   Glucagon kit: Baqsimi   Medic alert bracelet: denies         MEALS: eating 3 meals per day   Feels confident with CHO counting   Uses applications she is out eating   BF: eggs -- coffee in the AM   Lunch: salad, grilled chicken, tuna   Dinner: pasta, beans, spaghetti, sandwich   Snack: rarely    Drinks: coffee in AM   Water   Diet tea        CURRENT EXERCISE:  No      Review of Systems  Review of Systems   Constitutional:  Negative for appetite change, fatigue and unexpected weight change.   HENT:  Negative for trouble swallowing.    Eyes:  Negative for visual disturbance.   Respiratory:  Negative for shortness of breath.    Cardiovascular:  Negative for chest pain.   Gastrointestinal:  Negative for nausea.   Endocrine: Negative for polydipsia, polyphagia and polyuria.   Genitourinary:         No Nocturia    Skin:  Negative for wound.   Neurological:  Positive for headaches. Negative for numbness.       Physical Exam   Physical Exam  Vitals and nursing note reviewed.   Constitutional:       General: She is not in acute distress.     Appearance: Normal appearance. She is well-developed. She is not ill-appearing.   HENT:      Head: Normocephalic and atraumatic.      Right Ear: External ear normal.      Left Ear: External ear normal.      Nose: Nose normal.   Neck:      Thyroid: No thyromegaly.      Trachea: No tracheal deviation.   Cardiovascular:      Rate and Rhythm: Normal rate and regular rhythm.      Heart sounds: No murmur heard.  Pulmonary:      Effort: Pulmonary effort is normal. No respiratory distress.      Breath sounds: Normal breath sounds.   Abdominal:      Palpations: Abdomen is soft.      Tenderness: There is no abdominal tenderness.      Hernia: No hernia is present.   Musculoskeletal:      Cervical back: Normal range of motion and neck supple.   Skin:     General: Skin is warm and dry.      Capillary Refill: Capillary refill takes less than 2 seconds.      Findings: No rash.      Comments: Pump sites and dexcom sites are normal appearing. No lipo hypertropthy or atrophy     Neurological:      General: No focal deficit present.      Mental Status: She is alert and oriented to person, place, and time.      Cranial Nerves: No cranial nerve deficit.   Psychiatric:         Mood and Affect: Mood  normal.         Behavior: Behavior normal.         Thought Content: Thought content normal.         Judgment: Judgment normal.         FOOT EXAMINATION: Appropriate footwear         Lab Results   Component Value Date    TSH 0.644 06/27/2024               Type 1 diabetes mellitus with hyperglycemia  Uncontrolled   Blood sugar readings above goal on Dexcom download--see below   A1c has improved to 7.3% from 7.7%  + post prandial excursions ---based on her download it looks like she is bolusing after she eats--stressed to her the importance of bolusing before she eats and to hit the use sensor button  She feels confident with CHO counting ---she is adamant that she is entering inaccurate carbohydrates    She is back on ozempic per her PCP ---reports that she is on the 1 mg    We had a long discussion that she needs to stay on both of her devices.  It is hard to manage when she has not consistently using her insulin pump and her Dexcom together.  I strongly recommend that she stay on her Dexcom G6 and her Omnipod 5 at all times  We discussed that she needs to stay on top of her refills and get her medications regularly so she does not have gaps    She would prefer to get the Omnipod 5 taiwo on her phone so she wants to stay on the Dexcom G6    However she is interested in changing to the beta bionic insulin pump---we submitted the orders at our last visit but she never got back in touch with the company to start the pump  I provided her with that contact information today as she is reporting that she would like to move forward  With the beta biotics  She is aware that she must let us know when she receives the pump so that way we can get her in with Daniella to start it        -- Medication Changes:    Okay to continue the Ozempic 1 mg weekly that her PCP ordered    Continue omnipod 5 and Dexcom G6 and use them consistently in auto mode   Fiasp insulin   Omnipod 5   Insulin pump settings--  Basal: 0.8 units/hr --adjusted  based on pump download    ICR   MN-12PM- 1:8-   12PM- MN- 1:7.5--  ISF: 1:55-- adjusted from the settings that she had put in the pump  Target 120-130    IOB 3 hours --      She is using the Omnipod 5 taiwo on her phone---using the phone as the     We will also try to change her over to the beta bionic insulin pump       -- Reviewed goals of therapy are to get the best control we can without hypoglycemia.  -- Reviewed patient's current insulin regimen. Clarified proper insulin dose and timing in relation to meals, etc. Insulin injection sites and proper rotation instructed.    -- Advised frequent self blood glucose monitoring.  Patient encouraged to document glucose results and bring them to every clinic visit.  Needs to use Dexcom G6 with Omnipod 5 -- supplies from pharmacy  -- needs to use consistently   -- Hypoglycemia precautions discussed. Instructed on precautions before driving.    -- Call for Bg repeatedly < 70 or > 200.   -- Close adherence to lifestyle changes recommended.   -- Periodic follow ups for eye evaluations, foot care and dental care suggested.  ---follow up with diabetes education if she does change over to the beta bionic insulin pump         Patient has diabetes mellitus and manages diabetes with intensive insulin regimen and uses prandial and basal insulin daily  Patient requires a therapeutic CGM and is willing to use therapeutic CGM for the necessary frequent adjustments of insulin therapy.  I have completed an in-person visit during the previous 6 months and will continue to have in-person visits every 6 months to assess adherence to their CGM regimen and diabetes treatment plan.  Due to COVID pandemic and need for remote monitoring this tool is medically necessary  Hx of marked hypoglycemia with EMS activation       Mild nonproliferative diabetic retinopathy of both eyes without macular edema associated with type 1 diabetes mellitus  Optimize BG readings.   See above.   Continue to  follow-up with optometry as directed    History of diabetes with ketoacidosis  Of note     Overweight (BMI 25.0-29.9)  Body mass index is 26.22 kg/m².  Increases insulin resistance.   Discussed DM diet and exercise.       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  Has Baqsimi   Continue to use dexcom -- discussed the importance of consistently using the dexcom     Reviewed hypoglycemia management    Insulin pump in place  See above     Insulin pump fitting or adjustment  See above     Vitamin D deficiency  Check vitamin-D level  Prescribed Ergo weekly            I spent a total of 30 minutes on the day of the visit.This includes face to face time and non-face to face time preparing to see the patient (eg, review of tests), obtaining and/or reviewing separately obtained history, documenting clinical information in the electronic or other health record, independently interpreting results and communicating results to the patient/family/caregiver, or care coordinator.          Pump backup plan    If the insulin pump is non functional and discontinued for anticipated more than 20 hours, please give daily injections of:   Long acting insulin Tresiba 14 units daily -- can split to 7 units BID   Short acting insulin Fiasp- 1:9-1:8 for meals according to carb ratios and sensitivity factor in the pump.     With using correction scale:    150-200: +1 unit  201-250: +2 units  251-300: +3 units  301-350: +4 units  >350: +5 units        When the insulin pump is restarted, do not restart basal rates until at least 22 hours after the last long acting insulin injection. You can set a 0% temporary basal setting that will last until this time and use your pump to bolus for meals and correction.     For any technical insulin pump issues, please contact the insulin pump company; the toll free number is  printed on the label on the back of the insulin pump.       If your sugar is running high for a few hours and does not respond to two correction doses from the insulin pump, it may mean that you have a bad pump site and the site should be changed ,p          Follow up in about 3 months (around 5/3/2025).  Follow up with me in 3 months with labs prior           Orders Placed This Encounter   Procedures    Hemoglobin A1C     Standing Status:   Future     Standing Expiration Date:   8/3/2026    Comprehensive Metabolic Panel     Standing Status:   Future     Standing Expiration Date:   8/3/2026    CBC Auto Differential     Standing Status:   Future     Standing Expiration Date:   8/3/2026    Lipid Panel     Standing Status:   Future     Standing Expiration Date:   8/3/2026    Microalbumin/Creatinine Ratio, Urine     Standing Status:   Future     Standing Expiration Date:   8/3/2026     Order Specific Question:   Specimen Source     Answer:   Urine    TSH     Standing Status:   Future     Standing Expiration Date:   8/3/2026    Vitamin D     Standing Status:   Future     Standing Expiration Date:   8/3/2026       Recommendations were discussed with the patient in detail  The patient verbalized understanding and agrees with the plan outlined as above.     This note was partly generated with [employer] voice recognition software. I apologize for any possible typographical errors.

## 2025-02-03 NOTE — ASSESSMENT & PLAN NOTE
Body mass index is 26.22 kg/m².  Increases insulin resistance.   Discussed DM diet and exercise.

## 2025-02-03 NOTE — PATIENT INSTRUCTIONS
The representative from the beta Bionic pump company has been trying to reach you-- both Sharmila and Elizabeth.   If you are still interested in moving forward with the pump please give them a call back  Elizabeth's contact information is [email or web address]  Cell: [phone]     Sharmila's contact information is [phone]     You can either e-mail her or call her cell phone  She is expecting a call from you     Let us know if you have any questions or concerns     Thanks  Ritika

## 2025-02-03 NOTE — ASSESSMENT & PLAN NOTE
Uncontrolled   Blood sugar readings above goal on Dexcom download--see below   A1c has improved to 7.3% from 7.7%  + post prandial excursions ---based on her download it looks like she is bolusing after she eats--stressed to her the importance of bolusing before she eats and to hit the use sensor button  She feels confident with CHO counting ---she is adamant that she is entering inaccurate carbohydrates    She is back on ozempic per her PCP ---reports that she is on the 1 mg    We had a long discussion that she needs to stay on both of her devices.  It is hard to manage when she has not consistently using her insulin pump and her Dexcom together.  I strongly recommend that she stay on her Dexcom G6 and her Omnipod 5 at all times  We discussed that she needs to stay on top of her refills and get her medications regularly so she does not have gaps    She would prefer to get the Omnipod 5 taiwo on her phone so she wants to stay on the Dexcom G6    However she is interested in changing to the beta bionic insulin pump---we submitted the orders at our last visit but she never got back in touch with the company to start the pump  I provided her with that contact information today as she is reporting that she would like to move forward  With the beta biotics  She is aware that she must let us know when she receives the pump so that way we can get her in with Daniella to start it        -- Medication Changes:    Okay to continue the Ozempic 1 mg weekly that her PCP ordered    Continue omnipod 5 and Dexcom G6 and use them consistently in auto mode   Fiasp insulin   Omnipod 5   Insulin pump settings--  Basal: 0.8 units/hr --adjusted based on pump download    ICR   MN-12PM- 1:8-   12PM- MN- 1:7.5--  ISF: 1:55-- adjusted from the settings that she had put in the pump  Target 120-130    IOB 3 hours --      She is using the Omnipod 5 taiwo on her phone---using the phone as the     We will also try to change her over to the beta  bionic insulin pump       -- Reviewed goals of therapy are to get the best control we can without hypoglycemia.  -- Reviewed patient's current insulin regimen. Clarified proper insulin dose and timing in relation to meals, etc. Insulin injection sites and proper rotation instructed.    -- Advised frequent self blood glucose monitoring.  Patient encouraged to document glucose results and bring them to every clinic visit.  Needs to use Dexcom G6 with Omnipod 5 -- supplies from pharmacy  -- needs to use consistently   -- Hypoglycemia precautions discussed. Instructed on precautions before driving.    -- Call for Bg repeatedly < 70 or > 200.   -- Close adherence to lifestyle changes recommended.   -- Periodic follow ups for eye evaluations, foot care and dental care suggested.  ---follow up with diabetes education if she does change over to the beta bionic insulin pump         Patient has diabetes mellitus and manages diabetes with intensive insulin regimen and uses prandial and basal insulin daily  Patient requires a therapeutic CGM and is willing to use therapeutic CGM for the necessary frequent adjustments of insulin therapy.  I have completed an in-person visit during the previous 6 months and will continue to have in-person visits every 6 months to assess adherence to their CGM regimen and diabetes treatment plan.  Due to COVID pandemic and need for remote monitoring this tool is medically necessary  Hx of marked hypoglycemia with EMS activation

## 2025-02-15 ENCOUNTER — OFFICE VISIT (OUTPATIENT)
Dept: URGENT CARE | Facility: CLINIC | Age: 35
End: 2025-02-15
Payer: MEDICAID

## 2025-02-15 VITALS
WEIGHT: 148 LBS | TEMPERATURE: 98 F | SYSTOLIC BLOOD PRESSURE: 102 MMHG | HEART RATE: 88 BPM | OXYGEN SATURATION: 98 % | HEIGHT: 63 IN | BODY MASS INDEX: 26.22 KG/M2 | RESPIRATION RATE: 18 BRPM | DIASTOLIC BLOOD PRESSURE: 70 MMHG

## 2025-02-15 DIAGNOSIS — L03.011 PARONYCHIA OF FINGER, RIGHT: Primary | ICD-10-CM

## 2025-02-15 RX ORDER — CEPHALEXIN 500 MG/1
500 CAPSULE ORAL EVERY 6 HOURS
Qty: 28 CAPSULE | Refills: 0 | Status: SHIPPED | OUTPATIENT
Start: 2025-02-15 | End: 2025-02-22

## 2025-02-15 RX ORDER — MUPIROCIN 20 MG/G
OINTMENT TOPICAL 3 TIMES DAILY
Qty: 22 G | Refills: 0 | Status: SHIPPED | OUTPATIENT
Start: 2025-02-15

## 2025-02-15 NOTE — PROGRESS NOTES
"Subjective:      Patient ID: Anusha Andrew is a 34 y.o. female.    Vitals:  height is 5' 3" (1.6 m) and weight is 67.1 kg (148 lb). Her oral temperature is 98 °F (36.7 °C). Her blood pressure is 102/70 and her pulse is 88. Her respiration is 18 and oxygen saturation is 98%.     Chief Complaint: Paronychia    Pt states 5 days ago she pulled the cuticle on her right hand middle finger , states it is red and swollen, tender to touch     Pain  This is a new problem. The current episode started in the past 7 days. The problem has been gradually worsening. Pertinent negatives include no abdominal pain, chest pain, chills, fever, headaches, nausea, neck pain or vomiting.       Constitution: Negative for chills and fever.   Neck: Negative for neck pain.   Cardiovascular:  Negative for chest pain.   Eyes:  Negative for eye pain.   Respiratory:  Negative for shortness of breath.    Gastrointestinal:  Negative for abdominal pain, nausea and vomiting.   Musculoskeletal:  Positive for pain.   Skin:  Positive for erythema.   Neurological:  Negative for dizziness, light-headedness, headaches, disorientation and altered mental status.   Psychiatric/Behavioral:  Negative for altered mental status, disorientation and confusion.       Objective:     Physical Exam   Constitutional:  Non-toxic appearance. She does not appear ill. No distress.   HENT:   Head: Normocephalic and atraumatic.   Eyes: Conjunctivae are normal. Extraocular movement intact   Pulmonary/Chest: Effort normal.   Abdominal: Normal appearance.   Neurological: She is alert.   Skin: Skin is warm and dry. erythema         Comments: Tenderness, erythema, and swelling surround nailbed of right middle finger. No palpable abscess.   Nursing note and vitals reviewed.    Assessment:     1. Paronychia of finger, right      Plan:     Paronychia of finger, right  -     cephALEXin (KEFLEX) 500 MG capsule; Take 1 capsule (500 mg total) by mouth every 6 (six) hours for 7 days  " Dispense: 28 capsule; Refill: 0  -     mupirocin (BACTROBAN) 2 % ointment; Apply topically 3 (three) times daily.  Dispense: 22 g; Refill: 0            Patient Instructions   Cephalexin (Keflex) is an antibiotic to treat skin infection surrounding your finger nail. Please take as prescribed and to completion.  Recommend warm water soaks multiple times per day. You may also soak in antiseptics like chlorhexidine or povidone-iodine. Soaks should last 10-15 minutes for maximum effect.  Please apply mupirocin antibiotic ointment after each soak.  Refrain from wearing rings on the affected finger you are having symptoms.  If your symptoms do not improve within a few days or worsen, please return to urgent care.  Otherwise, follow up with your PCP.     Should you develop any worsening or new symptoms after leaving urgent care, it is recommended that you go to the ER for further/repeat evaluation.      Follow up with your PCP in 3-5 days after your urgent care visit.     Please remember that you have received care at an urgent care today. Urgent cares are not emergency rooms and are not equipped to handle life threatening emergencies and cannot rule in or out certain medical conditions and you may be released before all of your medical problems are known or treated, please schedule all follow up appointments as discussed and if you have worsening symptoms please go to the ER to rule out potential life threatening problems, as discussed.

## 2025-02-15 NOTE — PATIENT INSTRUCTIONS
Cephalexin (Keflex) is an antibiotic to treat skin infection surrounding your finger nail. Please take as prescribed and to completion.  Recommend warm water soaks multiple times per day. You may also soak in antiseptics like chlorhexidine or povidone-iodine. Soaks should last 10-15 minutes for maximum effect.  Please apply mupirocin antibiotic ointment after each soak.  Refrain from wearing rings on the affected finger you are having symptoms.  If your symptoms do not improve within a few days or worsen, please return to urgent care.  Otherwise, follow up with your PCP.     Should you develop any worsening or new symptoms after leaving urgent care, it is recommended that you go to the ER for further/repeat evaluation.      Follow up with your PCP in 3-5 days after your urgent care visit.     Please remember that you have received care at an urgent care today. Urgent cares are not emergency rooms and are not equipped to handle life threatening emergencies and cannot rule in or out certain medical conditions and you may be released before all of your medical problems are known or treated, please schedule all follow up appointments as discussed and if you have worsening symptoms please go to the ER to rule out potential life threatening problems, as discussed.

## 2025-03-07 ENCOUNTER — PATIENT MESSAGE (OUTPATIENT)
Dept: OPTOMETRY | Facility: CLINIC | Age: 35
End: 2025-03-07
Payer: MEDICAID

## 2025-03-07 ENCOUNTER — TELEPHONE (OUTPATIENT)
Dept: OPTOMETRY | Facility: CLINIC | Age: 35
End: 2025-03-07
Payer: MEDICAID

## 2025-03-07 NOTE — TELEPHONE ENCOUNTER
Final    Contact Lens Final Rx       Final Contact Lens Rx         Brand Base Curve Diameter Sphere Cylinder    Right Dailies Total 1 8.50 14.1 -2.75 Sphere    Left Dailies Total 1 8.50 14.1 -2.75 Sphere

## 2025-03-16 ENCOUNTER — PATIENT MESSAGE (OUTPATIENT)
Dept: OPTOMETRY | Facility: CLINIC | Age: 35
End: 2025-03-16
Payer: MEDICAID

## 2025-03-24 ENCOUNTER — HOSPITAL ENCOUNTER (EMERGENCY)
Facility: HOSPITAL | Age: 35
Discharge: HOME | End: 2025-03-24
Attending: STUDENT IN AN ORGANIZED HEALTH CARE EDUCATION/TRAINING PROGRAM
Payer: MEDICAID

## 2025-03-24 VITALS
TEMPERATURE: 99 F | HEART RATE: 86 BPM | OXYGEN SATURATION: 100 % | SYSTOLIC BLOOD PRESSURE: 122 MMHG | DIASTOLIC BLOOD PRESSURE: 78 MMHG | HEIGHT: 63 IN | WEIGHT: 145 LBS | RESPIRATION RATE: 20 BRPM | BODY MASS INDEX: 25.69 KG/M2

## 2025-03-24 DIAGNOSIS — L03.011 PARONYCHIA OF RIGHT MIDDLE FINGER: Primary | ICD-10-CM

## 2025-03-24 LAB — POCT GLUCOSE: 115 MG/DL (ref 70–110)

## 2025-03-24 PROCEDURE — 63600175 PHARM REV CODE 636 W HCPCS: Performed by: STUDENT IN AN ORGANIZED HEALTH CARE EDUCATION/TRAINING PROGRAM

## 2025-03-24 PROCEDURE — 10060 I&D ABSCESS SIMPLE/SINGLE: CPT

## 2025-03-24 PROCEDURE — 82962 GLUCOSE BLOOD TEST: CPT

## 2025-03-24 PROCEDURE — 99283 EMERGENCY DEPT VISIT LOW MDM: CPT | Mod: 25

## 2025-03-24 RX ORDER — AMOXICILLIN AND CLAVULANATE POTASSIUM 875; 125 MG/1; MG/1
1 TABLET, FILM COATED ORAL 2 TIMES DAILY
Qty: 20 TABLET | Refills: 0 | Status: SHIPPED | OUTPATIENT
Start: 2025-03-24 | End: 2025-04-03

## 2025-03-24 RX ORDER — LIDOCAINE HYDROCHLORIDE 10 MG/ML
100 INJECTION, SOLUTION EPIDURAL; INFILTRATION; INTRACAUDAL; PERINEURAL
Status: COMPLETED | OUTPATIENT
Start: 2025-03-24 | End: 2025-03-24

## 2025-03-24 RX ADMIN — LIDOCAINE HYDROCHLORIDE 100 MG: 10 SOLUTION INTRAVENOUS at 01:03

## 2025-03-24 NOTE — FIRST PROVIDER EVALUATION
Medical screening examination initiated.  I have conducted a focused provider triage encounter, findings are as follows:    Brief history of present illness:  R middle finger swelling and pain    There were no vitals filed for this visit.    Pertinent physical exam: Paronychia R middle finger    Brief workup plan:  Per primary team    Preliminary workup initiated; this workup will be continued and followed by the physician or advanced practice provider that is assigned to the patient when roomed.

## 2025-03-24 NOTE — DISCHARGE INSTRUCTIONS
- Warm soaks for 15 minutes 4 times daily  - Take Augmentin (an antibiotic) as prescribed for 10 days  - Follow up with primary care or hand in 2 days for a re-evaluation

## 2025-03-24 NOTE — ED PROVIDER NOTES
Encounter Date: 3/24/2025       History     Chief Complaint   Patient presents with    Finger Pain     Right middle finger pain and swelling x 2 months.      34-year-old female with PMH of type diabetes presents with right middle finger pain.  Patient states this has been going on for about a month.  It has been painful and swollen the majority of the time.  She went to an urgent care who said that they were not able to drain it because there was not enough it there.  She then went to another emergency department where they drained it.  She states that only blood came out but the size did go down; however, it never completely resolved and it has gotten worse again.  She states it is red and painful.  Reports her sugar has been well controlled recently.  She was also prescribed a course of Keflex from the urgent care and doxycycline from the ED, which she completed.  No fever.  ROS otherwise negative.     The history is provided by the patient.     Review of patient's allergies indicates:   Allergen Reactions    No known allergies      Past Medical History:   Diagnosis Date    Depression     Hyperlipidemia     Hypertension     Mild nonproliferative diabetic retinopathy of both eyes without macular edema associated with type 1 diabetes mellitus 3/30/2021    Type I (juvenile type) diabetes mellitus without mention of complication, uncontrolled 2011     Past Surgical History:   Procedure Laterality Date    ABCESS DRAINAGE      boil went over GA    CARPAL TUNNEL RELEASE Left 2022    Procedure: RELEASE, CARPAL TUNNEL;  Surgeon: Sekou Rojo Jr., MD;  Location: [facility] OR;  Service: Orthopedics;  Laterality: Left;    CARPAL TUNNEL RELEASE Right 3/28/2023    Procedure: RELEASE, CARPAL TUNNEL;  Surgeon: Sekou Rojo Jr., MD;  Location: [facility] OR;  Service: Orthopedics;  Laterality: Right;     SECTION  2012    DILATION AND CURETTAGE OF UTERUS  2019    Procedure: DILATION AND CURETTAGE, UTERUS;   Surgeon: Purnima Kidd MD;  Location: [facility] OR;  Service: OB/GYN;;    DILATION AND CURETTAGE OF UTERUS N/A 8/9/2023    Procedure: DILATION AND CURETTAGE, UTERUS;  Surgeon: Purnima Kidd MD;  Location: [facility];  Service: OB/GYN;  Laterality: N/A;    ENDOMETRIAL ABLATION N/A 8/9/2023    Procedure: ABLATION, ENDOMETRIUM;  Surgeon: Purnima Kidd MD;  Location: [facility] OR;  Service: OB/GYN;  Laterality: N/A;    HYSTEROSCOPY N/A 2/20/2019    Procedure: HYSTEROSCOPY;  Surgeon: Purnima Kidd MD;  Location: [facility] OR;  Service: OB/GYN;  Laterality: N/A;    HYSTEROSCOPY N/A 8/9/2023    Procedure: HYSTEROSCOPY;  Surgeon: Purnima Kidd MD;  Location: [facility];  Service: OB/GYN;  Laterality: N/A;    LAPAROSCOPIC SALPINGECTOMY Bilateral 8/9/2023    Procedure: SALPINGECTOMY, LAPAROSCOPIC;  Surgeon: Purnima Kidd MD;  Location: [facility];  Service: OB/GYN;  Laterality: Bilateral;    TONSILLECTOMY, ADENOIDECTOMY      TRIGGER FINGER RELEASE Left 12/13/2022    Procedure: RELEASE, TRIGGER FINGER;  Surgeon: Sekou Rojo Jr., MD;  Location: [facility] OR;  Service: Orthopedics;  Laterality: Left;    TRIGGER FINGER RELEASE Right 3/28/2023    Procedure: RELEASE, TRIGGER FINGER; MIDDLE FINGER;  Surgeon: Sekou Rojo Jr., MD;  Location: [facility] OR;  Service: Orthopedics;  Laterality: Right;    WISDOM TOOTH EXTRACTION       Family History   Problem Relation Name Age of Onset    Diabetes Brother      No Known Problems Mother      No Known Problems Father      Hydrocephalus Daughter      No Known Problems Brother      No Known Problems Brother      No Known Problems Maternal Aunt      No Known Problems Maternal Uncle      No Known Problems Paternal Aunt      No Known Problems Paternal Uncle      No Known Problems Maternal Grandmother      No Known Problems Maternal Grandfather      No Known Problems Paternal Grandmother      No Known Problems Paternal Grandfather      Amblyopia Neg Hx      Blindness Neg Hx      Cataracts Neg Hx      Glaucoma  Neg Hx      Hypertension Neg Hx      Macular degeneration Neg Hx      Retinal detachment Neg Hx      Strabismus Neg Hx      Stroke Neg Hx      Thyroid disease Neg Hx      Breast cancer Neg Hx      Colon cancer Neg Hx      Ovarian cancer Neg Hx      Cancer Neg Hx       Social History[1]  Review of Systems    Physical Exam     Initial Vitals [03/24/25 1226]   BP Pulse Resp Temp SpO2   126/80 90 16 98.6 °F (37 °C) 100 %      MAP       --         Physical Exam    Constitutional: She appears well-nourished. No distress.   Musculoskeletal:      Comments: Right middle finger has paronychia.  No fusiform swelling of the digit, passive flexion, pain with passive extension or pain with palpation of flexor sheath     Neurological: She is alert and oriented to person, place, and time.   Skin: Capillary refill takes less than 2 seconds.         ED Course   I & D - Incision and Drainage    Date/Time: 3/24/2025 2:10 PM  Location procedure was performed: [facility] EMERGENCY DEPARTMENT    Performed by: Nahun Guerrero MD  Authorized by: Nahun Guerrero MD  Pre-operative diagnosis: Paronychia  Post-operative diagnosis: Paronychia  Consent Done: Yes  Consent: Verbal consent obtained  Risks and benefits: risks, benefits and alternatives were discussed  Consent given by: patient  Anesthesia: digital block    Anesthesia:  Local Anesthetic: lidocaine 1% without epinephrine  Anesthetic total: 3 mL    Patient sedated: no  Scalpel size: 11  Incision type: single straight  Complexity: simple  Drainage: pus and bloody  Drainage amount: scant  Wound treatment: incision, drainage, wound left open and expression of material  Patient tolerance: Patient tolerated the procedure well with no immediate complications        Labs Reviewed   POCT GLUCOSE - Abnormal       Result Value    POCT Glucose 115 (*)    POCT GLUCOSE MONITORING CONTINUOUS          Imaging Results    None          Medications   LIDOcaine (PF) 10 mg/ml (1%) injection 100 mg (has no  administration in time range)     Medical Decision Making  Differential diagnoses considered includes paronychia, felon, cellulitis    Patient has a paronychia.  It has never fully healed after the initial drainage.  Given the recurrent/persistence of this, I prescribed a course of Augmentin in addition to performing an I&D.  Referred patient externally to [facility] for ortho/hand. Patient will be discharged at this time. Patient has been given home care instructions, follow up instructions, and strict return precautions. They agree with and are comfortable with the plan.       Risk  Prescription drug management.  Diagnosis or treatment significantly limited by social determinants of health.                                      Clinical Impression:  Final diagnoses:  [L03.011] Paronychia of right middle finger (Primary)          ED Disposition Condition    Discharge Stable          ED Prescriptions       Medication Sig Dispense Start Date End Date Auth. Provider    amoxicillin-clavulanate 875-125mg (AUGMENTIN) 875-125 mg per tablet Take 1 tablet by mouth 2 (two) times daily. for 10 days 20 tablet 3/24/2025 4/3/2025 Nahun Guerrero MD          Follow-up Information       Follow up With Specialties Details Why Contact Info    Magaly Muhammad NP Hospitalist, Emergency Medicine, Internal Medicine Schedule an appointment as soon as possible for a visit  To discuss your recent ER visit and any additional concerns that you may have [address]  [facility] [address]  [phone]      [facility] - Emergency Dept Emergency Medicine Go to  As needed, If symptoms worsen [address] [facility] [address] [phone]    Orthopedics, [facility] - Orthopedic Surgery   [address] [facility] [address]  [phone]                   [1]   Social History  Tobacco Use    Smoking status: Never    Smokeless tobacco: Never   Substance Use Topics    Alcohol use: Yes     Comment: occasionally    Drug  use: Nahun Sullivan MD  03/24/25 141     Nurse

## 2025-03-24 NOTE — PLAN OF CARE
GILES faxed referral to [facility] [phone]      Adele Payne CD, MSW, LMSW, RSW   Case Management  [facility]  Email: [email or web address]

## 2025-03-24 NOTE — ED TRIAGE NOTES
Anusha Andrew, a 34 y.o. female presents to the ED w/ complaint of finger pain. Pt arrives to Ed via personal vehicle with co finger pain. Pt states that she has had swelling x2 months. Pt's finger was lanced 2 weeks ago at another ED and she was taking antibiotics.     Triage note:  Chief Complaint   Patient presents with    Finger Pain     Right middle finger pain and swelling x 2 months.      Review of patient's allergies indicates:   Allergen Reactions    No known allergies      Past Medical History:   Diagnosis Date    Depression     Hyperlipidemia     Hypertension     Mild nonproliferative diabetic retinopathy of both eyes without macular edema associated with type 1 diabetes mellitus 3/30/2021    Type I (juvenile type) diabetes mellitus without mention of complication, uncontrolled 11/23/2011

## 2025-03-31 ENCOUNTER — PATIENT MESSAGE (OUTPATIENT)
Dept: ORTHOPEDICS | Facility: CLINIC | Age: 35
End: 2025-03-31
Payer: MEDICAID

## 2025-04-01 ENCOUNTER — PATIENT MESSAGE (OUTPATIENT)
Dept: ORTHOPEDICS | Facility: CLINIC | Age: 35
End: 2025-04-01
Payer: MEDICAID

## 2025-04-03 ENCOUNTER — TELEPHONE (OUTPATIENT)
Dept: ORTHOPEDICS | Facility: CLINIC | Age: 35
End: 2025-04-03
Payer: MEDICAID

## 2025-04-03 ENCOUNTER — OFFICE VISIT (OUTPATIENT)
Dept: ORTHOPEDICS | Facility: CLINIC | Age: 35
End: 2025-04-03
Payer: MEDICAID

## 2025-04-03 ENCOUNTER — ANESTHESIA EVENT (OUTPATIENT)
Dept: SURGERY | Facility: HOSPITAL | Age: 35
End: 2025-04-03
Payer: MEDICAID

## 2025-04-03 ENCOUNTER — HOSPITAL ENCOUNTER (OUTPATIENT)
Facility: HOSPITAL | Age: 35
Discharge: HOME | End: 2025-04-03
Attending: ORTHOPAEDIC SURGERY
Payer: MEDICAID

## 2025-04-03 DIAGNOSIS — M79.641 RIGHT HAND PAIN: ICD-10-CM

## 2025-04-03 DIAGNOSIS — M79.641 RIGHT HAND PAIN: Primary | ICD-10-CM

## 2025-04-03 DIAGNOSIS — L03.011 PARONYCHIA OF FINGER, RIGHT: Primary | ICD-10-CM

## 2025-04-03 PROCEDURE — 73130 X-RAY EXAM OF HAND: CPT | Mod: TC,PN,RT

## 2025-04-03 PROCEDURE — 3051F HG A1C>EQUAL 7.0%<8.0%: CPT | Mod: CPTII,,, | Performed by: ORTHOPAEDIC SURGERY

## 2025-04-03 PROCEDURE — 99999 PR PBB SHADOW E&M-EST. PATIENT-LVL IV: CPT | Mod: PBBFAC,,, | Performed by: ORTHOPAEDIC SURGERY

## 2025-04-03 PROCEDURE — 99214 OFFICE O/P EST MOD 30 MIN: CPT | Mod: PBBFAC,25,PN | Performed by: ORTHOPAEDIC SURGERY

## 2025-04-03 PROCEDURE — 73130 X-RAY EXAM OF HAND: CPT | Mod: 26,RT,, | Performed by: RADIOLOGY

## 2025-04-03 PROCEDURE — 99214 OFFICE O/P EST MOD 30 MIN: CPT | Mod: S$PBB,,, | Performed by: ORTHOPAEDIC SURGERY

## 2025-04-03 PROCEDURE — 1159F MED LIST DOCD IN RCRD: CPT | Mod: CPTII,,, | Performed by: ORTHOPAEDIC SURGERY

## 2025-04-03 RX ORDER — CEFAZOLIN SODIUM 2 G/50ML
2 SOLUTION INTRAVENOUS
Status: CANCELLED | OUTPATIENT
Start: 2025-04-03

## 2025-04-03 NOTE — ANESTHESIA PREPROCEDURE EVALUATION
"[facility]   Anesthesia Pre-Operative Evaluation        Patient Name: Anusha Andrew  YOB: 1990  MRN: [id number]    SUBJECTIVE:     Pre-operative Evaluation for Procedure(s) (LRB):  DEBRIDEMENT, PHALANX, HAND (Right)     04/03/2025    Anusha Andrew is a 34 y.o. female with a PMHx significant for T1DM, hx of gastric sleeve, pulm htn (most recent at 34 mmHg Patient now presents for the above procedure(s)     Previous Airway:   Intubated:  Postinduction    Mask Ventilation:  Easy mask    Attempts:  1    Attempted By:  CRNA    Method of Intubation:  Video laryngoscopy    Blade:  Morales 3    Laryngeal View Grade: Grade I - full view of cords      Difficult Airway Encountered?: No      Complications:  None    Airway Device:  Oral endotracheal tube    Airway Device Size:  7.0    Style/Cuff Inflation:  Cuffed (inflated to minimal occlusive pressure)    Tube secured:  21    Current Outpatient Medications   Medication Instructions    amoxicillin-clavulanate 875-125mg (AUGMENTIN) 875-125 mg per tablet 1 tablet, Oral, 2 times daily    BD EBEN 2ND GEN PEN NEEDLE 32 gauge x 5/32" Ndle To use with insulin injections 4 times per day    blood sugar diagnostic Strp To check BG 4  times daily, to use with insurance preferred meter, e 10.65    blood-glucose sensor (DEXCOM G6 SENSOR) Saida Change every 10 days    blood-glucose transmitter (DEXCOM G6 TRANSMITTER) Saida 1 transmitter every 3 months    FLUoxetine 20 mg, Oral, 2 times daily    fluticasone propionate (FLONASE) 50 mcg, Each Nostril, 2 times daily PRN    glucagon (BAQSIMI) 3 mg/actuation Spry Give one puff via nostril. Hold device between fingers and thumb, do not push plunger yet, insert tip gently into one nostril until finger(s) touch the outside of the nose, then push plunger firmly all the way in . Dose is complete when the green line disappears.    hydrocortisone (ANUSOL-HC) 2.5 % rectal cream Rectal, 2 times daily    insulin aspart, " niacinamide, (FIASP FLEXTOUCH U-100 INSULIN) 100 unit/mL (3 mL) InPn use three times daily before meals. Inject ICR 1:8 + snacks + correction scale. Max TDD of 50 units.  To have her backup in case off of insulin pump    insulin aspart, niacinamide, (FIASP U-100 INSULIN) 100 unit/mL Soln To use continuously with insulin pump. Max Total Daily Dose of 100 units    insulin degludec (TRESIBA FLEXTOUCH U-100) 7 Units, Subcutaneous, 2 times daily, To have as backup insulin in case off of insulin pump    insulin pump cart,auto,BT,G6/7 (OMNIPOD 5 G6-G7 PODS, GEN 5,) Crtg 1 Device, Subcutaneous, Every 48 hours    insulin syringe-needle U-100 0.5 mL 31 gauge x 5/16 Syrg use with admelog    ipratropium (ATROVENT) 21 mcg (0.03 %) nasal spray Take 2 sprays by each nostril route 2 (two) times daily.    mupirocin (BACTROBAN) 2 % ointment Topical (Top), 3 times daily    OZEMPIC 1 mg, Every 7 days    polyethylene glycol (GLYCOLAX) 17 g, Oral, Daily    sumatriptan (IMITREX) 50 mg, Oral, 2 times daily PRN    TRESIBA FLEXTOUCH U-100 7 Units, Subcutaneous, 2 times daily, To have as backup insulin in case off of insulin pump    VITAMIN D2 50,000 Units, Oral, Every 7 days       Review of patient's allergies indicates:   Allergen Reactions    No known allergies        Past Surgical History:   Procedure Laterality Date    ABCESS DRAINAGE      boil went over GA    CARPAL TUNNEL RELEASE Left 2022    Procedure: RELEASE, CARPAL TUNNEL;  Surgeon: Sekou Rojo Jr., MD;  Location: [facility] OR;  Service: Orthopedics;  Laterality: Left;    CARPAL TUNNEL RELEASE Right 3/28/2023    Procedure: RELEASE, CARPAL TUNNEL;  Surgeon: Sekou Rojo Jr., MD;  Location: [facility] OR;  Service: Orthopedics;  Laterality: Right;     SECTION  2012    DILATION AND CURETTAGE OF UTERUS  2019    Procedure: DILATION AND CURETTAGE, UTERUS;  Surgeon: Purnima Kidd MD;  Location: [facility] OR;  Service: OB/GYN;;    DILATION AND CURETTAGE OF UTERUS N/A  8/9/2023    Procedure: DILATION AND CURETTAGE, UTERUS;  Surgeon: Purnima Kidd MD;  Location: [facility] OR;  Service: OB/GYN;  Laterality: N/A;    ENDOMETRIAL ABLATION N/A 8/9/2023    Procedure: ABLATION, ENDOMETRIUM;  Surgeon: Purnima Kidd MD;  Location: [facility] OR;  Service: OB/GYN;  Laterality: N/A;    HYSTEROSCOPY N/A 2/20/2019    Procedure: HYSTEROSCOPY;  Surgeon: Purnima Kidd MD;  Location: [facility] OR;  Service: OB/GYN;  Laterality: N/A;    HYSTEROSCOPY N/A 8/9/2023    Procedure: HYSTEROSCOPY;  Surgeon: Purnima Kidd MD;  Location: [facility] OR;  Service: OB/GYN;  Laterality: N/A;    LAPAROSCOPIC SALPINGECTOMY Bilateral 8/9/2023    Procedure: SALPINGECTOMY, LAPAROSCOPIC;  Surgeon: Purnima Kidd MD;  Location: [facility] OR;  Service: OB/GYN;  Laterality: Bilateral;    TONSILLECTOMY, ADENOIDECTOMY      TRIGGER FINGER RELEASE Left 12/13/2022    Procedure: RELEASE, TRIGGER FINGER;  Surgeon: Sekou Rojo Jr., MD;  Location: [facility] OR;  Service: Orthopedics;  Laterality: Left;    TRIGGER FINGER RELEASE Right 3/28/2023    Procedure: RELEASE, TRIGGER FINGER; MIDDLE FINGER;  Surgeon: Sekou Rojo Jr., MD;  Location: [facility] OR;  Service: Orthopedics;  Laterality: Right;    WISDOM TOOTH EXTRACTION         Social History     Substance and Sexual Activity   Drug Use No     Alcohol Use: Not At Risk (1/24/2025)    AUDIT-C     Frequency of Alcohol Consumption: Monthly or less     Average Number of Drinks: 1 or 2     Frequency of Binge Drinking: Never     Tobacco Use: Low Risk  (3/24/2025)    Patient History     Smoking Tobacco Use: Never     Smokeless Tobacco Use: Never     Passive Exposure: Not on file       OBJECTIVE:     Vital Signs Range (Last 24H):         Significant Labs  Lab Results   Component Value Date    WBC 9.98 06/27/2024    HGB 13.4 06/27/2024    HCT 42.9 06/27/2024     06/27/2024    CHOL 223 (H) 06/23/2024    TRIG 46 06/23/2024    HDL 95 (H) 06/23/2024    ALT 14 11/01/2024    AST 16 11/01/2024      01/25/2025    K 3.6 01/25/2025     01/25/2025    CREATININE 0.8 01/25/2025    BUN 8 01/25/2025    CO2 20 (L) 01/25/2025    TSH 0.644 06/27/2024    INR 1.0 08/16/2022    GLUF 114 (H) 06/19/2020    HGBA1C 7.3 (H) 01/25/2025       Cardiac Studies:    EKG:   Results for orders placed or performed during the hospital encounter of 06/27/24   EKG 12-lead    Collection Time: 06/27/24  8:03 AM   Result Value Ref Range    QRS Duration 70 ms    [facility] QTC Calculation 446 ms    Narrative    Test Reason : R07.9,    Vent. Rate : 100 BPM     Atrial Rate : 100 BPM     P-R Int : 132 ms          QRS Dur : 070 ms      QT Int : 346 ms       P-R-T Axes : 083 094 058 degrees     QTc Int : 446 ms    Normal sinus rhythm  Biatrial enlargement  Rightward axis  Pulmonary disease pattern  Abnormal ECG  When compared with ECG of 12-APR-2024 21:07,  ST no longer depressed in Anterior leads  Confirmed by Anuj SANDOVAL MD ([id number]) on 6/27/2024 10:08:15 AM    Referred By: [email or web address]   SELF           Confirmed By:Anuj SANDOVAL MD       Transthoracic Echo  Results for orders placed during the hospital encounter of 04/12/24    Echo    Interpretation Summary    Left Ventricle: There is hyperdynamic systolic function with a visually estimated ejection fraction of greater than 70%. There is normal diastolic function.    Right Ventricle: Normal right ventricular cavity size. Wall thickness is normal. Right ventricle wall motion  is normal. Systolic function is normal.    Tricuspid Valve: There is mild regurgitation.    Pulmonary Artery: The estimated pulmonary artery systolic pressure is 34 mmHg.      Transesophageal Echo  No results found for this or any previous visit.      ASSESSMENT/PLAN AND ANESTHESIA ROS/EXAM:     Anusha Andrew is a 34 y.o. female is presenting for Procedure(s) (LRB):  DEBRIDEMENT, PHALANX, HAND (Right)       Pre-op Assessment    I have reviewed the Patient Summary Reports.     I have reviewed the Nursing Notes. I have reviewed the NPO  Status.   I have reviewed the Medications.     Review of Systems  Anesthesia Hx:  No problems with previous Anesthesia             Denies Family Hx of Anesthesia complications.    Denies Personal Hx of Anesthesia complications.                    Social:  Non-Smoker       Hematology/Oncology:  Hematology Normal   Oncology Normal                                   EENT/Dental:  EENT/Dental Normal           Cardiovascular:  Exercise tolerance: good    Denies Hypertension.                                          Pulmonary:  Pulmonary Normal                       Renal/:  Renal/ Normal                 Musculoskeletal:  Musculoskeletal Normal                Neurological:    Neuromuscular Disease,  Headaches           Peripheral Neuropathy                          Endocrine:  Diabetes, well controlled, type 1, using insulin   Dexcom and insulin pump         Dermatological:  Skin Normal    Psych:  Psychiatric History  depression                Physical Exam  General: Cooperative, Oriented, Alert and Well nourished    Airway:  Mallampati: I   Mouth Opening: Normal  TM Distance: Normal  Tongue: Normal  Neck ROM: Normal ROM    Dental:  Intact        Anesthesia Plan  Type of Anesthesia, risks & benefits discussed:    Anesthesia Type: Gen Natural Airway  Intra-op Monitoring Plan: Standard ASA Monitors  Post Op Pain Control Plan: multimodal analgesia and IV/PO Opioids PRN  Induction:  IV  Informed Consent: Informed consent signed with the Patient and all parties understand the risks and agree with anesthesia plan.  All questions answered.   ASA Score: 2  Day of Surgery Review of History & Physical: H&P Update referred to the surgeon/provider.    Ready For Surgery From Anesthesia Perspective.     .

## 2025-04-03 NOTE — H&P (VIEW-ONLY)
CC:  Chronic paronychia of the right middle finger        HPI:  Anusha Andrew is a very pleasant 34 y.o. female with ongoing symptoms right middle finger for the past 2 months   She initially had a small infection around the finger which has persisted   She has been in the emergency room twice she has been on 2 rounds of antibiotics without significant improvement   Still reporting pain swelling and drainage from the tip of the right middle finger she does have diabetes         PAST MEDICAL HISTORY:   Past Medical History:   Diagnosis Date    Depression     Hyperlipidemia     Hypertension     Mild nonproliferative diabetic retinopathy of both eyes without macular edema associated with type 1 diabetes mellitus 3/30/2021    Type I (juvenile type) diabetes mellitus without mention of complication, uncontrolled 2011     PAST SURGICAL HISTORY:   Past Surgical History:   Procedure Laterality Date    ABCESS DRAINAGE      boil went over [place]    CARPAL TUNNEL RELEASE Left 2022    Procedure: RELEASE, CARPAL TUNNEL;  Surgeon: Sekou oRjo Jr., MD;  Location: [facility] OR;  Service: Orthopedics;  Laterality: Left;    CARPAL TUNNEL RELEASE Right 3/28/2023    Procedure: RELEASE, CARPAL TUNNEL;  Surgeon: Sekou Rojo Jr., MD;  Location: [facility] OR;  Service: Orthopedics;  Laterality: Right;     SECTION  2012    DILATION AND CURETTAGE OF UTERUS  2019    Procedure: DILATION AND CURETTAGE, UTERUS;  Surgeon: Purnima Kidd MD;  Location: [facility] OR;  Service: OB/GYN;;    DILATION AND CURETTAGE OF UTERUS N/A 2023    Procedure: DILATION AND CURETTAGE, UTERUS;  Surgeon: Purnima Kidd MD;  Location: [facility] OR;  Service: OB/GYN;  Laterality: N/A;    ENDOMETRIAL ABLATION N/A 2023    Procedure: ABLATION, ENDOMETRIUM;  Surgeon: Purnima Kidd MD;  Location: [facility] OR;  Service: OB/GYN;  Laterality: N/A;    HYSTEROSCOPY N/A 2019    Procedure: HYSTEROSCOPY;  Surgeon: Purnima Kidd MD;  Location: [facility];   Service: OB/GYN;  Laterality: N/A;    HYSTEROSCOPY N/A 8/9/2023    Procedure: HYSTEROSCOPY;  Surgeon: Purnima Kidd MD;  Location: [facility] OR;  Service: OB/GYN;  Laterality: N/A;    LAPAROSCOPIC SALPINGECTOMY Bilateral 8/9/2023    Procedure: SALPINGECTOMY, LAPAROSCOPIC;  Surgeon: Purnima Kidd MD;  Location: [facility] OR;  Service: OB/GYN;  Laterality: Bilateral;    TONSILLECTOMY, ADENOIDECTOMY      TRIGGER FINGER RELEASE Left 12/13/2022    Procedure: RELEASE, TRIGGER FINGER;  Surgeon: Sekou Rojo Jr., MD;  Location: [facility] OR;  Service: Orthopedics;  Laterality: Left;    TRIGGER FINGER RELEASE Right 3/28/2023    Procedure: RELEASE, TRIGGER FINGER; MIDDLE FINGER;  Surgeon: Sekou Rojo Jr., MD;  Location: [facility] OR;  Service: Orthopedics;  Laterality: Right;    WISDOM TOOTH EXTRACTION       FAMILY HISTORY:   Family History   Problem Relation Name Age of Onset    Diabetes Brother      No Known Problems Mother      No Known Problems Father      Hydrocephalus Daughter      No Known Problems Brother      No Known Problems Brother      No Known Problems Maternal Aunt      No Known Problems Maternal Uncle      No Known Problems Paternal Aunt      No Known Problems Paternal Uncle      No Known Problems Maternal Grandmother      No Known Problems Maternal Grandfather      No Known Problems Paternal Grandmother      No Known Problems Paternal Grandfather      Amblyopia Neg Hx      Blindness Neg Hx      Cataracts Neg Hx      Glaucoma Neg Hx      Hypertension Neg Hx      Macular degeneration Neg Hx      Retinal detachment Neg Hx      Strabismus Neg Hx      Stroke Neg Hx      Thyroid disease Neg Hx      Breast cancer Neg Hx      Colon cancer Neg Hx      Ovarian cancer Neg Hx      Cancer Neg Hx       SOCIAL HISTORY: Social History[1]    MEDICATIONS: Current Medications[2]  ALLERGIES:   Review of patient's allergies indicates:   Allergen Reactions    No known allergies        Review of Systems:  Constitutional: no fever or  "chills  ENT: no nasal congestion or sore throat  Respiratory: no cough or shortness of breath  Cardiovascular: no chest pain or palpitations  Gastrointestinal: no nausea or vomiting, PUD, GERD, NSAID intolerance  Genitourinary: no hematuria or dysuria  Integument/Breast: no rash or pruritis  Hematologic/Lymphatic: no easy bruising or lymphadenopathy  Musculoskeletal: see HPI  Neurological: no seizures or tremors  Behavioral/Psych: no auditory or visual hallucinations      Physical Exam   Vitals:    04/03/25 1054   PainSc: 10-Worst pain ever   PainLoc: Hand       Constitutional: Oriented to person, place, and time. Appears well-developed and well-nourished.   HENT:   Head: Normocephalic and atraumatic.   Nose: Nose normal.   Eyes: No scleral icterus.   Neck: Normal range of motion.   Cardiovascular: Normal rate and regular rhythm.    Pulses:       Radial pulses are 2+ on the right side, and 2+ on the left side.   Pulmonary/Chest: Effort normal and breath sounds normal.   Abdominal: Soft.   Neurological: Alert and oriented to person, place, and time.   Skin: Skin is warm.   Psychiatric: Normal mood and affect.     MUSCULOSKELETAL UPPER EXTREMITY:  Examination right middle finger there is swelling around the base of the right middle finger nail fluctuance is noted slight drainage diffuse tenderness   Decreased range of motion               Diagnostic Studies:  AP lateral x-ray of the right middle finger shows no bony involvement or evidence of osteomyelitis        Assessment:  Chronic paronychia right middle finger    Plan:  Because of failure to improve I have recommended surgical treatment to include I and D of the right middle finger with cultures and debridement   The patient would like to set up surgery for tomorrow as an outpatient      The risks and benefits of surgery were discussed with the patient today and they understand.  The consent was signed in the office for surgery.      Sekou Alcocer" Darrel, " "MD  [facility]  Orthopedic Upper Extremity Surgery            [1]   Social History  Socioeconomic History    Marital status: Single   Tobacco Use    Smoking status: Never    Smokeless tobacco: Never   Substance and Sexual Activity    Alcohol use: Yes     Comment: occasionally    Drug use: No    Sexual activity: Yes     Partners: Male     Birth control/protection: Injection     Social Drivers of Health     Financial Resource Strain: Low Risk  (1/24/2025)    Overall Financial Resource Strain (CARDIA)     Difficulty of Paying Living Expenses: Not hard at all   Food Insecurity: No Food Insecurity (1/24/2025)    Hunger Vital Sign     Worried About Running Out of Food in the Last Year: Never true     Ran Out of Food in the Last Year: Never true   Transportation Needs: No Transportation Needs (1/29/2024)    PRAPARE - Transportation     Lack of Transportation (Medical): No     Lack of Transportation (Non-Medical): No   Physical Activity: Sufficiently Active (1/24/2025)    Exercise Vital Sign     Days of Exercise per Week: 3 days     Minutes of Exercise per Session: 60 min   Stress: No Stress Concern Present (1/24/2025)    [employer] [place] [employer] - Occupational Stress Questionnaire     Feeling of Stress : Not at all   Housing Stability: Low Risk  (1/29/2024)    Housing Stability Vital Sign     Unable to Pay for Housing in the Last Year: No     Number of Places Lived in the Last Year: 1     Unstable Housing in the Last Year: No   [2]   Current Outpatient Medications:     amoxicillin-clavulanate 875-125mg (AUGMENTIN) 875-125 mg per tablet, Take 1 tablet by mouth 2 (two) times daily. for 10 days, Disp: 20 tablet, Rfl: 0    BD EBEN 2ND GEN PEN NEEDLE 32 gauge x 5/32" Ndle, To use with insulin injections 4 times per day, Disp: 100 each, Rfl: 11    blood sugar diagnostic Strp, To check BG 4  times daily, to use with insurance preferred meter, e 10.65, Disp: 400 each, Rfl: 3    blood-glucose sensor " (DEXCOM G6 SENSOR) Saida, Change every 10 days, Disp: 3 each, Rfl: 11    blood-glucose transmitter (DEXCOM G6 TRANSMITTER) Saida, 1 transmitter every 3 months, Disp: 1 each, Rfl: 4    ergocalciferol (VITAMIN D2) 50,000 unit Cap, Take 1 capsule (50,000 Units total) by mouth every 7 days., Disp: 5 capsule, Rfl: 3    FLUoxetine 20 MG capsule, Take 1 capsule (20 mg total) by mouth 2 (two) times daily., Disp: 180 capsule, Rfl: 2    fluticasone propionate (FLONASE) 50 mcg/actuation nasal spray, 1 spray (50 mcg total) by Each Nostril route 2 (two) times daily as needed., Disp: 16 g, Rfl: 0    glucagon (BAQSIMI) 3 mg/actuation Spry, Give one puff via nostril. Hold device between fingers and thumb, do not push plunger yet, insert tip gently into one nostril until finger(s) touch the outside of the nose, then push plunger firmly all the way in . Dose is complete when the green line disappears., Disp: 2 each, Rfl: 2    hydrocortisone (ANUSOL-HC) 2.5 % rectal cream, Place rectally 2 (two) times daily., Disp: 28 g, Rfl: 2    insulin aspart, niacinamide, (FIASP FLEXTOUCH U-100 INSULIN) 100 unit/mL (3 mL) InPn, use three times daily before meals. Inject ICR 1:8 + snacks + correction scale. Max TDD of 50 units.  To have her backup in case off of insulin pump, Disp: 15 mL, Rfl: 11    insulin aspart, niacinamide, (FIASP U-100 INSULIN) 100 unit/mL Soln, To use continuously with insulin pump. Max Total Daily Dose of 100 units, Disp: 30 mL, Rfl: 11    insulin degludec (TRESIBA FLEXTOUCH U-100) 100 unit/mL (3 mL) insulin pen, Inject 7 Units into the skin 2 (two) times a day. To have as backup insulin in case off of insulin pump, Disp: 15 mL, Rfl: 11    insulin degludec (TRESIBA FLEXTOUCH U-100) 100 unit/mL (3 mL) insulin pen, Inject 7 Units into the skin 2 (two) times a day. To have as backup insulin in case off of insulin pump, Disp: 15 mL, Rfl: 11    insulin pump cart,auto,BT,G6/7 (OMNIPOD 5 G6-G7 PODS, GEN 5,) Crtg, Inject 1 Device into  the skin every 48 hours., Disp: 15 each, Rfl: 11    insulin syringe-needle U-100 0.5 mL 31 gauge x 5/16 Syrg, use with admelog, Disp: 100 each, Rfl: 0    ipratropium (ATROVENT) 21 mcg (0.03 %) nasal spray, Take 2 sprays by each nostril route 2 (two) times daily., Disp: 30 mL, Rfl: 0    mupirocin (BACTROBAN) 2 % ointment, Apply topically 3 (three) times daily., Disp: 22 g, Rfl: 0    polyethylene glycol (GLYCOLAX) 17 gram/dose powder, Take 17 g by mouth once daily., Disp: 30 each, Rfl: 3    semaglutide (OZEMPIC) 1 mg/dose (2 mg/1.5 mL) PnIj, Inject 1 mg into the skin every 7 days., Disp: , Rfl:     sumatriptan (IMITREX) 50 MG tablet, Take 1 tablet (50 mg total) by mouth 2 (two) times daily as needed (for intractable headaches)., Disp: 10 tablet, Rfl: 0

## 2025-04-03 NOTE — PROGRESS NOTES
CC:  Chronic paronychia of the right middle finger        HPI:  Anusha Andrew is a very pleasant 34 y.o. female with ongoing symptoms right middle finger for the past 2 months   She initially had a small infection around the finger which has persisted   She has been in the emergency room twice she has been on 2 rounds of antibiotics without significant improvement   Still reporting pain swelling and drainage from the tip of the right middle finger she does have diabetes         PAST MEDICAL HISTORY:   Past Medical History:   Diagnosis Date    Depression     Hyperlipidemia     Hypertension     Mild nonproliferative diabetic retinopathy of both eyes without macular edema associated with type 1 diabetes mellitus 3/30/2021    Type I (juvenile type) diabetes mellitus without mention of complication, uncontrolled 2011     PAST SURGICAL HISTORY:   Past Surgical History:   Procedure Laterality Date    ABCESS DRAINAGE      boil went over [place]    CARPAL TUNNEL RELEASE Left 2022    Procedure: RELEASE, CARPAL TUNNEL;  Surgeon: Sekou Rojo Jr., MD;  Location: [facility] OR;  Service: Orthopedics;  Laterality: Left;    CARPAL TUNNEL RELEASE Right 3/28/2023    Procedure: RELEASE, CARPAL TUNNEL;  Surgeon: Sekou Rojo Jr., MD;  Location: [facility] OR;  Service: Orthopedics;  Laterality: Right;     SECTION  2012    DILATION AND CURETTAGE OF UTERUS  2019    Procedure: DILATION AND CURETTAGE, UTERUS;  Surgeon: Purnima Kidd MD;  Location: [facility] OR;  Service: OB/GYN;;    DILATION AND CURETTAGE OF UTERUS N/A 2023    Procedure: DILATION AND CURETTAGE, UTERUS;  Surgeon: Purnima Kidd MD;  Location: [facility] OR;  Service: OB/GYN;  Laterality: N/A;    ENDOMETRIAL ABLATION N/A 2023    Procedure: ABLATION, ENDOMETRIUM;  Surgeon: Purnima Kidd MD;  Location: [facility] OR;  Service: OB/GYN;  Laterality: N/A;    HYSTEROSCOPY N/A 2019    Procedure: HYSTEROSCOPY;  Surgeon: Purnima Kidd MD;  Location: [facility];   Service: OB/GYN;  Laterality: N/A;    HYSTEROSCOPY N/A 8/9/2023    Procedure: HYSTEROSCOPY;  Surgeon: Purnima Kidd MD;  Location: [facility] OR;  Service: OB/GYN;  Laterality: N/A;    LAPAROSCOPIC SALPINGECTOMY Bilateral 8/9/2023    Procedure: SALPINGECTOMY, LAPAROSCOPIC;  Surgeon: Purnima Kidd MD;  Location: [facility] OR;  Service: OB/GYN;  Laterality: Bilateral;    TONSILLECTOMY, ADENOIDECTOMY      TRIGGER FINGER RELEASE Left 12/13/2022    Procedure: RELEASE, TRIGGER FINGER;  Surgeon: Sekou Rojo Jr., MD;  Location: [facility] OR;  Service: Orthopedics;  Laterality: Left;    TRIGGER FINGER RELEASE Right 3/28/2023    Procedure: RELEASE, TRIGGER FINGER; MIDDLE FINGER;  Surgeon: Sekou Rojo Jr., MD;  Location: [facility] OR;  Service: Orthopedics;  Laterality: Right;    WISDOM TOOTH EXTRACTION       FAMILY HISTORY:   Family History   Problem Relation Name Age of Onset    Diabetes Brother      No Known Problems Mother      No Known Problems Father      Hydrocephalus Daughter      No Known Problems Brother      No Known Problems Brother      No Known Problems Maternal Aunt      No Known Problems Maternal Uncle      No Known Problems Paternal Aunt      No Known Problems Paternal Uncle      No Known Problems Maternal Grandmother      No Known Problems Maternal Grandfather      No Known Problems Paternal Grandmother      No Known Problems Paternal Grandfather      Amblyopia Neg Hx      Blindness Neg Hx      Cataracts Neg Hx      Glaucoma Neg Hx      Hypertension Neg Hx      Macular degeneration Neg Hx      Retinal detachment Neg Hx      Strabismus Neg Hx      Stroke Neg Hx      Thyroid disease Neg Hx      Breast cancer Neg Hx      Colon cancer Neg Hx      Ovarian cancer Neg Hx      Cancer Neg Hx       SOCIAL HISTORY: Social History[1]    MEDICATIONS: Current Medications[2]  ALLERGIES:   Review of patient's allergies indicates:   Allergen Reactions    No known allergies        Review of Systems:  Constitutional: no fever or  "chills  ENT: no nasal congestion or sore throat  Respiratory: no cough or shortness of breath  Cardiovascular: no chest pain or palpitations  Gastrointestinal: no nausea or vomiting, PUD, GERD, NSAID intolerance  Genitourinary: no hematuria or dysuria  Integument/Breast: no rash or pruritis  Hematologic/Lymphatic: no easy bruising or lymphadenopathy  Musculoskeletal: see HPI  Neurological: no seizures or tremors  Behavioral/Psych: no auditory or visual hallucinations      Physical Exam   Vitals:    04/03/25 1054   PainSc: 10-Worst pain ever   PainLoc: Hand       Constitutional: Oriented to person, place, and time. Appears well-developed and well-nourished.   HENT:   Head: Normocephalic and atraumatic.   Nose: Nose normal.   Eyes: No scleral icterus.   Neck: Normal range of motion.   Cardiovascular: Normal rate and regular rhythm.    Pulses:       Radial pulses are 2+ on the right side, and 2+ on the left side.   Pulmonary/Chest: Effort normal and breath sounds normal.   Abdominal: Soft.   Neurological: Alert and oriented to person, place, and time.   Skin: Skin is warm.   Psychiatric: Normal mood and affect.     MUSCULOSKELETAL UPPER EXTREMITY:  Examination right middle finger there is swelling around the base of the right middle finger nail fluctuance is noted slight drainage diffuse tenderness   Decreased range of motion               Diagnostic Studies:  AP lateral x-ray of the right middle finger shows no bony involvement or evidence of osteomyelitis        Assessment:  Chronic paronychia right middle finger    Plan:  Because of failure to improve I have recommended surgical treatment to include I and D of the right middle finger with cultures and debridement   The patient would like to set up surgery for tomorrow as an outpatient      The risks and benefits of surgery were discussed with the patient today and they understand.  The consent was signed in the office for surgery.      Sekou Alcocer" Darrel, " "MD  [facility]  Orthopedic Upper Extremity Surgery            [1]   Social History  Socioeconomic History    Marital status: Single   Tobacco Use    Smoking status: Never    Smokeless tobacco: Never   Substance and Sexual Activity    Alcohol use: Yes     Comment: occasionally    Drug use: No    Sexual activity: Yes     Partners: Male     Birth control/protection: Injection     Social Drivers of Health     Financial Resource Strain: Low Risk  (1/24/2025)    Overall Financial Resource Strain (CARDIA)     Difficulty of Paying Living Expenses: Not hard at all   Food Insecurity: No Food Insecurity (1/24/2025)    Hunger Vital Sign     Worried About Running Out of Food in the Last Year: Never true     Ran Out of Food in the Last Year: Never true   Transportation Needs: No Transportation Needs (1/29/2024)    PRAPARE - Transportation     Lack of Transportation (Medical): No     Lack of Transportation (Non-Medical): No   Physical Activity: Sufficiently Active (1/24/2025)    Exercise Vital Sign     Days of Exercise per Week: 3 days     Minutes of Exercise per Session: 60 min   Stress: No Stress Concern Present (1/24/2025)    [place] [employer] - Occupational Stress Questionnaire     Feeling of Stress : Not at all   Housing Stability: Low Risk  (1/29/2024)    Housing Stability Vital Sign     Unable to Pay for Housing in the Last Year: No     Number of Places Lived in the Last Year: 1     Unstable Housing in the Last Year: No   [2]   Current Outpatient Medications:     amoxicillin-clavulanate 875-125mg (AUGMENTIN) 875-125 mg per tablet, Take 1 tablet by mouth 2 (two) times daily. for 10 days, Disp: 20 tablet, Rfl: 0    BD EBEN 2ND GEN PEN NEEDLE 32 gauge x 5/32" Ndle, To use with insulin injections 4 times per day, Disp: 100 each, Rfl: 11    blood sugar diagnostic Strp, To check BG 4  times daily, to use with insurance preferred meter, e 10.65, Disp: 400 each, Rfl: 3    blood-glucose sensor " (DEXCOM G6 SENSOR) Saida, Change every 10 days, Disp: 3 each, Rfl: 11    blood-glucose transmitter (DEXCOM G6 TRANSMITTER) Saida, 1 transmitter every 3 months, Disp: 1 each, Rfl: 4    ergocalciferol (VITAMIN D2) 50,000 unit Cap, Take 1 capsule (50,000 Units total) by mouth every 7 days., Disp: 5 capsule, Rfl: 3    FLUoxetine 20 MG capsule, Take 1 capsule (20 mg total) by mouth 2 (two) times daily., Disp: 180 capsule, Rfl: 2    fluticasone propionate (FLONASE) 50 mcg/actuation nasal spray, 1 spray (50 mcg total) by Each Nostril route 2 (two) times daily as needed., Disp: 16 g, Rfl: 0    glucagon (BAQSIMI) 3 mg/actuation Spry, Give one puff via nostril. Hold device between fingers and thumb, do not push plunger yet, insert tip gently into one nostril until finger(s) touch the outside of the nose, then push plunger firmly all the way in . Dose is complete when the green line disappears., Disp: 2 each, Rfl: 2    hydrocortisone (ANUSOL-HC) 2.5 % rectal cream, Place rectally 2 (two) times daily., Disp: 28 g, Rfl: 2    insulin aspart, niacinamide, (FIASP FLEXTOUCH U-100 INSULIN) 100 unit/mL (3 mL) InPn, use three times daily before meals. Inject ICR 1:8 + snacks + correction scale. Max TDD of 50 units.  To have her backup in case off of insulin pump, Disp: 15 mL, Rfl: 11    insulin aspart, niacinamide, (FIASP U-100 INSULIN) 100 unit/mL Soln, To use continuously with insulin pump. Max Total Daily Dose of 100 units, Disp: 30 mL, Rfl: 11    insulin degludec (TRESIBA FLEXTOUCH U-100) 100 unit/mL (3 mL) insulin pen, Inject 7 Units into the skin 2 (two) times a day. To have as backup insulin in case off of insulin pump, Disp: 15 mL, Rfl: 11    insulin degludec (TRESIBA FLEXTOUCH U-100) 100 unit/mL (3 mL) insulin pen, Inject 7 Units into the skin 2 (two) times a day. To have as backup insulin in case off of insulin pump, Disp: 15 mL, Rfl: 11    insulin pump cart,auto,BT,G6/7 (OMNIPOD 5 G6-G7 PODS, GEN 5,) Crtg, Inject 1 Device into  the skin every 48 hours., Disp: 15 each, Rfl: 11    insulin syringe-needle U-100 0.5 mL 31 gauge x 5/16 Syrg, use with admelog, Disp: 100 each, Rfl: 0    ipratropium (ATROVENT) 21 mcg (0.03 %) nasal spray, Take 2 sprays by each nostril route 2 (two) times daily., Disp: 30 mL, Rfl: 0    mupirocin (BACTROBAN) 2 % ointment, Apply topically 3 (three) times daily., Disp: 22 g, Rfl: 0    polyethylene glycol (GLYCOLAX) 17 gram/dose powder, Take 17 g by mouth once daily., Disp: 30 each, Rfl: 3    semaglutide (OZEMPIC) 1 mg/dose (2 mg/1.5 mL) PnIj, Inject 1 mg into the skin every 7 days., Disp: , Rfl:     sumatriptan (IMITREX) 50 MG tablet, Take 1 tablet (50 mg total) by mouth 2 (two) times daily as needed (for intractable headaches)., Disp: 10 tablet, Rfl: 0

## 2025-04-04 ENCOUNTER — HOSPITAL ENCOUNTER (OUTPATIENT)
Facility: HOSPITAL | Age: 35
Discharge: HOME | End: 2025-04-04
Attending: ORTHOPAEDIC SURGERY | Admitting: ORTHOPAEDIC SURGERY
Payer: MEDICAID

## 2025-04-04 ENCOUNTER — TELEPHONE (OUTPATIENT)
Dept: ORTHOPEDICS | Facility: CLINIC | Age: 35
End: 2025-04-04
Payer: MEDICAID

## 2025-04-04 ENCOUNTER — ANESTHESIA (OUTPATIENT)
Dept: SURGERY | Facility: HOSPITAL | Age: 35
End: 2025-04-04
Payer: MEDICAID

## 2025-04-04 VITALS
HEIGHT: 63 IN | WEIGHT: 145.06 LBS | BODY MASS INDEX: 25.7 KG/M2 | OXYGEN SATURATION: 99 % | TEMPERATURE: 98 F | SYSTOLIC BLOOD PRESSURE: 108 MMHG | HEART RATE: 88 BPM | DIASTOLIC BLOOD PRESSURE: 60 MMHG | RESPIRATION RATE: 18 BRPM

## 2025-04-04 DIAGNOSIS — L03.011 PARONYCHIA OF FINGER, RIGHT: ICD-10-CM

## 2025-04-04 DIAGNOSIS — L03.011 FELON OF FINGER OF RIGHT HAND: Primary | ICD-10-CM

## 2025-04-04 LAB
POCT GLUCOSE: 116 MG/DL (ref 70–110)
POCT GLUCOSE: 218 MG/DL (ref 70–110)
POCT GLUCOSE: 80 MG/DL (ref 70–110)

## 2025-04-04 PROCEDURE — 26011 DRAINAGE OF FINGER ABSCESS: CPT | Mod: F7,,, | Performed by: ORTHOPAEDIC SURGERY

## 2025-04-04 PROCEDURE — 71000015 HC POSTOP RECOV 1ST HR: Performed by: ORTHOPAEDIC SURGERY

## 2025-04-04 PROCEDURE — 63600175 PHARM REV CODE 636 W HCPCS

## 2025-04-04 PROCEDURE — 37000009 HC ANESTHESIA EA ADD 15 MINS: Performed by: ORTHOPAEDIC SURGERY

## 2025-04-04 PROCEDURE — 87186 SC STD MICRODIL/AGAR DIL: CPT | Performed by: ORTHOPAEDIC SURGERY

## 2025-04-04 PROCEDURE — 36000707: Performed by: ORTHOPAEDIC SURGERY

## 2025-04-04 PROCEDURE — 87076 CULTURE ANAEROBE IDENT EACH: CPT | Mod: 91 | Performed by: ORTHOPAEDIC SURGERY

## 2025-04-04 PROCEDURE — 63600175 PHARM REV CODE 636 W HCPCS: Performed by: ORTHOPAEDIC SURGERY

## 2025-04-04 PROCEDURE — 71000016 HC POSTOP RECOV ADDL HR: Performed by: ORTHOPAEDIC SURGERY

## 2025-04-04 PROCEDURE — 37000008 HC ANESTHESIA 1ST 15 MINUTES: Performed by: ORTHOPAEDIC SURGERY

## 2025-04-04 PROCEDURE — 36000706: Performed by: ORTHOPAEDIC SURGERY

## 2025-04-04 PROCEDURE — 87205 SMEAR GRAM STAIN: CPT | Performed by: ORTHOPAEDIC SURGERY

## 2025-04-04 RX ORDER — FENTANYL CITRATE 50 UG/ML
INJECTION, SOLUTION INTRAMUSCULAR; INTRAVENOUS
Status: DISCONTINUED | OUTPATIENT
Start: 2025-04-04 | End: 2025-04-04

## 2025-04-04 RX ORDER — ACETAMINOPHEN 325 MG/1
650 TABLET ORAL EVERY 4 HOURS PRN
Status: DISCONTINUED | OUTPATIENT
Start: 2025-04-04 | End: 2025-04-04 | Stop reason: HOSPADM

## 2025-04-04 RX ORDER — PROPOFOL 10 MG/ML
VIAL (ML) INTRAVENOUS
Status: DISCONTINUED | OUTPATIENT
Start: 2025-04-04 | End: 2025-04-04

## 2025-04-04 RX ORDER — HYDROMORPHONE HYDROCHLORIDE 2 MG/ML
0.5 INJECTION, SOLUTION INTRAMUSCULAR; INTRAVENOUS; SUBCUTANEOUS EVERY 5 MIN PRN
Status: DISCONTINUED | OUTPATIENT
Start: 2025-04-04 | End: 2025-04-04 | Stop reason: HOSPADM

## 2025-04-04 RX ORDER — KETOROLAC TROMETHAMINE 30 MG/ML
30 INJECTION, SOLUTION INTRAMUSCULAR; INTRAVENOUS ONCE
Status: DISCONTINUED | OUTPATIENT
Start: 2025-04-04 | End: 2025-04-04 | Stop reason: HOSPADM

## 2025-04-04 RX ORDER — OXYCODONE HYDROCHLORIDE 10 MG/1
10 TABLET ORAL EVERY 4 HOURS PRN
Status: DISCONTINUED | OUTPATIENT
Start: 2025-04-04 | End: 2025-04-04 | Stop reason: HOSPADM

## 2025-04-04 RX ORDER — ONDANSETRON HYDROCHLORIDE 2 MG/ML
INJECTION, SOLUTION INTRAVENOUS
Status: DISCONTINUED | OUTPATIENT
Start: 2025-04-04 | End: 2025-04-04

## 2025-04-04 RX ORDER — BUPIVACAINE HYDROCHLORIDE 5 MG/ML
INJECTION, SOLUTION EPIDURAL; INTRACAUDAL; PERINEURAL
Status: DISCONTINUED | OUTPATIENT
Start: 2025-04-04 | End: 2025-04-04 | Stop reason: HOSPADM

## 2025-04-04 RX ORDER — CEFAZOLIN SODIUM 1 G/3ML
INJECTION, POWDER, FOR SOLUTION INTRAMUSCULAR; INTRAVENOUS
Status: DISCONTINUED | OUTPATIENT
Start: 2025-04-04 | End: 2025-04-04

## 2025-04-04 RX ORDER — GLUCAGON 1 MG
1 KIT INJECTION
Status: DISCONTINUED | OUTPATIENT
Start: 2025-04-04 | End: 2025-04-04 | Stop reason: HOSPADM

## 2025-04-04 RX ORDER — MINOCYCLINE HYDROCHLORIDE 100 MG/1
100 CAPSULE ORAL 2 TIMES DAILY
Qty: 20 CAPSULE | Refills: 1 | Status: SHIPPED | OUTPATIENT
Start: 2025-04-04

## 2025-04-04 RX ORDER — LIDOCAINE HYDROCHLORIDE 20 MG/ML
INJECTION, SOLUTION EPIDURAL; INFILTRATION; INTRACAUDAL; PERINEURAL
Status: DISCONTINUED | OUTPATIENT
Start: 2025-04-04 | End: 2025-04-04

## 2025-04-04 RX ORDER — CEFAZOLIN 2 G/1
2 INJECTION, POWDER, FOR SOLUTION INTRAMUSCULAR; INTRAVENOUS
Status: DISCONTINUED | OUTPATIENT
Start: 2025-04-04 | End: 2025-04-04 | Stop reason: HOSPADM

## 2025-04-04 RX ORDER — MIDAZOLAM HYDROCHLORIDE 1 MG/ML
INJECTION INTRAMUSCULAR; INTRAVENOUS
Status: DISCONTINUED | OUTPATIENT
Start: 2025-04-04 | End: 2025-04-04

## 2025-04-04 RX ORDER — ONDANSETRON 8 MG/1
8 TABLET, ORALLY DISINTEGRATING ORAL EVERY 8 HOURS PRN
Status: DISCONTINUED | OUTPATIENT
Start: 2025-04-04 | End: 2025-04-04 | Stop reason: HOSPADM

## 2025-04-04 RX ORDER — HYDROCODONE BITARTRATE AND ACETAMINOPHEN 5; 325 MG/1; MG/1
1 TABLET ORAL EVERY 4 HOURS PRN
Qty: 20 TABLET | Refills: 0 | Status: SHIPPED | OUTPATIENT
Start: 2025-04-04 | End: 2025-04-07 | Stop reason: SDUPTHER

## 2025-04-04 RX ADMIN — ONDANSETRON 4 MG: 2 INJECTION, SOLUTION INTRAMUSCULAR; INTRAVENOUS at 02:04

## 2025-04-04 RX ADMIN — FENTANYL CITRATE 100 MCG: 50 INJECTION INTRAMUSCULAR; INTRAVENOUS at 02:04

## 2025-04-04 RX ADMIN — SODIUM CHLORIDE, SODIUM LACTATE, POTASSIUM CHLORIDE, AND CALCIUM CHLORIDE: .6; .31; .03; .02 INJECTION, SOLUTION INTRAVENOUS at 02:04

## 2025-04-04 RX ADMIN — PROPOFOL 70 MG: 10 INJECTION, EMULSION INTRAVENOUS at 02:04

## 2025-04-04 RX ADMIN — CEFAZOLIN 2 G: 330 INJECTION, POWDER, FOR SOLUTION INTRAMUSCULAR; INTRAVENOUS at 02:04

## 2025-04-04 RX ADMIN — MIDAZOLAM HYDROCHLORIDE 2 MG: 1 INJECTION, SOLUTION INTRAMUSCULAR; INTRAVENOUS at 02:04

## 2025-04-04 RX ADMIN — LIDOCAINE HYDROCHLORIDE 60 MG: 20 INJECTION, SOLUTION EPIDURAL; INFILTRATION; INTRACAUDAL; PERINEURAL at 02:04

## 2025-04-04 RX ADMIN — PROPOFOL 40 MG: 10 INJECTION, EMULSION INTRAVENOUS at 02:04

## 2025-04-04 NOTE — OP NOTE
[facility] - [facility] (Hospital)  Operative Note      Date of Procedure: 4/4/2025     Procedure: Procedure(s) (LRB):  DEBRIDEMENT, PHALANX, HAND (Right)     Surgeons and Role:     * Sekou Rojo Jr., MD - Primary    Assisting Surgeon: None    Pre-Operative Diagnosis: Paronychia of finger, right [L03.011]    Post-Operative Diagnosis: Post-Op Diagnosis Codes:     * Paronychia of finger, right [L03.011]    Anesthesia: Local MAC    Operative Findings (including complications, if any):  Felon abscess right middle finger    Description of Technical Procedures:     Preop diagnosis: Felon abscess right middle finger.      Postop diagnosis: Same.      Operative procedure:  Excisional debridement felon abscess right middle finger.      Surgeon: Darrel.      First assistant: Erick.      Anesthesia: Mac.      EBL: Minimal.      Specimen: Cultures.      Operative procedure in detail as follows:     After operative consent was obtained patient brought the operating room placed supine on the operating table.  Anesthesia by IV sedation followed by injection of xylocaine Marcaine combination at the base of the right middle finger performing a digital block.  Tourniquet applied right arm right upper extremity prepped and draped out in the normal sterile fashion.  Esmarch used to exsanguinated the limb and the tourniquet inflated 225 mmHg.      Following this a dorsal curved incision made around the base of the right middle finger nail with a 15 blade purulent material was encountered and sent for culture.  The abscess appeared to extend volarly around the edge of the nail bed to the pulp area this was carefully opened up with the scissors and curette and irrigated thoroughly with antibiotic saline solution.  Excisional debridement was performed of the soft tissues around the nail and also volarly in the small pocket further irrigation was performed followed by packing with iodoform gauze and a loose wrap tourniquet deflated patient  brought to the recovery room in stable condition all sponge needle counts reported as correct no complications    Significant Surgical Tasks Conducted by the Assistant(s), if Applicable: retraction    Estimated Blood Loss (EBL): * No values recorded between 4/4/2025  2:41 PM and 4/4/2025  3:06 PM *           Implants: * No implants in log *    Specimens:   Specimen (24h ago, onward)      None           * No specimens in log *           Condition: Good    Disposition: PACU - hemodynamically stable.    Attestation: I was present and scrubbed for the entire procedure.    Discharge Note    OUTCOME: Patient tolerated treatment/procedure well without complication and is now ready for discharge.    DISPOSITION: Home or Self Care    FINAL DIAGNOSIS:  Paronychia of finger, right    FOLLOWUP: In clinic    DISCHARGE INSTRUCTIONS:    Discharge Procedure Orders   Diet general     Leave dressing on - Keep it clean, dry, and intact until clinic visit     Call MD for:  temperature >100.4     Call MD for:  persistent nausea and vomiting     Call MD for:  severe uncontrolled pain     Keep surgical extremity elevated

## 2025-04-04 NOTE — TELEPHONE ENCOUNTER
----- Message from Sekou Rojo MD sent at 4/4/2025  3:27 PM CDT -----  Please call patient for appt on Monday afternoon to see me for packing removal from surgeryThx!

## 2025-04-04 NOTE — TRANSFER OF CARE
"Anesthesia Transfer of Care Note    Patient: Anusha Andrew    Procedure(s) Performed: Procedure(s) (LRB):  DEBRIDEMENT, PHALANX, HAND (Right)    Patient location: PACU    Anesthesia Type: general    Transport from OR: Transported from OR on 6-10 L/min O2 by face mask with adequate spontaneous ventilation    Post pain: adequate analgesia    Post assessment: no apparent anesthetic complications and tolerated procedure well    Post vital signs: stable    Level of consciousness: awake and alert    Nausea/Vomiting: no nausea/vomiting    Complications: none    Transfer of care protocol was followed      Last vitals: Visit Vitals  /65   Pulse 84   Temp 36.6 °C (97.8 °F) (Temporal)   Resp 16   Ht 5' 3" (1.6 m)   Wt 65.8 kg (145 lb 1 oz)   LMP 03/30/2025   SpO2 100%   Breastfeeding No   BMI 25.70 kg/m²     "

## 2025-04-04 NOTE — ANESTHESIA POSTPROCEDURE EVALUATION
Anesthesia Post Evaluation    Patient: Anusha Andrew    Procedure(s) Performed: Procedure(s) (LRB):  DEBRIDEMENT, PHALANX, HAND (Right)    Final Anesthesia Type: general      Patient location during evaluation: PACU  Patient participation: Yes- Able to Participate  Level of consciousness: awake and alert  Post-procedure vital signs: reviewed and stable  Pain management: adequate  Airway patency: patent    PONV status at discharge: No PONV  Anesthetic complications: no      Cardiovascular status: blood pressure returned to baseline  Respiratory status: unassisted  Hydration status: euvolemic            Vitals Value Taken Time   /60 04/04/25 16:15   Temp 36.7 °C (98.1 °F) 04/04/25 15:00   Pulse 88 04/04/25 16:15   Resp 18 04/04/25 16:15   SpO2 99 % 04/04/25 16:15         No case tracking events are documented in the log.      Pain/Sylvain Score: Sylvain Score: 10 (4/4/2025  4:15 PM)

## 2025-04-05 LAB
GRAM STN SPEC: NORMAL
GRAM STN SPEC: NORMAL

## 2025-04-07 ENCOUNTER — OFFICE VISIT (OUTPATIENT)
Dept: ORTHOPEDICS | Facility: CLINIC | Age: 35
End: 2025-04-07
Payer: MEDICAID

## 2025-04-07 ENCOUNTER — PATIENT MESSAGE (OUTPATIENT)
Dept: ORTHOPEDICS | Facility: CLINIC | Age: 35
End: 2025-04-07
Payer: MEDICAID

## 2025-04-07 ENCOUNTER — TELEPHONE (OUTPATIENT)
Dept: ORTHOPEDICS | Facility: CLINIC | Age: 35
End: 2025-04-07

## 2025-04-07 VITALS — BODY MASS INDEX: 25.7 KG/M2 | HEIGHT: 63 IN

## 2025-04-07 DIAGNOSIS — L03.011 PARONYCHIA OF FINGER, RIGHT: Primary | ICD-10-CM

## 2025-04-07 DIAGNOSIS — L03.011 FELON OF FINGER OF RIGHT HAND: ICD-10-CM

## 2025-04-07 LAB
BACTERIA SPEC AEROBE CULT: ABNORMAL
BACTERIA SPEC AEROBE CULT: ABNORMAL

## 2025-04-07 PROCEDURE — 99024 POSTOP FOLLOW-UP VISIT: CPT | Mod: ,,, | Performed by: ORTHOPAEDIC SURGERY

## 2025-04-07 PROCEDURE — 99999 PR PBB SHADOW E&M-EST. PATIENT-LVL III: CPT | Mod: PBBFAC,,, | Performed by: ORTHOPAEDIC SURGERY

## 2025-04-07 PROCEDURE — 99213 OFFICE O/P EST LOW 20 MIN: CPT | Mod: PBBFAC,PN | Performed by: ORTHOPAEDIC SURGERY

## 2025-04-07 PROCEDURE — 1159F MED LIST DOCD IN RCRD: CPT | Mod: CPTII,,, | Performed by: ORTHOPAEDIC SURGERY

## 2025-04-07 PROCEDURE — 3051F HG A1C>EQUAL 7.0%<8.0%: CPT | Mod: CPTII,,, | Performed by: ORTHOPAEDIC SURGERY

## 2025-04-07 RX ORDER — HYDROCODONE BITARTRATE AND ACETAMINOPHEN 5; 325 MG/1; MG/1
1 TABLET ORAL EVERY 8 HOURS PRN
Qty: 20 TABLET | Refills: 0 | Status: SHIPPED | OUTPATIENT
Start: 2025-04-07 | End: 2025-04-18

## 2025-04-07 RX ORDER — HYDROCODONE BITARTRATE AND ACETAMINOPHEN 5; 325 MG/1; MG/1
1 TABLET ORAL EVERY 8 HOURS PRN
Qty: 20 TABLET | Refills: 0 | Status: SHIPPED | OUTPATIENT
Start: 2025-04-07 | End: 2025-04-17

## 2025-04-07 RX ORDER — CIPROFLOXACIN 500 MG/1
500 TABLET ORAL 2 TIMES DAILY
Qty: 20 TABLET | Refills: 1 | Status: SHIPPED | OUTPATIENT
Start: 2025-04-07 | End: 2025-04-27

## 2025-04-07 NOTE — PROGRESS NOTES
Subjective:      Patient ID: Anusha Andrew is a 34 y.o. female.  Chief Complaint: Post-op Evaluation (Right middle Paronychia)      HPI  Anusha Andrew is a  34 y.o. female presenting today for post op visit.  She is s/p I and D of felon abscess right middle finger she is 3 days postop   She is doing well.     Review of patient's allergies indicates:   Allergen Reactions    No known allergies          Current Medications[1]    Past Medical History:   Diagnosis Date    Depression     Hyperlipidemia     Hypertension     Mild nonproliferative diabetic retinopathy of both eyes without macular edema associated with type 1 diabetes mellitus 3/30/2021    Type I (juvenile type) diabetes mellitus without mention of complication, uncontrolled 2011       Past Surgical History:   Procedure Laterality Date    ABCESS DRAINAGE      boil went over GA    CARPAL TUNNEL RELEASE Left 2022    Procedure: RELEASE, CARPAL TUNNEL;  Surgeon: Sekou Rojo Jr., MD;  Location: [facility] OR;  Service: Orthopedics;  Laterality: Left;    CARPAL TUNNEL RELEASE Right 3/28/2023    Procedure: RELEASE, CARPAL TUNNEL;  Surgeon: Sekou Rojo Jr., MD;  Location: [facility] OR;  Service: Orthopedics;  Laterality: Right;     SECTION  2012    DEBRIDEMENT, PHALANX, HAND Right 2025    Procedure: DEBRIDEMENT, PHALANX, HAND;  Surgeon: Sekou Rojo Jr., MD;  Location: [facility] OR;  Service: Orthopedics;  Laterality: Right;    DILATION AND CURETTAGE OF UTERUS  2019    Procedure: DILATION AND CURETTAGE, UTERUS;  Surgeon: Purnmia Kidd MD;  Location: [facility] OR;  Service: OB/GYN;;    DILATION AND CURETTAGE OF UTERUS N/A 2023    Procedure: DILATION AND CURETTAGE, UTERUS;  Surgeon: Purnima Kidd MD;  Location: [facility] OR;  Service: OB/GYN;  Laterality: N/A;    ENDOMETRIAL ABLATION N/A 2023    Procedure: ABLATION, ENDOMETRIUM;  Surgeon: Purnima Kidd MD;  Location: [facility];  Service: OB/GYN;  Laterality: N/A;    HYSTEROSCOPY  "N/A 2/20/2019    Procedure: HYSTEROSCOPY;  Surgeon: Purnima Kidd MD;  Location: [facility] OR;  Service: OB/GYN;  Laterality: N/A;    HYSTEROSCOPY N/A 8/9/2023    Procedure: HYSTEROSCOPY;  Surgeon: Purnima Kidd MD;  Location: [facility] OR;  Service: OB/GYN;  Laterality: N/A;    LAPAROSCOPIC SALPINGECTOMY Bilateral 8/9/2023    Procedure: SALPINGECTOMY, LAPAROSCOPIC;  Surgeon: Purnima Kidd MD;  Location: [facility] OR;  Service: OB/GYN;  Laterality: Bilateral;    TONSILLECTOMY, ADENOIDECTOMY      TRIGGER FINGER RELEASE Left 12/13/2022    Procedure: RELEASE, TRIGGER FINGER;  Surgeon: Sekou Rojo Jr., MD;  Location: [facility] OR;  Service: Orthopedics;  Laterality: Left;    TRIGGER FINGER RELEASE Right 3/28/2023    Procedure: RELEASE, TRIGGER FINGER; MIDDLE FINGER;  Surgeon: Sekou Rojo Jr., MD;  Location: [facility] OR;  Service: Orthopedics;  Laterality: Right;    WISDOM TOOTH EXTRACTION         OBJECTIVE:   PHYSICAL EXAM:  Height: 5' 3" (160 cm)    Vitals:    04/07/25 1106   Height: 5' 3" (1.6 m)   PainSc:   7   PainLoc: Finger     Ortho/SPM Exam  Examination right middle finger shows packing in place slight swelling minimal tenderness no evidence of purulence    RADIOGRAPHS:  None  Comments: I have personally reviewed the imaging and I agree with the above radiologist's report.    ASSESSMENT/PLAN:     IMPRESSION:  Status post I&D right middle finger    PLAN:  Packing removed   Cleaned well with peroxide   Instructed in appropriate wound care   She was switched from minocycline to Cipro to cover Klebsiella from culture    FOLLOW UP:  1 week for recheck    Disclaimer: This note has been generated using voice-recognition software. There may be typographical errors that have been missed during proof-reading.          [1]   Current Outpatient Medications   Medication Sig Dispense Refill    BD EBEN 2ND GEN PEN NEEDLE 32 gauge x 5/32" Ndle To use with insulin injections 4 times per day 100 each 11    blood sugar diagnostic Strp To " check BG 4  times daily, to use with insurance preferred meter, e 10.65 400 each 3    blood-glucose sensor (DEXCOM G6 SENSOR) Saida Change every 10 days 3 each 11    blood-glucose transmitter (DEXCOM G6 TRANSMITTER) Saida 1 transmitter every 3 months 1 each 4    ergocalciferol (VITAMIN D2) 50,000 unit Cap Take 1 capsule (50,000 Units total) by mouth every 7 days. 5 capsule 3    FLUoxetine 20 MG capsule Take 1 capsule (20 mg total) by mouth 2 (two) times daily. 180 capsule 2    fluticasone propionate (FLONASE) 50 mcg/actuation nasal spray 1 spray (50 mcg total) by Each Nostril route 2 (two) times daily as needed. 16 g 0    glucagon (BAQSIMI) 3 mg/actuation Spry Give one puff via nostril. Hold device between fingers and thumb, do not push plunger yet, insert tip gently into one nostril until finger(s) touch the outside of the nose, then push plunger firmly all the way in . Dose is complete when the green line disappears. 2 each 2    HYDROcodone-acetaminophen (NORCO) 5-325 mg per tablet Take 1 tablet by mouth every 4 (four) hours as needed for Pain. 20 tablet 0    hydrocortisone (ANUSOL-HC) 2.5 % rectal cream Place rectally 2 (two) times daily. 28 g 2    insulin aspart, niacinamide, (FIASP FLEXTOUCH U-100 INSULIN) 100 unit/mL (3 mL) InPn use three times daily before meals. Inject ICR 1:8 + snacks + correction scale. Max TDD of 50 units.  To have her backup in case off of insulin pump 15 mL 11    insulin aspart, niacinamide, (FIASP U-100 INSULIN) 100 unit/mL Soln To use continuously with insulin pump. Max Total Daily Dose of 100 units 30 mL 11    insulin degludec (TRESIBA FLEXTOUCH U-100) 100 unit/mL (3 mL) insulin pen Inject 7 Units into the skin 2 (two) times a day. To have as backup insulin in case off of insulin pump 15 mL 11    insulin degludec (TRESIBA FLEXTOUCH U-100) 100 unit/mL (3 mL) insulin pen Inject 7 Units into the skin 2 (two) times a day. To have as backup insulin in case off of insulin pump 15 mL 11     insulin pump cart,auto,BT,G6/7 (OMNIPOD 5 G6-G7 PODS, GEN 5,) Crtg Inject 1 Device into the skin every 48 hours. 15 each 11    insulin syringe-needle U-100 0.5 mL 31 gauge x 5/16 Syrg use with admelog 100 each 0    ipratropium (ATROVENT) 21 mcg (0.03 %) nasal spray Take 2 sprays by each nostril route 2 (two) times daily. 30 mL 0    minocycline (MINOCIN,DYNACIN) 100 MG capsule Take 1 capsule (100 mg total) by mouth 2 (two) times a day. 20 capsule 1    mupirocin (BACTROBAN) 2 % ointment Apply topically 3 (three) times daily. 22 g 0    polyethylene glycol (GLYCOLAX) 17 gram/dose powder Take 17 g by mouth once daily. 30 each 3    semaglutide (OZEMPIC) 1 mg/dose (2 mg/1.5 mL) PnIj Inject 1 mg into the skin every 7 days.      sumatriptan (IMITREX) 50 MG tablet Take 1 tablet (50 mg total) by mouth 2 (two) times daily as needed (for intractable headaches). 10 tablet 0    ciprofloxacin HCl (CIPRO) 500 MG tablet Take 1 tablet (500 mg total) by mouth 2 (two) times daily. for 20 days 20 tablet 1     No current facility-administered medications for this visit.

## 2025-04-09 LAB
BACTERIA SPEC ANAEROBE CULT: ABNORMAL
BACTERIA SPEC ANAEROBE CULT: ABNORMAL

## 2025-04-14 NOTE — PROGRESS NOTES
Subjective:     Patient ID: Anusha Andrew is a 34 y.o. female.    Chief Complaint: Postop Follow-Up    HPI:   Anusha Andrew  returns for a postop visit approximately 2 weeks following:    Surgery: Excisional debridement felon abscess right middle finger   Date of Surgery:  4-4-2025  Surgeon: Dr. Rojo    Overall, the patient is doing well with no complaints of fever, chills, or drainage to surgical incision. The patient reports that pain has been managed with medication. Pt maintains compliance with antibiotic ciprofloxacin 500mg BID. Post-operative complaints include: swelling    Intraoperative Cultures:   GRAM STAIN No WBCs   Rare Gram positive cocci        CULTURE, AEROBIC Moderate Streptococcus agalactiae (Group B) Abnormal    In case of Penicillin allergy, call lab for further testing. Beta-hemolytic streptococci are routinely susceptible to penicillins,cephalosporins and carbapenems.  Susceptibility testing not routinely performed.   Few Klebsiella oxytoca Abnormal         Anaerobe Culture Many Prevotella bivia Abnormal    Moderate PROPIONIBACTERIUM SPECIES Abnormal       Propionibacterium species further identified as Propionibacterium avidum      Susceptibility     Klebsiella oxytoca     ARAM     Ampicillin 16 µg/ml Resistant     Ampicillin/Sulbactam <=8/4 µg/ml Sensitive     Cefazolin <=2 µg/ml Sensitive     Cefepime <=2 µg/ml Sensitive     Ceftriaxone <=1 µg/ml Sensitive     Ciprofloxacin <=0.25 µg/ml Sensitive     Ertapenem <=0.5 µg/ml Sensitive     Gentamicin <=2 µg/ml Sensitive     Levofloxacin <=0.5 µg/ml Sensitive     Meropenem <=1 µg/ml Sensitive     Piperacillin/Tazobactam <=8 µg/ml Sensitive     Tobramycin <=2 µg/ml Sensitive     Trimeth/Sulfa <=2/38 µg/ml Sensitive                Past Medical History:   Diagnosis Date    Depression     Hyperlipidemia     Hypertension     Mild nonproliferative diabetic retinopathy of both eyes without macular edema associated with type 1 diabetes mellitus  3/30/2021    Type I (juvenile type) diabetes mellitus without mention of complication, uncontrolled 11/23/2011     Current Medications[1]  Review of patient's allergies indicates:   Allergen Reactions    No known allergies      Review of Systems   Constitutional:  Negative for chills and fever.   Respiratory:  Negative for shortness of breath.    Cardiovascular:  Negative for chest pain and leg swelling.   Gastrointestinal:  Negative for nausea and vomiting.   Genitourinary:  Negative for dysuria.   Musculoskeletal:  Negative for joint pain. Negative for falls.   Skin:  Negative for rash.   Neurological:  Negative for dizziness and Negative for sensory change.    Objective:   Orthopedic Physical Exam  LMP 03/30/2025 Comment: UPT negative this AAM  GEN: Well developed, well nourished female. AAOX3. No acute distress.   Breathing unlabored.  Mood and affect appropriate.     Right (middle digit) hand:    Inspection:   Some continued ongoing swelling at the base of the nail.  No active drainage at nail bed.  No visible purulence.  Minimally tender throughout distal aspect of the digit   ROM: intact.  Strength:  strength not tested today.    NV: Sensation intact to finger pads. Pulses present.    Intraoperative culture results:   GRAM STAIN No WBCs   Rare Gram positive cocci        CULTURE, AEROBIC Moderate Streptococcus agalactiae (Group B) Abnormal    In case of Penicillin allergy, call lab for further testing. Beta-hemolytic streptococci are routinely susceptible to penicillins,cephalosporins and carbapenems.  Susceptibility testing not routinely performed.   Few Klebsiella oxytoca Abnormal         Anaerobe Culture Many Prevotella bivia Abnormal    Moderate PROPIONIBACTERIUM SPECIES Abnormal       Propionibacterium species further identified as Propionibacterium avidum      Susceptibility     Klebsiella oxytoca     ARAM     Ampicillin 16 µg/ml Resistant     Ampicillin/Sulbactam <=8/4 µg/ml Sensitive     Cefazolin  "<=2 µg/ml Sensitive     Cefepime <=2 µg/ml Sensitive     Ceftriaxone <=1 µg/ml Sensitive     Ciprofloxacin <=0.25 µg/ml Sensitive     Ertapenem <=0.5 µg/ml Sensitive     Gentamicin <=2 µg/ml Sensitive     Levofloxacin <=0.5 µg/ml Sensitive     Meropenem <=1 µg/ml Sensitive     Piperacillin/Tazobactam <=8 µg/ml Sensitive     Tobramycin <=2 µg/ml Sensitive     Trimeth/Sulfa <=2/38 µg/ml Sensitive               Assessment:       ICD-10-CM ICD-9-CM    1. Postoperative state  Z98.890 V45.89           Plan:   2wks s/p   Excisional debridement felon abscess right middle finger     Wound Care: Regular wound care explained with soap and water.   Abx: Continue current abx regimen as prescribed  Activity: Slowly advance activity as tolerated. Pt encouraged on PROM and AROM.  Pain Control: rest, ice, OTC analgesics  Follow Up: 2 weeks for re-evaluation         [1]   Current Outpatient Medications:     BD EBEN 2ND GEN PEN NEEDLE 32 gauge x 5/32" Ndle, To use with insulin injections 4 times per day, Disp: 100 each, Rfl: 11    blood sugar diagnostic Strp, To check BG 4  times daily, to use with insurance preferred meter, e 10.65, Disp: 400 each, Rfl: 3    blood-glucose sensor (DEXCOM G6 SENSOR) Saida, Change every 10 days, Disp: 3 each, Rfl: 11    blood-glucose transmitter (DEXCOM G6 TRANSMITTER) Saida, 1 transmitter every 3 months, Disp: 1 each, Rfl: 4    ciprofloxacin HCl (CIPRO) 500 MG tablet, Take 1 tablet (500 mg total) by mouth 2 (two) times daily. for 20 days, Disp: 20 tablet, Rfl: 1    ergocalciferol (VITAMIN D2) 50,000 unit Cap, Take 1 capsule (50,000 Units total) by mouth every 7 days., Disp: 5 capsule, Rfl: 3    FLUoxetine 20 MG capsule, Take 1 capsule (20 mg total) by mouth 2 (two) times daily., Disp: 180 capsule, Rfl: 2    fluticasone propionate (FLONASE) 50 mcg/actuation nasal spray, 1 spray (50 mcg total) by Each Nostril route 2 (two) times daily as needed., Disp: 16 g, Rfl: 0    glucagon (BAQSIMI) 3 mg/actuation " Spry, Give one puff via nostril. Hold device between fingers and thumb, do not push plunger yet, insert tip gently into one nostril until finger(s) touch the outside of the nose, then push plunger firmly all the way in . Dose is complete when the green line disappears., Disp: 2 each, Rfl: 2    HYDROcodone-acetaminophen (NORCO) 5-325 mg per tablet, Take 1 tablet by mouth every 8 (eight) hours as needed for Pain., Disp: 20 tablet, Rfl: 0    HYDROcodone-acetaminophen (NORCO) 5-325 mg per tablet, Take 1 tablet by mouth every 8 (eight) hours as needed for Pain., Disp: 20 tablet, Rfl: 0    hydrocortisone (ANUSOL-HC) 2.5 % rectal cream, Place rectally 2 (two) times daily., Disp: 28 g, Rfl: 2    insulin aspart, niacinamide, (FIASP FLEXTOUCH U-100 INSULIN) 100 unit/mL (3 mL) InPn, use three times daily before meals. Inject ICR 1:8 + snacks + correction scale. Max TDD of 50 units.  To have her backup in case off of insulin pump, Disp: 15 mL, Rfl: 11    insulin aspart, niacinamide, (FIASP U-100 INSULIN) 100 unit/mL Soln, To use continuously with insulin pump. Max Total Daily Dose of 100 units, Disp: 30 mL, Rfl: 11    insulin degludec (TRESIBA FLEXTOUCH U-100) 100 unit/mL (3 mL) insulin pen, Inject 7 Units into the skin 2 (two) times a day. To have as backup insulin in case off of insulin pump, Disp: 15 mL, Rfl: 11    insulin degludec (TRESIBA FLEXTOUCH U-100) 100 unit/mL (3 mL) insulin pen, Inject 7 Units into the skin 2 (two) times a day. To have as backup insulin in case off of insulin pump, Disp: 15 mL, Rfl: 11    insulin pump cart,auto,BT,G6/7 (OMNIPOD 5 G6-G7 PODS, GEN 5,) Crtg, Inject 1 Device into the skin every 48 hours., Disp: 15 each, Rfl: 11    insulin syringe-needle U-100 0.5 mL 31 gauge x 5/16 Syrg, use with admelog, Disp: 100 each, Rfl: 0    ipratropium (ATROVENT) 21 mcg (0.03 %) nasal spray, Take 2 sprays by each nostril route 2 (two) times daily., Disp: 30 mL, Rfl: 0    minocycline (MINOCIN,DYNACIN) 100 MG  capsule, Take 1 capsule (100 mg total) by mouth 2 (two) times a day., Disp: 20 capsule, Rfl: 1    mupirocin (BACTROBAN) 2 % ointment, Apply topically 3 (three) times daily., Disp: 22 g, Rfl: 0    polyethylene glycol (GLYCOLAX) 17 gram/dose powder, Take 17 g by mouth once daily., Disp: 30 each, Rfl: 3    semaglutide (OZEMPIC) 1 mg/dose (2 mg/1.5 mL) PnIj, Inject 1 mg into the skin every 7 days., Disp: , Rfl:     sumatriptan (IMITREX) 50 MG tablet, Take 1 tablet (50 mg total) by mouth 2 (two) times daily as needed (for intractable headaches)., Disp: 10 tablet, Rfl: 0

## 2025-04-15 ENCOUNTER — TELEPHONE (OUTPATIENT)
Dept: ORTHOPEDICS | Facility: CLINIC | Age: 35
End: 2025-04-15
Payer: MEDICAID

## 2025-04-15 NOTE — TELEPHONE ENCOUNTER
Attempted to return pt call , she did not answer left a VM ----- Message from Arlene sent at 4/15/2025 11:06 AM CDT -----  Type:   Appointment RequestCaller is requesting a sooner appointment.  Caller declined first available appointment listed below.  Caller will not accept being placed on the waitlist and is requesting a message be sent to doctor.Name of Caller:pt When is the first available appointment?04/16 prefers 11 books closed Symptoms:n/a post op Would the patient rather a call back or a response via [facility]? Call Best Call Back Number:  [phone] Information:

## 2025-04-16 ENCOUNTER — PATIENT MESSAGE (OUTPATIENT)
Dept: ORTHOPEDICS | Facility: CLINIC | Age: 35
End: 2025-04-16

## 2025-04-16 ENCOUNTER — TELEPHONE (OUTPATIENT)
Dept: ORTHOPEDICS | Facility: CLINIC | Age: 35
End: 2025-04-16

## 2025-04-16 ENCOUNTER — OFFICE VISIT (OUTPATIENT)
Dept: ORTHOPEDICS | Facility: CLINIC | Age: 35
End: 2025-04-16
Payer: MEDICAID

## 2025-04-16 DIAGNOSIS — Z98.890 POSTOPERATIVE STATE: Primary | ICD-10-CM

## 2025-04-16 PROCEDURE — 99999 PR PBB SHADOW E&M-EST. PATIENT-LVL III: CPT | Mod: PBBFAC,,, | Performed by: PHYSICIAN ASSISTANT

## 2025-04-16 PROCEDURE — 99213 OFFICE O/P EST LOW 20 MIN: CPT | Mod: PBBFAC,PN | Performed by: PHYSICIAN ASSISTANT

## 2025-04-16 NOTE — LETTER
April 16, 2025      [facility]  [address]  [address]  [place] [address]  Phone: [phone]       Patient: Anusha Andrew   YOB: 1990  Date of Visit: 04/16/2025    To Whom It May Concern:    Sylvia Andrew  was at [facility] on 04/16/2025. The patient may return to work/school on 04/17/2025 with no restrictions. If you have any questions or concerns, or if I can be of further assistance, please do not hesitate to contact me.    Sincerely,      ELI Anderson PA-C

## 2025-05-01 ENCOUNTER — OFFICE VISIT (OUTPATIENT)
Dept: ORTHOPEDICS | Facility: CLINIC | Age: 35
End: 2025-05-01
Payer: MEDICAID

## 2025-05-01 VITALS — HEIGHT: 63 IN | BODY MASS INDEX: 25.7 KG/M2

## 2025-05-01 DIAGNOSIS — L03.011 PARONYCHIA OF FINGER, RIGHT: Primary | ICD-10-CM

## 2025-05-01 PROCEDURE — 3051F HG A1C>EQUAL 7.0%<8.0%: CPT | Mod: CPTII,,, | Performed by: ORTHOPAEDIC SURGERY

## 2025-05-01 PROCEDURE — 99999 PR PBB SHADOW E&M-EST. PATIENT-LVL III: CPT | Mod: PBBFAC,,, | Performed by: ORTHOPAEDIC SURGERY

## 2025-05-01 PROCEDURE — 99024 POSTOP FOLLOW-UP VISIT: CPT | Mod: ,,, | Performed by: ORTHOPAEDIC SURGERY

## 2025-05-01 PROCEDURE — 1159F MED LIST DOCD IN RCRD: CPT | Mod: CPTII,,, | Performed by: ORTHOPAEDIC SURGERY

## 2025-05-01 PROCEDURE — 99213 OFFICE O/P EST LOW 20 MIN: CPT | Mod: PBBFAC,PN | Performed by: ORTHOPAEDIC SURGERY

## 2025-05-01 NOTE — PROGRESS NOTES
Subjective:      Patient ID: Anusha Andrew is a 34 y.o. female.  Chief Complaint: Post-op Evaluation (Right middle finger)      HPI  Anusha Andrew is a  34 y.o. female presenting today for post op visit.  She is s/p I and D of the right middle finger abscess and felon now about 6 weeks out from surgery she has completed antibiotics.     Review of patient's allergies indicates:   Allergen Reactions    No known allergies          Current Medications[1]    Past Medical History:   Diagnosis Date    Depression     Hyperlipidemia     Hypertension     Mild nonproliferative diabetic retinopathy of both eyes without macular edema associated with type 1 diabetes mellitus 3/30/2021    Type I (juvenile type) diabetes mellitus without mention of complication, uncontrolled 2011       Past Surgical History:   Procedure Laterality Date    ABCESS DRAINAGE      boil went over [place]    CARPAL TUNNEL RELEASE Left 2022    Procedure: RELEASE, CARPAL TUNNEL;  Surgeon: Sekou Rojo Jr., MD;  Location: [facility] OR;  Service: Orthopedics;  Laterality: Left;    CARPAL TUNNEL RELEASE Right 3/28/2023    Procedure: RELEASE, CARPAL TUNNEL;  Surgeon: Sekou Rojo Jr., MD;  Location: [facility] OR;  Service: Orthopedics;  Laterality: Right;     SECTION  2012    DEBRIDEMENT, PHALANX, HAND Right 2025    Procedure: DEBRIDEMENT, PHALANX, HAND;  Surgeon: Sekou Rojo Jr., MD;  Location: [facility] OR;  Service: Orthopedics;  Laterality: Right;    DILATION AND CURETTAGE OF UTERUS  2019    Procedure: DILATION AND CURETTAGE, UTERUS;  Surgeon: Purnima Kidd MD;  Location: [facility] OR;  Service: OB/GYN;;    DILATION AND CURETTAGE OF UTERUS N/A 2023    Procedure: DILATION AND CURETTAGE, UTERUS;  Surgeon: Purnima Kidd MD;  Location: [facility] OR;  Service: OB/GYN;  Laterality: N/A;    ENDOMETRIAL ABLATION N/A 2023    Procedure: ABLATION, ENDOMETRIUM;  Surgeon: Purnima Kidd MD;  Location: [facility];  Service: OB/GYN;   "Laterality: N/A;    HYSTEROSCOPY N/A 2/20/2019    Procedure: HYSTEROSCOPY;  Surgeon: Purnima Kidd MD;  Location: [facility] OR;  Service: OB/GYN;  Laterality: N/A;    HYSTEROSCOPY N/A 8/9/2023    Procedure: HYSTEROSCOPY;  Surgeon: Purnima Kidd MD;  Location: [facility] OR;  Service: OB/GYN;  Laterality: N/A;    LAPAROSCOPIC SALPINGECTOMY Bilateral 8/9/2023    Procedure: SALPINGECTOMY, LAPAROSCOPIC;  Surgeon: Purnima Kidd MD;  Location: [facility] OR;  Service: OB/GYN;  Laterality: Bilateral;    TONSILLECTOMY, ADENOIDECTOMY      TRIGGER FINGER RELEASE Left 12/13/2022    Procedure: RELEASE, TRIGGER FINGER;  Surgeon: Sekou Rojo Jr., MD;  Location: [facility] OR;  Service: Orthopedics;  Laterality: Left;    TRIGGER FINGER RELEASE Right 3/28/2023    Procedure: RELEASE, TRIGGER FINGER; MIDDLE FINGER;  Surgeon: Sekou Rojo Jr., MD;  Location: [facility] OR;  Service: Orthopedics;  Laterality: Right;    WISDOM TOOTH EXTRACTION         OBJECTIVE:   PHYSICAL EXAM:  Height: 5' 3" (160 cm)    Vitals:    05/01/25 1517   Height: 5' 3" (1.6 m)   PainSc: 0-No pain   PainLoc: Finger     Ortho/SPM Exam  Examination of the right middle finger the tip looks good well-healed no evidence of infection she does have little bit of mild sensitivity at the tip range of motion full    RADIOGRAPHS:  None  Comments: I have personally reviewed the imaging and I agree with the above radiologist's report.    ASSESSMENT/PLAN:   Right middle finger  IMPRESSION:  Status post I&D felon of the    PLAN:  I have ordered some compounding cream for topical use   Squeeze ball for strengthening   Increase activities as tolerated    FOLLOW UP:  4-6 weeks    Disclaimer: This note has been generated using voice-recognition software. There may be typographical errors that have been missed during proof-reading.          [1]   Current Outpatient Medications   Medication Sig Dispense Refill    BD EBEN 2ND GEN PEN NEEDLE 32 gauge x 5/32" Ndle To use with insulin injections 4 " times per day 100 each 11    blood sugar diagnostic Strp To check BG 4  times daily, to use with insurance preferred meter, e 10.65 400 each 3    blood-glucose sensor (DEXCOM G6 SENSOR) Saida Change every 10 days 3 each 11    blood-glucose transmitter (DEXCOM G6 TRANSMITTER) Saida 1 transmitter every 3 months 1 each 4    ergocalciferol (VITAMIN D2) 50,000 unit Cap Take 1 capsule (50,000 Units total) by mouth every 7 days. 5 capsule 3    FLUoxetine 20 MG capsule Take 1 capsule (20 mg total) by mouth 2 (two) times daily. 180 capsule 2    fluticasone propionate (FLONASE) 50 mcg/actuation nasal spray 1 spray (50 mcg total) by Each Nostril route 2 (two) times daily as needed. 16 g 0    glucagon (BAQSIMI) 3 mg/actuation Spry Give one puff via nostril. Hold device between fingers and thumb, do not push plunger yet, insert tip gently into one nostril until finger(s) touch the outside of the nose, then push plunger firmly all the way in . Dose is complete when the green line disappears. 2 each 2    hydrocortisone (ANUSOL-HC) 2.5 % rectal cream Place rectally 2 (two) times daily. 28 g 2    insulin aspart, niacinamide, (FIASP FLEXTOUCH U-100 INSULIN) 100 unit/mL (3 mL) InPn use three times daily before meals. Inject ICR 1:8 + snacks + correction scale. Max TDD of 50 units.  To have her backup in case off of insulin pump 15 mL 11    insulin aspart, niacinamide, (FIASP U-100 INSULIN) 100 unit/mL Soln To use continuously with insulin pump. Max Total Daily Dose of 100 units 30 mL 11    insulin degludec (TRESIBA FLEXTOUCH U-100) 100 unit/mL (3 mL) insulin pen Inject 7 Units into the skin 2 (two) times a day. To have as backup insulin in case off of insulin pump 15 mL 11    insulin degludec (TRESIBA FLEXTOUCH U-100) 100 unit/mL (3 mL) insulin pen Inject 7 Units into the skin 2 (two) times a day. To have as backup insulin in case off of insulin pump 15 mL 11    insulin pump cart,auto,BT,G6/7 (OMNIPOD 5 G6-G7 PODS, GEN 5,) Crtg Inject 1  Device into the skin every 48 hours. 15 each 11    insulin syringe-needle U-100 0.5 mL 31 gauge x 5/16 Syrg use with admelog 100 each 0    ipratropium (ATROVENT) 21 mcg (0.03 %) nasal spray Take 2 sprays by each nostril route 2 (two) times daily. 30 mL 0    minocycline (MINOCIN,DYNACIN) 100 MG capsule Take 1 capsule (100 mg total) by mouth 2 (two) times a day. 20 capsule 1    mupirocin (BACTROBAN) 2 % ointment Apply topically 3 (three) times daily. 22 g 0    polyethylene glycol (GLYCOLAX) 17 gram/dose powder Take 17 g by mouth once daily. 30 each 3    semaglutide (OZEMPIC) 1 mg/dose (2 mg/1.5 mL) PnIj Inject 1 mg into the skin every 7 days.      sumatriptan (IMITREX) 50 MG tablet Take 1 tablet (50 mg total) by mouth 2 (two) times daily as needed (for intractable headaches). 10 tablet 0     No current facility-administered medications for this visit.

## 2025-05-02 ENCOUNTER — TELEPHONE (OUTPATIENT)
Dept: DIABETES | Facility: CLINIC | Age: 35
End: 2025-05-02
Payer: MEDICAID

## 2025-05-05 ENCOUNTER — PATIENT MESSAGE (OUTPATIENT)
Dept: DIABETES | Facility: CLINIC | Age: 35
End: 2025-05-05

## 2025-05-05 ENCOUNTER — OFFICE VISIT (OUTPATIENT)
Dept: DIABETES | Facility: CLINIC | Age: 35
End: 2025-05-05
Payer: MEDICAID

## 2025-05-05 VITALS — WEIGHT: 150 LBS | HEIGHT: 63 IN | BODY MASS INDEX: 26.58 KG/M2

## 2025-05-05 DIAGNOSIS — E10.65 TYPE 1 DIABETES MELLITUS WITH HYPERGLYCEMIA: Primary | ICD-10-CM

## 2025-05-05 DIAGNOSIS — Z90.3 HISTORY OF SLEEVE GASTRECTOMY: ICD-10-CM

## 2025-05-05 DIAGNOSIS — F32.1 CURRENT MODERATE EPISODE OF MAJOR DEPRESSIVE DISORDER WITHOUT PRIOR EPISODE: Chronic | ICD-10-CM

## 2025-05-05 DIAGNOSIS — E55.9 VITAMIN D DEFICIENCY: ICD-10-CM

## 2025-05-05 DIAGNOSIS — E10.3293 MILD NONPROLIFERATIVE DIABETIC RETINOPATHY OF BOTH EYES WITHOUT MACULAR EDEMA ASSOCIATED WITH TYPE 1 DIABETES MELLITUS: ICD-10-CM

## 2025-05-05 DIAGNOSIS — E10.649 TYPE 1 DIABETES MELLITUS WITH HYPOGLYCEMIA UNAWARENESS: ICD-10-CM

## 2025-05-05 DIAGNOSIS — E66.3 OVERWEIGHT (BMI 25.0-29.9): ICD-10-CM

## 2025-05-05 DIAGNOSIS — Z71.9 HEALTH EDUCATION/COUNSELING: ICD-10-CM

## 2025-05-05 DIAGNOSIS — Z96.41 INSULIN PUMP IN PLACE: ICD-10-CM

## 2025-05-05 DIAGNOSIS — Z86.39 HISTORY OF DIABETES WITH KETOACIDOSIS: ICD-10-CM

## 2025-05-05 DIAGNOSIS — Z46.81 INSULIN PUMP FITTING OR ADJUSTMENT: ICD-10-CM

## 2025-05-05 PROCEDURE — 3051F HG A1C>EQUAL 7.0%<8.0%: CPT | Mod: CPTII,95,, | Performed by: NURSE PRACTITIONER

## 2025-05-05 PROCEDURE — 98006 SYNCH AUDIO-VIDEO EST MOD 30: CPT | Mod: 95,,, | Performed by: NURSE PRACTITIONER

## 2025-05-05 PROCEDURE — 1160F RVW MEDS BY RX/DR IN RCRD: CPT | Mod: CPTII,95,, | Performed by: NURSE PRACTITIONER

## 2025-05-05 PROCEDURE — 1159F MED LIST DOCD IN RCRD: CPT | Mod: CPTII,95,, | Performed by: NURSE PRACTITIONER

## 2025-05-05 PROCEDURE — 3066F NEPHROPATHY DOC TX: CPT | Mod: CPTII,95,, | Performed by: NURSE PRACTITIONER

## 2025-05-05 PROCEDURE — 3061F NEG MICROALBUMINURIA REV: CPT | Mod: CPTII,95,, | Performed by: NURSE PRACTITIONER

## 2025-05-05 PROCEDURE — 95251 CONT GLUC MNTR ANALYSIS I&R: CPT | Mod: NDTC,,, | Performed by: NURSE PRACTITIONER

## 2025-05-05 RX ORDER — INSULIN DEGLUDEC 100 U/ML
7 INJECTION, SOLUTION SUBCUTANEOUS 2 TIMES DAILY
Qty: 15 ML | Refills: 11 | Status: SHIPPED | OUTPATIENT
Start: 2025-05-05

## 2025-05-05 RX ORDER — PEN NEEDLE, DIABETIC 32GX 5/32"
NEEDLE, DISPOSABLE MISCELLANEOUS
Qty: 100 EACH | Refills: 11 | Status: SHIPPED | OUTPATIENT
Start: 2025-05-05

## 2025-05-05 RX ORDER — BLOOD-GLUCOSE SENSOR
EACH MISCELLANEOUS
Qty: 3 EACH | Refills: 11 | Status: SHIPPED | OUTPATIENT
Start: 2025-05-05

## 2025-05-05 RX ORDER — INSULIN ASPART INJECTION 100 [IU]/ML
INJECTION, SOLUTION SUBCUTANEOUS
Qty: 15 ML | Refills: 11 | Status: SHIPPED | OUTPATIENT
Start: 2025-05-05

## 2025-05-05 RX ORDER — BLOOD-GLUCOSE TRANSMITTER
EACH MISCELLANEOUS
Qty: 1 EACH | Refills: 4 | Status: SHIPPED | OUTPATIENT
Start: 2025-05-05

## 2025-05-05 RX ORDER — INSULIN ASPART 100 [IU]/ML
1.6 INJECTION, SOLUTION SUBCUTANEOUS
Qty: 24 ML | Refills: 11 | Status: SHIPPED | OUTPATIENT
Start: 2025-05-05

## 2025-05-05 RX ORDER — ERGOCALCIFEROL 1.25 MG/1
50000 CAPSULE ORAL
Qty: 12 CAPSULE | Refills: 3 | Status: SHIPPED | OUTPATIENT
Start: 2025-05-05

## 2025-05-05 NOTE — ASSESSMENT & PLAN NOTE
Body mass index is 26.57 kg/m².  Increases insulin resistance.   Discussed DM diet and exercise.

## 2025-05-05 NOTE — ASSESSMENT & PLAN NOTE
Uncontrolled   Blood sugar readings above goal on Dexcom download--see below   A1c is pending  Suspect above goal based on Dexcom download  + post prandial excursions   She feels confident with CHO counting ---she is adamant that she is entering inaccurate carbohydrates  She feels her sugars have been running higher since she has been off the Ozempic  The insurance would not cover the Ozempic because she has type 1  Feel she needs pump adjustments        However she is interested in changing to the beta bionic insulin pump---we submitted the orders at our last visit but she never got back in touch with the company to start the pump---she still never followed up with the pump company to discuss this  I will ask the staff to resubmit the orders for the beta bionic pump   I also sent a message to the beta rep     She is aware that she must start the new pump with [facility]        -- Medication Changes:    Off the Ozempic due to insurance not covering    Continue omnipod 5 and Dexcom G6 and use them consistently in auto mode ---until she transitions over to the beta bionic pump   Fiasp insulin   Omnipod 5   Insulin pump settings--  Basal: 0.8 units/hr    ICR   MN-12PM- 1:7.5- tightened  12PM- MN- 1:7.-tightened  Tightened her insulin to carbohydrate ratio  Walked her through the change during the visit today  she felt comfortable making the change  Needs more prandial coverage    ISF: 1:55--    Target 120-130    IOB 3 hours --      She is using the Omnipod 5 taiwo on her phone---using the phone as the     We will also try to change her over to the beta bionic insulin pump ---resubmit in the orders today        -- Reviewed goals of therapy are to get the best control we can without hypoglycemia.  -- Reviewed patient's current insulin regimen. Clarified proper insulin dose and timing in relation to meals, etc. Insulin injection sites and proper rotation instructed.    -- Advised frequent self blood glucose monitoring.   Patient encouraged to document glucose results and bring them to every clinic visit.  Continue to use the Dexcom G6---supplies come from pharmacy  -- Hypoglycemia precautions discussed. Instructed on precautions before driving.    -- Call for Bg repeatedly < 70 or > 200.   -- Close adherence to lifestyle changes recommended.   -- Periodic follow ups for eye evaluations, foot care and dental care suggested.  ---follow up with diabetes education when she changes over to the beta bionic insulin pump         Patient has diabetes mellitus and manages diabetes with intensive insulin regimen and uses prandial and basal insulin daily  Patient requires a therapeutic CGM and is willing to use therapeutic CGM for the necessary frequent adjustments of insulin therapy.  I have completed an in-person visit during the previous 6 months and will continue to have in-person visits every 6 months to assess adherence to their CGM regimen and diabetes treatment plan.  Due to COVID pandemic and need for remote monitoring this tool is medically necessary  Hx of marked hypoglycemia with EMS activation

## 2025-05-05 NOTE — Clinical Note
1. Schedule with ye for beta bionic pump start in like 4 weeks- she does not have the supplies yet  2. See ye 1 week after pump start for follow up  3. Follow up with me in 3 months with A1c prior

## 2025-05-05 NOTE — PROGRESS NOTES
The patient location is: Home=[place]   The chief complaint leading to consultation is: Diabetes management- follow up visit     Visit type: audiovisual    Face to Face time with patient: 20 minutes   30 minutes of total time spent on the encounter, which includes face to face time and non-face to face time preparing to see the patient (eg, review of tests), Obtaining and/or reviewing separately obtained history, Documenting clinical information in the electronic or other health record, Independently interpreting results (not separately reported) and communicating results to the patient/family/caregiver, or Care coordination (not separately reported).         Each patient to whom he or she provides medical services by telemedicine is:  (1) informed of the relationship between the physician and patient and the respective role of any other health care provider with respect to management of the patient; and (2) notified that he or she may decline to receive medical services by telemedicine and may withdraw from such care at any time.    Notes:       CC:   Chief Complaint   Patient presents with    Diabetes Mellitus       HPI: Anusha Andrew is a 34 y.o. female presents for a follow up visit today for the management of T1DM       She was diagnosed with Type 1 diabetes at 17 year old. Hospitalized at the time of diagnosis and started on insulin therapy     Family hx of diabetes: brother-- T1DM     Hospitalized for diabetes:   Scare with EMS-severe hypoglycemia  12/31/22 - hypoglycemia EMS, used baqsimi  12/28/22 covid19 -- hypoglycemia as well.   H/o DKA. 10/2018 DKA/admission  DKA- 4/12    F/u with retinal specialist    No personal or FH of thyroid cancer or personal of pancreatic cancer or pancreatitis.     Gastric sleeve in July 2022  She lost about 50 lb    She reports since she had the gastric sleeve she is been suffering with issues of hypoglycemia      Our last visit was in February 2025  At that visit we discussed  okay to continue the Ozempic 1 mg weekly that her PCP had ordered  Continue the Omnipod 5 and Dexcom G6--use them consistently in automated mode  Continue the Fiasp insulin with the Omnipod 5  Adjusted her basal rate based on her pump download  Adjusted her insulin sensitivity factor  She is using the Omnipod 5 taiwo on her phone in using the phone as the   We will also try to change her over to the beta bionic pump   She changed her visit today to a virtual visit    She didn't want to change to the beta biopic until after graduation -- 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   So she wants to start it in the next 1-2 months     She never called them back to get the beta bionic supplies     Insurance is not covering the ozempic     She feels that her sugars are high off the ozempic   + prandial excursions  Hungry   Eating more      She did labs yesterday and her A1c is still pending                            DIABETES COMPLICATIONS: retinopathy      Diabetes Management Status    ASA:  No    Statin: Not taking  ACE/ARB: Not taking    The ASCVD Risk score (Anoop SHEA, et al., 2019) failed to calculate for the following reasons:    The 2019 ASCVD risk score is only valid for ages 40 to 79      Screening or Prevention Patient's value Goal Complete/Controlled?   HgA1C Testing and Control   Lab Results   Component Value Date    HGBA1C 7.3 (H) 01/25/2025      Annually/Less than 8% Yes     Lipid profile : 05/04/2025 Annually Yes     LDL control Lab Results   Component Value Date    LDLCALC 99.4 05/04/2025    Annually/Less than 100 mg/dl  No     Nephropathy screening Lab Results   Component Value Date    LABMICR 11.0 05/04/2025     Lab Results   Component Value Date    PROTEINUA Trace (A) 06/27/2024    Annually Yes     Blood pressure BP Readings from Last 1 Encounters:   04/04/25 108/60    Less than 140/90 Yes     Dilated retinal exam : 11/04/2024 Annually Yes     Foot exam   : 04/26/2023 Annually No         CURRENT  A1C:    Hemoglobin A1C   Date Value Ref Range Status   01/25/2025 7.3 (H) 4.0 - 5.6 % Final     Comment:     ADA Screening Guidelines:  5.7-6.4%  Consistent with prediabetes  >or=6.5%  Consistent with diabetes    High levels of fetal hemoglobin interfere with the HbA1C  assay. Heterozygous hemoglobin variants (HbS, HgC, etc)do  not significantly interfere with this assay.   However, presence of multiple variants may affect accuracy.     11/01/2024 7.7 (H) 4.0 - 5.6 % Final     Comment:     ADA Screening Guidelines:  5.7-6.4%  Consistent with prediabetes  >or=6.5%  Consistent with diabetes    High levels of fetal hemoglobin interfere with the HbA1C  assay. Heterozygous hemoglobin variants (HbS, HgC, etc)do  not significantly interfere with this assay.   However, presence of multiple variants may affect accuracy.     06/23/2024 7.0 (H) 4.0 - 5.6 % Final     Comment:     ADA Screening Guidelines:  5.7-6.4%  Consistent with prediabetes  >or=6.5%  Consistent with diabetes    High levels of fetal hemoglobin interfere with the HbA1C  assay. Heterozygous hemoglobin variants (HbS, HgC, etc)do  not significantly interfere with this assay.   However, presence of multiple variants may affect accuracy.         GOAL A1C: 6.5% without hypoglycemia       DM MEDICATIONS USED IN THE PAST:  humalog  levemir  novolog  lantus   Metformin   Trulicity   Ozempic   Dexcom   Fiasp   Tresiba   Omnipod 5  Had a tandem pump but never did it         CURRENT DIABETES MEDICATIONS:   Off the ozempic -- insurance would not cover it     Insulin Pump: Omnipod 5 with Fiasp     Insulin pump settings  Basal: 0.8 units/hr        ICR   MN-12PM- 1:8-   12PM- MN- 1:7.5--  ISF: 1:55-- adjusted from the settings that she had put in the pump  Target 120-130    IOB 3 hours --       Temp basals: ?    Pump site change: q 2-3 days   Cartridge change: q 2-3 days  Insulin TDD:  44.8 units    Basal 57 %   Bolus 43 %   CHO intake: 138.8 grams per day   Using the bolus  wizard: yes   Overridin%    In automated mode 99% of the time  3% automated limited   1% in manual mode       Back up Lantus/Levemir: back up Tresiba     Patient's pump was downloaded in clinic today and reviewed with patient.   Please see attached documents for pump download.     Supplies-- Pharmacy       BLOOD GLUCOSE MONITORING:    Sensor type: Dexcom G6   Average BG readin  Time in range:53%   26% high   20% very high   1% low   <1% very low   Estimated A1c 7.9%  Site change:   q10 days     2 weeks prior -- average blood sugar 189 in target range 55% of the time with an estimated A1c 7.8%    Supplies: pharmacy       Sensor was downloaded in clinic today and reviewed with patient.   Please see attached document for download.       HYPOGLYCEMIA: 1%low and <1 % very low   1% low   <1% very low   Glucagon kit: Baqsimi   Medic alert bracelet: denies         MEALS: eating 3 meals per day   Feels confident with CHO counting   Uses applications she is out eating   BF: eggs -- coffee in the AM   Lunch: salad, grilled chicken, tuna   Dinner: pasta, beans, spaghetti, sandwich   Snack: rarely   Drinks: coffee in AM   Water   Diet tea        CURRENT EXERCISE:  No      Review of Systems  Review of Systems   Constitutional:  Negative for appetite change, fatigue and unexpected weight change.   HENT:  Negative for trouble swallowing.    Eyes:  Negative for visual disturbance.   Respiratory:  Negative for shortness of breath.    Cardiovascular:  Negative for chest pain.   Gastrointestinal:  Negative for nausea.   Endocrine: Negative for polydipsia, polyphagia and polyuria.   Genitourinary:         No Nocturia    Skin:  Negative for wound.   Neurological:  Positive for headaches. Negative for numbness.       Physical Exam   Physical Exam  Vitals and nursing note reviewed.   Constitutional:       General: She is not in acute distress.     Appearance: Normal appearance. She is not ill-appearing.   HENT:      Head:  Normocephalic and atraumatic.      Nose: Nose normal.   Pulmonary:      Effort: Pulmonary effort is normal.   Neurological:      General: No focal deficit present.      Mental Status: She is alert and oriented to person, place, and time.   Psychiatric:         Mood and Affect: Mood normal.         Behavior: Behavior normal.         Thought Content: Thought content normal.         Judgment: Judgment normal.         FOOT EXAMINATION:  Deferred due to virtual visit        Lab Results   Component Value Date    TSH 2.337 05/04/2025               Type 1 diabetes mellitus with hyperglycemia  Uncontrolled   Blood sugar readings above goal on Dexcom download--see below   A1c is pending  Suspect above goal based on Dexcom download  + post prandial excursions   She feels confident with CHO counting ---she is adamant that she is entering inaccurate carbohydrates  She feels her sugars have been running higher since she has been off the Ozempic  The insurance would not cover the Ozempic because she has type 1  Feel she needs pump adjustments        However she is interested in changing to the beta bionic insulin pump---we submitted the orders at our last visit but she never got back in touch with the company to start the pump---she still never followed up with the pump company to discuss this  I will ask the staff to resubmit the orders for the beta bionic pump   I also sent a message to the beta rep     She is aware that she must start the new pump with [facility]        -- Medication Changes:    Off the Ozempic due to insurance not covering    Continue omnipod 5 and Dexcom G6 and use them consistently in auto mode ---until she transitions over to the beta bionic pump   Fiasp insulin   Omnipod 5   Insulin pump settings--  Basal: 0.8 units/hr    ICR   MN-12PM- 1:7.5- tightened  12PM- MN- 1:7.-tightened  Tightened her insulin to carbohydrate ratio  Walked her through the change during the visit today  she felt comfortable making the  change  Needs more prandial coverage    ISF: 1:55--    Target 120-130    IOB 3 hours --      She is using the Omnipod 5 taiwo on her phone---using the phone as the     We will also try to change her over to the beta bionic insulin pump ---resubmit in the orders today        -- Reviewed goals of therapy are to get the best control we can without hypoglycemia.  -- Reviewed patient's current insulin regimen. Clarified proper insulin dose and timing in relation to meals, etc. Insulin injection sites and proper rotation instructed.    -- Advised frequent self blood glucose monitoring.  Patient encouraged to document glucose results and bring them to every clinic visit.  Continue to use the Dexcom G6---supplies come from pharmacy  -- Hypoglycemia precautions discussed. Instructed on precautions before driving.    -- Call for Bg repeatedly < 70 or > 200.   -- Close adherence to lifestyle changes recommended.   -- Periodic follow ups for eye evaluations, foot care and dental care suggested.  ---follow up with diabetes education when she changes over to the beta bionic insulin pump         Patient has diabetes mellitus and manages diabetes with intensive insulin regimen and uses prandial and basal insulin daily  Patient requires a therapeutic CGM and is willing to use therapeutic CGM for the necessary frequent adjustments of insulin therapy.  I have completed an in-person visit during the previous 6 months and will continue to have in-person visits every 6 months to assess adherence to their CGM regimen and diabetes treatment plan.  Due to COVID pandemic and need for remote monitoring this tool is medically necessary  Hx of marked hypoglycemia with EMS activation       Mild nonproliferative diabetic retinopathy of both eyes without macular edema associated with type 1 diabetes mellitus  Optimize BG readings.   See above.   Continue to follow-up with optometry as directed    History of diabetes with ketoacidosis  Of  note     Overweight (BMI 25.0-29.9)  Body mass index is 26.57 kg/m².  Increases insulin resistance.   Discussed DM diet and exercise.       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  Has Nanyi   Continue to use dexcom -- discussed the importance of consistently using the dexcom     Reviewed hypoglycemia management    Insulin pump in place  See above     Insulin pump fitting or adjustment  See above     Vitamin D deficiency  On ergo weekly     Vitamin D   Date Value Ref Range Status   05/04/2025 23 (L) 30 - 96 ng/mL Final     Comment:     Vitamin D deficiency.........<10 ng/mL                              Vitamin D insufficiency......10-29 ng/mL       Vitamin D sufficiency........> or equal to 30 ng/mL  Vitamin D toxicity............>100 ng/mL       Vit D, 25-Hydroxy   Date Value Ref Range Status   11/01/2024 29 (L) 30 - 96 ng/mL Final     Comment:     Vitamin D deficiency.........<10 ng/mL                              Vitamin D insufficiency......10-29 ng/mL       Vitamin D sufficiency........> or equal to 30 ng/mL  Vitamin D toxicity............>100 ng/mL                   I spent a total of 30 minutes on the day of the visit.This includes face to face time and non-face to face time preparing to see the patient (eg, review of tests), obtaining and/or reviewing separately obtained history, documenting clinical information in the electronic or other health record, independently interpreting results and communicating results to the patient/family/caregiver, or care coordinator.          Pump backup plan    If the insulin pump is non functional and discontinued for anticipated more than 20 hours, please give daily injections of:   Long acting insulin Tresiba 14 units daily -- can split to 7 units BID   Short acting insulin Fiasp- 1:9-1:8  for meals according to carb ratios and sensitivity factor in the pump.     With using correction scale:    150-200: +1 unit  201-250: +2 units  251-300: +3 units  301-350: +4 units  >350: +5 units        When the insulin pump is restarted, do not restart basal rates until at least 22 hours after the last long acting insulin injection. You can set a 0% temporary basal setting that will last until this time and use your pump to bolus for meals and correction.     For any technical insulin pump issues, please contact the insulin pump company; the toll free number is printed on the label on the back of the insulin pump.       If your sugar is running high for a few hours and does not respond to two correction doses from the insulin pump, it may mean that you have a bad pump site and the site should be changed ,p          Follow up in about 3 months (around 8/5/2025).  1. Schedule with ye for beta bionic pump start in like 4 weeks- she does not have the supplies yet   2. See ye 1 week after pump start for follow up   3. Follow up with me in 3 months with A1c prior         Orders Placed This Encounter   Procedures    Hemoglobin A1C     Standing Status:   Future     Expected Date:   8/5/2025     Expiration Date:   11/5/2026    Ambulatory referral/consult to Diabetes Education     Standing Status:   Future     Expected Date:   5/5/2025     Expiration Date:   11/5/2026     Referral Priority:   Routine     Referral Type:   Consultation     Referral Reason:   Specialty Services Required     Referred to Provider:   Ye Maher, RD, [facility]     Requested Specialty:   Diabetes     Number of Visits Requested:   1       Recommendations were discussed with the patient in detail  The patient verbalized understanding and agrees with the plan outlined as above.     This note was partly generated with [employer] voice recognition software. I apologize for any possible typographical errors.

## 2025-05-05 NOTE — ASSESSMENT & PLAN NOTE
On ergo weekly     Vitamin D   Date Value Ref Range Status   05/04/2025 23 (L) 30 - 96 ng/mL Final     Comment:     Vitamin D deficiency.........<10 ng/mL                              Vitamin D insufficiency......10-29 ng/mL       Vitamin D sufficiency........> or equal to 30 ng/mL  Vitamin D toxicity............>100 ng/mL       Vit D, 25-Hydroxy   Date Value Ref Range Status   11/01/2024 29 (L) 30 - 96 ng/mL Final     Comment:     Vitamin D deficiency.........<10 ng/mL                              Vitamin D insufficiency......10-29 ng/mL       Vitamin D sufficiency........> or equal to 30 ng/mL  Vitamin D toxicity............>100 ng/mL

## 2025-05-06 ENCOUNTER — PATIENT MESSAGE (OUTPATIENT)
Dept: DIABETES | Facility: CLINIC | Age: 35
End: 2025-05-06
Payer: MEDICAID

## 2025-05-09 ENCOUNTER — OFFICE VISIT (OUTPATIENT)
Dept: OBSTETRICS AND GYNECOLOGY | Facility: CLINIC | Age: 35
End: 2025-05-09
Payer: MEDICAID

## 2025-05-09 VITALS
DIASTOLIC BLOOD PRESSURE: 64 MMHG | SYSTOLIC BLOOD PRESSURE: 110 MMHG | HEIGHT: 63 IN | WEIGHT: 161.19 LBS | BODY MASS INDEX: 28.56 KG/M2

## 2025-05-09 DIAGNOSIS — Z11.3 SCREENING EXAMINATION FOR STD (SEXUALLY TRANSMITTED DISEASE): ICD-10-CM

## 2025-05-09 DIAGNOSIS — Z12.4 ENCOUNTER FOR SCREENING FOR CERVICAL CANCER: ICD-10-CM

## 2025-05-09 DIAGNOSIS — Z11.51 ENCOUNTER FOR SCREENING FOR HUMAN PAPILLOMAVIRUS (HPV): ICD-10-CM

## 2025-05-09 DIAGNOSIS — Z01.419 ENCOUNTER FOR WELL WOMAN EXAM WITH ROUTINE GYNECOLOGICAL EXAM: Primary | ICD-10-CM

## 2025-05-09 PROCEDURE — 87624 HPV HI-RISK TYP POOLED RSLT: CPT

## 2025-05-09 PROCEDURE — 3074F SYST BP LT 130 MM HG: CPT | Mod: CPTII,,,

## 2025-05-09 PROCEDURE — 99999 PR PBB SHADOW E&M-EST. PATIENT-LVL III: CPT | Mod: PBBFAC,,,

## 2025-05-09 PROCEDURE — 3061F NEG MICROALBUMINURIA REV: CPT | Mod: CPTII,,,

## 2025-05-09 PROCEDURE — 3008F BODY MASS INDEX DOCD: CPT | Mod: CPTII,,,

## 2025-05-09 PROCEDURE — 87491 CHLMYD TRACH DNA AMP PROBE: CPT

## 2025-05-09 PROCEDURE — 3044F HG A1C LEVEL LT 7.0%: CPT | Mod: CPTII,,,

## 2025-05-09 PROCEDURE — 3078F DIAST BP <80 MM HG: CPT | Mod: CPTII,,,

## 2025-05-09 PROCEDURE — 88175 CYTOPATH C/V AUTO FLUID REDO: CPT | Mod: TC

## 2025-05-09 PROCEDURE — 99395 PREV VISIT EST AGE 18-39: CPT | Mod: S$PBB,,,

## 2025-05-09 PROCEDURE — 99213 OFFICE O/P EST LOW 20 MIN: CPT | Mod: PBBFAC

## 2025-05-09 PROCEDURE — 3066F NEPHROPATHY DOC TX: CPT | Mod: CPTII,,,

## 2025-05-09 PROCEDURE — 1159F MED LIST DOCD IN RCRD: CPT | Mod: CPTII,,,

## 2025-05-09 NOTE — PROGRESS NOTES
"  OBSTETRICS AND GYNECOLOGY      Chief Complaint:  Well Woman Exam     HPI:      Anusha Andrew is a 34 y.o.  who presents today for well woman exam.  LMP: Patient's last menstrual period was 2025 (exact date). Hx of BTL and endometrial ablation in . Reports she has been having cyclic spotting. Sometimes old blood sometimes new blood.  Very light to the point where she almost doesn't need any form of menstrual hygiene products. Denies any prolonged spotting or spotting more than once a month. Wondering if this is normal. No issues, problems, or other concerns. Specifically, patient denies abnormal vaginal discharge/odor, pelvic pain, or dysuria/hematuria. Ms. Andrew is currently sexually active with a single male partner. She is currently using bilateral tubal ligation for contraception. She would like STD screening today.     Previous Pap: NILM (2022)    Gardasil:Completed   Ms. Andrew confirms that she wears her seatbelt when riding in the car and does not text while driving.     FH:  Breast cancer: none  Colon cancer: none  Ovarian cancer: none  Endometrial cancer: none    OB History          2    Para   2    Term   1       1    AB        Living   2         SAB        IAB        Ectopic        Multiple        Live Births   2           Obstetric Comments   Gestational HTN, failed induction, not past 6 cm  Menarche ~12               ROS:     GENERAL: Feeling well overall.   SKIN: Denies rash.   HEENT: Denies headaches or vision changes.   CARDIOVASCULAR: Denies chest pain.   RESP: Denies shortness of breath.  BREASTS: Denies lumps or nipple discharge.   ABD: Denies constipation, diarrhea, nausea, vomiting.  URINARY: Denies dysuria, hematuria.  NEUROLOGIC: Denies syncope, falls.     Physical Exam:      PHYSICAL EXAM:  /64 (BP Location: Left arm, Patient Position: Sitting)   Ht 5' 3" (1.6 m)   Wt 73.1 kg (161 lb 2.5 oz)   LMP 2025 (Exact Date)   BMI 28.55 kg/m² "   Body mass index is 28.55 kg/m².     APPEARANCE:  Well nourished, well developed, in no acute distress.  Able to smile appropriately during our encounter. Makes eye contact. Pleasant.  PSYCH: Appropriate mood and affect.  SKIN:   No acne or hirsutism.  CARDIOVASCULAR:  No edema of peripheral extremities. Well perfused throughout.  RESP:  No accessory muscle use to breathe. Speaking comfortably in complete sentences.   BREASTS:  Symmetrical, with no visible skin lesions or scars, no palpable masses. No nipple discharge or peau d'orange bilaterally. No palpable axillary LAD.  ABDOMEN:  Soft. No tenderness or masses.    PELVIC:  Normal external genitalia without lesions. Normal hair distribution. Adequate perineal body, normal urethral meatus. Vagina moist and well rugated. Without lesions, without discharge. Cervix pink, without ectocervical lesions, discharge or tenderness.  No ectropion. No significant cystocele or rectocele.  Bimanual exam shows uterus to be normal size, regular, mobile and nontender.  Adnexa without masses or tenderness.    Gyn note:    A female chaperone was present for the breast and pelvic exam.     Assessment/Plan:     Encounter for well woman exam with routine gynecological exam        -     Counseled patient regarding healthy diet and regular exercise, daily multivitamin, daily seat belt use.         -     BP normotensive        -     She denies abuse and feels safe at home        -     Pap smear:  Performed         -     STD screening:  [facility] collected    Screening examination for STD (sexually transmitted disease)  -     C. trachomatis/N. gonorrhoeae by AMP DNA [facility]; Cervicovaginal    Encounter for screening for cervical cancer  -     Liquid-Based Pap Smear, Screening    Encounter for screening for human papillomavirus (HPV)  -     Liquid-Based Pap Smear, Screening    Discussed very light cyclic bleeding pattern can be normal post ablation procedure and that procedure itself is not 100%  effective at completely resolving menstrual bleeding.  Reviewed abnormal bleeding patterns and discussed f/u if any significant changes occur.    Follow up in 1 year for annual or sooner PRN.    Counseling:     Patient was counseled today on current ASCCP pap guidelines, the recommendation for yearly physical exams, safe driving habits, and breast self awareness. She is to see her PCP for other health maintenance.     Use of the [employer] Patient Portal discussed and encouraged during today's visit.     Samira Chin (Maggie), PO  Obstetrics and Gynecology  [facility]

## 2025-05-12 LAB
C TRACH DNA SPEC QL NAA+PROBE: NOT DETECTED
CTGC SOURCE (OHS) ORD-325: NORMAL
N GONORRHOEA DNA UR QL NAA+PROBE: NOT DETECTED

## 2025-05-15 ENCOUNTER — PATIENT MESSAGE (OUTPATIENT)
Dept: OBSTETRICS AND GYNECOLOGY | Facility: CLINIC | Age: 35
End: 2025-05-15
Payer: MEDICAID

## 2025-05-15 LAB
INSULIN SERPL-ACNC: NORMAL U[IU]/ML
LAB AP BETHESDA CATEGORY: NORMAL
LAB AP CLINICAL FINDINGS: NORMAL
LAB AP CONTRACEPTIVES: NORMAL
LAB AP GYN ADDITIONAL FINDINGS: NORMAL
LAB AP LMP DATE: NORMAL
LAB AP OCHS PAP SPECIMEN ADEQUACY: NORMAL
LAB AP OHS PAP INTERPRETATION: NORMAL
LAB AP PAP DISCLAIMER COMMENTS: NORMAL
LAB AP PAP ESTROGEN REPLACEMENT THERAPY: NORMAL
LAB AP PAP PMP: NORMAL
LAB AP PAP PREVIOUS BX: NORMAL
LAB AP PAP PRIOR TREATMENT: NORMAL
LAB AP PERFORMING LOCATION(S): NORMAL

## 2025-05-17 LAB
HPV DNA, HIGH RISK TYPE 16, PCR (OHS): NEGATIVE
HPV DNA, HIGH RISK TYPE 18, PCR (OHS): NEGATIVE
HPV DNA, HIGH RISK TYPE OTHER, PCR (OHS): NEGATIVE

## 2025-05-23 ENCOUNTER — TELEPHONE (OUTPATIENT)
Dept: DIABETES | Facility: CLINIC | Age: 35
End: 2025-05-23
Payer: MEDICAID

## (undated) DEVICE — SOL 9P NACL IRR PIC IL

## (undated) DEVICE — SUT MCRYL PLUS 4-0 PS2 27IN

## (undated) DEVICE — BANDAGE MATRIX HK LOOP 2IN 5YD

## (undated) DEVICE — SYR 10CC LUER LOCK

## (undated) DEVICE — STOCKINET 4INX48

## (undated) DEVICE — BANDAGE ESMARK ELASTIC ST 4X9

## (undated) DEVICE — BLADE SURG #15 CARBON STEEL

## (undated) DEVICE — PAD PREP CUFFED NS 24X48IN

## (undated) DEVICE — GAUZE AVANT SPNG 4PLY STRL 4X4

## (undated) DEVICE — SUT MONOCRYL 4-0 PS-2

## (undated) DEVICE — Device

## (undated) DEVICE — TOURNIQUET SB QC DP 18X4IN

## (undated) DEVICE — SEE MEDLINE ITEM 146313

## (undated) DEVICE — ADHESIVE DERMABOND ADVANCED

## (undated) DEVICE — KIT WING PAD POSITIONING

## (undated) DEVICE — PACK ECLIPSE BASIC III SURG

## (undated) DEVICE — SYR B-D DISP CONTROL 10CC100/C

## (undated) DEVICE — SOL IRR NACL .9% 3000ML

## (undated) DEVICE — TOWEL OR DISP STRL BLUE 4/PK

## (undated) DEVICE — APPLICATOR CHLORAPREP ORN 26ML

## (undated) DEVICE — PAD CAST SPECIALIST STRL 3

## (undated) DEVICE — BAG TISSUE RETRIEVAL 225ML

## (undated) DEVICE — PACK BASIC

## (undated) DEVICE — GLOVE SURG BIOGEL LATEX SZ 7.5

## (undated) DEVICE — SPLINT WRIST FOAM 8.5IN LG/RT

## (undated) DEVICE — PACK LAPAROSCOPY BAPTIST

## (undated) DEVICE — IRRIGATOR ENDOSCOPY DISP.

## (undated) DEVICE — PENCIL ELECTROSURG HOLST W/BLD

## (undated) DEVICE — ALCOHOL 70% ISOP W/GREEN 16OZ

## (undated) DEVICE — SOL IRR SOD CHL .9% POUR

## (undated) DEVICE — BLADE SCALP OPHTL BEVEL STR

## (undated) DEVICE — TROCAR ENDOPATH XCEL 5X100MM

## (undated) DEVICE — DRAPE HAND STERILE

## (undated) DEVICE — GOWN POLY REINF BRTH SLV LG

## (undated) DEVICE — BANDAGE ELASTIC 3IN ACE NS

## (undated) DEVICE — SUT ETHILON 5-0 PS-2 18IN

## (undated) DEVICE — NDL HYPO REG 25G X 1 1/2

## (undated) DEVICE — DRESSING XEROFORM NONADH 1X8IN

## (undated) DEVICE — HYSTEROSCOPE TRUCLEAR ELITE

## (undated) DEVICE — COVER OVERHEAD SURG LT BLUE

## (undated) DEVICE — PAD CAST 2 IN X 4YDS STERILE

## (undated) DEVICE — DRESSING XEROFORM FOIL PK 1X8

## (undated) DEVICE — PACK SURGERY START

## (undated) DEVICE — SUT MONOCYRL 4-0 PS2 UND

## (undated) DEVICE — STRIP WOUND PK BIGUANIDE 36X.5

## (undated) DEVICE — BANDAGE MATRIX HK LOOP 3IN 5YD

## (undated) DEVICE — ELECTRODE REM PLYHSV RETURN 9

## (undated) DEVICE — GOWN POLY REINF BRTH SLV XL

## (undated) DEVICE — GLOVE SENSICARE PI SURG 6

## (undated) DEVICE — SPONGE COTTON TRAY 4X4IN

## (undated) DEVICE — SUT VICRYL+ 27 UR-6 VIOL

## (undated) DEVICE — TRAY VAGINAL WET PREP

## (undated) DEVICE — MANIFOLD 4 PORT

## (undated) DEVICE — SOL POVIDONE PREP IODINE 4 OZ

## (undated) DEVICE — NDL INSUFFLATION VERRES 120MM

## (undated) DEVICE — DRAPE UND BUTT W/POUCH 40X44IN

## (undated) DEVICE — GLOVE SENSICARE PI MICRO 7.5

## (undated) DEVICE — SEE L#120831

## (undated) DEVICE — SET INFLOW TUBE HYSTER

## (undated) DEVICE — DEVICE TRUCLEAR ULTRA MINI

## (undated) DEVICE — SET BASIN 48X48IN 6000ML RING

## (undated) DEVICE — CLIPPER BLADE MOD 4406 (CAREF)

## (undated) DEVICE — TRAY DRY SKIN SCRUB PREP

## (undated) DEVICE — SYS SEE SHARP SCP ANTIFG LNG

## (undated) DEVICE — STOCKINET TUBULAR 1 PLY 6X60IN

## (undated) DEVICE — SEAL LENS SCOPE MYOSURE

## (undated) DEVICE — GLOVE BIOGEL SKINSENSE PI 6.5

## (undated) DEVICE — SPLINT WRIST FOAM 8.8IN LG/LFT

## (undated) DEVICE — PAD CAST SPECIALIST 2X4

## (undated) DEVICE — SOL POVIDONE SCRUB IODINE 4 OZ

## (undated) DEVICE — SOL NORMAL USPCA 0.9%

## (undated) DEVICE — SEE MEDLINE ITEM 154981

## (undated) DEVICE — SOL IRR 0.9% NACL 500ML PB

## (undated) DEVICE — JELLY SURGILUBE 5GR

## (undated) DEVICE — GAUZE SPONGE 4X4 12PLY

## (undated) DEVICE — PACK FLUENT DISPOSABLE

## (undated) DEVICE — CANNULA ENDOPATH XCEL 5X100MM